# Patient Record
Sex: MALE | Race: BLACK OR AFRICAN AMERICAN | NOT HISPANIC OR LATINO | Employment: OTHER | URBAN - NONMETROPOLITAN AREA
[De-identification: names, ages, dates, MRNs, and addresses within clinical notes are randomized per-mention and may not be internally consistent; named-entity substitution may affect disease eponyms.]

---

## 2024-01-01 ENCOUNTER — HOSPITAL ENCOUNTER (INPATIENT)
Facility: HOSPITAL | Age: 34
LOS: 23 days | DRG: 853 | End: 2025-01-07
Attending: EMERGENCY MEDICINE | Admitting: INTERNAL MEDICINE
Payer: MEDICARE

## 2024-01-01 DIAGNOSIS — J18.9 PNEUMONIA OF RIGHT LOWER LOBE DUE TO INFECTIOUS ORGANISM: ICD-10-CM

## 2024-01-01 DIAGNOSIS — A41.9 SEPSIS: Primary | ICD-10-CM

## 2024-01-01 DIAGNOSIS — S31.000S SACRAL WOUND, SEQUELA: ICD-10-CM

## 2024-01-01 DIAGNOSIS — T83.511A URINARY TRACT INFECTION ASSOCIATED WITH INDWELLING URETHRAL CATHETER, INITIAL ENCOUNTER: ICD-10-CM

## 2024-01-01 DIAGNOSIS — N39.0 URINARY TRACT INFECTION ASSOCIATED WITH INDWELLING URETHRAL CATHETER, INITIAL ENCOUNTER: ICD-10-CM

## 2024-01-01 LAB
ABO + RH BLD: NORMAL
ABO + RH BLD: NORMAL
AC: 12
AC: 18
AC: 22
AC: 22
ALBUMIN SERPL BCP-MCNC: 0.6 G/DL (ref 3.5–5.2)
ALBUMIN SERPL BCP-MCNC: 0.7 G/DL (ref 3.5–5.2)
ALBUMIN SERPL BCP-MCNC: 0.7 G/DL (ref 3.5–5.2)
ALBUMIN SERPL BCP-MCNC: <0.6 G/DL (ref 3.5–5.2)
ALP SERPL-CCNC: 326 U/L (ref 55–135)
ALP SERPL-CCNC: 326 U/L (ref 55–135)
ALP SERPL-CCNC: 328 U/L (ref 55–135)
ALP SERPL-CCNC: 363 U/L (ref 55–135)
ALP SERPL-CCNC: 386 U/L (ref 55–135)
ALP SERPL-CCNC: 395 U/L (ref 55–135)
ALP SERPL-CCNC: 412 U/L (ref 55–135)
ALP SERPL-CCNC: 439 U/L (ref 55–135)
ALP SERPL-CCNC: 461 U/L (ref 55–135)
ALP SERPL-CCNC: 471 U/L (ref 55–135)
ALP SERPL-CCNC: 482 U/L (ref 55–135)
ALP SERPL-CCNC: 503 U/L (ref 55–135)
ALP SERPL-CCNC: 503 U/L (ref 55–135)
ALP SERPL-CCNC: 527 U/L (ref 55–135)
ALP SERPL-CCNC: 558 U/L (ref 55–135)
ALP SERPL-CCNC: 623 U/L (ref 55–135)
ALP SERPL-CCNC: 680 U/L (ref 55–135)
ALT SERPL W/O P-5'-P-CCNC: 10 U/L (ref 10–44)
ALT SERPL W/O P-5'-P-CCNC: 11 U/L (ref 10–44)
ALT SERPL W/O P-5'-P-CCNC: 15 U/L (ref 10–44)
ALT SERPL W/O P-5'-P-CCNC: 16 U/L (ref 10–44)
ALT SERPL W/O P-5'-P-CCNC: 17 U/L (ref 10–44)
ALT SERPL W/O P-5'-P-CCNC: 22 U/L (ref 10–44)
ALT SERPL W/O P-5'-P-CCNC: 22 U/L (ref 10–44)
ALT SERPL W/O P-5'-P-CCNC: 6 U/L (ref 10–44)
ALT SERPL W/O P-5'-P-CCNC: 6 U/L (ref 10–44)
ALT SERPL W/O P-5'-P-CCNC: 8 U/L (ref 10–44)
ALT SERPL W/O P-5'-P-CCNC: 9 U/L (ref 10–44)
ALT SERPL W/O P-5'-P-CCNC: 9 U/L (ref 10–44)
ALT SERPL W/O P-5'-P-CCNC: <6 U/L (ref 10–44)
ALT SERPL W/O P-5'-P-CCNC: <6 U/L (ref 10–44)
ANION GAP SERPL CALC-SCNC: -2 MMOL/L (ref 3–11)
ANION GAP SERPL CALC-SCNC: -2 MMOL/L (ref 3–11)
ANION GAP SERPL CALC-SCNC: 0 MMOL/L (ref 3–11)
ANION GAP SERPL CALC-SCNC: 1 MMOL/L (ref 3–11)
ANION GAP SERPL CALC-SCNC: 2 MMOL/L (ref 3–11)
ANION GAP SERPL CALC-SCNC: 4 MMOL/L (ref 3–11)
ANION GAP SERPL CALC-SCNC: 5 MMOL/L (ref 3–11)
ANION GAP SERPL CALC-SCNC: 9 MMOL/L (ref 3–11)
AST SERPL-CCNC: 105 U/L (ref 10–40)
AST SERPL-CCNC: 18 U/L (ref 10–40)
AST SERPL-CCNC: 18 U/L (ref 10–40)
AST SERPL-CCNC: 20 U/L (ref 10–40)
AST SERPL-CCNC: 23 U/L (ref 10–40)
AST SERPL-CCNC: 24 U/L (ref 10–40)
AST SERPL-CCNC: 25 U/L (ref 10–40)
AST SERPL-CCNC: 25 U/L (ref 10–40)
AST SERPL-CCNC: 27 U/L (ref 10–40)
AST SERPL-CCNC: 33 U/L (ref 10–40)
AST SERPL-CCNC: 34 U/L (ref 10–40)
AST SERPL-CCNC: 38 U/L (ref 10–40)
AST SERPL-CCNC: 41 U/L (ref 10–40)
AST SERPL-CCNC: 41 U/L (ref 10–40)
AST SERPL-CCNC: 44 U/L (ref 10–40)
BACTERIA #/AREA URNS HPF: ABNORMAL /HPF
BACTERIA BLD CULT: NORMAL
BACTERIA BLD CULT: NORMAL
BACTERIA SPEC AEROBE CULT: ABNORMAL
BACTERIA UR CULT: ABNORMAL
BASOPHILS # BLD AUTO: 0.03 K/UL (ref 0–0.2)
BASOPHILS # BLD AUTO: 0.04 K/UL (ref 0–0.2)
BASOPHILS # BLD AUTO: 0.06 K/UL (ref 0–0.2)
BASOPHILS # BLD AUTO: 0.07 K/UL (ref 0–0.2)
BASOPHILS # BLD AUTO: 0.08 K/UL (ref 0–0.2)
BASOPHILS # BLD AUTO: 0.12 K/UL (ref 0–0.2)
BASOPHILS # BLD AUTO: 0.13 K/UL (ref 0–0.2)
BASOPHILS # BLD AUTO: ABNORMAL K/UL (ref 0–0.2)
BASOPHILS # BLD AUTO: ABNORMAL K/UL (ref 0–0.2)
BASOPHILS NFR BLD: 0 % (ref 0–1.9)
BASOPHILS NFR BLD: 0.2 % (ref 0–1.9)
BASOPHILS NFR BLD: 0.2 % (ref 0–1.9)
BASOPHILS NFR BLD: 0.3 % (ref 0–1.9)
BASOPHILS NFR BLD: 0.4 % (ref 0–1.9)
BASOPHILS NFR BLD: 0.6 % (ref 0–1.9)
BASOPHILS NFR BLD: 1 % (ref 0–1.9)
BILIRUB SERPL-MCNC: 0.2 MG/DL (ref 0.1–1)
BILIRUB SERPL-MCNC: 0.3 MG/DL (ref 0.1–1)
BILIRUB SERPL-MCNC: 0.3 MG/DL (ref 0.1–1)
BILIRUB SERPL-MCNC: 0.4 MG/DL (ref 0.1–1)
BILIRUB SERPL-MCNC: 0.5 MG/DL (ref 0.1–1)
BILIRUB SERPL-MCNC: 0.6 MG/DL (ref 0.1–1)
BILIRUB SERPL-MCNC: 0.6 MG/DL (ref 0.1–1)
BILIRUB SERPL-MCNC: 0.9 MG/DL (ref 0.1–1)
BILIRUB UR QL STRIP: NEGATIVE
BLD GP AB SCN CELLS X3 SERPL QL: NORMAL
BLD GP AB SCN CELLS X3 SERPL QL: NORMAL
BLD PROD TYP BPU: NORMAL
BLOOD UNIT EXPIRATION DATE: NORMAL
BLOOD UNIT TYPE CODE: 5100
BLOOD UNIT TYPE: NORMAL
BUN SERPL-MCNC: 10 MG/DL (ref 6–20)
BUN SERPL-MCNC: 10 MG/DL (ref 6–20)
BUN SERPL-MCNC: 12 MG/DL (ref 6–20)
BUN SERPL-MCNC: 13 MG/DL (ref 6–20)
BUN SERPL-MCNC: 13 MG/DL (ref 6–20)
BUN SERPL-MCNC: 14 MG/DL (ref 6–20)
BUN SERPL-MCNC: 16 MG/DL (ref 6–20)
BUN SERPL-MCNC: 18 MG/DL (ref 6–20)
BUN SERPL-MCNC: 19 MG/DL (ref 6–20)
BUN SERPL-MCNC: 27 MG/DL (ref 6–20)
BUN SERPL-MCNC: 40 MG/DL (ref 6–20)
BUN SERPL-MCNC: 50 MG/DL (ref 6–20)
BUN SERPL-MCNC: 52 MG/DL (ref 6–20)
BUN SERPL-MCNC: 57 MG/DL (ref 6–20)
BUN SERPL-MCNC: 66 MG/DL (ref 6–20)
CALCIUM SERPL-MCNC: 10 MG/DL (ref 8.7–10.5)
CALCIUM SERPL-MCNC: 10 MG/DL (ref 8.7–10.5)
CALCIUM SERPL-MCNC: 10.2 MG/DL (ref 8.7–10.5)
CALCIUM SERPL-MCNC: 10.2 MG/DL (ref 8.7–10.5)
CALCIUM SERPL-MCNC: 10.5 MG/DL (ref 8.7–10.5)
CALCIUM SERPL-MCNC: 10.7 MG/DL (ref 8.7–10.5)
CALCIUM SERPL-MCNC: 10.8 MG/DL (ref 8.7–10.5)
CALCIUM SERPL-MCNC: 11 MG/DL (ref 8.7–10.5)
CALCIUM SERPL-MCNC: 9 MG/DL (ref 8.7–10.5)
CALCIUM SERPL-MCNC: 9.1 MG/DL (ref 8.7–10.5)
CALCIUM SERPL-MCNC: 9.1 MG/DL (ref 8.7–10.5)
CALCIUM SERPL-MCNC: 9.2 MG/DL (ref 8.7–10.5)
CALCIUM SERPL-MCNC: 9.4 MG/DL (ref 8.7–10.5)
CALCIUM SERPL-MCNC: 9.6 MG/DL (ref 8.7–10.5)
CALCIUM SERPL-MCNC: 9.7 MG/DL (ref 8.7–10.5)
CALCIUM SERPL-MCNC: 9.8 MG/DL (ref 8.7–10.5)
CALCIUM SERPL-MCNC: 9.8 MG/DL (ref 8.7–10.5)
CAOX CRY URNS QL MICRO: ABNORMAL
CHLORIDE SERPL-SCNC: 100 MMOL/L (ref 95–110)
CHLORIDE SERPL-SCNC: 102 MMOL/L (ref 95–110)
CHLORIDE SERPL-SCNC: 103 MMOL/L (ref 95–110)
CHLORIDE SERPL-SCNC: 104 MMOL/L (ref 95–110)
CHLORIDE SERPL-SCNC: 105 MMOL/L (ref 95–110)
CHLORIDE SERPL-SCNC: 105 MMOL/L (ref 95–110)
CHLORIDE SERPL-SCNC: 106 MMOL/L (ref 95–110)
CHLORIDE SERPL-SCNC: 106 MMOL/L (ref 95–110)
CHLORIDE SERPL-SCNC: 107 MMOL/L (ref 95–110)
CHLORIDE SERPL-SCNC: 108 MMOL/L (ref 95–110)
CHLORIDE SERPL-SCNC: 97 MMOL/L (ref 95–110)
CHLORIDE SERPL-SCNC: 98 MMOL/L (ref 95–110)
CHOLEST SERPL-MCNC: 97 MG/DL (ref 120–199)
CHOLEST/HDLC SERPL: 2.6 {RATIO} (ref 2–5)
CLARITY UR: ABNORMAL
CO2 SERPL-SCNC: 24 MMOL/L (ref 23–29)
CO2 SERPL-SCNC: 25 MMOL/L (ref 23–29)
CO2 SERPL-SCNC: 27 MMOL/L (ref 23–29)
CO2 SERPL-SCNC: 28 MMOL/L (ref 23–29)
CO2 SERPL-SCNC: 28 MMOL/L (ref 23–29)
CO2 SERPL-SCNC: 29 MMOL/L (ref 23–29)
CO2 SERPL-SCNC: 30 MMOL/L (ref 23–29)
CO2 SERPL-SCNC: 31 MMOL/L (ref 23–29)
CO2 SERPL-SCNC: 32 MMOL/L (ref 23–29)
CO2 SERPL-SCNC: 33 MMOL/L (ref 23–29)
CODING SYSTEM: NORMAL
COLOR UR: YELLOW
COMMENTS: ABNORMAL
CREAT SERPL-MCNC: 0.8 MG/DL (ref 0.5–1.4)
CREAT SERPL-MCNC: 0.9 MG/DL (ref 0.5–1.4)
CREAT SERPL-MCNC: 0.9 MG/DL (ref 0.5–1.4)
CREAT SERPL-MCNC: 1 MG/DL (ref 0.5–1.4)
CREAT SERPL-MCNC: 1.1 MG/DL (ref 0.5–1.4)
CREAT SERPL-MCNC: 1.2 MG/DL (ref 0.5–1.4)
CREAT SERPL-MCNC: 1.3 MG/DL (ref 0.5–1.4)
CREAT SERPL-MCNC: 1.4 MG/DL (ref 0.5–1.4)
CREAT SERPL-MCNC: 1.5 MG/DL (ref 0.5–1.4)
CREAT SERPL-MCNC: 1.5 MG/DL (ref 0.5–1.4)
CREAT SERPL-MCNC: 1.6 MG/DL (ref 0.5–1.4)
CROSSMATCH INTERPRETATION: NORMAL
CTP QC/QA: YES
CTP QC/QA: YES
DIFFERENTIAL METHOD BLD: ABNORMAL
DISPENSE STATUS: NORMAL
EOSINOPHIL # BLD AUTO: 0.5 K/UL (ref 0–0.5)
EOSINOPHIL # BLD AUTO: 0.5 K/UL (ref 0–0.5)
EOSINOPHIL # BLD AUTO: 0.6 K/UL (ref 0–0.5)
EOSINOPHIL # BLD AUTO: 0.8 K/UL (ref 0–0.5)
EOSINOPHIL # BLD AUTO: 0.9 K/UL (ref 0–0.5)
EOSINOPHIL # BLD AUTO: 0.9 K/UL (ref 0–0.5)
EOSINOPHIL # BLD AUTO: 1 K/UL (ref 0–0.5)
EOSINOPHIL # BLD AUTO: 1.1 K/UL (ref 0–0.5)
EOSINOPHIL # BLD AUTO: 1.1 K/UL (ref 0–0.5)
EOSINOPHIL # BLD AUTO: 1.3 K/UL (ref 0–0.5)
EOSINOPHIL # BLD AUTO: 1.7 K/UL (ref 0–0.5)
EOSINOPHIL # BLD AUTO: ABNORMAL K/UL (ref 0–0.5)
EOSINOPHIL # BLD AUTO: ABNORMAL K/UL (ref 0–0.5)
EOSINOPHIL NFR BLD: 1.7 % (ref 0–8)
EOSINOPHIL NFR BLD: 2 % (ref 0–8)
EOSINOPHIL NFR BLD: 2.2 % (ref 0–8)
EOSINOPHIL NFR BLD: 3 % (ref 0–8)
EOSINOPHIL NFR BLD: 3.5 % (ref 0–8)
EOSINOPHIL NFR BLD: 4.6 % (ref 0–8)
EOSINOPHIL NFR BLD: 5.3 % (ref 0–8)
EOSINOPHIL NFR BLD: 5.6 % (ref 0–8)
EOSINOPHIL NFR BLD: 6.1 % (ref 0–8)
EOSINOPHIL NFR BLD: 6.2 % (ref 0–8)
EOSINOPHIL NFR BLD: 6.5 % (ref 0–8)
EOSINOPHIL NFR BLD: 7.1 % (ref 0–8)
EOSINOPHIL NFR BLD: 7.1 % (ref 0–8)
EOSINOPHIL NFR BLD: 7.5 % (ref 0–8)
EOSINOPHIL NFR BLD: 8 % (ref 0–8)
EOSINOPHIL NFR BLD: 8.3 % (ref 0–8)
EOSINOPHIL NFR BLD: 9.6 % (ref 0–8)
ERYTHROCYTE [DISTWIDTH] IN BLOOD BY AUTOMATED COUNT: 15.6 % (ref 11.5–14.5)
ERYTHROCYTE [DISTWIDTH] IN BLOOD BY AUTOMATED COUNT: 15.7 % (ref 11.5–14.5)
ERYTHROCYTE [DISTWIDTH] IN BLOOD BY AUTOMATED COUNT: 15.7 % (ref 11.5–14.5)
ERYTHROCYTE [DISTWIDTH] IN BLOOD BY AUTOMATED COUNT: 15.9 % (ref 11.5–14.5)
ERYTHROCYTE [DISTWIDTH] IN BLOOD BY AUTOMATED COUNT: 15.9 % (ref 11.5–14.5)
ERYTHROCYTE [DISTWIDTH] IN BLOOD BY AUTOMATED COUNT: 16 % (ref 11.5–14.5)
ERYTHROCYTE [DISTWIDTH] IN BLOOD BY AUTOMATED COUNT: 16.1 % (ref 11.5–14.5)
ERYTHROCYTE [DISTWIDTH] IN BLOOD BY AUTOMATED COUNT: 16.1 % (ref 11.5–14.5)
ERYTHROCYTE [DISTWIDTH] IN BLOOD BY AUTOMATED COUNT: 16.2 % (ref 11.5–14.5)
ERYTHROCYTE [DISTWIDTH] IN BLOOD BY AUTOMATED COUNT: 16.3 % (ref 11.5–14.5)
ERYTHROCYTE [DISTWIDTH] IN BLOOD BY AUTOMATED COUNT: 16.3 % (ref 11.5–14.5)
ERYTHROCYTE [DISTWIDTH] IN BLOOD BY AUTOMATED COUNT: 16.5 % (ref 11.5–14.5)
EST. GFR  (NO RACE VARIABLE): 58 ML/MIN/1.73 M^2
EST. GFR  (NO RACE VARIABLE): >60 ML/MIN/1.73 M^2
ESTIMATED AVG GLUCOSE: 143 MG/DL (ref 68–131)
FIO2: 100 %
FIO2: 21 %
FIO2: 30 %
FIO2: 40 %
FIO2: 50 %
FIO2: 50 %
FIO2: 60 %
GENTAMICIN PEAK SERPL-MCNC: 4.5 UG/ML (ref 5–30)
GENTAMICIN PEAK SERPL-MCNC: 8.1 UG/ML (ref 5–30)
GENTAMICIN SERPL-MCNC: 0.8 UG/ML
GENTAMICIN SERPL-MCNC: 1.1 UG/ML
GENTAMICIN SERPL-MCNC: 1.5 UG/ML
GENTAMICIN SERPL-MCNC: 1.8 UG/ML
GENTAMICIN SERPL-MCNC: 2.1 UG/ML
GENTAMICIN SERPL-MCNC: 5.1 UG/ML
GENTAMICIN TROUGH SERPL-MCNC: 0.8 UG/ML (ref 0–2)
GENTAMICIN TROUGH SERPL-MCNC: 1.3 UG/ML (ref 0–2)
GENTAMICIN TROUGH SERPL-MCNC: 2.3 UG/ML (ref 0–2)
GENTAMICIN TROUGH SERPL-MCNC: 3 UG/ML (ref 0–2)
GENTAMICIN TROUGH SERPL-MCNC: 4.2 UG/ML (ref 0–2)
GLUCOSE SERPL-MCNC: 156 MG/DL (ref 70–110)
GLUCOSE SERPL-MCNC: 163 MG/DL (ref 70–110)
GLUCOSE SERPL-MCNC: 165 MG/DL (ref 70–110)
GLUCOSE SERPL-MCNC: 187 MG/DL (ref 70–110)
GLUCOSE SERPL-MCNC: 188 MG/DL (ref 70–110)
GLUCOSE SERPL-MCNC: 190 MG/DL (ref 70–110)
GLUCOSE SERPL-MCNC: 199 MG/DL (ref 70–110)
GLUCOSE SERPL-MCNC: 201 MG/DL (ref 70–110)
GLUCOSE SERPL-MCNC: 208 MG/DL (ref 70–110)
GLUCOSE SERPL-MCNC: 229 MG/DL (ref 70–110)
GLUCOSE SERPL-MCNC: 229 MG/DL (ref 70–110)
GLUCOSE SERPL-MCNC: 236 MG/DL (ref 70–110)
GLUCOSE SERPL-MCNC: 239 MG/DL (ref 70–110)
GLUCOSE SERPL-MCNC: 265 MG/DL (ref 70–110)
GLUCOSE SERPL-MCNC: 279 MG/DL (ref 70–110)
GLUCOSE SERPL-MCNC: 297 MG/DL (ref 70–110)
GLUCOSE SERPL-MCNC: 452 MG/DL (ref 70–110)
GLUCOSE SERPL-MCNC: 460 MG/DL (ref 70–110)
GLUCOSE UR QL STRIP: NEGATIVE
GRAM STN SPEC: ABNORMAL
GRAN CASTS #/AREA URNS LPF: 4 /LPF
HBA1C MFR BLD: 6.6 % (ref 4–5.6)
HCT VFR BLD AUTO: 21.2 % (ref 40–54)
HCT VFR BLD AUTO: 21.3 % (ref 40–54)
HCT VFR BLD AUTO: 22 % (ref 40–54)
HCT VFR BLD AUTO: 22.9 % (ref 40–54)
HCT VFR BLD AUTO: 23.4 % (ref 40–54)
HCT VFR BLD AUTO: 24.2 % (ref 40–54)
HCT VFR BLD AUTO: 24.5 % (ref 40–54)
HCT VFR BLD AUTO: 24.7 % (ref 40–54)
HCT VFR BLD AUTO: 24.9 % (ref 40–54)
HCT VFR BLD AUTO: 25.1 % (ref 40–54)
HCT VFR BLD AUTO: 25.2 % (ref 40–54)
HCT VFR BLD AUTO: 25.2 % (ref 40–54)
HCT VFR BLD AUTO: 25.4 % (ref 40–54)
HCT VFR BLD AUTO: 25.7 % (ref 40–54)
HCT VFR BLD AUTO: 25.9 % (ref 40–54)
HCT VFR BLD AUTO: 26.4 % (ref 40–54)
HCT VFR BLD AUTO: 26.8 % (ref 40–54)
HCT VFR BLD AUTO: 30.4 % (ref 40–54)
HDLC SERPL-MCNC: 38 MG/DL (ref 40–75)
HDLC SERPL: 39.2 % (ref 20–50)
HGB BLD-MCNC: 6.3 G/DL (ref 14–18)
HGB BLD-MCNC: 6.5 G/DL (ref 14–18)
HGB BLD-MCNC: 6.5 G/DL (ref 14–18)
HGB BLD-MCNC: 6.7 G/DL (ref 14–18)
HGB BLD-MCNC: 7.3 G/DL (ref 14–18)
HGB BLD-MCNC: 7.6 G/DL (ref 14–18)
HGB BLD-MCNC: 7.6 G/DL (ref 14–18)
HGB BLD-MCNC: 7.8 G/DL (ref 14–18)
HGB BLD-MCNC: 7.9 G/DL (ref 14–18)
HGB BLD-MCNC: 8 G/DL (ref 14–18)
HGB BLD-MCNC: 8.4 G/DL (ref 14–18)
HGB BLD-MCNC: 8.5 G/DL (ref 14–18)
HGB BLD-MCNC: 8.7 G/DL (ref 14–18)
HGB BLD-MCNC: 9.9 G/DL (ref 14–18)
HGB UR QL STRIP: ABNORMAL
HYALINE CASTS #/AREA URNS LPF: 50 /LPF
IMM GRANULOCYTES # BLD AUTO: 0.08 K/UL (ref 0–0.04)
IMM GRANULOCYTES # BLD AUTO: 0.09 K/UL (ref 0–0.04)
IMM GRANULOCYTES # BLD AUTO: 0.12 K/UL (ref 0–0.04)
IMM GRANULOCYTES # BLD AUTO: 0.13 K/UL (ref 0–0.04)
IMM GRANULOCYTES # BLD AUTO: 0.14 K/UL (ref 0–0.04)
IMM GRANULOCYTES # BLD AUTO: 0.17 K/UL (ref 0–0.04)
IMM GRANULOCYTES # BLD AUTO: 0.21 K/UL (ref 0–0.04)
IMM GRANULOCYTES # BLD AUTO: 0.21 K/UL (ref 0–0.04)
IMM GRANULOCYTES # BLD AUTO: 0.23 K/UL (ref 0–0.04)
IMM GRANULOCYTES # BLD AUTO: 0.26 K/UL (ref 0–0.04)
IMM GRANULOCYTES # BLD AUTO: 0.26 K/UL (ref 0–0.04)
IMM GRANULOCYTES # BLD AUTO: 0.28 K/UL (ref 0–0.04)
IMM GRANULOCYTES # BLD AUTO: 0.32 K/UL (ref 0–0.04)
IMM GRANULOCYTES # BLD AUTO: 0.35 K/UL (ref 0–0.04)
IMM GRANULOCYTES # BLD AUTO: 0.71 K/UL (ref 0–0.04)
IMM GRANULOCYTES # BLD AUTO: ABNORMAL K/UL (ref 0–0.04)
IMM GRANULOCYTES # BLD AUTO: ABNORMAL K/UL (ref 0–0.04)
IMM GRANULOCYTES NFR BLD AUTO: 0.5 % (ref 0–0.5)
IMM GRANULOCYTES NFR BLD AUTO: 0.7 % (ref 0–0.5)
IMM GRANULOCYTES NFR BLD AUTO: 0.8 % (ref 0–0.5)
IMM GRANULOCYTES NFR BLD AUTO: 1.1 % (ref 0–0.5)
IMM GRANULOCYTES NFR BLD AUTO: 1.1 % (ref 0–0.5)
IMM GRANULOCYTES NFR BLD AUTO: 1.2 % (ref 0–0.5)
IMM GRANULOCYTES NFR BLD AUTO: 1.2 % (ref 0–0.5)
IMM GRANULOCYTES NFR BLD AUTO: 1.3 % (ref 0–0.5)
IMM GRANULOCYTES NFR BLD AUTO: 1.5 % (ref 0–0.5)
IMM GRANULOCYTES NFR BLD AUTO: 1.6 % (ref 0–0.5)
IMM GRANULOCYTES NFR BLD AUTO: 2.1 % (ref 0–0.5)
IMM GRANULOCYTES NFR BLD AUTO: ABNORMAL % (ref 0–0.5)
IMM GRANULOCYTES NFR BLD AUTO: ABNORMAL % (ref 0–0.5)
KETONES UR QL STRIP: ABNORMAL
LACTATE SERPL-SCNC: 1.6 MMOL/L (ref 0.5–2.2)
LACTATE SERPL-SCNC: 2.3 MMOL/L (ref 0.5–2.2)
LDLC SERPL CALC-MCNC: 7.2 MG/DL (ref 63–159)
LEUKOCYTE ESTERASE UR QL STRIP: ABNORMAL
LYMPHOCYTES # BLD AUTO: 0.6 K/UL (ref 1–4.8)
LYMPHOCYTES # BLD AUTO: 0.9 K/UL (ref 1–4.8)
LYMPHOCYTES # BLD AUTO: 1 K/UL (ref 1–4.8)
LYMPHOCYTES # BLD AUTO: 1 K/UL (ref 1–4.8)
LYMPHOCYTES # BLD AUTO: 1.1 K/UL (ref 1–4.8)
LYMPHOCYTES # BLD AUTO: 1.2 K/UL (ref 1–4.8)
LYMPHOCYTES # BLD AUTO: 1.2 K/UL (ref 1–4.8)
LYMPHOCYTES # BLD AUTO: 1.3 K/UL (ref 1–4.8)
LYMPHOCYTES # BLD AUTO: 1.4 K/UL (ref 1–4.8)
LYMPHOCYTES # BLD AUTO: 1.7 K/UL (ref 1–4.8)
LYMPHOCYTES # BLD AUTO: 2 K/UL (ref 1–4.8)
LYMPHOCYTES # BLD AUTO: ABNORMAL K/UL (ref 1–4.8)
LYMPHOCYTES # BLD AUTO: ABNORMAL K/UL (ref 1–4.8)
LYMPHOCYTES NFR BLD: 10.9 % (ref 18–48)
LYMPHOCYTES NFR BLD: 12.1 % (ref 18–48)
LYMPHOCYTES NFR BLD: 12.6 % (ref 18–48)
LYMPHOCYTES NFR BLD: 3 % (ref 18–48)
LYMPHOCYTES NFR BLD: 3 % (ref 18–48)
LYMPHOCYTES NFR BLD: 3.8 % (ref 18–48)
LYMPHOCYTES NFR BLD: 4.7 % (ref 18–48)
LYMPHOCYTES NFR BLD: 4.9 % (ref 18–48)
LYMPHOCYTES NFR BLD: 5.1 % (ref 18–48)
LYMPHOCYTES NFR BLD: 5.2 % (ref 18–48)
LYMPHOCYTES NFR BLD: 5.3 % (ref 18–48)
LYMPHOCYTES NFR BLD: 6 % (ref 18–48)
LYMPHOCYTES NFR BLD: 6.5 % (ref 18–48)
LYMPHOCYTES NFR BLD: 6.7 % (ref 18–48)
LYMPHOCYTES NFR BLD: 7.1 % (ref 18–48)
LYMPHOCYTES NFR BLD: 8.9 % (ref 18–48)
LYMPHOCYTES NFR BLD: 9.4 % (ref 18–48)
Lab: ABNORMAL
MAGNESIUM SERPL-MCNC: 1.3 MG/DL (ref 1.6–2.6)
MAGNESIUM SERPL-MCNC: 1.4 MG/DL (ref 1.6–2.6)
MAGNESIUM SERPL-MCNC: 1.4 MG/DL (ref 1.6–2.6)
MAGNESIUM SERPL-MCNC: 1.5 MG/DL (ref 1.6–2.6)
MAGNESIUM SERPL-MCNC: 1.6 MG/DL (ref 1.6–2.6)
MAGNESIUM SERPL-MCNC: 1.7 MG/DL (ref 1.6–2.6)
MAGNESIUM SERPL-MCNC: 1.8 MG/DL (ref 1.6–2.6)
MAGNESIUM SERPL-MCNC: 1.9 MG/DL (ref 1.6–2.6)
MCH RBC QN AUTO: 25.9 PG (ref 27–31)
MCH RBC QN AUTO: 26.4 PG (ref 27–31)
MCH RBC QN AUTO: 26.6 PG (ref 27–31)
MCH RBC QN AUTO: 27.1 PG (ref 27–31)
MCH RBC QN AUTO: 27.2 PG (ref 27–31)
MCH RBC QN AUTO: 27.3 PG (ref 27–31)
MCH RBC QN AUTO: 27.3 PG (ref 27–31)
MCH RBC QN AUTO: 27.4 PG (ref 27–31)
MCH RBC QN AUTO: 27.4 PG (ref 27–31)
MCH RBC QN AUTO: 27.5 PG (ref 27–31)
MCH RBC QN AUTO: 27.5 PG (ref 27–31)
MCH RBC QN AUTO: 27.7 PG (ref 27–31)
MCH RBC QN AUTO: 27.7 PG (ref 27–31)
MCH RBC QN AUTO: 27.9 PG (ref 27–31)
MCH RBC QN AUTO: 28 PG (ref 27–31)
MCH RBC QN AUTO: 28 PG (ref 27–31)
MCH RBC QN AUTO: 28.2 PG (ref 27–31)
MCH RBC QN AUTO: 28.9 PG (ref 27–31)
MCHC RBC AUTO-ENTMCNC: 27.5 G/DL (ref 32–36)
MCHC RBC AUTO-ENTMCNC: 29.5 G/DL (ref 32–36)
MCHC RBC AUTO-ENTMCNC: 30.2 G/DL (ref 32–36)
MCHC RBC AUTO-ENTMCNC: 30.3 G/DL (ref 32–36)
MCHC RBC AUTO-ENTMCNC: 30.7 G/DL (ref 32–36)
MCHC RBC AUTO-ENTMCNC: 31.1 G/DL (ref 32–36)
MCHC RBC AUTO-ENTMCNC: 31.1 G/DL (ref 32–36)
MCHC RBC AUTO-ENTMCNC: 31.2 G/DL (ref 32–36)
MCHC RBC AUTO-ENTMCNC: 31.3 G/DL (ref 32–36)
MCHC RBC AUTO-ENTMCNC: 31.5 G/DL (ref 32–36)
MCHC RBC AUTO-ENTMCNC: 31.7 G/DL (ref 32–36)
MCHC RBC AUTO-ENTMCNC: 31.8 G/DL (ref 32–36)
MCHC RBC AUTO-ENTMCNC: 32.2 G/DL (ref 32–36)
MCHC RBC AUTO-ENTMCNC: 32.4 G/DL (ref 32–36)
MCHC RBC AUTO-ENTMCNC: 32.4 G/DL (ref 32–36)
MCHC RBC AUTO-ENTMCNC: 32.5 G/DL (ref 32–36)
MCHC RBC AUTO-ENTMCNC: 32.6 G/DL (ref 32–36)
MCHC RBC AUTO-ENTMCNC: 33.1 G/DL (ref 32–36)
MCV RBC AUTO: 100 FL (ref 82–98)
MCV RBC AUTO: 85 FL (ref 82–98)
MCV RBC AUTO: 86 FL (ref 82–98)
MCV RBC AUTO: 87 FL (ref 82–98)
MCV RBC AUTO: 88 FL (ref 82–98)
MCV RBC AUTO: 91 FL (ref 82–98)
MICROSCOPIC COMMENT: ABNORMAL
MODIFIED ALLEN'S TEST: ABNORMAL
MODIFIED ALLEN'S TEST: NORMAL
MODIFIED ALLEN'S TEST: POSITIVE
MONOCYTES # BLD AUTO: 0.6 K/UL (ref 0.3–1)
MONOCYTES # BLD AUTO: 0.6 K/UL (ref 0.3–1)
MONOCYTES # BLD AUTO: 0.7 K/UL (ref 0.3–1)
MONOCYTES # BLD AUTO: 0.7 K/UL (ref 0.3–1)
MONOCYTES # BLD AUTO: 0.9 K/UL (ref 0.3–1)
MONOCYTES # BLD AUTO: 0.9 K/UL (ref 0.3–1)
MONOCYTES # BLD AUTO: 1 K/UL (ref 0.3–1)
MONOCYTES # BLD AUTO: 1.1 K/UL (ref 0.3–1)
MONOCYTES # BLD AUTO: 1.4 K/UL (ref 0.3–1)
MONOCYTES # BLD AUTO: 1.5 K/UL (ref 0.3–1)
MONOCYTES # BLD AUTO: 1.7 K/UL (ref 0.3–1)
MONOCYTES # BLD AUTO: 2.1 K/UL (ref 0.3–1)
MONOCYTES # BLD AUTO: ABNORMAL K/UL (ref 0.3–1)
MONOCYTES # BLD AUTO: ABNORMAL K/UL (ref 0.3–1)
MONOCYTES NFR BLD: 10.1 % (ref 4–15)
MONOCYTES NFR BLD: 10.5 % (ref 4–15)
MONOCYTES NFR BLD: 3 % (ref 4–15)
MONOCYTES NFR BLD: 3.1 % (ref 4–15)
MONOCYTES NFR BLD: 3.2 % (ref 4–15)
MONOCYTES NFR BLD: 3.9 % (ref 4–15)
MONOCYTES NFR BLD: 4.3 % (ref 4–15)
MONOCYTES NFR BLD: 6.1 % (ref 4–15)
MONOCYTES NFR BLD: 6.4 % (ref 4–15)
MONOCYTES NFR BLD: 6.5 % (ref 4–15)
MONOCYTES NFR BLD: 6.8 % (ref 4–15)
MONOCYTES NFR BLD: 7.4 % (ref 4–15)
MONOCYTES NFR BLD: 7.4 % (ref 4–15)
MONOCYTES NFR BLD: 8.3 % (ref 4–15)
MONOCYTES NFR BLD: 8.7 % (ref 4–15)
NEUTROPHILS # BLD AUTO: 10.7 K/UL (ref 1.8–7.7)
NEUTROPHILS # BLD AUTO: 10.9 K/UL (ref 1.8–7.7)
NEUTROPHILS # BLD AUTO: 11 K/UL (ref 1.8–7.7)
NEUTROPHILS # BLD AUTO: 12.2 K/UL (ref 1.8–7.7)
NEUTROPHILS # BLD AUTO: 13.8 K/UL (ref 1.8–7.7)
NEUTROPHILS # BLD AUTO: 13.9 K/UL (ref 1.8–7.7)
NEUTROPHILS # BLD AUTO: 14.6 K/UL (ref 1.8–7.7)
NEUTROPHILS # BLD AUTO: 15.2 K/UL (ref 1.8–7.7)
NEUTROPHILS # BLD AUTO: 16.4 K/UL (ref 1.8–7.7)
NEUTROPHILS # BLD AUTO: 19.8 K/UL (ref 1.8–7.7)
NEUTROPHILS # BLD AUTO: 26.2 K/UL (ref 1.8–7.7)
NEUTROPHILS # BLD AUTO: 30.6 K/UL (ref 1.8–7.7)
NEUTROPHILS # BLD AUTO: 7.5 K/UL (ref 1.8–7.7)
NEUTROPHILS # BLD AUTO: 7.5 K/UL (ref 1.8–7.7)
NEUTROPHILS # BLD AUTO: 8.5 K/UL (ref 1.8–7.7)
NEUTROPHILS # BLD AUTO: ABNORMAL K/UL (ref 1.8–7.7)
NEUTROPHILS # BLD AUTO: ABNORMAL K/UL (ref 1.8–7.7)
NEUTROPHILS NFR BLD: 35.4 % (ref 38–73)
NEUTROPHILS NFR BLD: 70.1 % (ref 38–73)
NEUTROPHILS NFR BLD: 72.1 % (ref 38–73)
NEUTROPHILS NFR BLD: 74.1 % (ref 38–73)
NEUTROPHILS NFR BLD: 74.6 % (ref 38–73)
NEUTROPHILS NFR BLD: 75.2 % (ref 38–73)
NEUTROPHILS NFR BLD: 78.1 % (ref 38–73)
NEUTROPHILS NFR BLD: 78.4 % (ref 38–73)
NEUTROPHILS NFR BLD: 78.5 % (ref 38–73)
NEUTROPHILS NFR BLD: 78.6 % (ref 38–73)
NEUTROPHILS NFR BLD: 79.3 % (ref 38–73)
NEUTROPHILS NFR BLD: 79.5 % (ref 38–73)
NEUTROPHILS NFR BLD: 84.9 % (ref 38–73)
NEUTROPHILS NFR BLD: 86.9 % (ref 38–73)
NEUTROPHILS NFR BLD: 87.2 % (ref 38–73)
NEUTROPHILS NFR BLD: 88.4 % (ref 38–73)
NEUTROPHILS NFR BLD: 91 % (ref 38–73)
NEUTS BAND NFR BLD MANUAL: 44.4 %
NITRITE UR QL STRIP: POSITIVE
NONHDLC SERPL-MCNC: 59 MG/DL
NOTIFIED BY: ABNORMAL
NRBC BLD-RTO: 0 /100 WBC
NRBC BLD-RTO: 1 /100 WBC
NUM UNITS TRANS PACKED RBC: NORMAL
O2DEVICE: ABNORMAL
O2DEVICE: NORMAL
OHS QRS DURATION: 84 MS
OHS QTC CALCULATION: 470 MS
PCO2 BLDA: 34.6 MMHG (ref 35–45)
PCO2 BLDA: 36 MMHG (ref 35–45)
PCO2 BLDA: 36.1 MMHG (ref 35–45)
PCO2 BLDA: 36.7 MMHG (ref 35–45)
PCO2 BLDA: 38.1 MMHG (ref 35–45)
PCO2 BLDA: 38.3 MMHG (ref 35–45)
PCO2 BLDA: 38.5 MMHG (ref 35–45)
PCO2 BLDA: 39.1 MMHG (ref 35–45)
PCO2 BLDA: 40.8 MMHG (ref 35–45)
PCO2 BLDA: 43.6 MMHG (ref 35–45)
PCO2 BLDA: 46.1 MMHG (ref 35–45)
PCO2 BLDA: 47.4 MMHG (ref 35–45)
PCO2 BLDA: 48.5 MMHG (ref 35–45)
PCO2 BLDA: 48.8 MMHG (ref 35–45)
PCO2 BLDA: 49 MMHG (ref 35–45)
PCO2 BLDA: 49.3 MMHG (ref 35–45)
PCO2 BLDA: 49.9 MMHG (ref 35–45)
PCO2 BLDA: 50.7 MMHG (ref 35–45)
PEAK FLOW: 60
PEEP: 5
PH SMN: 7.36 [PH] (ref 7.34–7.45)
PH SMN: 7.37 [PH] (ref 7.34–7.45)
PH SMN: 7.39 [PH] (ref 7.34–7.45)
PH SMN: 7.41 [PH] (ref 7.34–7.45)
PH SMN: 7.42 [PH] (ref 7.34–7.45)
PH SMN: 7.44 [PH] (ref 7.34–7.45)
PH SMN: 7.44 [PH] (ref 7.34–7.45)
PH SMN: 7.45 [PH] (ref 7.34–7.45)
PH UR STRIP: 6 [PH] (ref 5–8)
PLATELET # BLD AUTO: 141 K/UL (ref 150–450)
PLATELET # BLD AUTO: 154 K/UL (ref 150–450)
PLATELET # BLD AUTO: 157 K/UL (ref 150–450)
PLATELET # BLD AUTO: 170 K/UL (ref 150–450)
PLATELET # BLD AUTO: 171 K/UL (ref 150–450)
PLATELET # BLD AUTO: 173 K/UL (ref 150–450)
PLATELET # BLD AUTO: 174 K/UL (ref 150–450)
PLATELET # BLD AUTO: 232 K/UL (ref 150–450)
PLATELET # BLD AUTO: 247 K/UL (ref 150–450)
PLATELET # BLD AUTO: 250 K/UL (ref 150–450)
PLATELET # BLD AUTO: 253 K/UL (ref 150–450)
PLATELET # BLD AUTO: 255 K/UL (ref 150–450)
PLATELET # BLD AUTO: 265 K/UL (ref 150–450)
PLATELET # BLD AUTO: 331 K/UL (ref 150–450)
PLATELET # BLD AUTO: 343 K/UL (ref 150–450)
PLATELET # BLD AUTO: 450 K/UL (ref 150–450)
PLATELET # BLD AUTO: 527 K/UL (ref 150–450)
PLATELET # BLD AUTO: 563 K/UL (ref 150–450)
PLATELET BLD QL SMEAR: ABNORMAL
PMV BLD AUTO: 10 FL (ref 9.2–12.9)
PMV BLD AUTO: 10 FL (ref 9.2–12.9)
PMV BLD AUTO: 10.3 FL (ref 9.2–12.9)
PMV BLD AUTO: 10.4 FL (ref 9.2–12.9)
PMV BLD AUTO: 10.9 FL (ref 9.2–12.9)
PMV BLD AUTO: 11.3 FL (ref 9.2–12.9)
PMV BLD AUTO: 11.3 FL (ref 9.2–12.9)
PMV BLD AUTO: 11.6 FL (ref 9.2–12.9)
PMV BLD AUTO: 12.2 FL (ref 9.2–12.9)
PMV BLD AUTO: 9.2 FL (ref 9.2–12.9)
PMV BLD AUTO: 9.6 FL (ref 9.2–12.9)
PMV BLD AUTO: 9.7 FL (ref 9.2–12.9)
PMV BLD AUTO: 9.8 FL (ref 9.2–12.9)
PMV BLD AUTO: 9.8 FL (ref 9.2–12.9)
PMV BLD AUTO: 9.9 FL (ref 9.2–12.9)
PO2 BLDA: 114 MMHG (ref 80–100)
PO2 BLDA: 121 MMHG (ref 80–100)
PO2 BLDA: 129 MMHG (ref 80–100)
PO2 BLDA: 156 MMHG (ref 80–100)
PO2 BLDA: 173 MMHG (ref 80–100)
PO2 BLDA: 174 MMHG (ref 80–100)
PO2 BLDA: 178 MMHG (ref 80–100)
PO2 BLDA: 251 MMHG (ref 80–100)
PO2 BLDA: 44.4 MMHG (ref 80–100)
PO2 BLDA: 51.3 MMHG (ref 80–100)
PO2 BLDA: 52.2 MMHG (ref 80–100)
PO2 BLDA: 53.2 MMHG (ref 80–100)
PO2 BLDA: 58.3 MMHG (ref 80–100)
PO2 BLDA: 60.9 MMHG (ref 80–100)
PO2 BLDA: 86.9 MMHG (ref 80–100)
PO2 BLDA: 89.2 MMHG (ref 80–100)
PO2 BLDA: 93.6 MMHG (ref 80–100)
PO2 BLDA: 97.2 MMHG (ref 80–100)
POC BASE DEFICIT: -1 MMOL/L (ref -2–2)
POC BASE DEFICIT: -2.3 MMOL/L (ref -2–2)
POC BASE DEFICIT: -3 MMOL/L (ref -2–2)
POC BASE DEFICIT: 0.7 MMOL/L (ref -2–2)
POC BASE DEFICIT: 0.8 MMOL/L (ref -2–2)
POC BASE DEFICIT: 0.9 MMOL/L (ref -2–2)
POC BASE DEFICIT: 1.3 MMOL/L (ref -2–2)
POC BASE DEFICIT: 1.4 MMOL/L (ref -2–2)
POC BASE DEFICIT: 2.3 MMOL/L (ref -2–2)
POC BASE DEFICIT: 2.3 MMOL/L (ref -2–2)
POC BASE DEFICIT: 2.7 MMOL/L (ref -2–2)
POC BASE DEFICIT: 2.9 MMOL/L (ref -2–2)
POC BASE DEFICIT: 3.6 MMOL/L (ref -2–2)
POC BASE DEFICIT: 4.1 MMOL/L (ref -2–2)
POC BASE DEFICIT: 4.1 MMOL/L (ref -2–2)
POC BASE DEFICIT: 4.3 MMOL/L (ref -2–2)
POC BASE DEFICIT: 7 MMOL/L (ref -2–2)
POC BASE DEFICIT: 7.5 MMOL/L (ref -2–2)
POC HCO3: 22.2 MMOL/L (ref 24–28)
POC HCO3: 22.7 MMOL/L (ref 24–28)
POC HCO3: 23.8 MMOL/L (ref 24–28)
POC HCO3: 25 MMOL/L (ref 24–28)
POC HCO3: 25.1 MMOL/L (ref 24–28)
POC HCO3: 25.1 MMOL/L (ref 24–28)
POC HCO3: 25.4 MMOL/L (ref 24–28)
POC HCO3: 25.5 MMOL/L (ref 24–28)
POC HCO3: 26.1 MMOL/L (ref 24–28)
POC HCO3: 26.3 MMOL/L (ref 24–28)
POC HCO3: 26.3 MMOL/L (ref 24–28)
POC HCO3: 26.4 MMOL/L (ref 24–28)
POC HCO3: 27.3 MMOL/L (ref 24–28)
POC HCO3: 27.3 MMOL/L (ref 24–28)
POC HCO3: 27.7 MMOL/L (ref 24–28)
POC HCO3: 27.9 MMOL/L (ref 24–28)
POC HCO3: 30.1 MMOL/L (ref 24–28)
POC HCO3: 30.5 MMOL/L (ref 24–28)
POC MOLECULAR INFLUENZA A AGN: NEGATIVE
POC MOLECULAR INFLUENZA B AGN: NEGATIVE
POC NOTIFIED NOTE: ABNORMAL
POC PERFORMED BY: ABNORMAL
POC PERFORMED BY: NORMAL
POC SATURATED O2: 100 % (ref 95–100)
POC SATURATED O2: 78.1 % (ref 95–100)
POC SATURATED O2: 85.4 % (ref 95–100)
POC SATURATED O2: 85.5 % (ref 95–100)
POC SATURATED O2: 85.7 % (ref 95–100)
POC SATURATED O2: 87.9 % (ref 95–100)
POC SATURATED O2: 88.1 % (ref 95–100)
POC SATURATED O2: 97.7 % (ref 95–100)
POC SATURATED O2: 97.9 % (ref 95–100)
POC SATURATED O2: 98.1 % (ref 95–100)
POC SATURATED O2: 98.6 % (ref 95–100)
POC SATURATED O2: 99.3 % (ref 95–100)
POC SATURATED O2: 99.5 % (ref 95–100)
POC SATURATED O2: 99.8 % (ref 95–100)
POC SATURATED O2: 99.8 % (ref 95–100)
POC SATURATED O2: 99.9 % (ref 95–100)
POC TEMPERATURE: 37 C
POCT GLUCOSE: 134 MG/DL (ref 70–110)
POCT GLUCOSE: 148 MG/DL (ref 70–110)
POCT GLUCOSE: 152 MG/DL (ref 70–110)
POCT GLUCOSE: 154 MG/DL (ref 70–110)
POCT GLUCOSE: 157 MG/DL (ref 70–110)
POCT GLUCOSE: 158 MG/DL (ref 70–110)
POCT GLUCOSE: 169 MG/DL (ref 70–110)
POCT GLUCOSE: 170 MG/DL (ref 70–110)
POCT GLUCOSE: 170 MG/DL (ref 70–110)
POCT GLUCOSE: 173 MG/DL (ref 70–110)
POCT GLUCOSE: 174 MG/DL (ref 70–110)
POCT GLUCOSE: 175 MG/DL (ref 70–110)
POCT GLUCOSE: 176 MG/DL (ref 70–110)
POCT GLUCOSE: 177 MG/DL (ref 70–110)
POCT GLUCOSE: 178 MG/DL (ref 70–110)
POCT GLUCOSE: 179 MG/DL (ref 70–110)
POCT GLUCOSE: 179 MG/DL (ref 70–110)
POCT GLUCOSE: 181 MG/DL (ref 70–110)
POCT GLUCOSE: 185 MG/DL (ref 70–110)
POCT GLUCOSE: 185 MG/DL (ref 70–110)
POCT GLUCOSE: 186 MG/DL (ref 70–110)
POCT GLUCOSE: 187 MG/DL (ref 70–110)
POCT GLUCOSE: 189 MG/DL (ref 70–110)
POCT GLUCOSE: 189 MG/DL (ref 70–110)
POCT GLUCOSE: 190 MG/DL (ref 70–110)
POCT GLUCOSE: 191 MG/DL (ref 70–110)
POCT GLUCOSE: 194 MG/DL (ref 70–110)
POCT GLUCOSE: 196 MG/DL (ref 70–110)
POCT GLUCOSE: 197 MG/DL (ref 70–110)
POCT GLUCOSE: 198 MG/DL (ref 70–110)
POCT GLUCOSE: 200 MG/DL (ref 70–110)
POCT GLUCOSE: 202 MG/DL (ref 70–110)
POCT GLUCOSE: 206 MG/DL (ref 70–110)
POCT GLUCOSE: 206 MG/DL (ref 70–110)
POCT GLUCOSE: 210 MG/DL (ref 70–110)
POCT GLUCOSE: 212 MG/DL (ref 70–110)
POCT GLUCOSE: 214 MG/DL (ref 70–110)
POCT GLUCOSE: 215 MG/DL (ref 70–110)
POCT GLUCOSE: 216 MG/DL (ref 70–110)
POCT GLUCOSE: 218 MG/DL (ref 70–110)
POCT GLUCOSE: 221 MG/DL (ref 70–110)
POCT GLUCOSE: 222 MG/DL (ref 70–110)
POCT GLUCOSE: 222 MG/DL (ref 70–110)
POCT GLUCOSE: 226 MG/DL (ref 70–110)
POCT GLUCOSE: 226 MG/DL (ref 70–110)
POCT GLUCOSE: 229 MG/DL (ref 70–110)
POCT GLUCOSE: 235 MG/DL (ref 70–110)
POCT GLUCOSE: 238 MG/DL (ref 70–110)
POCT GLUCOSE: 239 MG/DL (ref 70–110)
POCT GLUCOSE: 239 MG/DL (ref 70–110)
POCT GLUCOSE: 242 MG/DL (ref 70–110)
POCT GLUCOSE: 243 MG/DL (ref 70–110)
POCT GLUCOSE: 244 MG/DL (ref 70–110)
POCT GLUCOSE: 244 MG/DL (ref 70–110)
POCT GLUCOSE: 245 MG/DL (ref 70–110)
POCT GLUCOSE: 250 MG/DL (ref 70–110)
POCT GLUCOSE: 251 MG/DL (ref 70–110)
POCT GLUCOSE: 252 MG/DL (ref 70–110)
POCT GLUCOSE: 256 MG/DL (ref 70–110)
POCT GLUCOSE: 257 MG/DL (ref 70–110)
POCT GLUCOSE: 259 MG/DL (ref 70–110)
POCT GLUCOSE: 260 MG/DL (ref 70–110)
POCT GLUCOSE: 261 MG/DL (ref 70–110)
POCT GLUCOSE: 263 MG/DL (ref 70–110)
POCT GLUCOSE: 271 MG/DL (ref 70–110)
POCT GLUCOSE: 276 MG/DL (ref 70–110)
POCT GLUCOSE: 278 MG/DL (ref 70–110)
POCT GLUCOSE: 281 MG/DL (ref 70–110)
POCT GLUCOSE: 285 MG/DL (ref 70–110)
POCT GLUCOSE: 287 MG/DL (ref 70–110)
POCT GLUCOSE: 287 MG/DL (ref 70–110)
POCT GLUCOSE: 289 MG/DL (ref 70–110)
POCT GLUCOSE: 292 MG/DL (ref 70–110)
POCT GLUCOSE: 296 MG/DL (ref 70–110)
POCT GLUCOSE: 303 MG/DL (ref 70–110)
POCT GLUCOSE: 306 MG/DL (ref 70–110)
POCT GLUCOSE: 307 MG/DL (ref 70–110)
POCT GLUCOSE: 313 MG/DL (ref 70–110)
POCT GLUCOSE: 321 MG/DL (ref 70–110)
POCT GLUCOSE: 323 MG/DL (ref 70–110)
POCT GLUCOSE: 324 MG/DL (ref 70–110)
POCT GLUCOSE: 327 MG/DL (ref 70–110)
POCT GLUCOSE: 342 MG/DL (ref 70–110)
POCT GLUCOSE: 342 MG/DL (ref 70–110)
POCT GLUCOSE: 354 MG/DL (ref 70–110)
POCT GLUCOSE: 356 MG/DL (ref 70–110)
POCT GLUCOSE: 363 MG/DL (ref 70–110)
POCT GLUCOSE: 364 MG/DL (ref 70–110)
POCT GLUCOSE: 378 MG/DL (ref 70–110)
POCT GLUCOSE: 384 MG/DL (ref 70–110)
POCT GLUCOSE: 393 MG/DL (ref 70–110)
POCT GLUCOSE: 449 MG/DL (ref 70–110)
POCT GLUCOSE: 452 MG/DL (ref 70–110)
POCT GLUCOSE: 471 MG/DL (ref 70–110)
POTASSIUM SERPL-SCNC: 3.3 MMOL/L (ref 3.5–5.1)
POTASSIUM SERPL-SCNC: 3.3 MMOL/L (ref 3.5–5.1)
POTASSIUM SERPL-SCNC: 3.8 MMOL/L (ref 3.5–5.1)
POTASSIUM SERPL-SCNC: 3.9 MMOL/L (ref 3.5–5.1)
POTASSIUM SERPL-SCNC: 3.9 MMOL/L (ref 3.5–5.1)
POTASSIUM SERPL-SCNC: 4.2 MMOL/L (ref 3.5–5.1)
POTASSIUM SERPL-SCNC: 4.2 MMOL/L (ref 3.5–5.1)
POTASSIUM SERPL-SCNC: 4.3 MMOL/L (ref 3.5–5.1)
POTASSIUM SERPL-SCNC: 4.5 MMOL/L (ref 3.5–5.1)
POTASSIUM SERPL-SCNC: 4.7 MMOL/L (ref 3.5–5.1)
POTASSIUM SERPL-SCNC: 4.7 MMOL/L (ref 3.5–5.1)
POTASSIUM SERPL-SCNC: 4.8 MMOL/L (ref 3.5–5.1)
POTASSIUM SERPL-SCNC: 4.9 MMOL/L (ref 3.5–5.1)
POTASSIUM SERPL-SCNC: 5.1 MMOL/L (ref 3.5–5.1)
POTASSIUM SERPL-SCNC: 5.1 MMOL/L (ref 3.5–5.1)
PROT SERPL-MCNC: 5 G/DL (ref 6–8.4)
PROT SERPL-MCNC: 5.1 G/DL (ref 6–8.4)
PROT SERPL-MCNC: 5.2 G/DL (ref 6–8.4)
PROT SERPL-MCNC: 5.5 G/DL (ref 6–8.4)
PROT SERPL-MCNC: 5.7 G/DL (ref 6–8.4)
PROT SERPL-MCNC: 5.8 G/DL (ref 6–8.4)
PROT SERPL-MCNC: 5.9 G/DL (ref 6–8.4)
PROT SERPL-MCNC: 5.9 G/DL (ref 6–8.4)
PROT SERPL-MCNC: 6 G/DL (ref 6–8.4)
PROT SERPL-MCNC: 6 G/DL (ref 6–8.4)
PROT SERPL-MCNC: 6.1 G/DL (ref 6–8.4)
PROT SERPL-MCNC: 6.2 G/DL (ref 6–8.4)
PROT SERPL-MCNC: 6.7 G/DL (ref 6–8.4)
PROT UR QL STRIP: ABNORMAL
PROVIDER NOTIFIED: ABNORMAL
RBC # BLD AUTO: 2.29 M/UL (ref 4.6–6.2)
RBC # BLD AUTO: 2.44 M/UL (ref 4.6–6.2)
RBC # BLD AUTO: 2.46 M/UL (ref 4.6–6.2)
RBC # BLD AUTO: 2.51 M/UL (ref 4.6–6.2)
RBC # BLD AUTO: 2.67 M/UL (ref 4.6–6.2)
RBC # BLD AUTO: 2.77 M/UL (ref 4.6–6.2)
RBC # BLD AUTO: 2.77 M/UL (ref 4.6–6.2)
RBC # BLD AUTO: 2.85 M/UL (ref 4.6–6.2)
RBC # BLD AUTO: 2.85 M/UL (ref 4.6–6.2)
RBC # BLD AUTO: 2.86 M/UL (ref 4.6–6.2)
RBC # BLD AUTO: 2.86 M/UL (ref 4.6–6.2)
RBC # BLD AUTO: 2.89 M/UL (ref 4.6–6.2)
RBC # BLD AUTO: 2.91 M/UL (ref 4.6–6.2)
RBC # BLD AUTO: 2.94 M/UL (ref 4.6–6.2)
RBC # BLD AUTO: 3 M/UL (ref 4.6–6.2)
RBC # BLD AUTO: 3.05 M/UL (ref 4.6–6.2)
RBC # BLD AUTO: 3.08 M/UL (ref 4.6–6.2)
RBC # BLD AUTO: 3.57 M/UL (ref 4.6–6.2)
RBC #/AREA URNS HPF: >100 /HPF (ref 0–4)
SARS-COV-2 RDRP RESP QL NAA+PROBE: NEGATIVE
SODIUM SERPL-SCNC: 127 MMOL/L (ref 136–145)
SODIUM SERPL-SCNC: 129 MMOL/L (ref 136–145)
SODIUM SERPL-SCNC: 130 MMOL/L (ref 136–145)
SODIUM SERPL-SCNC: 130 MMOL/L (ref 136–145)
SODIUM SERPL-SCNC: 131 MMOL/L (ref 136–145)
SODIUM SERPL-SCNC: 131 MMOL/L (ref 136–145)
SODIUM SERPL-SCNC: 133 MMOL/L (ref 136–145)
SODIUM SERPL-SCNC: 134 MMOL/L (ref 136–145)
SODIUM SERPL-SCNC: 134 MMOL/L (ref 136–145)
SODIUM SERPL-SCNC: 135 MMOL/L (ref 136–145)
SODIUM SERPL-SCNC: 137 MMOL/L (ref 136–145)
SODIUM SERPL-SCNC: 138 MMOL/L (ref 136–145)
SODIUM SERPL-SCNC: 138 MMOL/L (ref 136–145)
SODIUM SERPL-SCNC: 139 MMOL/L (ref 136–145)
SP GR UR STRIP: 1.01 (ref 1–1.03)
SPECIMEN OUTDATE: NORMAL
SPECIMEN OUTDATE: NORMAL
SPECIMEN SOURCE: ABNORMAL
SPECIMEN SOURCE: NORMAL
SQUAMOUS #/AREA URNS HPF: 6 /HPF
TRIGL SERPL-MCNC: 259 MG/DL (ref 30–150)
TRIGL SERPL-MCNC: 259 MG/DL (ref 30–150)
URN SPEC COLLECT METH UR: ABNORMAL
UROBILINOGEN UR STRIP-ACNC: NEGATIVE EU/DL
VANCOMYCIN SERPL-MCNC: 19.2 UG/ML
VANCOMYCIN SERPL-MCNC: 19.3 UG/ML
VANCOMYCIN SERPL-MCNC: 23.5 UG/ML
VANCOMYCIN TROUGH SERPL-MCNC: 31.8 UG/ML (ref 10–22)
VT: 400
WBC # BLD AUTO: 10.37 K/UL (ref 3.9–12.7)
WBC # BLD AUTO: 10.73 K/UL (ref 3.9–12.7)
WBC # BLD AUTO: 11.33 K/UL (ref 3.9–12.7)
WBC # BLD AUTO: 12.66 K/UL (ref 3.9–12.7)
WBC # BLD AUTO: 14.04 K/UL (ref 3.9–12.7)
WBC # BLD AUTO: 14.32 K/UL (ref 3.9–12.7)
WBC # BLD AUTO: 14.55 K/UL (ref 3.9–12.7)
WBC # BLD AUTO: 15.57 K/UL (ref 3.9–12.7)
WBC # BLD AUTO: 17.38 K/UL (ref 3.9–12.7)
WBC # BLD AUTO: 17.69 K/UL (ref 3.9–12.7)
WBC # BLD AUTO: 17.74 K/UL (ref 3.9–12.7)
WBC # BLD AUTO: 19.44 K/UL (ref 3.9–12.7)
WBC # BLD AUTO: 19.77 K/UL (ref 3.9–12.7)
WBC # BLD AUTO: 20.56 K/UL (ref 3.9–12.7)
WBC # BLD AUTO: 22.84 K/UL (ref 3.9–12.7)
WBC # BLD AUTO: 28.42 K/UL (ref 3.9–12.7)
WBC # BLD AUTO: 30 K/UL (ref 3.9–12.7)
WBC # BLD AUTO: 34.55 K/UL (ref 3.9–12.7)
WBC #/AREA URNS HPF: >100 /HPF (ref 0–5)

## 2024-01-01 PROCEDURE — 37000009 HC ANESTHESIA EA ADD 15 MINS: Performed by: SURGERY

## 2024-01-01 PROCEDURE — 20000000 HC ICU ROOM

## 2024-01-01 PROCEDURE — 36415 COLL VENOUS BLD VENIPUNCTURE: CPT | Performed by: INTERNAL MEDICINE

## 2024-01-01 PROCEDURE — 27100171 HC OXYGEN HIGH FLOW UP TO 24 HOURS

## 2024-01-01 PROCEDURE — 63600175 PHARM REV CODE 636 W HCPCS: Performed by: STUDENT IN AN ORGANIZED HEALTH CARE EDUCATION/TRAINING PROGRAM

## 2024-01-01 PROCEDURE — 85025 COMPLETE CBC W/AUTO DIFF WBC: CPT | Performed by: INTERNAL MEDICINE

## 2024-01-01 PROCEDURE — 83735 ASSAY OF MAGNESIUM: CPT | Performed by: INTERNAL MEDICINE

## 2024-01-01 PROCEDURE — 25000003 PHARM REV CODE 250: Performed by: SURGERY

## 2024-01-01 PROCEDURE — 25000003 PHARM REV CODE 250: Performed by: STUDENT IN AN ORGANIZED HEALTH CARE EDUCATION/TRAINING PROGRAM

## 2024-01-01 PROCEDURE — 80053 COMPREHEN METABOLIC PANEL: CPT | Performed by: INTERNAL MEDICINE

## 2024-01-01 PROCEDURE — 87205 SMEAR GRAM STAIN: CPT | Performed by: INTERNAL MEDICINE

## 2024-01-01 PROCEDURE — 82803 BLOOD GASES ANY COMBINATION: CPT

## 2024-01-01 PROCEDURE — P9016 RBC LEUKOCYTES REDUCED: HCPCS | Performed by: INTERNAL MEDICINE

## 2024-01-01 PROCEDURE — 25000003 PHARM REV CODE 250: Performed by: EMERGENCY MEDICINE

## 2024-01-01 PROCEDURE — 99900035 HC TECH TIME PER 15 MIN (STAT)

## 2024-01-01 PROCEDURE — 80170 ASSAY OF GENTAMICIN: CPT | Performed by: INTERNAL MEDICINE

## 2024-01-01 PROCEDURE — 94003 VENT MGMT INPAT SUBQ DAY: CPT

## 2024-01-01 PROCEDURE — 36430 TRANSFUSION BLD/BLD COMPNT: CPT

## 2024-01-01 PROCEDURE — 87502 INFLUENZA DNA AMP PROBE: CPT

## 2024-01-01 PROCEDURE — 87186 SC STD MICRODIL/AGAR DIL: CPT | Performed by: INTERNAL MEDICINE

## 2024-01-01 PROCEDURE — 25000003 PHARM REV CODE 250: Performed by: INTERNAL MEDICINE

## 2024-01-01 PROCEDURE — 36600 WITHDRAWAL OF ARTERIAL BLOOD: CPT

## 2024-01-01 PROCEDURE — 63600175 PHARM REV CODE 636 W HCPCS: Performed by: EMERGENCY MEDICINE

## 2024-01-01 PROCEDURE — 63600175 PHARM REV CODE 636 W HCPCS: Performed by: INTERNAL MEDICINE

## 2024-01-01 PROCEDURE — 27000207 HC ISOLATION

## 2024-01-01 PROCEDURE — 02HV33Z INSERTION OF INFUSION DEVICE INTO SUPERIOR VENA CAVA, PERCUTANEOUS APPROACH: ICD-10-PCS | Performed by: INTERNAL MEDICINE

## 2024-01-01 PROCEDURE — 99900031 HC PATIENT EDUCATION (STAT)

## 2024-01-01 PROCEDURE — 87150 DNA/RNA AMPLIFIED PROBE: CPT | Mod: 59 | Performed by: EMERGENCY MEDICINE

## 2024-01-01 PROCEDURE — 36000707: Performed by: SURGERY

## 2024-01-01 PROCEDURE — 5A09357 ASSISTANCE WITH RESPIRATORY VENTILATION, LESS THAN 24 CONSECUTIVE HOURS, CONTINUOUS POSITIVE AIRWAY PRESSURE: ICD-10-PCS | Performed by: EMERGENCY MEDICINE

## 2024-01-01 PROCEDURE — 94761 N-INVAS EAR/PLS OXIMETRY MLT: CPT

## 2024-01-01 PROCEDURE — 80170 ASSAY OF GENTAMICIN: CPT | Mod: 91 | Performed by: INTERNAL MEDICINE

## 2024-01-01 PROCEDURE — 36569 INSJ PICC 5 YR+ W/O IMAGING: CPT

## 2024-01-01 PROCEDURE — 85027 COMPLETE CBC AUTOMATED: CPT | Performed by: INTERNAL MEDICINE

## 2024-01-01 PROCEDURE — 87086 URINE CULTURE/COLONY COUNT: CPT | Performed by: EMERGENCY MEDICINE

## 2024-01-01 PROCEDURE — P9047 ALBUMIN (HUMAN), 25%, 50ML: HCPCS | Mod: JZ,JG | Performed by: NURSE PRACTITIONER

## 2024-01-01 PROCEDURE — 94640 AIRWAY INHALATION TREATMENT: CPT

## 2024-01-01 PROCEDURE — 25000242 PHARM REV CODE 250 ALT 637 W/ HCPCS: Performed by: STUDENT IN AN ORGANIZED HEALTH CARE EDUCATION/TRAINING PROGRAM

## 2024-01-01 PROCEDURE — 63600175 PHARM REV CODE 636 W HCPCS: Mod: JZ,JG | Performed by: NURSE PRACTITIONER

## 2024-01-01 PROCEDURE — 87635 SARS-COV-2 COVID-19 AMP PRB: CPT | Performed by: EMERGENCY MEDICINE

## 2024-01-01 PROCEDURE — 87070 CULTURE OTHR SPECIMN AEROBIC: CPT | Performed by: EMERGENCY MEDICINE

## 2024-01-01 PROCEDURE — 93005 ELECTROCARDIOGRAM TRACING: CPT

## 2024-01-01 PROCEDURE — 93010 ELECTROCARDIOGRAM REPORT: CPT | Mod: ,,, | Performed by: INTERNAL MEDICINE

## 2024-01-01 PROCEDURE — 99233 SBSQ HOSP IP/OBS HIGH 50: CPT | Mod: ,,, | Performed by: STUDENT IN AN ORGANIZED HEALTH CARE EDUCATION/TRAINING PROGRAM

## 2024-01-01 PROCEDURE — 25000003 PHARM REV CODE 250: Performed by: HOSPITALIST

## 2024-01-01 PROCEDURE — 37000008 HC ANESTHESIA 1ST 15 MINUTES: Performed by: SURGERY

## 2024-01-01 PROCEDURE — 99900026 HC AIRWAY MAINTENANCE (STAT)

## 2024-01-01 PROCEDURE — 25000242 PHARM REV CODE 250 ALT 637 W/ HCPCS: Performed by: INTERNAL MEDICINE

## 2024-01-01 PROCEDURE — 85007 BL SMEAR W/DIFF WBC COUNT: CPT | Performed by: INTERNAL MEDICINE

## 2024-01-01 PROCEDURE — 0JB70ZZ EXCISION OF BACK SUBCUTANEOUS TISSUE AND FASCIA, OPEN APPROACH: ICD-10-PCS | Performed by: SURGERY

## 2024-01-01 PROCEDURE — 83036 HEMOGLOBIN GLYCOSYLATED A1C: CPT | Performed by: INTERNAL MEDICINE

## 2024-01-01 PROCEDURE — 63600175 PHARM REV CODE 636 W HCPCS: Performed by: HOSPITALIST

## 2024-01-01 PROCEDURE — 80202 ASSAY OF VANCOMYCIN: CPT | Performed by: INTERNAL MEDICINE

## 2024-01-01 PROCEDURE — 36000706: Performed by: SURGERY

## 2024-01-01 PROCEDURE — 87205 SMEAR GRAM STAIN: CPT | Performed by: EMERGENCY MEDICINE

## 2024-01-01 PROCEDURE — 87040 BLOOD CULTURE FOR BACTERIA: CPT | Performed by: INTERNAL MEDICINE

## 2024-01-01 PROCEDURE — 36415 COLL VENOUS BLD VENIPUNCTURE: CPT | Performed by: EMERGENCY MEDICINE

## 2024-01-01 PROCEDURE — 27100108

## 2024-01-01 PROCEDURE — 87150 DNA/RNA AMPLIFIED PROBE: CPT | Mod: 59 | Performed by: INTERNAL MEDICINE

## 2024-01-01 PROCEDURE — 94799 UNLISTED PULMONARY SVC/PX: CPT

## 2024-01-01 PROCEDURE — B4185 PARENTERAL SOL 10 GM LIPIDS: HCPCS | Performed by: INTERNAL MEDICINE

## 2024-01-01 PROCEDURE — 87088 URINE BACTERIA CULTURE: CPT | Performed by: EMERGENCY MEDICINE

## 2024-01-01 PROCEDURE — 87186 SC STD MICRODIL/AGAR DIL: CPT | Mod: 59 | Performed by: EMERGENCY MEDICINE

## 2024-01-01 PROCEDURE — 25500020 PHARM REV CODE 255: Performed by: INTERNAL MEDICINE

## 2024-01-01 PROCEDURE — 63600175 PHARM REV CODE 636 W HCPCS: Performed by: SURGERY

## 2024-01-01 PROCEDURE — 27200966 HC CLOSED SUCTION SYSTEM

## 2024-01-01 PROCEDURE — 81000 URINALYSIS NONAUTO W/SCOPE: CPT | Performed by: EMERGENCY MEDICINE

## 2024-01-01 PROCEDURE — 80061 LIPID PANEL: CPT | Performed by: INTERNAL MEDICINE

## 2024-01-01 PROCEDURE — 87070 CULTURE OTHR SPECIMN AEROBIC: CPT | Performed by: INTERNAL MEDICINE

## 2024-01-01 PROCEDURE — 85025 COMPLETE CBC W/AUTO DIFF WBC: CPT | Performed by: EMERGENCY MEDICINE

## 2024-01-01 PROCEDURE — 27201423 OPTIME MED/SURG SUP & DEVICES STERILE SUPPLY: Performed by: SURGERY

## 2024-01-01 PROCEDURE — 82947 ASSAY GLUCOSE BLOOD QUANT: CPT | Performed by: INTERNAL MEDICINE

## 2024-01-01 PROCEDURE — 86920 COMPATIBILITY TEST SPIN: CPT | Performed by: INTERNAL MEDICINE

## 2024-01-01 PROCEDURE — A4216 STERILE WATER/SALINE, 10 ML: HCPCS | Performed by: EMERGENCY MEDICINE

## 2024-01-01 PROCEDURE — 36415 COLL VENOUS BLD VENIPUNCTURE: CPT | Mod: XB | Performed by: INTERNAL MEDICINE

## 2024-01-01 PROCEDURE — 5A1955Z RESPIRATORY VENTILATION, GREATER THAN 96 CONSECUTIVE HOURS: ICD-10-PCS | Performed by: EMERGENCY MEDICINE

## 2024-01-01 PROCEDURE — 94668 MNPJ CHEST WALL SBSQ: CPT

## 2024-01-01 PROCEDURE — 63600175 PHARM REV CODE 636 W HCPCS

## 2024-01-01 PROCEDURE — 80053 COMPREHEN METABOLIC PANEL: CPT | Performed by: EMERGENCY MEDICINE

## 2024-01-01 PROCEDURE — 87040 BLOOD CULTURE FOR BACTERIA: CPT | Mod: 59 | Performed by: EMERGENCY MEDICINE

## 2024-01-01 PROCEDURE — C1751 CATH, INF, PER/CENT/MIDLINE: HCPCS

## 2024-01-01 PROCEDURE — 25000003 PHARM REV CODE 250

## 2024-01-01 PROCEDURE — 94002 VENT MGMT INPAT INIT DAY: CPT

## 2024-01-01 PROCEDURE — 31500 INSERT EMERGENCY AIRWAY: CPT

## 2024-01-01 PROCEDURE — 94761 N-INVAS EAR/PLS OXIMETRY MLT: CPT | Mod: XB

## 2024-01-01 PROCEDURE — 86850 RBC ANTIBODY SCREEN: CPT | Performed by: INTERNAL MEDICINE

## 2024-01-01 PROCEDURE — 30243N1 TRANSFUSION OF NONAUTOLOGOUS RED BLOOD CELLS INTO CENTRAL VEIN, PERCUTANEOUS APPROACH: ICD-10-PCS | Performed by: INTERNAL MEDICINE

## 2024-01-01 PROCEDURE — 87184 SC STD DISK METHOD PER PLATE: CPT | Performed by: EMERGENCY MEDICINE

## 2024-01-01 PROCEDURE — 96368 THER/DIAG CONCURRENT INF: CPT

## 2024-01-01 PROCEDURE — 27000221 HC OXYGEN, UP TO 24 HOURS

## 2024-01-01 PROCEDURE — 0BH17EZ INSERTION OF ENDOTRACHEAL AIRWAY INTO TRACHEA, VIA NATURAL OR ARTIFICIAL OPENING: ICD-10-PCS | Performed by: EMERGENCY MEDICINE

## 2024-01-01 PROCEDURE — 96365 THER/PROPH/DIAG IV INF INIT: CPT | Mod: 59

## 2024-01-01 PROCEDURE — 25000242 PHARM REV CODE 250 ALT 637 W/ HCPCS: Performed by: SURGERY

## 2024-01-01 PROCEDURE — 99285 EMERGENCY DEPT VISIT HI MDM: CPT | Mod: 25

## 2024-01-01 PROCEDURE — 83605 ASSAY OF LACTIC ACID: CPT | Mod: 91 | Performed by: EMERGENCY MEDICINE

## 2024-01-01 PROCEDURE — 94667 MNPJ CHEST WALL 1ST: CPT

## 2024-01-01 RX ORDER — TALC
6 POWDER (GRAM) TOPICAL NIGHTLY PRN
Status: DISCONTINUED | OUTPATIENT
Start: 2024-01-01 | End: 2025-01-08 | Stop reason: HOSPADM

## 2024-01-01 RX ORDER — FAMOTIDINE 20 MG/1
20 TABLET, FILM COATED ORAL 2 TIMES DAILY
Status: DISCONTINUED | OUTPATIENT
Start: 2024-01-01 | End: 2025-01-01

## 2024-01-01 RX ORDER — FAMOTIDINE 10 MG/ML
20 INJECTION INTRAVENOUS 2 TIMES DAILY
Status: DISCONTINUED | OUTPATIENT
Start: 2024-01-01 | End: 2024-01-01

## 2024-01-01 RX ORDER — MUPIROCIN 20 MG/G
OINTMENT TOPICAL 2 TIMES DAILY
Status: COMPLETED | OUTPATIENT
Start: 2024-01-01 | End: 2024-01-01

## 2024-01-01 RX ORDER — TALC
6 POWDER (GRAM) TOPICAL NIGHTLY PRN
Status: DISCONTINUED | OUTPATIENT
Start: 2024-01-01 | End: 2024-01-01

## 2024-01-01 RX ORDER — FLUOXETINE HYDROCHLORIDE 20 MG/1
20 CAPSULE ORAL DAILY
Status: DISCONTINUED | OUTPATIENT
Start: 2024-01-01 | End: 2025-01-08 | Stop reason: HOSPADM

## 2024-01-01 RX ORDER — LANOLIN ALCOHOL/MO/W.PET/CERES
400 CREAM (GRAM) TOPICAL 2 TIMES DAILY
Status: DISCONTINUED | OUTPATIENT
Start: 2024-01-01 | End: 2025-01-08 | Stop reason: HOSPADM

## 2024-01-01 RX ORDER — MAGNESIUM SULFATE HEPTAHYDRATE 40 MG/ML
2 INJECTION, SOLUTION INTRAVENOUS ONCE
Status: COMPLETED | OUTPATIENT
Start: 2024-01-01 | End: 2024-01-01

## 2024-01-01 RX ORDER — ETOMIDATE 2 MG/ML
INJECTION INTRAVENOUS
Status: COMPLETED
Start: 2024-01-01 | End: 2024-01-01

## 2024-01-01 RX ORDER — FUROSEMIDE 10 MG/ML
20 INJECTION INTRAMUSCULAR; INTRAVENOUS DAILY
Status: DISCONTINUED | OUTPATIENT
Start: 2024-01-01 | End: 2025-01-01

## 2024-01-01 RX ORDER — METOCLOPRAMIDE 10 MG/1
10 TABLET ORAL
Status: DISCONTINUED | OUTPATIENT
Start: 2024-01-01 | End: 2024-01-01

## 2024-01-01 RX ORDER — ALBUTEROL SULFATE 2.5 MG/.5ML
2.5 SOLUTION RESPIRATORY (INHALATION)
Status: DISCONTINUED | OUTPATIENT
Start: 2024-01-01 | End: 2025-01-08 | Stop reason: HOSPADM

## 2024-01-01 RX ORDER — FUROSEMIDE 10 MG/ML
20 INJECTION INTRAMUSCULAR; INTRAVENOUS ONCE
Status: COMPLETED | OUTPATIENT
Start: 2024-01-01 | End: 2024-01-01

## 2024-01-01 RX ORDER — ROCURONIUM BROMIDE 10 MG/ML
80 INJECTION, SOLUTION INTRAVENOUS ONCE
Status: COMPLETED | OUTPATIENT
Start: 2024-01-01 | End: 2024-01-01

## 2024-01-01 RX ORDER — METOCLOPRAMIDE 10 MG/1
10 TABLET ORAL EVERY 6 HOURS
Status: DISCONTINUED | OUTPATIENT
Start: 2024-01-01 | End: 2024-01-01

## 2024-01-01 RX ORDER — SODIUM CHLORIDE 0.9 % (FLUSH) 0.9 %
10 SYRINGE (ML) INJECTION EVERY 12 HOURS PRN
Status: DISCONTINUED | OUTPATIENT
Start: 2024-01-01 | End: 2025-01-08 | Stop reason: HOSPADM

## 2024-01-01 RX ORDER — INSULIN ASPART 100 [IU]/ML
0-10 INJECTION, SOLUTION INTRAVENOUS; SUBCUTANEOUS EVERY 4 HOURS PRN
Status: DISCONTINUED | OUTPATIENT
Start: 2024-01-01 | End: 2025-01-08 | Stop reason: HOSPADM

## 2024-01-01 RX ORDER — NOREPINEPHRINE BITARTRATE/D5W 4MG/250ML
0-3 PLASTIC BAG, INJECTION (ML) INTRAVENOUS CONTINUOUS
Status: DISCONTINUED | OUTPATIENT
Start: 2024-01-01 | End: 2025-01-01

## 2024-01-01 RX ORDER — MEROPENEM 1 G/1
1 INJECTION, POWDER, FOR SOLUTION INTRAVENOUS
Status: DISCONTINUED | OUTPATIENT
Start: 2024-01-01 | End: 2024-01-01

## 2024-01-01 RX ORDER — ROCURONIUM BROMIDE 10 MG/ML
INJECTION, SOLUTION INTRAVENOUS
Status: COMPLETED
Start: 2024-01-01 | End: 2024-01-01

## 2024-01-01 RX ORDER — FENTANYL CITRATE 50 UG/ML
INJECTION, SOLUTION INTRAMUSCULAR; INTRAVENOUS
Status: COMPLETED
Start: 2024-01-01 | End: 2024-01-01

## 2024-01-01 RX ORDER — ETOMIDATE 2 MG/ML
20 INJECTION INTRAVENOUS
Status: COMPLETED | OUTPATIENT
Start: 2024-01-01 | End: 2024-01-01

## 2024-01-01 RX ORDER — INSULIN GLARGINE 100 [IU]/ML
12 INJECTION, SOLUTION SUBCUTANEOUS DAILY
Status: DISCONTINUED | OUTPATIENT
Start: 2024-01-01 | End: 2024-01-01

## 2024-01-01 RX ORDER — DEXTROSE MONOHYDRATE 100 MG/ML
INJECTION, SOLUTION INTRAVENOUS CONTINUOUS PRN
Status: DISCONTINUED | OUTPATIENT
Start: 2024-01-01 | End: 2025-01-08 | Stop reason: HOSPADM

## 2024-01-01 RX ORDER — LURASIDONE HYDROCHLORIDE 40 MG/1
40 TABLET, FILM COATED ORAL DAILY
Status: DISCONTINUED | OUTPATIENT
Start: 2024-01-01 | End: 2025-01-08 | Stop reason: HOSPADM

## 2024-01-01 RX ORDER — SODIUM CHLORIDE 0.9 % (FLUSH) 0.9 %
10 SYRINGE (ML) INJECTION
Status: DISCONTINUED | OUTPATIENT
Start: 2024-01-01 | End: 2025-01-08 | Stop reason: HOSPADM

## 2024-01-01 RX ORDER — PROPOFOL 10 MG/ML
0-50 INJECTION, EMULSION INTRAVENOUS DAILY PRN
Status: DISCONTINUED | OUTPATIENT
Start: 2024-01-01 | End: 2025-01-08 | Stop reason: HOSPADM

## 2024-01-01 RX ORDER — MEROPENEM 1 G/1
1 INJECTION, POWDER, FOR SOLUTION INTRAVENOUS
Status: COMPLETED | OUTPATIENT
Start: 2024-01-01 | End: 2024-01-01

## 2024-01-01 RX ORDER — ALBUMIN HUMAN 250 G/1000ML
25 SOLUTION INTRAVENOUS ONCE
Status: COMPLETED | OUTPATIENT
Start: 2024-01-01 | End: 2024-01-01

## 2024-01-01 RX ORDER — HYDROCODONE BITARTRATE AND ACETAMINOPHEN 500; 5 MG/1; MG/1
TABLET ORAL
Status: DISCONTINUED | OUTPATIENT
Start: 2024-01-01 | End: 2024-01-01

## 2024-01-01 RX ORDER — MIDAZOLAM HYDROCHLORIDE 1 MG/ML
2 INJECTION, SOLUTION INTRAMUSCULAR; INTRAVENOUS
Status: DISCONTINUED | OUTPATIENT
Start: 2024-01-01 | End: 2025-01-08 | Stop reason: HOSPADM

## 2024-01-01 RX ORDER — ALBUTEROL SULFATE 2.5 MG/.5ML
2.5 SOLUTION RESPIRATORY (INHALATION)
Status: DISCONTINUED | OUTPATIENT
Start: 2024-01-01 | End: 2024-01-01

## 2024-01-01 RX ORDER — INSULIN ASPART 100 [IU]/ML
0-5 INJECTION, SOLUTION INTRAVENOUS; SUBCUTANEOUS EVERY 4 HOURS PRN
Status: DISCONTINUED | OUTPATIENT
Start: 2024-01-01 | End: 2024-01-01

## 2024-01-01 RX ORDER — INSULIN GLARGINE 100 [IU]/ML
16 INJECTION, SOLUTION SUBCUTANEOUS DAILY
Status: DISCONTINUED | OUTPATIENT
Start: 2024-01-01 | End: 2024-01-01

## 2024-01-01 RX ORDER — NAPROXEN SODIUM 220 MG/1
81 TABLET, FILM COATED ORAL DAILY
Status: DISCONTINUED | OUTPATIENT
Start: 2024-01-01 | End: 2025-01-01

## 2024-01-01 RX ORDER — INSULIN GLARGINE 100 [IU]/ML
20 INJECTION, SOLUTION SUBCUTANEOUS DAILY
Status: DISCONTINUED | OUTPATIENT
Start: 2024-01-01 | End: 2025-01-08 | Stop reason: HOSPADM

## 2024-01-01 RX ORDER — ERYTHROMYCIN 250 MG/1
250 TABLET, COATED ORAL 4 TIMES DAILY
Status: DISCONTINUED | OUTPATIENT
Start: 2024-01-01 | End: 2025-01-08 | Stop reason: HOSPADM

## 2024-01-01 RX ORDER — SODIUM CHLORIDE 9 MG/ML
INJECTION, SOLUTION INTRAVENOUS CONTINUOUS
Status: DISCONTINUED | OUTPATIENT
Start: 2024-01-01 | End: 2025-01-01

## 2024-01-01 RX ORDER — VANCOMYCIN 1.75 GRAM/500 ML IN 0.9 % SODIUM CHLORIDE INTRAVENOUS
25
Status: COMPLETED | OUTPATIENT
Start: 2024-01-01 | End: 2024-01-01

## 2024-01-01 RX ORDER — FUROSEMIDE 10 MG/ML
20 INJECTION INTRAMUSCULAR; INTRAVENOUS EVERY 12 HOURS
Status: DISCONTINUED | OUTPATIENT
Start: 2024-01-01 | End: 2024-01-01

## 2024-01-01 RX ORDER — BISACODYL 10 MG/1
10 SUPPOSITORY RECTAL DAILY
Status: DISCONTINUED | OUTPATIENT
Start: 2024-01-01 | End: 2025-01-01

## 2024-01-01 RX ORDER — GLUCAGON 1 MG
1 KIT INJECTION
Status: DISCONTINUED | OUTPATIENT
Start: 2024-01-01 | End: 2025-01-08 | Stop reason: HOSPADM

## 2024-01-01 RX ORDER — METOCLOPRAMIDE 5 MG/1
5 TABLET ORAL
Status: DISCONTINUED | OUTPATIENT
Start: 2024-01-01 | End: 2024-01-01

## 2024-01-01 RX ORDER — ENOXAPARIN SODIUM 100 MG/ML
40 INJECTION SUBCUTANEOUS EVERY 24 HOURS
Status: DISCONTINUED | OUTPATIENT
Start: 2024-01-01 | End: 2025-01-01

## 2024-01-01 RX ORDER — METOCLOPRAMIDE HYDROCHLORIDE 5 MG/ML
10 INJECTION INTRAMUSCULAR; INTRAVENOUS EVERY 6 HOURS
Status: DISCONTINUED | OUTPATIENT
Start: 2024-01-01 | End: 2025-01-01

## 2024-01-01 RX ADMIN — METOCLOPRAMIDE 10 MG: 5 INJECTION, SOLUTION INTRAMUSCULAR; INTRAVENOUS at 11:12

## 2024-01-01 RX ADMIN — LEVOTHYROXINE SODIUM 150 MCG: 100 TABLET ORAL at 05:12

## 2024-01-01 RX ADMIN — ALBUTEROL SULFATE 2.5 MG: 2.5 SOLUTION RESPIRATORY (INHALATION) at 07:12

## 2024-01-01 RX ADMIN — LEVOTHYROXINE SODIUM 150 MCG: 100 TABLET ORAL at 06:12

## 2024-01-01 RX ADMIN — FLUOXETINE HYDROCHLORIDE 20 MG: 20 CAPSULE ORAL at 08:12

## 2024-01-01 RX ADMIN — PROPOFOL 10 MCG/KG/MIN: 10 INJECTION, EMULSION INTRAVENOUS at 03:12

## 2024-01-01 RX ADMIN — LURASIDONE HYDROCHLORIDE 40 MG: 40 TABLET, FILM COATED ORAL at 09:12

## 2024-01-01 RX ADMIN — POTASSIUM BICARBONATE 25 MEQ: 978 TABLET, EFFERVESCENT ORAL at 08:12

## 2024-01-01 RX ADMIN — ENOXAPARIN SODIUM 40 MG: 40 INJECTION SUBCUTANEOUS at 05:12

## 2024-01-01 RX ADMIN — ALBUTEROL SULFATE 2.5 MG: 2.5 SOLUTION RESPIRATORY (INHALATION) at 11:12

## 2024-01-01 RX ADMIN — ALBUTEROL SULFATE 2.5 MG: 2.5 SOLUTION RESPIRATORY (INHALATION) at 03:12

## 2024-01-01 RX ADMIN — INSULIN ASPART 4 UNITS: 100 INJECTION, SOLUTION INTRAVENOUS; SUBCUTANEOUS at 04:12

## 2024-01-01 RX ADMIN — COLLAGENASE SANTYL: 250 OINTMENT TOPICAL at 04:12

## 2024-01-01 RX ADMIN — Medication 400 MG: at 09:12

## 2024-01-01 RX ADMIN — INSULIN ASPART 2 UNITS: 100 INJECTION, SOLUTION INTRAVENOUS; SUBCUTANEOUS at 08:12

## 2024-01-01 RX ADMIN — INSULIN ASPART 10 UNITS: 100 INJECTION, SOLUTION INTRAVENOUS; SUBCUTANEOUS at 05:12

## 2024-01-01 RX ADMIN — MEROPENEM 1 G: 1 INJECTION INTRAVENOUS at 05:12

## 2024-01-01 RX ADMIN — INSULIN ASPART 3 UNITS: 100 INJECTION, SOLUTION INTRAVENOUS; SUBCUTANEOUS at 08:12

## 2024-01-01 RX ADMIN — PROPOFOL 15 MCG/KG/MIN: 10 INJECTION, EMULSION INTRAVENOUS at 07:12

## 2024-01-01 RX ADMIN — METOCLOPRAMIDE 10 MG: 5 INJECTION, SOLUTION INTRAMUSCULAR; INTRAVENOUS at 05:12

## 2024-01-01 RX ADMIN — INSULIN ASPART 2 UNITS: 100 INJECTION, SOLUTION INTRAVENOUS; SUBCUTANEOUS at 04:12

## 2024-01-01 RX ADMIN — SODIUM CHLORIDE 2040 ML: 9 INJECTION, SOLUTION INTRAVENOUS at 09:12

## 2024-01-01 RX ADMIN — PROPOFOL 20 MCG/KG/MIN: 10 INJECTION, EMULSION INTRAVENOUS at 04:12

## 2024-01-01 RX ADMIN — METOCLOPRAMIDE 5 MG: 5 TABLET ORAL at 04:12

## 2024-01-01 RX ADMIN — MEROPENEM 1 G: 1 INJECTION INTRAVENOUS at 09:12

## 2024-01-01 RX ADMIN — FAMOTIDINE 20 MG: 20 TABLET, FILM COATED ORAL at 09:12

## 2024-01-01 RX ADMIN — FENTANYL CITRATE 25 MCG/HR: 50 INJECTION, SOLUTION INTRAMUSCULAR; INTRAVENOUS at 01:12

## 2024-01-01 RX ADMIN — METOCLOPRAMIDE 10 MG: 5 INJECTION, SOLUTION INTRAMUSCULAR; INTRAVENOUS at 12:12

## 2024-01-01 RX ADMIN — Medication 400 MG: at 08:12

## 2024-01-01 RX ADMIN — INSULIN ASPART 6 UNITS: 100 INJECTION, SOLUTION INTRAVENOUS; SUBCUTANEOUS at 10:12

## 2024-01-01 RX ADMIN — GENTAMICIN SULFATE 112.4 MG: 40 INJECTION, SOLUTION INTRAMUSCULAR; INTRAVENOUS at 07:12

## 2024-01-01 RX ADMIN — FLUOXETINE HYDROCHLORIDE 20 MG: 20 CAPSULE ORAL at 09:12

## 2024-01-01 RX ADMIN — MAGNESIUM SULFATE HEPTAHYDRATE 2 G: 40 INJECTION, SOLUTION INTRAVENOUS at 09:12

## 2024-01-01 RX ADMIN — ALBUTEROL SULFATE 2.5 MG: 2.5 SOLUTION RESPIRATORY (INHALATION) at 02:12

## 2024-01-01 RX ADMIN — FUROSEMIDE 20 MG: 10 INJECTION, SOLUTION INTRAMUSCULAR; INTRAVENOUS at 08:12

## 2024-01-01 RX ADMIN — INSULIN ASPART 8 UNITS: 100 INJECTION, SOLUTION INTRAVENOUS; SUBCUTANEOUS at 03:12

## 2024-01-01 RX ADMIN — METOCLOPRAMIDE 5 MG: 5 TABLET ORAL at 06:12

## 2024-01-01 RX ADMIN — Medication 0.25 MCG/KG/MIN: at 09:12

## 2024-01-01 RX ADMIN — POTASSIUM BICARBONATE 25 MEQ: 978 TABLET, EFFERVESCENT ORAL at 09:12

## 2024-01-01 RX ADMIN — BISACODYL 10 MG: 10 SUPPOSITORY RECTAL at 10:12

## 2024-01-01 RX ADMIN — ASCORBIC ACID, VITAMIN A PALMITATE, CHOLECALCIFEROL, THIAMINE HYDROCHLORIDE, RIBOFLAVIN-5 PHOSPHATE SODIUM, PYRIDOXINE HYDROCHLORIDE, NIACINAMIDE, DEXPANTHENOL, ALPHA-TOCOPHEROL ACETATE, VITAMIN K1, FOLIC ACID, BIOTIN, CYANOCOBALAMIN: 200; 3300; 200; 6; 3.6; 6; 40; 15; 10; 150; 600; 60; 5 INJECTION, SOLUTION INTRAVENOUS at 04:12

## 2024-01-01 RX ADMIN — METOCLOPRAMIDE 10 MG: 10 TABLET ORAL at 12:12

## 2024-01-01 RX ADMIN — FAMOTIDINE 20 MG: 10 INJECTION, SOLUTION INTRAVENOUS at 08:12

## 2024-01-01 RX ADMIN — VANCOMYCIN HYDROCHLORIDE 750 MG: 750 INJECTION, POWDER, LYOPHILIZED, FOR SOLUTION INTRAVENOUS at 12:12

## 2024-01-01 RX ADMIN — ASPIRIN 81 MG CHEWABLE TABLET 81 MG: 81 TABLET CHEWABLE at 08:12

## 2024-01-01 RX ADMIN — MEROPENEM 1 G: 1 INJECTION INTRAVENOUS at 12:12

## 2024-01-01 RX ADMIN — INSULIN GLARGINE 20 UNITS: 100 INJECTION, SOLUTION SUBCUTANEOUS at 09:12

## 2024-01-01 RX ADMIN — Medication 0.1 MCG/KG/MIN: at 09:12

## 2024-01-01 RX ADMIN — ASPIRIN 81 MG CHEWABLE TABLET 81 MG: 81 TABLET CHEWABLE at 09:12

## 2024-01-01 RX ADMIN — LEUCINE, PHENYLALANINE, LYSINE, METHIONINE, ISOLEUCINE, VALINE, HISTIDINE, THREONINE, TRYPTOPHAN, ALANINE, GLYCINE, ARGININE, PROLINE, SERINE, TYROSINE, SODIUM ACETATE, DIBASIC POTASSIUM PHOSPHATE, MAGNESIUM CHLORIDE, SODIUM CHLORIDE, CALCIUM CHLORIDE, DEXTROSE
365; 280; 290; 200; 300; 290; 240; 210; 90; 1035; 515; 575; 340; 250; 20; 340; 261; 51; 59; 33; 20 INJECTION INTRAVENOUS at 03:12

## 2024-01-01 RX ADMIN — MUPIROCIN: 20 OINTMENT TOPICAL at 01:12

## 2024-01-01 RX ADMIN — MUPIROCIN: 20 OINTMENT TOPICAL at 08:12

## 2024-01-01 RX ADMIN — Medication 0.1 MCG/KG/MIN: at 04:12

## 2024-01-01 RX ADMIN — SODIUM HYPOCHLORITE: 1.25 SOLUTION TOPICAL at 02:12

## 2024-01-01 RX ADMIN — VANCOMYCIN HYDROCHLORIDE 1000 MG: 1 INJECTION, POWDER, LYOPHILIZED, FOR SOLUTION INTRAVENOUS at 09:12

## 2024-01-01 RX ADMIN — FAMOTIDINE 20 MG: 20 TABLET, FILM COATED ORAL at 08:12

## 2024-01-01 RX ADMIN — INSULIN GLARGINE 20 UNITS: 100 INJECTION, SOLUTION SUBCUTANEOUS at 10:12

## 2024-01-01 RX ADMIN — LEVOTHYROXINE SODIUM 150 MCG: 100 TABLET ORAL at 04:12

## 2024-01-01 RX ADMIN — COLLAGENASE SANTYL: 250 OINTMENT TOPICAL at 03:12

## 2024-01-01 RX ADMIN — MEROPENEM 1 G: 1 INJECTION INTRAVENOUS at 04:12

## 2024-01-01 RX ADMIN — FAMOTIDINE 20 MG: 20 TABLET, FILM COATED ORAL at 10:12

## 2024-01-01 RX ADMIN — MEROPENEM 1 G: 1 INJECTION INTRAVENOUS at 08:12

## 2024-01-01 RX ADMIN — PROPOFOL 15 MCG/KG/MIN: 10 INJECTION, EMULSION INTRAVENOUS at 04:12

## 2024-01-01 RX ADMIN — SODIUM HYPOCHLORITE: 1.25 SOLUTION TOPICAL at 10:12

## 2024-01-01 RX ADMIN — INSULIN ASPART 6 UNITS: 100 INJECTION, SOLUTION INTRAVENOUS; SUBCUTANEOUS at 11:12

## 2024-01-01 RX ADMIN — INSULIN ASPART 3 UNITS: 100 INJECTION, SOLUTION INTRAVENOUS; SUBCUTANEOUS at 12:12

## 2024-01-01 RX ADMIN — INSULIN ASPART 2 UNITS: 100 INJECTION, SOLUTION INTRAVENOUS; SUBCUTANEOUS at 05:12

## 2024-01-01 RX ADMIN — Medication 0.1 MCG/KG/MIN: at 07:12

## 2024-01-01 RX ADMIN — Medication 10 ML: at 12:12

## 2024-01-01 RX ADMIN — INSULIN ASPART 1 UNITS: 100 INJECTION, SOLUTION INTRAVENOUS; SUBCUTANEOUS at 08:12

## 2024-01-01 RX ADMIN — MEROPENEM 1 G: 1 INJECTION INTRAVENOUS at 01:12

## 2024-01-01 RX ADMIN — Medication 1750 MG: at 09:12

## 2024-01-01 RX ADMIN — PIPERACILLIN AND TAZOBACTAM 4.5 G: 4; .5 INJECTION, POWDER, LYOPHILIZED, FOR SOLUTION INTRAVENOUS; PARENTERAL at 01:12

## 2024-01-01 RX ADMIN — METOCLOPRAMIDE 10 MG: 5 INJECTION, SOLUTION INTRAMUSCULAR; INTRAVENOUS at 01:12

## 2024-01-01 RX ADMIN — COLLAGENASE SANTYL: 250 OINTMENT TOPICAL at 09:12

## 2024-01-01 RX ADMIN — LEVOTHYROXINE SODIUM 150 MCG: 100 TABLET ORAL at 08:12

## 2024-01-01 RX ADMIN — PROPOFOL 15 MCG/KG/MIN: 10 INJECTION, EMULSION INTRAVENOUS at 09:12

## 2024-01-01 RX ADMIN — INSULIN ASPART 10 UNITS: 100 INJECTION, SOLUTION INTRAVENOUS; SUBCUTANEOUS at 08:12

## 2024-01-01 RX ADMIN — INSULIN ASPART 6 UNITS: 100 INJECTION, SOLUTION INTRAVENOUS; SUBCUTANEOUS at 08:12

## 2024-01-01 RX ADMIN — FUROSEMIDE 20 MG: 10 INJECTION, SOLUTION INTRAMUSCULAR; INTRAVENOUS at 09:12

## 2024-01-01 RX ADMIN — ENOXAPARIN SODIUM 40 MG: 40 INJECTION SUBCUTANEOUS at 04:12

## 2024-01-01 RX ADMIN — PROPOFOL 15 MCG/KG/MIN: 10 INJECTION, EMULSION INTRAVENOUS at 06:12

## 2024-01-01 RX ADMIN — ALBUTEROL SULFATE 2.5 MG: 2.5 SOLUTION RESPIRATORY (INHALATION) at 08:12

## 2024-01-01 RX ADMIN — PROPOFOL 20 MCG/KG/MIN: 10 INJECTION, EMULSION INTRAVENOUS at 08:12

## 2024-01-01 RX ADMIN — INSULIN ASPART 8 UNITS: 100 INJECTION, SOLUTION INTRAVENOUS; SUBCUTANEOUS at 05:12

## 2024-01-01 RX ADMIN — SOYBEAN OIL 250 ML: 20 INJECTION, SOLUTION INTRAVENOUS at 10:12

## 2024-01-01 RX ADMIN — PIPERACILLIN AND TAZOBACTAM 4.5 G: 4; .5 INJECTION, POWDER, LYOPHILIZED, FOR SOLUTION INTRAVENOUS; PARENTERAL at 09:12

## 2024-01-01 RX ADMIN — LEUCINE, PHENYLALANINE, LYSINE, METHIONINE, ISOLEUCINE, VALINE, HISTIDINE, THREONINE, TRYPTOPHAN, ALANINE, GLYCINE, ARGININE, PROLINE, SERINE, TYROSINE, SODIUM ACETATE, DIBASIC POTASSIUM PHOSPHATE, MAGNESIUM CHLORIDE, SODIUM CHLORIDE, CALCIUM CHLORIDE, DEXTROSE
365; 280; 290; 200; 300; 290; 240; 210; 90; 1035; 515; 575; 340; 250; 20; 340; 261; 51; 59; 33; 20 INJECTION INTRAVENOUS at 04:12

## 2024-01-01 RX ADMIN — PROPOFOL 10 MCG/KG/MIN: 10 INJECTION, EMULSION INTRAVENOUS at 12:12

## 2024-01-01 RX ADMIN — INSULIN ASPART 2 UNITS: 100 INJECTION, SOLUTION INTRAVENOUS; SUBCUTANEOUS at 12:12

## 2024-01-01 RX ADMIN — PROPOFOL 25 MCG/KG/MIN: 10 INJECTION, EMULSION INTRAVENOUS at 02:12

## 2024-01-01 RX ADMIN — LURASIDONE HYDROCHLORIDE 40 MG: 40 TABLET, FILM COATED ORAL at 08:12

## 2024-01-01 RX ADMIN — INSULIN ASPART 2 UNITS: 100 INJECTION, SOLUTION INTRAVENOUS; SUBCUTANEOUS at 09:12

## 2024-01-01 RX ADMIN — SODIUM CHLORIDE: 9 INJECTION, SOLUTION INTRAVENOUS at 05:12

## 2024-01-01 RX ADMIN — METOCLOPRAMIDE 5 MG: 5 TABLET ORAL at 11:12

## 2024-01-01 RX ADMIN — INSULIN ASPART 8 UNITS: 100 INJECTION, SOLUTION INTRAVENOUS; SUBCUTANEOUS at 08:12

## 2024-01-01 RX ADMIN — SODIUM CHLORIDE: 9 INJECTION, SOLUTION INTRAVENOUS at 09:12

## 2024-01-01 RX ADMIN — INSULIN GLARGINE 12 UNITS: 100 INJECTION, SOLUTION SUBCUTANEOUS at 08:12

## 2024-01-01 RX ADMIN — MUPIROCIN: 20 OINTMENT TOPICAL at 09:12

## 2024-01-01 RX ADMIN — INSULIN ASPART 6 UNITS: 100 INJECTION, SOLUTION INTRAVENOUS; SUBCUTANEOUS at 09:12

## 2024-01-01 RX ADMIN — INSULIN GLARGINE 12 UNITS: 100 INJECTION, SOLUTION SUBCUTANEOUS at 09:12

## 2024-01-01 RX ADMIN — INSULIN ASPART 2 UNITS: 100 INJECTION, SOLUTION INTRAVENOUS; SUBCUTANEOUS at 03:12

## 2024-01-01 RX ADMIN — Medication 0.1 MCG/KG/MIN: at 06:12

## 2024-01-01 RX ADMIN — COLLAGENASE SANTYL: 250 OINTMENT TOPICAL at 10:12

## 2024-01-01 RX ADMIN — FAMOTIDINE 20 MG: 10 INJECTION, SOLUTION INTRAVENOUS at 09:12

## 2024-01-01 RX ADMIN — Medication 0.2 MCG/KG/MIN: at 02:12

## 2024-01-01 RX ADMIN — METOCLOPRAMIDE 5 MG: 5 TABLET ORAL at 10:12

## 2024-01-01 RX ADMIN — Medication 10 ML: at 08:12

## 2024-01-01 RX ADMIN — PROPOFOL 15 MCG/KG/MIN: 10 INJECTION, EMULSION INTRAVENOUS at 11:12

## 2024-01-01 RX ADMIN — SODIUM CHLORIDE: 9 INJECTION, SOLUTION INTRAVENOUS at 12:12

## 2024-01-01 RX ADMIN — PROPOFOL 10 MCG/KG/MIN: 10 INJECTION, EMULSION INTRAVENOUS at 02:12

## 2024-01-01 RX ADMIN — ASCORBIC ACID, VITAMIN A PALMITATE, CHOLECALCIFEROL, THIAMINE HYDROCHLORIDE, RIBOFLAVIN-5 PHOSPHATE SODIUM, PYRIDOXINE HYDROCHLORIDE, NIACINAMIDE, DEXPANTHENOL, ALPHA-TOCOPHEROL ACETATE, VITAMIN K1, FOLIC ACID, BIOTIN, CYANOCOBALAMIN: 200; 3300; 200; 6; 3.6; 6; 40; 15; 10; 150; 600; 60; 5 INJECTION, SOLUTION INTRAVENOUS at 05:12

## 2024-01-01 RX ADMIN — SODIUM HYPOCHLORITE: 1.25 SOLUTION TOPICAL at 09:12

## 2024-01-01 RX ADMIN — MIDAZOLAM 2 MG: 1 INJECTION INTRAMUSCULAR; INTRAVENOUS at 10:12

## 2024-01-01 RX ADMIN — PROPOFOL 15 MCG/KG/MIN: 10 INJECTION, EMULSION INTRAVENOUS at 01:12

## 2024-01-01 RX ADMIN — BISACODYL 10 MG: 10 SUPPOSITORY RECTAL at 05:12

## 2024-01-01 RX ADMIN — SODIUM CHLORIDE 2040 ML: 9 INJECTION, SOLUTION INTRAVENOUS at 10:12

## 2024-01-01 RX ADMIN — INSULIN ASPART 8 UNITS: 100 INJECTION, SOLUTION INTRAVENOUS; SUBCUTANEOUS at 11:12

## 2024-01-01 RX ADMIN — SODIUM CHLORIDE: 9 INJECTION, SOLUTION INTRAVENOUS at 11:12

## 2024-01-01 RX ADMIN — ALBUMIN (HUMAN) 25 G: 12.5 SOLUTION INTRAVENOUS at 09:12

## 2024-01-01 RX ADMIN — ETOMIDATE 20 MG: 2 INJECTION INTRAVENOUS at 09:12

## 2024-01-01 RX ADMIN — Medication 0.1 MCG/KG/MIN: at 10:12

## 2024-01-01 RX ADMIN — INSULIN ASPART 8 UNITS: 100 INJECTION, SOLUTION INTRAVENOUS; SUBCUTANEOUS at 09:12

## 2024-01-01 RX ADMIN — METOCLOPRAMIDE 10 MG: 5 INJECTION, SOLUTION INTRAMUSCULAR; INTRAVENOUS at 06:12

## 2024-01-01 RX ADMIN — INSULIN GLARGINE 16 UNITS: 100 INJECTION, SOLUTION SUBCUTANEOUS at 09:12

## 2024-01-01 RX ADMIN — INSULIN ASPART 3 UNITS: 100 INJECTION, SOLUTION INTRAVENOUS; SUBCUTANEOUS at 04:12

## 2024-01-01 RX ADMIN — INSULIN ASPART 10 UNITS: 100 INJECTION, SOLUTION INTRAVENOUS; SUBCUTANEOUS at 11:12

## 2024-01-01 RX ADMIN — INSULIN ASPART 3 UNITS: 100 INJECTION, SOLUTION INTRAVENOUS; SUBCUTANEOUS at 09:12

## 2024-01-01 RX ADMIN — MEROPENEM 1 G: 1 INJECTION INTRAVENOUS at 06:12

## 2024-01-01 RX ADMIN — PROPOFOL 15 MCG/KG/MIN: 10 INJECTION, EMULSION INTRAVENOUS at 05:12

## 2024-01-01 RX ADMIN — INSULIN ASPART 3 UNITS: 100 INJECTION, SOLUTION INTRAVENOUS; SUBCUTANEOUS at 05:12

## 2024-01-01 RX ADMIN — Medication 400 MG: at 10:12

## 2024-01-01 RX ADMIN — INSULIN ASPART 8 UNITS: 100 INJECTION, SOLUTION INTRAVENOUS; SUBCUTANEOUS at 04:12

## 2024-01-01 RX ADMIN — Medication 0.1 MCG/KG/MIN: at 05:12

## 2024-01-01 RX ADMIN — INSULIN ASPART 6 UNITS: 100 INJECTION, SOLUTION INTRAVENOUS; SUBCUTANEOUS at 07:12

## 2024-01-01 RX ADMIN — Medication 0.1 MCG/KG/MIN: at 01:12

## 2024-01-01 RX ADMIN — Medication 0.15 MCG/KG/MIN: at 12:12

## 2024-01-01 RX ADMIN — SODIUM HYPOCHLORITE: 1.25 SOLUTION TOPICAL at 04:12

## 2024-01-01 RX ADMIN — ERYTHROMYCIN 250 MG: 250 TABLET, COATED ORAL at 05:12

## 2024-01-01 RX ADMIN — Medication 0.1 MCG/KG/MIN: at 11:12

## 2024-01-01 RX ADMIN — INSULIN ASPART 3 UNITS: 100 INJECTION, SOLUTION INTRAVENOUS; SUBCUTANEOUS at 11:12

## 2024-01-01 RX ADMIN — Medication 0.05 MCG/KG/MIN: at 10:12

## 2024-01-01 RX ADMIN — VANCOMYCIN HYDROCHLORIDE 1000 MG: 1 INJECTION, POWDER, LYOPHILIZED, FOR SOLUTION INTRAVENOUS at 10:12

## 2024-01-01 RX ADMIN — GENTAMICIN SULFATE 134.4 MG: 40 INJECTION, SOLUTION INTRAMUSCULAR; INTRAVENOUS at 04:12

## 2024-01-01 RX ADMIN — INSULIN ASPART 10 UNITS: 100 INJECTION, SOLUTION INTRAVENOUS; SUBCUTANEOUS at 12:12

## 2024-01-01 RX ADMIN — FLUOXETINE HYDROCHLORIDE 20 MG: 20 CAPSULE ORAL at 10:12

## 2024-01-01 RX ADMIN — Medication 0.1 MCG/KG/MIN: at 03:12

## 2024-01-01 RX ADMIN — I.V. FAT EMULSION 250 ML: 20 EMULSION INTRAVENOUS at 09:12

## 2024-01-01 RX ADMIN — SODIUM CHLORIDE: 9 INJECTION, SOLUTION INTRAVENOUS at 07:12

## 2024-01-01 RX ADMIN — ERYTHROMYCIN 250 MG: 250 TABLET, COATED ORAL at 09:12

## 2024-01-01 RX ADMIN — ERYTHROMYCIN 250 MG: 250 TABLET, COATED ORAL at 08:12

## 2024-01-01 RX ADMIN — MAGNESIUM SULFATE HEPTAHYDRATE 2 G: 40 INJECTION, SOLUTION INTRAVENOUS at 10:12

## 2024-01-01 RX ADMIN — SODIUM HYPOCHLORITE: 1.25 SOLUTION TOPICAL at 03:12

## 2024-01-01 RX ADMIN — ALBUTEROL SULFATE 2.5 MG: 2.5 SOLUTION RESPIRATORY (INHALATION) at 06:12

## 2024-01-01 RX ADMIN — IOHEXOL 100 ML: 350 INJECTION, SOLUTION INTRAVENOUS at 12:12

## 2024-01-01 RX ADMIN — INSULIN ASPART 2 UNITS: 100 INJECTION, SOLUTION INTRAVENOUS; SUBCUTANEOUS at 11:12

## 2024-01-01 RX ADMIN — Medication 0.05 MCG/KG/MIN: at 06:12

## 2024-01-01 RX ADMIN — METOCLOPRAMIDE 5 MG: 5 TABLET ORAL at 12:12

## 2024-01-01 RX ADMIN — METOCLOPRAMIDE 5 MG: 5 TABLET ORAL at 03:12

## 2024-01-01 RX ADMIN — HUMAN INSULIN 5 UNITS: 100 INJECTION, SOLUTION SUBCUTANEOUS at 04:12

## 2024-01-01 RX ADMIN — INSULIN ASPART 1 UNITS: 100 INJECTION, SOLUTION INTRAVENOUS; SUBCUTANEOUS at 12:12

## 2024-01-01 RX ADMIN — METOCLOPRAMIDE 10 MG: 10 TABLET ORAL at 05:12

## 2024-01-01 RX ADMIN — I.V. FAT EMULSION 250 ML: 20 EMULSION INTRAVENOUS at 04:12

## 2024-01-01 RX ADMIN — SOYBEAN OIL 250 ML: 20 INJECTION, SOLUTION INTRAVENOUS at 09:12

## 2024-01-01 RX ADMIN — Medication 0.1 MCG/KG/MIN: at 08:12

## 2024-01-01 RX ADMIN — METOCLOPRAMIDE 5 MG: 5 TABLET ORAL at 05:12

## 2024-01-01 RX ADMIN — Medication 0.15 MCG/KG/MIN: at 09:12

## 2024-01-01 RX ADMIN — PIPERACILLIN AND TAZOBACTAM 4.5 G: 4; .5 INJECTION, POWDER, LYOPHILIZED, FOR SOLUTION INTRAVENOUS; PARENTERAL at 05:12

## 2024-01-01 RX ADMIN — PROPOFOL 15 MCG/KG/MIN: 10 INJECTION, EMULSION INTRAVENOUS at 03:12

## 2024-01-01 RX ADMIN — INSULIN GLARGINE 16 UNITS: 100 INJECTION, SOLUTION SUBCUTANEOUS at 08:12

## 2024-01-01 RX ADMIN — METOCLOPRAMIDE 10 MG: 10 TABLET ORAL at 06:12

## 2024-01-01 RX ADMIN — BISACODYL 10 MG: 10 SUPPOSITORY RECTAL at 09:12

## 2024-01-01 RX ADMIN — ASPIRIN 81 MG CHEWABLE TABLET 81 MG: 81 TABLET CHEWABLE at 10:12

## 2024-01-01 RX ADMIN — INSULIN ASPART 1 UNITS: 100 INJECTION, SOLUTION INTRAVENOUS; SUBCUTANEOUS at 11:12

## 2024-01-01 RX ADMIN — INSULIN ASPART 4 UNITS: 100 INJECTION, SOLUTION INTRAVENOUS; SUBCUTANEOUS at 12:12

## 2024-01-01 RX ADMIN — Medication 0.1 MCG/KG/MIN: at 12:12

## 2024-01-01 RX ADMIN — ALBUTEROL SULFATE 2.5 MG: 2.5 SOLUTION RESPIRATORY (INHALATION) at 04:12

## 2024-01-01 RX ADMIN — SODIUM CHLORIDE: 9 INJECTION, SOLUTION INTRAVENOUS at 03:12

## 2024-01-01 RX ADMIN — ENOXAPARIN SODIUM 40 MG: 40 INJECTION SUBCUTANEOUS at 06:12

## 2024-01-01 RX ADMIN — SODIUM CHLORIDE: 9 INJECTION, SOLUTION INTRAVENOUS at 04:12

## 2024-01-01 RX ADMIN — ALBUTEROL SULFATE 2.5 MG: 2.5 SOLUTION RESPIRATORY (INHALATION) at 01:12

## 2024-01-01 RX ADMIN — INSULIN ASPART 4 UNITS: 100 INJECTION, SOLUTION INTRAVENOUS; SUBCUTANEOUS at 01:12

## 2024-01-01 RX ADMIN — ERYTHROMYCIN 250 MG: 250 TABLET, COATED ORAL at 01:12

## 2024-01-01 RX ADMIN — ROCURONIUM BROMIDE 80 MG: 10 INJECTION INTRAVENOUS at 09:12

## 2024-01-01 RX ADMIN — PROPOFOL 15 MCG/KG/MIN: 10 INJECTION, EMULSION INTRAVENOUS at 02:12

## 2024-01-01 RX ADMIN — ROCURONIUM BROMIDE 80 MG: 10 INJECTION, SOLUTION INTRAVENOUS at 09:12

## 2024-01-01 RX ADMIN — LURASIDONE HYDROCHLORIDE 40 MG: 40 TABLET, FILM COATED ORAL at 10:12

## 2024-01-01 RX ADMIN — PROPOFOL 20 MCG/KG/MIN: 10 INJECTION, EMULSION INTRAVENOUS at 01:12

## 2024-01-01 RX ADMIN — GENTAMICIN SULFATE 462.8 MG: 40 INJECTION, SOLUTION INTRAMUSCULAR; INTRAVENOUS at 03:12

## 2024-01-01 RX ADMIN — PROPOFOL 25 MCG/KG/MIN: 10 INJECTION, EMULSION INTRAVENOUS at 09:12

## 2024-01-01 RX ADMIN — GENTAMICIN SULFATE 462.8 MG: 40 INJECTION, SOLUTION INTRAMUSCULAR; INTRAVENOUS at 04:12

## 2024-01-01 RX ADMIN — INSULIN ASPART 6 UNITS: 100 INJECTION, SOLUTION INTRAVENOUS; SUBCUTANEOUS at 12:12

## 2024-01-01 RX ADMIN — LEUCINE, PHENYLALANINE, LYSINE, METHIONINE, ISOLEUCINE, VALINE, HISTIDINE, THREONINE, TRYPTOPHAN, ALANINE, GLYCINE, ARGININE, PROLINE, SERINE, TYROSINE, SODIUM ACETATE, DIBASIC POTASSIUM PHOSPHATE, MAGNESIUM CHLORIDE, SODIUM CHLORIDE, CALCIUM CHLORIDE, DEXTROSE
365; 280; 290; 200; 300; 290; 240; 210; 90; 1035; 515; 575; 340; 250; 20; 340; 261; 51; 59; 33; 20 INJECTION INTRAVENOUS at 05:12

## 2024-01-01 RX ADMIN — PROPOFOL 15 MCG/KG/MIN: 10 INJECTION, EMULSION INTRAVENOUS at 10:12

## 2024-01-01 RX ADMIN — FUROSEMIDE 20 MG: 10 INJECTION, SOLUTION INTRAMUSCULAR; INTRAVENOUS at 10:12

## 2024-01-01 RX ADMIN — INSULIN ASPART 6 UNITS: 100 INJECTION, SOLUTION INTRAVENOUS; SUBCUTANEOUS at 03:12

## 2024-01-01 RX ADMIN — PROPOFOL 15 MCG/KG/MIN: 10 INJECTION, EMULSION INTRAVENOUS at 12:12

## 2024-01-01 RX ADMIN — SODIUM CHLORIDE: 9 INJECTION, SOLUTION INTRAVENOUS at 10:12

## 2024-01-01 RX ADMIN — Medication 0.15 MCG/KG/MIN: at 05:12

## 2024-01-01 RX ADMIN — PROPOFOL 25 MCG/KG/MIN: 10 INJECTION, EMULSION INTRAVENOUS at 11:12

## 2024-01-01 RX ADMIN — INSULIN ASPART 4 UNITS: 100 INJECTION, SOLUTION INTRAVENOUS; SUBCUTANEOUS at 11:12

## 2024-01-01 RX ADMIN — Medication 0.25 MCG/KG/MIN: at 05:12

## 2024-01-01 RX ADMIN — PROPOFOL 25 MCG/KG/MIN: 10 INJECTION, EMULSION INTRAVENOUS at 05:12

## 2024-01-01 RX ADMIN — INSULIN ASPART 4 UNITS: 100 INJECTION, SOLUTION INTRAVENOUS; SUBCUTANEOUS at 09:12

## 2024-01-01 RX ADMIN — Medication 10 ML: at 01:12

## 2024-01-01 RX ADMIN — LEUCINE, PHENYLALANINE, LYSINE, METHIONINE, ISOLEUCINE, VALINE, HISTIDINE, THREONINE, TRYPTOPHAN, ALANINE, GLYCINE, ARGININE, PROLINE, SERINE, TYROSINE, SODIUM ACETATE, DIBASIC POTASSIUM PHOSPHATE, MAGNESIUM CHLORIDE, SODIUM CHLORIDE, CALCIUM CHLORIDE, DEXTROSE
1035; 575; 33; 20; 515; 240; 300; 365; 290; 51; 200; 280; 261; 340; 250; 340; 59; 210; 90; 20; 290 INJECTION INTRAVENOUS at 05:12

## 2024-01-01 RX ADMIN — METOCLOPRAMIDE 10 MG: 10 TABLET ORAL at 04:12

## 2024-01-01 RX ADMIN — Medication 0.15 MCG/KG/MIN: at 04:12

## 2024-01-01 RX ADMIN — SODIUM CHLORIDE: 9 INJECTION, SOLUTION INTRAVENOUS at 01:12

## 2024-01-01 RX ADMIN — INSULIN ASPART 10 UNITS: 100 INJECTION, SOLUTION INTRAVENOUS; SUBCUTANEOUS at 04:12

## 2024-01-01 RX ADMIN — SODIUM CHLORIDE: 9 INJECTION, SOLUTION INTRAVENOUS at 06:12

## 2024-01-01 RX ADMIN — PROPOFOL 25 MCG/KG/MIN: 10 INJECTION, EMULSION INTRAVENOUS at 03:12

## 2024-01-03 PROBLEM — J98.2 PNEUMOMEDIASTINUM: Status: ACTIVE | Noted: 2024-01-03
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
## 2024-01-04 PROBLEM — E10.10 DIABETIC KETOACIDOSIS WITHOUT COMA ASSOCIATED WITH TYPE 1 DIABETES MELLITUS: Status: ACTIVE | Noted: 2024-01-04

## 2024-01-04 PROBLEM — E10.9 TYPE 1 DIABETES MELLITUS WITHOUT COMPLICATION: Status: ACTIVE | Noted: 2024-01-04

## 2024-01-04 PROBLEM — E10.9 TYPE 1 DIABETES: Status: ACTIVE | Noted: 2024-01-04

## 2024-01-04 PROBLEM — R11.10 VOMITING: Status: ACTIVE | Noted: 2024-01-04

## 2024-01-04 PROBLEM — N17.9 AKI (ACUTE KIDNEY INJURY): Status: ACTIVE | Noted: 2024-01-04

## 2024-01-04 PROBLEM — E11.10 DKA (DIABETIC KETOACIDOSIS): Status: RESOLVED | Noted: 2024-01-04 | Resolved: 2024-01-04

## 2024-01-04 PROBLEM — E10.9 TYPE 1 DIABETES MELLITUS WITHOUT COMPLICATION: Status: RESOLVED | Noted: 2024-01-04 | Resolved: 2024-01-04

## 2024-01-04 PROBLEM — E11.10 DKA (DIABETIC KETOACIDOSIS): Status: ACTIVE | Noted: 2024-01-04

## 2024-03-09 ENCOUNTER — OUTSIDE PLACE OF SERVICE (OUTPATIENT)
Dept: ADMINISTRATIVE | Facility: OTHER | Age: 34
End: 2024-03-09
Payer: MEDICARE

## 2024-03-09 PROCEDURE — 99222 1ST HOSP IP/OBS MODERATE 55: CPT | Mod: ,,, | Performed by: UROLOGY

## 2024-03-14 ENCOUNTER — HOSPITAL ENCOUNTER (OUTPATIENT)
Dept: RADIOLOGY | Facility: HOSPITAL | Age: 34
Discharge: HOME | End: 2024-03-14
Attending: INTERNAL MEDICINE
Payer: MEDICARE

## 2024-03-14 PROCEDURE — 71045 X-RAY EXAM CHEST 1 VIEW: CPT | Mod: 26,,, | Performed by: RADIOLOGY

## 2024-03-16 ENCOUNTER — HOSPITAL ENCOUNTER (OUTPATIENT)
Dept: RADIOLOGY | Facility: HOSPITAL | Age: 34
Discharge: HOME | End: 2024-03-16
Attending: INTERNAL MEDICINE
Payer: MEDICARE

## 2024-03-16 PROCEDURE — 74018 RADEX ABDOMEN 1 VIEW: CPT | Mod: 26,,, | Performed by: RADIOLOGY

## 2024-04-08 PROBLEM — N17.9 AKI (ACUTE KIDNEY INJURY): Status: RESOLVED | Noted: 2024-01-04 | Resolved: 2024-04-08

## 2024-04-08 PROBLEM — E10.10 DIABETIC KETOACIDOSIS WITHOUT COMA ASSOCIATED WITH TYPE 1 DIABETES MELLITUS: Status: RESOLVED | Noted: 2024-01-04 | Resolved: 2024-04-08

## 2024-11-18 ENCOUNTER — HOSPITAL ENCOUNTER (EMERGENCY)
Facility: HOSPITAL | Age: 34
Discharge: HOME | End: 2024-11-18
Attending: EMERGENCY MEDICINE
Payer: MEDICARE

## 2024-11-18 VITALS
BODY MASS INDEX: 21.19 KG/M2 | HEART RATE: 91 BPM | HEIGHT: 67 IN | SYSTOLIC BLOOD PRESSURE: 106 MMHG | RESPIRATION RATE: 16 BRPM | TEMPERATURE: 98 F | OXYGEN SATURATION: 100 % | DIASTOLIC BLOOD PRESSURE: 67 MMHG | WEIGHT: 135 LBS

## 2024-11-18 DIAGNOSIS — E16.0 HYPOGLYCEMIA DUE TO INSULIN: Primary | ICD-10-CM

## 2024-11-18 DIAGNOSIS — N39.0 URINARY TRACT INFECTION WITHOUT HEMATURIA, SITE UNSPECIFIED: ICD-10-CM

## 2024-11-18 DIAGNOSIS — T38.3X5A HYPOGLYCEMIA DUE TO INSULIN: Primary | ICD-10-CM

## 2024-11-18 LAB
ALBUMIN SERPL BCP-MCNC: 1.5 G/DL (ref 3.5–5.2)
ALP SERPL-CCNC: 277 U/L (ref 55–135)
ALT SERPL W/O P-5'-P-CCNC: 51 U/L (ref 10–44)
ANION GAP SERPL CALC-SCNC: 4 MMOL/L (ref 3–11)
AST SERPL-CCNC: 50 U/L (ref 10–40)
BACTERIA #/AREA URNS HPF: ABNORMAL /HPF
BASOPHILS # BLD AUTO: 0.02 K/UL (ref 0–0.2)
BASOPHILS NFR BLD: 0.2 % (ref 0–1.9)
BILIRUB SERPL-MCNC: 0.2 MG/DL (ref 0.1–1)
BILIRUB UR QL STRIP: NEGATIVE
BUN SERPL-MCNC: 35 MG/DL (ref 6–20)
CALCIUM SERPL-MCNC: 9.9 MG/DL (ref 8.7–10.5)
CHLORIDE SERPL-SCNC: 95 MMOL/L (ref 95–110)
CLARITY UR: ABNORMAL
CO2 SERPL-SCNC: 33 MMOL/L (ref 23–29)
COLOR UR: YELLOW
CREAT SERPL-MCNC: 1 MG/DL (ref 0.5–1.4)
DIFFERENTIAL METHOD BLD: ABNORMAL
EOSINOPHIL # BLD AUTO: 0.4 K/UL (ref 0–0.5)
EOSINOPHIL NFR BLD: 4.1 % (ref 0–8)
ERYTHROCYTE [DISTWIDTH] IN BLOOD BY AUTOMATED COUNT: 15.8 % (ref 11.5–14.5)
EST. GFR  (NO RACE VARIABLE): >60 ML/MIN/1.73 M^2
GLUCOSE SERPL-MCNC: 106 MG/DL (ref 70–110)
GLUCOSE UR QL STRIP: NEGATIVE
HCT VFR BLD AUTO: 26 % (ref 40–54)
HGB BLD-MCNC: 8.1 G/DL (ref 14–18)
HGB UR QL STRIP: ABNORMAL
HYALINE CASTS #/AREA URNS LPF: 10 /LPF
IMM GRANULOCYTES # BLD AUTO: 0.03 K/UL (ref 0–0.04)
IMM GRANULOCYTES NFR BLD AUTO: 0.3 % (ref 0–0.5)
KETONES UR QL STRIP: NEGATIVE
LEUKOCYTE ESTERASE UR QL STRIP: ABNORMAL
LYMPHOCYTES # BLD AUTO: 1.3 K/UL (ref 1–4.8)
LYMPHOCYTES NFR BLD: 13.2 % (ref 18–48)
MCH RBC QN AUTO: 26 PG (ref 27–31)
MCHC RBC AUTO-ENTMCNC: 31.2 G/DL (ref 32–36)
MCV RBC AUTO: 83 FL (ref 82–98)
MICROSCOPIC COMMENT: ABNORMAL
MONOCYTES # BLD AUTO: 0.9 K/UL (ref 0.3–1)
MONOCYTES NFR BLD: 8.7 % (ref 4–15)
NEUTROPHILS # BLD AUTO: 7.4 K/UL (ref 1.8–7.7)
NEUTROPHILS NFR BLD: 73.5 % (ref 38–73)
NITRITE UR QL STRIP: NEGATIVE
NRBC BLD-RTO: 0 /100 WBC
PH UR STRIP: 7 [PH] (ref 5–8)
PLATELET # BLD AUTO: 701 K/UL (ref 150–450)
PMV BLD AUTO: 9.3 FL (ref 9.2–12.9)
POCT GLUCOSE: 113 MG/DL (ref 70–110)
POCT GLUCOSE: 41 MG/DL (ref 70–110)
POCT GLUCOSE: 48 MG/DL (ref 70–110)
POCT GLUCOSE: 56 MG/DL (ref 70–110)
POCT GLUCOSE: 57 MG/DL (ref 70–110)
POTASSIUM SERPL-SCNC: 4.1 MMOL/L (ref 3.5–5.1)
PROT SERPL-MCNC: 8.4 G/DL (ref 6–8.4)
PROT UR QL STRIP: ABNORMAL
RBC # BLD AUTO: 3.12 M/UL (ref 4.6–6.2)
RBC #/AREA URNS HPF: 20 /HPF (ref 0–4)
SODIUM SERPL-SCNC: 132 MMOL/L (ref 136–145)
SP GR UR STRIP: 1.02 (ref 1–1.03)
SQUAMOUS #/AREA URNS HPF: 10 /HPF
URN SPEC COLLECT METH UR: ABNORMAL
UROBILINOGEN UR STRIP-ACNC: 1 EU/DL
WBC # BLD AUTO: 10.08 K/UL (ref 3.9–12.7)
WBC #/AREA URNS HPF: >100 /HPF (ref 0–5)
WBC CLUMPS URNS QL MICRO: ABNORMAL

## 2024-11-18 PROCEDURE — 99284 EMERGENCY DEPT VISIT MOD MDM: CPT

## 2024-11-18 PROCEDURE — 81000 URINALYSIS NONAUTO W/SCOPE: CPT | Performed by: EMERGENCY MEDICINE

## 2024-11-18 PROCEDURE — 96375 TX/PRO/DX INJ NEW DRUG ADDON: CPT

## 2024-11-18 PROCEDURE — 25000003 PHARM REV CODE 250: Performed by: EMERGENCY MEDICINE

## 2024-11-18 PROCEDURE — 96365 THER/PROPH/DIAG IV INF INIT: CPT

## 2024-11-18 PROCEDURE — 87186 SC STD MICRODIL/AGAR DIL: CPT | Performed by: EMERGENCY MEDICINE

## 2024-11-18 PROCEDURE — 96361 HYDRATE IV INFUSION ADD-ON: CPT

## 2024-11-18 PROCEDURE — 80053 COMPREHEN METABOLIC PANEL: CPT | Performed by: EMERGENCY MEDICINE

## 2024-11-18 PROCEDURE — 87086 URINE CULTURE/COLONY COUNT: CPT | Performed by: EMERGENCY MEDICINE

## 2024-11-18 PROCEDURE — 85025 COMPLETE CBC W/AUTO DIFF WBC: CPT | Performed by: EMERGENCY MEDICINE

## 2024-11-18 PROCEDURE — 63600175 PHARM REV CODE 636 W HCPCS: Performed by: EMERGENCY MEDICINE

## 2024-11-18 PROCEDURE — 87088 URINE BACTERIA CULTURE: CPT | Performed by: EMERGENCY MEDICINE

## 2024-11-18 PROCEDURE — 36415 COLL VENOUS BLD VENIPUNCTURE: CPT | Performed by: EMERGENCY MEDICINE

## 2024-11-18 RX ORDER — LEVOTHYROXINE SODIUM 100 UG/1
100 TABLET ORAL EVERY MORNING
COMMUNITY

## 2024-11-18 RX ORDER — DIAZEPAM 5 MG/1
10 TABLET ORAL 2 TIMES DAILY
COMMUNITY

## 2024-11-18 RX ORDER — FLUOXETINE HYDROCHLORIDE 20 MG/1
20 CAPSULE ORAL DAILY
COMMUNITY

## 2024-11-18 RX ORDER — OXYCODONE HYDROCHLORIDE 5 MG/1
10 CAPSULE ORAL EVERY 6 HOURS PRN
COMMUNITY

## 2024-11-18 RX ORDER — CEFDINIR 250 MG/5ML
300 POWDER, FOR SUSPENSION ORAL EVERY 12 HOURS
Qty: 84 ML | Refills: 0 | Status: SHIPPED | OUTPATIENT
Start: 2024-11-18 | End: 2024-11-25

## 2024-11-18 RX ORDER — HYDROXYZINE HYDROCHLORIDE 25 MG/1
25 TABLET, FILM COATED ORAL 3 TIMES DAILY
COMMUNITY

## 2024-11-18 RX ORDER — ACETAMINOPHEN 325 MG/1
325 TABLET ORAL EVERY 6 HOURS PRN
COMMUNITY

## 2024-11-18 RX ORDER — ASCORBIC ACID 500 MG
500 TABLET ORAL DAILY
COMMUNITY

## 2024-11-18 RX ORDER — INSULIN ASPART 100 [IU]/ML
INJECTION, SOLUTION INTRAVENOUS; SUBCUTANEOUS
COMMUNITY

## 2024-11-18 RX ORDER — ENOXAPARIN SODIUM 100 MG/ML
40 INJECTION SUBCUTANEOUS
COMMUNITY

## 2024-11-18 RX ORDER — GABAPENTIN 250 MG/5ML
5 SOLUTION ORAL EVERY 12 HOURS
COMMUNITY

## 2024-11-18 RX ORDER — CEFTRIAXONE 1 G/1
1 INJECTION, POWDER, FOR SOLUTION INTRAMUSCULAR; INTRAVENOUS
Status: COMPLETED | OUTPATIENT
Start: 2024-11-18 | End: 2024-11-18

## 2024-11-18 RX ORDER — FAMOTIDINE 40 MG/1
40 TABLET, FILM COATED ORAL DAILY
COMMUNITY

## 2024-11-18 RX ORDER — DEXTROSE MONOHYDRATE AND SODIUM CHLORIDE 5; .9 G/100ML; G/100ML
1000 INJECTION, SOLUTION INTRAVENOUS
Status: COMPLETED | OUTPATIENT
Start: 2024-11-18 | End: 2024-11-18

## 2024-11-18 RX ADMIN — SODIUM CHLORIDE 1000 ML: 9 INJECTION, SOLUTION INTRAVENOUS at 07:11

## 2024-11-18 RX ADMIN — DEXTROSE AND SODIUM CHLORIDE 1000 ML: 5; 900 INJECTION, SOLUTION INTRAVENOUS at 08:11

## 2024-11-18 RX ADMIN — CEFTRIAXONE SODIUM 1 G: 1 INJECTION, POWDER, FOR SOLUTION INTRAMUSCULAR; INTRAVENOUS at 07:11

## 2024-11-18 NOTE — ED PROVIDER NOTES
Encounter Date: 11/18/2024       History     Chief Complaint   Patient presents with    Hypoglycemia     Pt to the ER via EMS from [facility] w/ complaints of hypoglycemia. Per EMS pt received mutiple doses of insulin in the last 24 hours after cbg was greater than 500, found this morning w/ CBG of 45. EMS gave 250 D5W, repeat cbg 110. Pt non vebral upon arrival, hx on anoxic brain injury.      34 yo male with DM, anoxic brain injury here from NH with hypoglycemia after multiple insulin boluses 2/2 hyperglycemia. History is limited 2/2 chronic condition. No fever reported.       Review of patient's allergies indicates:   Allergen Reactions    Guaifenesin Hives    Codeine Itching     Past Medical History:   Diagnosis Date    Anoxic brain injury     Bipolar disorder, unspecified     Diabetes insipidus     Diabetes mellitus     Dysphagia, unspecified     Chucky gangrene     Gangrene     GERD (gastroesophageal reflux disease)     Hereditary and idiopathic neuropathy     Nontraumatic intracerebral hemorrhage, unspecified     Other muscle spasm     Other psychoactive substance abuse with withdrawal, uncomplicated     Skin transplant status     Thyroid disease      Past Surgical History:   Procedure Laterality Date    COLOSTOMY      INSERTION, PEG TUBE       No family history on file.  Social History     Tobacco Use    Smoking status: Unknown     Review of Systems   Unable to perform ROS: Patient nonverbal       Physical Exam     Initial Vitals [11/18/24 0610]   BP Pulse Resp Temp SpO2   108/78 95 18 98.4 °F (36.9 °C) 96 %      MAP       --         Physical Exam    Nursing note and vitals reviewed.  Constitutional: He is not diaphoretic. No distress.   HENT:   Head: Normocephalic and atraumatic.   Eyes: Conjunctivae are normal. Right eye exhibits no discharge. Left eye exhibits no discharge.   Neck: Neck supple.   Normal range of motion.  Cardiovascular:  Normal rate, regular rhythm and intact distal pulses.            Pulmonary/Chest: Breath sounds normal. No respiratory distress. He has no wheezes. He has no rales.   Abdominal: Abdomen is soft. Bowel sounds are normal. He exhibits no distension. There is no abdominal tenderness. There is no rebound.   Musculoskeletal:         General: No tenderness or edema. Normal range of motion.      Cervical back: Normal range of motion and neck supple.     Neurological:   Responds to some questions with nod of head.   Skin: Skin is warm and dry. Capillary refill takes less than 2 seconds.         ED Course   Procedures  Labs Reviewed   CBC W/ AUTO DIFFERENTIAL - Abnormal       Result Value    WBC 10.08      RBC 3.12 (*)     Hemoglobin 8.1 (*)     Hematocrit 26.0 (*)     MCV 83      MCH 26.0 (*)     MCHC 31.2 (*)     RDW 15.8 (*)     Platelets 701 (*)     MPV 9.3      Immature Granulocytes 0.3      Gran # (ANC) 7.4      Immature Grans (Abs) 0.03      Lymph # 1.3      Mono # 0.9      Eos # 0.4      Baso # 0.02      nRBC 0      Gran % 73.5 (*)     Lymph % 13.2 (*)     Mono % 8.7      Eosinophil % 4.1      Basophil % 0.2      Differential Method Automated     COMPREHENSIVE METABOLIC PANEL - Abnormal    Sodium 132 (*)     Potassium 4.1      Chloride 95      CO2 33 (*)     Glucose 106      BUN 35 (*)     Creatinine 1.0      Calcium 9.9      Total Protein 8.4      Albumin 1.5 (*)     Total Bilirubin 0.2      Alkaline Phosphatase 277 (*)     AST 50 (*)     ALT 51 (*)     eGFR >60.0      Anion Gap 4     URINALYSIS, REFLEX TO URINE CULTURE - Abnormal    Specimen UA Urine, Catheterized      Color, UA Yellow      Appearance, UA Cloudy (*)     pH, UA 7.0      Specific Gravity, UA 1.020      Protein, UA 3+ (*)     Glucose, UA Negative      Ketones, UA Negative      Bilirubin (UA) Negative      Occult Blood UA 2+ (*)     Nitrite, UA Negative      Urobilinogen, UA 1.0      Leukocytes, UA 3+ (*)     Narrative:     Preferred Collection Type->Urine, Catheterized  Specimen Source->Urine   URINALYSIS  MICROSCOPIC - Abnormal    RBC, UA 20 (*)     WBC, UA >100 (*)     WBC Clumps, UA Moderate (*)     Bacteria Many (*)     Squam Epithel, UA 10      Hyaline Casts, UA 10 (*)     Microscopic Comment SEE COMMENT      Narrative:     Preferred Collection Type->Urine, Catheterized  Specimen Source->Urine   POCT GLUCOSE - Abnormal    POCT Glucose 113 (*)    POCT GLUCOSE - Abnormal    POCT Glucose 57 (*)    POCT GLUCOSE - Abnormal    POCT Glucose 41 (*)    POCT GLUCOSE - Abnormal    POCT Glucose 48 (*)    POCT GLUCOSE - Abnormal    POCT Glucose 56 (*)    CULTURE, URINE          Imaging Results    None          Medications   cefTRIAXone injection 1 g (1 g Intravenous Given 11/18/24 0741)   sodium chloride 0.9% bolus 1,000 mL 1,000 mL (0 mLs Intravenous Stopped 11/18/24 0841)   dextrose 5 % and 0.9 % NaCl infusion ( Intravenous Stopped 11/18/24 1128)     Medical Decision Making  Labs with UTI. In ED repeat blood glucose 57. Will contact PCP for disposition.     Contacted Dr Diamond once initial labs resulted with UTI, reassuring chemistry/cbc. Rec to repeat glucose and return to NH.    Contacted Dr Diamond after repeat glucose 48. Recs glucerna, as patient has been NPO while in ED with no nutrition, repeat glucose if stable ok to return to NH on abx for UTI.     Glucose remained stable after glucerna. Vitals stable. Will d/c back to NH where insulin will be adjusted by PCP.     Amount and/or Complexity of Data Reviewed  Independent Historian: EMS  Labs: ordered. Decision-making details documented in ED Course.    Risk  Prescription drug management.                                      Clinical Impression:  Final diagnoses:  [E16.0, T38.3X5A] Hypoglycemia due to insulin (Primary)  [N39.0] Urinary tract infection without hematuria, site unspecified          ED Disposition Condition    Discharge Stable          ED Prescriptions       Medication Sig Dispense Start Date End Date Auth. Provider    cefdinir (OMNICEF) 250 mg/5 mL  suspension 6 mLs (300 mg total) by Per G Tube route every 12 (twelve) hours. for 7 days 84 mL 11/18/2024 11/25/2024 Dashawn Mcdowell MD          Follow-up Information    None          Dashawn Mcdowell MD  11/19/24 0680

## 2024-11-21 ENCOUNTER — LAB VISIT (OUTPATIENT)
Dept: LAB | Facility: HOSPITAL | Age: 34
End: 2024-11-21
Attending: INTERNAL MEDICINE
Payer: MEDICARE

## 2024-11-21 DIAGNOSIS — R10.2 PERINEAL PAIN IN MALE: ICD-10-CM

## 2024-11-21 LAB — BACTERIA UR CULT: ABNORMAL

## 2024-11-21 PROCEDURE — 87070 CULTURE OTHR SPECIMN AEROBIC: CPT | Performed by: INTERNAL MEDICINE

## 2024-11-25 LAB — BACTERIA SPEC AEROBE CULT: NORMAL

## 2024-11-27 ENCOUNTER — LAB VISIT (OUTPATIENT)
Dept: LAB | Facility: HOSPITAL | Age: 34
End: 2024-11-27
Payer: MEDICARE

## 2024-11-27 DIAGNOSIS — L08.9 FACE, NECK, AND SCALP EXCEPT EYE, BLISTER, INFECTED: ICD-10-CM

## 2024-11-27 DIAGNOSIS — N49.3 FOURNIER DISEASE: Primary | ICD-10-CM

## 2024-11-27 DIAGNOSIS — S00.82XA FACE, NECK, AND SCALP EXCEPT EYE, BLISTER, INFECTED: ICD-10-CM

## 2024-11-27 DIAGNOSIS — E03.9 MYXEDEMA HEART DISEASE: ICD-10-CM

## 2024-11-27 DIAGNOSIS — S10.92XA FACE, NECK, AND SCALP EXCEPT EYE, BLISTER, INFECTED: ICD-10-CM

## 2024-11-27 DIAGNOSIS — S00.02XA FACE, NECK, AND SCALP EXCEPT EYE, BLISTER, INFECTED: ICD-10-CM

## 2024-11-27 DIAGNOSIS — I51.9 MYXEDEMA HEART DISEASE: ICD-10-CM

## 2024-11-27 LAB
BASOPHILS # BLD AUTO: 0.02 K/UL (ref 0–0.2)
BASOPHILS NFR BLD: 0.2 % (ref 0–1.9)
DIFFERENTIAL METHOD BLD: ABNORMAL
EOSINOPHIL # BLD AUTO: 0.3 K/UL (ref 0–0.5)
EOSINOPHIL NFR BLD: 2.3 % (ref 0–8)
ERYTHROCYTE [DISTWIDTH] IN BLOOD BY AUTOMATED COUNT: 15.1 % (ref 11.5–14.5)
HCT VFR BLD AUTO: 26.5 % (ref 40–54)
HGB BLD-MCNC: 7.8 G/DL (ref 14–18)
IMM GRANULOCYTES # BLD AUTO: 0.05 K/UL (ref 0–0.04)
IMM GRANULOCYTES NFR BLD AUTO: 0.5 % (ref 0–0.5)
LYMPHOCYTES # BLD AUTO: 1.2 K/UL (ref 1–4.8)
LYMPHOCYTES NFR BLD: 10.8 % (ref 18–48)
MCH RBC QN AUTO: 25.7 PG (ref 27–31)
MCHC RBC AUTO-ENTMCNC: 29.4 G/DL (ref 32–36)
MCV RBC AUTO: 87 FL (ref 82–98)
MONOCYTES # BLD AUTO: 0.9 K/UL (ref 0.3–1)
MONOCYTES NFR BLD: 7.7 % (ref 4–15)
NEUTROPHILS # BLD AUTO: 8.7 K/UL (ref 1.8–7.7)
NEUTROPHILS NFR BLD: 78.5 % (ref 38–73)
NRBC BLD-RTO: 0 /100 WBC
PLATELET # BLD AUTO: 861 K/UL (ref 150–450)
PMV BLD AUTO: 9.4 FL (ref 9.2–12.9)
RBC # BLD AUTO: 3.04 M/UL (ref 4.6–6.2)
T4 FREE SERPL-MCNC: 0.78 NG/DL (ref 0.71–1.51)
TSH SERPL DL<=0.005 MIU/L-ACNC: 22 UIU/ML (ref 0.4–4)
WBC # BLD AUTO: 11.05 K/UL (ref 3.9–12.7)

## 2024-11-27 PROCEDURE — 85025 COMPLETE CBC W/AUTO DIFF WBC: CPT

## 2024-11-27 PROCEDURE — 84439 ASSAY OF FREE THYROXINE: CPT

## 2024-11-27 PROCEDURE — 36415 COLL VENOUS BLD VENIPUNCTURE: CPT

## 2024-11-27 PROCEDURE — 84443 ASSAY THYROID STIM HORMONE: CPT

## 2024-11-29 ENCOUNTER — HOSPITAL ENCOUNTER (INPATIENT)
Facility: HOSPITAL | Age: 34
LOS: 13 days | Discharge: HOME | DRG: 698 | End: 2024-12-12
Attending: EMERGENCY MEDICINE | Admitting: EMERGENCY MEDICINE
Payer: MEDICARE

## 2024-11-29 DIAGNOSIS — S31.000D WOUND OF SACRAL REGION, SUBSEQUENT ENCOUNTER: ICD-10-CM

## 2024-11-29 DIAGNOSIS — N39.0 URINARY TRACT INFECTION ASSOCIATED WITH INDWELLING URETHRAL CATHETER, INITIAL ENCOUNTER: Primary | ICD-10-CM

## 2024-11-29 DIAGNOSIS — T83.511A URINARY TRACT INFECTION ASSOCIATED WITH INDWELLING URETHRAL CATHETER, INITIAL ENCOUNTER: Primary | ICD-10-CM

## 2024-11-29 DIAGNOSIS — R41.82 ALTERED MENTAL STATUS: ICD-10-CM

## 2024-11-29 LAB
ALBUMIN SERPL BCP-MCNC: 1.3 G/DL (ref 3.5–5.2)
ALP SERPL-CCNC: 225 U/L (ref 55–135)
ALT SERPL W/O P-5'-P-CCNC: 39 U/L (ref 10–44)
ANION GAP SERPL CALC-SCNC: 7 MMOL/L (ref 3–11)
AST SERPL-CCNC: 80 U/L (ref 10–40)
BACTERIA #/AREA URNS HPF: ABNORMAL /HPF
BASOPHILS # BLD AUTO: 0.03 K/UL (ref 0–0.2)
BASOPHILS NFR BLD: 0.3 % (ref 0–1.9)
BILIRUB SERPL-MCNC: 0.1 MG/DL (ref 0.1–1)
BILIRUB UR QL STRIP: NEGATIVE
BUN SERPL-MCNC: 42 MG/DL (ref 6–20)
CALCIUM SERPL-MCNC: 9.8 MG/DL (ref 8.7–10.5)
CHLORIDE SERPL-SCNC: 101 MMOL/L (ref 95–110)
CLARITY UR: ABNORMAL
CO2 SERPL-SCNC: 35 MMOL/L (ref 23–29)
COLOR UR: YELLOW
CREAT SERPL-MCNC: 1.2 MG/DL (ref 0.5–1.4)
DIFFERENTIAL METHOD BLD: ABNORMAL
EOSINOPHIL # BLD AUTO: 0.4 K/UL (ref 0–0.5)
EOSINOPHIL NFR BLD: 3.3 % (ref 0–8)
ERYTHROCYTE [DISTWIDTH] IN BLOOD BY AUTOMATED COUNT: 14.6 % (ref 11.5–14.5)
EST. GFR  (NO RACE VARIABLE): >60 ML/MIN/1.73 M^2
GLUCOSE SERPL-MCNC: 100 MG/DL (ref 70–110)
GLUCOSE UR QL STRIP: NEGATIVE
HCT VFR BLD AUTO: 25.8 % (ref 40–54)
HGB BLD-MCNC: 7.5 G/DL (ref 14–18)
HGB UR QL STRIP: ABNORMAL
HYALINE CASTS #/AREA URNS LPF: 1.5 /LPF
IMM GRANULOCYTES # BLD AUTO: 0.09 K/UL (ref 0–0.04)
IMM GRANULOCYTES NFR BLD AUTO: 0.9 % (ref 0–0.5)
KETONES UR QL STRIP: NEGATIVE
LACTATE SERPL-SCNC: 1.3 MMOL/L (ref 0.5–2.2)
LACTATE SERPL-SCNC: 2 MMOL/L (ref 0.5–2.2)
LEUKOCYTE ESTERASE UR QL STRIP: ABNORMAL
LYMPHOCYTES # BLD AUTO: 1.4 K/UL (ref 1–4.8)
LYMPHOCYTES NFR BLD: 13.5 % (ref 18–48)
MCH RBC QN AUTO: 25.4 PG (ref 27–31)
MCHC RBC AUTO-ENTMCNC: 29.1 G/DL (ref 32–36)
MCV RBC AUTO: 88 FL (ref 82–98)
MICROSCOPIC COMMENT: ABNORMAL
MONOCYTES # BLD AUTO: 1 K/UL (ref 0.3–1)
MONOCYTES NFR BLD: 9.1 % (ref 4–15)
NEUTROPHILS # BLD AUTO: 7.6 K/UL (ref 1.8–7.7)
NEUTROPHILS NFR BLD: 72.9 % (ref 38–73)
NITRITE UR QL STRIP: POSITIVE
NRBC BLD-RTO: 0 /100 WBC
PH UR STRIP: 6 [PH] (ref 5–8)
PLATELET # BLD AUTO: 777 K/UL (ref 150–450)
PMV BLD AUTO: 8.9 FL (ref 9.2–12.9)
POCT GLUCOSE: 115 MG/DL (ref 70–110)
POCT GLUCOSE: 94 MG/DL (ref 70–110)
POTASSIUM SERPL-SCNC: 4.5 MMOL/L (ref 3.5–5.1)
PROT SERPL-MCNC: 8.5 G/DL (ref 6–8.4)
PROT UR QL STRIP: ABNORMAL
RBC # BLD AUTO: 2.95 M/UL (ref 4.6–6.2)
RBC #/AREA URNS HPF: 12 /HPF (ref 0–4)
SODIUM SERPL-SCNC: 143 MMOL/L (ref 136–145)
SP GR UR STRIP: 1.02 (ref 1–1.03)
SQUAMOUS #/AREA URNS HPF: 1 /HPF
URN SPEC COLLECT METH UR: ABNORMAL
UROBILINOGEN UR STRIP-ACNC: NEGATIVE EU/DL
WBC # BLD AUTO: 10.45 K/UL (ref 3.9–12.7)
WBC #/AREA URNS HPF: >100 /HPF (ref 0–5)

## 2024-11-29 PROCEDURE — 87086 URINE CULTURE/COLONY COUNT: CPT | Performed by: EMERGENCY MEDICINE

## 2024-11-29 PROCEDURE — 63600175 PHARM REV CODE 636 W HCPCS: Performed by: EMERGENCY MEDICINE

## 2024-11-29 PROCEDURE — 36415 COLL VENOUS BLD VENIPUNCTURE: CPT | Performed by: EMERGENCY MEDICINE

## 2024-11-29 PROCEDURE — 81000 URINALYSIS NONAUTO W/SCOPE: CPT | Performed by: EMERGENCY MEDICINE

## 2024-11-29 PROCEDURE — 63600175 PHARM REV CODE 636 W HCPCS: Performed by: INTERNAL MEDICINE

## 2024-11-29 PROCEDURE — 83605 ASSAY OF LACTIC ACID: CPT | Mod: 91 | Performed by: EMERGENCY MEDICINE

## 2024-11-29 PROCEDURE — 93005 ELECTROCARDIOGRAM TRACING: CPT

## 2024-11-29 PROCEDURE — 93010 ELECTROCARDIOGRAM REPORT: CPT | Mod: ,,, | Performed by: INTERNAL MEDICINE

## 2024-11-29 PROCEDURE — 25000003 PHARM REV CODE 250: Performed by: EMERGENCY MEDICINE

## 2024-11-29 PROCEDURE — 99285 EMERGENCY DEPT VISIT HI MDM: CPT | Mod: 25

## 2024-11-29 PROCEDURE — 80053 COMPREHEN METABOLIC PANEL: CPT | Performed by: EMERGENCY MEDICINE

## 2024-11-29 PROCEDURE — 96360 HYDRATION IV INFUSION INIT: CPT

## 2024-11-29 PROCEDURE — 21400001 HC TELEMETRY ROOM

## 2024-11-29 PROCEDURE — 87040 BLOOD CULTURE FOR BACTERIA: CPT | Performed by: EMERGENCY MEDICINE

## 2024-11-29 PROCEDURE — 87186 SC STD MICRODIL/AGAR DIL: CPT | Performed by: EMERGENCY MEDICINE

## 2024-11-29 PROCEDURE — 87088 URINE BACTERIA CULTURE: CPT | Performed by: EMERGENCY MEDICINE

## 2024-11-29 PROCEDURE — 25000003 PHARM REV CODE 250: Performed by: INTERNAL MEDICINE

## 2024-11-29 PROCEDURE — 96361 HYDRATE IV INFUSION ADD-ON: CPT

## 2024-11-29 PROCEDURE — 85025 COMPLETE CBC W/AUTO DIFF WBC: CPT | Performed by: EMERGENCY MEDICINE

## 2024-11-29 RX ORDER — TALC
6 POWDER (GRAM) TOPICAL NIGHTLY PRN
Status: DISCONTINUED | OUTPATIENT
Start: 2024-11-29 | End: 2024-11-29

## 2024-11-29 RX ORDER — SODIUM CHLORIDE 9 MG/ML
INJECTION, SOLUTION INTRAVENOUS CONTINUOUS
Status: DISCONTINUED | OUTPATIENT
Start: 2024-11-29 | End: 2024-12-03

## 2024-11-29 RX ORDER — CLINDAMYCIN HYDROCHLORIDE 300 MG/1
300 CAPSULE ORAL EVERY 6 HOURS
Status: ON HOLD | COMMUNITY
End: 2024-12-12 | Stop reason: HOSPADM

## 2024-11-29 RX ORDER — POLYETHYLENE GLYCOL 3350 17 G/17G
17 POWDER, FOR SOLUTION ORAL DAILY
Status: DISCONTINUED | OUTPATIENT
Start: 2024-11-30 | End: 2024-12-12 | Stop reason: HOSPADM

## 2024-11-29 RX ORDER — ENOXAPARIN SODIUM 100 MG/ML
40 INJECTION SUBCUTANEOUS EVERY 24 HOURS
Status: DISCONTINUED | OUTPATIENT
Start: 2024-11-29 | End: 2024-12-12 | Stop reason: HOSPADM

## 2024-11-29 RX ORDER — ACETAMINOPHEN 325 MG/1
650 TABLET ORAL EVERY 8 HOURS PRN
Status: DISCONTINUED | OUTPATIENT
Start: 2024-11-29 | End: 2024-12-12 | Stop reason: HOSPADM

## 2024-11-29 RX ORDER — MEROPENEM 500 MG/1
500 INJECTION, POWDER, FOR SOLUTION INTRAVENOUS
Status: DISCONTINUED | OUTPATIENT
Start: 2024-11-29 | End: 2024-11-29

## 2024-11-29 RX ORDER — ONDANSETRON HYDROCHLORIDE 2 MG/ML
4 INJECTION, SOLUTION INTRAVENOUS EVERY 8 HOURS PRN
Status: DISCONTINUED | OUTPATIENT
Start: 2024-11-29 | End: 2024-12-12 | Stop reason: HOSPADM

## 2024-11-29 RX ORDER — MEROPENEM 1 G/1
1 INJECTION, POWDER, FOR SOLUTION INTRAVENOUS
Status: DISCONTINUED | OUTPATIENT
Start: 2024-11-29 | End: 2024-12-08

## 2024-11-29 RX ORDER — TALC
6 POWDER (GRAM) TOPICAL NIGHTLY PRN
Status: DISCONTINUED | OUTPATIENT
Start: 2024-11-29 | End: 2024-12-12 | Stop reason: HOSPADM

## 2024-11-29 RX ORDER — ACETAMINOPHEN 325 MG/1
650 TABLET ORAL EVERY 8 HOURS PRN
Status: DISCONTINUED | OUTPATIENT
Start: 2024-11-29 | End: 2024-11-29

## 2024-11-29 RX ORDER — POLYETHYLENE GLYCOL 3350 17 G/17G
17 POWDER, FOR SOLUTION ORAL DAILY
Status: DISCONTINUED | OUTPATIENT
Start: 2024-11-29 | End: 2024-11-29

## 2024-11-29 RX ORDER — MUPIROCIN 20 MG/G
OINTMENT TOPICAL 2 TIMES DAILY
Status: DISPENSED | OUTPATIENT
Start: 2024-11-29 | End: 2024-12-04

## 2024-11-29 RX ORDER — NAPROXEN SODIUM 220 MG/1
81 TABLET, FILM COATED ORAL DAILY
COMMUNITY

## 2024-11-29 RX ORDER — SODIUM CHLORIDE 0.9 % (FLUSH) 0.9 %
10 SYRINGE (ML) INJECTION
Status: DISCONTINUED | OUTPATIENT
Start: 2024-11-29 | End: 2024-12-12 | Stop reason: HOSPADM

## 2024-11-29 RX ADMIN — MUPIROCIN: 20 OINTMENT TOPICAL at 09:11

## 2024-11-29 RX ADMIN — SODIUM CHLORIDE 2037 ML: 9 INJECTION, SOLUTION INTRAVENOUS at 11:11

## 2024-11-29 RX ADMIN — ENOXAPARIN SODIUM 40 MG: 100 INJECTION SUBCUTANEOUS at 04:11

## 2024-11-29 RX ADMIN — MEROPENEM 1 G: 1 INJECTION INTRAVENOUS at 09:11

## 2024-11-29 RX ADMIN — SODIUM CHLORIDE: 9 INJECTION, SOLUTION INTRAVENOUS at 04:11

## 2024-11-29 RX ADMIN — MEROPENEM 500 MG: 500 INJECTION, POWDER, FOR SOLUTION INTRAVENOUS at 02:11

## 2024-11-29 NOTE — PROGRESS NOTES
Pharmacist Renal Dose Adjustment Note    Carlos Chahal is a 34 y.o. male being treated with the medication Meropenem    Patient Data:    Vital Signs (Most Recent):  Temp: 96.9 °F (36.1 °C) (11/29/24 1502)  Pulse: 99 (11/29/24 1502)  Resp: 18 (11/29/24 1502)  BP: 104/71 (11/29/24 1502)  SpO2: 100 % (11/29/24 1502) Vital Signs (72h Range):  Temp:  [96.9 °F (36.1 °C)-98.3 °F (36.8 °C)]   Pulse:  []   Resp:  [14-19]   BP: ()/(66-71)   SpO2:  [97 %-100 %]      Recent Labs   Lab 11/29/24  1117   CREATININE 1.2     Serum creatinine: 1.2 mg/dL 11/29/24 1117  Estimated creatinine clearance: 81.9 mL/min    Medication:meropenem dose: 500 mg frequency q6h will be changed to medication:meropenem dose:1000 mg frequency:q8h    Pharmacist's Name: Bekah Garay  Pharmacist's Extension: [phone]

## 2024-11-29 NOTE — ED PROVIDER NOTES
Encounter Date: 11/29/2024       History     Chief Complaint   Patient presents with    Loss of Consciousness     Pt here from NH due to increased LOC, unsure what pt normal is per EMS. Pt not responsive, possible UTI. Rojas in place from the 18th     34-year-old male presents to the emergency department from the nursing home due to decreased level of consciousness.  The patient has a history of anoxic brain injury, but he is usually awake, alert, and responsive.  At this time the patient is only responsive to painful stimuli.  GCS 9.         Review of patient's allergies indicates:   Allergen Reactions    Guaifenesin Hives    Codeine Itching     Past Medical History:   Diagnosis Date    Anoxic brain injury     Bipolar disorder, unspecified     Diabetes insipidus     Diabetes mellitus     Dysphagia, unspecified     Chucky gangrene     Gangrene     GERD (gastroesophageal reflux disease)     Hereditary and idiopathic neuropathy     Nontraumatic intracerebral hemorrhage, unspecified     Other muscle spasm     Other psychoactive substance abuse with withdrawal, uncomplicated     Skin transplant status     Thyroid disease      Past Surgical History:   Procedure Laterality Date    COLOSTOMY      INSERTION, PEG TUBE       No family history on file.  Social History     Tobacco Use    Smoking status: Unknown     Review of Systems   Constitutional:         Altered mental status   All other systems reviewed and are negative.      Physical Exam     Initial Vitals [11/29/24 1054]   BP Pulse Resp Temp SpO2   99/66 105 19 98.3 °F (36.8 °C) 97 %      MAP       --         Physical Exam    Nursing note and vitals reviewed.  Constitutional: He appears well-developed. He appears lethargic.   HENT:   Head: Normocephalic and atraumatic.   Eyes: EOM are normal. Pupils are equal, round, and reactive to light.   Neck:   Normal range of motion.  Cardiovascular:  Normal rate, regular rhythm and normal heart sounds.            Pulmonary/Chest: Breath sounds normal.   Abdominal: Abdomen is soft. Bowel sounds are normal. He exhibits no distension. There is no abdominal tenderness. There is no rebound.   Musculoskeletal:         General: Normal range of motion.      Cervical back: Normal range of motion.     Neurological: He appears lethargic. No cranial nerve deficit or sensory deficit. GCS eye subscore is 4. GCS verbal subscore is 5. GCS motor subscore is 6.   Skin: Skin is warm and dry.   Psychiatric: He has a normal mood and affect. His mood appears not anxious.         ED Course   Procedures  Labs Reviewed   CBC W/ AUTO DIFFERENTIAL - Abnormal       Result Value    WBC 10.45      RBC 2.95 (*)     Hemoglobin 7.5 (*)     Hematocrit 25.8 (*)     MCV 88      MCH 25.4 (*)     MCHC 29.1 (*)     RDW 14.6 (*)     Platelets 777 (*)     MPV 8.9 (*)     Immature Granulocytes 0.9 (*)     Gran # (ANC) 7.6      Immature Grans (Abs) 0.09 (*)     Lymph # 1.4      Mono # 1.0      Eos # 0.4      Baso # 0.03      nRBC 0      Gran % 72.9      Lymph % 13.5 (*)     Mono % 9.1      Eosinophil % 3.3      Basophil % 0.3      Differential Method Automated     COMPREHENSIVE METABOLIC PANEL - Abnormal    Sodium 143      Potassium 4.5      Chloride 101      CO2 35 (*)     Glucose 100      BUN 42 (*)     Creatinine 1.2      Calcium 9.8      Total Protein 8.5 (*)     Albumin 1.3 (*)     Total Bilirubin 0.1      Alkaline Phosphatase 225 (*)     AST 80 (*)     ALT 39      eGFR >60.0      Anion Gap 7     URINALYSIS, REFLEX TO URINE CULTURE - Abnormal    Specimen UA Urine, Clean Catch      Color, UA Yellow      Appearance, UA Cloudy (*)     pH, UA 6.0      Specific Gravity, UA 1.020      Protein, UA 1+ (*)     Glucose, UA Negative      Ketones, UA Negative      Bilirubin (UA) Negative      Occult Blood UA 1+ (*)     Nitrite, UA Positive (*)     Urobilinogen, UA Negative      Leukocytes, UA 3+ (*)     Narrative:     Preferred Collection Type->Urine, Clean  Catch  Specimen Source->Urine   URINALYSIS MICROSCOPIC - Abnormal    RBC, UA 12 (*)     WBC, UA >100 (*)     Bacteria Many (*)     Squam Epithel, UA 1      Hyaline Casts, UA 1.5 (*)     Microscopic Comment SEE COMMENT      Narrative:     Preferred Collection Type->Urine, Clean Catch  Specimen Source->Urine   POCT GLUCOSE - Abnormal    POCT Glucose 115 (*)    CULTURE, BLOOD   CULTURE, BLOOD   CULTURE, URINE   LACTIC ACID, PLASMA    Lactate (Lactic Acid) 2.0     LACTIC ACID, PLASMA    Lactate (Lactic Acid) 1.3       EKG Readings: (Independently Interpreted)   Initial Reading: No STEMI. Rhythm: Sinus Tachycardia. Heart Rate: 105. Ectopy: No Ectopy. ST Segments: Normal ST Segments. T Waves: Normal. Clinical Impression: Sinus Tachycardia       Imaging Results              CT Head Without Contrast (Final result)  Result time 11/29/24 11:58:33      Final result by Nakul Cross MD (11/29/24 11:58:33)                   Impression:      Moderate cerebral volume loss when compared to 01/03/2024.  No signs of acute hemorrhage.      Electronically signed by: Nakul Cross MD  Date:    11/29/2024  Time:    11:58               Narrative:    EXAMINATION:  CT HEAD WITHOUT CONTRAST    CLINICAL HISTORY:  Mental status change, unknown cause;    TECHNIQUE:  Iterative reconstruction technique was performed.    CT/cardiac nuclear exam/s in prior 12 months:  4.    COMPARISON:  Head CT 01/03/2024..    FINDINGS:  No hydrocephalus. No mass or mass effect. No signs of acute hemorrhage.  Moderate cerebral volume loss, especially to the prior exam.  Cranial bones unremarkable.                                       X-Ray Chest AP Portable (Final result)  Result time 11/29/24 11:56:38      Final result by Nakul Cross MD (11/29/24 11:56:38)                   Impression:      No acute findings.      Electronically signed by: Nakul Cross MD  Date:    11/29/2024  Time:    11:56               Narrative:    EXAMINATION:  XR CHEST AP  PORTABLE    CLINICAL HISTORY:  Sepsis;    COMPARISON:  Chest x-ray 01/03/2024.    FINDINGS:  Unremarkable AP cardiac silhouette.  No focal airspace disease.  No pleural fluid.                                       Medications   meropenem injection 500 mg (has no administration in time range)   sodium chloride 0.9% bolus 2,037 mL 2,037 mL (0 mLs Intravenous Stopped 11/29/24 1315)     Medical Decision Making             ED Course as of 11/29/24 1431   Fri Nov 29, 2024   1400 The patient has an alteration of mental status due to a urinary tract infection.  IV antibiotics initiated.  Admit. [DH]   1416 Discussed with Dr. Edwards who accepts admission. [DH]      ED Course User Index  [DH] Asad Alegria DO                           Clinical Impression:  Final diagnoses:  [R41.82] Altered mental status  [T83.511A, N39.0] Urinary tract infection associated with indwelling urethral catheter, initial encounter (Primary)          ED Disposition Condition    Admit Stable                Asad Alegria DO  11/29/24 7008

## 2024-11-30 PROBLEM — Z86.69 HISTORY OF ANOXIC BRAIN INJURY: Status: ACTIVE | Noted: 2024-11-30

## 2024-11-30 PROBLEM — N39.0 URINARY TRACT INFECTION ASSOCIATED WITH INDWELLING URETHRAL CATHETER: Status: ACTIVE | Noted: 2024-11-30

## 2024-11-30 PROBLEM — R41.82 ALTERED MENTAL STATUS: Status: ACTIVE | Noted: 2024-11-30

## 2024-11-30 PROBLEM — T83.511A URINARY TRACT INFECTION ASSOCIATED WITH INDWELLING URETHRAL CATHETER: Status: ACTIVE | Noted: 2024-11-30

## 2024-11-30 LAB
ANION GAP SERPL CALC-SCNC: 5 MMOL/L (ref 3–11)
BASOPHILS # BLD AUTO: 0.02 K/UL (ref 0–0.2)
BASOPHILS NFR BLD: 0.2 % (ref 0–1.9)
BUN SERPL-MCNC: 26 MG/DL (ref 6–20)
CALCIUM SERPL-MCNC: 8.7 MG/DL (ref 8.7–10.5)
CHLORIDE SERPL-SCNC: 106 MMOL/L (ref 95–110)
CO2 SERPL-SCNC: 30 MMOL/L (ref 23–29)
CREAT SERPL-MCNC: 0.9 MG/DL (ref 0.5–1.4)
DIFFERENTIAL METHOD BLD: ABNORMAL
EOSINOPHIL # BLD AUTO: 0.3 K/UL (ref 0–0.5)
EOSINOPHIL NFR BLD: 3.1 % (ref 0–8)
ERYTHROCYTE [DISTWIDTH] IN BLOOD BY AUTOMATED COUNT: 14.7 % (ref 11.5–14.5)
EST. GFR  (NO RACE VARIABLE): >60 ML/MIN/1.73 M^2
GLUCOSE SERPL-MCNC: 221 MG/DL (ref 70–110)
HCT VFR BLD AUTO: 21.9 % (ref 40–54)
HGB BLD-MCNC: 6.4 G/DL (ref 14–18)
IMM GRANULOCYTES # BLD AUTO: 0.08 K/UL (ref 0–0.04)
IMM GRANULOCYTES NFR BLD AUTO: 0.8 % (ref 0–0.5)
LYMPHOCYTES # BLD AUTO: 1.2 K/UL (ref 1–4.8)
LYMPHOCYTES NFR BLD: 11.7 % (ref 18–48)
MCH RBC QN AUTO: 25.6 PG (ref 27–31)
MCHC RBC AUTO-ENTMCNC: 29.2 G/DL (ref 32–36)
MCV RBC AUTO: 88 FL (ref 82–98)
MONOCYTES # BLD AUTO: 0.8 K/UL (ref 0.3–1)
MONOCYTES NFR BLD: 8.1 % (ref 4–15)
NEUTROPHILS # BLD AUTO: 7.8 K/UL (ref 1.8–7.7)
NEUTROPHILS NFR BLD: 76.1 % (ref 38–73)
NRBC BLD-RTO: 0 /100 WBC
OHS QRS DURATION: 80 MS
OHS QTC CALCULATION: 457 MS
PLATELET # BLD AUTO: 715 K/UL (ref 150–450)
PMV BLD AUTO: 9.3 FL (ref 9.2–12.9)
POCT GLUCOSE: 160 MG/DL (ref 70–110)
POCT GLUCOSE: 230 MG/DL (ref 70–110)
POCT GLUCOSE: 232 MG/DL (ref 70–110)
POCT GLUCOSE: 57 MG/DL (ref 70–110)
POCT GLUCOSE: 59 MG/DL (ref 70–110)
POTASSIUM SERPL-SCNC: 4.2 MMOL/L (ref 3.5–5.1)
RBC # BLD AUTO: 2.5 M/UL (ref 4.6–6.2)
SODIUM SERPL-SCNC: 141 MMOL/L (ref 136–145)
WBC # BLD AUTO: 10.22 K/UL (ref 3.9–12.7)

## 2024-11-30 PROCEDURE — 85025 COMPLETE CBC W/AUTO DIFF WBC: CPT | Performed by: EMERGENCY MEDICINE

## 2024-11-30 PROCEDURE — 25000003 PHARM REV CODE 250: Performed by: INTERNAL MEDICINE

## 2024-11-30 PROCEDURE — 63600175 PHARM REV CODE 636 W HCPCS: Performed by: INTERNAL MEDICINE

## 2024-11-30 PROCEDURE — 21400001 HC TELEMETRY ROOM

## 2024-11-30 PROCEDURE — 80048 BASIC METABOLIC PNL TOTAL CA: CPT | Performed by: EMERGENCY MEDICINE

## 2024-11-30 PROCEDURE — 25000003 PHARM REV CODE 250: Performed by: EMERGENCY MEDICINE

## 2024-11-30 PROCEDURE — 27000207 HC ISOLATION

## 2024-11-30 PROCEDURE — 36415 COLL VENOUS BLD VENIPUNCTURE: CPT | Performed by: EMERGENCY MEDICINE

## 2024-11-30 RX ORDER — INSULIN GLARGINE 100 [IU]/ML
8 INJECTION, SOLUTION SUBCUTANEOUS NIGHTLY
Status: DISCONTINUED | OUTPATIENT
Start: 2024-11-30 | End: 2024-12-02

## 2024-11-30 RX ORDER — DIAZEPAM 5 MG/1
10 TABLET ORAL 2 TIMES DAILY
Status: DISCONTINUED | OUTPATIENT
Start: 2024-11-30 | End: 2024-12-02

## 2024-11-30 RX ORDER — LURASIDONE HYDROCHLORIDE 40 MG/1
40 TABLET, FILM COATED ORAL DAILY
Status: DISCONTINUED | OUTPATIENT
Start: 2024-12-01 | End: 2024-12-12 | Stop reason: HOSPADM

## 2024-11-30 RX ORDER — GABAPENTIN 100 MG/1
200 CAPSULE ORAL 2 TIMES DAILY
Status: DISCONTINUED | OUTPATIENT
Start: 2024-11-30 | End: 2024-12-08

## 2024-11-30 RX ORDER — LEVOTHYROXINE SODIUM 150 UG/1
150 TABLET ORAL EVERY MORNING
Status: DISCONTINUED | OUTPATIENT
Start: 2024-11-30 | End: 2024-12-05

## 2024-11-30 RX ORDER — LURASIDONE HYDROCHLORIDE 40 MG/1
40 TABLET, FILM COATED ORAL DAILY
Status: DISCONTINUED | OUTPATIENT
Start: 2024-11-30 | End: 2024-11-30

## 2024-11-30 RX ORDER — NAPROXEN SODIUM 220 MG/1
81 TABLET, FILM COATED ORAL DAILY
Status: DISCONTINUED | OUTPATIENT
Start: 2024-11-30 | End: 2024-11-30

## 2024-11-30 RX ORDER — NAPROXEN SODIUM 220 MG/1
81 TABLET, FILM COATED ORAL DAILY
Status: DISCONTINUED | OUTPATIENT
Start: 2024-12-01 | End: 2024-12-12 | Stop reason: HOSPADM

## 2024-11-30 RX ORDER — FLUOXETINE HYDROCHLORIDE 20 MG/1
20 CAPSULE ORAL DAILY
Status: DISCONTINUED | OUTPATIENT
Start: 2024-11-30 | End: 2024-12-12 | Stop reason: HOSPADM

## 2024-11-30 RX ORDER — ATORVASTATIN CALCIUM 40 MG/1
40 TABLET, FILM COATED ORAL DAILY
Status: DISCONTINUED | OUTPATIENT
Start: 2024-11-30 | End: 2024-12-07

## 2024-11-30 RX ORDER — BACLOFEN 5 MG/ML
5 SUSPENSION ORAL 3 TIMES DAILY
Status: DISCONTINUED | OUTPATIENT
Start: 2024-11-30 | End: 2024-11-30

## 2024-11-30 RX ORDER — FAMOTIDINE 20 MG/1
40 TABLET, FILM COATED ORAL DAILY
Status: DISCONTINUED | OUTPATIENT
Start: 2024-11-30 | End: 2024-12-02

## 2024-11-30 RX ORDER — OXYCODONE HYDROCHLORIDE 10 MG/1
10 TABLET ORAL EVERY 6 HOURS PRN
Status: DISCONTINUED | OUTPATIENT
Start: 2024-11-30 | End: 2024-12-12 | Stop reason: HOSPADM

## 2024-11-30 RX ORDER — INSULIN ASPART 100 [IU]/ML
4 INJECTION, SOLUTION INTRAVENOUS; SUBCUTANEOUS
Status: DISCONTINUED | OUTPATIENT
Start: 2024-11-30 | End: 2024-12-04

## 2024-11-30 RX ADMIN — FLUOXETINE HYDROCHLORIDE 20 MG: 20 CAPSULE ORAL at 10:11

## 2024-11-30 RX ADMIN — GABAPENTIN 200 MG: 100 CAPSULE ORAL at 08:11

## 2024-11-30 RX ADMIN — MUPIROCIN: 20 OINTMENT TOPICAL at 09:11

## 2024-11-30 RX ADMIN — INSULIN ASPART 4 UNITS: 100 INJECTION, SOLUTION INTRAVENOUS; SUBCUTANEOUS at 11:11

## 2024-11-30 RX ADMIN — LEVOTHYROXINE SODIUM 150 MCG: 150 TABLET ORAL at 10:11

## 2024-11-30 RX ADMIN — SODIUM CHLORIDE: 9 INJECTION, SOLUTION INTRAVENOUS at 09:11

## 2024-11-30 RX ADMIN — POLYETHYLENE GLYCOL (3350) 17 G: 17 POWDER, FOR SOLUTION ORAL at 09:11

## 2024-11-30 RX ADMIN — ACETAMINOPHEN 650 MG: 325 TABLET ORAL at 04:11

## 2024-11-30 RX ADMIN — OXYCODONE HYDROCHLORIDE 10 MG: 10 TABLET ORAL at 10:11

## 2024-11-30 RX ADMIN — FAMOTIDINE 40 MG: 20 TABLET, FILM COATED ORAL at 10:11

## 2024-11-30 RX ADMIN — LURASIDONE HYDROCHLORIDE 40 MG: 40 TABLET, FILM COATED ORAL at 12:11

## 2024-11-30 RX ADMIN — MEROPENEM 1 G: 1 INJECTION INTRAVENOUS at 02:11

## 2024-11-30 RX ADMIN — SODIUM CHLORIDE: 9 INJECTION, SOLUTION INTRAVENOUS at 12:11

## 2024-11-30 RX ADMIN — MEROPENEM 1 G: 1 INJECTION INTRAVENOUS at 09:11

## 2024-11-30 RX ADMIN — ATORVASTATIN CALCIUM 40 MG: 40 TABLET, FILM COATED ORAL at 10:11

## 2024-11-30 RX ADMIN — DIAZEPAM 10 MG: 5 TABLET ORAL at 10:11

## 2024-11-30 RX ADMIN — MEROPENEM 1 G: 1 INJECTION INTRAVENOUS at 05:11

## 2024-11-30 RX ADMIN — SODIUM CHLORIDE: 9 INJECTION, SOLUTION INTRAVENOUS at 10:11

## 2024-11-30 RX ADMIN — DIAZEPAM 10 MG: 5 TABLET ORAL at 08:11

## 2024-11-30 RX ADMIN — ASPIRIN 81 MG CHEWABLE TABLET 81 MG: 81 TABLET CHEWABLE at 10:11

## 2024-11-30 RX ADMIN — ENOXAPARIN SODIUM 40 MG: 100 INJECTION SUBCUTANEOUS at 05:11

## 2024-11-30 RX ADMIN — GABAPENTIN 200 MG: 100 CAPSULE ORAL at 10:11

## 2024-11-30 NOTE — ASSESSMENT & PLAN NOTE
Altered mental status secondary to UTI, symptoms improved after IV fluids, antibiotics on board follow-up with culture report

## 2024-11-30 NOTE — H&P
[facility] Medicine  History & Physical    Patient Name: Carlos Chahal  MRN: [id number]  Patient Class: IP- Inpatient  Admission Date: 11/29/2024  Attending Physician: Brandon Pruitt DO   Primary Care Provider: Jose Francisco Klein MD         Patient information was obtained from patient, past medical records, and ER records.     Subjective:     Principal Problem:Altered mental status    Chief Complaint:   Chief Complaint   Patient presents with    Loss of Consciousness     Pt here from NH due to increased LOC, unsure what pt normal is per EMS. Pt not responsive, possible UTI. Rojas in place from the 18th        HPI: Patient 34-year-old male history of bipolar disorder, diabetes, history of anoxic brain injury, GERD, neuropathy, brought to the ED due to decreased level of consciousness, patient was evaluated in the ED was noted to have UTI, no leukocytosis, H&H 7.5/25.8, patient was started on IV antibiotic, blood cultures pending, admitted    Past Medical History:   Diagnosis Date    Anoxic brain injury     Bipolar disorder, unspecified     Diabetes insipidus     Diabetes mellitus     Dysphagia, unspecified     Chucky gangrene     Gangrene     GERD (gastroesophageal reflux disease)     Hereditary and idiopathic neuropathy     Nontraumatic intracerebral hemorrhage, unspecified     Other muscle spasm     Other psychoactive substance abuse with withdrawal, uncomplicated     Skin transplant status     Thyroid disease        Past Surgical History:   Procedure Laterality Date    COLOSTOMY      INSERTION, PEG TUBE         Review of patient's allergies indicates:   Allergen Reactions    Guaifenesin Hives    Codeine Itching       No current facility-administered medications on file prior to encounter.     Current Outpatient Medications on File Prior to Encounter   Medication Sig    acetaminophen (TYLENOL) 325 MG tablet 325 mg by Per G Tube route every 6 (six) hours as needed.    ascorbic acid,  vitamin C, (VITAMIN C) 500 MG tablet 500 mg by Per G Tube route once daily.    aspirin 81 MG Chew Take 81 mg by mouth once daily.    BACLOFEN ORAL 5 mg by PEG Tube route 3 (three) times daily.    clindamycin (CLEOCIN) 300 MG capsule Take 300 mg by mouth every 6 (six) hours.    diazePAM (VALIUM) 5 MG tablet 10 mg by Per G Tube route 2 (two) times a day.    enoxaparin (LOVENOX) 40 mg/0.4 mL Syrg Inject 40 mg into the skin.    famotidine (PEPCID) 40 MG tablet 40 mg by Per G Tube route once daily.    FLUoxetine 20 MG capsule 20 mg by Per G Tube route once daily.    gabapentin (NEURONTIN) 250 mg/5 mL solution 5 mLs by Per G Tube route every 12 (twelve) hours.    hydrOXYzine HCL (ATARAX) 25 MG tablet 25 mg by Per G Tube route 3 (three) times daily.    insulin aspart U-100 (NOVOLOG) 100 unit/mL injection Inject into the skin 4 (four) times daily before meals and nightly.    insulin glargine,hum.rec.anlog (LANTUS SUBQ) Inject 8 Units into the skin once daily.    levothyroxine (SYNTHROID) 100 MCG tablet 150 mcg by Per G Tube route every morning.    oxyCODONE (OXY-IR) 5 mg Cap 10 mg by Per G Tube route every 6 (six) hours as needed for Pain.    zinc sulfate (ZINC-220 ORAL) 50 mg by PEG Tube route.    ALPRAZolam (XANAX) 0.5 MG tablet Take 0.5 mg by mouth daily as needed.    levothyroxine (SYNTHROID) 88 MCG tablet Take 88 mcg by mouth every morning.    lurasidone (LATUDA) 40 mg Tab tablet Take 40 mg by mouth once daily.    NOVOLIN R REGULAR U100 INSULIN 100 unit/mL injection Per insulin pump    rosuvastatin (CRESTOR) 10 MG tablet Take 10 mg by mouth once daily.     Family History    None       Tobacco Use    Smoking status: Unknown    Smokeless tobacco: Not on file   Substance and Sexual Activity    Alcohol use: Not on file    Drug use: Not on file    Sexual activity: Not on file     Review of Systems   Unable to perform ROS: Mental status change     Objective:     Vital Signs (Most Recent):  Temp: 97.3 °F (36.3 °C) (11/30/24  "0848)  Pulse: 89 (11/30/24 1323)  Resp: 20 (11/30/24 1018)  BP: 116/77 (11/30/24 0848)  SpO2: 98 % (11/30/24 0848) Vital Signs (24h Range):  Temp:  [96.9 °F (36.1 °C)-97.9 °F (36.6 °C)] 97.3 °F (36.3 °C)  Pulse:  [] 89  Resp:  [16-20] 20  SpO2:  [97 %-100 %] 98 %  BP: (100-116)/(63-77) 116/77     Weight: 67.9 kg (149 lb 11.2 oz)  Body mass index is 23.45 kg/m².     Physical Exam  Constitutional:       General: He is not in acute distress.     Appearance: He is not diaphoretic.   HENT:      Head: Normocephalic and atraumatic.      Nose: No congestion or rhinorrhea.   Eyes:      General:         Right eye: No discharge.         Left eye: No discharge.      Extraocular Movements: Extraocular movements intact.   Cardiovascular:      Rate and Rhythm: Normal rate.   Pulmonary:      Effort: Pulmonary effort is normal. No respiratory distress.      Breath sounds: No wheezing, rhonchi or rales.   Abdominal:      General: Bowel sounds are normal. There is no distension.      Tenderness: There is no abdominal tenderness.   Musculoskeletal:      Cervical back: Neck supple.      Right lower leg: No edema.      Left lower leg: No edema.   Skin:     General: Skin is warm and dry.      Capillary Refill: Capillary refill takes less than 2 seconds.   Neurological:      Mental Status: He is alert. Mental status is at baseline.   Psychiatric:         Mood and Affect: Mood normal.            Significant Labs: All pertinent labs within the past 24 hours have been reviewed.  Blood Culture:   Recent Labs   Lab 11/29/24  1011 11/29/24  1117   LABBLOO No Growth to date No Growth to date     Urine Culture: No results for input(s): "LABURIN" in the last 48 hours.  Urine Studies:   Recent Labs   Lab 11/29/24  1139   COLORU Yellow   APPEARANCEUA Cloudy*   PHUR 6.0   SPECGRAV 1.020   PROTEINUA 1+*   GLUCUA Negative   KETONESU Negative   BILIRUBINUA Negative   OCCULTUA 1+*   NITRITE Positive*   UROBILINOGEN Negative   LEUKOCYTESUR 3+* "   RBCUA 12*   WBCUA >100*   BACTERIA Many*   SQUAMEPITHEL 1   HYALINECASTS 1.5*     Recent Lab Results         11/30/24  0559   11/30/24  0538   11/29/24  1654        Anion Gap   5         Baso #   0.02         Basophil %   0.2         BUN   26         Calcium   8.7         Chloride   106         CO2   30         Creatinine   0.9         Differential Method   Automated         eGFR   >60.0         Eos #   0.3         Eos %   3.1         Glucose   221         Gran # (ANC)   7.8         Gran %   76.1         Hematocrit   21.9         Hemoglobin   6.4         Immature Grans (Abs)   0.08  Comment: Mild elevation in immature granulocytes is non specific and   can be seen in a variety of conditions including stress response,   acute inflammation, trauma and pregnancy. Correlation with other   laboratory and clinical findings is essential.           Immature Granulocytes   0.8         Lymph #   1.2         Lymph %   11.7         MCH   25.6         MCHC   29.2         MCV   88         Mono #   0.8         Mono %   8.1         MPV   9.3         nRBC   0         Platelet Count   715         POCT Glucose 230     94       Potassium   4.2         RBC   2.50         RDW   14.7         Sodium   141         WBC   10.22                 Significant Imaging: I have reviewed all pertinent imaging results/findings within the past 24 hours.  Assessment/Plan:     * Altered mental status  Altered mental status secondary to UTI, symptoms improved after IV fluids, antibiotics on board follow-up with culture report      History of anoxic brain injury  Patient with a history of anoxic brain injury, chronic indwelling Rojas catheter, contributing to increased UTI risks, history of multidrug resistant organisms, ESBL, patient was started on meropenem pending culture follow-up      Urinary tract infection associated with indwelling urethral catheter  Patient was found to have altered mental status at nursing home, limited response, lethargic,  brought to the ED for evaluation no leukocytosis, afebrile, patient was noted to have positive UA, chronic indwelling Rojas catheter, started on IV antibiotic, blood cultures pending urine culture pending, follow-up with result    Type 1 diabetes  Patient's FSGs are uncontrolled due to hyperglycemia on current medication regimen.  Last A1c reviewed-   Lab Results   Component Value Date    HGBA1C 12.4 (H) 01/03/2024     Most recent fingerstick glucose reviewed-   Recent Labs   Lab 11/29/24  1654 11/30/24  0559   POCTGLUCOSE 94 230*     Current correctional scale  Low  Maintain anti-hyperglycemic dose as follows-   Antihyperglycemics (From admission, onward)      Start     Stop Route Frequency Ordered    11/30/24 2100  insulin glargine U-100 (Lantus) injection 8 Units         -- SubQ Nightly 11/30/24 0958    11/30/24 1130  insulin aspart U-100 pen 4 Units         -- SubQ 3 times daily with meals 11/30/24 0958          Hold Oral hypoglycemics while patient is in the hospital.      VTE Risk Mitigation (From admission, onward)           Ordered     enoxaparin injection 40 mg  Daily         11/29/24 1536     Place sequential compression device  Until discontinued         11/29/24 1536                               Pharmacist Renal Dose Adjustment Note    Carlos Chahal is a 34 y.o. male being treated with the medication Meropenem    Patient Data:    Vital Signs (Most Recent):  Temp: 96.9 °F (36.1 °C) (11/29/24 1502)  Pulse: 99 (11/29/24 1502)  Resp: 18 (11/29/24 1502)  BP: 104/71 (11/29/24 1502)  SpO2: 100 % (11/29/24 1502) Vital Signs (72h Range):  Temp:  [96.9 °F (36.1 °C)-98.3 °F (36.8 °C)]   Pulse:  []   Resp:  [14-19]   BP: ()/(66-71)   SpO2:  [97 %-100 %]      Recent Labs   Lab 11/29/24  1117   CREATININE 1.2     Serum creatinine: 1.2 mg/dL 11/29/24 1117  Estimated creatinine clearance: 81.9 mL/min    Medication:meropenem dose: 500 mg frequency q6h will be changed to medication:meropenem dose:1000 mg  frequency:q8h    Pharmacist's Name: Bekah Garay  Pharmacist's Extension: [phone]      Octavio Edwards MD  Department of Hospital Medicine  [facility]

## 2024-11-30 NOTE — ASSESSMENT & PLAN NOTE
Patient was found to have altered mental status at nursing home, limited response, lethargic, brought to the ED for evaluation no leukocytosis, afebrile, patient was noted to have positive UA, chronic indwelling Rojas catheter, started on IV antibiotic, blood cultures pending urine culture pending, follow-up with result

## 2024-11-30 NOTE — SUBJECTIVE & OBJECTIVE
Past Medical History:   Diagnosis Date    Anoxic brain injury     Bipolar disorder, unspecified     Diabetes insipidus     Diabetes mellitus     Dysphagia, unspecified     Chucky gangrene     Gangrene     GERD (gastroesophageal reflux disease)     Hereditary and idiopathic neuropathy     Nontraumatic intracerebral hemorrhage, unspecified     Other muscle spasm     Other psychoactive substance abuse with withdrawal, uncomplicated     Skin transplant status     Thyroid disease        Past Surgical History:   Procedure Laterality Date    COLOSTOMY      INSERTION, PEG TUBE         Review of patient's allergies indicates:   Allergen Reactions    Guaifenesin Hives    Codeine Itching       No current facility-administered medications on file prior to encounter.     Current Outpatient Medications on File Prior to Encounter   Medication Sig    acetaminophen (TYLENOL) 325 MG tablet 325 mg by Per G Tube route every 6 (six) hours as needed.    ascorbic acid, vitamin C, (VITAMIN C) 500 MG tablet 500 mg by Per G Tube route once daily.    aspirin 81 MG Chew Take 81 mg by mouth once daily.    BACLOFEN ORAL 5 mg by PEG Tube route 3 (three) times daily.    clindamycin (CLEOCIN) 300 MG capsule Take 300 mg by mouth every 6 (six) hours.    diazePAM (VALIUM) 5 MG tablet 10 mg by Per G Tube route 2 (two) times a day.    enoxaparin (LOVENOX) 40 mg/0.4 mL Syrg Inject 40 mg into the skin.    famotidine (PEPCID) 40 MG tablet 40 mg by Per G Tube route once daily.    FLUoxetine 20 MG capsule 20 mg by Per G Tube route once daily.    gabapentin (NEURONTIN) 250 mg/5 mL solution 5 mLs by Per G Tube route every 12 (twelve) hours.    hydrOXYzine HCL (ATARAX) 25 MG tablet 25 mg by Per G Tube route 3 (three) times daily.    insulin aspart U-100 (NOVOLOG) 100 unit/mL injection Inject into the skin 4 (four) times daily before meals and nightly.    insulin glargine,hum.rec.anlog (LANTUS SUBQ) Inject 8 Units into the skin once daily.    levothyroxine  (SYNTHROID) 100 MCG tablet 150 mcg by Per G Tube route every morning.    oxyCODONE (OXY-IR) 5 mg Cap 10 mg by Per G Tube route every 6 (six) hours as needed for Pain.    zinc sulfate (ZINC-220 ORAL) 50 mg by PEG Tube route.    ALPRAZolam (XANAX) 0.5 MG tablet Take 0.5 mg by mouth daily as needed.    levothyroxine (SYNTHROID) 88 MCG tablet Take 88 mcg by mouth every morning.    lurasidone (LATUDA) 40 mg Tab tablet Take 40 mg by mouth once daily.    NOVOLIN R REGULAR U100 INSULIN 100 unit/mL injection Per insulin pump    rosuvastatin (CRESTOR) 10 MG tablet Take 10 mg by mouth once daily.     Family History    None       Tobacco Use    Smoking status: Unknown    Smokeless tobacco: Not on file   Substance and Sexual Activity    Alcohol use: Not on file    Drug use: Not on file    Sexual activity: Not on file     Review of Systems   Unable to perform ROS: Mental status change     Objective:     Vital Signs (Most Recent):  Temp: 97.3 °F (36.3 °C) (11/30/24 0848)  Pulse: 89 (11/30/24 1323)  Resp: 20 (11/30/24 1018)  BP: 116/77 (11/30/24 0848)  SpO2: 98 % (11/30/24 0848) Vital Signs (24h Range):  Temp:  [96.9 °F (36.1 °C)-97.9 °F (36.6 °C)] 97.3 °F (36.3 °C)  Pulse:  [] 89  Resp:  [16-20] 20  SpO2:  [97 %-100 %] 98 %  BP: (100-116)/(63-77) 116/77     Weight: 67.9 kg (149 lb 11.2 oz)  Body mass index is 23.45 kg/m².     Physical Exam  Constitutional:       General: He is not in acute distress.     Appearance: He is not diaphoretic.   HENT:      Head: Normocephalic and atraumatic.      Nose: No congestion or rhinorrhea.   Eyes:      General:         Right eye: No discharge.         Left eye: No discharge.      Extraocular Movements: Extraocular movements intact.   Cardiovascular:      Rate and Rhythm: Normal rate.   Pulmonary:      Effort: Pulmonary effort is normal. No respiratory distress.      Breath sounds: No wheezing, rhonchi or rales.   Abdominal:      General: Bowel sounds are normal. There is no distension.      " Tenderness: There is no abdominal tenderness.   Musculoskeletal:      Cervical back: Neck supple.      Right lower leg: No edema.      Left lower leg: No edema.   Skin:     General: Skin is warm and dry.      Capillary Refill: Capillary refill takes less than 2 seconds.   Neurological:      Mental Status: He is alert. Mental status is at baseline.   Psychiatric:         Mood and Affect: Mood normal.            Significant Labs: All pertinent labs within the past 24 hours have been reviewed.  Blood Culture:   Recent Labs   Lab 11/29/24  1011 11/29/24  1117   LABBLOO No Growth to date No Growth to date     Urine Culture: No results for input(s): "LABURIN" in the last 48 hours.  Urine Studies:   Recent Labs   Lab 11/29/24  1139   COLORU Yellow   APPEARANCEUA Cloudy*   PHUR 6.0   SPECGRAV 1.020   PROTEINUA 1+*   GLUCUA Negative   KETONESU Negative   BILIRUBINUA Negative   OCCULTUA 1+*   NITRITE Positive*   UROBILINOGEN Negative   LEUKOCYTESUR 3+*   RBCUA 12*   WBCUA >100*   BACTERIA Many*   SQUAMEPITHEL 1   HYALINECASTS 1.5*     Recent Lab Results         11/30/24  0559   11/30/24  0538   11/29/24  1654        Anion Gap   5         Baso #   0.02         Basophil %   0.2         BUN   26         Calcium   8.7         Chloride   106         CO2   30         Creatinine   0.9         Differential Method   Automated         eGFR   >60.0         Eos #   0.3         Eos %   3.1         Glucose   221         Gran # (ANC)   7.8         Gran %   76.1         Hematocrit   21.9         Hemoglobin   6.4         Immature Grans (Abs)   0.08  Comment: Mild elevation in immature granulocytes is non specific and   can be seen in a variety of conditions including stress response,   acute inflammation, trauma and pregnancy. Correlation with other   laboratory and clinical findings is essential.           Immature Granulocytes   0.8         Lymph #   1.2         Lymph %   11.7         MCH   25.6         MCHC   29.2         MCV   88         " Mono #   0.8         Mono %   8.1         MPV   9.3         nRBC   0         Platelet Count   715         POCT Glucose 230     94       Potassium   4.2         RBC   2.50         RDW   14.7         Sodium   141         WBC   10.22                 Significant Imaging: I have reviewed all pertinent imaging results/findings within the past 24 hours.

## 2024-11-30 NOTE — ASSESSMENT & PLAN NOTE
Patient's FSGs are uncontrolled due to hyperglycemia on current medication regimen.  Last A1c reviewed-   Lab Results   Component Value Date    HGBA1C 12.4 (H) 01/03/2024     Most recent fingerstick glucose reviewed-   Recent Labs   Lab 11/29/24  1654 11/30/24  0559   POCTGLUCOSE 94 230*     Current correctional scale  Low  Maintain anti-hyperglycemic dose as follows-   Antihyperglycemics (From admission, onward)      Start     Stop Route Frequency Ordered    11/30/24 2100  insulin glargine U-100 (Lantus) injection 8 Units         -- SubQ Nightly 11/30/24 0958    11/30/24 1130  insulin aspart U-100 pen 4 Units         -- SubQ 3 times daily with meals 11/30/24 0958          Hold Oral hypoglycemics while patient is in the hospital.

## 2024-11-30 NOTE — PLAN OF CARE
[facility]  Initial Discharge Assessment       Primary Care Provider: Jose Francisco Klein MD    Admission Diagnosis: Altered mental status [R41.82]  Urinary tract infection associated with indwelling urethral catheter, initial encounter [T83.511A, N39.0]    Admission Date: 11/29/2024  Expected Discharge Date:          Payor: BC[employer] M[employer] MEDICARE / Plan: [facility] [employer] ADVANTAGE DUAL SNP / Product Type: Medicare Advantage /     Extended Emergency Contact Information  Primary Emergency Contact: Hope Chahal  Mobile Phone: [phone]  Relation: Father  Preferred language: English  Secondary Emergency Contact: Hipolito Chahal  Mobile Phone: [phone]  Relation: Brother  Preferred language: English    Discharge Plan A: Skilled Nursing Facility  Discharge Plan B: Return to Nursing Home      [facility] [address] [facility]  [address] [facility] [address]  Phone: [phone] Fax: [phone]    [facility] [place], [place] - [address], [facility] [address]  [address], [facility] [address]  [facility] [address]  Phone: [phone] Fax: [phone]      Initial Assessment (most recent)       Adult Discharge Assessment - 11/30/24 1043          Discharge Assessment    Assessment Type Discharge Planning Assessment     Confirmed/corrected address, phone number and insurance Yes     Source of Information family     Communicated JOSE MANUEL with patient/caregiver Date not available/Unable to determine     Reason For Admission infection , UTI, altered mental status, lethargy     People in Home facility resident   unable at this time to confirm if SNF or skilled nursing, Father is not sure.    Facility Arrived From: [facility]     Do you expect to return to your current living situation? Yes     Prior to hospitilization cognitive status: Unable to Assess     Current cognitive status: --   has been lethargic, awake but responses slow.    Walking or Climbing Stairs Difficulty yes     Walking or Climbing Stairs ambulation  difficulty, dependent;transferring difficulty, dependent     Mobility Management dependent     Dressing/Bathing Difficulty yes     Dressing/Bathing bathing difficulty, dependent     Home Layout Able to live on 1st floor     Equipment Currently Used at Home other (see comments)   arrived from nursing home, cannot confirm at this time what equipment patient has    Patient currently being followed by outpatient case management? No     Do you take prescription medications? Yes     Do you have any problems affording any of your prescribed medications? TBD     Is the patient taking medications as prescribed? yes     Who is going to help you get home at discharge? return to [facility]     How do you get to doctors appointments? agency;family or friend will provide     Do you take coumadin? No     Discharge Plan A Skilled Nursing Facility     Discharge Plan B Return to Nursing Home     DME Needed Upon Discharge  other (see comments)   requires further evaluation    Discharge Plan discussed with: --   Hope Chahal, patient's father                Patient slow with responses to conversation, speech difficult to understand, gives permission for  to contact his father.  Reached out per phone to Hope Chahal.  He states patient has been at [facility], not certain if skilled or retirement, but likely patient will be returning there upon discharge.

## 2024-11-30 NOTE — PLAN OF CARE
Plan of care reveiwed with patient and remains ongoing. 18G to left forearm, infusing NS at 125ml/hr as ordered. IV site is clean, dry and intact with no signs of redness, swelling, or discomfort noted. Rojas cath is in place, connected to drainage bag positioned below the bladder level, tubing is free of kinks. Wound dressings in place, covering the affected areas. Dressings are clean, dry and intact. Ostomy appliance is in place and functioning appropriately. Stoma is pink and moist, with no signs of irritation or surrounding skin breakdown. Output os consistent with expected findings. Peg tube is in place and patent. Flushed with 60ml of water. No redness, swelling or leakage noted. Site is clean, and patient tolerated the device without complaints. Bed is in lowest position with call light within reach.       Problem: Skin Injury Risk Increased  Goal: Skin Health and Integrity  Outcome: Progressing     Problem: Infection  Goal: Absence of Infection Signs and Symptoms  Outcome: Progressing     Problem: Adult Inpatient Plan of Care  Goal: Plan of Care Review  Outcome: Progressing  Goal: Patient-Specific Goal (Individualized)  Outcome: Progressing  Goal: Absence of Hospital-Acquired Illness or Injury  Outcome: Progressing  Goal: Optimal Comfort and Wellbeing  Outcome: Progressing  Goal: Readiness for Transition of Care  Outcome: Progressing     Problem: Diabetes Comorbidity  Goal: Blood Glucose Level Within Targeted Range  Outcome: Progressing     Problem: Wound  Goal: Optimal Coping  Outcome: Progressing  Goal: Optimal Functional Ability  Outcome: Progressing  Goal: Absence of Infection Signs and Symptoms  Outcome: Progressing  Goal: Improved Oral Intake  Outcome: Progressing  Goal: Optimal Pain Control and Function  Outcome: Progressing  Goal: Skin Health and Integrity  Outcome: Progressing  Goal: Optimal Wound Healing  Outcome: Progressing     Problem: Fall Injury Risk  Goal: Absence of Fall and Fall-Related  Injury  Outcome: Progressing

## 2024-11-30 NOTE — HPI
Patient 34-year-old male history of bipolar disorder, diabetes, history of anoxic brain injury, GERD, neuropathy, brought to the ED due to decreased level of consciousness, patient was evaluated in the ED was noted to have UTI, no leukocytosis, H&H 7.5/25.8, patient was started on IV antibiotic, blood cultures pending, admitted

## 2024-11-30 NOTE — ASSESSMENT & PLAN NOTE
Patient with a history of anoxic brain injury, chronic indwelling Rojas catheter, contributing to increased UTI risks, history of multidrug resistant organisms, ESBL, patient was started on meropenem pending culture follow-up

## 2024-12-01 PROBLEM — D75.839 THROMBOCYTOSIS: Status: ACTIVE | Noted: 2024-12-01

## 2024-12-01 PROBLEM — D50.9 MICROCYTIC ANEMIA: Status: ACTIVE | Noted: 2024-12-01

## 2024-12-01 LAB
ABO + RH BLD: NORMAL
ALBUMIN SERPL BCP-MCNC: 0.9 G/DL (ref 3.5–5.2)
ALP SERPL-CCNC: 146 U/L (ref 55–135)
ALT SERPL W/O P-5'-P-CCNC: 18 U/L (ref 10–44)
ANION GAP SERPL CALC-SCNC: 7 MMOL/L (ref 3–11)
AST SERPL-CCNC: 30 U/L (ref 10–40)
BACTERIA UR CULT: ABNORMAL
BASOPHILS # BLD AUTO: 0.02 K/UL (ref 0–0.2)
BASOPHILS NFR BLD: 0.2 % (ref 0–1.9)
BILIRUB SERPL-MCNC: 0.3 MG/DL (ref 0.1–1)
BLD GP AB SCN CELLS X3 SERPL QL: NORMAL
BLD PROD TYP BPU: NORMAL
BLOOD UNIT EXPIRATION DATE: NORMAL
BLOOD UNIT TYPE CODE: 9500
BLOOD UNIT TYPE: NORMAL
BUN SERPL-MCNC: 15 MG/DL (ref 6–20)
CALCIUM SERPL-MCNC: 8.7 MG/DL (ref 8.7–10.5)
CHLORIDE SERPL-SCNC: 107 MMOL/L (ref 95–110)
CO2 SERPL-SCNC: 28 MMOL/L (ref 23–29)
CODING SYSTEM: NORMAL
CREAT SERPL-MCNC: 0.9 MG/DL (ref 0.5–1.4)
CROSSMATCH INTERPRETATION: NORMAL
DIFFERENTIAL METHOD BLD: ABNORMAL
DISPENSE STATUS: NORMAL
EOSINOPHIL # BLD AUTO: 0.3 K/UL (ref 0–0.5)
EOSINOPHIL NFR BLD: 2.6 % (ref 0–8)
ERYTHROCYTE [DISTWIDTH] IN BLOOD BY AUTOMATED COUNT: 14.8 % (ref 11.5–14.5)
EST. GFR  (NO RACE VARIABLE): >60 ML/MIN/1.73 M^2
GLUCOSE SERPL-MCNC: 190 MG/DL (ref 70–110)
HCT VFR BLD AUTO: 22.2 % (ref 40–54)
HGB BLD-MCNC: 6.6 G/DL (ref 14–18)
IMM GRANULOCYTES # BLD AUTO: 0.06 K/UL (ref 0–0.04)
IMM GRANULOCYTES NFR BLD AUTO: 0.6 % (ref 0–0.5)
LYMPHOCYTES # BLD AUTO: 1.4 K/UL (ref 1–4.8)
LYMPHOCYTES NFR BLD: 13.7 % (ref 18–48)
MAGNESIUM SERPL-MCNC: 1.6 MG/DL (ref 1.6–2.6)
MCH RBC QN AUTO: 26.1 PG (ref 27–31)
MCHC RBC AUTO-ENTMCNC: 29.7 G/DL (ref 32–36)
MCV RBC AUTO: 88 FL (ref 82–98)
MONOCYTES # BLD AUTO: 0.8 K/UL (ref 0.3–1)
MONOCYTES NFR BLD: 8.1 % (ref 4–15)
NEUTROPHILS # BLD AUTO: 7.6 K/UL (ref 1.8–7.7)
NEUTROPHILS NFR BLD: 74.8 % (ref 38–73)
NRBC BLD-RTO: 0 /100 WBC
NUM UNITS TRANS PACKED RBC: NORMAL
PLATELET # BLD AUTO: 749 K/UL (ref 150–450)
PMV BLD AUTO: 9.6 FL (ref 9.2–12.9)
POCT GLUCOSE: 128 MG/DL (ref 70–110)
POCT GLUCOSE: 137 MG/DL (ref 70–110)
POCT GLUCOSE: 143 MG/DL (ref 70–110)
POCT GLUCOSE: 223 MG/DL (ref 70–110)
POTASSIUM SERPL-SCNC: 4 MMOL/L (ref 3.5–5.1)
PROT SERPL-MCNC: 6.9 G/DL (ref 6–8.4)
RBC # BLD AUTO: 2.53 M/UL (ref 4.6–6.2)
SODIUM SERPL-SCNC: 142 MMOL/L (ref 136–145)
SPECIMEN OUTDATE: NORMAL
WBC # BLD AUTO: 10.17 K/UL (ref 3.9–12.7)

## 2024-12-01 PROCEDURE — 63600175 PHARM REV CODE 636 W HCPCS: Performed by: INTERNAL MEDICINE

## 2024-12-01 PROCEDURE — 83735 ASSAY OF MAGNESIUM: CPT | Performed by: INTERNAL MEDICINE

## 2024-12-01 PROCEDURE — P9016 RBC LEUKOCYTES REDUCED: HCPCS | Performed by: INTERNAL MEDICINE

## 2024-12-01 PROCEDURE — 80053 COMPREHEN METABOLIC PANEL: CPT | Performed by: INTERNAL MEDICINE

## 2024-12-01 PROCEDURE — 85025 COMPLETE CBC W/AUTO DIFF WBC: CPT | Performed by: INTERNAL MEDICINE

## 2024-12-01 PROCEDURE — 86901 BLOOD TYPING SEROLOGIC RH(D): CPT | Performed by: INTERNAL MEDICINE

## 2024-12-01 PROCEDURE — 25000003 PHARM REV CODE 250: Performed by: STUDENT IN AN ORGANIZED HEALTH CARE EDUCATION/TRAINING PROGRAM

## 2024-12-01 PROCEDURE — 25000003 PHARM REV CODE 250: Performed by: INTERNAL MEDICINE

## 2024-12-01 PROCEDURE — 27000207 HC ISOLATION

## 2024-12-01 PROCEDURE — 36415 COLL VENOUS BLD VENIPUNCTURE: CPT | Performed by: INTERNAL MEDICINE

## 2024-12-01 PROCEDURE — 21400001 HC TELEMETRY ROOM

## 2024-12-01 PROCEDURE — 86920 COMPATIBILITY TEST SPIN: CPT | Performed by: INTERNAL MEDICINE

## 2024-12-01 RX ORDER — IBUPROFEN 200 MG
24 TABLET ORAL
Status: CANCELLED | OUTPATIENT
Start: 2024-12-01

## 2024-12-01 RX ORDER — IBUPROFEN 200 MG
16 TABLET ORAL
Status: CANCELLED | OUTPATIENT
Start: 2024-12-01

## 2024-12-01 RX ORDER — GLUCAGON 1 MG
1 KIT INJECTION
Status: CANCELLED | OUTPATIENT
Start: 2024-12-01

## 2024-12-01 RX ADMIN — SODIUM CHLORIDE: 9 INJECTION, SOLUTION INTRAVENOUS at 06:12

## 2024-12-01 RX ADMIN — SODIUM CHLORIDE: 9 INJECTION, SOLUTION INTRAVENOUS at 07:12

## 2024-12-01 RX ADMIN — MEROPENEM 1 G: 1 INJECTION INTRAVENOUS at 06:12

## 2024-12-01 RX ADMIN — INSULIN ASPART 4 UNITS: 100 INJECTION, SOLUTION INTRAVENOUS; SUBCUTANEOUS at 11:12

## 2024-12-01 RX ADMIN — OXYCODONE HYDROCHLORIDE 10 MG: 10 TABLET ORAL at 08:12

## 2024-12-01 RX ADMIN — FAMOTIDINE 40 MG: 20 TABLET, FILM COATED ORAL at 09:12

## 2024-12-01 RX ADMIN — ACETAMINOPHEN 650 MG: 325 TABLET ORAL at 10:12

## 2024-12-01 RX ADMIN — MUPIROCIN: 20 OINTMENT TOPICAL at 09:12

## 2024-12-01 RX ADMIN — OXYCODONE HYDROCHLORIDE 10 MG: 10 TABLET ORAL at 03:12

## 2024-12-01 RX ADMIN — ATORVASTATIN CALCIUM 40 MG: 40 TABLET, FILM COATED ORAL at 09:12

## 2024-12-01 RX ADMIN — MEROPENEM 1 G: 1 INJECTION INTRAVENOUS at 03:12

## 2024-12-01 RX ADMIN — ENOXAPARIN SODIUM 40 MG: 100 INJECTION SUBCUTANEOUS at 05:12

## 2024-12-01 RX ADMIN — LEVOTHYROXINE SODIUM 150 MCG: 150 TABLET ORAL at 06:12

## 2024-12-01 RX ADMIN — DIAZEPAM 10 MG: 5 TABLET ORAL at 08:12

## 2024-12-01 RX ADMIN — ASPIRIN 81 MG CHEWABLE TABLET 81 MG: 81 TABLET CHEWABLE at 09:12

## 2024-12-01 RX ADMIN — GABAPENTIN 200 MG: 100 CAPSULE ORAL at 08:12

## 2024-12-01 RX ADMIN — FLUOXETINE HYDROCHLORIDE 20 MG: 20 CAPSULE ORAL at 09:12

## 2024-12-01 RX ADMIN — DIAZEPAM 10 MG: 5 TABLET ORAL at 09:12

## 2024-12-01 RX ADMIN — POLYETHYLENE GLYCOL (3350) 17 G: 17 POWDER, FOR SOLUTION ORAL at 09:12

## 2024-12-01 RX ADMIN — MUPIROCIN: 20 OINTMENT TOPICAL at 10:12

## 2024-12-01 RX ADMIN — GABAPENTIN 200 MG: 100 CAPSULE ORAL at 09:12

## 2024-12-01 RX ADMIN — MEROPENEM 1 G: 1 INJECTION INTRAVENOUS at 10:12

## 2024-12-01 RX ADMIN — LURASIDONE HYDROCHLORIDE 40 MG: 40 TABLET, FILM COATED ORAL at 09:12

## 2024-12-01 NOTE — SUBJECTIVE & OBJECTIVE
Interval History: Patient seen and examined.    Review of Systems   Unable to perform ROS: Mental status change     Objective:     Vital Signs (Most Recent):  Temp: (!) 100.4 °F (38 °C) (12/01/24 1036)  Pulse: 104 (12/01/24 1136)  Resp: 18 (12/01/24 0854)  BP: 105/66 (12/01/24 0854)  SpO2: 95 % (12/01/24 0854) Vital Signs (24h Range):  Temp:  [97.5 °F (36.4 °C)-100.4 °F (38 °C)] 100.4 °F (38 °C)  Pulse:  [] 104  Resp:  [18] 18  SpO2:  [95 %-98 %] 95 %  BP: ()/(58-75) 105/66     Weight: 67.9 kg (149 lb 11.2 oz)  Body mass index is 23.45 kg/m².    Intake/Output Summary (Last 24 hours) at 12/1/2024 1256  Last data filed at 12/1/2024 0721  Gross per 24 hour   Intake 4063.41 ml   Output 2066 ml   Net 1997.41 ml         Physical Exam  Constitutional:       General: He is not in acute distress.     Appearance: He is not diaphoretic.   HENT:      Head: Normocephalic and atraumatic.      Nose: No congestion or rhinorrhea.   Eyes:      General:         Right eye: No discharge.         Left eye: No discharge.      Extraocular Movements: Extraocular movements intact.   Cardiovascular:      Rate and Rhythm: Normal rate.   Pulmonary:      Effort: Pulmonary effort is normal. No respiratory distress.      Breath sounds: No wheezing, rhonchi or rales.   Abdominal:      General: Bowel sounds are normal. There is no distension.      Tenderness: There is no abdominal tenderness.   Musculoskeletal:      Cervical back: Neck supple.      Right lower leg: No edema.      Left lower leg: No edema.   Skin:     General: Skin is warm and dry.      Capillary Refill: Capillary refill takes less than 2 seconds.   Neurological:      Mental Status: He is alert. Mental status is at baseline.   Psychiatric:         Mood and Affect: Mood normal.             Significant Labs: All pertinent labs within the past 24 hours have been reviewed.  Recent Lab Results  (Last 5 results in the past 24 hours)        12/01/24  1133   12/01/24  0607    11/30/24 2059   11/30/24  1830   11/30/24  1727        Albumin   0.9             ALP   146             ALT   18             Anion Gap   7             AST   30             Baso #   0.02             Basophil %   0.2             BILIRUBIN TOTAL   0.3  Comment: For infants and newborns, interpretation of results should be based  on gestational age, weight and in agreement with clinical  observations.    Premature Infant recommended reference ranges:  Up to 24 hours.............<8.0 mg/dL  Up to 48 hours............<12.0 mg/dL  3-5 days..................<15.0 mg/dL  6-29 days.................<15.0 mg/dL    For patients on Eltrombopag therapy, use of Dimension [place] TBIL is   not   recommended.               BUN   15             Calcium   8.7             Chloride   107             CO2   28             Creatinine   0.9             Differential Method   Automated             eGFR   >60.0             Eos #   0.3             Eos %   2.6             Glucose   190             Gran # (ANC)   7.6             Gran %   74.8             Hematocrit   22.2             Hemoglobin   6.6             Immature Grans (Abs)   0.06  Comment: Mild elevation in immature granulocytes is non specific and   can be seen in a variety of conditions including stress response,   acute inflammation, trauma and pregnancy. Correlation with other   laboratory and clinical findings is essential.               Immature Granulocytes   0.6             Lymph #   1.4             Lymph %   13.7             Magnesium    1.6             MCH   26.1             MCHC   29.7             MCV   88             Mono #   0.8             Mono %   8.1             MPV   9.6             nRBC   0             Platelet Count   749             POCT Glucose 223     160   128   59       Potassium   4.0             PROTEIN TOTAL   6.9             RBC   2.53             RDW   14.8             Sodium   142             WBC   10.17                                    Significant Imaging:  I have reviewed all pertinent imaging results/findings within the past 24 hours.

## 2024-12-01 NOTE — ASSESSMENT & PLAN NOTE
Patient's FSGs are uncontrolled due to hyperglycemia on current medication regimen.  Last A1c reviewed-   Lab Results   Component Value Date    HGBA1C 12.4 (H) 01/03/2024     Most recent fingerstick glucose reviewed-   Recent Labs   Lab 11/30/24  1727 11/30/24  1830 11/30/24 2059 12/01/24  1133   POCTGLUCOSE 59* 128* 160* 223*       Current correctional scale  Low  Maintain anti-hyperglycemic dose as follows-   Antihyperglycemics (From admission, onward)    Start     Stop Route Frequency Ordered    11/30/24 2100  insulin glargine U-100 (Lantus) injection 8 Units         -- SubQ Nightly 11/30/24 0958    11/30/24 1130  insulin aspart U-100 pen 4 Units         -- SubQ 3 times daily with meals 11/30/24 0958        Hold Oral hypoglycemics while patient is in the hospital.

## 2024-12-01 NOTE — ASSESSMENT & PLAN NOTE
Anemia is likely due to Iron deficiency. Most recent hemoglobin and hematocrit are listed below.  Recent Labs     11/29/24  1117 11/30/24  0538 12/01/24  0607   HGB 7.5* 6.4* 6.6*   HCT 25.8* 21.9* 22.2*     Plan  - Monitor serial CBC: Daily  - Transfuse PRBC if patient becomes hemodynamically unstable, symptomatic or H/H drops below 7/21.  - Patient has not received any PRBC transfusions to date, order placed to receive 1 unit today  - Patient's anemia is currently stable

## 2024-12-01 NOTE — PROGRESS NOTES
[facility] Medicine  Progress Note    Patient Name: Carlos Chahal  MRN: [id number]  Patient Class: IP- Inpatient   Admission Date: 11/29/2024  Length of Stay: 2 days  Attending Physician: Brandon Pruitt DO  Primary Care Provider: Jose Francisco Klein MD        Subjective:     Principal Problem:Altered mental status        HPI:  Patient 34-year-old male history of bipolar disorder, diabetes, history of anoxic brain injury, GERD, neuropathy, brought to the ED due to decreased level of consciousness, patient was evaluated in the ED was noted to have UTI, no leukocytosis, H&H 7.5/25.8, patient was started on IV antibiotic, blood cultures pending, admitted    Overview/Hospital Course:  12/1 AA, weekend crosscover, patient anemic, 1 unit packed red blood cell ordered, urine culture Gram-negative rods, sensitivities pending, on IV antibiotic, renal function stable, post H&H once transfusion completed    Interval History: Patient seen and examined.    Review of Systems   Unable to perform ROS: Mental status change     Objective:     Vital Signs (Most Recent):  Temp: (!) 100.4 °F (38 °C) (12/01/24 1036)  Pulse: 104 (12/01/24 1136)  Resp: 18 (12/01/24 0854)  BP: 105/66 (12/01/24 0854)  SpO2: 95 % (12/01/24 0854) Vital Signs (24h Range):  Temp:  [97.5 °F (36.4 °C)-100.4 °F (38 °C)] 100.4 °F (38 °C)  Pulse:  [] 104  Resp:  [18] 18  SpO2:  [95 %-98 %] 95 %  BP: ()/(58-75) 105/66     Weight: 67.9 kg (149 lb 11.2 oz)  Body mass index is 23.45 kg/m².    Intake/Output Summary (Last 24 hours) at 12/1/2024 1256  Last data filed at 12/1/2024 0721  Gross per 24 hour   Intake 4063.41 ml   Output 2066 ml   Net 1997.41 ml         Physical Exam  Constitutional:       General: He is not in acute distress.     Appearance: He is not diaphoretic.   HENT:      Head: Normocephalic and atraumatic.      Nose: No congestion or rhinorrhea.   Eyes:      General:         Right eye: No discharge.         Left eye: No  discharge.      Extraocular Movements: Extraocular movements intact.   Cardiovascular:      Rate and Rhythm: Normal rate.   Pulmonary:      Effort: Pulmonary effort is normal. No respiratory distress.      Breath sounds: No wheezing, rhonchi or rales.   Abdominal:      General: Bowel sounds are normal. There is no distension.      Tenderness: There is no abdominal tenderness.   Musculoskeletal:      Cervical back: Neck supple.      Right lower leg: No edema.      Left lower leg: No edema.   Skin:     General: Skin is warm and dry.      Capillary Refill: Capillary refill takes less than 2 seconds.   Neurological:      Mental Status: He is alert. Mental status is at baseline.   Psychiatric:         Mood and Affect: Mood normal.             Significant Labs: All pertinent labs within the past 24 hours have been reviewed.  Recent Lab Results  (Last 5 results in the past 24 hours)        12/01/24  1133   12/01/24  0607   11/30/24  2059   11/30/24  1830   11/30/24  1727        Albumin   0.9             ALP   146             ALT   18             Anion Gap   7             AST   30             Baso #   0.02             Basophil %   0.2             BILIRUBIN TOTAL   0.3  Comment: For infants and newborns, interpretation of results should be based  on gestational age, weight and in agreement with clinical  observations.    Premature Infant recommended reference ranges:  Up to 24 hours.............<8.0 mg/dL  Up to 48 hours............<12.0 mg/dL  3-5 days..................<15.0 mg/dL  6-29 days.................<15.0 mg/dL    For patients on Eltrombopag therapy, use of Dimension [place] TBIL is   not   recommended.               BUN   15             Calcium   8.7             Chloride   107             CO2   28             Creatinine   0.9             Differential Method   Automated             eGFR   >60.0             Eos #   0.3             Eos %   2.6             Glucose   190             Gran # (ANC)   7.6             Gran %    74.8             Hematocrit   22.2             Hemoglobin   6.6             Immature Grans (Abs)   0.06  Comment: Mild elevation in immature granulocytes is non specific and   can be seen in a variety of conditions including stress response,   acute inflammation, trauma and pregnancy. Correlation with other   laboratory and clinical findings is essential.               Immature Granulocytes   0.6             Lymph #   1.4             Lymph %   13.7             Magnesium    1.6             MCH   26.1             MCHC   29.7             MCV   88             Mono #   0.8             Mono %   8.1             MPV   9.6             nRBC   0             Platelet Count   749             POCT Glucose 223     160   128   59       Potassium   4.0             PROTEIN TOTAL   6.9             RBC   2.53             RDW   14.8             Sodium   142             WBC   10.17                                    Significant Imaging: I have reviewed all pertinent imaging results/findings within the past 24 hours.    Assessment/Plan:      * Altered mental status  Altered mental status secondary to UTI, symptoms improved after IV fluids, antibiotics on board follow-up with culture report      Thrombocytosis  Likely related to iron-deficiency anemia, transfusion ordered, will start on iron supplement      Microcytic anemia  Anemia is likely due to Iron deficiency. Most recent hemoglobin and hematocrit are listed below.  Recent Labs     11/29/24  1117 11/30/24  0538 12/01/24  0607   HGB 7.5* 6.4* 6.6*   HCT 25.8* 21.9* 22.2*     Plan  - Monitor serial CBC: Daily  - Transfuse PRBC if patient becomes hemodynamically unstable, symptomatic or H/H drops below 7/21.  - Patient has not received any PRBC transfusions to date, order placed to receive 1 unit today  - Patient's anemia is currently stable    History of anoxic brain injury  Patient with a history of anoxic brain injury, chronic indwelling Rojas catheter, contributing to increased UTI  risks, history of multidrug resistant organisms, ESBL, patient was started on meropenem pending culture follow-up      Urinary tract infection associated with indwelling urethral catheter  Patient was found to have altered mental status at nursing home, limited response, lethargic, brought to the ED for evaluation no leukocytosis, afebrile, patient was noted to have positive UA, chronic indwelling Rojas catheter, started on IV antibiotic, blood cultures pending urine culture pending, follow-up with result    Type 1 diabetes  Patient's FSGs are uncontrolled due to hyperglycemia on current medication regimen.  Last A1c reviewed-   Lab Results   Component Value Date    HGBA1C 12.4 (H) 01/03/2024     Most recent fingerstick glucose reviewed-   Recent Labs   Lab 11/30/24  1727 11/30/24  1830 11/30/24  2059 12/01/24  1133   POCTGLUCOSE 59* 128* 160* 223*       Current correctional scale  Low  Maintain anti-hyperglycemic dose as follows-   Antihyperglycemics (From admission, onward)      Start     Stop Route Frequency Ordered    11/30/24 2100  insulin glargine U-100 (Lantus) injection 8 Units         -- SubQ Nightly 11/30/24 0958    11/30/24 1130  insulin aspart U-100 pen 4 Units         -- SubQ 3 times daily with meals 11/30/24 0958          Hold Oral hypoglycemics while patient is in the hospital.      VTE Risk Mitigation (From admission, onward)           Ordered     enoxaparin injection 40 mg  Daily         11/29/24 1536     Place sequential compression device  Until discontinued         11/29/24 1536                    Discharge Planning   JOSE MANUEL:      Code Status: Full Code   Is the patient medically ready for discharge?:     Reason for patient still in hospital (select all that apply): Patient trending condition, Laboratory test, Treatment, Imaging, and Pending disposition  Discharge Plan A: Skilled Nursing Facility                  Octavio Edwards MD  Department of Hospital Medicine   [facility]

## 2024-12-01 NOTE — PLAN OF CARE
Plan of care reviewed. Patient remained free of falls and tolerated all medications this shift. IV in place, patent, flushed and continues to infuse 0.9 @ 125 ml/hr. No redness, swelling or drainage noted at site. A/O x1. Oriented to name only. Patient is in bed resting. Visible rise and fall noted. No signs of acute stress. RA .Tele monitor audible with leads intact. incontinent to bowel and bladder. Rojas in place, cleaned and drained. Ostomy wafer and bag changed due to leaking out. Skin cleaned and prepped. Oral care provided. C/O pain in legs this shift, PRN given.  Call bell and remote in reach. Bed locked and in lowest position. All belongings in reach. No further concerns at this time.       Problem: Skin Injury Risk Increased  Goal: Skin Health and Integrity  Outcome: Progressing     Problem: Infection  Goal: Absence of Infection Signs and Symptoms  Outcome: Progressing     Problem: Adult Inpatient Plan of Care  Goal: Plan of Care Review  Outcome: Progressing  Goal: Patient-Specific Goal (Individualized)  Outcome: Progressing  Goal: Absence of Hospital-Acquired Illness or Injury  Outcome: Progressing  Goal: Optimal Comfort and Wellbeing  Outcome: Progressing  Goal: Readiness for Transition of Care  Outcome: Progressing     Problem: Diabetes Comorbidity  Goal: Blood Glucose Level Within Targeted Range  Outcome: Progressing     Problem: Wound  Goal: Optimal Coping  Outcome: Progressing  Goal: Optimal Functional Ability  Outcome: Progressing  Goal: Absence of Infection Signs and Symptoms  Outcome: Progressing  Goal: Improved Oral Intake  Outcome: Progressing  Goal: Optimal Pain Control and Function  Outcome: Progressing  Goal: Skin Health and Integrity  Outcome: Progressing  Goal: Optimal Wound Healing  Outcome: Progressing     Problem: Fall Injury Risk  Goal: Absence of Fall and Fall-Related Injury  Outcome: Progressing

## 2024-12-01 NOTE — HOSPITAL COURSE
12/1 AA, weekend crosscover, patient anemic, 1 unit packed red blood cell ordered, urine culture Gram-negative rods, sensitivities pending, on IV antibiotic, renal function stable, post H&H once transfusion completed  12/2 KD:  Patient lying in bed with decreased LOC, H&H stable.  IV merum continued.  WC consulted to continue wound care orders.  Additional iron labs ordered.  12/3 KD:  Patient awake and alert lying in bed with no distress.  Continue IV antibiotics.  New orders for general surgical consult for wound debridement.  12/4 KD:  Patient awake and alert.  KUB ordered to confirm G-tube placement.  Hold feedings until placement is confirmed.    12/5 KD:  Pt.  Awake and alert.  Glucerna @ 10 cc/hr continuous feedings.  Nutritional consult today. Cont. Wound care per GS recs.   12/6 KD:  Awake and alert.  Continue titrating feedings as tolerated upon awaiting nutritional consult.  Continue wound care per GS rec  12/7 [place] weekend crossover. Nursing home resident with anoxic brain injury, diabetes, PEG dependent with ileostomy undergoing treatment for MDRO urinary tract infection and chronic perineal and sacral wounds. Hg/HCT stable, 7.8/25.6 (s/p 1U PRBC 12/1/24). Reported no urine output and minimal output to ileostomy yesterday. Currently tolerating tube feeds with urinary Rojas catheter draining clear urine. Electrolytes within normal limits, liver enzymes elevated along with alk phos. Patient in no acute distress, non tender abdomen, soft throughout. Will hold statin and monitor LFTs. Continue with IV antibiotics, strict I/O, daily wound cleanse and care.  12/8 [place] weekend crossover. Overnight vital signs normotensive, stable. No reported events overnight. Post tube feed start, residual measure at >200 mL with >600 mL output into ileostomy. Tube feed discontinued. Elevated LFTs AST 1029, , Alk phos 423, Tbili 0.4. Also associated with mild leukocytosis 12k Renal function normal. No evidence of acute  abdomen on physical exam. Rojas catheter draining yellow urine. Will discontinue merrem concerning acute liver failure, give bolus IVF, monitor residual.   12/9 KD: Awake, alert. Feedings d/c. Liver enzymes elevated. , , . H&H 7/28.3. Monitor for need of transfusion. WBC 14.37 elevated on 12/8, today WBC 11.67. CT-ABD today.   12/10 KD: Lying in bed with no complaints. Glucerna 1.5 c. @ 20cc/hr cont. Feeding, tolerating well with no residual. Goal to increase 5cc/hr to reach 45cc/hr. Doxy 100 mg BID with no end date started. WC BID to sacral wound continuing prior wound care orders.   12/11 KD: Pt. Lying in bed no distress noted. Cont. Discharge planning for tomorrow back to [facility], if continues tolerating feedings.   12/12 KD: D/c to NH today. Tolerating feedings, continue to increase feedings slowly to goal of 55cc/hr. Increased reglan to 5mg TID to help improve GI motility. Cont. W.C. consider wound vac to sacrum if needed.

## 2024-12-02 PROBLEM — S31.109A OPEN WOUND OF GROIN: Status: ACTIVE | Noted: 2024-12-02

## 2024-12-02 LAB
ALBUMIN SERPL BCP-MCNC: 0.9 G/DL (ref 3.5–5.2)
ALP SERPL-CCNC: 159 U/L (ref 55–135)
ALT SERPL W/O P-5'-P-CCNC: 17 U/L (ref 10–44)
ANION GAP SERPL CALC-SCNC: 6 MMOL/L (ref 3–11)
AST SERPL-CCNC: 32 U/L (ref 10–40)
BASOPHILS # BLD AUTO: 0.03 K/UL (ref 0–0.2)
BASOPHILS NFR BLD: 0.3 % (ref 0–1.9)
BILIRUB SERPL-MCNC: 0.4 MG/DL (ref 0.1–1)
BUN SERPL-MCNC: 9 MG/DL (ref 6–20)
CALCIUM SERPL-MCNC: 8.7 MG/DL (ref 8.7–10.5)
CHLORIDE SERPL-SCNC: 105 MMOL/L (ref 95–110)
CO2 SERPL-SCNC: 28 MMOL/L (ref 23–29)
CREAT SERPL-MCNC: 0.9 MG/DL (ref 0.5–1.4)
DIFFERENTIAL METHOD BLD: ABNORMAL
EOSINOPHIL # BLD AUTO: 0.3 K/UL (ref 0–0.5)
EOSINOPHIL NFR BLD: 2.2 % (ref 0–8)
ERYTHROCYTE [DISTWIDTH] IN BLOOD BY AUTOMATED COUNT: 14.7 % (ref 11.5–14.5)
EST. GFR  (NO RACE VARIABLE): >60 ML/MIN/1.73 M^2
FERRITIN SERPL-MCNC: 1177 NG/ML (ref 20–300)
FOLATE SERPL-MCNC: 24.3 NG/ML (ref 4–24)
GLUCOSE SERPL-MCNC: 260 MG/DL (ref 70–110)
HCT VFR BLD AUTO: 25.1 % (ref 40–54)
HGB BLD-MCNC: 7.6 G/DL (ref 14–18)
IMM GRANULOCYTES # BLD AUTO: 0.07 K/UL (ref 0–0.04)
IMM GRANULOCYTES NFR BLD AUTO: 0.6 % (ref 0–0.5)
IRON SATN MFR SERPL: 18 % (ref 20–50)
IRON SERPL-MCNC: 16 UG/DL (ref 45–160)
LYMPHOCYTES # BLD AUTO: 1.3 K/UL (ref 1–4.8)
LYMPHOCYTES NFR BLD: 10.7 % (ref 18–48)
MAGNESIUM SERPL-MCNC: 1.5 MG/DL (ref 1.6–2.6)
MCH RBC QN AUTO: 25.9 PG (ref 27–31)
MCHC RBC AUTO-ENTMCNC: 30.3 G/DL (ref 32–36)
MCV RBC AUTO: 86 FL (ref 82–98)
MONOCYTES # BLD AUTO: 0.7 K/UL (ref 0.3–1)
MONOCYTES NFR BLD: 6 % (ref 4–15)
NEUTROPHILS # BLD AUTO: 9.4 K/UL (ref 1.8–7.7)
NEUTROPHILS NFR BLD: 80.2 % (ref 38–73)
NRBC BLD-RTO: 0 /100 WBC
PLATELET # BLD AUTO: 701 K/UL (ref 150–450)
PMV BLD AUTO: 8.8 FL (ref 9.2–12.9)
POTASSIUM SERPL-SCNC: 3.8 MMOL/L (ref 3.5–5.1)
PROT SERPL-MCNC: 6.8 G/DL (ref 6–8.4)
RBC # BLD AUTO: 2.93 M/UL (ref 4.6–6.2)
SODIUM SERPL-SCNC: 139 MMOL/L (ref 136–145)
TOTAL IRON BINDING CAPACITY: 91 UG/DL (ref 250–450)
VIT B12 SERPL-MCNC: 1456 PG/ML (ref 210–950)
WBC # BLD AUTO: 11.7 K/UL (ref 3.9–12.7)

## 2024-12-02 PROCEDURE — 25000003 PHARM REV CODE 250: Performed by: INTERNAL MEDICINE

## 2024-12-02 PROCEDURE — 63600175 PHARM REV CODE 636 W HCPCS: Performed by: INTERNAL MEDICINE

## 2024-12-02 PROCEDURE — 36415 COLL VENOUS BLD VENIPUNCTURE: CPT | Performed by: INTERNAL MEDICINE

## 2024-12-02 PROCEDURE — 27000207 HC ISOLATION

## 2024-12-02 PROCEDURE — 82728 ASSAY OF FERRITIN: CPT | Performed by: INTERNAL MEDICINE

## 2024-12-02 PROCEDURE — 80053 COMPREHEN METABOLIC PANEL: CPT | Performed by: INTERNAL MEDICINE

## 2024-12-02 PROCEDURE — 25000003 PHARM REV CODE 250: Performed by: STUDENT IN AN ORGANIZED HEALTH CARE EDUCATION/TRAINING PROGRAM

## 2024-12-02 PROCEDURE — 83540 ASSAY OF IRON: CPT | Performed by: INTERNAL MEDICINE

## 2024-12-02 PROCEDURE — 83735 ASSAY OF MAGNESIUM: CPT | Performed by: INTERNAL MEDICINE

## 2024-12-02 PROCEDURE — 82746 ASSAY OF FOLIC ACID SERUM: CPT | Performed by: INTERNAL MEDICINE

## 2024-12-02 PROCEDURE — 82607 VITAMIN B-12: CPT | Performed by: INTERNAL MEDICINE

## 2024-12-02 PROCEDURE — 21400001 HC TELEMETRY ROOM

## 2024-12-02 PROCEDURE — 85025 COMPLETE CBC W/AUTO DIFF WBC: CPT | Performed by: INTERNAL MEDICINE

## 2024-12-02 RX ORDER — FAMOTIDINE 20 MG/1
20 TABLET, FILM COATED ORAL 2 TIMES DAILY
Status: DISCONTINUED | OUTPATIENT
Start: 2024-12-03 | End: 2024-12-12 | Stop reason: HOSPADM

## 2024-12-02 RX ORDER — BACLOFEN 5 MG/1
5 TABLET ORAL 2 TIMES DAILY
Status: DISCONTINUED | OUTPATIENT
Start: 2024-12-02 | End: 2024-12-04

## 2024-12-02 RX ORDER — DIAZEPAM 5 MG/1
5 TABLET ORAL 2 TIMES DAILY
Status: DISCONTINUED | OUTPATIENT
Start: 2024-12-02 | End: 2024-12-12 | Stop reason: HOSPADM

## 2024-12-02 RX ORDER — INSULIN GLARGINE 100 [IU]/ML
12 INJECTION, SOLUTION SUBCUTANEOUS NIGHTLY
Status: DISCONTINUED | OUTPATIENT
Start: 2024-12-02 | End: 2024-12-03

## 2024-12-02 RX ADMIN — BACLOFEN 5 MG: 5 TABLET ORAL at 08:12

## 2024-12-02 RX ADMIN — INSULIN ASPART 4 UNITS: 100 INJECTION, SOLUTION INTRAVENOUS; SUBCUTANEOUS at 05:12

## 2024-12-02 RX ADMIN — MEROPENEM 1 G: 1 INJECTION INTRAVENOUS at 02:12

## 2024-12-02 RX ADMIN — SODIUM HYPOCHLORITE: 1.25 SOLUTION TOPICAL at 12:12

## 2024-12-02 RX ADMIN — GABAPENTIN 200 MG: 100 CAPSULE ORAL at 08:12

## 2024-12-02 RX ADMIN — DIAZEPAM 5 MG: 5 TABLET ORAL at 08:12

## 2024-12-02 RX ADMIN — SODIUM CHLORIDE: 9 INJECTION, SOLUTION INTRAVENOUS at 05:12

## 2024-12-02 RX ADMIN — OXYCODONE HYDROCHLORIDE 10 MG: 10 TABLET ORAL at 12:12

## 2024-12-02 RX ADMIN — ENOXAPARIN SODIUM 40 MG: 100 INJECTION SUBCUTANEOUS at 05:12

## 2024-12-02 RX ADMIN — MEROPENEM 1 G: 1 INJECTION INTRAVENOUS at 06:12

## 2024-12-02 RX ADMIN — MUPIROCIN: 20 OINTMENT TOPICAL at 09:12

## 2024-12-02 RX ADMIN — FAMOTIDINE 40 MG: 20 TABLET, FILM COATED ORAL at 08:12

## 2024-12-02 RX ADMIN — DIAZEPAM 5 MG: 5 TABLET ORAL at 09:12

## 2024-12-02 RX ADMIN — MUPIROCIN: 20 OINTMENT TOPICAL at 08:12

## 2024-12-02 RX ADMIN — FLUOXETINE HYDROCHLORIDE 20 MG: 20 CAPSULE ORAL at 08:12

## 2024-12-02 RX ADMIN — MEROPENEM 1 G: 1 INJECTION INTRAVENOUS at 09:12

## 2024-12-02 RX ADMIN — GABAPENTIN 200 MG: 100 CAPSULE ORAL at 09:12

## 2024-12-02 RX ADMIN — ASPIRIN 81 MG CHEWABLE TABLET 81 MG: 81 TABLET CHEWABLE at 08:12

## 2024-12-02 RX ADMIN — POLYETHYLENE GLYCOL (3350) 17 G: 17 POWDER, FOR SOLUTION ORAL at 08:12

## 2024-12-02 RX ADMIN — LEVOTHYROXINE SODIUM 150 MCG: 150 TABLET ORAL at 06:12

## 2024-12-02 RX ADMIN — ATORVASTATIN CALCIUM 40 MG: 40 TABLET, FILM COATED ORAL at 08:12

## 2024-12-02 RX ADMIN — BACLOFEN 5 MG: 5 TABLET ORAL at 09:12

## 2024-12-02 RX ADMIN — OXYCODONE HYDROCHLORIDE 10 MG: 10 TABLET ORAL at 05:12

## 2024-12-02 RX ADMIN — LURASIDONE HYDROCHLORIDE 40 MG: 40 TABLET, FILM COATED ORAL at 09:12

## 2024-12-02 NOTE — CONSULTS
Wound care consult received.  See separate notes for pictures and measurements.  Notified provider of order to perineal/buttocks wounds.  New orders obtained.  Wound care/dressing changes completed with primary nurse.  Dietary has been consulted for nutritional recommendations related to wounds.  Low air loss pump to be connected to mattress.  Continue turning/repositioning every 2 hours.  Bilateral heel medix boots on to offload heels and protect from breakdown.

## 2024-12-02 NOTE — PLAN OF CARE
JAZIEL phoned [facility] and spoke to [facility] to request for wound care orders. JAZIEL provided fax number [phone]. SW pending orders.     ADDENDUM: JAZIEL received fax and provided to Lillian Cadena RN.

## 2024-12-02 NOTE — PROGRESS NOTES
Pharmacist Renal Dose Adjustment Note    Carlos Chahal is a 34 y.o. male being treated with the medication famotidine    Patient Data:    Vital Signs (Most Recent):  Temp: 98.3 °F (36.8 °C) (12/02/24 0733)  Pulse: 98 (12/02/24 1048)  Resp: 18 (12/02/24 0733)  BP: 100/63 (12/02/24 0733)  SpO2: 96 % (12/02/24 0733) Vital Signs (72h Range):  Temp:  [96.9 °F (36.1 °C)-100.4 °F (38 °C)]   Pulse:  []   Resp:  [14-20]   BP: ()/(58-80)   SpO2:  [95 %-100 %]      Recent Labs   Lab 11/30/24  0538 12/01/24  0607 12/02/24  0600   CREATININE 0.9 0.9 0.9     Serum creatinine: 0.9 mg/dL 12/02/24 0600  Estimated creatinine clearance: 109.1 mL/min    Medication: famotidine dose:  40 mg frequency  QD will be changed to medication: famotidine dose:20 mg frequency: BID    Pharmacist's Name: Dong Dubose  Pharmacist's Extension: [phone]

## 2024-12-02 NOTE — CONSULTS
[facility] Surg  Adult Nutrition  Consult Note    SUMMARY     Recommendations  1. Rec'd Continuous EN: Glucerna 1.2 or Diabetasource AC @ 10mL/r increasing by 5-10mL q4-6hrs to goal rate of 75mL/hr with a continuous 25mL FWF to provide 2160kcal (106% EEN), 108g of protein (113% EPN), and 2049mL FW.   2.  Recd Néstor BID via PEG tube to promote wound healing and to provide additional nutrition.       - Do not mix Néstor with formula in a tube-feeding bag      - Pour one packet of Néstor in a clean, small container for mixing.       - Add 4 fl oz (120 mL) water at room temperature.       - Mix well with disposable spoon or tongue blade until all particles are completely hydrated.       - Verify correct tube placement.       - Flush feeding tube with 30 mL water.       -Administer Néstor through feeding tube using a 60-mL or larger syringe.       - Flush with an additional 30 mL water.   3. RD to follow and make erc's accordingly.  Goals:   1. Pt will be started on TF by next RD follow up.   2. Pt will reach TF goal rate within 48hrs of initiation.  Nutrition Goal Status: new  Communication of RD Recs: reviewed with physician, reviewed with RN    Assessment and Plan    Nutrition Problem  Inadequate oral intake    Related to (etiology):   Increased nutrient needs    Signs and Symptoms (as evidenced by):   wounds     Interventions/Recommendations (treatment strategy):  1. Rec'd Continuous EN: Glucerna 1.2 or Diabetasource AC @ 10mL/r increasing by 5-10mL q4-6hrs to goal rate of 75mL/hr with a continuous 25mL FWF to provide 2160kcal (106% EEN), 108g of protein (113% EPN), and 2049mL FW.   2.  Recd Néstor BID via NG tube to promote wound healing and to provide additional nutrition.       - Do not mix Néstor with formula in a tube-feeding bag      - Pour one packet of Néstor in a clean, small container for mixing.       - Add 4 fl oz (120 mL) water at room temperature.       - Mix well with disposable spoon or tongue blade  "until all particles are completely hydrated.       - Verify correct tube placement.       - Flush feeding tube with 30 mL water.       -Administer Nétsor through feeding tube using a 60-mL or larger syringe.       - Flush with an additional 30 mL water.   3. RD to follow and make erc's accordingly.    Nutrition Diagnosis Status:   New    Malnutrition Assessment  NFPE not warranted at this time. RD to continue to monitor for signs and symptoms of malnutrition.    Reason for Assessment    Reason For Assessment: consult (PEG)  Diagnosis: other (see comments) (Altered Mental Status)  General Information Comments: RD consutled for pthaving a PEG tube. Pt is also on a Regular diet. Noted pt's hx of Diabetes. Spoke with RN about pt's PO intake and PEG. RN stated pt is eating minimal via PO and also stated pt has wounds. Stated to RN PO diet should be switched to Consistent CHO diet and order was changed. Will provide rec's for TF and Néstor via PEG. RD to follow and make rec's accordigngly.  Nutrition Discharge Planning: TBD as care progresses    Nutrition Risk Screen    Nutrition Risk Screen: tube feeding or parenteral nutrition    Nutrition/Diet History    Spiritual, Cultural Beliefs, Christian Practices, Values that Affect Care: no  Food Allergies: NKFA    Anthropometrics    Temp: 98.6 °F (37 °C)  Height Method: Stated  Height: 5' 7" (170.2 cm)  Height (inches): 67 in  Weight Method: Bed Scale  Weight: 67.9 kg (149 lb 11.1 oz)  Weight (lb): 149.69 lb  Ideal Body Weight (IBW), Male: 148 lb  % Ideal Body Weight, Male (lb): 101.14 %  BMI (Calculated): 23.4  BMI Grade: 18.5-24.9 - normal    Lab/Procedures/Meds    Pertinent Labs Reviewed: reviewed  Pertinent Medications Reviewed: reviewed    Estimated/Assessed Needs    Weight Used For Calorie Calculations: 67.9 kg (149 lb 11.1 oz)  Energy Calorie Requirements (kcal): 4670-8834 (30-35kcal/kg)  Energy Need Method: Kcal/kg  Protein Requirements: 81-95 (1.2-1.4g/kg)  Weight Used " For Protein Calculations: 67.9 kg (149 lb 11.1 oz)  Fluid Requirements (mL): 0403-4968 (1mL/kcal)  Estimated Fluid Requirement Method: RDA Method  RDA Method (mL): 2037    Nutrition Prescription Ordered    Current Diet Order: Consistent CHO  Oral Nutrition Supplement: None    Evaluation of Received Nutrient/Fluid Intake    % Kcal Needs: 0-25%  % Protein Needs: 0-25%  I/O: - 689.6  Energy Calories Required: not meeting needs  Protein Required: not meeting needs  Tolerance: tolerating  % Intake of Estimated Energy Needs: 0 - 25 %  % Meal Intake: 0 - 25 %    Nutrition Risk    Level of Risk/Frequency of Follow-up: moderate     Monitor and Evaluation    Food and Nutrient Intake: energy intake, food and beverage intake, enteral nutrition intake  Food and Nutrient Adminstration: diet order, enteral and parenteral nutrition administration  Knowledge/Beliefs/Attitudes: food and nutrition knowledge/skill, beliefs and attitudes  Physical Activity and Function: nutrition-related ADLs and IADLs  Anthropometric Measurements: height/length, weight, weight change, body mass index  Biochemical Data, Medical Tests and Procedures: electrolyte and renal panel, glucose/endocrine profile, gastrointestinal profile, inflammatory profile, lipid profile  Nutrition-Focused Physical Findings: overall appearance     Nutrition Follow-Up    RD Follow-up?: Yes

## 2024-12-02 NOTE — ASSESSMENT & PLAN NOTE
Anemia is likely due to Iron deficiency. Most recent hemoglobin and hematocrit are listed below.  Recent Labs     11/30/24  0538 12/01/24  0607 12/02/24  0600   HGB 6.4* 6.6* 7.6*   HCT 21.9* 22.2* 25.1*       Plan  - Monitor serial CBC: Daily  - Transfuse PRBC if patient becomes hemodynamically unstable, symptomatic or H/H drops below 7/21.  - Patient has not received any PRBC transfusions to date, order placed to receive 1 unit today  - Patient's anemia is currently stable  12/2 KD:  Iron panel ordered

## 2024-12-02 NOTE — ASSESSMENT & PLAN NOTE
Patient's FSGs are uncontrolled due to hyperglycemia on current medication regimen.  Last A1c reviewed-   Lab Results   Component Value Date    HGBA1C 12.4 (H) 01/03/2024     Most recent fingerstick glucose reviewed-   Recent Labs   Lab 12/01/24  1133 12/01/24  1643 12/01/24  2228   POCTGLUCOSE 223* 137* 143*       Current correctional scale  Low  Maintain anti-hyperglycemic dose as follows-   Antihyperglycemics (From admission, onward)    Start     Stop Route Frequency Ordered    12/02/24 2100  insulin glargine U-100 (Lantus) injection 12 Units         -- SubQ Nightly 12/02/24 0835    11/30/24 1130  insulin aspart U-100 pen 4 Units         -- SubQ 3 times daily with meals 11/30/24 0958        Hold Oral hypoglycemics while patient is in the hospital.

## 2024-12-02 NOTE — PROGRESS NOTES
12/02/24 1414        Wound 12/02/24 1400 Donor Site Right anterior;medial Thigh   Date First Assessed/Time First Assessed: 12/02/24 1400   Present on Original Admission: Yes  Primary Wound Type: Donor Site  Side: Right  Orientation: anterior;medial  Location: Thigh   Dressing Appearance Clean;Intact;Dry   Drainage Amount None   Appearance Pink;Red   Tissue loss description Partial thickness   Periwound Area Dry;Intact;Warm   Wound Edges Defined   Wound Length (cm) 18.5 cm   Wound Width (cm) 7 cm   Wound Surface Area (cm^2) 129.5 cm^2   Care Cleansed with:;Sterile normal saline   Dressing Applied;Gauze;Other (comment)  (Xerform gauze applied to wound bed)   Dressing Change Due 12/04/24

## 2024-12-02 NOTE — PROGRESS NOTES
12/02/24 1430        Wound 11/18/24 0800 Pressure Injury Left Buttocks   Date First Assessed/Time First Assessed: 11/18/24 0800   Present on Original Admission: Yes  Primary Wound Type: Pressure Injury  Side: Left  Location: (c) Buttocks   Pressure Injury Stage 4   Dressing Appearance Saturated   Drainage Amount Large   Drainage Characteristics/Odor Malodorous;Tan;Serosanguineous;Green   Appearance Pink;Red;Necrotic;Black   Tissue loss description Full thickness   Periwound Area Intact;Warm   Wound Edges Irregular   Wound Length (cm) 17.5 cm   Wound Width (cm) 3.5 cm   Wound Depth (cm) 1.9 cm   Wound Volume (cm^3) 116.375 cm^3   Wound Surface Area (cm^2) 61.25 cm^2   Undermining (depth (cm)/location) 2.5 cm in depth from 10:00 to 3:00   Care Cleansed with:;Antimicrobial agent   Dressing Applied;Gauze, wet to dry;Absorptive Pad  (Dakin's moistened gauze applied undermining and to wound bed)   Periwound Care Absorptive dressing applied   Dressing Change Due 12/03/24     Wound width 13.5 cm

## 2024-12-02 NOTE — ASSESSMENT & PLAN NOTE
Patient was found to have altered mental status at nursing home, limited response, lethargic, brought to the ED for evaluation no leukocytosis, afebrile, patient was noted to have positive UA, chronic indwelling Rojas catheter, started on IV antibiotic, blood cultures pending urine culture pending, follow-up with result  12/2 KD:  Continue IV meropenum 1g Q 8 hrs,

## 2024-12-02 NOTE — SUBJECTIVE & OBJECTIVE
Interval History: Patient seen and examined.    Review of Systems   Unable to perform ROS: Mental status change     Objective:     Vital Signs (Most Recent):  Temp: 98.3 °F (36.8 °C) (12/02/24 0733)  Pulse: 103 (12/02/24 0733)  Resp: 18 (12/02/24 0733)  BP: 100/63 (12/02/24 0733)  SpO2: 96 % (12/02/24 0733) Vital Signs (24h Range):  Temp:  [97.7 °F (36.5 °C)-100.4 °F (38 °C)] 98.3 °F (36.8 °C)  Pulse:  [] 103  Resp:  [16-18] 18  SpO2:  [95 %-99 %] 96 %  BP: (100-119)/(63-80) 100/63     Weight: 67.9 kg (149 lb 11.2 oz)  Body mass index is 23.45 kg/m².    Intake/Output Summary (Last 24 hours) at 12/2/2024 0842  Last data filed at 12/2/2024 0502  Gross per 24 hour   Intake 1691.73 ml   Output 2625 ml   Net -933.27 ml         Physical Exam  Constitutional:       General: He is not in acute distress.     Appearance: He is ill-appearing. He is not diaphoretic.   HENT:      Head: Normocephalic and atraumatic.      Nose: No congestion or rhinorrhea.   Eyes:      General:         Right eye: No discharge.         Left eye: No discharge.      Extraocular Movements: Extraocular movements intact.   Cardiovascular:      Rate and Rhythm: Normal rate.   Pulmonary:      Effort: Pulmonary effort is normal. No respiratory distress.      Breath sounds: No wheezing, rhonchi or rales.   Abdominal:      General: Bowel sounds are normal. There is no distension.      Tenderness: There is no abdominal tenderness.   Musculoskeletal:      Cervical back: Neck supple.      Right lower leg: No edema.      Left lower leg: No edema.   Skin:     General: Skin is warm and dry.      Capillary Refill: Capillary refill takes less than 2 seconds.      Coloration: Skin is pale.      Comments: Multiple wounds   Neurological:      Mental Status: He is alert. He is disoriented.   Psychiatric:         Mood and Affect: Mood normal.             Significant Labs: All pertinent labs within the past 24 hours have been reviewed.  Recent Lab Results  (Last 5  results in the past 24 hours)        12/02/24  0600   12/01/24  2228   12/01/24  1643   12/01/24  1330   12/01/24  1133        Unit Blood Type Code       9500         Unit Expiration       [id number]         Unit Blood Type       O NEG         Albumin 0.9                              ALT 17               Anion Gap 6               AST 32               Baso # 0.03               Basophil % 0.3               BILIRUBIN TOTAL 0.4  Comment: For infants and newborns, interpretation of results should be based  on gestational age, weight and in agreement with clinical  observations.    Premature Infant recommended reference ranges:  Up to 24 hours.............<8.0 mg/dL  Up to 48 hours............<12.0 mg/dL  3-5 days..................<15.0 mg/dL  6-29 days.................<15.0 mg/dL    For patients on Eltrombopag therapy, use of [employer] [place] TBIL is   not   recommended.                 BUN 9               Calcium 8.7               Chloride 105               CO2 28               CODING SYSTEM       [id number]         Creatinine 0.9               Crossmatch Interpretation       Compatible         Differential Method Automated               DISPENSE STATUS       TRANSFUSED         eGFR >60.0               Eos # 0.3               Eos % 2.2               Glucose 260               Gran # (ANC) 9.4               Gran % 80.2               Group & Rh       O POS         Hematocrit 25.1               Hemoglobin 7.6               Immature Grans (Abs) 0.07  Comment: Mild elevation in immature granulocytes is non specific and   can be seen in a variety of conditions including stress response,   acute inflammation, trauma and pregnancy. Correlation with other   laboratory and clinical findings is essential.                 Immature Granulocytes 0.6               INDIRECT CAMILLE       NEG         Lymph # 1.3               Lymph % 10.7               Magnesium  1.5               MCH 25.9               MCHC 30.3               MCV  86               Mono # 0.7               Mono % 6.0               MPV 8.8               nRBC 0               Platelet Count 701               POCT Glucose   143   137     223       Potassium 3.8               Product Code       [id number]         PROTEIN TOTAL 6.8               RBC 2.93               RDW 14.7               Sodium 139               Specimen Outdate       12/04/2024 23:59         UNIT NUMBER       [id number]         WBC 11.70                                      Significant Imaging: I have reviewed all pertinent imaging results/findings within the past 24 hours.

## 2024-12-02 NOTE — ASSESSMENT & PLAN NOTE
Altered mental status secondary to UTI, symptoms improved after IV fluids, antibiotics on board follow-up with culture report  12/2 KD:  Continue IV meropenem amlodipine when g every 8 hours

## 2024-12-02 NOTE — PROGRESS NOTES
[facility]  Progress Note    Patient Name: Carlos Chahal  MRN: [id number]  Patient Class: IP- Inpatient   Admission Date: 11/29/2024  Length of Stay: 3 days  Attending Physician: Kim Diamond MD  Primary Care Provider: Jose Francisco Klein MD        Subjective:     Principal Problem:Altered mental status        HPI:  Patient 34-year-old male history of bipolar disorder, diabetes, history of anoxic brain injury, GERD, neuropathy, brought to the ED due to decreased level of consciousness, patient was evaluated in the ED was noted to have UTI, no leukocytosis, H&H 7.5/25.8, patient was started on IV antibiotic, blood cultures pending, admitted    Overview/Hospital Course:  12/1 AA, weekend crosscover, patient anemic, 1 unit packed red blood cell ordered, urine culture Gram-negative rods, sensitivities pending, on IV antibiotic, renal function stable, post H&H once transfusion completed  12/2 KD:  Patient lying in bed with decreased LOC, H&H stable.  IV merum continued.  WC consulted to continue wound care orders.  Additional iron labs ordered.    Interval History: Patient seen and examined.    Review of Systems   Unable to perform ROS: Mental status change     Objective:     Vital Signs (Most Recent):  Temp: 98.3 °F (36.8 °C) (12/02/24 0733)  Pulse: 103 (12/02/24 0733)  Resp: 18 (12/02/24 0733)  BP: 100/63 (12/02/24 0733)  SpO2: 96 % (12/02/24 0733) Vital Signs (24h Range):  Temp:  [97.7 °F (36.5 °C)-100.4 °F (38 °C)] 98.3 °F (36.8 °C)  Pulse:  [] 103  Resp:  [16-18] 18  SpO2:  [95 %-99 %] 96 %  BP: (100-119)/(63-80) 100/63     Weight: 67.9 kg (149 lb 11.2 oz)  Body mass index is 23.45 kg/m².    Intake/Output Summary (Last 24 hours) at 12/2/2024 0842  Last data filed at 12/2/2024 0502  Gross per 24 hour   Intake 1691.73 ml   Output 2625 ml   Net -933.27 ml         Physical Exam  Constitutional:       General: He is not in acute distress.     Appearance: He is ill-appearing. He is not  diaphoretic.   HENT:      Head: Normocephalic and atraumatic.      Nose: No congestion or rhinorrhea.   Eyes:      General:         Right eye: No discharge.         Left eye: No discharge.      Extraocular Movements: Extraocular movements intact.   Cardiovascular:      Rate and Rhythm: Normal rate.   Pulmonary:      Effort: Pulmonary effort is normal. No respiratory distress.      Breath sounds: No wheezing, rhonchi or rales.   Abdominal:      General: Bowel sounds are normal. There is no distension.      Tenderness: There is no abdominal tenderness.   Musculoskeletal:      Cervical back: Neck supple.      Right lower leg: No edema.      Left lower leg: No edema.   Skin:     General: Skin is warm and dry.      Capillary Refill: Capillary refill takes less than 2 seconds.      Coloration: Skin is pale.      Comments: Multiple wounds   Neurological:      Mental Status: He is alert. He is disoriented.   Psychiatric:         Mood and Affect: Mood normal.             Significant Labs: All pertinent labs within the past 24 hours have been reviewed.  Recent Lab Results  (Last 5 results in the past 24 hours)        12/02/24  0600   12/01/24  2228   12/01/24  1643   12/01/24  1330   12/01/24  1133        Unit Blood Type Code       9500         Unit Expiration       [id number]         Unit Blood Type       O NEG         Albumin 0.9                              ALT 17               Anion Gap 6               AST 32               Baso # 0.03               Basophil % 0.3               BILIRUBIN TOTAL 0.4  Comment: For infants and newborns, interpretation of results should be based  on gestational age, weight and in agreement with clinical  observations.    Premature Infant recommended reference ranges:  Up to 24 hours.............<8.0 mg/dL  Up to 48 hours............<12.0 mg/dL  3-5 days..................<15.0 mg/dL  6-29 days.................<15.0 mg/dL    For patients on Eltrombopag therapy, use of [email or web address] [place]  TBIL is   not   recommended.                 BUN 9               Calcium 8.7               Chloride 105               CO2 28               CODING SYSTEM       [id number]         Creatinine 0.9               Crossmatch Interpretation       Compatible         Differential Method Automated               DISPENSE STATUS       TRANSFUSED         eGFR >60.0               Eos # 0.3               Eos % 2.2               Glucose 260               Gran # (ANC) 9.4               Gran % 80.2               Group & Rh       O POS         Hematocrit 25.1               Hemoglobin 7.6               Immature Grans (Abs) 0.07  Comment: Mild elevation in immature granulocytes is non specific and   can be seen in a variety of conditions including stress response,   acute inflammation, trauma and pregnancy. Correlation with other   laboratory and clinical findings is essential.                 Immature Granulocytes 0.6               INDIRECT CAMILLE       NEG         Lymph # 1.3               Lymph % 10.7               Magnesium  1.5               MCH 25.9               MCHC 30.3               MCV 86               Mono # 0.7               Mono % 6.0               MPV 8.8               nRBC 0               Platelet Count 701               POCT Glucose   143   137     223       Potassium 3.8               Product Code       [id number]         PROTEIN TOTAL 6.8               RBC 2.93               RDW 14.7               Sodium 139               Specimen Outdate       12/04/2024 23:59         UNIT NUMBER       [id number]         WBC 11.70                                      Significant Imaging: I have reviewed all pertinent imaging results/findings within the past 24 hours.    Assessment/Plan:      * Altered mental status  Altered mental status secondary to UTI, symptoms improved after IV fluids, antibiotics on board follow-up with culture report  12/2 KD:  Continue IV meropenem amlodipine when g every 8 hours      Open wound of groin  12/2  KD:  Hx fourier's gangrene.  Wound care consult to continue WC during hospital stay.      Thrombocytosis  Likely related to iron-deficiency anemia, transfusion ordered, will start on iron supplement  12/2 KD:  H&H stable      Microcytic anemia  Anemia is likely due to Iron deficiency. Most recent hemoglobin and hematocrit are listed below.  Recent Labs     11/30/24  0538 12/01/24  0607 12/02/24  0600   HGB 6.4* 6.6* 7.6*   HCT 21.9* 22.2* 25.1*       Plan  - Monitor serial CBC: Daily  - Transfuse PRBC if patient becomes hemodynamically unstable, symptomatic or H/H drops below 7/21.  - Patient has not received any PRBC transfusions to date, order placed to receive 1 unit today  - Patient's anemia is currently stable  12/2 KD:  Iron panel ordered    History of anoxic brain injury  Patient with a history of anoxic brain injury, chronic indwelling Rojas catheter, contributing to increased UTI risks, history of multidrug resistant organisms, ESBL, patient was started on meropenem pending culture follow-up      Urinary tract infection associated with indwelling urethral catheter  Patient was found to have altered mental status at nursing home, limited response, lethargic, brought to the ED for evaluation no leukocytosis, afebrile, patient was noted to have positive UA, chronic indwelling Rojas catheter, started on IV antibiotic, blood cultures pending urine culture pending, follow-up with result  12/2 KD:  Continue IV meropenum 1g Q 8 hrs,     Type 1 diabetes  Patient's FSGs are uncontrolled due to hyperglycemia on current medication regimen.  Last A1c reviewed-   Lab Results   Component Value Date    HGBA1C 12.4 (H) 01/03/2024     Most recent fingerstick glucose reviewed-   Recent Labs   Lab 12/01/24  1133 12/01/24  1643 12/01/24  2228   POCTGLUCOSE 223* 137* 143*       Current correctional scale  Low  Maintain anti-hyperglycemic dose as follows-   Antihyperglycemics (From admission, onward)      Start     Stop Route  Frequency Ordered    12/02/24 2100  insulin glargine U-100 (Lantus) injection 12 Units         -- SubQ Nightly 12/02/24 0835    11/30/24 1130  insulin aspart U-100 pen 4 Units         -- SubQ 3 times daily with meals 11/30/24 0958          Hold Oral hypoglycemics while patient is in the hospital.      VTE Risk Mitigation (From admission, onward)           Ordered     enoxaparin injection 40 mg  Daily         11/29/24 1536     Place sequential compression device  Until discontinued         11/29/24 1536                    Discharge Planning   JOSE MANUEL:      Code Status: Full Code   Is the patient medically ready for discharge?:     Reason for patient still in hospital (select all that apply): Treatment  Discharge Plan A: Skilled Nursing Facility                  STEPHEN GRIGSBY, AMRIK  Department of Hospital Medicine   [facility]

## 2024-12-02 NOTE — PLAN OF CARE
Plan of care reviewed, needs reinforcement due to disorientation. Patient more alert throughout day. Patient receiving one unit of blood @125 mL/hr to 18 G left forearm. Call bell in reach.

## 2024-12-02 NOTE — ASSESSMENT & PLAN NOTE
Likely related to iron-deficiency anemia, transfusion ordered, will start on iron supplement  12/2 KD:  H&H stable

## 2024-12-02 NOTE — PROGRESS NOTES
12/02/24 1303        Wound 11/18/24 0800 Donor Site Right anterior;lateral Thigh   Date First Assessed/Time First Assessed: 11/18/24 0800   Present on Original Admission: Yes  Primary Wound Type: Donor Site  Side: Right  Orientation: anterior;lateral  Location: Thigh   Wound Image    Dressing Appearance Clean;Intact;Dry   Drainage Amount None   Appearance Pink;Red   Tissue loss description Partial thickness   Periwound Area Intact;Warm;Dry   Wound Edges Defined   Wound Length (cm) 21.5 cm   Wound Width (cm) 6.5 cm   Wound Surface Area (cm^2) 139.75 cm^2   Care Cleansed with:;Sterile normal saline   Dressing Applied;Gauze;Other (comment)  (Xeroform gauze applied to wound bed)   Dressing Change Due 12/04/24

## 2024-12-02 NOTE — PROGRESS NOTES
12/02/24 1352        Wound 11/18/24 0800 Other (comment) Groin   Date First Assessed/Time First Assessed: 11/18/24 0800   Present on Original Admission: Yes  Primary Wound Type: (c) Other (comment)  Location: Groin   Wound Image    Dressing Appearance Saturated   Drainage Amount Moderate   Drainage Characteristics/Odor Malodorous;Green;Serosanguineous   Appearance Red;Pink;Moist;Yellow;Slough   Periwound Area Dry;Intact;Warm   Wound Edges Irregular   Wound Length (cm) 21.5 cm   Wound Depth (cm) 6.5 cm   Care Cleansed with:;Antimicrobial agent   Dressing Applied;Gauze, wet to dry;Absorptive Pad   Packing Inserted  1   Periwound Care Absorptive dressing applied   Dressing Change Due 12/03/24     Wound opening over pubis 1.0 cm L X 2.0 cm W X 3.5 cm D.  Yellow slough noted inside wound opening.  Cleansed wound with Dakin's, packed wound opening with Dakin's moistened gauze, applied Dakin's moistened gauze to wound bed, covered with ABD and secured with tape.

## 2024-12-02 NOTE — PLAN OF CARE
Plan of care reviewed. Patient remained free of falls and tolerated all medications this shift. IV in place, patent, flushed and continues to infuse 0.9 @ 125 ml/hr. No redness, swelling or drainage noted at site. A/O x1. Oriented to name only. Patient is in bed resting. Visible rise and fall noted. No signs of acute stress. RA .Tele monitor audible with leads intact. incontinent to bowel and bladder. Rojas in place, cleaned and drained. Ostomy in place with no leakage. Bed bath given. Oral care provided. C/O pain in legs this shift, PRN given.  Call bell and remote in reach. Bed locked and in lowest position. All belongings in reach. No further concerns at this time.           Problem: Skin Injury Risk Increased  Goal: Skin Health and Integrity  Outcome: Progressing     Problem: Infection  Goal: Absence of Infection Signs and Symptoms  Outcome: Progressing     Problem: Adult Inpatient Plan of Care  Goal: Plan of Care Review  Outcome: Progressing  Goal: Patient-Specific Goal (Individualized)  Outcome: Progressing  Goal: Absence of Hospital-Acquired Illness or Injury  Outcome: Progressing  Goal: Optimal Comfort and Wellbeing  Outcome: Progressing  Goal: Readiness for Transition of Care  Outcome: Progressing     Problem: Diabetes Comorbidity  Goal: Blood Glucose Level Within Targeted Range  Outcome: Progressing     Problem: Wound  Goal: Optimal Coping  Outcome: Progressing  Goal: Optimal Functional Ability  Outcome: Progressing  Goal: Absence of Infection Signs and Symptoms  Outcome: Progressing  Goal: Improved Oral Intake  Outcome: Progressing  Goal: Optimal Pain Control and Function  Outcome: Progressing  Goal: Skin Health and Integrity  Outcome: Progressing  Goal: Optimal Wound Healing  Outcome: Progressing     Problem: Fall Injury Risk  Goal: Absence of Fall and Fall-Related Injury  Outcome: Progressing

## 2024-12-03 PROBLEM — S31.000A SACRAL WOUND: Status: ACTIVE | Noted: 2024-12-03

## 2024-12-03 LAB
ALBUMIN SERPL BCP-MCNC: 0.9 G/DL (ref 3.5–5.2)
ALP SERPL-CCNC: 166 U/L (ref 55–135)
ALT SERPL W/O P-5'-P-CCNC: 14 U/L (ref 10–44)
ANION GAP SERPL CALC-SCNC: 10 MMOL/L (ref 3–11)
AST SERPL-CCNC: 30 U/L (ref 10–40)
BASOPHILS # BLD AUTO: 0.02 K/UL (ref 0–0.2)
BASOPHILS NFR BLD: 0.2 % (ref 0–1.9)
BILIRUB SERPL-MCNC: 0.4 MG/DL (ref 0.1–1)
BUN SERPL-MCNC: 12 MG/DL (ref 6–20)
CALCIUM SERPL-MCNC: 9.3 MG/DL (ref 8.7–10.5)
CHLORIDE SERPL-SCNC: 105 MMOL/L (ref 95–110)
CO2 SERPL-SCNC: 24 MMOL/L (ref 23–29)
CREAT SERPL-MCNC: 0.9 MG/DL (ref 0.5–1.4)
DIFFERENTIAL METHOD BLD: ABNORMAL
EOSINOPHIL # BLD AUTO: 0.2 K/UL (ref 0–0.5)
EOSINOPHIL NFR BLD: 2 % (ref 0–8)
ERYTHROCYTE [DISTWIDTH] IN BLOOD BY AUTOMATED COUNT: 15.2 % (ref 11.5–14.5)
EST. GFR  (NO RACE VARIABLE): >60 ML/MIN/1.73 M^2
GLUCOSE SERPL-MCNC: 384 MG/DL (ref 70–110)
HCT VFR BLD AUTO: 27.6 % (ref 40–54)
HGB BLD-MCNC: 8.1 G/DL (ref 14–18)
IMM GRANULOCYTES # BLD AUTO: 0.09 K/UL (ref 0–0.04)
IMM GRANULOCYTES NFR BLD AUTO: 0.8 % (ref 0–0.5)
LYMPHOCYTES # BLD AUTO: 1.1 K/UL (ref 1–4.8)
LYMPHOCYTES NFR BLD: 9.4 % (ref 18–48)
MAGNESIUM SERPL-MCNC: 1.6 MG/DL (ref 1.6–2.6)
MCH RBC QN AUTO: 26.2 PG (ref 27–31)
MCHC RBC AUTO-ENTMCNC: 29.3 G/DL (ref 32–36)
MCV RBC AUTO: 89 FL (ref 82–98)
MONOCYTES # BLD AUTO: 0.6 K/UL (ref 0.3–1)
MONOCYTES NFR BLD: 5.5 % (ref 4–15)
NEUTROPHILS # BLD AUTO: 9.3 K/UL (ref 1.8–7.7)
NEUTROPHILS NFR BLD: 82.1 % (ref 38–73)
NRBC BLD-RTO: 0 /100 WBC
PLATELET # BLD AUTO: 783 K/UL (ref 150–450)
PMV BLD AUTO: 9.3 FL (ref 9.2–12.9)
POCT GLUCOSE: 338 MG/DL (ref 70–110)
POCT GLUCOSE: 481 MG/DL (ref 70–110)
POTASSIUM SERPL-SCNC: 4.2 MMOL/L (ref 3.5–5.1)
PROT SERPL-MCNC: 6.8 G/DL (ref 6–8.4)
RBC # BLD AUTO: 3.09 M/UL (ref 4.6–6.2)
SODIUM SERPL-SCNC: 139 MMOL/L (ref 136–145)
WBC # BLD AUTO: 11.28 K/UL (ref 3.9–12.7)

## 2024-12-03 PROCEDURE — 25000003 PHARM REV CODE 250: Performed by: INTERNAL MEDICINE

## 2024-12-03 PROCEDURE — 63600175 PHARM REV CODE 636 W HCPCS: Performed by: INTERNAL MEDICINE

## 2024-12-03 PROCEDURE — 25000003 PHARM REV CODE 250: Performed by: STUDENT IN AN ORGANIZED HEALTH CARE EDUCATION/TRAINING PROGRAM

## 2024-12-03 PROCEDURE — 36415 COLL VENOUS BLD VENIPUNCTURE: CPT | Performed by: INTERNAL MEDICINE

## 2024-12-03 PROCEDURE — 83735 ASSAY OF MAGNESIUM: CPT | Performed by: INTERNAL MEDICINE

## 2024-12-03 PROCEDURE — 27000207 HC ISOLATION

## 2024-12-03 PROCEDURE — 21400001 HC TELEMETRY ROOM

## 2024-12-03 PROCEDURE — 99223 1ST HOSP IP/OBS HIGH 75: CPT | Mod: ,,, | Performed by: STUDENT IN AN ORGANIZED HEALTH CARE EDUCATION/TRAINING PROGRAM

## 2024-12-03 PROCEDURE — 85025 COMPLETE CBC W/AUTO DIFF WBC: CPT | Performed by: INTERNAL MEDICINE

## 2024-12-03 PROCEDURE — 80053 COMPREHEN METABOLIC PANEL: CPT | Performed by: INTERNAL MEDICINE

## 2024-12-03 RX ORDER — INSULIN ASPART 100 [IU]/ML
0-10 INJECTION, SOLUTION INTRAVENOUS; SUBCUTANEOUS EVERY 4 HOURS PRN
Status: DISCONTINUED | OUTPATIENT
Start: 2024-12-03 | End: 2024-12-03 | Stop reason: CLARIF

## 2024-12-03 RX ORDER — INSULIN GLARGINE 100 [IU]/ML
16 INJECTION, SOLUTION SUBCUTANEOUS NIGHTLY
Status: DISCONTINUED | OUTPATIENT
Start: 2024-12-03 | End: 2024-12-11

## 2024-12-03 RX ORDER — INSULIN ASPART 100 [IU]/ML
0-10 INJECTION, SOLUTION INTRAVENOUS; SUBCUTANEOUS EVERY 6 HOURS PRN
Status: DISCONTINUED | OUTPATIENT
Start: 2024-12-03 | End: 2024-12-12 | Stop reason: HOSPADM

## 2024-12-03 RX ORDER — GLUCAGON 1 MG
1 KIT INJECTION
Status: DISCONTINUED | OUTPATIENT
Start: 2024-12-03 | End: 2024-12-03 | Stop reason: CLARIF

## 2024-12-03 RX ORDER — GLUCAGON 1 MG
1 KIT INJECTION
Status: DISCONTINUED | OUTPATIENT
Start: 2024-12-03 | End: 2024-12-12 | Stop reason: HOSPADM

## 2024-12-03 RX ADMIN — FAMOTIDINE 20 MG: 20 TABLET, FILM COATED ORAL at 08:12

## 2024-12-03 RX ADMIN — INSULIN GLARGINE 16 UNITS: 100 INJECTION, SOLUTION SUBCUTANEOUS at 09:12

## 2024-12-03 RX ADMIN — LEVOTHYROXINE SODIUM 150 MCG: 150 TABLET ORAL at 07:12

## 2024-12-03 RX ADMIN — INSULIN ASPART 4 UNITS: 100 INJECTION, SOLUTION INTRAVENOUS; SUBCUTANEOUS at 12:12

## 2024-12-03 RX ADMIN — INSULIN ASPART 4 UNITS: 100 INJECTION, SOLUTION INTRAVENOUS; SUBCUTANEOUS at 08:12

## 2024-12-03 RX ADMIN — ENOXAPARIN SODIUM 40 MG: 100 INJECTION SUBCUTANEOUS at 06:12

## 2024-12-03 RX ADMIN — MEROPENEM 1 G: 1 INJECTION INTRAVENOUS at 10:12

## 2024-12-03 RX ADMIN — BACLOFEN 5 MG: 5 TABLET ORAL at 09:12

## 2024-12-03 RX ADMIN — FAMOTIDINE 20 MG: 20 TABLET, FILM COATED ORAL at 09:12

## 2024-12-03 RX ADMIN — MEROPENEM 1 G: 1 INJECTION INTRAVENOUS at 06:12

## 2024-12-03 RX ADMIN — MUPIROCIN: 20 OINTMENT TOPICAL at 09:12

## 2024-12-03 RX ADMIN — GABAPENTIN 200 MG: 100 CAPSULE ORAL at 09:12

## 2024-12-03 RX ADMIN — MEROPENEM 1 G: 1 INJECTION INTRAVENOUS at 02:12

## 2024-12-03 RX ADMIN — BACLOFEN 5 MG: 5 TABLET ORAL at 08:12

## 2024-12-03 RX ADMIN — DIAZEPAM 5 MG: 5 TABLET ORAL at 08:12

## 2024-12-03 RX ADMIN — GABAPENTIN 200 MG: 100 CAPSULE ORAL at 08:12

## 2024-12-03 RX ADMIN — POLYETHYLENE GLYCOL (3350) 17 G: 17 POWDER, FOR SOLUTION ORAL at 08:12

## 2024-12-03 RX ADMIN — ASPIRIN 81 MG CHEWABLE TABLET 81 MG: 81 TABLET CHEWABLE at 08:12

## 2024-12-03 RX ADMIN — SODIUM HYPOCHLORITE: 1.25 SOLUTION TOPICAL at 06:12

## 2024-12-03 RX ADMIN — ATORVASTATIN CALCIUM 40 MG: 40 TABLET, FILM COATED ORAL at 08:12

## 2024-12-03 RX ADMIN — LURASIDONE HYDROCHLORIDE 40 MG: 40 TABLET, FILM COATED ORAL at 08:12

## 2024-12-03 RX ADMIN — OXYCODONE HYDROCHLORIDE 10 MG: 10 TABLET ORAL at 08:12

## 2024-12-03 RX ADMIN — FLUOXETINE HYDROCHLORIDE 20 MG: 20 CAPSULE ORAL at 08:12

## 2024-12-03 RX ADMIN — DIAZEPAM 5 MG: 5 TABLET ORAL at 09:12

## 2024-12-03 NOTE — PROGRESS NOTES
[facility] Medicine  Progress Note    Patient Name: Carlos Chahal  MRN: [id number]  Patient Class: IP- Inpatient   Admission Date: 11/29/2024  Length of Stay: 4 days  Attending Physician: Kim Diamond MD  Primary Care Provider: Jose Francisco Klein MD        Subjective     Principal Problem:Altered mental status        HPI:  Patient 34-year-old male history of bipolar disorder, diabetes, history of anoxic brain injury, GERD, neuropathy, brought to the ED due to decreased level of consciousness, patient was evaluated in the ED was noted to have UTI, no leukocytosis, H&H 7.5/25.8, patient was started on IV antibiotic, blood cultures pending, admitted    Overview/Hospital Course:  12/1 AA, weekend crosscover, patient anemic, 1 unit packed red blood cell ordered, urine culture Gram-negative rods, sensitivities pending, on IV antibiotic, renal function stable, post H&H once transfusion completed  12/2 KD:  Patient lying in bed with decreased LOC, H&H stable.  IV merum continued.  WC consulted to continue wound care orders.  Additional iron labs ordered.  12/3 KD:  Patient awake and alert lying in bed with no distress.  Continue IV antibiotics.  New orders for general surgical consult for wound debridement.    Interval History: Patient seen and examined.    Review of Systems   Unable to perform ROS: Mental status change     Objective:     Vital Signs (Most Recent):  Temp: 98.3 °F (36.8 °C) (12/03/24 0732)  Pulse: 103 (12/03/24 0739)  Resp: 19 (12/03/24 0819)  BP: 112/78 (12/03/24 0732)  SpO2: 97 % (12/03/24 0732) Vital Signs (24h Range):  Temp:  [97.6 °F (36.4 °C)-98.6 °F (37 °C)] 98.3 °F (36.8 °C)  Pulse:  [] 103  Resp:  [17-20] 19  SpO2:  [95 %-100 %] 97 %  BP: ()/(50-78) 112/78     Weight: 67.9 kg (149 lb 11.1 oz)  Body mass index is 23.45 kg/m².    Intake/Output Summary (Last 24 hours) at 12/3/2024 0940  Last data filed at 12/3/2024 0546  Gross per 24 hour   Intake 2450 ml   Output  1150 ml   Net 1300 ml         Physical Exam  Constitutional:       General: He is not in acute distress.     Appearance: He is ill-appearing. He is not diaphoretic.   HENT:      Head: Normocephalic and atraumatic.      Nose: No congestion or rhinorrhea.   Eyes:      General:         Right eye: No discharge.         Left eye: No discharge.      Extraocular Movements: Extraocular movements intact.   Cardiovascular:      Rate and Rhythm: Normal rate.   Pulmonary:      Effort: Pulmonary effort is normal. No respiratory distress.      Breath sounds: No wheezing, rhonchi or rales.   Abdominal:      General: Bowel sounds are normal. There is no distension.      Tenderness: There is no abdominal tenderness.   Musculoskeletal:      Cervical back: Neck supple.      Right lower leg: No edema.      Left lower leg: No edema.   Skin:     General: Skin is warm and dry.      Capillary Refill: Capillary refill takes less than 2 seconds.      Coloration: Skin is pale.      Comments: Multiple wounds-see WC notes   Neurological:      Mental Status: He is alert. He is disoriented.   Psychiatric:         Mood and Affect: Mood normal.             Significant Labs: All pertinent labs within the past 24 hours have been reviewed.  Recent Lab Results         12/03/24  0542   12/02/24  2144        Albumin 0.9                  ALT 14         Anion Gap 10         AST 30         Baso # 0.02         Basophil % 0.2         BILIRUBIN TOTAL 0.4  Comment: For infants and newborns, interpretation of results should be based  on gestational age, weight and in agreement with clinical  observations.    Premature Infant recommended reference ranges:  Up to 24 hours.............<8.0 mg/dL  Up to 48 hours............<12.0 mg/dL  3-5 days..................<15.0 mg/dL  6-29 days.................<15.0 mg/dL    For patients on Eltrombopag therapy, use of Dimension [place] TBIL is   not   recommended.           BUN 12         Calcium 9.3         Chloride  105         CO2 24         Creatinine 0.9         Differential Method Automated         eGFR >60.0         Eos # 0.2         Eos % 2.0         Glucose 384         Gran # (ANC) 9.3         Gran % 82.1         Hematocrit 27.6         Hemoglobin 8.1         Immature Grans (Abs) 0.09  Comment: Mild elevation in immature granulocytes is non specific and   can be seen in a variety of conditions including stress response,   acute inflammation, trauma and pregnancy. Correlation with other   laboratory and clinical findings is essential.           Immature Granulocytes 0.8         Lymph # 1.1         Lymph % 9.4         Magnesium  1.6         MCH 26.2         MCHC 29.3         MCV 89         Mono # 0.6         Mono % 5.5         MPV 9.3         nRBC 0         Platelet Count 783         POCT Glucose   338       Potassium 4.2         PROTEIN TOTAL 6.8         RBC 3.09         RDW 15.2         Sodium 139         WBC 11.28                 Significant Imaging: I have reviewed all pertinent imaging results/findings within the past 24 hours.    Assessment and Plan     * Altered mental status  Altered mental status secondary to UTI, symptoms improved after IV fluids, antibiotics on board follow-up with culture report  12/2 KD:  Continue IV meropenem amlodipine when g every 8 hours      Sacral wound    12/3 KD:  Orders placed for General surgery wound debridement consult    Open wound of groin  12/2 KD:  Hx fourier's gangrene.  Wound care consult to continue WC during hospital stay.      Thrombocytosis  Likely related to iron-deficiency anemia, transfusion ordered, will start on iron supplement  12/2 KD:  H&H stable      Microcytic anemia  Anemia is likely due to Iron deficiency. Most recent hemoglobin and hematocrit are listed below.  Recent Labs     12/01/24  0607 12/02/24  0600 12/03/24  0542   HGB 6.6* 7.6* 8.1*   HCT 22.2* 25.1* 27.6*       Plan  - Monitor serial CBC: Daily  - Transfuse PRBC if patient becomes hemodynamically  unstable, symptomatic or H/H drops below 7/21.  - Patient has not received any PRBC transfusions to date, order placed to receive 1 unit today  - Patient's anemia is currently stable  12/2 KD:  Iron panel ordered    History of anoxic brain injury  Patient with a history of anoxic brain injury, chronic indwelling Rojas catheter, contributing to increased UTI risks, history of multidrug resistant organisms, ESBL, patient was started on meropenem pending culture follow-up      Urinary tract infection associated with indwelling urethral catheter  Patient was found to have altered mental status at nursing home, limited response, lethargic, brought to the ED for evaluation no leukocytosis, afebrile, patient was noted to have positive UA, chronic indwelling Rojas catheter, started on IV antibiotic, blood cultures pending urine culture pending, follow-up with result  12/2 KD:  Continue IV meropenum 1g Q 8 hrs,     Type 1 diabetes  Patient's FSGs are uncontrolled due to hyperglycemia on current medication regimen.  Last A1c reviewed-   Lab Results   Component Value Date    HGBA1C 12.4 (H) 01/03/2024     Most recent fingerstick glucose reviewed-   Recent Labs   Lab 12/02/24  2144   POCTGLUCOSE 338*       Current correctional scale  Low  Maintain anti-hyperglycemic dose as follows-   Antihyperglycemics (From admission, onward)      Start     Stop Route Frequency Ordered    12/02/24 2100  insulin glargine U-100 (Lantus) injection 12 Units         -- SubQ Nightly 12/02/24 0835    11/30/24 1130  insulin aspart U-100 pen 4 Units         -- SubQ 3 times daily with meals 11/30/24 0958          Hold Oral hypoglycemics while patient is in the hospital.      VTE Risk Mitigation (From admission, onward)           Ordered     enoxaparin injection 40 mg  Daily         11/29/24 1536     Place sequential compression device  Until discontinued         11/29/24 1536                    Discharge Planning   JOSE MANUEL:      Code Status: Full Code   Is  the patient medically ready for discharge?:  Treatment     Discharge Plan A: Skilled Nursing Facility                        STEPHEN GRIGSBY, AMRIK  Department of Hospital Medicine   [facility]

## 2024-12-03 NOTE — ASSESSMENT & PLAN NOTE
Patient's FSGs are uncontrolled due to hyperglycemia on current medication regimen.  Last A1c reviewed-   Lab Results   Component Value Date    HGBA1C 12.4 (H) 01/03/2024     Most recent fingerstick glucose reviewed-   Recent Labs   Lab 12/02/24 2144   POCTGLUCOSE 338*       Current correctional scale  Low  Maintain anti-hyperglycemic dose as follows-   Antihyperglycemics (From admission, onward)    Start     Stop Route Frequency Ordered    12/02/24 2100  insulin glargine U-100 (Lantus) injection 12 Units         -- SubQ Nightly 12/02/24 0835    11/30/24 1130  insulin aspart U-100 pen 4 Units         -- SubQ 3 times daily with meals 11/30/24 0958        Hold Oral hypoglycemics while patient is in the hospital.

## 2024-12-03 NOTE — SUBJECTIVE & OBJECTIVE
No current facility-administered medications on file prior to encounter.     Current Outpatient Medications on File Prior to Encounter   Medication Sig    acetaminophen (TYLENOL) 325 MG tablet 325 mg by Per G Tube route every 6 (six) hours as needed.    ascorbic acid, vitamin C, (VITAMIN C) 500 MG tablet 500 mg by Per G Tube route once daily.    aspirin 81 MG Chew Take 81 mg by mouth once daily.    BACLOFEN ORAL 5 mg by PEG Tube route 3 (three) times daily.    clindamycin (CLEOCIN) 300 MG capsule Take 300 mg by mouth every 6 (six) hours.    diazePAM (VALIUM) 5 MG tablet 10 mg by Per G Tube route 2 (two) times a day.    enoxaparin (LOVENOX) 40 mg/0.4 mL Syrg Inject 40 mg into the skin.    famotidine (PEPCID) 40 MG tablet 40 mg by Per G Tube route once daily.    FLUoxetine 20 MG capsule 20 mg by Per G Tube route once daily.    gabapentin (NEURONTIN) 250 mg/5 mL solution 5 mLs by Per G Tube route every 12 (twelve) hours.    hydrOXYzine HCL (ATARAX) 25 MG tablet 25 mg by Per G Tube route 3 (three) times daily.    insulin aspart U-100 (NOVOLOG) 100 unit/mL injection Inject into the skin 4 (four) times daily before meals and nightly.    insulin glargine,hum.rec.anlog (LANTUS SUBQ) Inject 8 Units into the skin once daily.    levothyroxine (SYNTHROID) 100 MCG tablet 150 mcg by Per G Tube route every morning.    oxyCODONE (OXY-IR) 5 mg Cap 10 mg by Per G Tube route every 6 (six) hours as needed for Pain.    zinc sulfate (ZINC-220 ORAL) 50 mg by PEG Tube route.    ALPRAZolam (XANAX) 0.5 MG tablet Take 0.5 mg by mouth daily as needed.    levothyroxine (SYNTHROID) 88 MCG tablet Take 88 mcg by mouth every morning.    lurasidone (LATUDA) 40 mg Tab tablet Take 40 mg by mouth once daily.    NOVOLIN R REGULAR U100 INSULIN 100 unit/mL injection Per insulin pump    rosuvastatin (CRESTOR) 10 MG tablet Take 10 mg by mouth once daily.       Review of patient's allergies indicates:   Allergen Reactions    Guaifenesin Hives    Codeine  Itching       Past Medical History:   Diagnosis Date    Anoxic brain injury     Bipolar disorder, unspecified     Diabetes insipidus     Diabetes mellitus     Dysphagia, unspecified     [place] gangrene     Gangrene     GERD (gastroesophageal reflux disease)     Hereditary and idiopathic neuropathy     Nontraumatic intracerebral hemorrhage, unspecified     Other muscle spasm     Other psychoactive substance abuse with withdrawal, uncomplicated     Skin transplant status     Thyroid disease      Past Surgical History:   Procedure Laterality Date    COLOSTOMY      INSERTION, PEG TUBE       Family History    None       Tobacco Use    Smoking status: Unknown    Smokeless tobacco: Not on file   Substance and Sexual Activity    Alcohol use: Not on file    Drug use: Not on file    Sexual activity: Not on file     Review of Systems   Unable to perform ROS: Patient nonverbal     Objective:     Vital Signs (Most Recent):  Temp: 98.3 °F (36.8 °C) (12/03/24 1653)  Pulse: (!) 114 (12/03/24 1653)  Resp: 18 (12/03/24 1653)  BP: 104/68 (12/03/24 1653)  SpO2: 99 % (12/03/24 1653) Vital Signs (24h Range):  Temp:  [97.6 °F (36.4 °C)-98.3 °F (36.8 °C)] 98.3 °F (36.8 °C)  Pulse:  [] 114  Resp:  [18-20] 18  SpO2:  [95 %-100 %] 99 %  BP: ()/(50-78) 104/68     Weight: 67.9 kg (149 lb 11.1 oz)  Body mass index is 23.45 kg/m².     Physical Exam  Vitals reviewed.   Constitutional:       General: He is not in acute distress.     Appearance: He is not toxic-appearing.   Cardiovascular:      Rate and Rhythm: Normal rate and regular rhythm.   Pulmonary:      Effort: Pulmonary effort is normal. No respiratory distress.   Abdominal:      General: There is no distension.      Palpations: Abdomen is soft.      Tenderness: There is no abdominal tenderness. There is no guarding or rebound.   Musculoskeletal:      Right lower leg: No edema.      Left lower leg: No edema.   Skin:     General: Skin is warm and dry.      Capillary Refill:  Capillary refill takes less than 2 seconds.      Comments: Pubic wound with viable granulation tissue.  Small 0.5 cm opening near pubis with mild exudate   L thigh donor site clean and dry   Stage IV sacral wound viable granulation tissue.  No exposed bone. Small amount of devitalized tissue at most cranial aspect.  Small rim of devitalized skin on right lateral rim of wound.  No surrounding skin erythema.  No purulent drainage   Neurological:      Mental Status: Mental status is at baseline.            I have reviewed all pertinent lab results within the past 24 hours.  CBC:   Recent Labs   Lab 12/03/24  0542   WBC 11.28   RBC 3.09*   HGB 8.1*   HCT 27.6*   *   MCV 89   MCH 26.2*   MCHC 29.3*     CMP:   Recent Labs   Lab 12/03/24  0542   *   CALCIUM 9.3   ALBUMIN 0.9*   PROT 6.8      K 4.2   CO2 24      BUN 12   CREATININE 0.9   ALKPHOS 166*   ALT 14   AST 30   BILITOT 0.4       Significant Diagnostics:  I have reviewed all pertinent imaging results/findings within the past 24 hours.

## 2024-12-03 NOTE — PLAN OF CARE
[facility]  Discharge Reassessment    Primary Care Provider: Jose Francisco Klein MD    Expected Discharge Date:     Reassessment (most recent)       Discharge Reassessment - 12/03/24 0953          Discharge Reassessment    Assessment Type Discharge Planning Reassessment     Did the patient's condition or plan change since previous assessment? Yes     Discharge Plan discussed with: Caregiver     Name(s) and Number(s) Yue Maciel (Aunt) [phone]     Communicated JOSE MANUEL with patient/caregiver Date not available/Unable to determine     Discharge Plan A Return to nursing home   Pt is a long-term resident at [facility].    Discharge Plan B Return to Nursing Home     DME Needed Upon Discharge  other (see comments)   TBD    Transition of Care Barriers None     Why the patient remains in the hospital Requires continued medical care        Post-Acute Status    Discharge Delays None known at this time

## 2024-12-03 NOTE — ASSESSMENT & PLAN NOTE
Anemia is likely due to Iron deficiency. Most recent hemoglobin and hematocrit are listed below.  Recent Labs     12/01/24  0607 12/02/24  0600 12/03/24  0542   HGB 6.6* 7.6* 8.1*   HCT 22.2* 25.1* 27.6*       Plan  - Monitor serial CBC: Daily  - Transfuse PRBC if patient becomes hemodynamically unstable, symptomatic or H/H drops below 7/21.  - Patient has not received any PRBC transfusions to date, order placed to receive 1 unit today  - Patient's anemia is currently stable  12/2 KD:  Iron panel ordered

## 2024-12-03 NOTE — SUBJECTIVE & OBJECTIVE
Interval History: Patient seen and examined.    Review of Systems   Unable to perform ROS: Mental status change     Objective:     Vital Signs (Most Recent):  Temp: 98.3 °F (36.8 °C) (12/03/24 0732)  Pulse: 103 (12/03/24 0739)  Resp: 19 (12/03/24 0819)  BP: 112/78 (12/03/24 0732)  SpO2: 97 % (12/03/24 0732) Vital Signs (24h Range):  Temp:  [97.6 °F (36.4 °C)-98.6 °F (37 °C)] 98.3 °F (36.8 °C)  Pulse:  [] 103  Resp:  [17-20] 19  SpO2:  [95 %-100 %] 97 %  BP: ()/(50-78) 112/78     Weight: 67.9 kg (149 lb 11.1 oz)  Body mass index is 23.45 kg/m².    Intake/Output Summary (Last 24 hours) at 12/3/2024 0940  Last data filed at 12/3/2024 0546  Gross per 24 hour   Intake 2450 ml   Output 1150 ml   Net 1300 ml         Physical Exam  Constitutional:       General: He is not in acute distress.     Appearance: He is ill-appearing. He is not diaphoretic.   HENT:      Head: Normocephalic and atraumatic.      Nose: No congestion or rhinorrhea.   Eyes:      General:         Right eye: No discharge.         Left eye: No discharge.      Extraocular Movements: Extraocular movements intact.   Cardiovascular:      Rate and Rhythm: Normal rate.   Pulmonary:      Effort: Pulmonary effort is normal. No respiratory distress.      Breath sounds: No wheezing, rhonchi or rales.   Abdominal:      General: Bowel sounds are normal. There is no distension.      Tenderness: There is no abdominal tenderness.   Musculoskeletal:      Cervical back: Neck supple.      Right lower leg: No edema.      Left lower leg: No edema.   Skin:     General: Skin is warm and dry.      Capillary Refill: Capillary refill takes less than 2 seconds.      Coloration: Skin is pale.      Comments: Multiple wounds-see WC notes   Neurological:      Mental Status: He is alert. He is disoriented.   Psychiatric:         Mood and Affect: Mood normal.             Significant Labs: All pertinent labs within the past 24 hours have been reviewed.  Recent Lab Results          12/03/24  0542   12/02/24  2144        Albumin 0.9                  ALT 14         Anion Gap 10         AST 30         Baso # 0.02         Basophil % 0.2         BILIRUBIN TOTAL 0.4  Comment: For infants and newborns, interpretation of results should be based  on gestational age, weight and in agreement with clinical  observations.    Premature Infant recommended reference ranges:  Up to 24 hours.............<8.0 mg/dL  Up to 48 hours............<12.0 mg/dL  3-5 days..................<15.0 mg/dL  6-29 days.................<15.0 mg/dL    For patients on Eltrombopag therapy, use of Dimension [place] TBIL is   not   recommended.           BUN 12         Calcium 9.3         Chloride 105         CO2 24         Creatinine 0.9         Differential Method Automated         eGFR >60.0         Eos # 0.2         Eos % 2.0         Glucose 384         Gran # (ANC) 9.3         Gran % 82.1         Hematocrit 27.6         Hemoglobin 8.1         Immature Grans (Abs) 0.09  Comment: Mild elevation in immature granulocytes is non specific and   can be seen in a variety of conditions including stress response,   acute inflammation, trauma and pregnancy. Correlation with other   laboratory and clinical findings is essential.           Immature Granulocytes 0.8         Lymph # 1.1         Lymph % 9.4         Magnesium  1.6         MCH 26.2         MCHC 29.3         MCV 89         Mono # 0.6         Mono % 5.5         MPV 9.3         nRBC 0         Platelet Count 783         POCT Glucose   338       Potassium 4.2         PROTEIN TOTAL 6.8         RBC 3.09         RDW 15.2         Sodium 139         WBC 11.28                 Significant Imaging: I have reviewed all pertinent imaging results/findings within the past 24 hours.

## 2024-12-03 NOTE — HPI
35yo male with history of poorly controlled DM I, fornier's gangrene, anoxic brain injury with severe neuro deficit and PEG tube dependent who presents with non healing sacral wound.  Currently treated with Dakins wet to dry.  GS consulted for evaluation.

## 2024-12-03 NOTE — PLAN OF CARE
Problem: Skin Injury Risk Increased  Goal: Skin Health and Integrity  Outcome: Not Progressing     Problem: Infection  Goal: Absence of Infection Signs and Symptoms  Outcome: Not Progressing     Problem: Adult Inpatient Plan of Care  Goal: Plan of Care Review  Outcome: Not Progressing  Goal: Patient-Specific Goal (Individualized)  Outcome: Not Progressing  Goal: Absence of Hospital-Acquired Illness or Injury  Outcome: Not Progressing  Goal: Optimal Comfort and Wellbeing  Outcome: Not Progressing  Goal: Readiness for Transition of Care  Outcome: Not Progressing     Problem: Diabetes Comorbidity  Goal: Blood Glucose Level Within Targeted Range  Outcome: Not Progressing     Problem: Wound  Goal: Optimal Coping  Outcome: Not Progressing  Goal: Optimal Functional Ability  Outcome: Not Progressing  Goal: Absence of Infection Signs and Symptoms  Outcome: Not Progressing  Goal: Improved Oral Intake  Outcome: Not Progressing  Goal: Optimal Pain Control and Function  Outcome: Not Progressing  Goal: Skin Health and Integrity  Outcome: Not Progressing  Goal: Optimal Wound Healing  Outcome: Not Progressing     Problem: Fall Injury Risk  Goal: Absence of Fall and Fall-Related Injury  Outcome: Not Progressing

## 2024-12-04 LAB
ALBUMIN SERPL BCP-MCNC: 0.9 G/DL (ref 3.5–5.2)
ALP SERPL-CCNC: 190 U/L (ref 55–135)
ALT SERPL W/O P-5'-P-CCNC: 12 U/L (ref 10–44)
ANION GAP SERPL CALC-SCNC: 4 MMOL/L (ref 3–11)
AST SERPL-CCNC: 24 U/L (ref 10–40)
BACTERIA BLD CULT: NORMAL
BACTERIA BLD CULT: NORMAL
BASOPHILS # BLD AUTO: 0.02 K/UL (ref 0–0.2)
BASOPHILS NFR BLD: 0.2 % (ref 0–1.9)
BILIRUB SERPL-MCNC: 0.2 MG/DL (ref 0.1–1)
BUN SERPL-MCNC: 14 MG/DL (ref 6–20)
CALCIUM SERPL-MCNC: 9.8 MG/DL (ref 8.7–10.5)
CHLORIDE SERPL-SCNC: 107 MMOL/L (ref 95–110)
CO2 SERPL-SCNC: 32 MMOL/L (ref 23–29)
CREAT SERPL-MCNC: 0.9 MG/DL (ref 0.5–1.4)
DIFFERENTIAL METHOD BLD: ABNORMAL
EOSINOPHIL # BLD AUTO: 0.2 K/UL (ref 0–0.5)
EOSINOPHIL NFR BLD: 1.9 % (ref 0–8)
ERYTHROCYTE [DISTWIDTH] IN BLOOD BY AUTOMATED COUNT: 15.2 % (ref 11.5–14.5)
EST. GFR  (NO RACE VARIABLE): >60 ML/MIN/1.73 M^2
GLUCOSE SERPL-MCNC: 377 MG/DL (ref 70–110)
HCT VFR BLD AUTO: 26.7 % (ref 40–54)
HGB BLD-MCNC: 8 G/DL (ref 14–18)
IMM GRANULOCYTES # BLD AUTO: 0.05 K/UL (ref 0–0.04)
IMM GRANULOCYTES NFR BLD AUTO: 0.5 % (ref 0–0.5)
LYMPHOCYTES # BLD AUTO: 1 K/UL (ref 1–4.8)
LYMPHOCYTES NFR BLD: 9.2 % (ref 18–48)
MAGNESIUM SERPL-MCNC: 1.6 MG/DL (ref 1.6–2.6)
MCH RBC QN AUTO: 26.1 PG (ref 27–31)
MCHC RBC AUTO-ENTMCNC: 30 G/DL (ref 32–36)
MCV RBC AUTO: 87 FL (ref 82–98)
MONOCYTES # BLD AUTO: 0.8 K/UL (ref 0.3–1)
MONOCYTES NFR BLD: 7.4 % (ref 4–15)
NEUTROPHILS # BLD AUTO: 8.6 K/UL (ref 1.8–7.7)
NEUTROPHILS NFR BLD: 80.8 % (ref 38–73)
NRBC BLD-RTO: 0 /100 WBC
PLATELET # BLD AUTO: 777 K/UL (ref 150–450)
PMV BLD AUTO: 8.9 FL (ref 9.2–12.9)
POCT GLUCOSE: 23 MG/DL (ref 70–110)
POCT GLUCOSE: 276 MG/DL (ref 70–110)
POCT GLUCOSE: 359 MG/DL (ref 70–110)
POCT GLUCOSE: 71 MG/DL (ref 70–110)
POCT GLUCOSE: 78 MG/DL (ref 70–110)
POTASSIUM SERPL-SCNC: 3.8 MMOL/L (ref 3.5–5.1)
PROT SERPL-MCNC: 7 G/DL (ref 6–8.4)
RBC # BLD AUTO: 3.06 M/UL (ref 4.6–6.2)
SODIUM SERPL-SCNC: 143 MMOL/L (ref 136–145)
WBC # BLD AUTO: 10.6 K/UL (ref 3.9–12.7)

## 2024-12-04 PROCEDURE — 21400001 HC TELEMETRY ROOM

## 2024-12-04 PROCEDURE — 85025 COMPLETE CBC W/AUTO DIFF WBC: CPT | Performed by: INTERNAL MEDICINE

## 2024-12-04 PROCEDURE — 63600175 PHARM REV CODE 636 W HCPCS: Performed by: INTERNAL MEDICINE

## 2024-12-04 PROCEDURE — 27000207 HC ISOLATION

## 2024-12-04 PROCEDURE — 80053 COMPREHEN METABOLIC PANEL: CPT | Performed by: INTERNAL MEDICINE

## 2024-12-04 PROCEDURE — 25000003 PHARM REV CODE 250: Performed by: INTERNAL MEDICINE

## 2024-12-04 PROCEDURE — 25000003 PHARM REV CODE 250: Performed by: STUDENT IN AN ORGANIZED HEALTH CARE EDUCATION/TRAINING PROGRAM

## 2024-12-04 PROCEDURE — 36415 COLL VENOUS BLD VENIPUNCTURE: CPT | Performed by: INTERNAL MEDICINE

## 2024-12-04 PROCEDURE — 83735 ASSAY OF MAGNESIUM: CPT | Performed by: INTERNAL MEDICINE

## 2024-12-04 RX ORDER — GLUCAGON 1 MG
1 KIT INJECTION
Status: DISCONTINUED | OUTPATIENT
Start: 2024-12-04 | End: 2024-12-04

## 2024-12-04 RX ADMIN — MEROPENEM 1 G: 1 INJECTION INTRAVENOUS at 04:12

## 2024-12-04 RX ADMIN — INSULIN ASPART 10 UNITS: 100 INJECTION, SOLUTION INTRAVENOUS; SUBCUTANEOUS at 05:12

## 2024-12-04 RX ADMIN — MEROPENEM 1 G: 1 INJECTION INTRAVENOUS at 10:12

## 2024-12-04 RX ADMIN — FAMOTIDINE 20 MG: 20 TABLET, FILM COATED ORAL at 08:12

## 2024-12-04 RX ADMIN — LURASIDONE HYDROCHLORIDE 40 MG: 40 TABLET, FILM COATED ORAL at 09:12

## 2024-12-04 RX ADMIN — MUPIROCIN: 20 OINTMENT TOPICAL at 09:12

## 2024-12-04 RX ADMIN — MEROPENEM 1 G: 1 INJECTION INTRAVENOUS at 05:12

## 2024-12-04 RX ADMIN — FLUOXETINE HYDROCHLORIDE 20 MG: 20 CAPSULE ORAL at 09:12

## 2024-12-04 RX ADMIN — GABAPENTIN 200 MG: 100 CAPSULE ORAL at 08:12

## 2024-12-04 RX ADMIN — DIAZEPAM 5 MG: 5 TABLET ORAL at 09:12

## 2024-12-04 RX ADMIN — FAMOTIDINE 20 MG: 20 TABLET, FILM COATED ORAL at 09:12

## 2024-12-04 RX ADMIN — SODIUM HYPOCHLORITE: 1.25 SOLUTION TOPICAL at 09:12

## 2024-12-04 RX ADMIN — OXYCODONE HYDROCHLORIDE 10 MG: 10 TABLET ORAL at 09:12

## 2024-12-04 RX ADMIN — LEVOTHYROXINE SODIUM 150 MCG: 150 TABLET ORAL at 05:12

## 2024-12-04 RX ADMIN — ASPIRIN 81 MG CHEWABLE TABLET 81 MG: 81 TABLET CHEWABLE at 09:12

## 2024-12-04 RX ADMIN — ATORVASTATIN CALCIUM 40 MG: 40 TABLET, FILM COATED ORAL at 09:12

## 2024-12-04 RX ADMIN — DIAZEPAM 5 MG: 5 TABLET ORAL at 08:12

## 2024-12-04 RX ADMIN — GABAPENTIN 200 MG: 100 CAPSULE ORAL at 09:12

## 2024-12-04 RX ADMIN — ENOXAPARIN SODIUM 40 MG: 100 INJECTION SUBCUTANEOUS at 04:12

## 2024-12-04 RX ADMIN — POLYETHYLENE GLYCOL (3350) 17 G: 17 POWDER, FOR SOLUTION ORAL at 09:12

## 2024-12-04 RX ADMIN — OXYCODONE HYDROCHLORIDE 10 MG: 10 TABLET ORAL at 04:12

## 2024-12-04 NOTE — ASSESSMENT & PLAN NOTE
Patient's FSGs are uncontrolled due to hyperglycemia on current medication regimen.  Last A1c reviewed-   Lab Results   Component Value Date    HGBA1C 12.4 (H) 01/03/2024     Most recent fingerstick glucose reviewed-   Recent Labs   Lab 12/03/24 2113 12/04/24  0544   POCTGLUCOSE 481* 359*       Current correctional scale  Low  Maintain anti-hyperglycemic dose as follows-   Antihyperglycemics (From admission, onward)    Start     Stop Route Frequency Ordered    12/03/24 2356  insulin aspart U-100 pen 0-10 Units         -- SubQ Every 6 hours PRN 12/03/24 2300    12/03/24 2100  insulin glargine U-100 (Lantus) pen 16 Units         -- SubQ Nightly 12/03/24 1242        Hold Oral hypoglycemics while patient is in the hospital.

## 2024-12-04 NOTE — PLAN OF CARE
Pt. Sitting up in bed resting, ileostomy present and draining to ostomy bag, peg tube present, feedings on hold for residual of 750 ml, KUB ordered this am, multiple wounds, wound care as ordered, glucose monitoring, indwelling varghese patent and draining to uriometer, plan of care discussed with pt., no evidence of learning, flat affect, able to ask questions, room near unit station, meds crushed per peg, call bell in reach, encouraging to call for assistance, frequent nurse rounds, will continue to monitor.       Problem: Infection  Goal: Absence of Infection Signs and Symptoms  Outcome: Progressing     Problem: Wound  Goal: Optimal Coping  Outcome: Progressing  Goal: Optimal Functional Ability  Outcome: Progressing  Goal: Absence of Infection Signs and Symptoms  Outcome: Progressing  Goal: Improved Oral Intake  Outcome: Progressing  Goal: Optimal Pain Control and Function  Outcome: Progressing  Goal: Skin Health and Integrity  Outcome: Progressing  Goal: Optimal Wound Healing  Outcome: Progressing     Problem: Diabetes Comorbidity  Goal: Blood Glucose Level Within Targeted Range  Outcome: Progressing     Problem: Skin Injury Risk Increased  Goal: Skin Health and Integrity  Outcome: Progressing

## 2024-12-04 NOTE — PLAN OF CARE
Recommendations  1. Rec'd Continuous EN: Glucerna 1.2 or Diabetasource AC @ 10mL/r increasing by 5-10mL q4-6hrs to goal rate of 75mL/hr with a continuous 25mL FWF to provide 2160kcal (106% EEN), 108g of protein (113% EPN), and 2049mL FW.   2.  Recd Néstor BID via PEG tube to promote wound healing and to provide additional nutrition.       - Do not mix Néstor with formula in a tube-feeding bag      - Pour one packet of Néstor in a clean, small container for mixing.       - Add 4 fl oz (120 mL) water at room temperature.       - Mix well with disposable spoon or tongue blade until all particles are completely hydrated.       - Verify correct tube placement.       - Flush feeding tube with 30 mL water.       -Administer Néstor through feeding tube using a 60-mL or larger syringe.       - Flush with an additional 30 mL water.   3. RD to follow and make erc's accordingly.  Goals:   1. Pt will be started on TF by next RD follow up.   2. Pt will reach TF goal rate within 48hrs of initiation.  Nutrition Goal Status: On Hold

## 2024-12-04 NOTE — NURSING
Called and left a message for the jaylen atkins to have Doctor on call call me back about elevated blood sugar and pt. Not tolerating peg tube feedings. Awaiting doctor to call back.

## 2024-12-04 NOTE — ASSESSMENT & PLAN NOTE
Continue dakins wet to dry - pack pubic wound as well  Poorly incorporated skin graft to groin, but underlying granulation tissue viable   No surgical intervention warranted at this time

## 2024-12-04 NOTE — ASSESSMENT & PLAN NOTE
Anemia is likely due to Iron deficiency. Most recent hemoglobin and hematocrit are listed below.  Recent Labs     12/02/24  0600 12/03/24  0542 12/04/24  0529   HGB 7.6* 8.1* 8.0*   HCT 25.1* 27.6* 26.7*       Plan  - Monitor serial CBC: Daily  - Transfuse PRBC if patient becomes hemodynamically unstable, symptomatic or H/H drops below 7/21.  - Patient has not received any PRBC transfusions to date, order placed to receive 1 unit today  - Patient's anemia is currently stable  12/2 KD:  Iron panel ordered

## 2024-12-04 NOTE — PROGRESS NOTES
[facility] Medicine  Progress Note    Patient Name: Carlos Chahal  MRN: [id number]  Patient Class: IP- Inpatient   Admission Date: 11/29/2024  Length of Stay: 5 days  Attending Physician: Kim Diamond MD  Primary Care Provider: Jose Francisco Kelin MD        Subjective     Principal Problem:Altered mental status        HPI:  Patient 34-year-old male history of bipolar disorder, diabetes, history of anoxic brain injury, GERD, neuropathy, brought to the ED due to decreased level of consciousness, patient was evaluated in the ED was noted to have UTI, no leukocytosis, H&H 7.5/25.8, patient was started on IV antibiotic, blood cultures pending, admitted    Overview/Hospital Course:  12/1 AA, weekend crosscover, patient anemic, 1 unit packed red blood cell ordered, urine culture Gram-negative rods, sensitivities pending, on IV antibiotic, renal function stable, post H&H once transfusion completed  12/2 KD:  Patient lying in bed with decreased LOC, H&H stable.  IV merum continued.  WC consulted to continue wound care orders.  Additional iron labs ordered.  12/3 KD:  Patient awake and alert lying in bed with no distress.  Continue IV antibiotics.  New orders for general surgical consult for wound debridement.  12/4 KD:  Patient awake and alert.  KUB ordered to confirm G-tube placement.  Hold feedings until placement is confirmed.      No current facility-administered medications on file prior to encounter.     Current Outpatient Medications on File Prior to Encounter   Medication Sig    acetaminophen (TYLENOL) 325 MG tablet 325 mg by Per G Tube route every 6 (six) hours as needed.    ascorbic acid, vitamin C, (VITAMIN C) 500 MG tablet 500 mg by Per G Tube route once daily.    aspirin 81 MG Chew Take 81 mg by mouth once daily.    BACLOFEN ORAL 5 mg by PEG Tube route 3 (three) times daily.    clindamycin (CLEOCIN) 300 MG capsule Take 300 mg by mouth every 6 (six) hours.    diazePAM (VALIUM) 5 MG tablet 10  mg by Per G Tube route 2 (two) times a day.    enoxaparin (LOVENOX) 40 mg/0.4 mL Syrg Inject 40 mg into the skin.    famotidine (PEPCID) 40 MG tablet 40 mg by Per G Tube route once daily.    FLUoxetine 20 MG capsule 20 mg by Per G Tube route once daily.    gabapentin (NEURONTIN) 250 mg/5 mL solution 5 mLs by Per G Tube route every 12 (twelve) hours.    hydrOXYzine HCL (ATARAX) 25 MG tablet 25 mg by Per G Tube route 3 (three) times daily.    insulin aspart U-100 (NOVOLOG) 100 unit/mL injection Inject into the skin 4 (four) times daily before meals and nightly.    insulin glargine,hum.rec.anlog (LANTUS SUBQ) Inject 8 Units into the skin once daily.    levothyroxine (SYNTHROID) 100 MCG tablet 150 mcg by Per G Tube route every morning.    oxyCODONE (OXY-IR) 5 mg Cap 10 mg by Per G Tube route every 6 (six) hours as needed for Pain.    zinc sulfate (ZINC-220 ORAL) 50 mg by PEG Tube route.    ALPRAZolam (XANAX) 0.5 MG tablet Take 0.5 mg by mouth daily as needed.    levothyroxine (SYNTHROID) 88 MCG tablet Take 88 mcg by mouth every morning.    lurasidone (LATUDA) 40 mg Tab tablet Take 40 mg by mouth once daily.    NOVOLIN R REGULAR U100 INSULIN 100 unit/mL injection Per insulin pump    rosuvastatin (CRESTOR) 10 MG tablet Take 10 mg by mouth once daily.       Review of patient's allergies indicates:   Allergen Reactions    Guaifenesin Hives    Codeine Itching       Past Medical History:   Diagnosis Date    Anoxic brain injury     Bipolar disorder, unspecified     Diabetes insipidus     Diabetes mellitus     Dysphagia, unspecified     Chucky gangrene     Gangrene     GERD (gastroesophageal reflux disease)     Hereditary and idiopathic neuropathy     Nontraumatic intracerebral hemorrhage, unspecified     Other muscle spasm     Other psychoactive substance abuse with withdrawal, uncomplicated     Skin transplant status     Thyroid disease      Past Surgical History:   Procedure Laterality Date    COLOSTOMY      INSERTION, PEG  TUBE       Family History    None       Tobacco Use    Smoking status: Unknown    Smokeless tobacco: Not on file   Substance and Sexual Activity    Alcohol use: Not on file    Drug use: Not on file    Sexual activity: Not on file     Review of Systems   Unable to perform ROS: Patient nonverbal     Objective:     Vital Signs (Most Recent):  Temp: 97.7 °F (36.5 °C) (12/04/24 0816)  Pulse: 99 (12/04/24 0816)  Resp: 18 (12/04/24 0816)  BP: 102/65 (12/04/24 0816)  SpO2: 98 % (12/04/24 0816) Vital Signs (24h Range):  Temp:  [97.7 °F (36.5 °C)-98.9 °F (37.2 °C)] 97.7 °F (36.5 °C)  Pulse:  [] 99  Resp:  [16-20] 18  SpO2:  [95 %-99 %] 98 %  BP: (102-131)/(65-75) 102/65     Weight: 67.9 kg (149 lb 11.1 oz)  Body mass index is 23.45 kg/m².     Physical Exam  Vitals reviewed.   Constitutional:       General: He is not in acute distress.     Appearance: He is not toxic-appearing.   Cardiovascular:      Rate and Rhythm: Normal rate and regular rhythm.   Pulmonary:      Effort: Pulmonary effort is normal. No respiratory distress.   Abdominal:      General: There is no distension.      Palpations: Abdomen is soft.      Tenderness: There is no abdominal tenderness. There is no guarding or rebound.   Musculoskeletal:      Right lower leg: No edema.      Left lower leg: No edema.   Skin:     General: Skin is warm and dry.      Capillary Refill: Capillary refill takes less than 2 seconds.      Comments: Pubic wound with viable granulation tissue.  Small 0.5 cm opening near pubis with mild exudate   L thigh donor site clean and dry   Stage IV sacral wound viable granulation tissue.  No exposed bone. Small amount of devitalized tissue at most cranial aspect.  Small rim of devitalized skin on right lateral rim of wound.  No surrounding skin erythema.  No purulent drainage   Neurological:      Mental Status: Mental status is at baseline.            I have reviewed all pertinent lab results within the past 24 hours.  CBC:   Recent  Labs   Lab 12/04/24  0529   WBC 10.60   RBC 3.06*   HGB 8.0*   HCT 26.7*   *   MCV 87   MCH 26.1*   MCHC 30.0*     CMP:   Recent Labs   Lab 12/04/24  0529   *   CALCIUM 9.8   ALBUMIN 0.9*   PROT 7.0      K 3.8   CO2 32*      BUN 14   CREATININE 0.9   ALKPHOS 190*   ALT 12   AST 24   BILITOT 0.2       Significant Diagnostics:  I have reviewed all pertinent imaging results/findings within the past 24 hours.      Assessment and Plan     * Altered mental status  Altered mental status secondary to UTI, symptoms improved after IV fluids, antibiotics on board follow-up with culture report  12/2 KD:  Continue IV meropenem amlodipine when g every 8 hours      Sacral wound    12/3 KD:  Orders placed for General surgery wound debridement consult    Open wound of groin  12/2 KD:  Hx fourier's gangrene.  Wound care consult to continue WC during hospital stay.      Thrombocytosis  Likely related to iron-deficiency anemia, transfusion ordered, will start on iron supplement  12/2 KD:  H&H stable      Microcytic anemia  Anemia is likely due to Iron deficiency. Most recent hemoglobin and hematocrit are listed below.  Recent Labs     12/02/24  0600 12/03/24  0542 12/04/24  0529   HGB 7.6* 8.1* 8.0*   HCT 25.1* 27.6* 26.7*       Plan  - Monitor serial CBC: Daily  - Transfuse PRBC if patient becomes hemodynamically unstable, symptomatic or H/H drops below 7/21.  - Patient has not received any PRBC transfusions to date, order placed to receive 1 unit today  - Patient's anemia is currently stable  12/2 KD:  Iron panel ordered    History of anoxic brain injury  Patient with a history of anoxic brain injury, chronic indwelling Rojas catheter, contributing to increased UTI risks, history of multidrug resistant organisms, ESBL, patient was started on meropenem pending culture follow-up      Urinary tract infection associated with indwelling urethral catheter  Patient was found to have altered mental status at nursing  home, limited response, lethargic, brought to the ED for evaluation no leukocytosis, afebrile, patient was noted to have positive UA, chronic indwelling Rojas catheter, started on IV antibiotic, blood cultures pending urine culture pending, follow-up with result  12/2 KD:  Continue IV meropenum 1g Q 8 hrs,     Vomiting    12/4 KD:  Hold tube feedings until G-tube placement has been confirmed.  KUB ordered today    Type 1 diabetes  Patient's FSGs are uncontrolled due to hyperglycemia on current medication regimen.  Last A1c reviewed-   Lab Results   Component Value Date    HGBA1C 12.4 (H) 01/03/2024     Most recent fingerstick glucose reviewed-   Recent Labs   Lab 12/03/24  2113 12/04/24  0544   POCTGLUCOSE 481* 359*       Current correctional scale  Low  Maintain anti-hyperglycemic dose as follows-   Antihyperglycemics (From admission, onward)      Start     Stop Route Frequency Ordered    12/03/24 2356  insulin aspart U-100 pen 0-10 Units         -- SubQ Every 6 hours PRN 12/03/24 2300    12/03/24 2100  insulin glargine U-100 (Lantus) pen 16 Units         -- SubQ Nightly 12/03/24 1242          Hold Oral hypoglycemics while patient is in the hospital.      VTE Risk Mitigation (From admission, onward)           Ordered     enoxaparin injection 40 mg  Daily         11/29/24 1536     Place sequential compression device  Until discontinued         11/29/24 1536                    Discharge Planning   JOSE MANUEL:      Code Status: Full Code   Medical Readiness for Discharge Date:   Treatment  Discharge Plan A: Return to nursing home (Pt is a [facility] resident at [facility] [place] [facility] [place]   Discharge Delays: None known at this time                    STEPHEN GRIGSBY NP  Department of Hospital Medicine   [facility]

## 2024-12-04 NOTE — NURSING
Dr. Norris called back and I explained that upon assessment pt. Had a residual of 750 ml of formula that was discarded. Pt. Also has a blood sugar of 481 Pt. Received his lantus of 16 units tonight as ordered.  New order given to hold feedings tonight, order a KUB for in the am, get accuchecks AC & HS with moderate sliding scale coverage starting in the am,  does not want any more insulin tonight, read back order, will continue to monitor.

## 2024-12-04 NOTE — NURSING
Upon turning pt to do wound care to sacrum,. Pt PEG became dislodged due to balloon becoming popped.  Colostomy also fell off Care provided with bed bath. Notified Dr. Liang of tube falling out colostomy replaced. Wound care given

## 2024-12-04 NOTE — SUBJECTIVE & OBJECTIVE
No current facility-administered medications on file prior to encounter.     Current Outpatient Medications on File Prior to Encounter   Medication Sig    acetaminophen (TYLENOL) 325 MG tablet 325 mg by Per G Tube route every 6 (six) hours as needed.    ascorbic acid, vitamin C, (VITAMIN C) 500 MG tablet 500 mg by Per G Tube route once daily.    aspirin 81 MG Chew Take 81 mg by mouth once daily.    BACLOFEN ORAL 5 mg by PEG Tube route 3 (three) times daily.    clindamycin (CLEOCIN) 300 MG capsule Take 300 mg by mouth every 6 (six) hours.    diazePAM (VALIUM) 5 MG tablet 10 mg by Per G Tube route 2 (two) times a day.    enoxaparin (LOVENOX) 40 mg/0.4 mL Syrg Inject 40 mg into the skin.    famotidine (PEPCID) 40 MG tablet 40 mg by Per G Tube route once daily.    FLUoxetine 20 MG capsule 20 mg by Per G Tube route once daily.    gabapentin (NEURONTIN) 250 mg/5 mL solution 5 mLs by Per G Tube route every 12 (twelve) hours.    hydrOXYzine HCL (ATARAX) 25 MG tablet 25 mg by Per G Tube route 3 (three) times daily.    insulin aspart U-100 (NOVOLOG) 100 unit/mL injection Inject into the skin 4 (four) times daily before meals and nightly.    insulin glargine,hum.rec.anlog (LANTUS SUBQ) Inject 8 Units into the skin once daily.    levothyroxine (SYNTHROID) 100 MCG tablet 150 mcg by Per G Tube route every morning.    oxyCODONE (OXY-IR) 5 mg Cap 10 mg by Per G Tube route every 6 (six) hours as needed for Pain.    zinc sulfate (ZINC-220 ORAL) 50 mg by PEG Tube route.    ALPRAZolam (XANAX) 0.5 MG tablet Take 0.5 mg by mouth daily as needed.    levothyroxine (SYNTHROID) 88 MCG tablet Take 88 mcg by mouth every morning.    lurasidone (LATUDA) 40 mg Tab tablet Take 40 mg by mouth once daily.    NOVOLIN R REGULAR U100 INSULIN 100 unit/mL injection Per insulin pump    rosuvastatin (CRESTOR) 10 MG tablet Take 10 mg by mouth once daily.       Review of patient's allergies indicates:   Allergen Reactions    Guaifenesin Hives    Codeine  Itching       Past Medical History:   Diagnosis Date    Anoxic brain injury     Bipolar disorder, unspecified     Diabetes insipidus     Diabetes mellitus     Dysphagia, unspecified     Chucky gangrene     Gangrene     GERD (gastroesophageal reflux disease)     Hereditary and idiopathic neuropathy     Nontraumatic intracerebral hemorrhage, unspecified     Other muscle spasm     Other psychoactive substance abuse with withdrawal, uncomplicated     Skin transplant status     Thyroid disease      Past Surgical History:   Procedure Laterality Date    COLOSTOMY      INSERTION, PEG TUBE       Family History    None       Tobacco Use    Smoking status: Unknown    Smokeless tobacco: Not on file   Substance and Sexual Activity    Alcohol use: Not on file    Drug use: Not on file    Sexual activity: Not on file     Review of Systems   Unable to perform ROS: Patient nonverbal     Objective:     Vital Signs (Most Recent):  Temp: 97.7 °F (36.5 °C) (12/04/24 0816)  Pulse: 99 (12/04/24 0816)  Resp: 18 (12/04/24 0816)  BP: 102/65 (12/04/24 0816)  SpO2: 98 % (12/04/24 0816) Vital Signs (24h Range):  Temp:  [97.7 °F (36.5 °C)-98.9 °F (37.2 °C)] 97.7 °F (36.5 °C)  Pulse:  [] 99  Resp:  [16-20] 18  SpO2:  [95 %-99 %] 98 %  BP: (102-131)/(65-75) 102/65     Weight: 67.9 kg (149 lb 11.1 oz)  Body mass index is 23.45 kg/m².     Physical Exam  Vitals reviewed.   Constitutional:       General: He is not in acute distress.     Appearance: He is not toxic-appearing.   Cardiovascular:      Rate and Rhythm: Normal rate and regular rhythm.   Pulmonary:      Effort: Pulmonary effort is normal. No respiratory distress.   Abdominal:      General: There is no distension.      Palpations: Abdomen is soft.      Tenderness: There is no abdominal tenderness. There is no guarding or rebound.   Musculoskeletal:      Right lower leg: No edema.      Left lower leg: No edema.   Skin:     General: Skin is warm and dry.      Capillary Refill: Capillary  refill takes less than 2 seconds.      Comments: Pubic wound with viable granulation tissue.  Small 0.5 cm opening near pubis with mild exudate   L thigh donor site clean and dry   Stage IV sacral wound viable granulation tissue.  No exposed bone. Small amount of devitalized tissue at most cranial aspect.  Small rim of devitalized skin on right lateral rim of wound.  No surrounding skin erythema.  No purulent drainage   Neurological:      Mental Status: Mental status is at baseline.            I have reviewed all pertinent lab results within the past 24 hours.  CBC:   Recent Labs   Lab 12/04/24  0529   WBC 10.60   RBC 3.06*   HGB 8.0*   HCT 26.7*   *   MCV 87   MCH 26.1*   MCHC 30.0*     CMP:   Recent Labs   Lab 12/04/24  0529   *   CALCIUM 9.8   ALBUMIN 0.9*   PROT 7.0      K 3.8   CO2 32*      BUN 14   CREATININE 0.9   ALKPHOS 190*   ALT 12   AST 24   BILITOT 0.2       Significant Diagnostics:  I have reviewed all pertinent imaging results/findings within the past 24 hours.

## 2024-12-04 NOTE — PROGRESS NOTES
[facility]  Adult Nutrition  Progress Note    SUMMARY       Recommendations  1. Rec'd Continuous EN: Glucerna 1.2 or Diabetasource AC @ 10mL/r increasing by 5-10mL q4-6hrs to goal rate of 75mL/hr with a continuous 25mL FWF to provide 2160kcal (106% EEN), 108g of protein (113% EPN), and 2049mL FW.   2.  Recd Néstor BID via PEG tube to promote wound healing and to provide additional nutrition.       - Do not mix Néstor with formula in a tube-feeding bag      - Pour one packet of Néstor in a clean, small container for mixing.       - Add 4 fl oz (120 mL) water at room temperature.       - Mix well with disposable spoon or tongue blade until all particles are completely hydrated.       - Verify correct tube placement.       - Flush feeding tube with 30 mL water.       -Administer Néstor through feeding tube using a 60-mL or larger syringe.       - Flush with an additional 30 mL water.   3. RD to follow and make erc's accordingly.  Goals:   1. Pt will be started on TF by next RD follow up.   2. Pt will reach TF goal rate within 48hrs of initiation.  Nutrition Goal Status: On Hold  Communication of RD Recs: reviewed with physician, reviewed with RN     Assessment and Plan     Nutrition Problem  Inadequate oral intake     Related to (etiology):   Increased nutrient needs     Signs and Symptoms (as evidenced by):   wounds      Interventions/Recommendations (treatment strategy):  1. Rec'd Continuous EN: Glucerna 1.2 or Diabetasource AC @ 10mL/r increasing by 5-10mL q4-6hrs to goal rate of 75mL/hr with a continuous 25mL FWF to provide 2160kcal (106% EEN), 108g of protein (113% EPN), and 2049mL FW.   2.  Recd Néstor BID via NG tube to promote wound healing and to provide additional nutrition.       - Do not mix Néstor with formula in a tube-feeding bag      - Pour one packet of Néstor in a clean, small container for mixing.       - Add 4 fl oz (120 mL) water at room temperature.       - Mix well with disposable spoon or  "tongue blade until all particles are completely hydrated.       - Verify correct tube placement.       - Flush feeding tube with 30 mL water.       -Administer Néstor through feeding tube using a 60-mL or larger syringe.       - Flush with an additional 30 mL water.   3. RD to follow and make erc's accordingly.     Nutrition Diagnosis Status:   Continues     Malnutrition Assessment  NFPE not warranted at this time. RD to continue to monitor for signs and symptoms of malnutrition.    Reason for Assessment    Reason For Assessment: RD follow-up  Diagnosis: other (see comments) (Altered Mental Status)  General Information Comments: Followed up on pt. Noted BG = 377 this morning. TF on hold unitl KUB is done to confirm G-tube placement. RD to follow and make rec's accordingly.  Nutrition Discharge Planning: TBD as care progresses    Nutrition Risk Screen    Nutrition Risk Screen: tube feeding or parenteral nutrition    Nutrition/Diet History    Spiritual, Cultural Beliefs, Spiritism Practices, Values that Affect Care: no  Food Allergies: NKFA    Anthropometrics    Temp: 97.7 °F (36.5 °C)  Height Method: Stated  Height: 5' 7" (170.2 cm)  Height (inches): 67 in  Weight Method: Bed Scale  Weight: 67.9 kg (149 lb 11.1 oz)  Weight (lb): 149.69 lb  Ideal Body Weight (IBW), Male: 148 lb  % Ideal Body Weight, Male (lb): 101.14 %  BMI (Calculated): 23.4  BMI Grade: 18.5-24.9 - normal    Lab/Procedures/Meds    Pertinent Labs Reviewed: reviewed  Pertinent Labs Comments: Glucose = 377  Pertinent Medications Reviewed: reviewed    Estimated/Assessed Needs    Weight Used For Calorie Calculations: 67.9 kg (149 lb 11.1 oz)  Energy Calorie Requirements (kcal): 7694-9016 (30-35kcal/kg)  Energy Need Method: Kcal/kg  Protein Requirements: 81-95 (1.2-1.4g/kg)  Weight Used For Protein Calculations: 67.9 kg (149 lb 11.1 oz)  Fluid Requirements (mL): 0952-1119 (1mL/kcal)  Estimated Fluid Requirement Method: RDA Method  RDA Method (mL): " 2037    Nutrition Prescription Ordered    Current Diet Order: Consistent CHO  Nutrition Order Comments: Néstor via G-tube  Current Nutrition Support Frequency Ordered: Continuous (On Hold)  Oral Nutrition Supplement: None    Evaluation of Received Nutrient/Fluid Intake    Enteral Calories (kcal): 0  Enteral Protein (gm): 0  Enteral (Free Water) Fluid (mL): 0  % Kcal Needs: 0%  % Protein Needs: 0%  I/O: -550  Energy Calories Required: not meeting needs  Protein Required: not meeting needs  Tolerance: not tolerating  % Intake of Estimated Energy Needs: 0 - 25 %  % Meal Intake: NPO    Nutrition Risk    Level of Risk/Frequency of Follow-up: moderate     Monitor and Evaluation    Food and Nutrient Intake: energy intake, food and beverage intake, enteral nutrition intake  Food and Nutrient Adminstration: diet order, enteral and parenteral nutrition administration  Knowledge/Beliefs/Attitudes: food and nutrition knowledge/skill, beliefs and attitudes  Physical Activity and Function: nutrition-related ADLs and IADLs  Anthropometric Measurements: height/length, weight, weight change, body mass index  Biochemical Data, Medical Tests and Procedures: electrolyte and renal panel, glucose/endocrine profile, gastrointestinal profile, inflammatory profile, lipid profile  Nutrition-Focused Physical Findings: overall appearance     Nutrition Follow-Up    RD Follow-up?: Yes

## 2024-12-04 NOTE — CONSULTS
[facility] Surg  General Surgery  Consult Note    Patient Name: Carlos Chahal  MRN: [id number]  Code Status: Full Code  Admission Date: 11/29/2024  Hospital Length of Stay: 4 days  Attending Physician: Kim Diamond MD  Primary Care Provider: Jose Francisco Klein MD    Patient information was obtained from nursing home, past medical records, and ER records.     Consults  Subjective:     Principal Problem: Altered mental status    History of Present Illness: 35yo male with history of poorly controlled DM I, fornier's gangrene, anoxic brain injury with severe neuro deficit and PEG tube dependent who presents with non healing sacral wound.  Currently treated with Dakins wet to dry.  GS consulted for evaluation.     No current facility-administered medications on file prior to encounter.     Current Outpatient Medications on File Prior to Encounter   Medication Sig    acetaminophen (TYLENOL) 325 MG tablet 325 mg by Per G Tube route every 6 (six) hours as needed.    ascorbic acid, vitamin C, (VITAMIN C) 500 MG tablet 500 mg by Per G Tube route once daily.    aspirin 81 MG Chew Take 81 mg by mouth once daily.    BACLOFEN ORAL 5 mg by PEG Tube route 3 (three) times daily.    clindamycin (CLEOCIN) 300 MG capsule Take 300 mg by mouth every 6 (six) hours.    diazePAM (VALIUM) 5 MG tablet 10 mg by Per G Tube route 2 (two) times a day.    enoxaparin (LOVENOX) 40 mg/0.4 mL Syrg Inject 40 mg into the skin.    famotidine (PEPCID) 40 MG tablet 40 mg by Per G Tube route once daily.    FLUoxetine 20 MG capsule 20 mg by Per G Tube route once daily.    gabapentin (NEURONTIN) 250 mg/5 mL solution 5 mLs by Per G Tube route every 12 (twelve) hours.    hydrOXYzine HCL (ATARAX) 25 MG tablet 25 mg by Per G Tube route 3 (three) times daily.    insulin aspart U-100 (NOVOLOG) 100 unit/mL injection Inject into the skin 4 (four) times daily before meals and nightly.    insulin glargine,hum.rec.anlog (LANTUS SUBQ) Inject 8 Units into the skin  once daily.    levothyroxine (SYNTHROID) 100 MCG tablet 150 mcg by Per G Tube route every morning.    oxyCODONE (OXY-IR) 5 mg Cap 10 mg by Per G Tube route every 6 (six) hours as needed for Pain.    zinc sulfate (ZINC-220 ORAL) 50 mg by PEG Tube route.    ALPRAZolam (XANAX) 0.5 MG tablet Take 0.5 mg by mouth daily as needed.    levothyroxine (SYNTHROID) 88 MCG tablet Take 88 mcg by mouth every morning.    lurasidone (LATUDA) 40 mg Tab tablet Take 40 mg by mouth once daily.    NOVOLIN R REGULAR U100 INSULIN 100 unit/mL injection Per insulin pump    rosuvastatin (CRESTOR) 10 MG tablet Take 10 mg by mouth once daily.       Review of patient's allergies indicates:   Allergen Reactions    Guaifenesin Hives    Codeine Itching       Past Medical History:   Diagnosis Date    Anoxic brain injury     Bipolar disorder, unspecified     Diabetes insipidus     Diabetes mellitus     Dysphagia, unspecified     Chucky gangrene     Gangrene     GERD (gastroesophageal reflux disease)     Hereditary and idiopathic neuropathy     Nontraumatic intracerebral hemorrhage, unspecified     Other muscle spasm     Other psychoactive substance abuse with withdrawal, uncomplicated     Skin transplant status     Thyroid disease      Past Surgical History:   Procedure Laterality Date    COLOSTOMY      INSERTION, PEG TUBE       Family History    None       Tobacco Use    Smoking status: Unknown    Smokeless tobacco: Not on file   Substance and Sexual Activity    Alcohol use: Not on file    Drug use: Not on file    Sexual activity: Not on file     Review of Systems   Unable to perform ROS: Patient nonverbal     Objective:     Vital Signs (Most Recent):  Temp: 98.3 °F (36.8 °C) (12/03/24 1653)  Pulse: (!) 114 (12/03/24 1653)  Resp: 18 (12/03/24 1653)  BP: 104/68 (12/03/24 1653)  SpO2: 99 % (12/03/24 1653) Vital Signs (24h Range):  Temp:  [97.6 °F (36.4 °C)-98.3 °F (36.8 °C)] 98.3 °F (36.8 °C)  Pulse:  [] 114  Resp:  [18-20] 18  SpO2:  [95  %-100 %] 99 %  BP: ()/(50-78) 104/68     Weight: 67.9 kg (149 lb 11.1 oz)  Body mass index is 23.45 kg/m².     Physical Exam  Vitals reviewed.   Constitutional:       General: He is not in acute distress.     Appearance: He is not toxic-appearing.   Cardiovascular:      Rate and Rhythm: Normal rate and regular rhythm.   Pulmonary:      Effort: Pulmonary effort is normal. No respiratory distress.   Abdominal:      General: There is no distension.      Palpations: Abdomen is soft.      Tenderness: There is no abdominal tenderness. There is no guarding or rebound.   Musculoskeletal:      Right lower leg: No edema.      Left lower leg: No edema.   Skin:     General: Skin is warm and dry.      Capillary Refill: Capillary refill takes less than 2 seconds.      Comments: Pubic wound with viable granulation tissue.  Small 0.5 cm opening near pubis with mild exudate   L thigh donor site clean and dry   Stage IV sacral wound viable granulation tissue.  No exposed bone. Small amount of devitalized tissue at most cranial aspect.  Small rim of devitalized skin on right lateral rim of wound.  No surrounding skin erythema.  No purulent drainage   Neurological:      Mental Status: Mental status is at baseline.            I have reviewed all pertinent lab results within the past 24 hours.  CBC:   Recent Labs   Lab 12/03/24  0542   WBC 11.28   RBC 3.09*   HGB 8.1*   HCT 27.6*   *   MCV 89   MCH 26.2*   MCHC 29.3*     CMP:   Recent Labs   Lab 12/03/24  0542   *   CALCIUM 9.3   ALBUMIN 0.9*   PROT 6.8      K 4.2   CO2 24      BUN 12   CREATININE 0.9   ALKPHOS 166*   ALT 14   AST 30   BILITOT 0.4       Significant Diagnostics:  I have reviewed all pertinent imaging results/findings within the past 24 hours.    Assessment/Plan:     Sacral wound  Stage IV with small amount of devitalized tissue   Continue Dakins wet to dry daily   Will attempt non-excisional bedside debridement tomorrow to aid in wound  healing   Q2 turn  Continue tube feeds for nutritional support - Albumin 0.9     Open wound of groin  Continue dakins wet to dry - pack pubic wound as well  Poorly incorporated skin graft to groin, but underlying granulation tissue viable   No surgical intervention warranted at this time    History of anoxic brain injury  PEG tube dependent  Balloon ruptured this afternoon - 20 Fr gastrotomy tube replaced at bedside today   Rest of care per primary     Type 1 diabetes  Glucose 331   Continue management per primary       VTE Risk Mitigation (From admission, onward)           Ordered     enoxaparin injection 40 mg  Daily         11/29/24 1536     Place sequential compression device  Until discontinued         11/29/24 1536                    Thank you for your consult. I will follow-up with patient. Please contact us if you have any additional questions.    Ramonita Liang MD  General Surgery  [facility]

## 2024-12-04 NOTE — ASSESSMENT & PLAN NOTE
Stage IV with small amount of devitalized tissue   Continue Dakins wet to dry daily   Will attempt non-excisional bedside debridement tomorrow to aid in wound healing   Q2 turn  Continue tube feeds for nutritional support - Albumin 0.9

## 2024-12-04 NOTE — ASSESSMENT & PLAN NOTE
PEG tube dependent  Balloon ruptured this afternoon - 20 Fr gastrotomy tube replaced at bedside today   Rest of care per primary

## 2024-12-05 LAB
ALBUMIN SERPL BCP-MCNC: 0.9 G/DL (ref 3.5–5.2)
ALP SERPL-CCNC: 170 U/L (ref 55–135)
ALT SERPL W/O P-5'-P-CCNC: 11 U/L (ref 10–44)
ANION GAP SERPL CALC-SCNC: 8 MMOL/L (ref 3–11)
AST SERPL-CCNC: 24 U/L (ref 10–40)
BASOPHILS # BLD AUTO: 0.03 K/UL (ref 0–0.2)
BASOPHILS NFR BLD: 0.2 % (ref 0–1.9)
BILIRUB SERPL-MCNC: 0.2 MG/DL (ref 0.1–1)
BUN SERPL-MCNC: 11 MG/DL (ref 6–20)
CALCIUM SERPL-MCNC: 9.9 MG/DL (ref 8.7–10.5)
CHLORIDE SERPL-SCNC: 107 MMOL/L (ref 95–110)
CO2 SERPL-SCNC: 33 MMOL/L (ref 23–29)
CREAT SERPL-MCNC: 0.7 MG/DL (ref 0.5–1.4)
DIFFERENTIAL METHOD BLD: ABNORMAL
EOSINOPHIL # BLD AUTO: 0.2 K/UL (ref 0–0.5)
EOSINOPHIL NFR BLD: 0.8 % (ref 0–8)
ERYTHROCYTE [DISTWIDTH] IN BLOOD BY AUTOMATED COUNT: 15.3 % (ref 11.5–14.5)
EST. GFR  (NO RACE VARIABLE): >60 ML/MIN/1.73 M^2
GLUCOSE SERPL-MCNC: 235 MG/DL (ref 70–110)
GLUCOSE SERPL-MCNC: NORMAL MG/DL (ref 70–110)
HCT VFR BLD AUTO: 26.2 % (ref 40–54)
HGB BLD-MCNC: 8 G/DL (ref 14–18)
IMM GRANULOCYTES # BLD AUTO: 0.11 K/UL (ref 0–0.04)
IMM GRANULOCYTES NFR BLD AUTO: 0.6 % (ref 0–0.5)
LYMPHOCYTES # BLD AUTO: 0.8 K/UL (ref 1–4.8)
LYMPHOCYTES NFR BLD: 4.2 % (ref 18–48)
MAGNESIUM SERPL-MCNC: 1.4 MG/DL (ref 1.6–2.6)
MCH RBC QN AUTO: 26.7 PG (ref 27–31)
MCHC RBC AUTO-ENTMCNC: 30.5 G/DL (ref 32–36)
MCV RBC AUTO: 87 FL (ref 82–98)
MONOCYTES # BLD AUTO: 0.8 K/UL (ref 0.3–1)
MONOCYTES NFR BLD: 4.2 % (ref 4–15)
NEUTROPHILS # BLD AUTO: 16.8 K/UL (ref 1.8–7.7)
NEUTROPHILS NFR BLD: 90 % (ref 38–73)
NRBC BLD-RTO: 0 /100 WBC
PLATELET # BLD AUTO: 640 K/UL (ref 150–450)
PMV BLD AUTO: 9 FL (ref 9.2–12.9)
POCT GLUCOSE: 365 MG/DL (ref 70–110)
POCT GLUCOSE: 381 MG/DL (ref 70–110)
POTASSIUM SERPL-SCNC: 3.7 MMOL/L (ref 3.5–5.1)
PROT SERPL-MCNC: 6.7 G/DL (ref 6–8.4)
RBC # BLD AUTO: 3 M/UL (ref 4.6–6.2)
SODIUM SERPL-SCNC: 148 MMOL/L (ref 136–145)
WBC # BLD AUTO: 18.66 K/UL (ref 3.9–12.7)

## 2024-12-05 PROCEDURE — 25000003 PHARM REV CODE 250: Performed by: INTERNAL MEDICINE

## 2024-12-05 PROCEDURE — 80053 COMPREHEN METABOLIC PANEL: CPT | Performed by: INTERNAL MEDICINE

## 2024-12-05 PROCEDURE — 63600175 PHARM REV CODE 636 W HCPCS: Performed by: INTERNAL MEDICINE

## 2024-12-05 PROCEDURE — 25000003 PHARM REV CODE 250: Performed by: STUDENT IN AN ORGANIZED HEALTH CARE EDUCATION/TRAINING PROGRAM

## 2024-12-05 PROCEDURE — 85025 COMPLETE CBC W/AUTO DIFF WBC: CPT | Performed by: INTERNAL MEDICINE

## 2024-12-05 PROCEDURE — 27000207 HC ISOLATION

## 2024-12-05 PROCEDURE — 83735 ASSAY OF MAGNESIUM: CPT | Performed by: INTERNAL MEDICINE

## 2024-12-05 PROCEDURE — 36415 COLL VENOUS BLD VENIPUNCTURE: CPT | Performed by: INTERNAL MEDICINE

## 2024-12-05 PROCEDURE — 21400001 HC TELEMETRY ROOM

## 2024-12-05 RX ORDER — LEVOTHYROXINE SODIUM 150 UG/1
150 TABLET ORAL
Status: DISCONTINUED | OUTPATIENT
Start: 2024-12-06 | End: 2024-12-12 | Stop reason: HOSPADM

## 2024-12-05 RX ADMIN — GABAPENTIN 200 MG: 100 CAPSULE ORAL at 08:12

## 2024-12-05 RX ADMIN — ASPIRIN 81 MG CHEWABLE TABLET 81 MG: 81 TABLET CHEWABLE at 09:12

## 2024-12-05 RX ADMIN — DIAZEPAM 5 MG: 5 TABLET ORAL at 09:12

## 2024-12-05 RX ADMIN — MEROPENEM 1 G: 1 INJECTION INTRAVENOUS at 06:12

## 2024-12-05 RX ADMIN — FAMOTIDINE 20 MG: 20 TABLET, FILM COATED ORAL at 09:12

## 2024-12-05 RX ADMIN — GABAPENTIN 200 MG: 100 CAPSULE ORAL at 09:12

## 2024-12-05 RX ADMIN — MEROPENEM 1 G: 1 INJECTION INTRAVENOUS at 09:12

## 2024-12-05 RX ADMIN — ENOXAPARIN SODIUM 40 MG: 100 INJECTION SUBCUTANEOUS at 04:12

## 2024-12-05 RX ADMIN — LURASIDONE HYDROCHLORIDE 40 MG: 40 TABLET, FILM COATED ORAL at 09:12

## 2024-12-05 RX ADMIN — DIAZEPAM 5 MG: 5 TABLET ORAL at 08:12

## 2024-12-05 RX ADMIN — ATORVASTATIN CALCIUM 40 MG: 40 TABLET, FILM COATED ORAL at 09:12

## 2024-12-05 RX ADMIN — SODIUM HYPOCHLORITE: 1.25 SOLUTION TOPICAL at 09:12

## 2024-12-05 RX ADMIN — OXYCODONE HYDROCHLORIDE 10 MG: 10 TABLET ORAL at 04:12

## 2024-12-05 RX ADMIN — MEROPENEM 1 G: 1 INJECTION INTRAVENOUS at 04:12

## 2024-12-05 RX ADMIN — FLUOXETINE HYDROCHLORIDE 20 MG: 20 CAPSULE ORAL at 09:12

## 2024-12-05 RX ADMIN — OXYCODONE HYDROCHLORIDE 10 MG: 10 TABLET ORAL at 11:12

## 2024-12-05 RX ADMIN — POLYETHYLENE GLYCOL (3350) 17 G: 17 POWDER, FOR SOLUTION ORAL at 09:12

## 2024-12-05 RX ADMIN — FAMOTIDINE 20 MG: 20 TABLET, FILM COATED ORAL at 08:12

## 2024-12-05 RX ADMIN — INSULIN GLARGINE 16 UNITS: 100 INJECTION, SOLUTION SUBCUTANEOUS at 09:12

## 2024-12-05 RX ADMIN — INSULIN ASPART 10 UNITS: 100 INJECTION, SOLUTION INTRAVENOUS; SUBCUTANEOUS at 09:12

## 2024-12-05 NOTE — PROGRESS NOTES
[facility] Medicine  Progress Note    Patient Name: Carlos Chahal  MRN: [id number]  Patient Class: IP- Inpatient   Admission Date: 11/29/2024  Length of Stay: 6 days  Attending Physician: Kim Diamond MD  Primary Care Provider: Jose Francisco Klein MD        Subjective     Principal Problem:Altered mental status        HPI:  Patient 34-year-old male history of bipolar disorder, diabetes, history of anoxic brain injury, GERD, neuropathy, brought to the ED due to decreased level of consciousness, patient was evaluated in the ED was noted to have UTI, no leukocytosis, H&H 7.5/25.8, patient was started on IV antibiotic, blood cultures pending, admitted    Overview/Hospital Course:  12/1 AA, weekend crosscover, patient anemic, 1 unit packed red blood cell ordered, urine culture Gram-negative rods, sensitivities pending, on IV antibiotic, renal function stable, post H&H once transfusion completed  12/2 KD:  Patient lying in bed with decreased LOC, H&H stable.  IV merum continued.  WC consulted to continue wound care orders.  Additional iron labs ordered.  12/3 KD:  Patient awake and alert lying in bed with no distress.  Continue IV antibiotics.  New orders for general surgical consult for wound debridement.  12/4 KD:  Patient awake and alert.  KUB ordered to confirm G-tube placement.  Hold feedings until placement is confirmed.    12/5 KD:  Pt.  Awake and alert.  Glucerna @ 10 cc/hr continuous feedings.  Nutritional consult today. Cont. Wound care per  recs.     No current facility-administered medications on file prior to encounter.     Current Outpatient Medications on File Prior to Encounter   Medication Sig    acetaminophen (TYLENOL) 325 MG tablet 325 mg by Per G Tube route every 6 (six) hours as needed.    ascorbic acid, vitamin C, (VITAMIN C) 500 MG tablet 500 mg by Per G Tube route once daily.    aspirin 81 MG Chew Take 81 mg by mouth once daily.    BACLOFEN ORAL 5 mg by PEG Tube route 3  (three) times daily.    clindamycin (CLEOCIN) 300 MG capsule Take 300 mg by mouth every 6 (six) hours.    diazePAM (VALIUM) 5 MG tablet 10 mg by Per G Tube route 2 (two) times a day.    enoxaparin (LOVENOX) 40 mg/0.4 mL Syrg Inject 40 mg into the skin.    famotidine (PEPCID) 40 MG tablet 40 mg by Per G Tube route once daily.    FLUoxetine 20 MG capsule 20 mg by Per G Tube route once daily.    gabapentin (NEURONTIN) 250 mg/5 mL solution 5 mLs by Per G Tube route every 12 (twelve) hours.    hydrOXYzine HCL (ATARAX) 25 MG tablet 25 mg by Per G Tube route 3 (three) times daily.    insulin aspart U-100 (NOVOLOG) 100 unit/mL injection Inject into the skin 4 (four) times daily before meals and nightly.    insulin glargine,hum.rec.anlog (LANTUS SUBQ) Inject 8 Units into the skin once daily.    levothyroxine (SYNTHROID) 100 MCG tablet 150 mcg by Per G Tube route every morning.    oxyCODONE (OXY-IR) 5 mg Cap 10 mg by Per G Tube route every 6 (six) hours as needed for Pain.    zinc sulfate (ZINC-220 ORAL) 50 mg by PEG Tube route.    ALPRAZolam (XANAX) 0.5 MG tablet Take 0.5 mg by mouth daily as needed.    levothyroxine (SYNTHROID) 88 MCG tablet Take 88 mcg by mouth every morning.    lurasidone (LATUDA) 40 mg Tab tablet Take 40 mg by mouth once daily.    NOVOLIN R REGULAR U100 INSULIN 100 unit/mL injection Per insulin pump    rosuvastatin (CRESTOR) 10 MG tablet Take 10 mg by mouth once daily.       Review of patient's allergies indicates:   Allergen Reactions    Guaifenesin Hives    Codeine Itching       Past Medical History:   Diagnosis Date    Anoxic brain injury     Bipolar disorder, unspecified     Diabetes insipidus     Diabetes mellitus     Dysphagia, unspecified     Chucky gangrene     Gangrene     GERD (gastroesophageal reflux disease)     Hereditary and idiopathic neuropathy     Nontraumatic intracerebral hemorrhage, unspecified     Other muscle spasm     Other psychoactive substance abuse with withdrawal,  uncomplicated     Skin transplant status     Thyroid disease      Past Surgical History:   Procedure Laterality Date    COLOSTOMY      INSERTION, PEG TUBE       Family History    None       Tobacco Use    Smoking status: Unknown    Smokeless tobacco: Not on file   Substance and Sexual Activity    Alcohol use: Not on file    Drug use: Not on file    Sexual activity: Not on file     Review of Systems   Unable to perform ROS: Patient nonverbal     Objective:     Vital Signs (Most Recent):  Temp: 97.7 °F (36.5 °C) (12/04/24 0816)  Pulse: 95 (12/05/24 0450)  Resp: 18 (12/05/24 0450)  BP: 103/65 (12/05/24 0450)  SpO2: 97 % (12/05/24 0450) Vital Signs (24h Range):  Pulse:  [84-95] 95  Resp:  [16-20] 18  SpO2:  [96 %-99 %] 97 %  BP: (103-110)/(65-68) 103/65     Weight: 67.9 kg (149 lb 11.1 oz)  Body mass index is 23.45 kg/m².     Physical Exam  Vitals reviewed.   Constitutional:       General: He is not in acute distress.     Appearance: He is not toxic-appearing.   Cardiovascular:      Rate and Rhythm: Normal rate and regular rhythm.   Pulmonary:      Effort: Pulmonary effort is normal. No respiratory distress.   Abdominal:      General: There is no distension.      Palpations: Abdomen is soft.      Tenderness: There is no abdominal tenderness. There is no guarding or rebound.   Musculoskeletal:      Right lower leg: No edema.      Left lower leg: No edema.   Skin:     General: Skin is warm and dry.      Capillary Refill: Capillary refill takes less than 2 seconds.      Comments: Pubic wound with viable granulation tissue.  Small 0.5 cm opening near pubis with mild exudate   L thigh donor site clean and dry   Stage IV sacral wound viable granulation tissue.  No exposed bone. Small amount of devitalized tissue at most cranial aspect.  Small rim of devitalized skin on right lateral rim of wound.  No surrounding skin erythema.  No purulent drainage   Neurological:      Mental Status: Mental status is at baseline.            I  have reviewed all pertinent lab results within the past 24 hours.  CBC:   Recent Labs   Lab 12/05/24  0536   WBC 18.66*   RBC 3.00*   HGB 8.0*   HCT 26.2*   *   MCV 87   MCH 26.7*   MCHC 30.5*     CMP:   Recent Labs   Lab 12/05/24  0536   *   CALCIUM 9.9   ALBUMIN 0.9*   PROT 6.7   *   K 3.7   CO2 33*      BUN 11   CREATININE 0.7   ALKPHOS 170*   ALT 11   AST 24   BILITOT 0.2       Significant Diagnostics:  I have reviewed all pertinent imaging results/findings within the past 24 hours.      Assessment and Plan     * Altered mental status  Altered mental status secondary to UTI, symptoms improved after IV fluids, antibiotics on board follow-up with culture report  12/2 KD:  Continue IV meropenem amlodipine when g every 8 hours      Sacral wound    12/3 KD:  Orders placed for General surgery wound debridement consult  12/5 KD: Cont. Daily WC per GS recs.    Open wound of groin  12/2 KD:  Hx fourier's gangrene.  Wound care consult to continue WC during hospital stay.  12/5 KD: Cont. Daily Wound care per GS recs.      Thrombocytosis  Likely related to iron-deficiency anemia, transfusion ordered, will start on iron supplement  12/2 KD:  H&H stable      Microcytic anemia  Anemia is likely due to Iron deficiency. Most recent hemoglobin and hematocrit are listed below.  Recent Labs     12/03/24  0542 12/04/24  0529 12/05/24  0536   HGB 8.1* 8.0* 8.0*   HCT 27.6* 26.7* 26.2*       Plan  - Monitor serial CBC: Daily  - Transfuse PRBC if patient becomes hemodynamically unstable, symptomatic or H/H drops below 7/21.  - Patient has not received any PRBC transfusions to date, order placed to receive 1 unit today  - Patient's anemia is currently stable  12/2 KD:  Iron panel ordered    History of anoxic brain injury  Patient with a history of anoxic brain injury, chronic indwelling Rojas catheter, contributing to increased UTI risks, history of multidrug resistant organisms, ESBL, patient was started on  meropenem pending culture follow-up      Urinary tract infection associated with indwelling urethral catheter  Patient was found to have altered mental status at nursing home, limited response, lethargic, brought to the ED for evaluation no leukocytosis, afebrile, patient was noted to have positive UA, chronic indwelling Rojas catheter, started on IV antibiotic, blood cultures pending urine culture pending, follow-up with result  12/2 KD:  Continue IV meropenum 1g Q 8 hrs,     Vomiting    12/4 KD:  Hold tube feedings until G-tube placement has been confirmed.  KUB ordered today  12/5 KD: Glucernia 10cc/hr cont. Feedings. Nutrition consult today.    Type 1 diabetes  Patient's FSGs are uncontrolled due to hyperglycemia on current medication regimen.  Last A1c reviewed-   Lab Results   Component Value Date    HGBA1C 12.4 (H) 01/03/2024     Most recent fingerstick glucose reviewed-   Recent Labs   Lab 12/04/24  1800 12/04/24  1808 12/04/24  2046   POCTGLUCOSE 23* 71 78       Current correctional scale  Low  Maintain anti-hyperglycemic dose as follows-   Antihyperglycemics (From admission, onward)      Start     Stop Route Frequency Ordered    12/03/24 2356  insulin aspart U-100 pen 0-10 Units         -- SubQ Every 6 hours PRN 12/03/24 2300    12/03/24 2100  insulin glargine U-100 (Lantus) pen 16 Units         -- SubQ Nightly 12/03/24 1242          Hold Oral hypoglycemics while patient is in the hospital.      VTE Risk Mitigation (From admission, onward)           Ordered     enoxaparin injection 40 mg  Daily         11/29/24 1536     Place sequential compression device  Until discontinued         11/29/24 1536                    Discharge Planning   JOSE MANUEL:      Code Status: Full Code   Medical Readiness for Discharge Date:  Treatment   Discharge Plan A: Return to nursing home (Pt is a long-term resident at [facility]   Discharge Delays: None known at this time                    STEPHEN GRIGSBY NP  Department  of [facility] Medicine   [facility]

## 2024-12-05 NOTE — ASSESSMENT & PLAN NOTE
12/2 KD:  Hx fourier's gangrene.  Wound care consult to continue WC during hospital stay.  12/5 KD: Cont. Daily Wound care per GS recs.

## 2024-12-05 NOTE — ASSESSMENT & PLAN NOTE
12/4 KD:  Hold tube feedings until G-tube placement has been confirmed.  KUB ordered today  12/5 KD: Glucernia 10cc/hr cont. Feedings. Nutrition consult today.

## 2024-12-05 NOTE — ASSESSMENT & PLAN NOTE
Likely related to iron-deficiency anemia, transfusion ordered, will start on iron supplement  12/2 KD:  H&H stable     yesterday

## 2024-12-05 NOTE — SUBJECTIVE & OBJECTIVE
No current facility-administered medications on file prior to encounter.     Current Outpatient Medications on File Prior to Encounter   Medication Sig    acetaminophen (TYLENOL) 325 MG tablet 325 mg by Per G Tube route every 6 (six) hours as needed.    ascorbic acid, vitamin C, (VITAMIN C) 500 MG tablet 500 mg by Per G Tube route once daily.    aspirin 81 MG Chew Take 81 mg by mouth once daily.    BACLOFEN ORAL 5 mg by PEG Tube route 3 (three) times daily.    clindamycin (CLEOCIN) 300 MG capsule Take 300 mg by mouth every 6 (six) hours.    diazePAM (VALIUM) 5 MG tablet 10 mg by Per G Tube route 2 (two) times a day.    enoxaparin (LOVENOX) 40 mg/0.4 mL Syrg Inject 40 mg into the skin.    famotidine (PEPCID) 40 MG tablet 40 mg by Per G Tube route once daily.    FLUoxetine 20 MG capsule 20 mg by Per G Tube route once daily.    gabapentin (NEURONTIN) 250 mg/5 mL solution 5 mLs by Per G Tube route every 12 (twelve) hours.    hydrOXYzine HCL (ATARAX) 25 MG tablet 25 mg by Per G Tube route 3 (three) times daily.    insulin aspart U-100 (NOVOLOG) 100 unit/mL injection Inject into the skin 4 (four) times daily before meals and nightly.    insulin glargine,hum.rec.anlog (LANTUS SUBQ) Inject 8 Units into the skin once daily.    levothyroxine (SYNTHROID) 100 MCG tablet 150 mcg by Per G Tube route every morning.    oxyCODONE (OXY-IR) 5 mg Cap 10 mg by Per G Tube route every 6 (six) hours as needed for Pain.    zinc sulfate (ZINC-220 ORAL) 50 mg by PEG Tube route.    ALPRAZolam (XANAX) 0.5 MG tablet Take 0.5 mg by mouth daily as needed.    levothyroxine (SYNTHROID) 88 MCG tablet Take 88 mcg by mouth every morning.    lurasidone (LATUDA) 40 mg Tab tablet Take 40 mg by mouth once daily.    NOVOLIN R REGULAR U100 INSULIN 100 unit/mL injection Per insulin pump    rosuvastatin (CRESTOR) 10 MG tablet Take 10 mg by mouth once daily.       Review of patient's allergies indicates:   Allergen Reactions    Guaifenesin Hives    Codeine  Itching       Past Medical History:   Diagnosis Date    Anoxic brain injury     Bipolar disorder, unspecified     Diabetes insipidus     Diabetes mellitus     Dysphagia, unspecified     Chucky gangrene     Gangrene     GERD (gastroesophageal reflux disease)     Hereditary and idiopathic neuropathy     Nontraumatic intracerebral hemorrhage, unspecified     Other muscle spasm     Other psychoactive substance abuse with withdrawal, uncomplicated     Skin transplant status     Thyroid disease      Past Surgical History:   Procedure Laterality Date    COLOSTOMY      INSERTION, PEG TUBE       Family History    None       Tobacco Use    Smoking status: Unknown    Smokeless tobacco: Not on file   Substance and Sexual Activity    Alcohol use: Not on file    Drug use: Not on file    Sexual activity: Not on file     Review of Systems   Unable to perform ROS: Patient nonverbal     Objective:     Vital Signs (Most Recent):  Temp: 97.7 °F (36.5 °C) (12/04/24 0816)  Pulse: 95 (12/05/24 0450)  Resp: 18 (12/05/24 0450)  BP: 103/65 (12/05/24 0450)  SpO2: 97 % (12/05/24 0450) Vital Signs (24h Range):  Pulse:  [84-95] 95  Resp:  [16-20] 18  SpO2:  [96 %-99 %] 97 %  BP: (103-110)/(65-68) 103/65     Weight: 67.9 kg (149 lb 11.1 oz)  Body mass index is 23.45 kg/m².     Physical Exam  Vitals reviewed.   Constitutional:       General: He is not in acute distress.     Appearance: He is not toxic-appearing.   Cardiovascular:      Rate and Rhythm: Normal rate and regular rhythm.   Pulmonary:      Effort: Pulmonary effort is normal. No respiratory distress.   Abdominal:      General: There is no distension.      Palpations: Abdomen is soft.      Tenderness: There is no abdominal tenderness. There is no guarding or rebound.   Musculoskeletal:      Right lower leg: No edema.      Left lower leg: No edema.   Skin:     General: Skin is warm and dry.      Capillary Refill: Capillary refill takes less than 2 seconds.      Comments: Pubic wound with  viable granulation tissue.  Small 0.5 cm opening near pubis with mild exudate   L thigh donor site clean and dry   Stage IV sacral wound viable granulation tissue.  No exposed bone. Small amount of devitalized tissue at most cranial aspect.  Small rim of devitalized skin on right lateral rim of wound.  No surrounding skin erythema.  No purulent drainage   Neurological:      Mental Status: Mental status is at baseline.            I have reviewed all pertinent lab results within the past 24 hours.  CBC:   Recent Labs   Lab 12/05/24  0536   WBC 18.66*   RBC 3.00*   HGB 8.0*   HCT 26.2*   *   MCV 87   MCH 26.7*   MCHC 30.5*     CMP:   Recent Labs   Lab 12/05/24  0536   *   CALCIUM 9.9   ALBUMIN 0.9*   PROT 6.7   *   K 3.7   CO2 33*      BUN 11   CREATININE 0.7   ALKPHOS 170*   ALT 11   AST 24   BILITOT 0.2       Significant Diagnostics:  I have reviewed all pertinent imaging results/findings within the past 24 hours.

## 2024-12-05 NOTE — NURSING
Pt crying and yelling due to pain stating he wants to go home, pt allowed me to reposition his legs and head but refused further care this morning. Pain medication offered to relieve patients pain however he refused as well as colostomy care and rechecking his tempeture. Emotional relief offered to patient. Call light and personal items within reach of patient.

## 2024-12-05 NOTE — ASSESSMENT & PLAN NOTE
12/3 KD:  Orders placed for General surgery wound debridement consult  12/5 KD: Cont. Daily WC per GS recs.

## 2024-12-05 NOTE — ASSESSMENT & PLAN NOTE
Anemia is likely due to Iron deficiency. Most recent hemoglobin and hematocrit are listed below.  Recent Labs     12/03/24  0542 12/04/24  0529 12/05/24  0536   HGB 8.1* 8.0* 8.0*   HCT 27.6* 26.7* 26.2*       Plan  - Monitor serial CBC: Daily  - Transfuse PRBC if patient becomes hemodynamically unstable, symptomatic or H/H drops below 7/21.  - Patient has not received any PRBC transfusions to date, order placed to receive 1 unit today  - Patient's anemia is currently stable  12/2 KD:  Iron panel ordered

## 2024-12-05 NOTE — PLAN OF CARE
Plan of care reviewed and ongoing with patient. 22 gauge IV to R FA saline locked/flushes well, room air tolerating well. PEG intact with Glucerna 1.2 Liborio continuous feedings by pump with a rate of 10 mL/hr, tolerated medications crushed through PEG. Scheduled residual checks 100 mL or less each time. Ostomy to LLQ passing flatus and loose-liquid stool. 16 Fr varghese catheter in place, draining properly into drainage device free of kinks/loops. Wounds to groin area noted. Call light and personal items within reach, free checks and turning completed during the night.       Problem: Skin Injury Risk Increased  Goal: Skin Health and Integrity  Outcome: Not Progressing     Problem: Infection  Goal: Absence of Infection Signs and Symptoms  Outcome: Not Progressing     Problem: Adult Inpatient Plan of Care  Goal: Plan of Care Review  Outcome: Not Progressing  Goal: Patient-Specific Goal (Individualized)  Outcome: Not Progressing  Goal: Absence of Hospital-Acquired Illness or Injury  Outcome: Not Progressing  Goal: Optimal Comfort and Wellbeing  Outcome: Not Progressing  Goal: Readiness for Transition of Care  Outcome: Not Progressing     Problem: Diabetes Comorbidity  Goal: Blood Glucose Level Within Targeted Range  Outcome: Not Progressing     Problem: Wound  Goal: Optimal Coping  Outcome: Not Progressing  Goal: Optimal Functional Ability  Outcome: Not Progressing  Goal: Absence of Infection Signs and Symptoms  Outcome: Not Progressing  Goal: Improved Oral Intake  Outcome: Not Progressing  Goal: Optimal Pain Control and Function  Outcome: Not Progressing  Goal: Skin Health and Integrity  Outcome: Not Progressing  Goal: Optimal Wound Healing  Outcome: Not Progressing     Problem: Fall Injury Risk  Goal: Absence of Fall and Fall-Related Injury  Outcome: Not Progressing

## 2024-12-05 NOTE — ASSESSMENT & PLAN NOTE
Patient's FSGs are uncontrolled due to hyperglycemia on current medication regimen.  Last A1c reviewed-   Lab Results   Component Value Date    HGBA1C 12.4 (H) 01/03/2024     Most recent fingerstick glucose reviewed-   Recent Labs   Lab 12/04/24  1800 12/04/24  1808 12/04/24  2046   POCTGLUCOSE 23* 71 78       Current correctional scale  Low  Maintain anti-hyperglycemic dose as follows-   Antihyperglycemics (From admission, onward)    Start     Stop Route Frequency Ordered    12/03/24 2356  insulin aspart U-100 pen 0-10 Units         -- SubQ Every 6 hours PRN 12/03/24 2300    12/03/24 2100  insulin glargine U-100 (Lantus) pen 16 Units         -- SubQ Nightly 12/03/24 1242        Hold Oral hypoglycemics while patient is in the hospital.

## 2024-12-06 LAB
ALBUMIN SERPL BCP-MCNC: 0.9 G/DL (ref 3.5–5.2)
ALP SERPL-CCNC: 209 U/L (ref 55–135)
ALT SERPL W/O P-5'-P-CCNC: 17 U/L (ref 10–44)
ANION GAP SERPL CALC-SCNC: 1 MMOL/L (ref 3–11)
AST SERPL-CCNC: 37 U/L (ref 10–40)
BASOPHILS # BLD AUTO: 0.02 K/UL (ref 0–0.2)
BASOPHILS NFR BLD: 0.2 % (ref 0–1.9)
BILIRUB SERPL-MCNC: 0.2 MG/DL (ref 0.1–1)
BUN SERPL-MCNC: 21 MG/DL (ref 6–20)
CALCIUM SERPL-MCNC: 9.9 MG/DL (ref 8.7–10.5)
CHLORIDE SERPL-SCNC: 102 MMOL/L (ref 95–110)
CO2 SERPL-SCNC: 35 MMOL/L (ref 23–29)
CREAT SERPL-MCNC: 1.1 MG/DL (ref 0.5–1.4)
DIFFERENTIAL METHOD BLD: ABNORMAL
EOSINOPHIL # BLD AUTO: 0.2 K/UL (ref 0–0.5)
EOSINOPHIL NFR BLD: 1.6 % (ref 0–8)
ERYTHROCYTE [DISTWIDTH] IN BLOOD BY AUTOMATED COUNT: 15.4 % (ref 11.5–14.5)
EST. GFR  (NO RACE VARIABLE): >60 ML/MIN/1.73 M^2
GLUCOSE SERPL-MCNC: 311 MG/DL (ref 70–110)
GLUCOSE SERPL-MCNC: 414 MG/DL (ref 70–110)
HCT VFR BLD AUTO: 25.3 % (ref 40–54)
HGB BLD-MCNC: 7.7 G/DL (ref 14–18)
IMM GRANULOCYTES # BLD AUTO: 0.03 K/UL (ref 0–0.04)
IMM GRANULOCYTES NFR BLD AUTO: 0.3 % (ref 0–0.5)
LYMPHOCYTES # BLD AUTO: 1.2 K/UL (ref 1–4.8)
LYMPHOCYTES NFR BLD: 11.2 % (ref 18–48)
MAGNESIUM SERPL-MCNC: 1.5 MG/DL (ref 1.6–2.6)
MCH RBC QN AUTO: 26.1 PG (ref 27–31)
MCHC RBC AUTO-ENTMCNC: 30.4 G/DL (ref 32–36)
MCV RBC AUTO: 86 FL (ref 82–98)
MONOCYTES # BLD AUTO: 0.5 K/UL (ref 0.3–1)
MONOCYTES NFR BLD: 4.2 % (ref 4–15)
NEUTROPHILS # BLD AUTO: 8.9 K/UL (ref 1.8–7.7)
NEUTROPHILS NFR BLD: 82.5 % (ref 38–73)
NRBC BLD-RTO: 0 /100 WBC
PLATELET # BLD AUTO: 679 K/UL (ref 150–450)
PMV BLD AUTO: 9.4 FL (ref 9.2–12.9)
POCT GLUCOSE: 208 MG/DL (ref 70–110)
POCT GLUCOSE: 222 MG/DL (ref 70–110)
POCT GLUCOSE: 300 MG/DL (ref 70–110)
POCT GLUCOSE: 414 MG/DL (ref 70–110)
POCT GLUCOSE: >500 MG/DL (ref 70–110)
POTASSIUM SERPL-SCNC: 3.7 MMOL/L (ref 3.5–5.1)
PROT SERPL-MCNC: 7 G/DL (ref 6–8.4)
RBC # BLD AUTO: 2.95 M/UL (ref 4.6–6.2)
SODIUM SERPL-SCNC: 138 MMOL/L (ref 136–145)
WBC # BLD AUTO: 10.73 K/UL (ref 3.9–12.7)

## 2024-12-06 PROCEDURE — 85025 COMPLETE CBC W/AUTO DIFF WBC: CPT | Performed by: INTERNAL MEDICINE

## 2024-12-06 PROCEDURE — 63600175 PHARM REV CODE 636 W HCPCS: Performed by: INTERNAL MEDICINE

## 2024-12-06 PROCEDURE — 27000207 HC ISOLATION

## 2024-12-06 PROCEDURE — 80053 COMPREHEN METABOLIC PANEL: CPT | Performed by: INTERNAL MEDICINE

## 2024-12-06 PROCEDURE — 36415 COLL VENOUS BLD VENIPUNCTURE: CPT | Performed by: INTERNAL MEDICINE

## 2024-12-06 PROCEDURE — 21400001 HC TELEMETRY ROOM

## 2024-12-06 PROCEDURE — 25000003 PHARM REV CODE 250: Performed by: STUDENT IN AN ORGANIZED HEALTH CARE EDUCATION/TRAINING PROGRAM

## 2024-12-06 PROCEDURE — 25000003 PHARM REV CODE 250: Performed by: INTERNAL MEDICINE

## 2024-12-06 PROCEDURE — 83735 ASSAY OF MAGNESIUM: CPT | Performed by: INTERNAL MEDICINE

## 2024-12-06 RX ADMIN — FAMOTIDINE 20 MG: 20 TABLET, FILM COATED ORAL at 09:12

## 2024-12-06 RX ADMIN — INSULIN ASPART 2 UNITS: 100 INJECTION, SOLUTION INTRAVENOUS; SUBCUTANEOUS at 05:12

## 2024-12-06 RX ADMIN — MEROPENEM 1 G: 1 INJECTION INTRAVENOUS at 10:12

## 2024-12-06 RX ADMIN — ATORVASTATIN CALCIUM 40 MG: 40 TABLET, FILM COATED ORAL at 08:12

## 2024-12-06 RX ADMIN — OXYCODONE HYDROCHLORIDE 10 MG: 10 TABLET ORAL at 12:12

## 2024-12-06 RX ADMIN — INSULIN GLARGINE 16 UNITS: 100 INJECTION, SOLUTION SUBCUTANEOUS at 09:12

## 2024-12-06 RX ADMIN — SODIUM HYPOCHLORITE: 1.25 SOLUTION TOPICAL at 08:12

## 2024-12-06 RX ADMIN — FLUOXETINE HYDROCHLORIDE 20 MG: 20 CAPSULE ORAL at 08:12

## 2024-12-06 RX ADMIN — DIAZEPAM 5 MG: 5 TABLET ORAL at 08:12

## 2024-12-06 RX ADMIN — MEROPENEM 1 G: 1 INJECTION INTRAVENOUS at 02:12

## 2024-12-06 RX ADMIN — ENOXAPARIN SODIUM 40 MG: 100 INJECTION SUBCUTANEOUS at 05:12

## 2024-12-06 RX ADMIN — FAMOTIDINE 20 MG: 20 TABLET, FILM COATED ORAL at 08:12

## 2024-12-06 RX ADMIN — MEROPENEM 1 G: 1 INJECTION INTRAVENOUS at 06:12

## 2024-12-06 RX ADMIN — GABAPENTIN 200 MG: 100 CAPSULE ORAL at 09:12

## 2024-12-06 RX ADMIN — ASPIRIN 81 MG CHEWABLE TABLET 81 MG: 81 TABLET CHEWABLE at 08:12

## 2024-12-06 RX ADMIN — DIAZEPAM 5 MG: 5 TABLET ORAL at 09:12

## 2024-12-06 RX ADMIN — LEVOTHYROXINE SODIUM 150 MCG: 150 TABLET ORAL at 06:12

## 2024-12-06 RX ADMIN — GABAPENTIN 200 MG: 100 CAPSULE ORAL at 08:12

## 2024-12-06 RX ADMIN — LURASIDONE HYDROCHLORIDE 40 MG: 40 TABLET, FILM COATED ORAL at 08:12

## 2024-12-06 NOTE — ASSESSMENT & PLAN NOTE
Anemia is likely due to Iron deficiency. Most recent hemoglobin and hematocrit are listed below.  Recent Labs     12/04/24  0529 12/05/24  0536 12/06/24  0518   HGB 8.0* 8.0* 7.7*   HCT 26.7* 26.2* 25.3*       Plan  - Monitor serial CBC: Daily  - Transfuse PRBC if patient becomes hemodynamically unstable, symptomatic or H/H drops below 7/21.  - Patient has not received any PRBC transfusions to date, order placed to receive 1 unit today  - Patient's anemia is currently stable  12/2 KD:  Iron panel ordered  12/6 KD: H&H stable

## 2024-12-06 NOTE — PLAN OF CARE
Plan of care reviewed and ongoing with patient. 22 gauge IV to R FA saline locked/flushes well, room air tolerating well. PEG intact with Glucerna 1.2 Liborio continuous feedings by pump with a rate of 25 mL/hr, tolerated medications crushed through PEG. No residual noted during ordered checks during the night. Ostomy to LLQ passing flatus and loose-liquid stool. 16 Fr varghese catheter in place, draining properly into drainage device free of kinks/loops. Wounds to groin area noted. Call light and personal items within reach, frequent checks and turning completed during the night.           Problem: Skin Injury Risk Increased  Goal: Skin Health and Integrity  Outcome: Not Progressing     Problem: Infection  Goal: Absence of Infection Signs and Symptoms  Outcome: Not Progressing     Problem: Adult Inpatient Plan of Care  Goal: Plan of Care Review  Outcome: Not Progressing  Goal: Patient-Specific Goal (Individualized)  Outcome: Not Progressing  Goal: Absence of Hospital-Acquired Illness or Injury  Outcome: Not Progressing  Goal: Optimal Comfort and Wellbeing  Outcome: Not Progressing  Goal: Readiness for Transition of Care  Outcome: Not Progressing     Problem: Diabetes Comorbidity  Goal: Blood Glucose Level Within Targeted Range  Outcome: Not Progressing     Problem: Wound  Goal: Optimal Coping  Outcome: Not Progressing  Goal: Optimal Functional Ability  Outcome: Not Progressing  Goal: Absence of Infection Signs and Symptoms  Outcome: Not Progressing  Goal: Improved Oral Intake  Outcome: Not Progressing  Goal: Optimal Pain Control and Function  Outcome: Not Progressing  Goal: Skin Health and Integrity  Outcome: Not Progressing  Goal: Optimal Wound Healing  Outcome: Not Progressing     Problem: Fall Injury Risk  Goal: Absence of Fall and Fall-Related Injury  Outcome: Not Progressing

## 2024-12-06 NOTE — ASSESSMENT & PLAN NOTE
12/4 KD:  Hold tube feedings until G-tube placement has been confirmed.  KUB ordered today  12/5 KD: Glucernia 10cc/hr cont. Feedings. Nutrition consult today.  12/6 KD: Continue titrating feedings as tolerated upon awaiting nutritional consult.  Continue wound care per GS rec

## 2024-12-06 NOTE — SUBJECTIVE & OBJECTIVE
No current facility-administered medications on file prior to encounter.     Current Outpatient Medications on File Prior to Encounter   Medication Sig    acetaminophen (TYLENOL) 325 MG tablet 325 mg by Per G Tube route every 6 (six) hours as needed.    ascorbic acid, vitamin C, (VITAMIN C) 500 MG tablet 500 mg by Per G Tube route once daily.    aspirin 81 MG Chew Take 81 mg by mouth once daily.    BACLOFEN ORAL 5 mg by PEG Tube route 3 (three) times daily.    clindamycin (CLEOCIN) 300 MG capsule Take 300 mg by mouth every 6 (six) hours.    diazePAM (VALIUM) 5 MG tablet 10 mg by Per G Tube route 2 (two) times a day.    enoxaparin (LOVENOX) 40 mg/0.4 mL Syrg Inject 40 mg into the skin.    famotidine (PEPCID) 40 MG tablet 40 mg by Per G Tube route once daily.    FLUoxetine 20 MG capsule 20 mg by Per G Tube route once daily.    gabapentin (NEURONTIN) 250 mg/5 mL solution 5 mLs by Per G Tube route every 12 (twelve) hours.    hydrOXYzine HCL (ATARAX) 25 MG tablet 25 mg by Per G Tube route 3 (three) times daily.    insulin aspart U-100 (NOVOLOG) 100 unit/mL injection Inject into the skin 4 (four) times daily before meals and nightly.    insulin glargine,hum.rec.anlog (LANTUS SUBQ) Inject 8 Units into the skin once daily.    levothyroxine (SYNTHROID) 100 MCG tablet 150 mcg by Per G Tube route every morning.    oxyCODONE (OXY-IR) 5 mg Cap 10 mg by Per G Tube route every 6 (six) hours as needed for Pain.    zinc sulfate (ZINC-220 ORAL) 50 mg by PEG Tube route.    ALPRAZolam (XANAX) 0.5 MG tablet Take 0.5 mg by mouth daily as needed.    levothyroxine (SYNTHROID) 88 MCG tablet Take 88 mcg by mouth every morning.    lurasidone (LATUDA) 40 mg Tab tablet Take 40 mg by mouth once daily.    NOVOLIN R REGULAR U100 INSULIN 100 unit/mL injection Per insulin pump    rosuvastatin (CRESTOR) 10 MG tablet Take 10 mg by mouth once daily.       Review of patient's allergies indicates:   Allergen Reactions    Guaifenesin Hives    Codeine  Itching       Past Medical History:   Diagnosis Date    Anoxic brain injury     Bipolar disorder, unspecified     Diabetes insipidus     Diabetes mellitus     Dysphagia, unspecified     Chucky gangrene     Gangrene     GERD (gastroesophageal reflux disease)     Hereditary and idiopathic neuropathy     Nontraumatic intracerebral hemorrhage, unspecified     Other muscle spasm     Other psychoactive substance abuse with withdrawal, uncomplicated     Skin transplant status     Thyroid disease      Past Surgical History:   Procedure Laterality Date    COLOSTOMY      INSERTION, PEG TUBE       Family History    None       Tobacco Use    Smoking status: Unknown    Smokeless tobacco: Not on file   Substance and Sexual Activity    Alcohol use: Not on file    Drug use: Not on file    Sexual activity: Not on file     Review of Systems   Unable to perform ROS: Patient nonverbal     Objective:     Vital Signs (Most Recent):  Temp: 98.6 °F (37 °C) (12/06/24 0728)  Pulse: 110 (12/06/24 0728)  Resp: 18 (12/06/24 0728)  BP: (!) 101/44 (12/06/24 0728)  SpO2: 96 % (12/06/24 0728) Vital Signs (24h Range):  Temp:  [98 °F (36.7 °C)-99.9 °F (37.7 °C)] 98.6 °F (37 °C)  Pulse:  [] 110  Resp:  [16-20] 18  SpO2:  [94 %-99 %] 96 %  BP: ()/(44-68) 101/44     Weight: 67.9 kg (149 lb 11.1 oz)  Body mass index is 23.45 kg/m².     Physical Exam  Vitals reviewed.   Constitutional:       General: He is not in acute distress.     Appearance: He is not toxic-appearing.   Cardiovascular:      Rate and Rhythm: Normal rate and regular rhythm.   Pulmonary:      Effort: Pulmonary effort is normal. No respiratory distress.   Abdominal:      General: There is no distension.      Palpations: Abdomen is soft.      Tenderness: There is no abdominal tenderness. There is no guarding or rebound.   Musculoskeletal:      Right lower leg: No edema.      Left lower leg: No edema.   Skin:     General: Skin is warm and dry.      Capillary Refill: Capillary  refill takes less than 2 seconds.      Comments: Pubic wound with viable granulation tissue.  Small 0.5 cm opening near pubis with mild exudate   L thigh donor site clean and dry   Stage IV sacral wound viable granulation tissue.  No exposed bone. Small amount of devitalized tissue at most cranial aspect.  Small rim of devitalized skin on right lateral rim of wound.  No surrounding skin erythema.  No purulent drainage   Neurological:      Mental Status: Mental status is at baseline.            I have reviewed all pertinent lab results within the past 24 hours.  CBC:   Recent Labs   Lab 12/06/24  0518   WBC 10.73   RBC 2.95*   HGB 7.7*   HCT 25.3*   *   MCV 86   MCH 26.1*   MCHC 30.4*     CMP:   Recent Labs   Lab 12/06/24  0517   *   CALCIUM 9.9   ALBUMIN 0.9*   PROT 7.0      K 3.7   CO2 35*      BUN 21*   CREATININE 1.1   ALKPHOS 209*   ALT 17   AST 37   BILITOT 0.2       Significant Diagnostics:  I have reviewed all pertinent imaging results/findings within the past 24 hours.

## 2024-12-06 NOTE — ASSESSMENT & PLAN NOTE
Patient's FSGs are uncontrolled due to hyperglycemia on current medication regimen.  Last A1c reviewed-   Lab Results   Component Value Date    HGBA1C 12.4 (H) 01/03/2024     Most recent fingerstick glucose reviewed-   Recent Labs   Lab 12/05/24  1558 12/05/24  2049 12/06/24  0012 12/06/24  0634   POCTGLUCOSE 381* >500* 414* 300*       Current correctional scale  Low  Maintain anti-hyperglycemic dose as follows-   Antihyperglycemics (From admission, onward)    Start     Stop Route Frequency Ordered    12/03/24 2356  insulin aspart U-100 pen 0-10 Units         -- SubQ Every 6 hours PRN 12/03/24 2300    12/03/24 2100  insulin glargine U-100 (Lantus) pen 16 Units         -- SubQ Nightly 12/03/24 1242        Hold Oral hypoglycemics while patient is in the hospital.

## 2024-12-06 NOTE — PLAN OF CARE
Recommendations  1. Rec'd Continuous EN: Glucerna 1.2 or Diabetasource AC @ 10mL/r increasing by 5-10mL q4-6hrs to goal rate of 75mL/hr with a continuous 25mL FWF to provide 2160kcal (106% EEN), 108g of protein (113% EPN), and 2049mL FW.   2.  Recd Néstor BID via PEG tube to promote wound healing and to provide additional nutrition.       - Do not mix Néstor with formula in a tube-feeding bag      - Pour one packet of Néstor in a clean, small container for mixing.       - Add 4 fl oz (120 mL) water at room temperature.       - Mix well with disposable spoon or tongue blade until all particles are completely hydrated.       - Verify correct tube placement.       - Flush feeding tube with 30 mL water.       -Administer Néstor through feeding tube using a 60-mL or larger syringe.       - Flush with an additional 30 mL water.   3. RD to follow and make erc's accordingly.  Goals:   1. Pt will be started on TF by next RD follow up.   2. Pt will reach TF goal rate within 48hrs of initiation.  Nutrition Goal Status: goal not met

## 2024-12-06 NOTE — PROGRESS NOTES
[facility] Medicine  Progress Note    Patient Name: Carlos Chahal  MRN: [id number]  Patient Class: IP- Inpatient   Admission Date: 11/29/2024  Length of Stay: 7 days  Attending Physician: Kim Diamond MD  Primary Care Provider: Jose Francisco Klein MD        Subjective     Principal Problem:Altered mental status        HPI:  Patient 34-year-old male history of bipolar disorder, diabetes, history of anoxic brain injury, GERD, neuropathy, brought to the ED due to decreased level of consciousness, patient was evaluated in the ED was noted to have UTI, no leukocytosis, H&H 7.5/25.8, patient was started on IV antibiotic, blood cultures pending, admitted    Overview/Hospital Course:  12/1 AA, weekend crosscover, patient anemic, 1 unit packed red blood cell ordered, urine culture Gram-negative rods, sensitivities pending, on IV antibiotic, renal function stable, post H&H once transfusion completed  12/2 KD:  Patient lying in bed with decreased LOC, H&H stable.  IV merum continued.  WC consulted to continue wound care orders.  Additional iron labs ordered.  12/3 KD:  Patient awake and alert lying in bed with no distress.  Continue IV antibiotics.  New orders for general surgical consult for wound debridement.  12/4 KD:  Patient awake and alert.  KUB ordered to confirm G-tube placement.  Hold feedings until placement is confirmed.    12/5 KD:  Pt.  Awake and alert.  Glucerna @ 10 cc/hr continuous feedings.  Nutritional consult today. Cont. Wound care per GS recs.   12/6 KD:  Awake and alert.  Continue titrating feedings as tolerated upon awaiting nutritional consult.  Continue wound care per GS rec    No current facility-administered medications on file prior to encounter.     Current Outpatient Medications on File Prior to Encounter   Medication Sig    acetaminophen (TYLENOL) 325 MG tablet 325 mg by Per G Tube route every 6 (six) hours as needed.    ascorbic acid, vitamin C, (VITAMIN C) 500 MG tablet  500 mg by Per G Tube route once daily.    aspirin 81 MG Chew Take 81 mg by mouth once daily.    BACLOFEN ORAL 5 mg by PEG Tube route 3 (three) times daily.    clindamycin (CLEOCIN) 300 MG capsule Take 300 mg by mouth every 6 (six) hours.    diazePAM (VALIUM) 5 MG tablet 10 mg by Per G Tube route 2 (two) times a day.    enoxaparin (LOVENOX) 40 mg/0.4 mL Syrg Inject 40 mg into the skin.    famotidine (PEPCID) 40 MG tablet 40 mg by Per G Tube route once daily.    FLUoxetine 20 MG capsule 20 mg by Per G Tube route once daily.    gabapentin (NEURONTIN) 250 mg/5 mL solution 5 mLs by Per G Tube route every 12 (twelve) hours.    hydrOXYzine HCL (ATARAX) 25 MG tablet 25 mg by Per G Tube route 3 (three) times daily.    insulin aspart U-100 (NOVOLOG) 100 unit/mL injection Inject into the skin 4 (four) times daily before meals and nightly.    insulin glargine,hum.rec.anlog (LANTUS SUBQ) Inject 8 Units into the skin once daily.    levothyroxine (SYNTHROID) 100 MCG tablet 150 mcg by Per G Tube route every morning.    oxyCODONE (OXY-IR) 5 mg Cap 10 mg by Per G Tube route every 6 (six) hours as needed for Pain.    zinc sulfate (ZINC-220 ORAL) 50 mg by PEG Tube route.    ALPRAZolam (XANAX) 0.5 MG tablet Take 0.5 mg by mouth daily as needed.    levothyroxine (SYNTHROID) 88 MCG tablet Take 88 mcg by mouth every morning.    lurasidone (LATUDA) 40 mg Tab tablet Take 40 mg by mouth once daily.    NOVOLIN R REGULAR U100 INSULIN 100 unit/mL injection Per insulin pump    rosuvastatin (CRESTOR) 10 MG tablet Take 10 mg by mouth once daily.       Review of patient's allergies indicates:   Allergen Reactions    Guaifenesin Hives    Codeine Itching       Past Medical History:   Diagnosis Date    Anoxic brain injury     Bipolar disorder, unspecified     Diabetes insipidus     Diabetes mellitus     Dysphagia, unspecified     Chucky gangrene     Gangrene     GERD (gastroesophageal reflux disease)     Hereditary and idiopathic neuropathy      Nontraumatic intracerebral hemorrhage, unspecified     Other muscle spasm     Other psychoactive substance abuse with withdrawal, uncomplicated     Skin transplant status     Thyroid disease      Past Surgical History:   Procedure Laterality Date    COLOSTOMY      INSERTION, PEG TUBE       Family History    None       Tobacco Use    Smoking status: Unknown    Smokeless tobacco: Not on file   Substance and Sexual Activity    Alcohol use: Not on file    Drug use: Not on file    Sexual activity: Not on file     Review of Systems   Unable to perform ROS: Patient nonverbal     Objective:     Vital Signs (Most Recent):  Temp: 98.6 °F (37 °C) (12/06/24 0728)  Pulse: 110 (12/06/24 0728)  Resp: 18 (12/06/24 0728)  BP: (!) 101/44 (12/06/24 0728)  SpO2: 96 % (12/06/24 0728) Vital Signs (24h Range):  Temp:  [98 °F (36.7 °C)-99.9 °F (37.7 °C)] 98.6 °F (37 °C)  Pulse:  [] 110  Resp:  [16-20] 18  SpO2:  [94 %-99 %] 96 %  BP: ()/(44-68) 101/44     Weight: 67.9 kg (149 lb 11.1 oz)  Body mass index is 23.45 kg/m².     Physical Exam  Vitals reviewed.   Constitutional:       General: He is not in acute distress.     Appearance: He is not toxic-appearing.   Cardiovascular:      Rate and Rhythm: Normal rate and regular rhythm.   Pulmonary:      Effort: Pulmonary effort is normal. No respiratory distress.   Abdominal:      General: There is no distension.      Palpations: Abdomen is soft.      Tenderness: There is no abdominal tenderness. There is no guarding or rebound.   Musculoskeletal:      Right lower leg: No edema.      Left lower leg: No edema.   Skin:     General: Skin is warm and dry.      Capillary Refill: Capillary refill takes less than 2 seconds.      Comments: Pubic wound with viable granulation tissue.  Small 0.5 cm opening near pubis with mild exudate   L thigh donor site clean and dry   Stage IV sacral wound viable granulation tissue.  No exposed bone. Small amount of devitalized tissue at most cranial aspect.   Small rim of devitalized skin on right lateral rim of wound.  No surrounding skin erythema.  No purulent drainage   Neurological:      Mental Status: Mental status is at baseline.            I have reviewed all pertinent lab results within the past 24 hours.  CBC:   Recent Labs   Lab 12/06/24  0518   WBC 10.73   RBC 2.95*   HGB 7.7*   HCT 25.3*   *   MCV 86   MCH 26.1*   MCHC 30.4*     CMP:   Recent Labs   Lab 12/06/24  0517   *   CALCIUM 9.9   ALBUMIN 0.9*   PROT 7.0      K 3.7   CO2 35*      BUN 21*   CREATININE 1.1   ALKPHOS 209*   ALT 17   AST 37   BILITOT 0.2       Significant Diagnostics:  I have reviewed all pertinent imaging results/findings within the past 24 hours.      Assessment and Plan     * Altered mental status  Altered mental status secondary to UTI, symptoms improved after IV fluids, antibiotics on board follow-up with culture report  12/2 KD:  Continue IV meropenem amlodipine when g every 8 hours      Sacral wound    12/3 KD:  Orders placed for General surgery wound debridement consult  12/5 KD: Cont. Daily WC per GS recs.    Open wound of groin  12/2 KD:  Hx fourier's gangrene.  Wound care consult to continue WC during hospital stay.  12/5 KD: Cont. Daily Wound care per GS recs.      Thrombocytosis  Likely related to iron-deficiency anemia, transfusion ordered, will start on iron supplement  12/2 KD:  H&H stable      Microcytic anemia  Anemia is likely due to Iron deficiency. Most recent hemoglobin and hematocrit are listed below.  Recent Labs     12/04/24  0529 12/05/24  0536 12/06/24  0518   HGB 8.0* 8.0* 7.7*   HCT 26.7* 26.2* 25.3*       Plan  - Monitor serial CBC: Daily  - Transfuse PRBC if patient becomes hemodynamically unstable, symptomatic or H/H drops below 7/21.  - Patient has not received any PRBC transfusions to date, order placed to receive 1 unit today  - Patient's anemia is currently stable  12/2 KD:  Iron panel ordered  12/6 KD: H&H stable    History of  anoxic brain injury  Patient with a history of anoxic brain injury, chronic indwelling Rojas catheter, contributing to increased UTI risks, history of multidrug resistant organisms, ESBL, patient was started on meropenem pending culture follow-up      Urinary tract infection associated with indwelling urethral catheter  Patient was found to have altered mental status at nursing home, limited response, lethargic, brought to the ED for evaluation no leukocytosis, afebrile, patient was noted to have positive UA, chronic indwelling Rojas catheter, started on IV antibiotic, blood cultures pending urine culture pending, follow-up with result  12/2 KD:  Continue IV meropenum 1g Q 8 hrs,     Vomiting    12/4 KD:  Hold tube feedings until G-tube placement has been confirmed.  KUB ordered today  12/5 KD: Glucernia 10cc/hr cont. Feedings. Nutrition consult today.  12/6 KD: Continue titrating feedings as tolerated upon awaiting nutritional consult.  Continue wound care per GS rec    Type 1 diabetes  Patient's FSGs are uncontrolled due to hyperglycemia on current medication regimen.  Last A1c reviewed-   Lab Results   Component Value Date    HGBA1C 12.4 (H) 01/03/2024     Most recent fingerstick glucose reviewed-   Recent Labs   Lab 12/05/24  1558 12/05/24  2049 12/06/24  0012 12/06/24  0634   POCTGLUCOSE 381* >500* 414* 300*       Current correctional scale  Low  Maintain anti-hyperglycemic dose as follows-   Antihyperglycemics (From admission, onward)      Start     Stop Route Frequency Ordered    12/03/24 2356  insulin aspart U-100 pen 0-10 Units         -- SubQ Every 6 hours PRN 12/03/24 2300    12/03/24 2100  insulin glargine U-100 (Lantus) pen 16 Units         -- SubQ Nightly 12/03/24 1242          Hold Oral hypoglycemics while patient is in the hospital.      VTE Risk Mitigation (From admission, onward)           Ordered     enoxaparin injection 40 mg  Daily         11/29/24 1536     Place sequential compression device   Until discontinued         11/29/24 1536                    Discharge Planning   JOSE MANUEL:      Code Status: Full Code   Medical Readiness for Discharge Date:   Treatment  Discharge Plan A: Return to nursing home (Pt is a [facility] resident at [facility] [place].)   Discharge Delays: None known at this time                    STEPHEN GRIGSBY, NP  Department of Hospital Medicine   [facility]

## 2024-12-06 NOTE — NURSING
Pt blood glucose resulted to over 500 per glucometer. NP Calin notified, ordered to give scheduled 16 unit of long acting insulin with 10 unit short acting insulin. Medication administered per orders. Blood glucose scheduled to be rechecked @ 0000. Call light and personal items.

## 2024-12-06 NOTE — PROGRESS NOTES
[facility]  Adult Nutrition  Progress Note    SUMMARY       Recommendations  1. Rec'd Continuous EN: Glucerna 1.2 or Diabetasource AC @ 10mL/r increasing by 5-10mL q4-6hrs to goal rate of 75mL/hr with a continuous 25mL FWF to provide 2160kcal (106% EEN), 108g of protein (113% EPN), and 2049mL FW.   2.  Recd Néstor BID via PEG tube to promote wound healing and to provide additional nutrition.       - Do not mix Néstor with formula in a tube-feeding bag      - Pour one packet of Néstor in a clean, small container for mixing.       - Add 4 fl oz (120 mL) water at room temperature.       - Mix well with disposable spoon or tongue blade until all particles are completely hydrated.       - Verify correct tube placement.       - Flush feeding tube with 30 mL water.       -Administer Néstor through feeding tube using a 60-mL or larger syringe.       - Flush with an additional 30 mL water.   3. RD to follow and make erc's accordingly.  Goals:   1. Pt will be started on TF by next RD follow up.   2. Pt will reach TF goal rate within 48hrs of initiation.  Nutrition Goal Status: goal not met  Communication of RD Recs: reviewed with physician    Assessment and Plan     Nutrition Problem  Inadequate oral intake     Related to (etiology):   Increased nutrient needs     Signs and Symptoms (as evidenced by):   wounds      Interventions/Recommendations (treatment strategy):  1. Rec'd Continuous EN: Glucerna 1.2 or Diabetasource AC @ 10mL/r increasing by 5-10mL q4-6hrs to goal rate of 75mL/hr with a continuous 25mL FWF to provide 2160kcal (106% EEN), 108g of protein (113% EPN), and 2049mL FW.   2.  Recd Néstor BID via NG tube to promote wound healing and to provide additional nutrition.       - Do not mix Néstor with formula in a tube-feeding bag      - Pour one packet of Néstor in a clean, small container for mixing.       - Add 4 fl oz (120 mL) water at room temperature.       - Mix well with disposable spoon or tongue blade  "until all particles are completely hydrated.       - Verify correct tube placement.       - Flush feeding tube with 30 mL water.       -Administer Néstor through feeding tube using a 60-mL or larger syringe.       - Flush with an additional 30 mL water.   3. RD to follow and make erc's accordingly.     Nutrition Diagnosis Status:   Continues     Malnutrition Assessment  NFPE not warranted at this time. RD to continue to monitor for signs and symptoms of malnutrition.    Nutrition Related Social Determinants of Health:  Unable to assess    Reason for Assessment    Reason For Assessment: RD follow-up  Diagnosis: other (see comments) (Altered Mental Status)  General Information Comments: Followed up on pt this morning. Pt is not greatly tolerating TF. TF not progressing towards goal, staying @ 10mL/hr. Spoke with MD about switching to a more concentrated formula to potentially lower the rate. Awaiting shipment of Glucerna 1.5 and will make rec's when formula arrives. Noted BG >500. Will monitor labs. Continue to progress towards current TF goal as tolerated/medically appropriate. RD to follow and make rec's accordingly.  Nutrition Discharge Planning: TBD as care progresses    Nutrition Risk Screen    Nutrition Risk Screen: tube feeding or parenteral nutrition    Nutrition/Diet History    Spiritual, Cultural Beliefs, Restorationist Practices, Values that Affect Care: no  Food Allergies: NKFA    Anthropometrics    Temp: 98.6 °F (37 °C)  Height Method: Stated  Height: 5' 7" (170.2 cm)  Height (inches): 67 in  Weight Method: Bed Scale  Weight: 67.9 kg (149 lb 11.1 oz)  Weight (lb): 149.69 lb  Ideal Body Weight (IBW), Male: 148 lb  % Ideal Body Weight, Male (lb): 101.14 %  BMI (Calculated): 23.4  BMI Grade: 18.5-24.9 - normal    Lab/Procedures/Meds    Pertinent Labs Reviewed: reviewed  Pertinent Labs Comments: Glucose = >500  Pertinent Medications Reviewed: reviewed    Estimated/Assessed Needs    Weight Used For Calorie " Calculations: 67.9 kg (149 lb 11.1 oz)  Energy Calorie Requirements (kcal): 9174-5323 (30-35kcal/kg)  Energy Need Method: Kcal/kg  Protein Requirements: 81-95 (1.2-1.4g/kg)  Weight Used For Protein Calculations: 67.9 kg (149 lb 11.1 oz)  Fluid Requirements (mL): 8194-0550 (1mL/kcal)  Estimated Fluid Requirement Method: RDA Method  RDA Method (mL): 2037    Nutrition Prescription Ordered    Current Diet Order: Consistent CHO  Nutrition Order Comments: Néstor via G-tube  Current Nutrition Support Formula Ordered: Glucerna 1.2  Current Nutrition Support Rate Ordered: 10 (ml)  Current Nutrition Support Frequency Ordered: Continuous  Oral Nutrition Supplement: None    Evaluation of Received Nutrient/Fluid Intake    Enteral Calories (kcal): 288  Enteral Protein (gm): 14  Enteral (Free Water) Fluid (mL): 193  % Kcal Needs: 14%  % Protein Needs: 17%  I/O: -550  Energy Calories Required: not meeting needs  Protein Required: not meeting needs  Tolerance: not tolerating  % Intake of Estimated Energy Needs: 0 - 25 %  % Meal Intake: 0 - 25 %    Nutrition Risk    Level of Risk/Frequency of Follow-up: moderate     Monitor and Evaluation    Food and Nutrient Intake: energy intake, food and beverage intake, enteral nutrition intake  Food and Nutrient Adminstration: diet order, enteral and parenteral nutrition administration  Knowledge/Beliefs/Attitudes: food and nutrition knowledge/skill, beliefs and attitudes  Physical Activity and Function: nutrition-related ADLs and IADLs  Anthropometric Measurements: height/length, weight, weight change, body mass index  Biochemical Data, Medical Tests and Procedures: electrolyte and renal panel, glucose/endocrine profile, gastrointestinal profile, inflammatory profile, lipid profile  Nutrition-Focused Physical Findings: overall appearance     Nutrition Follow-Up    RD Follow-up?: Yes

## 2024-12-06 NOTE — PLAN OF CARE
Problem: Skin Injury Risk Increased  Goal: Skin Health and Integrity  Outcome: Progressing     Problem: Infection  Goal: Absence of Infection Signs and Symptoms  Outcome: Progressing     Problem: Adult Inpatient Plan of Care  Goal: Plan of Care Review  Outcome: Progressing  Goal: Patient-Specific Goal (Individualized)  Outcome: Progressing  Goal: Absence of Hospital-Acquired Illness or Injury  Outcome: Progressing  Goal: Optimal Comfort and Wellbeing  Outcome: Progressing  Goal: Readiness for Transition of Care  Outcome: Progressing     Problem: Diabetes Comorbidity  Goal: Blood Glucose Level Within Targeted Range  Outcome: Progressing     Problem: Wound  Goal: Optimal Coping  Outcome: Progressing  Goal: Optimal Functional Ability  Outcome: Progressing  Goal: Absence of Infection Signs and Symptoms  Outcome: Progressing  Goal: Improved Oral Intake  Outcome: Progressing  Goal: Optimal Pain Control and Function  Outcome: Progressing  Goal: Skin Health and Integrity  Outcome: Progressing  Goal: Optimal Wound Healing  Outcome: Progressing     Problem: Fall Injury Risk  Goal: Absence of Fall and Fall-Related Injury  Outcome: Progressing

## 2024-12-07 LAB
ALBUMIN SERPL BCP-MCNC: 0.9 G/DL (ref 3.5–5.2)
ALP SERPL-CCNC: 269 U/L (ref 55–135)
ALT SERPL W/O P-5'-P-CCNC: 61 U/L (ref 10–44)
ANION GAP SERPL CALC-SCNC: 5 MMOL/L (ref 3–11)
AST SERPL-CCNC: 273 U/L (ref 10–40)
BASOPHILS # BLD AUTO: 0.01 K/UL (ref 0–0.2)
BASOPHILS NFR BLD: 0.1 % (ref 0–1.9)
BILIRUB SERPL-MCNC: 0.2 MG/DL (ref 0.1–1)
BUN SERPL-MCNC: 20 MG/DL (ref 6–20)
CALCIUM SERPL-MCNC: 9.5 MG/DL (ref 8.7–10.5)
CHLORIDE SERPL-SCNC: 103 MMOL/L (ref 95–110)
CO2 SERPL-SCNC: 34 MMOL/L (ref 23–29)
CREAT SERPL-MCNC: 0.9 MG/DL (ref 0.5–1.4)
DIFFERENTIAL METHOD BLD: ABNORMAL
EOSINOPHIL # BLD AUTO: 0.1 K/UL (ref 0–0.5)
EOSINOPHIL NFR BLD: 0.8 % (ref 0–8)
ERYTHROCYTE [DISTWIDTH] IN BLOOD BY AUTOMATED COUNT: 15.1 % (ref 11.5–14.5)
EST. GFR  (NO RACE VARIABLE): >60 ML/MIN/1.73 M^2
GLUCOSE SERPL-MCNC: 172 MG/DL (ref 70–110)
HCT VFR BLD AUTO: 25.6 % (ref 40–54)
HGB BLD-MCNC: 7.8 G/DL (ref 14–18)
IMM GRANULOCYTES # BLD AUTO: 0.04 K/UL (ref 0–0.04)
IMM GRANULOCYTES NFR BLD AUTO: 0.3 % (ref 0–0.5)
LYMPHOCYTES # BLD AUTO: 1.4 K/UL (ref 1–4.8)
LYMPHOCYTES NFR BLD: 12.1 % (ref 18–48)
MAGNESIUM SERPL-MCNC: 1.6 MG/DL (ref 1.6–2.6)
MCH RBC QN AUTO: 26 PG (ref 27–31)
MCHC RBC AUTO-ENTMCNC: 30.5 G/DL (ref 32–36)
MCV RBC AUTO: 85 FL (ref 82–98)
MONOCYTES # BLD AUTO: 0.8 K/UL (ref 0.3–1)
MONOCYTES NFR BLD: 6.5 % (ref 4–15)
NEUTROPHILS # BLD AUTO: 9.4 K/UL (ref 1.8–7.7)
NEUTROPHILS NFR BLD: 80.2 % (ref 38–73)
NRBC BLD-RTO: 0 /100 WBC
PLATELET # BLD AUTO: 579 K/UL (ref 150–450)
PMV BLD AUTO: 9.1 FL (ref 9.2–12.9)
POCT GLUCOSE: 116 MG/DL (ref 70–110)
POCT GLUCOSE: 163 MG/DL (ref 70–110)
POCT GLUCOSE: 313 MG/DL (ref 70–110)
POCT GLUCOSE: 89 MG/DL (ref 70–110)
POTASSIUM SERPL-SCNC: 4 MMOL/L (ref 3.5–5.1)
PROT SERPL-MCNC: 7 G/DL (ref 6–8.4)
RBC # BLD AUTO: 3 M/UL (ref 4.6–6.2)
SODIUM SERPL-SCNC: 142 MMOL/L (ref 136–145)
WBC # BLD AUTO: 11.77 K/UL (ref 3.9–12.7)

## 2024-12-07 PROCEDURE — 25000003 PHARM REV CODE 250: Performed by: INTERNAL MEDICINE

## 2024-12-07 PROCEDURE — 63600175 PHARM REV CODE 636 W HCPCS: Performed by: INTERNAL MEDICINE

## 2024-12-07 PROCEDURE — 99233 SBSQ HOSP IP/OBS HIGH 50: CPT | Mod: ,,, | Performed by: STUDENT IN AN ORGANIZED HEALTH CARE EDUCATION/TRAINING PROGRAM

## 2024-12-07 PROCEDURE — 63600175 PHARM REV CODE 636 W HCPCS: Performed by: STUDENT IN AN ORGANIZED HEALTH CARE EDUCATION/TRAINING PROGRAM

## 2024-12-07 PROCEDURE — 25000003 PHARM REV CODE 250: Performed by: STUDENT IN AN ORGANIZED HEALTH CARE EDUCATION/TRAINING PROGRAM

## 2024-12-07 PROCEDURE — 83735 ASSAY OF MAGNESIUM: CPT | Performed by: INTERNAL MEDICINE

## 2024-12-07 PROCEDURE — 27000207 HC ISOLATION

## 2024-12-07 PROCEDURE — 85025 COMPLETE CBC W/AUTO DIFF WBC: CPT | Performed by: INTERNAL MEDICINE

## 2024-12-07 PROCEDURE — 36415 COLL VENOUS BLD VENIPUNCTURE: CPT | Performed by: INTERNAL MEDICINE

## 2024-12-07 PROCEDURE — 21400001 HC TELEMETRY ROOM

## 2024-12-07 PROCEDURE — 80053 COMPREHEN METABOLIC PANEL: CPT | Performed by: INTERNAL MEDICINE

## 2024-12-07 RX ORDER — MAGNESIUM SULFATE HEPTAHYDRATE 40 MG/ML
2 INJECTION, SOLUTION INTRAVENOUS ONCE
Status: COMPLETED | OUTPATIENT
Start: 2024-12-07 | End: 2024-12-07

## 2024-12-07 RX ADMIN — FLUOXETINE HYDROCHLORIDE 20 MG: 20 CAPSULE ORAL at 09:12

## 2024-12-07 RX ADMIN — ENOXAPARIN SODIUM 40 MG: 100 INJECTION SUBCUTANEOUS at 05:12

## 2024-12-07 RX ADMIN — GABAPENTIN 200 MG: 100 CAPSULE ORAL at 09:12

## 2024-12-07 RX ADMIN — SODIUM HYPOCHLORITE: 1.25 SOLUTION TOPICAL at 09:12

## 2024-12-07 RX ADMIN — LEVOTHYROXINE SODIUM 150 MCG: 150 TABLET ORAL at 05:12

## 2024-12-07 RX ADMIN — OXYCODONE HYDROCHLORIDE 10 MG: 10 TABLET ORAL at 05:12

## 2024-12-07 RX ADMIN — FAMOTIDINE 20 MG: 20 TABLET, FILM COATED ORAL at 09:12

## 2024-12-07 RX ADMIN — ASPIRIN 81 MG CHEWABLE TABLET 81 MG: 81 TABLET CHEWABLE at 09:12

## 2024-12-07 RX ADMIN — ATORVASTATIN CALCIUM 40 MG: 40 TABLET, FILM COATED ORAL at 09:12

## 2024-12-07 RX ADMIN — INSULIN ASPART 4 UNITS: 100 INJECTION, SOLUTION INTRAVENOUS; SUBCUTANEOUS at 12:12

## 2024-12-07 RX ADMIN — POLYETHYLENE GLYCOL (3350) 17 G: 17 POWDER, FOR SOLUTION ORAL at 09:12

## 2024-12-07 RX ADMIN — MAGNESIUM SULFATE HEPTAHYDRATE 2 G: 40 INJECTION, SOLUTION INTRAVENOUS at 03:12

## 2024-12-07 RX ADMIN — LURASIDONE HYDROCHLORIDE 40 MG: 40 TABLET, FILM COATED ORAL at 09:12

## 2024-12-07 RX ADMIN — MEROPENEM 1 G: 1 INJECTION INTRAVENOUS at 07:12

## 2024-12-07 RX ADMIN — DIAZEPAM 5 MG: 5 TABLET ORAL at 09:12

## 2024-12-07 RX ADMIN — MEROPENEM 1 G: 1 INJECTION INTRAVENOUS at 03:12

## 2024-12-07 RX ADMIN — MEROPENEM 1 G: 1 INJECTION INTRAVENOUS at 10:12

## 2024-12-07 NOTE — ASSESSMENT & PLAN NOTE
Altered mental status secondary to UTI, symptoms improved after IV fluids, antibiotics on board follow-up with culture report  12/2 KD:  Continue IV meropenem amlodipine when g every 8 hours  12/7/ no leukocytosis, no elevated temperature, continue D9 merrem for MDRO uti

## 2024-12-07 NOTE — ASSESSMENT & PLAN NOTE
Patient was found to have altered mental status at nursing home, limited response, lethargic, brought to the ED for evaluation no leukocytosis, afebrile, patient was noted to have positive UA, chronic indwelling Rojas catheter, started on IV antibiotic, blood cultures pending urine culture pending, follow-up with result  12/2 KD:  Continue IV meropenum 1g Q 8 hrs,   12/7 continue D9 abx therapy, monitor I/Os

## 2024-12-07 NOTE — ASSESSMENT & PLAN NOTE
Anemia is likely due to Iron deficiency. Most recent hemoglobin and hematocrit are listed below.  Recent Labs     12/05/24  0536 12/06/24  0518 12/07/24  0603   HGB 8.0* 7.7* 7.8*   HCT 26.2* 25.3* 25.6*       Plan  - Monitor serial CBC: Daily  - Transfuse PRBC if patient becomes hemodynamically unstable, symptomatic or H/H drops below 7/21.  - Patient has not received any PRBC transfusions to date, order placed to receive 1 unit today  - Patient's anemia is currently stable  12/2 KD:  Iron panel ordered  12/6 KD: H&H stable

## 2024-12-07 NOTE — ASSESSMENT & PLAN NOTE
Patient's FSGs are uncontrolled due to hyperglycemia on current medication regimen.  Last A1c reviewed-   Lab Results   Component Value Date    HGBA1C 12.4 (H) 01/03/2024     Most recent fingerstick glucose reviewed-   Recent Labs   Lab 12/06/24  1739 12/07/24  0053 12/07/24  0740   POCTGLUCOSE 222* 313* 163*       Current correctional scale  Low  Maintain anti-hyperglycemic dose as follows-   Antihyperglycemics (From admission, onward)      Start     Stop Route Frequency Ordered    12/03/24 2356  insulin aspart U-100 pen 0-10 Units         -- SubQ Every 6 hours PRN 12/03/24 2300    12/03/24 2100  insulin glargine U-100 (Lantus) pen 16 Units         -- SubQ Nightly 12/03/24 1242          Hold Oral hypoglycemics while patient is in the hospital.

## 2024-12-07 NOTE — PLAN OF CARE
Plan of care on going and reviewed with patient. Vital signs stable, afebrile. Antibiotics administered as ordered. Patient rested throughout the night with visible rise and fall of the chest. Continuous feeding, Glucerna 1.2, via peg tube going at 40ml/hr with 60ml flush every 4 hours. Patient tolerating feeding well, 0 residual noted. Bed is in lowest position, with call light within reach.      Problem: Skin Injury Risk Increased  Goal: Skin Health and Integrity  Outcome: Progressing     Problem: Infection  Goal: Absence of Infection Signs and Symptoms  Outcome: Progressing     Problem: Adult Inpatient Plan of Care  Goal: Plan of Care Review  Outcome: Progressing  Goal: Patient-Specific Goal (Individualized)  Outcome: Progressing  Goal: Absence of Hospital-Acquired Illness or Injury  Outcome: Progressing  Goal: Optimal Comfort and Wellbeing  Outcome: Progressing  Goal: Readiness for Transition of Care  Outcome: Progressing     Problem: Diabetes Comorbidity  Goal: Blood Glucose Level Within Targeted Range  Outcome: Progressing     Problem: Wound  Goal: Optimal Coping  Outcome: Progressing  Goal: Optimal Functional Ability  Outcome: Progressing  Goal: Absence of Infection Signs and Symptoms  Outcome: Progressing  Goal: Improved Oral Intake  Outcome: Progressing  Goal: Optimal Pain Control and Function  Outcome: Progressing  Goal: Skin Health and Integrity  Outcome: Progressing  Goal: Optimal Wound Healing  Outcome: Progressing     Problem: Fall Injury Risk  Goal: Absence of Fall and Fall-Related Injury  Outcome: Progressing

## 2024-12-07 NOTE — SUBJECTIVE & OBJECTIVE
Interval History: Patient seen and examined.     Review of Systems   Unable to perform ROS: Patient nonverbal     Objective:     Vital Signs (Most Recent):  Temp: 98.6 °F (37 °C) (12/07/24 1214)  Pulse: 90 (12/07/24 1214)  Resp: 17 (12/07/24 1214)  BP: 109/60 (12/07/24 1214)  SpO2: 98 % (12/07/24 1214) Vital Signs (24h Range):  Temp:  [98.2 °F (36.8 °C)-98.6 °F (37 °C)] 98.6 °F (37 °C)  Pulse:  [] 90  Resp:  [14-18] 17  SpO2:  [95 %-98 %] 98 %  BP: (109-119)/(60-74) 109/60     Weight: 67.9 kg (149 lb 11.1 oz)  Body mass index is 23.45 kg/m².    Intake/Output Summary (Last 24 hours) at 12/7/2024 1436  Last data filed at 12/7/2024 0552  Gross per 24 hour   Intake --   Output 350 ml   Net -350 ml         Physical Exam  Vitals and nursing note reviewed.   Constitutional:       General: He is not in acute distress.     Appearance: He is not toxic-appearing.   Cardiovascular:      Rate and Rhythm: Normal rate and regular rhythm.   Pulmonary:      Effort: Pulmonary effort is normal. No respiratory distress.      Breath sounds: No wheezing.   Abdominal:      General: There is no distension.      Palpations: Abdomen is soft.      Tenderness: There is no abdominal tenderness. There is no guarding or rebound.   Musculoskeletal:      Right lower leg: No edema.      Left lower leg: No edema.   Skin:     General: Skin is warm and dry.      Capillary Refill: Capillary refill takes less than 2 seconds.   Neurological:      Mental Status: Mental status is at baseline.             Significant Labs: All pertinent labs within the past 24 hours have been reviewed.    Recent Labs   Lab 12/07/24  0603   *      K 4.0      CO2 34*   BUN 20   CREATININE 0.9   CALCIUM 9.5   MG 1.6     CBC:   Recent Labs   Lab 12/06/24  0518 12/07/24  0603   WBC 10.73 11.77   HGB 7.7* 7.8*   HCT 25.3* 25.6*   * 579*     CMP:   Recent Labs   Lab 12/06/24  0517 12/07/24  0603    142   K 3.7 4.0    103   CO2 35* 34*    * 172*   BUN 21* 20   CREATININE 1.1 0.9   CALCIUM 9.9 9.5   PROT 7.0 7.0   ALBUMIN 0.9* 0.9*   BILITOT 0.2 0.2   ALKPHOS 209* 269*   AST 37 273*   ALT 17 61*   ANIONGAP 1* 5     Recent Labs   Lab 12/06/24  0517 12/07/24  0603   MG 1.5* 1.6     POCT Glucose:   Recent Labs   Lab 12/06/24  1739 12/07/24  0053 12/07/24  0740   POCTGLUCOSE 222* 313* 163*     Lab Results   Component Value Date    TSH 22.000 (H) 11/27/2024    FREET4 0.78 11/27/2024       X-Ray Abdomen AP 1 View  Narrative: EXAMINATION:  XR ABDOMEN AP 1 VIEW    CLINICAL HISTORY:  gtube placement;    COMPARISON:  None    FINDINGS:  A gastrostomy tube is noted.  There is contrast material within the stomach.  No extraluminal contrast identified.  No dilated bowel.  Impression: As above.    Electronically signed by: Rober Ramachandran MD  Date:    12/04/2024  Time:    09:27  X-Ray Abdomen AP 1 View  Narrative: EXAMINATION:  XR ABDOMEN AP 1 VIEW    CLINICAL HISTORY:  Residuals 750ml of formula;    COMPARISON:  03/16/2024    FINDINGS:  No dilated bowel identified.  A catheter projects over the pelvis.  No acute osseous finding.  Impression: Nonobstructive bowel gas pattern.    Electronically signed by: Rober Ramachandran MD  Date:    12/04/2024  Time:    07:38     Significant Imaging: I have reviewed all pertinent imaging results/findings within the past 24 hours.

## 2024-12-07 NOTE — PROGRESS NOTES
[facility] Medicine  Progress Note    Patient Name: Carlos Chahal  MRN: [id number]  Patient Class: IP- Inpatient   Admission Date: 11/29/2024  Length of Stay: 8 days  Attending Physician: Kim Diamond MD  Primary Care Provider: Jose Francisco Klein MD        Subjective     Principal Problem:Altered mental status        HPI:  Patient 34-year-old male history of bipolar disorder, diabetes, history of anoxic brain injury, GERD, neuropathy, brought to the ED due to decreased level of consciousness, patient was evaluated in the ED was noted to have UTI, no leukocytosis, H&H 7.5/25.8, patient was started on IV antibiotic, blood cultures pending, admitted    Overview/Hospital Course:  12/1 AA, weekend crosscover, patient anemic, 1 unit packed red blood cell ordered, urine culture Gram-negative rods, sensitivities pending, on IV antibiotic, renal function stable, post H&H once transfusion completed  12/2 KD:  Patient lying in bed with decreased LOC, H&H stable.  IV merum continued.  WC consulted to continue wound care orders.  Additional iron labs ordered.  12/3 KD:  Patient awake and alert lying in bed with no distress.  Continue IV antibiotics.  New orders for general surgical consult for wound debridement.  12/4 KD:  Patient awake and alert.  KUB ordered to confirm G-tube placement.  Hold feedings until placement is confirmed.    12/5 KD:  Pt.  Awake and alert.  Glucerna @ 10 cc/hr continuous feedings.  Nutritional consult today. Cont. Wound care per GS recs.   12/6 KD:  Awake and alert.  Continue titrating feedings as tolerated upon awaiting nutritional consult.  Continue wound care per GS rec  12/7 [place] weekend crossover. Nursing home resident with anoxic brain injury, diabetes, PEG dependent with ileostomy undergoing treatment for MDRO urinary tract infection and chronic perineal and sacral wounds. Hg/HCT stable, 7.8/25.6 (s/p 1U PRBC 12/1/24). Reported no urine output and minimal output to  ileostomy yesterday. Currently tolerating tube feeds with urinary Rojas catheter draining clear urine. Electrolytes within normal limits, liver enzymes elevated along with alk phos. Patient in no acute distress, non tender abdomen, soft throughout. Will hold statin and monitor LFTs. Continue with IV antibiotics, strict I/O, daily wound cleanse and care.    Interval History: Patient seen and examined.     Review of Systems   Unable to perform ROS: Patient nonverbal     Objective:     Vital Signs (Most Recent):  Temp: 98.6 °F (37 °C) (12/07/24 1214)  Pulse: 90 (12/07/24 1214)  Resp: 17 (12/07/24 1214)  BP: 109/60 (12/07/24 1214)  SpO2: 98 % (12/07/24 1214) Vital Signs (24h Range):  Temp:  [98.2 °F (36.8 °C)-98.6 °F (37 °C)] 98.6 °F (37 °C)  Pulse:  [] 90  Resp:  [14-18] 17  SpO2:  [95 %-98 %] 98 %  BP: (109-119)/(60-74) 109/60     Weight: 67.9 kg (149 lb 11.1 oz)  Body mass index is 23.45 kg/m².    Intake/Output Summary (Last 24 hours) at 12/7/2024 1436  Last data filed at 12/7/2024 0552  Gross per 24 hour   Intake --   Output 350 ml   Net -350 ml         Physical Exam  Vitals and nursing note reviewed.   Constitutional:       General: He is not in acute distress.     Appearance: He is not toxic-appearing.   Cardiovascular:      Rate and Rhythm: Normal rate and regular rhythm.   Pulmonary:      Effort: Pulmonary effort is normal. No respiratory distress.      Breath sounds: No wheezing.   Abdominal:      General: There is no distension.      Palpations: Abdomen is soft.      Tenderness: There is no abdominal tenderness. There is no guarding or rebound.   Musculoskeletal:      Right lower leg: No edema.      Left lower leg: No edema.   Skin:     General: Skin is warm and dry.      Capillary Refill: Capillary refill takes less than 2 seconds.   Neurological:      Mental Status: Mental status is at baseline.             Significant Labs: All pertinent labs within the past 24 hours have been reviewed.    Recent Labs    Lab 12/07/24  0603   *      K 4.0      CO2 34*   BUN 20   CREATININE 0.9   CALCIUM 9.5   MG 1.6     CBC:   Recent Labs   Lab 12/06/24  0518 12/07/24  0603   WBC 10.73 11.77   HGB 7.7* 7.8*   HCT 25.3* 25.6*   * 579*     CMP:   Recent Labs   Lab 12/06/24  0517 12/07/24  0603    142   K 3.7 4.0    103   CO2 35* 34*   * 172*   BUN 21* 20   CREATININE 1.1 0.9   CALCIUM 9.9 9.5   PROT 7.0 7.0   ALBUMIN 0.9* 0.9*   BILITOT 0.2 0.2   ALKPHOS 209* 269*   AST 37 273*   ALT 17 61*   ANIONGAP 1* 5     Recent Labs   Lab 12/06/24  0517 12/07/24  0603   MG 1.5* 1.6     POCT Glucose:   Recent Labs   Lab 12/06/24  1739 12/07/24  0053 12/07/24  0740   POCTGLUCOSE 222* 313* 163*     Lab Results   Component Value Date    TSH 22.000 (H) 11/27/2024    FREET4 0.78 11/27/2024       X-Ray Abdomen AP 1 View  Narrative: EXAMINATION:  XR ABDOMEN AP 1 VIEW    CLINICAL HISTORY:  gtube placement;    COMPARISON:  None    FINDINGS:  A gastrostomy tube is noted.  There is contrast material within the stomach.  No extraluminal contrast identified.  No dilated bowel.  Impression: As above.    Electronically signed by: Rober Ramachandran MD  Date:    12/04/2024  Time:    09:27  X-Ray Abdomen AP 1 View  Narrative: EXAMINATION:  XR ABDOMEN AP 1 VIEW    CLINICAL HISTORY:  Residuals 750ml of formula;    COMPARISON:  03/16/2024    FINDINGS:  No dilated bowel identified.  A catheter projects over the pelvis.  No acute osseous finding.  Impression: Nonobstructive bowel gas pattern.    Electronically signed by: Rober Ramachandran MD  Date:    12/04/2024  Time:    07:38     Significant Imaging: I have reviewed all pertinent imaging results/findings within the past 24 hours.    Assessment and Plan     * Altered mental status  Altered mental status secondary to UTI, symptoms improved after IV fluids, antibiotics on board follow-up with culture report  12/2 KD:  Continue IV meropenem amlodipine when g every 8 hours  12/7/ no  leukocytosis, no elevated temperature, continue D9 merrem for MDRO uti      Sacral wound    12/3 KD:  Orders placed for General surgery wound debridement consult  12/5 KD: Cont. Daily WC per GS recs.    Open wound of groin  12/2 KD:  Hx fourier's gangrene.  Wound care consult to continue WC during hospital stay.  12/5 KD: Cont. Daily Wound care per GS recs.        Thrombocytosis  Likely related to iron-deficiency anemia, transfusion ordered, will start on iron supplement  12/2 KD:  H&H stable      Microcytic anemia  Anemia is likely due to Iron deficiency. Most recent hemoglobin and hematocrit are listed below.  Recent Labs     12/05/24  0536 12/06/24  0518 12/07/24  0603   HGB 8.0* 7.7* 7.8*   HCT 26.2* 25.3* 25.6*       Plan  - Monitor serial CBC: Daily  - Transfuse PRBC if patient becomes hemodynamically unstable, symptomatic or H/H drops below 7/21.  - Patient has not received any PRBC transfusions to date, order placed to receive 1 unit today  - Patient's anemia is currently stable  12/2 KD:  Iron panel ordered  12/6 KD: H&H stable    History of anoxic brain injury  Patient with a history of anoxic brain injury, chronic indwelling Rojas catheter, contributing to increased UTI risks, history of multidrug resistant organisms, ESBL, patient was started on meropenem pending culture follow-up      Urinary tract infection associated with indwelling urethral catheter  Patient was found to have altered mental status at nursing home, limited response, lethargic, brought to the ED for evaluation no leukocytosis, afebrile, patient was noted to have positive UA, chronic indwelling Rojas catheter, started on IV antibiotic, blood cultures pending urine culture pending, follow-up with result  12/2 KD:  Continue IV meropenum 1g Q 8 hrs,   12/7 continue D9 abx therapy, monitor I/Os     Vomiting    12/4 KD:  Hold tube feedings until G-tube placement has been confirmed.  KUB ordered today  12/5 KD: Glucernia 10cc/hr cont.  Feedings. Nutrition consult today.  12/6 KD: Continue titrating feedings as tolerated upon awaiting nutritional consult.  Continue wound care per GS rec    Type 1 diabetes  Patient's FSGs are uncontrolled due to hyperglycemia on current medication regimen.  Last A1c reviewed-   Lab Results   Component Value Date    HGBA1C 12.4 (H) 01/03/2024     Most recent fingerstick glucose reviewed-   Recent Labs   Lab 12/06/24  1739 12/07/24  0053 12/07/24  0740   POCTGLUCOSE 222* 313* 163*       Current correctional scale  Low  Maintain anti-hyperglycemic dose as follows-   Antihyperglycemics (From admission, onward)      Start     Stop Route Frequency Ordered    12/03/24 2356  insulin aspart U-100 pen 0-10 Units         -- SubQ Every 6 hours PRN 12/03/24 2300    12/03/24 2100  insulin glargine U-100 (Lantus) pen 16 Units         -- SubQ Nightly 12/03/24 1242          Hold Oral hypoglycemics while patient is in the hospital.      VTE Risk Mitigation (From admission, onward)           Ordered     enoxaparin injection 40 mg  Daily         11/29/24 1536     Place sequential compression device  Until discontinued         11/29/24 1536                    Discharge Planning   JOSE MANUEL:      Code Status: Full Code   Medical Readiness for Discharge Date:   Discharge Plan A: Return to nursing home (Pt is a [facility] resident [facility]   Discharge Delays: None known at this time                    Brandon Pruitt DO  Department of Hospital Medicine   [facility]

## 2024-12-08 LAB
ALBUMIN SERPL BCP-MCNC: 1 G/DL (ref 3.5–5.2)
ALBUMIN SERPL BCP-MCNC: 1 G/DL (ref 3.5–5.2)
ALP SERPL-CCNC: 400 U/L (ref 55–135)
ALP SERPL-CCNC: 423 U/L (ref 55–135)
ALT SERPL W/O P-5'-P-CCNC: 171 U/L (ref 10–44)
ALT SERPL W/O P-5'-P-CCNC: 214 U/L (ref 10–44)
ANION GAP SERPL CALC-SCNC: 5 MMOL/L (ref 3–11)
AST SERPL-CCNC: 1029 U/L (ref 10–40)
AST SERPL-CCNC: 668 U/L (ref 10–40)
BASOPHILS # BLD AUTO: 0.01 K/UL (ref 0–0.2)
BASOPHILS NFR BLD: 0.1 % (ref 0–1.9)
BILIRUB DIRECT SERPL-MCNC: 0.3 MG/DL (ref 0.1–0.3)
BILIRUB SERPL-MCNC: 0.4 MG/DL (ref 0.1–1)
BILIRUB SERPL-MCNC: 0.5 MG/DL (ref 0.1–1)
BUN SERPL-MCNC: 16 MG/DL (ref 6–20)
CALCIUM SERPL-MCNC: 9.6 MG/DL (ref 8.7–10.5)
CHLORIDE SERPL-SCNC: 100 MMOL/L (ref 95–110)
CO2 SERPL-SCNC: 34 MMOL/L (ref 23–29)
CREAT SERPL-MCNC: 1 MG/DL (ref 0.5–1.4)
DIFFERENTIAL METHOD BLD: ABNORMAL
EOSINOPHIL # BLD AUTO: 0.2 K/UL (ref 0–0.5)
EOSINOPHIL NFR BLD: 1.6 % (ref 0–8)
ERYTHROCYTE [DISTWIDTH] IN BLOOD BY AUTOMATED COUNT: 15.1 % (ref 11.5–14.5)
EST. GFR  (NO RACE VARIABLE): >60 ML/MIN/1.73 M^2
GLUCOSE SERPL-MCNC: 117 MG/DL (ref 70–110)
HAV IGM SERPL QL IA: NORMAL
HBV CORE IGM SERPL QL IA: NORMAL
HBV SURFACE AG SERPL QL IA: NORMAL
HCT VFR BLD AUTO: 24.3 % (ref 40–54)
HCV AB SERPL QL IA: NORMAL
HGB BLD-MCNC: 7.4 G/DL (ref 14–18)
IMM GRANULOCYTES # BLD AUTO: 0.06 K/UL (ref 0–0.04)
IMM GRANULOCYTES NFR BLD AUTO: 0.4 % (ref 0–0.5)
LIPASE SERPL-CCNC: 12 U/L (ref 13–75)
LYMPHOCYTES # BLD AUTO: 1.5 K/UL (ref 1–4.8)
LYMPHOCYTES NFR BLD: 10.2 % (ref 18–48)
MAGNESIUM SERPL-MCNC: 1.9 MG/DL (ref 1.6–2.6)
MCH RBC QN AUTO: 26.2 PG (ref 27–31)
MCHC RBC AUTO-ENTMCNC: 30.5 G/DL (ref 32–36)
MCV RBC AUTO: 86 FL (ref 82–98)
MONOCYTES # BLD AUTO: 1 K/UL (ref 0.3–1)
MONOCYTES NFR BLD: 7.1 % (ref 4–15)
NEUTROPHILS # BLD AUTO: 11.6 K/UL (ref 1.8–7.7)
NEUTROPHILS NFR BLD: 80.6 % (ref 38–73)
NRBC BLD-RTO: 0 /100 WBC
PLATELET # BLD AUTO: 510 K/UL (ref 150–450)
PMV BLD AUTO: 9.3 FL (ref 9.2–12.9)
POCT GLUCOSE: 130 MG/DL (ref 70–110)
POCT GLUCOSE: 86 MG/DL (ref 70–110)
POTASSIUM SERPL-SCNC: 3.9 MMOL/L (ref 3.5–5.1)
PROT SERPL-MCNC: 6.7 G/DL (ref 6–8.4)
PROT SERPL-MCNC: 6.8 G/DL (ref 6–8.4)
RBC # BLD AUTO: 2.82 M/UL (ref 4.6–6.2)
SODIUM SERPL-SCNC: 139 MMOL/L (ref 136–145)
WBC # BLD AUTO: 14.37 K/UL (ref 3.9–12.7)

## 2024-12-08 PROCEDURE — 25000003 PHARM REV CODE 250: Performed by: INTERNAL MEDICINE

## 2024-12-08 PROCEDURE — 36415 COLL VENOUS BLD VENIPUNCTURE: CPT | Mod: XB | Performed by: STUDENT IN AN ORGANIZED HEALTH CARE EDUCATION/TRAINING PROGRAM

## 2024-12-08 PROCEDURE — 99233 SBSQ HOSP IP/OBS HIGH 50: CPT | Mod: ,,, | Performed by: STUDENT IN AN ORGANIZED HEALTH CARE EDUCATION/TRAINING PROGRAM

## 2024-12-08 PROCEDURE — 83690 ASSAY OF LIPASE: CPT | Performed by: STUDENT IN AN ORGANIZED HEALTH CARE EDUCATION/TRAINING PROGRAM

## 2024-12-08 PROCEDURE — 63600175 PHARM REV CODE 636 W HCPCS: Performed by: INTERNAL MEDICINE

## 2024-12-08 PROCEDURE — 80076 HEPATIC FUNCTION PANEL: CPT | Performed by: STUDENT IN AN ORGANIZED HEALTH CARE EDUCATION/TRAINING PROGRAM

## 2024-12-08 PROCEDURE — 85025 COMPLETE CBC W/AUTO DIFF WBC: CPT | Performed by: INTERNAL MEDICINE

## 2024-12-08 PROCEDURE — 25000003 PHARM REV CODE 250: Performed by: STUDENT IN AN ORGANIZED HEALTH CARE EDUCATION/TRAINING PROGRAM

## 2024-12-08 PROCEDURE — 27000207 HC ISOLATION

## 2024-12-08 PROCEDURE — 83735 ASSAY OF MAGNESIUM: CPT | Performed by: INTERNAL MEDICINE

## 2024-12-08 PROCEDURE — 36415 COLL VENOUS BLD VENIPUNCTURE: CPT | Performed by: INTERNAL MEDICINE

## 2024-12-08 PROCEDURE — 21400001 HC TELEMETRY ROOM

## 2024-12-08 PROCEDURE — 80074 ACUTE HEPATITIS PANEL: CPT | Performed by: STUDENT IN AN ORGANIZED HEALTH CARE EDUCATION/TRAINING PROGRAM

## 2024-12-08 PROCEDURE — 80053 COMPREHEN METABOLIC PANEL: CPT | Performed by: INTERNAL MEDICINE

## 2024-12-08 RX ORDER — SODIUM CHLORIDE 9 MG/ML
INJECTION, SOLUTION INTRAVENOUS CONTINUOUS
Status: ACTIVE | OUTPATIENT
Start: 2024-12-08 | End: 2024-12-08

## 2024-12-08 RX ORDER — GABAPENTIN 100 MG/1
100 CAPSULE ORAL 2 TIMES DAILY
Status: DISCONTINUED | OUTPATIENT
Start: 2024-12-09 | End: 2024-12-12 | Stop reason: HOSPADM

## 2024-12-08 RX ORDER — SODIUM CHLORIDE 9 MG/ML
INJECTION, SOLUTION INTRAVENOUS CONTINUOUS
Status: DISCONTINUED | OUTPATIENT
Start: 2024-12-08 | End: 2024-12-12

## 2024-12-08 RX ADMIN — MEROPENEM 1 G: 1 INJECTION INTRAVENOUS at 06:12

## 2024-12-08 RX ADMIN — LURASIDONE HYDROCHLORIDE 40 MG: 40 TABLET, FILM COATED ORAL at 08:12

## 2024-12-08 RX ADMIN — FAMOTIDINE 20 MG: 20 TABLET, FILM COATED ORAL at 08:12

## 2024-12-08 RX ADMIN — PANCRELIPASE LIPASE, PANCRELIPASE PROTEASE, PANCRELIPASE AMYLASE 2 CAPSULE: 10000; 32000; 42000 CAPSULE, DELAYED RELEASE ORAL at 04:12

## 2024-12-08 RX ADMIN — FLUOXETINE HYDROCHLORIDE 20 MG: 20 CAPSULE ORAL at 08:12

## 2024-12-08 RX ADMIN — SODIUM HYPOCHLORITE: 1.25 SOLUTION TOPICAL at 08:12

## 2024-12-08 RX ADMIN — ENOXAPARIN SODIUM 40 MG: 100 INJECTION SUBCUTANEOUS at 04:12

## 2024-12-08 RX ADMIN — DIAZEPAM 5 MG: 5 TABLET ORAL at 08:12

## 2024-12-08 RX ADMIN — SODIUM CHLORIDE: 9 INJECTION, SOLUTION INTRAVENOUS at 01:12

## 2024-12-08 RX ADMIN — OXYCODONE HYDROCHLORIDE 10 MG: 10 TABLET ORAL at 03:12

## 2024-12-08 RX ADMIN — GABAPENTIN 200 MG: 100 CAPSULE ORAL at 08:12

## 2024-12-08 RX ADMIN — LEVOTHYROXINE SODIUM 150 MCG: 150 TABLET ORAL at 06:12

## 2024-12-08 RX ADMIN — ASPIRIN 81 MG CHEWABLE TABLET 81 MG: 81 TABLET CHEWABLE at 08:12

## 2024-12-08 NOTE — PLAN OF CARE
Problem: Skin Injury Risk Increased  Goal: Skin Health and Integrity  Outcome: Progressing     Problem: Infection  Goal: Absence of Infection Signs and Symptoms  Outcome: Progressing     Problem: Adult Inpatient Plan of Care  Goal: Plan of Care Review  Outcome: Progressing  Goal: Patient-Specific Goal (Individualized)  Outcome: Progressing  Goal: Absence of Hospital-Acquired Illness or Injury  Outcome: Progressing  Goal: Optimal Comfort and Wellbeing  Outcome: Progressing  Goal: Readiness for Transition of Care  Outcome: Progressing     Problem: Diabetes Comorbidity  Goal: Blood Glucose Level Within Targeted Range  Outcome: Progressing     Problem: Wound  Goal: Optimal Coping  Outcome: Progressing  Goal: Optimal Functional Ability  Outcome: Progressing  Goal: Absence of Infection Signs and Symptoms  Outcome: Progressing  Goal: Improved Oral Intake  Outcome: Progressing  Goal: Optimal Pain Control and Function  Outcome: Progressing  Goal: Skin Health and Integrity  Outcome: Progressing  Goal: Optimal Wound Healing  Outcome: Progressing     Problem: Fall Injury Risk  Goal: Absence of Fall and Fall-Related Injury  Outcome: Progressing   Plan of care reviewed with patient. Pt is free of falls and injury. NADN. VSS. Afebrile. Pt tolerating meds well. Feedings (Glucerna 1.2) infusing at 40mL/hr via pump. Pain controlled with PRN meds. Questions and concerns addressed.

## 2024-12-08 NOTE — SUBJECTIVE & OBJECTIVE
Interval History: patient seen and examined.     Review of Systems   Unable to perform ROS: Patient nonverbal     Objective:     Vital Signs (Most Recent):  Temp: 98.4 °F (36.9 °C) (12/08/24 0835)  Pulse: 97 (12/08/24 0835)  Resp: 20 (12/08/24 0835)  BP: 105/64 (12/08/24 0835)  SpO2: 97 % (12/08/24 0835) Vital Signs (24h Range):  Temp:  [97.9 °F (36.6 °C)-99 °F (37.2 °C)] 98.4 °F (36.9 °C)  Pulse:  [95-98] 97  Resp:  [18-20] 20  SpO2:  [93 %-97 %] 97 %  BP: (103-112)/(55-64) 105/64     Weight: 67.9 kg (149 lb 11.1 oz)  Body mass index is 23.45 kg/m².    Intake/Output Summary (Last 24 hours) at 12/8/2024 1251  Last data filed at 12/8/2024 0337  Gross per 24 hour   Intake 776.31 ml   Output 1650 ml   Net -873.69 ml         Physical Exam  Vitals and nursing note reviewed.   Constitutional:       General: He is not in acute distress.     Appearance: He is not toxic-appearing.   Cardiovascular:      Rate and Rhythm: Normal rate and regular rhythm.   Pulmonary:      Effort: Pulmonary effort is normal. No respiratory distress.      Breath sounds: No wheezing.   Abdominal:      General: Abdomen is flat. There is no distension.      Palpations: Abdomen is soft.      Tenderness: There is abdominal tenderness (mild grimace at epigastric region). There is no guarding.   Musculoskeletal:      Right lower leg: No edema.      Left lower leg: No edema.   Skin:     General: Skin is warm and dry.      Capillary Refill: Capillary refill takes less than 2 seconds.   Neurological:      Mental Status: Mental status is at baseline.             Significant Labs: All pertinent labs within the past 24 hours have been reviewed.    Recent Labs   Lab 12/04/24  0529 12/05/24  0536 12/06/24  0517 12/07/24  0603 12/08/24  0545   BILITOT 0.2 0.2 0.2 0.2 0.4     Recent Labs   Lab 12/08/24  0545   *      K 3.9      CO2 34*   BUN 16   CREATININE 1.0   CALCIUM 9.6   MG 1.9     CBC:   Recent Labs   Lab 12/07/24  0603 12/08/24  0545    WBC 11.77 14.37*   HGB 7.8* 7.4*   HCT 25.6* 24.3*   * 510*     CMP:   Recent Labs   Lab 12/07/24  0603 12/08/24  0545    139   K 4.0 3.9    100   CO2 34* 34*   * 117*   BUN 20 16   CREATININE 0.9 1.0   CALCIUM 9.5 9.6   PROT 7.0 6.8   ALBUMIN 0.9* 1.0*   BILITOT 0.2 0.4   ALKPHOS 269* 423*   * 1,029*   ALT 61* 214*   ANIONGAP 5 5     Recent Labs   Lab 12/07/24  0603 12/08/24  0545   MG 1.6 1.9     POCT Glucose:   Recent Labs   Lab 12/07/24  2117 12/07/24  2332 12/08/24  1235   POCTGLUCOSE 89 116* 86     X-Ray Abdomen AP 1 View  Narrative: EXAMINATION:  XR ABDOMEN AP 1 VIEW    CLINICAL HISTORY:  gtube placement;    COMPARISON:  None    FINDINGS:  A gastrostomy tube is noted.  There is contrast material within the stomach.  No extraluminal contrast identified.  No dilated bowel.  Impression: As above.    Electronically signed by: Rober Ramachandran MD  Date:    12/04/2024  Time:    09:27    X-Ray Abdomen AP 1 View  Narrative: EXAMINATION:  XR ABDOMEN AP 1 VIEW    CLINICAL HISTORY:  Residuals 750ml of formula;    COMPARISON:  03/16/2024    FINDINGS:  No dilated bowel identified.  A catheter projects over the pelvis.  No acute osseous finding.  Impression: Nonobstructive bowel gas pattern.    Electronically signed by: Rober Ramachandran MD  Date:    12/04/2024  Time:    07:38     Significant Imaging: I have reviewed all pertinent imaging results/findings within the past 24 hours.

## 2024-12-08 NOTE — ASSESSMENT & PLAN NOTE
Patient's FSGs are uncontrolled due to hyperglycemia on current medication regimen.  Last A1c reviewed-   Lab Results   Component Value Date    HGBA1C 12.4 (H) 01/03/2024     Most recent fingerstick glucose reviewed-   Recent Labs   Lab 12/07/24  2117 12/07/24  2332 12/08/24  1235   POCTGLUCOSE 89 116* 86       Current correctional scale  Low  Maintain anti-hyperglycemic dose as follows-   Antihyperglycemics (From admission, onward)      Start     Stop Route Frequency Ordered    12/03/24 2356  insulin aspart U-100 pen 0-10 Units         -- SubQ Every 6 hours PRN 12/03/24 2300    12/03/24 2100  insulin glargine U-100 (Lantus) pen 16 Units         -- SubQ Nightly 12/03/24 1242          Hold Oral hypoglycemics while patient is in the hospital.

## 2024-12-08 NOTE — ASSESSMENT & PLAN NOTE
Altered mental status secondary to UTI, symptoms improved after IV fluids, antibiotics on board follow-up with culture report  12/2 KD:  Continue IV meropenem amlodipine when g every 8 hours  12/7/ no leukocytosis, no elevated temperature, continue D9 merrem for MDRO uti  12/8 leukocytosis 12k, vitals signs stable, elevated LFT >1000, f/u hepatic study after IVF

## 2024-12-08 NOTE — ASSESSMENT & PLAN NOTE
Anemia is likely due to Iron deficiency. Most recent hemoglobin and hematocrit are listed below.  Recent Labs     12/06/24  0518 12/07/24  0603 12/08/24  0545   HGB 7.7* 7.8* 7.4*   HCT 25.3* 25.6* 24.3*       Plan  - Monitor serial CBC: Daily  - Transfuse PRBC if patient becomes hemodynamically unstable, symptomatic or H/H drops below 7/21.  - Patient has not received any PRBC transfusions to date, order placed to receive 1 unit today  - Patient's anemia is currently stable  12/2 KD:  Iron panel ordered  12/6 KD: H&H stable

## 2024-12-08 NOTE — PLAN OF CARE
Plan of care reviewed with patient. Patient still on PEG tube feedings of 40/hr with Glucerna. Patient's ostomy was emptied 3 times on DOC and his varghese was emptied once. Patient had no residual on DOC. PIV flushed and saline locked. Patient belongings and call bell within reach. Bed locked and in the lowest position.

## 2024-12-08 NOTE — ASSESSMENT & PLAN NOTE
12/3 KD:  Orders placed for General surgery wound debridement consult  12/5 KD: Cont. Daily WC per GS recs.  12/8 wound dressing change, f/u GS recommendations

## 2024-12-08 NOTE — NURSING
Notified Yvonne Pruitt and Garth that pt is not tolerating any feedings. Unable to administer pancrealase due to inability to crush,. New order to hold feeding. Garth to reassess tube in am.  Continue ivf

## 2024-12-09 LAB
ALBUMIN SERPL BCP-MCNC: 0.7 G/DL (ref 3.5–5.2)
ALP SERPL-CCNC: 362 U/L (ref 55–135)
ALT SERPL W/O P-5'-P-CCNC: 136 U/L (ref 10–44)
ANION GAP SERPL CALC-SCNC: 5 MMOL/L (ref 3–11)
AST SERPL-CCNC: 399 U/L (ref 10–40)
BASOPHILS # BLD AUTO: 0.01 K/UL (ref 0–0.2)
BASOPHILS NFR BLD: 0.1 % (ref 0–1.9)
BILIRUB SERPL-MCNC: 0.5 MG/DL (ref 0.1–1)
BUN SERPL-MCNC: 12 MG/DL (ref 6–20)
CALCIUM SERPL-MCNC: 9.1 MG/DL (ref 8.7–10.5)
CHLORIDE SERPL-SCNC: 105 MMOL/L (ref 95–110)
CO2 SERPL-SCNC: 33 MMOL/L (ref 23–29)
CREAT SERPL-MCNC: 0.8 MG/DL (ref 0.5–1.4)
DIFFERENTIAL METHOD BLD: ABNORMAL
EOSINOPHIL # BLD AUTO: 0.4 K/UL (ref 0–0.5)
EOSINOPHIL NFR BLD: 3.1 % (ref 0–8)
ERYTHROCYTE [DISTWIDTH] IN BLOOD BY AUTOMATED COUNT: 15.1 % (ref 11.5–14.5)
EST. GFR  (NO RACE VARIABLE): >60 ML/MIN/1.73 M^2
GLUCOSE SERPL-MCNC: 106 MG/DL (ref 70–110)
HCT VFR BLD AUTO: 23.3 % (ref 40–54)
HGB BLD-MCNC: 7 G/DL (ref 14–18)
IMM GRANULOCYTES # BLD AUTO: 0.05 K/UL (ref 0–0.04)
IMM GRANULOCYTES NFR BLD AUTO: 0.4 % (ref 0–0.5)
LYMPHOCYTES # BLD AUTO: 1.2 K/UL (ref 1–4.8)
LYMPHOCYTES NFR BLD: 9.9 % (ref 18–48)
MAGNESIUM SERPL-MCNC: 1.6 MG/DL (ref 1.6–2.6)
MCH RBC QN AUTO: 26.2 PG (ref 27–31)
MCHC RBC AUTO-ENTMCNC: 30 G/DL (ref 32–36)
MCV RBC AUTO: 87 FL (ref 82–98)
MONOCYTES # BLD AUTO: 1.1 K/UL (ref 0.3–1)
MONOCYTES NFR BLD: 9.2 % (ref 4–15)
NEUTROPHILS # BLD AUTO: 9 K/UL (ref 1.8–7.7)
NEUTROPHILS NFR BLD: 77.3 % (ref 38–73)
NRBC BLD-RTO: 0 /100 WBC
PLATELET # BLD AUTO: 522 K/UL (ref 150–450)
PMV BLD AUTO: 9.8 FL (ref 9.2–12.9)
POCT GLUCOSE: 152 MG/DL (ref 70–110)
POCT GLUCOSE: 208 MG/DL (ref 70–110)
POCT GLUCOSE: 248 MG/DL (ref 70–110)
POTASSIUM SERPL-SCNC: 3.9 MMOL/L (ref 3.5–5.1)
PROT SERPL-MCNC: 6.3 G/DL (ref 6–8.4)
RBC # BLD AUTO: 2.67 M/UL (ref 4.6–6.2)
SODIUM SERPL-SCNC: 143 MMOL/L (ref 136–145)
WBC # BLD AUTO: 11.67 K/UL (ref 3.9–12.7)

## 2024-12-09 PROCEDURE — 21400001 HC TELEMETRY ROOM

## 2024-12-09 PROCEDURE — 25500020 PHARM REV CODE 255: Performed by: INTERNAL MEDICINE

## 2024-12-09 PROCEDURE — 25000003 PHARM REV CODE 250: Performed by: INTERNAL MEDICINE

## 2024-12-09 PROCEDURE — 85025 COMPLETE CBC W/AUTO DIFF WBC: CPT | Performed by: INTERNAL MEDICINE

## 2024-12-09 PROCEDURE — 83735 ASSAY OF MAGNESIUM: CPT | Performed by: INTERNAL MEDICINE

## 2024-12-09 PROCEDURE — 27000207 HC ISOLATION

## 2024-12-09 PROCEDURE — 80053 COMPREHEN METABOLIC PANEL: CPT | Performed by: INTERNAL MEDICINE

## 2024-12-09 PROCEDURE — 63600175 PHARM REV CODE 636 W HCPCS: Performed by: INTERNAL MEDICINE

## 2024-12-09 PROCEDURE — 36415 COLL VENOUS BLD VENIPUNCTURE: CPT | Performed by: INTERNAL MEDICINE

## 2024-12-09 PROCEDURE — 25000003 PHARM REV CODE 250: Performed by: STUDENT IN AN ORGANIZED HEALTH CARE EDUCATION/TRAINING PROGRAM

## 2024-12-09 RX ORDER — HYDROMORPHONE HYDROCHLORIDE 1 MG/ML
1 INJECTION, SOLUTION INTRAMUSCULAR; INTRAVENOUS; SUBCUTANEOUS EVERY 4 HOURS PRN
Status: DISCONTINUED | OUTPATIENT
Start: 2024-12-09 | End: 2024-12-12 | Stop reason: HOSPADM

## 2024-12-09 RX ORDER — METOCLOPRAMIDE 5 MG/1
5 TABLET ORAL 2 TIMES DAILY
Status: DISCONTINUED | OUTPATIENT
Start: 2024-12-09 | End: 2024-12-10

## 2024-12-09 RX ADMIN — PANCRELIPASE LIPASE, PANCRELIPASE PROTEASE, PANCRELIPASE AMYLASE 2 CAPSULE: 10000; 32000; 42000 CAPSULE, DELAYED RELEASE ORAL at 06:12

## 2024-12-09 RX ADMIN — GABAPENTIN 100 MG: 100 CAPSULE ORAL at 09:12

## 2024-12-09 RX ADMIN — ENOXAPARIN SODIUM 40 MG: 100 INJECTION SUBCUTANEOUS at 06:12

## 2024-12-09 RX ADMIN — INSULIN GLARGINE 16 UNITS: 100 INJECTION, SOLUTION SUBCUTANEOUS at 09:12

## 2024-12-09 RX ADMIN — METOCLOPRAMIDE 5 MG: 5 TABLET ORAL at 09:12

## 2024-12-09 RX ADMIN — METOCLOPRAMIDE 5 MG: 5 TABLET ORAL at 02:12

## 2024-12-09 RX ADMIN — FAMOTIDINE 20 MG: 20 TABLET, FILM COATED ORAL at 09:12

## 2024-12-09 RX ADMIN — IOHEXOL 100 ML: 350 INJECTION, SOLUTION INTRAVENOUS at 10:12

## 2024-12-09 RX ADMIN — PANCRELIPASE LIPASE, PANCRELIPASE PROTEASE, PANCRELIPASE AMYLASE 2 CAPSULE: 10000; 32000; 42000 CAPSULE, DELAYED RELEASE ORAL at 08:12

## 2024-12-09 RX ADMIN — OXYCODONE HYDROCHLORIDE 10 MG: 10 TABLET ORAL at 02:12

## 2024-12-09 RX ADMIN — SODIUM HYPOCHLORITE: 1.25 SOLUTION TOPICAL at 09:12

## 2024-12-09 RX ADMIN — DIAZEPAM 5 MG: 5 TABLET ORAL at 08:12

## 2024-12-09 RX ADMIN — DIAZEPAM 5 MG: 5 TABLET ORAL at 09:12

## 2024-12-09 RX ADMIN — OXYCODONE HYDROCHLORIDE 10 MG: 10 TABLET ORAL at 08:12

## 2024-12-09 NOTE — ASSESSMENT & PLAN NOTE
12/4 KD:  Hold tube feedings until G-tube placement has been confirmed.  KUB ordered today  12/5 KD: Glucernia 10cc/hr cont. Feedings. Nutrition consult today.  12/6 KD: Continue titrating feedings as tolerated upon awaiting nutritional consult.  Continue wound care per GS rec  12/8 residual over >200 mL, tube feeding discontinued, no emesis  12/9 KD: cont. D/c feedings d/t residual. CT-ABD today.   ----- Message from [place] sent at 3/14/2018  5:58 PM CDT -----  Patient has an appointment for lab on 03/16/2018; There are no lab orders in Epic, please enter orders prior to patient's appointment.   If patient is not due for lab work please respond to this message or notify the Lab Department.  Thank you.

## 2024-12-09 NOTE — ASSESSMENT & PLAN NOTE
Patient was found to have altered mental status at nursing home, limited response, lethargic, brought to the ED for evaluation no leukocytosis, afebrile, patient was noted to have positive UA, chronic indwelling Rojas catheter, started on IV antibiotic, blood cultures pending urine culture pending, follow-up with result  12/2 KD:  Continue IV meropenum 1g Q 8 hrs,   12/7 continue D9 abx therapy, monitor I/Os   12/8 discontinue merrem concern with acute liver failure, IVF NS bolus x1L

## 2024-12-09 NOTE — ASSESSMENT & PLAN NOTE
Anemia is likely due to Iron deficiency. Most recent hemoglobin and hematocrit are listed below.  Recent Labs     12/07/24  0603 12/08/24  0545 12/09/24  0528   HGB 7.8* 7.4* 7.0*   HCT 25.6* 24.3* 23.3*       Plan  - Monitor serial CBC: Daily  - Transfuse PRBC if patient becomes hemodynamically unstable, symptomatic or H/H drops below 7/21.  - Patient has not received any PRBC transfusions to date, order placed to receive 1 unit today  - Patient's anemia is currently stable  12/2 KD:  Iron panel ordered  12/6 KD: H&H stable  12/9 KD: H&H 7/28.3. Monitor for need of transfusion.

## 2024-12-09 NOTE — PLAN OF CARE
Problem: Skin Injury Risk Increased  Goal: Skin Health and Integrity  Outcome: Progressing     Problem: Infection  Goal: Absence of Infection Signs and Symptoms  Outcome: Progressing     Problem: Adult Inpatient Plan of Care  Goal: Plan of Care Review  Outcome: Progressing  Goal: Patient-Specific Goal (Individualized)  Outcome: Progressing  Goal: Absence of Hospital-Acquired Illness or Injury  Outcome: Progressing  Goal: Optimal Comfort and Wellbeing  Outcome: Progressing  Goal: Readiness for Transition of Care  Outcome: Progressing     Problem: Diabetes Comorbidity  Goal: Blood Glucose Level Within Targeted Range  Outcome: Progressing     Problem: Wound  Goal: Optimal Coping  Outcome: Progressing  Goal: Optimal Functional Ability  Outcome: Progressing  Goal: Absence of Infection Signs and Symptoms  Outcome: Progressing  Goal: Improved Oral Intake  Outcome: Progressing  Goal: Optimal Pain Control and Function  Outcome: Progressing  Goal: Skin Health and Integrity  Outcome: Progressing  Goal: Optimal Wound Healing  Outcome: Progressing     Problem: Fall Injury Risk  Goal: Absence of Fall and Fall-Related Injury  Outcome: Progressing   Plan of care reviewed with patient. Pt is free of falls and injury. NADN. VSS. Afebrile.  IVF infusing. PEG tube feedings are being held per Dr. Pruitt's orders due to excessive residuals.  Questions and concerns addressed.

## 2024-12-09 NOTE — PROGRESS NOTES
[facility]  Progress Note    Patient Name: Carlos Chahal  MRN: [id number]  Patient Class: IP- Inpatient   Admission Date: 11/29/2024  Length of Stay: 10 days  Attending Physician: Kim Diamond MD  Primary Care Provider: Jose Francisco Klein MD        Subjective     Principal Problem:Altered mental status        HPI:  Patient 34-year-old male history of bipolar disorder, diabetes, history of anoxic brain injury, GERD, neuropathy, brought to the ED due to decreased level of consciousness, patient was evaluated in the ED was noted to have UTI, no leukocytosis, H&H 7.5/25.8, patient was started on IV antibiotic, blood cultures pending, admitted    Overview/Hospital Course:  12/1 AA, weekend crosscover, patient anemic, 1 unit packed red blood cell ordered, urine culture Gram-negative rods, sensitivities pending, on IV antibiotic, renal function stable, post H&H once transfusion completed  12/2 KD:  Patient lying in bed with decreased LOC, H&H stable.  IV merum continued.  WC consulted to continue wound care orders.  Additional iron labs ordered.  12/3 KD:  Patient awake and alert lying in bed with no distress.  Continue IV antibiotics.  New orders for general surgical consult for wound debridement.  12/4 KD:  Patient awake and alert.  KUB ordered to confirm G-tube placement.  Hold feedings until placement is confirmed.    12/5 KD:  Pt.  Awake and alert.  Glucerna @ 10 cc/hr continuous feedings.  Nutritional consult today. Cont. Wound care per GS recs.   12/6 KD:  Awake and alert.  Continue titrating feedings as tolerated upon awaiting nutritional consult.  Continue wound care per GS rec  12/7 [place] weekend crossover. Nursing home resident with anoxic brain injury, diabetes, PEG dependent with ileostomy undergoing treatment for MDRO urinary tract infection and chronic perineal and sacral wounds. Hg/HCT stable, 7.8/25.6 (s/p 1U PRBC 12/1/24). Reported no urine output and minimal output to  ileostomy yesterday. Currently tolerating tube feeds with urinary Rojas catheter draining clear urine. Electrolytes within normal limits, liver enzymes elevated along with alk phos. Patient in no acute distress, non tender abdomen, soft throughout. Will hold statin and monitor LFTs. Continue with IV antibiotics, strict I/O, daily wound cleanse and care.  12/8 [place] weekend crossover. Overnight vital signs normotensive, stable. No reported events overnight. Post tube feed start, residual measure at >200 mL with >600 mL output into ileostomy. Tube feed discontinued. Elevated LFTs AST 1029, , Alk phos 423, Tbili 0.4. Also associated with mild leukocytosis 12k Renal function normal. No evidence of acute abdomen on physical exam. Rojas catheter draining yellow urine. Will discontinue merrem concerning acute liver failure, give bolus IVF, monitor residual.   12/9 KD: Awake, alert. Feedings d/c. Liver enzymes elevated. , , . H&H 7/28.3. Monitor for need of transfusion. WBC 14.37 elevated on 12/8, today WBC 11.67. CT-ABD today.     Interval History: patient seen and examined.     Review of Systems   Unable to perform ROS: Other     Objective:     Vital Signs (Most Recent):  Temp: 98.2 °F (36.8 °C) (12/09/24 0824)  Pulse: 93 (12/09/24 0824)  Resp: 18 (12/09/24 0824)  BP: (!) 103/45 (12/09/24 0824)  SpO2: 99 % (12/09/24 0824) Vital Signs (24h Range):  Temp:  [97.2 °F (36.2 °C)-98.2 °F (36.8 °C)] 98.2 °F (36.8 °C)  Pulse:  [92-96] 93  Resp:  [15-18] 18  SpO2:  [95 %-99 %] 99 %  BP: (103-125)/(45-72) 103/45     Weight: 67.9 kg (149 lb 11.1 oz)  Body mass index is 23.45 kg/m².    Intake/Output Summary (Last 24 hours) at 12/9/2024 0925  Last data filed at 12/9/2024 0600  Gross per 24 hour   Intake 240 ml   Output 925 ml   Net -685 ml         Physical Exam  Vitals and nursing note reviewed.   Constitutional:       General: He is not in acute distress.     Appearance: He is not ill-appearing or  toxic-appearing.   Cardiovascular:      Rate and Rhythm: Normal rate and regular rhythm.   Pulmonary:      Effort: Pulmonary effort is normal. No respiratory distress.      Breath sounds: No wheezing.   Abdominal:      General: Abdomen is flat. There is no distension.      Palpations: Abdomen is soft.      Tenderness: There is abdominal tenderness (mild grimace at epigastric region). There is no guarding.   Musculoskeletal:      Right lower leg: No edema.      Left lower leg: No edema.   Skin:     General: Skin is warm and dry.      Capillary Refill: Capillary refill takes less than 2 seconds.   Neurological:      Mental Status: Mental status is at baseline.   Psychiatric:      Comments: tearful             Significant Labs: All pertinent labs within the past 24 hours have been reviewed.    Recent Labs   Lab 12/06/24  0517 12/07/24  0603 12/08/24  0545 12/08/24  1301 12/09/24  0528   BILIDIR  --   --   --  0.3  --    BILITOT 0.2 0.2 0.4 0.5 0.5     Recent Labs   Lab 12/09/24  0528         K 3.9      CO2 33*   BUN 12   CREATININE 0.8   CALCIUM 9.1   MG 1.6     CBC:   Recent Labs   Lab 12/08/24  0545 12/09/24  0528   WBC 14.37* 11.67   HGB 7.4* 7.0*   HCT 24.3* 23.3*   * 522*     CMP:   Recent Labs   Lab 12/08/24  0545 12/08/24  1301 12/09/24  0528     --  143   K 3.9  --  3.9     --  105   CO2 34*  --  33*   *  --  106   BUN 16  --  12   CREATININE 1.0  --  0.8   CALCIUM 9.6  --  9.1   PROT 6.8 6.7 6.3   ALBUMIN 1.0* 1.0* 0.7*   BILITOT 0.4 0.5 0.5   ALKPHOS 423* 400* 362*   AST 1,029* 668* 399*   * 171* 136*   ANIONGAP 5  --  5     Recent Labs   Lab 12/08/24  0545 12/09/24  0528   MG 1.9 1.6     POCT Glucose:   Recent Labs   Lab 12/07/24  2332 12/08/24  1235 12/08/24  1916   POCTGLUCOSE 116* 86 130*     X-Ray Abdomen AP 1 View  Narrative: EXAMINATION:  XR ABDOMEN AP 1 VIEW    CLINICAL HISTORY:  gtube placement;    COMPARISON:  None    FINDINGS:  A gastrostomy tube  is noted.  There is contrast material within the stomach.  No extraluminal contrast identified.  No dilated bowel.  Impression: As above.    Electronically signed by: Rober Ramachandran MD  Date:    12/04/2024  Time:    09:27    X-Ray Abdomen AP 1 View  Narrative: EXAMINATION:  XR ABDOMEN AP 1 VIEW    CLINICAL HISTORY:  Residuals 750ml of formula;    COMPARISON:  03/16/2024    FINDINGS:  No dilated bowel identified.  A catheter projects over the pelvis.  No acute osseous finding.  Impression: Nonobstructive bowel gas pattern.    Electronically signed by: Rober Ramachandran MD  Date:    12/04/2024  Time:    07:38     Significant Imaging: I have reviewed all pertinent imaging results/findings within the past 24 hours.    Assessment and Plan     * Altered mental status  Altered mental status secondary to UTI, symptoms improved after IV fluids, antibiotics on board follow-up with culture report  12/2 KD:  Continue IV meropenem amlodipine when g every 8 hours  12/7/ no leukocytosis, no elevated temperature, continue D9 merrem for MDRO uti  12/8 leukocytosis 12k, vitals signs stable, elevated LFT >1000, f/u hepatic study after IVF  12/9 KD: Liver enzymes elevated. , , . H&H 7/28.3. Monitor for need of transfusion. WBC 14.37 elevated on 12/8, today WBC 11.67. CT-ABD today      Sacral wound    12/3 KD:  Orders placed for General surgery wound debridement consult  12/5 KD: Cont. Daily WC per GS recs.  12/8 wound dressing change, f/u GS recommendations    Open wound of groin  12/2 KD:  Hx fourier's gangrene.  Wound care consult to continue WC during hospital stay.  12/5 KD: Cont. Daily Wound care per GS recs.        Thrombocytosis  Likely related to iron-deficiency anemia, transfusion ordered, will start on iron supplement  12/2 KD:  H&H stable  12/9 KD: WBC 14.37 elevated on 12/8, today WBC 11.67. CT-ABD today       Microcytic anemia  Anemia is likely due to Iron deficiency. Most recent hemoglobin and hematocrit are  listed below.  Recent Labs     12/07/24  0603 12/08/24  0545 12/09/24  0528   HGB 7.8* 7.4* 7.0*   HCT 25.6* 24.3* 23.3*       Plan  - Monitor serial CBC: Daily  - Transfuse PRBC if patient becomes hemodynamically unstable, symptomatic or H/H drops below 7/21.  - Patient has not received any PRBC transfusions to date, order placed to receive 1 unit today  - Patient's anemia is currently stable  12/2 KD:  Iron panel ordered  12/6 KD: H&H stable  12/9 KD: H&H 7/28.3. Monitor for need of transfusion.     History of anoxic brain injury  Patient with a history of anoxic brain injury, chronic indwelling Rojas catheter, contributing to increased UTI risks, history of multidrug resistant organisms, ESBL, patient was started on meropenem pending culture follow-up      Urinary tract infection associated with indwelling urethral catheter  Patient was found to have altered mental status at nursing home, limited response, lethargic, brought to the ED for evaluation no leukocytosis, afebrile, patient was noted to have positive UA, chronic indwelling Rojas catheter, started on IV antibiotic, blood cultures pending urine culture pending, follow-up with result  12/2 KD:  Continue IV meropenum 1g Q 8 hrs,   12/7 continue D9 abx therapy, monitor I/Os   12/8 discontinue merrem concern with acute liver failure, IVF NS bolus x1L    Vomiting    12/4 KD:  Hold tube feedings until G-tube placement has been confirmed.  KUB ordered today  12/5 KD: Glucernia 10cc/hr cont. Feedings. Nutrition consult today.  12/6 KD: Continue titrating feedings as tolerated upon awaiting nutritional consult.  Continue wound care per GS rec  12/8 residual over >200 mL, tube feeding discontinued, no emesis  12/9 KD: cont. D/c feedings d/t residual. CT-ABD today.    Type 1 diabetes  Patient's FSGs are uncontrolled due to hyperglycemia on current medication regimen.  Last A1c reviewed-   Lab Results   Component Value Date    HGBA1C 12.4 (H) 01/03/2024     Most  recent fingerstick glucose reviewed-   Recent Labs   Lab 12/08/24  1235 12/08/24  1916   POCTGLUCOSE 86 130*       Current correctional scale  Low  Maintain anti-hyperglycemic dose as follows-   Antihyperglycemics (From admission, onward)      Start     Stop Route Frequency Ordered    12/03/24 2356  insulin aspart U-100 pen 0-10 Units         -- SubQ Every 6 hours PRN 12/03/24 2300    12/03/24 2100  insulin glargine U-100 (Lantus) pen 16 Units         -- SubQ Nightly 12/03/24 1242          Hold Oral hypoglycemics while patient is in the hospital.      VTE Risk Mitigation (From admission, onward)           Ordered     enoxaparin injection 40 mg  Daily         11/29/24 1536     Place sequential compression device  Until discontinued         11/29/24 1536                    Discharge Planning   JOSE MANUEL:      Code Status: Full Code   Medical Readiness for Discharge Date: Treatment.  Discharge Plan A: Return to nursing home (Pt is a senior care resident at [facility].)   Discharge Delays: None known at this time                    STEPHEN GRIGSBY NP  Department of Hospital Medicine   [facility]

## 2024-12-09 NOTE — PROGRESS NOTES
[facility] Medicine  Progress Note    Patient Name: Carlos Chahal  MRN: [id number]  Patient Class: IP- Inpatient   Admission Date: 11/29/2024  Length of Stay: 9 days  Attending Physician: Kim Diamond MD  Primary Care Provider: Jose Francisco Klein MD        Subjective     Principal Problem:Altered mental status        HPI:  Patient 34-year-old male history of bipolar disorder, diabetes, history of anoxic brain injury, GERD, neuropathy, brought to the ED due to decreased level of consciousness, patient was evaluated in the ED was noted to have UTI, no leukocytosis, H&H 7.5/25.8, patient was started on IV antibiotic, blood cultures pending, admitted    Overview/Hospital Course:  12/1 AA, weekend crosscover, patient anemic, 1 unit packed red blood cell ordered, urine culture Gram-negative rods, sensitivities pending, on IV antibiotic, renal function stable, post H&H once transfusion completed  12/2 KD:  Patient lying in bed with decreased LOC, H&H stable.  IV merum continued.  WC consulted to continue wound care orders.  Additional iron labs ordered.  12/3 KD:  Patient awake and alert lying in bed with no distress.  Continue IV antibiotics.  New orders for general surgical consult for wound debridement.  12/4 KD:  Patient awake and alert.  KUB ordered to confirm G-tube placement.  Hold feedings until placement is confirmed.    12/5 KD:  Pt.  Awake and alert.  Glucerna @ 10 cc/hr continuous feedings.  Nutritional consult today. Cont. Wound care per GS recs.   12/6 KD:  Awake and alert.  Continue titrating feedings as tolerated upon awaiting nutritional consult.  Continue wound care per GS rec  12/7 [place] weekend crossover. Nursing home resident with anoxic brain injury, diabetes, PEG dependent with ileostomy undergoing treatment for MDRO urinary tract infection and chronic perineal and sacral wounds. Hg/HCT stable, 7.8/25.6 (s/p 1U PRBC 12/1/24). Reported no urine output and minimal output to  ileostomy yesterday. Currently tolerating tube feeds with urinary Rojas catheter draining clear urine. Electrolytes within normal limits, liver enzymes elevated along with alk phos. Patient in no acute distress, non tender abdomen, soft throughout. Will hold statin and monitor LFTs. Continue with IV antibiotics, strict I/O, daily wound cleanse and care.  12/8 [place] weekend crossover. Overnight vital signs normotensive, stable. No reported events overnight. Post tube feed start, residual measure at >200 mL with >600 mL output into ileostomy. Tube feed discontinued. Elevated LFTs AST 1029, , Alk phos 423, Tbili 0.4. Also associated with mild leukocytosis 12k Renal function normal. No evidence of acute abdomen on physical exam. Rojas catheter draining yellow urine. Will discontinue merrem concerning acute liver failure, give bolus IVF, monitor residual.     Interval History: patient seen and examined.     Review of Systems   Unable to perform ROS: Patient nonverbal     Objective:     Vital Signs (Most Recent):  Temp: 98.4 °F (36.9 °C) (12/08/24 0835)  Pulse: 97 (12/08/24 0835)  Resp: 20 (12/08/24 0835)  BP: 105/64 (12/08/24 0835)  SpO2: 97 % (12/08/24 0835) Vital Signs (24h Range):  Temp:  [97.9 °F (36.6 °C)-99 °F (37.2 °C)] 98.4 °F (36.9 °C)  Pulse:  [95-98] 97  Resp:  [18-20] 20  SpO2:  [93 %-97 %] 97 %  BP: (103-112)/(55-64) 105/64     Weight: 67.9 kg (149 lb 11.1 oz)  Body mass index is 23.45 kg/m².    Intake/Output Summary (Last 24 hours) at 12/8/2024 1251  Last data filed at 12/8/2024 0337  Gross per 24 hour   Intake 776.31 ml   Output 1650 ml   Net -873.69 ml         Physical Exam  Vitals and nursing note reviewed.   Constitutional:       General: He is not in acute distress.     Appearance: He is not toxic-appearing.   Cardiovascular:      Rate and Rhythm: Normal rate and regular rhythm.   Pulmonary:      Effort: Pulmonary effort is normal. No respiratory distress.      Breath sounds: No wheezing.    Abdominal:      General: Abdomen is flat. There is no distension.      Palpations: Abdomen is soft.      Tenderness: There is abdominal tenderness (mild grimace at epigastric region). There is no guarding.   Musculoskeletal:      Right lower leg: No edema.      Left lower leg: No edema.   Skin:     General: Skin is warm and dry.      Capillary Refill: Capillary refill takes less than 2 seconds.   Neurological:      Mental Status: Mental status is at baseline.             Significant Labs: All pertinent labs within the past 24 hours have been reviewed.    Recent Labs   Lab 12/04/24  0529 12/05/24  0536 12/06/24  0517 12/07/24  0603 12/08/24  0545   BILITOT 0.2 0.2 0.2 0.2 0.4     Recent Labs   Lab 12/08/24  0545   *      K 3.9      CO2 34*   BUN 16   CREATININE 1.0   CALCIUM 9.6   MG 1.9     CBC:   Recent Labs   Lab 12/07/24  0603 12/08/24  0545   WBC 11.77 14.37*   HGB 7.8* 7.4*   HCT 25.6* 24.3*   * 510*     CMP:   Recent Labs   Lab 12/07/24  0603 12/08/24  0545    139   K 4.0 3.9    100   CO2 34* 34*   * 117*   BUN 20 16   CREATININE 0.9 1.0   CALCIUM 9.5 9.6   PROT 7.0 6.8   ALBUMIN 0.9* 1.0*   BILITOT 0.2 0.4   ALKPHOS 269* 423*   * 1,029*   ALT 61* 214*   ANIONGAP 5 5     Recent Labs   Lab 12/07/24  0603 12/08/24  0545   MG 1.6 1.9     POCT Glucose:   Recent Labs   Lab 12/07/24  2117 12/07/24  2332 12/08/24  1235   POCTGLUCOSE 89 116* 86     X-Ray Abdomen AP 1 View  Narrative: EXAMINATION:  XR ABDOMEN AP 1 VIEW    CLINICAL HISTORY:  gtube placement;    COMPARISON:  None    FINDINGS:  A gastrostomy tube is noted.  There is contrast material within the stomach.  No extraluminal contrast identified.  No dilated bowel.  Impression: As above.    Electronically signed by: Rober Ramachandran MD  Date:    12/04/2024  Time:    09:27    X-Ray Abdomen AP 1 View  Narrative: EXAMINATION:  XR ABDOMEN AP 1 VIEW    CLINICAL HISTORY:  Residuals 750ml of  formula;    COMPARISON:  03/16/2024    FINDINGS:  No dilated bowel identified.  A catheter projects over the pelvis.  No acute osseous finding.  Impression: Nonobstructive bowel gas pattern.    Electronically signed by: Rober Ramachandran MD  Date:    12/04/2024  Time:    07:38     Significant Imaging: I have reviewed all pertinent imaging results/findings within the past 24 hours.    Assessment and Plan     * Altered mental status  Altered mental status secondary to UTI, symptoms improved after IV fluids, antibiotics on board follow-up with culture report  12/2 KD:  Continue IV meropenem amlodipine when g every 8 hours  12/7/ no leukocytosis, no elevated temperature, continue D9 merrem for MDRO uti  12/8 leukocytosis 12k, vitals signs stable, elevated LFT >1000, f/u hepatic study after IVF      Sacral wound    12/3 KD:  Orders placed for General surgery wound debridement consult  12/5 KD: Cont. Daily WC per GS recs.  12/8 wound dressing change, f/u GS recommendations    Open wound of groin  12/2 KD:  Hx fourier's gangrene.  Wound care consult to continue WC during hospital stay.  12/5 KD: Cont. Daily Wound care per GS recs.        Thrombocytosis  Likely related to iron-deficiency anemia, transfusion ordered, will start on iron supplement  12/2 KD:  H&H stable      Microcytic anemia  Anemia is likely due to Iron deficiency. Most recent hemoglobin and hematocrit are listed below.  Recent Labs     12/06/24  0518 12/07/24  0603 12/08/24  0545   HGB 7.7* 7.8* 7.4*   HCT 25.3* 25.6* 24.3*       Plan  - Monitor serial CBC: Daily  - Transfuse PRBC if patient becomes hemodynamically unstable, symptomatic or H/H drops below 7/21.  - Patient has not received any PRBC transfusions to date, order placed to receive 1 unit today  - Patient's anemia is currently stable  12/2 KD:  Iron panel ordered  12/6 KD: H&H stable    History of anoxic brain injury  Patient with a history of anoxic brain injury, chronic indwelling Rojas catheter,  contributing to increased UTI risks, history of multidrug resistant organisms, ESBL, patient was started on meropenem pending culture follow-up      Urinary tract infection associated with indwelling urethral catheter  Patient was found to have altered mental status at nursing home, limited response, lethargic, brought to the ED for evaluation no leukocytosis, afebrile, patient was noted to have positive UA, chronic indwelling Rojas catheter, started on IV antibiotic, blood cultures pending urine culture pending, follow-up with result  12/2 KD:  Continue IV meropenum 1g Q 8 hrs,   12/7 continue D9 abx therapy, monitor I/Os   12/8 discontinue merrem concern with acute liver failure, IVF NS bolus x1L    Vomiting    12/4 KD:  Hold tube feedings until G-tube placement has been confirmed.  KUB ordered today  12/5 KD: Glucernia 10cc/hr cont. Feedings. Nutrition consult today.  12/6 KD: Continue titrating feedings as tolerated upon awaiting nutritional consult.  Continue wound care per GS rec  12/8 residual over >200 mL, tube feeding discontinued, no emesis    Type 1 diabetes  Patient's FSGs are uncontrolled due to hyperglycemia on current medication regimen.  Last A1c reviewed-   Lab Results   Component Value Date    HGBA1C 12.4 (H) 01/03/2024     Most recent fingerstick glucose reviewed-   Recent Labs   Lab 12/07/24  2117 12/07/24  2332 12/08/24  1235   POCTGLUCOSE 89 116* 86       Current correctional scale  Low  Maintain anti-hyperglycemic dose as follows-   Antihyperglycemics (From admission, onward)      Start     Stop Route Frequency Ordered    12/03/24 2356  insulin aspart U-100 pen 0-10 Units         -- SubQ Every 6 hours PRN 12/03/24 2300    12/03/24 2100  insulin glargine U-100 (Lantus) pen 16 Units         -- SubQ Nightly 12/03/24 1242          Hold Oral hypoglycemics while patient is in the hospital.      VTE Risk Mitigation (From admission, onward)           Ordered     enoxaparin injection 40 mg  Daily          11/29/24 1536     Place sequential compression device  Until discontinued         11/29/24 1536                    Discharge Planning   JOSE MANUEL:      Code Status: Full Code   Medical Readiness for Discharge Date:   Discharge Plan A: Return to nursing home (Pt is a long term resident at [facility]   Discharge Delays: None known at this time                    Brandon Pruitt DO  Department of Hospital Medicine   [facility] Surg

## 2024-12-09 NOTE — ASSESSMENT & PLAN NOTE
Altered mental status secondary to UTI, symptoms improved after IV fluids, antibiotics on board follow-up with culture report  12/2 KD:  Continue IV meropenem amlodipine when g every 8 hours  12/7/ no leukocytosis, no elevated temperature, continue D9 merrem for MDRO uti  12/8 leukocytosis 12k, vitals signs stable, elevated LFT >1000, f/u hepatic study after IVF  12/9 KD: Liver enzymes elevated. , , . H&H 7/28.3. Monitor for need of transfusion. WBC 14.37 elevated on 12/8, today WBC 11.67. CT-ABD today

## 2024-12-09 NOTE — PROGRESS NOTES
[facility] Surg  Adult Nutrition  Progress Note    SUMMARY       Recommendations  1. Rec'd Continuous EN: Glucerna 1.2 or Diabetasource AC @ 10mL/r increasing by 5-10mL q4-6hrs to goal rate of 75mL/hr with a continuous 25mL FWF to provide 2160kcal (106% EEN), 108g of protein (113% EPN), and 2049mL FW.    2. Rec'd Continuous EN: Glucerna 1.5 @ 10mL/r increasing by 5-10mL q4-6hrs to goal rate of 55mL/hr with a continuous 40mL FWF to provide 1980kcal (97% EEN), 109g of protein (114% EPN), and 1962mL FW.     3.  Recd Néstor BID via PEG tube to promote wound healing and to provide additional nutrition.         - Do not mix Néstor with formula in a tube-feeding bag        - Pour one packet of Néstor in a clean, small container for mixing.         - Add 4 fl oz (120 mL) water at room temperature.         - Mix well with disposable spoon or tongue blade until all particles are completely hydrated.         - Verify correct tube placement.         - Flush feeding tube with 30 mL water.         -Administer Néstor through feeding tube using a 60-mL or larger syringe.         - Flush with an additional 30 mL water.     4. RD to follow and make erc's accordingly.  Goals:   1. Pt will be started on TF by next RD follow up.   2. Pt will reach TF goal rate within 48hrs of initiation.  Nutrition Goal Status: goal not met  Communication of RD Recs: reviewed with physician    Assessment and Plan     Nutrition Problem  Inadequate oral intake     Related to (etiology):   Increased nutrient needs     Signs and Symptoms (as evidenced by):   wounds      Interventions/Recommendations (treatment strategy):  1. Rec'd Continuous EN: Glucerna 1.2 or Diabetasource AC @ 10mL/r increasing by 5-10mL q4-6hrs to goal rate of 75mL/hr with a continuous 25mL FWF to provide 2160kcal (106% EEN), 108g of protein (113% EPN), and 2049mL FW.   2.  Recd Néstor BID via NG tube to promote wound healing and to provide additional nutrition.       - Do not mix  "Néstor with formula in a tube-feeding bag      - Pour one packet of Néstor in a clean, small container for mixing.       - Add 4 fl oz (120 mL) water at room temperature.       - Mix well with disposable spoon or tongue blade until all particles are completely hydrated.       - Verify correct tube placement.       - Flush feeding tube with 30 mL water.       -Administer Néstor through feeding tube using a 60-mL or larger syringe.       - Flush with an additional 30 mL water.   3. RD to follow and make erc's accordingly.     Nutrition Diagnosis Status:   Continues     Malnutrition Assessment  NFPE not warranted at this time. RD to continue to monitor for signs and symptoms of malnutrition.     Nutrition Related Social Determinants of Health:  Unable to assess    Reason for Assessment    Reason For Assessment: RD follow-up  Diagnosis: other (see comments) (Altered Mental Status)  General Information Comments: Followed up on pt this morning. Pt is not tolerating TF. RD to follow and make rec's accordingly. Recieved Glucerna 1.5 shipment today. Noticed liver labs. Will make rec's and communicate with MD. RD to follow and make rec's accordingly.  Nutrition Discharge Planning: TBD as care progresses    Nutrition Risk Screen    Nutrition Risk Screen: tube feeding or parenteral nutrition    Nutrition/Diet History    Spiritual, Cultural Beliefs, Nondenominational Practices, Values that Affect Care: no  Food Allergies: NKFA    Anthropometrics    Temp: 98 °F (36.7 °C)  Height Method: Stated  Height: 5' 7" (170.2 cm)  Height (inches): 67 in  Weight Method: Bed Scale  Weight: 67.9 kg (149 lb 11.1 oz)  Weight (lb): 149.69 lb  Ideal Body Weight (IBW), Male: 148 lb  % Ideal Body Weight, Male (lb): 101.14 %  BMI (Calculated): 23.4  BMI Grade: 18.5-24.9 - normal    Lab/Procedures/Meds    Pertinent Labs Reviewed: reviewed  Pertinent Labs Comments: Glucose = >500  Pertinent Medications Reviewed: reviewed    Estimated/Assessed Needs    Weight Used " For Calorie Calculations: 67.9 kg (149 lb 11.1 oz)  Energy Calorie Requirements (kcal): 7483-4850 (30-35kcal/kg)  Energy Need Method: Kcal/kg  Protein Requirements: 81-95 (1.2-1.4g/kg)  Weight Used For Protein Calculations: 67.9 kg (149 lb 11.1 oz)  Fluid Requirements (mL): 5389-6702 (1mL/kcal)  Estimated Fluid Requirement Method: RDA Method  RDA Method (mL): 2037    Nutrition Prescription Ordered    Current Diet Order: Consistent CHO  Nutrition Order Comments: Néstor via G-tube  Current Nutrition Support Formula Ordered: Glucerna 1.2  Current Nutrition Support Rate Ordered: 0 (ml)  Current Nutrition Support Frequency Ordered: Discontinued  Oral Nutrition Supplement: None    Evaluation of Received Nutrient/Fluid Intake    Enteral Calories (kcal): 0  Enteral Protein (gm): 0  Enteral (Free Water) Fluid (mL): 0  Free Water Flush Fluid (mL): 0  % Kcal Needs: 0%  % Protein Needs: 0%  I/O: +240  Energy Calories Required: not meeting needs  Protein Required: not meeting needs  Tolerance: not tolerating  % Intake of Estimated Energy Needs: 0 - 25 %  % Meal Intake: 0 - 25 %    Nutrition Risk    Level of Risk/Frequency of Follow-up: moderate     Monitor and Evaluation    Food and Nutrient Intake: energy intake, food and beverage intake, enteral nutrition intake  Food and Nutrient Adminstration: diet order, enteral and parenteral nutrition administration  Knowledge/Beliefs/Attitudes: food and nutrition knowledge/skill, beliefs and attitudes  Physical Activity and Function: nutrition-related ADLs and IADLs  Anthropometric Measurements: height/length, weight, weight change, body mass index  Biochemical Data, Medical Tests and Procedures: electrolyte and renal panel, glucose/endocrine profile, gastrointestinal profile, inflammatory profile, lipid profile  Nutrition-Focused Physical Findings: overall appearance     Nutrition Follow-Up    RD Follow-up?: Yes

## 2024-12-09 NOTE — ASSESSMENT & PLAN NOTE
Patient's FSGs are uncontrolled due to hyperglycemia on current medication regimen.  Last A1c reviewed-   Lab Results   Component Value Date    HGBA1C 12.4 (H) 01/03/2024     Most recent fingerstick glucose reviewed-   Recent Labs   Lab 12/08/24  1235 12/08/24  1916   POCTGLUCOSE 86 130*       Current correctional scale  Low  Maintain anti-hyperglycemic dose as follows-   Antihyperglycemics (From admission, onward)    Start     Stop Route Frequency Ordered    12/03/24 2356  insulin aspart U-100 pen 0-10 Units         -- SubQ Every 6 hours PRN 12/03/24 2300    12/03/24 2100  insulin glargine U-100 (Lantus) pen 16 Units         -- SubQ Nightly 12/03/24 1242        Hold Oral hypoglycemics while patient is in the hospital.

## 2024-12-09 NOTE — SUBJECTIVE & OBJECTIVE
Interval History: patient seen and examined.     Review of Systems   Unable to perform ROS: Other     Objective:     Vital Signs (Most Recent):  Temp: 98.2 °F (36.8 °C) (12/09/24 0824)  Pulse: 93 (12/09/24 0824)  Resp: 18 (12/09/24 0824)  BP: (!) 103/45 (12/09/24 0824)  SpO2: 99 % (12/09/24 0824) Vital Signs (24h Range):  Temp:  [97.2 °F (36.2 °C)-98.2 °F (36.8 °C)] 98.2 °F (36.8 °C)  Pulse:  [92-96] 93  Resp:  [15-18] 18  SpO2:  [95 %-99 %] 99 %  BP: (103-125)/(45-72) 103/45     Weight: 67.9 kg (149 lb 11.1 oz)  Body mass index is 23.45 kg/m².    Intake/Output Summary (Last 24 hours) at 12/9/2024 0925  Last data filed at 12/9/2024 0600  Gross per 24 hour   Intake 240 ml   Output 925 ml   Net -685 ml         Physical Exam  Vitals and nursing note reviewed.   Constitutional:       General: He is not in acute distress.     Appearance: He is not ill-appearing or toxic-appearing.   Cardiovascular:      Rate and Rhythm: Normal rate and regular rhythm.   Pulmonary:      Effort: Pulmonary effort is normal. No respiratory distress.      Breath sounds: No wheezing.   Abdominal:      General: Abdomen is flat. There is no distension.      Palpations: Abdomen is soft.      Tenderness: There is abdominal tenderness (mild grimace at epigastric region). There is no guarding.   Musculoskeletal:      Right lower leg: No edema.      Left lower leg: No edema.   Skin:     General: Skin is warm and dry.      Capillary Refill: Capillary refill takes less than 2 seconds.   Neurological:      Mental Status: Mental status is at baseline.   Psychiatric:      Comments: tearful             Significant Labs: All pertinent labs within the past 24 hours have been reviewed.    Recent Labs   Lab 12/06/24  0517 12/07/24  0603 12/08/24  0545 12/08/24  1301 12/09/24  0528   BILIDIR  --   --   --  0.3  --    BILITOT 0.2 0.2 0.4 0.5 0.5     Recent Labs   Lab 12/09/24  0528         K 3.9      CO2 33*   BUN 12   CREATININE 0.8   CALCIUM  9.1   MG 1.6     CBC:   Recent Labs   Lab 12/08/24  0545 12/09/24  0528   WBC 14.37* 11.67   HGB 7.4* 7.0*   HCT 24.3* 23.3*   * 522*     CMP:   Recent Labs   Lab 12/08/24  0545 12/08/24  1301 12/09/24  0528     --  143   K 3.9  --  3.9     --  105   CO2 34*  --  33*   *  --  106   BUN 16  --  12   CREATININE 1.0  --  0.8   CALCIUM 9.6  --  9.1   PROT 6.8 6.7 6.3   ALBUMIN 1.0* 1.0* 0.7*   BILITOT 0.4 0.5 0.5   ALKPHOS 423* 400* 362*   AST 1,029* 668* 399*   * 171* 136*   ANIONGAP 5  --  5     Recent Labs   Lab 12/08/24  0545 12/09/24  0528   MG 1.9 1.6     POCT Glucose:   Recent Labs   Lab 12/07/24  2332 12/08/24  1235 12/08/24  1916   POCTGLUCOSE 116* 86 130*     X-Ray Abdomen AP 1 View  Narrative: EXAMINATION:  XR ABDOMEN AP 1 VIEW    CLINICAL HISTORY:  gtube placement;    COMPARISON:  None    FINDINGS:  A gastrostomy tube is noted.  There is contrast material within the stomach.  No extraluminal contrast identified.  No dilated bowel.  Impression: As above.    Electronically signed by: Rober Ramachandran MD  Date:    12/04/2024  Time:    09:27    X-Ray Abdomen AP 1 View  Narrative: EXAMINATION:  XR ABDOMEN AP 1 VIEW    CLINICAL HISTORY:  Residuals 750ml of formula;    COMPARISON:  03/16/2024    FINDINGS:  No dilated bowel identified.  A catheter projects over the pelvis.  No acute osseous finding.  Impression: Nonobstructive bowel gas pattern.    Electronically signed by: Rober Ramachandran MD  Date:    12/04/2024  Time:    07:38     Significant Imaging: I have reviewed all pertinent imaging results/findings within the past 24 hours.

## 2024-12-09 NOTE — PLAN OF CARE
Recommendations  1. Rec'd Continuous EN: Glucerna 1.2 or Diabetasource AC @ 10mL/r increasing by 5-10mL q4-6hrs to goal rate of 75mL/hr with a continuous 25mL FWF to provide 2160kcal (106% EEN), 108g of protein (113% EPN), and 2049mL FW.    2. Rec'd Continuous EN: Glucerna 1.5 @ 10mL/r increasing by 5-10mL q4-6hrs to goal rate of 55mL/hr with a continuous 40mL FWF to provide 1980kcal (97% EEN), 109g of protein (114% EPN), and 1962mL FW.     3.  Recd Néstor BID via PEG tube to promote wound healing and to provide additional nutrition.         - Do not mix Néstor with formula in a tube-feeding bag        - Pour one packet of Néstor in a clean, small container for mixing.         - Add 4 fl oz (120 mL) water at room temperature.         - Mix well with disposable spoon or tongue blade until all particles are completely hydrated.         - Verify correct tube placement.         - Flush feeding tube with 30 mL water.         -Administer Néstor through feeding tube using a 60-mL or larger syringe.         - Flush with an additional 30 mL water.     4. RD to follow and make erc's accordingly.  Goals:   1. Pt will be started on TF by next RD follow up.   2. Pt will reach TF goal rate within 48hrs of initiation.  Nutrition Goal Status: goal not met

## 2024-12-09 NOTE — PLAN OF CARE
12/09/24 1415   Medicare Message   Important Message from Medicare regarding Discharge Appeal Rights Given to patient/caregiver;Explained to patient/caregiver;Signed/date by patient/caregiver   Date IMM was signed 12/09/24   Time IMM was signed [id number]

## 2024-12-09 NOTE — ASSESSMENT & PLAN NOTE
Likely related to iron-deficiency anemia, transfusion ordered, will start on iron supplement  12/2 KD:  H&H stable  12/9 KD: WBC 14.37 elevated on 12/8, today WBC 11.67. CT-ABD today

## 2024-12-09 NOTE — ASSESSMENT & PLAN NOTE
12/4 KD:  Hold tube feedings until G-tube placement has been confirmed.  KUB ordered today  12/5 KD: Glucernia 10cc/hr cont. Feedings. Nutrition consult today.  12/6 KD: Continue titrating feedings as tolerated upon awaiting nutritional consult.  Continue wound care per GS rec  12/8 residual over >200 mL, tube feeding discontinued, no emesis

## 2024-12-10 LAB
ALBUMIN SERPL BCP-MCNC: 0.8 G/DL (ref 3.5–5.2)
ALP SERPL-CCNC: 450 U/L (ref 55–135)
ALT SERPL W/O P-5'-P-CCNC: 96 U/L (ref 10–44)
ANION GAP SERPL CALC-SCNC: 5 MMOL/L (ref 3–11)
AST SERPL-CCNC: 180 U/L (ref 10–40)
BASOPHILS # BLD AUTO: 0.02 K/UL (ref 0–0.2)
BASOPHILS NFR BLD: 0.2 % (ref 0–1.9)
BILIRUB SERPL-MCNC: 0.4 MG/DL (ref 0.1–1)
BUN SERPL-MCNC: 14 MG/DL (ref 6–20)
CALCIUM SERPL-MCNC: 10.7 MG/DL (ref 8.7–10.5)
CHLORIDE SERPL-SCNC: 104 MMOL/L (ref 95–110)
CO2 SERPL-SCNC: 33 MMOL/L (ref 23–29)
CREAT SERPL-MCNC: 0.8 MG/DL (ref 0.5–1.4)
DIFFERENTIAL METHOD BLD: ABNORMAL
EOSINOPHIL # BLD AUTO: 0.5 K/UL (ref 0–0.5)
EOSINOPHIL NFR BLD: 4 % (ref 0–8)
ERYTHROCYTE [DISTWIDTH] IN BLOOD BY AUTOMATED COUNT: 15 % (ref 11.5–14.5)
EST. GFR  (NO RACE VARIABLE): >60 ML/MIN/1.73 M^2
GLUCOSE SERPL-MCNC: 214 MG/DL (ref 70–110)
HCT VFR BLD AUTO: 23.6 % (ref 40–54)
HGB BLD-MCNC: 7.2 G/DL (ref 14–18)
IMM GRANULOCYTES # BLD AUTO: 0.06 K/UL (ref 0–0.04)
IMM GRANULOCYTES NFR BLD AUTO: 0.5 % (ref 0–0.5)
LYMPHOCYTES # BLD AUTO: 1.2 K/UL (ref 1–4.8)
LYMPHOCYTES NFR BLD: 10.4 % (ref 18–48)
MAGNESIUM SERPL-MCNC: 1.7 MG/DL (ref 1.6–2.6)
MCH RBC QN AUTO: 26.2 PG (ref 27–31)
MCHC RBC AUTO-ENTMCNC: 30.5 G/DL (ref 32–36)
MCV RBC AUTO: 86 FL (ref 82–98)
MONOCYTES # BLD AUTO: 1 K/UL (ref 0.3–1)
MONOCYTES NFR BLD: 9.1 % (ref 4–15)
NEUTROPHILS # BLD AUTO: 8.6 K/UL (ref 1.8–7.7)
NEUTROPHILS NFR BLD: 75.8 % (ref 38–73)
NRBC BLD-RTO: 0 /100 WBC
PLATELET # BLD AUTO: 541 K/UL (ref 150–450)
PMV BLD AUTO: 9.7 FL (ref 9.2–12.9)
POCT GLUCOSE: 256 MG/DL (ref 70–110)
POCT GLUCOSE: 265 MG/DL (ref 70–110)
POTASSIUM SERPL-SCNC: 4.1 MMOL/L (ref 3.5–5.1)
PROT SERPL-MCNC: 7 G/DL (ref 6–8.4)
RBC # BLD AUTO: 2.75 M/UL (ref 4.6–6.2)
SODIUM SERPL-SCNC: 142 MMOL/L (ref 136–145)
WBC # BLD AUTO: 11.29 K/UL (ref 3.9–12.7)

## 2024-12-10 PROCEDURE — 63600175 PHARM REV CODE 636 W HCPCS: Performed by: INTERNAL MEDICINE

## 2024-12-10 PROCEDURE — 25000003 PHARM REV CODE 250: Performed by: INTERNAL MEDICINE

## 2024-12-10 PROCEDURE — 25000003 PHARM REV CODE 250: Performed by: STUDENT IN AN ORGANIZED HEALTH CARE EDUCATION/TRAINING PROGRAM

## 2024-12-10 PROCEDURE — 21400001 HC TELEMETRY ROOM

## 2024-12-10 PROCEDURE — 80053 COMPREHEN METABOLIC PANEL: CPT | Performed by: INTERNAL MEDICINE

## 2024-12-10 PROCEDURE — 36415 COLL VENOUS BLD VENIPUNCTURE: CPT | Performed by: INTERNAL MEDICINE

## 2024-12-10 PROCEDURE — 27000207 HC ISOLATION

## 2024-12-10 PROCEDURE — 83735 ASSAY OF MAGNESIUM: CPT | Performed by: INTERNAL MEDICINE

## 2024-12-10 PROCEDURE — 85025 COMPLETE CBC W/AUTO DIFF WBC: CPT | Performed by: INTERNAL MEDICINE

## 2024-12-10 RX ORDER — METOCLOPRAMIDE 5 MG/1
5 TABLET ORAL 2 TIMES DAILY
Status: DISCONTINUED | OUTPATIENT
Start: 2024-12-10 | End: 2024-12-12

## 2024-12-10 RX ORDER — DOXYCYCLINE HYCLATE 100 MG
100 TABLET ORAL EVERY 12 HOURS
Status: DISCONTINUED | OUTPATIENT
Start: 2024-12-10 | End: 2024-12-12 | Stop reason: HOSPADM

## 2024-12-10 RX ORDER — DOXYCYCLINE HYCLATE 100 MG
100 TABLET ORAL EVERY 12 HOURS
Status: DISCONTINUED | OUTPATIENT
Start: 2024-12-10 | End: 2024-12-10

## 2024-12-10 RX ADMIN — ASPIRIN 81 MG CHEWABLE TABLET 81 MG: 81 TABLET CHEWABLE at 08:12

## 2024-12-10 RX ADMIN — METOCLOPRAMIDE 5 MG: 5 TABLET ORAL at 09:12

## 2024-12-10 RX ADMIN — HYDROMORPHONE HYDROCHLORIDE 1 MG: 1 INJECTION, SOLUTION INTRAMUSCULAR; INTRAVENOUS; SUBCUTANEOUS at 02:12

## 2024-12-10 RX ADMIN — METOCLOPRAMIDE 5 MG: 5 TABLET ORAL at 08:12

## 2024-12-10 RX ADMIN — SODIUM HYPOCHLORITE: 1.25 SOLUTION TOPICAL at 09:12

## 2024-12-10 RX ADMIN — OXYCODONE HYDROCHLORIDE 10 MG: 10 TABLET ORAL at 02:12

## 2024-12-10 RX ADMIN — GABAPENTIN 100 MG: 100 CAPSULE ORAL at 08:12

## 2024-12-10 RX ADMIN — FAMOTIDINE 20 MG: 20 TABLET, FILM COATED ORAL at 08:12

## 2024-12-10 RX ADMIN — LEVOTHYROXINE SODIUM 150 MCG: 150 TABLET ORAL at 06:12

## 2024-12-10 RX ADMIN — DIAZEPAM 5 MG: 5 TABLET ORAL at 08:12

## 2024-12-10 RX ADMIN — DOXYCYCLINE HYCLATE 100 MG: 100 TABLET, COATED ORAL at 08:12

## 2024-12-10 RX ADMIN — ENOXAPARIN SODIUM 40 MG: 100 INJECTION SUBCUTANEOUS at 05:12

## 2024-12-10 RX ADMIN — OXYCODONE HYDROCHLORIDE 10 MG: 10 TABLET ORAL at 08:12

## 2024-12-10 RX ADMIN — PANCRELIPASE LIPASE, PANCRELIPASE PROTEASE, PANCRELIPASE AMYLASE 2 CAPSULE: 10000; 32000; 42000 CAPSULE, DELAYED RELEASE ORAL at 08:12

## 2024-12-10 RX ADMIN — LURASIDONE HYDROCHLORIDE 40 MG: 40 TABLET, FILM COATED ORAL at 08:12

## 2024-12-10 RX ADMIN — FLUOXETINE HYDROCHLORIDE 20 MG: 20 CAPSULE ORAL at 08:12

## 2024-12-10 RX ADMIN — INSULIN GLARGINE 16 UNITS: 100 INJECTION, SOLUTION SUBCUTANEOUS at 08:12

## 2024-12-10 RX ADMIN — PANCRELIPASE LIPASE, PANCRELIPASE PROTEASE, PANCRELIPASE AMYLASE 2 CAPSULE: 10000; 32000; 42000 CAPSULE, DELAYED RELEASE ORAL at 01:12

## 2024-12-10 NOTE — ASSESSMENT & PLAN NOTE
12/3 KD:  Orders placed for General surgery wound debridement consult  12/5 KD: Cont. Daily WC per GS recs.  12/8 wound dressing change, f/u GS recommendations  12/10 KD: Doxy 100 mg BID with no end date started. WC BID to sacral wound continuing prior wound care orders.

## 2024-12-10 NOTE — SUBJECTIVE & OBJECTIVE
Interval History: patient seen and examined.     Review of Systems   Unable to perform ROS: Other     Objective:     Vital Signs (Most Recent):  Temp: 97.8 °F (36.6 °C) (12/09/24 1927)  Pulse: 100 (12/10/24 0737)  Resp: 16 (12/10/24 0345)  BP: 126/60 (12/10/24 0737)  SpO2: 98 % (12/10/24 0737) Vital Signs (24h Range):  Temp:  [97.8 °F (36.6 °C)-98 °F (36.7 °C)] 97.8 °F (36.6 °C)  Pulse:  [] 100  Resp:  [16-20] 16  SpO2:  [97 %-100 %] 98 %  BP: ()/(53-69) 126/60     Weight: 67.9 kg (149 lb 11.1 oz)  Body mass index is 23.45 kg/m².    Intake/Output Summary (Last 24 hours) at 12/10/2024 0831  Last data filed at 12/10/2024 0631  Gross per 24 hour   Intake 545 ml   Output 2450 ml   Net -1905 ml         Physical Exam  Vitals and nursing note reviewed.   Constitutional:       General: He is not in acute distress.     Appearance: He is not ill-appearing or toxic-appearing.   Cardiovascular:      Rate and Rhythm: Normal rate and regular rhythm.   Pulmonary:      Effort: Pulmonary effort is normal. No respiratory distress.      Breath sounds: No wheezing.   Abdominal:      General: Abdomen is flat. There is no distension.      Palpations: Abdomen is soft.      Tenderness: There is abdominal tenderness (mild grimace at epigastric region). There is no guarding.   Musculoskeletal:      Right lower leg: No edema.      Left lower leg: No edema.   Skin:     General: Skin is warm and dry.      Capillary Refill: Capillary refill takes less than 2 seconds.   Neurological:      Mental Status: Mental status is at baseline.   Psychiatric:      Comments: tearful             Significant Labs: All pertinent labs within the past 24 hours have been reviewed.    Recent Labs   Lab 12/07/24  0603 12/08/24  0545 12/08/24  1301 12/09/24  0528 12/10/24  0516   BILIDIR  --   --  0.3  --   --    BILITOT 0.2 0.4 0.5 0.5 0.4     Recent Labs   Lab 12/10/24  0516   *      K 4.1      CO2 33*   BUN 14   CREATININE 0.8    CALCIUM 10.7*   MG 1.7     CBC:   Recent Labs   Lab 12/09/24  0528 12/10/24  0516   WBC 11.67 11.29   HGB 7.0* 7.2*   HCT 23.3* 23.6*   * 541*     CMP:   Recent Labs   Lab 12/08/24  1301 12/09/24  0528 12/10/24  0516   NA  --  143 142   K  --  3.9 4.1   CL  --  105 104   CO2  --  33* 33*   GLU  --  106 214*   BUN  --  12 14   CREATININE  --  0.8 0.8   CALCIUM  --  9.1 10.7*   PROT 6.7 6.3 7.0   ALBUMIN 1.0* 0.7* 0.8*   BILITOT 0.5 0.5 0.4   ALKPHOS 400* 362* 450*   * 399* 180*   * 136* 96*   ANIONGAP  --  5 5     Recent Labs   Lab 12/09/24  0528 12/10/24  0516   MG 1.6 1.7     POCT Glucose:   Recent Labs   Lab 12/09/24  1205 12/09/24  1704 12/09/24  2115   POCTGLUCOSE 152* 208* 248*     X-Ray Abdomen AP 1 View  Narrative: EXAMINATION:  XR ABDOMEN AP 1 VIEW    CLINICAL HISTORY:  gtube placement;    COMPARISON:  None    FINDINGS:  A gastrostomy tube is noted.  There is contrast material within the stomach.  No extraluminal contrast identified.  No dilated bowel.  Impression: As above.    Electronically signed by: Rober Ramachandran MD  Date:    12/04/2024  Time:    09:27    X-Ray Abdomen AP 1 View  Narrative: EXAMINATION:  XR ABDOMEN AP 1 VIEW    CLINICAL HISTORY:  Residuals 750ml of formula;    COMPARISON:  03/16/2024    FINDINGS:  No dilated bowel identified.  A catheter projects over the pelvis.  No acute osseous finding.  Impression: Nonobstructive bowel gas pattern.    Electronically signed by: Rober Ramachandran MD  Date:    12/04/2024  Time:    07:38     Significant Imaging: I have reviewed all pertinent imaging results/findings within the past 24 hours.

## 2024-12-10 NOTE — PROGRESS NOTES
[facility]  Progress Note    Patient Name: Carlos Chahal  MRN: [id number]  Patient Class: IP- Inpatient   Admission Date: 11/29/2024  Length of Stay: 11 days  Attending Physician: Kim Diamond MD  Primary Care Provider: Jose Francisco Klein MD        Subjective     Principal Problem:Altered mental status        HPI:  Patient 34-year-old male history of bipolar disorder, diabetes, history of anoxic brain injury, GERD, neuropathy, brought to the ED due to decreased level of consciousness, patient was evaluated in the ED was noted to have UTI, no leukocytosis, H&H 7.5/25.8, patient was started on IV antibiotic, blood cultures pending, admitted    Overview/Hospital Course:  12/1 AA, weekend crosscover, patient anemic, 1 unit packed red blood cell ordered, urine culture Gram-negative rods, sensitivities pending, on IV antibiotic, renal function stable, post H&H once transfusion completed  12/2 KD:  Patient lying in bed with decreased LOC, H&H stable.  IV merum continued.  WC consulted to continue wound care orders.  Additional iron labs ordered.  12/3 KD:  Patient awake and alert lying in bed with no distress.  Continue IV antibiotics.  New orders for general surgical consult for wound debridement.  12/4 KD:  Patient awake and alert.  KUB ordered to confirm G-tube placement.  Hold feedings until placement is confirmed.    12/5 KD:  Pt.  Awake and alert.  Glucerna @ 10 cc/hr continuous feedings.  Nutritional consult today. Cont. Wound care per GS recs.   12/6 KD:  Awake and alert.  Continue titrating feedings as tolerated upon awaiting nutritional consult.  Continue wound care per GS rec  12/7 [place] weekend crossover. Nursing home resident with anoxic brain injury, diabetes, PEG dependent with ileostomy undergoing treatment for MDRO urinary tract infection and chronic perineal and sacral wounds. Hg/HCT stable, 7.8/25.6 (s/p 1U PRBC 12/1/24). Reported no urine output and minimal output to  ileostomy yesterday. Currently tolerating tube feeds with urinary Rojas catheter draining clear urine. Electrolytes within normal limits, liver enzymes elevated along with alk phos. Patient in no acute distress, non tender abdomen, soft throughout. Will hold statin and monitor LFTs. Continue with IV antibiotics, strict I/O, daily wound cleanse and care.  12/8 [place] weekend crossover. Overnight vital signs normotensive, stable. No reported events overnight. Post tube feed start, residual measure at >200 mL with >600 mL output into ileostomy. Tube feed discontinued. Elevated LFTs AST 1029, , Alk phos 423, Tbili 0.4. Also associated with mild leukocytosis 12k Renal function normal. No evidence of acute abdomen on physical exam. Rojas catheter draining yellow urine. Will discontinue merrem concerning acute liver failure, give bolus IVF, monitor residual.   12/9 KD: Awake, alert. Feedings d/c. Liver enzymes elevated. , , . H&H 7/28.3. Monitor for need of transfusion. WBC 14.37 elevated on 12/8, today WBC 11.67. CT-ABD today.   12/10 KD: Lying in bed with no complaints. Glucerna 1.5 c. @ 20cc/hr cont. Feeding, tolerating well with no residual. Goal to increase 5cc/hr to reach 45cc/hr. Doxy 100 mg BID with no end date started. WC BID to sacral wound continuing prior wound care orders.     Interval History: patient seen and examined.     Review of Systems   Unable to perform ROS: Other     Objective:     Vital Signs (Most Recent):  Temp: 97.8 °F (36.6 °C) (12/09/24 1927)  Pulse: 100 (12/10/24 0737)  Resp: 16 (12/10/24 0345)  BP: 126/60 (12/10/24 0737)  SpO2: 98 % (12/10/24 0737) Vital Signs (24h Range):  Temp:  [97.8 °F (36.6 °C)-98 °F (36.7 °C)] 97.8 °F (36.6 °C)  Pulse:  [] 100  Resp:  [16-20] 16  SpO2:  [97 %-100 %] 98 %  BP: ()/(53-69) 126/60     Weight: 67.9 kg (149 lb 11.1 oz)  Body mass index is 23.45 kg/m².    Intake/Output Summary (Last 24 hours) at 12/10/2024 0831  Last data  filed at 12/10/2024 0631  Gross per 24 hour   Intake 545 ml   Output 2450 ml   Net -1905 ml         Physical Exam  Vitals and nursing note reviewed.   Constitutional:       General: He is not in acute distress.     Appearance: He is not ill-appearing or toxic-appearing.   Cardiovascular:      Rate and Rhythm: Normal rate and regular rhythm.   Pulmonary:      Effort: Pulmonary effort is normal. No respiratory distress.      Breath sounds: No wheezing.   Abdominal:      General: Abdomen is flat. There is no distension.      Palpations: Abdomen is soft.      Tenderness: There is abdominal tenderness (mild grimace at epigastric region). There is no guarding.   Musculoskeletal:      Right lower leg: No edema.      Left lower leg: No edema.   Skin:     General: Skin is warm and dry.      Capillary Refill: Capillary refill takes less than 2 seconds.   Neurological:      Mental Status: Mental status is at baseline.   Psychiatric:      Comments: tearful             Significant Labs: All pertinent labs within the past 24 hours have been reviewed.    Recent Labs   Lab 12/07/24  0603 12/08/24  0545 12/08/24  1301 12/09/24  0528 12/10/24  0516   BILIDIR  --   --  0.3  --   --    BILITOT 0.2 0.4 0.5 0.5 0.4     Recent Labs   Lab 12/10/24  0516   *      K 4.1      CO2 33*   BUN 14   CREATININE 0.8   CALCIUM 10.7*   MG 1.7     CBC:   Recent Labs   Lab 12/09/24  0528 12/10/24  0516   WBC 11.67 11.29   HGB 7.0* 7.2*   HCT 23.3* 23.6*   * 541*     CMP:   Recent Labs   Lab 12/08/24  1301 12/09/24  0528 12/10/24  0516   NA  --  143 142   K  --  3.9 4.1   CL  --  105 104   CO2  --  33* 33*   GLU  --  106 214*   BUN  --  12 14   CREATININE  --  0.8 0.8   CALCIUM  --  9.1 10.7*   PROT 6.7 6.3 7.0   ALBUMIN 1.0* 0.7* 0.8*   BILITOT 0.5 0.5 0.4   ALKPHOS 400* 362* 450*   * 399* 180*   * 136* 96*   ANIONGAP  --  5 5     Recent Labs   Lab 12/09/24  0528 12/10/24  0516   MG 1.6 1.7     POCT Glucose:    Recent Labs   Lab 12/09/24  1205 12/09/24  1704 12/09/24  2115   POCTGLUCOSE 152* 208* 248*     X-Ray Abdomen AP 1 View  Narrative: EXAMINATION:  XR ABDOMEN AP 1 VIEW    CLINICAL HISTORY:  gtube placement;    COMPARISON:  None    FINDINGS:  A gastrostomy tube is noted.  There is contrast material within the stomach.  No extraluminal contrast identified.  No dilated bowel.  Impression: As above.    Electronically signed by: Rober Ramachandran MD  Date:    12/04/2024  Time:    09:27    X-Ray Abdomen AP 1 View  Narrative: EXAMINATION:  XR ABDOMEN AP 1 VIEW    CLINICAL HISTORY:  Residuals 750ml of formula;    COMPARISON:  03/16/2024    FINDINGS:  No dilated bowel identified.  A catheter projects over the pelvis.  No acute osseous finding.  Impression: Nonobstructive bowel gas pattern.    Electronically signed by: Rober Ramachandran MD  Date:    12/04/2024  Time:    07:38     Significant Imaging: I have reviewed all pertinent imaging results/findings within the past 24 hours.    Assessment and Plan     * Altered mental status  Altered mental status secondary to UTI, symptoms improved after IV fluids, antibiotics on board follow-up with culture report  12/2 KD:  Continue IV meropenem amlodipine when g every 8 hours  12/7/ no leukocytosis, no elevated temperature, continue D9 merrem for MDRO uti  12/8 leukocytosis 12k, vitals signs stable, elevated LFT >1000, f/u hepatic study after IVF  12/9 KD: Liver enzymes elevated. , , . H&H 7/28.3. Monitor for need of transfusion. WBC 14.37 elevated on 12/8, today WBC 11.67. CT-ABD today      Sacral wound    12/3 KD:  Orders placed for General surgery wound debridement consult  12/5 KD: Cont. Daily WC per GS recs.  12/8 wound dressing change, f/u GS recommendations  12/10 KD: Doxy 100 mg BID with no end date started. WC BID to sacral wound continuing prior wound care orders.     Open wound of groin  12/2 KD:  Hx fourier's gangrene.  Wound care consult to continue WC during  hospital stay.  12/5 KD: Cont. Daily Wound care per GS recs.  12/10 KD: Doxy 100 mg BID with no end date started. WC BID to sacral wound continuing prior wound care orders.         Thrombocytosis  Likely related to iron-deficiency anemia, transfusion ordered, will start on iron supplement  12/2 KD:  H&H stable  12/9 KD: WBC 14.37 elevated on 12/8, today WBC 11.67. CT-ABD today   12/10 KD: Doxy 100 mg BID with no end date started. WC BID to sacral wound continuing prior wound care orders.       Microcytic anemia  Anemia is likely due to Iron deficiency. Most recent hemoglobin and hematocrit are listed below.  Recent Labs     12/08/24  0545 12/09/24  0528 12/10/24  0516   HGB 7.4* 7.0* 7.2*   HCT 24.3* 23.3* 23.6*       Plan  - Monitor serial CBC: Daily  - Transfuse PRBC if patient becomes hemodynamically unstable, symptomatic or H/H drops below 7/21.  - Patient has not received any PRBC transfusions to date, order placed to receive 1 unit today  - Patient's anemia is currently stable  12/2 KD:  Iron panel ordered  12/6 KD: H&H stable  12/9 KD: H&H 7/28.3. Monitor for need of transfusion.   12/10 KD: H&H 7.2/23.6    History of anoxic brain injury  Patient with a history of anoxic brain injury, chronic indwelling Rojas catheter, contributing to increased UTI risks, history of multidrug resistant organisms, ESBL, patient was started on meropenem pending culture follow-up      Urinary tract infection associated with indwelling urethral catheter  Patient was found to have altered mental status at nursing home, limited response, lethargic, brought to the ED for evaluation no leukocytosis, afebrile, patient was noted to have positive UA, chronic indwelling Rojas catheter, started on IV antibiotic, blood cultures pending urine culture pending, follow-up with result  12/2 KD:  Continue IV meropenum 1g Q 8 hrs,   12/7 continue D9 abx therapy, monitor I/Os   12/8 discontinue merrem concern with acute liver failure, IVF NS bolus  x1L  12/10 KD: Doxy 100 mg BID with no end date started. WC BID to sacral wound continuing prior wound care orders.     Vomiting    12/4 KD:  Hold tube feedings until G-tube placement has been confirmed.  KUB ordered today  12/5 KD: Glucernia 10cc/hr cont. Feedings. Nutrition consult today.  12/6 KD: Continue titrating feedings as tolerated upon awaiting nutritional consult.  Continue wound care per GS rec  12/8 residual over >200 mL, tube feeding discontinued, no emesis  12/9 KD: cont. D/c feedings d/t residual. CT-ABD today.  12/10 KD: Glucerna 1.5 c. @ 20cc/hr cont. Feeding, tolerating well with no residual. Goal to increase 5cc/hr to reach 45cc/hr.    Type 1 diabetes  Patient's FSGs are uncontrolled due to hyperglycemia on current medication regimen.  Last A1c reviewed-   Lab Results   Component Value Date    HGBA1C 12.4 (H) 01/03/2024     Most recent fingerstick glucose reviewed-   Recent Labs   Lab 12/09/24  1205 12/09/24  1704 12/09/24  2115   POCTGLUCOSE 152* 208* 248*       Current correctional scale  Low  Maintain anti-hyperglycemic dose as follows-   Antihyperglycemics (From admission, onward)      Start     Stop Route Frequency Ordered    12/03/24 2356  insulin aspart U-100 pen 0-10 Units         -- SubQ Every 6 hours PRN 12/03/24 2300    12/03/24 2100  insulin glargine U-100 (Lantus) pen 16 Units         -- SubQ Nightly 12/03/24 1242          Hold Oral hypoglycemics while patient is in the hospital.      VTE Risk Mitigation (From admission, onward)           Ordered     enoxaparin injection 40 mg  Daily         11/29/24 1536     Place sequential compression device  Until discontinued         11/29/24 1536                    Discharge Planning   JOSE MANUEL:      Code Status: Full Code   Medical Readiness for Discharge Date: Treatment  Discharge Plan A: Return to nursing home (Pt is a care home resident at [facility].)   Discharge Delays: None known at this time                    STEPHEN GRIGSBY,  NP  Department of Hospital Medicine   [facility]

## 2024-12-10 NOTE — ASSESSMENT & PLAN NOTE
Patient's FSGs are uncontrolled due to hyperglycemia on current medication regimen.  Last A1c reviewed-   Lab Results   Component Value Date    HGBA1C 12.4 (H) 01/03/2024     Most recent fingerstick glucose reviewed-   Recent Labs   Lab 12/09/24  1205 12/09/24  1704 12/09/24  2115   POCTGLUCOSE 152* 208* 248*       Current correctional scale  Low  Maintain anti-hyperglycemic dose as follows-   Antihyperglycemics (From admission, onward)    Start     Stop Route Frequency Ordered    12/03/24 2356  insulin aspart U-100 pen 0-10 Units         -- SubQ Every 6 hours PRN 12/03/24 2300    12/03/24 2100  insulin glargine U-100 (Lantus) pen 16 Units         -- SubQ Nightly 12/03/24 1242        Hold Oral hypoglycemics while patient is in the hospital.

## 2024-12-10 NOTE — PLAN OF CARE
Plan of care reviewed and ongoing with patient. 22 gauge IV to R FA saline locked/flushes well, room air tolerating well. PEG intact with Glucerna 1.2 [place] continuous feedings by pump with a rate of 20 mL/hr, tolerated medications crushed through PEG. No residual noted during ordered checks during the night. Ostomy to LLQ passing flatus and loose-liquid stool, appliance changed during the night. Stoma and site care completed. 16 Fr varghese catheter in place, draining properly into drainage device free of kinks/loops. Wounds to groin area noted. Call light and personal items within reach, frequent checks and turning completed during the night.         Problem: Adult Inpatient Plan of Care  Goal: Plan of Care Review  Outcome: Progressing  Goal: Absence of Hospital-Acquired Illness or Injury  Outcome: Progressing     Problem: Skin Injury Risk Increased  Goal: Skin Health and Integrity  Outcome: Not Progressing     Problem: Infection  Goal: Absence of Infection Signs and Symptoms  Outcome: Not Progressing     Problem: Adult Inpatient Plan of Care  Goal: Patient-Specific Goal (Individualized)  Outcome: Not Progressing  Goal: Optimal Comfort and Wellbeing  Outcome: Not Progressing  Goal: Readiness for Transition of Care  Outcome: Not Progressing     Problem: Diabetes Comorbidity  Goal: Blood Glucose Level Within Targeted Range  Outcome: Not Progressing     Problem: Wound  Goal: Optimal Coping  Outcome: Not Progressing  Goal: Optimal Functional Ability  Outcome: Not Progressing  Goal: Absence of Infection Signs and Symptoms  Outcome: Not Progressing  Goal: Improved Oral Intake  Outcome: Not Progressing  Goal: Optimal Pain Control and Function  Outcome: Not Progressing  Goal: Skin Health and Integrity  Outcome: Not Progressing  Goal: Optimal Wound Healing  Outcome: Not Progressing     Problem: Fall Injury Risk  Goal: Absence of Fall and Fall-Related Injury  Outcome: Not Progressing

## 2024-12-10 NOTE — ASSESSMENT & PLAN NOTE
12/4 KD:  Hold tube feedings until G-tube placement has been confirmed.  KUB ordered today  12/5 KD: Glucernia 10cc/hr cont. Feedings. Nutrition consult today.  12/6 KD: Continue titrating feedings as tolerated upon awaiting nutritional consult.  Continue wound care per GS rec  12/8 residual over >200 mL, tube feeding discontinued, no emesis  12/9 KD: cont. D/c feedings d/t residual. CT-ABD today.  12/10 KD: Glucerna 1.5 c. @ 20cc/hr cont. Feeding, tolerating well with no residual. Goal to increase 5cc/hr to reach 45cc/hr.

## 2024-12-10 NOTE — PLAN OF CARE
[facility]  Discharge Reassessment    Primary Care Provider: Jose Francisco Klein MD    Expected Discharge Date:     Reassessment (most recent)       Discharge Reassessment - 12/10/24 1001          Discharge Reassessment    Assessment Type Discharge Planning Reassessment     Did the patient's condition or plan change since previous assessment? Yes     Communicated JOSE MANUEL with patient/caregiver Date not available/Unable to determine     Discharge Plan A Return to nursing home     Discharge Plan B Return to Nursing Home     DME Needed Upon Discharge  other (see comments)   TBD    Transition of Care Barriers None     Why the patient remains in the hospital Requires continued medical care        Post-Acute Status    Discharge Delays None known at this time                   Patient will return to [facility] [place] a [facility] resident once medically stable.

## 2024-12-10 NOTE — ASSESSMENT & PLAN NOTE
Patient was found to have altered mental status at nursing home, limited response, lethargic, brought to the ED for evaluation no leukocytosis, afebrile, patient was noted to have positive UA, chronic indwelling Rojas catheter, started on IV antibiotic, blood cultures pending urine culture pending, follow-up with result  12/2 KD:  Continue IV meropenum 1g Q 8 hrs,   12/7 continue D9 abx therapy, monitor I/Os   12/8 discontinue merrem concern with acute liver failure, IVF NS bolus x1L  12/10 KD: Doxy 100 mg BID with no end date started. WC BID to sacral wound continuing prior wound care orders.

## 2024-12-10 NOTE — ASSESSMENT & PLAN NOTE
Likely related to iron-deficiency anemia, transfusion ordered, will start on iron supplement  12/2 KD:  H&H stable  12/9 KD: WBC 14.37 elevated on 12/8, today WBC 11.67. CT-ABD today   12/10 KD: Doxy 100 mg BID with no end date started. WC BID to sacral wound continuing prior wound care orders.

## 2024-12-10 NOTE — ASSESSMENT & PLAN NOTE
Anemia is likely due to Iron deficiency. Most recent hemoglobin and hematocrit are listed below.  Recent Labs     12/08/24  0545 12/09/24  0528 12/10/24  0516   HGB 7.4* 7.0* 7.2*   HCT 24.3* 23.3* 23.6*       Plan  - Monitor serial CBC: Daily  - Transfuse PRBC if patient becomes hemodynamically unstable, symptomatic or H/H drops below 7/21.  - Patient has not received any PRBC transfusions to date, order placed to receive 1 unit today  - Patient's anemia is currently stable  12/2 KD:  Iron panel ordered  12/6 KD: H&H stable  12/9 KD: H&H 7/28.3. Monitor for need of transfusion.   12/10 KD: H&H 7.2/23.6

## 2024-12-11 LAB
ALBUMIN SERPL BCP-MCNC: 0.9 G/DL (ref 3.5–5.2)
ALP SERPL-CCNC: 427 U/L (ref 55–135)
ALT SERPL W/O P-5'-P-CCNC: 63 U/L (ref 10–44)
ANION GAP SERPL CALC-SCNC: 4 MMOL/L (ref 3–11)
AST SERPL-CCNC: 73 U/L (ref 10–40)
BASOPHILS # BLD AUTO: 0.01 K/UL (ref 0–0.2)
BASOPHILS NFR BLD: 0.1 % (ref 0–1.9)
BILIRUB SERPL-MCNC: 0.1 MG/DL (ref 0.1–1)
BUN SERPL-MCNC: 21 MG/DL (ref 6–20)
CALCIUM SERPL-MCNC: 10.3 MG/DL (ref 8.7–10.5)
CHLORIDE SERPL-SCNC: 104 MMOL/L (ref 95–110)
CO2 SERPL-SCNC: 32 MMOL/L (ref 23–29)
CREAT SERPL-MCNC: 1.1 MG/DL (ref 0.5–1.4)
DIFFERENTIAL METHOD BLD: ABNORMAL
EOSINOPHIL # BLD AUTO: 0.3 K/UL (ref 0–0.5)
EOSINOPHIL NFR BLD: 3.6 % (ref 0–8)
ERYTHROCYTE [DISTWIDTH] IN BLOOD BY AUTOMATED COUNT: 15.2 % (ref 11.5–14.5)
EST. GFR  (NO RACE VARIABLE): >60 ML/MIN/1.73 M^2
GLUCOSE SERPL-MCNC: 128 MG/DL (ref 70–110)
GLUCOSE SERPL-MCNC: 171 MG/DL (ref 70–110)
HCT VFR BLD AUTO: 23.2 % (ref 40–54)
HGB BLD-MCNC: 6.9 G/DL (ref 14–18)
IMM GRANULOCYTES # BLD AUTO: 0.04 K/UL (ref 0–0.04)
IMM GRANULOCYTES NFR BLD AUTO: 0.4 % (ref 0–0.5)
LYMPHOCYTES # BLD AUTO: 1.2 K/UL (ref 1–4.8)
LYMPHOCYTES NFR BLD: 13.7 % (ref 18–48)
MAGNESIUM SERPL-MCNC: 1.6 MG/DL (ref 1.6–2.6)
MCH RBC QN AUTO: 25.8 PG (ref 27–31)
MCHC RBC AUTO-ENTMCNC: 29.7 G/DL (ref 32–36)
MCV RBC AUTO: 87 FL (ref 82–98)
MONOCYTES # BLD AUTO: 1 K/UL (ref 0.3–1)
MONOCYTES NFR BLD: 11.7 % (ref 4–15)
NEUTROPHILS # BLD AUTO: 6.3 K/UL (ref 1.8–7.7)
NEUTROPHILS NFR BLD: 70.5 % (ref 38–73)
NRBC BLD-RTO: 0 /100 WBC
PLATELET # BLD AUTO: 460 K/UL (ref 150–450)
PMV BLD AUTO: 9.8 FL (ref 9.2–12.9)
POCT GLUCOSE: 133 MG/DL (ref 70–110)
POCT GLUCOSE: 236 MG/DL (ref 70–110)
POCT GLUCOSE: 282 MG/DL (ref 70–110)
POCT GLUCOSE: 31 MG/DL (ref 70–110)
POCT GLUCOSE: 32 MG/DL (ref 70–110)
POTASSIUM SERPL-SCNC: 4 MMOL/L (ref 3.5–5.1)
PROT SERPL-MCNC: 6.8 G/DL (ref 6–8.4)
RBC # BLD AUTO: 2.67 M/UL (ref 4.6–6.2)
SODIUM SERPL-SCNC: 140 MMOL/L (ref 136–145)
WBC # BLD AUTO: 8.92 K/UL (ref 3.9–12.7)

## 2024-12-11 PROCEDURE — 83735 ASSAY OF MAGNESIUM: CPT | Performed by: INTERNAL MEDICINE

## 2024-12-11 PROCEDURE — 99233 SBSQ HOSP IP/OBS HIGH 50: CPT | Mod: ,,, | Performed by: STUDENT IN AN ORGANIZED HEALTH CARE EDUCATION/TRAINING PROGRAM

## 2024-12-11 PROCEDURE — 85025 COMPLETE CBC W/AUTO DIFF WBC: CPT | Performed by: INTERNAL MEDICINE

## 2024-12-11 PROCEDURE — 36415 COLL VENOUS BLD VENIPUNCTURE: CPT | Performed by: INTERNAL MEDICINE

## 2024-12-11 PROCEDURE — 21400001 HC TELEMETRY ROOM

## 2024-12-11 PROCEDURE — 27000207 HC ISOLATION

## 2024-12-11 PROCEDURE — 80053 COMPREHEN METABOLIC PANEL: CPT | Performed by: INTERNAL MEDICINE

## 2024-12-11 PROCEDURE — 82947 ASSAY GLUCOSE BLOOD QUANT: CPT | Performed by: INTERNAL MEDICINE

## 2024-12-11 PROCEDURE — 25000003 PHARM REV CODE 250: Performed by: INTERNAL MEDICINE

## 2024-12-11 PROCEDURE — 63600175 PHARM REV CODE 636 W HCPCS: Performed by: INTERNAL MEDICINE

## 2024-12-11 PROCEDURE — 25000003 PHARM REV CODE 250: Performed by: STUDENT IN AN ORGANIZED HEALTH CARE EDUCATION/TRAINING PROGRAM

## 2024-12-11 RX ORDER — SILVER NITRATE 38.21; 12.74 MG/1; MG/1
1 STICK TOPICAL ONCE
Status: COMPLETED | OUTPATIENT
Start: 2024-12-11 | End: 2024-12-11

## 2024-12-11 RX ADMIN — METOCLOPRAMIDE 5 MG: 5 TABLET ORAL at 11:12

## 2024-12-11 RX ADMIN — LEVOTHYROXINE SODIUM 150 MCG: 150 TABLET ORAL at 06:12

## 2024-12-11 RX ADMIN — SILVER NITRATE APPLICATORS 1 APPLICATOR: 25; 75 STICK TOPICAL at 04:12

## 2024-12-11 RX ADMIN — HYDROMORPHONE HYDROCHLORIDE 1 MG: 1 INJECTION, SOLUTION INTRAMUSCULAR; INTRAVENOUS; SUBCUTANEOUS at 09:12

## 2024-12-11 RX ADMIN — METOCLOPRAMIDE 5 MG: 5 TABLET ORAL at 09:12

## 2024-12-11 RX ADMIN — ENOXAPARIN SODIUM 40 MG: 100 INJECTION SUBCUTANEOUS at 05:12

## 2024-12-11 RX ADMIN — GABAPENTIN 100 MG: 100 CAPSULE ORAL at 09:12

## 2024-12-11 RX ADMIN — LURASIDONE HYDROCHLORIDE 40 MG: 40 TABLET, FILM COATED ORAL at 09:12

## 2024-12-11 RX ADMIN — DOXYCYCLINE HYCLATE 100 MG: 100 TABLET, COATED ORAL at 09:12

## 2024-12-11 RX ADMIN — FLUOXETINE HYDROCHLORIDE 20 MG: 20 CAPSULE ORAL at 09:12

## 2024-12-11 RX ADMIN — DEXTROSE MONOHYDRATE 250 ML: 100 INJECTION, SOLUTION INTRAVENOUS at 10:12

## 2024-12-11 RX ADMIN — FAMOTIDINE 20 MG: 20 TABLET, FILM COATED ORAL at 09:12

## 2024-12-11 RX ADMIN — FAMOTIDINE 20 MG: 20 TABLET, FILM COATED ORAL at 11:12

## 2024-12-11 RX ADMIN — GABAPENTIN 100 MG: 100 CAPSULE ORAL at 11:12

## 2024-12-11 RX ADMIN — SODIUM HYPOCHLORITE: 1.25 SOLUTION TOPICAL at 09:12

## 2024-12-11 RX ADMIN — ASPIRIN 81 MG CHEWABLE TABLET 81 MG: 81 TABLET CHEWABLE at 09:12

## 2024-12-11 RX ADMIN — PANCRELIPASE LIPASE, PANCRELIPASE PROTEASE, PANCRELIPASE AMYLASE 2 CAPSULE: 10000; 32000; 42000 CAPSULE, DELAYED RELEASE ORAL at 05:12

## 2024-12-11 RX ADMIN — DOXYCYCLINE HYCLATE 100 MG: 100 TABLET, COATED ORAL at 11:12

## 2024-12-11 RX ADMIN — POLYETHYLENE GLYCOL (3350) 17 G: 17 POWDER, FOR SOLUTION ORAL at 09:12

## 2024-12-11 RX ADMIN — DIAZEPAM 5 MG: 5 TABLET ORAL at 09:12

## 2024-12-11 NOTE — PLAN OF CARE
12/11/24 1407   Medicare Message   Important Message from Medicare regarding Discharge Appeal Rights Given to patient/caregiver;Explained to patient/caregiver;Signed/date by patient/caregiver   Date IMM was signed 12/11/24   Time IMM was signed [id number]

## 2024-12-11 NOTE — SUBJECTIVE & OBJECTIVE
Interval History: patient seen and examined.     Review of Systems   Unable to perform ROS: Other     Objective:     Vital Signs (Most Recent):  Temp: 98.6 °F (37 °C) (12/11/24 0715)  Pulse: 88 (12/11/24 0715)  Resp: 18 (12/11/24 0715)  BP: 117/73 (12/11/24 0715)  SpO2: 100 % (12/11/24 0715) Vital Signs (24h Range):  Temp:  [97 °F (36.1 °C)-98.9 °F (37.2 °C)] 98.6 °F (37 °C)  Pulse:  [] 88  Resp:  [18-24] 18  SpO2:  [96 %-100 %] 100 %  BP: (106-123)/(59-73) 117/73     Weight: 67.9 kg (149 lb 11.1 oz)  Body mass index is 23.45 kg/m².    Intake/Output Summary (Last 24 hours) at 12/11/2024 0843  Last data filed at 12/10/2024 2008  Gross per 24 hour   Intake --   Output 350 ml   Net -350 ml         Physical Exam  Vitals and nursing note reviewed.   Constitutional:       General: He is not in acute distress.     Appearance: He is not ill-appearing or toxic-appearing.   Cardiovascular:      Rate and Rhythm: Normal rate and regular rhythm.   Pulmonary:      Effort: Pulmonary effort is normal. No respiratory distress.      Breath sounds: No wheezing.   Abdominal:      General: Abdomen is flat. There is no distension.      Palpations: Abdomen is soft.      Tenderness: There is abdominal tenderness (mild grimace at epigastric region). There is no guarding.   Musculoskeletal:      Right lower leg: No edema.      Left lower leg: No edema.   Skin:     General: Skin is warm and dry.      Capillary Refill: Capillary refill takes less than 2 seconds.   Neurological:      Mental Status: Mental status is at baseline.   Psychiatric:      Comments: tearful             Significant Labs: All pertinent labs within the past 24 hours have been reviewed.    Recent Labs   Lab 12/08/24  0545 12/08/24  1301 12/09/24  0528 12/10/24  0516 12/11/24  0530   BILIDIR  --  0.3  --   --   --    BILITOT 0.4 0.5 0.5 0.4 0.1     Recent Labs   Lab 12/11/24  0530   *      K 4.0      CO2 32*   BUN 21*   CREATININE 1.1   CALCIUM 10.3    MG 1.6     CBC:   Recent Labs   Lab 12/10/24  0516 12/11/24  0530   WBC 11.29 8.92   HGB 7.2* 6.9*   HCT 23.6* 23.2*   * 460*     CMP:   Recent Labs   Lab 12/10/24  0516 12/11/24  0530    140   K 4.1 4.0    104   CO2 33* 32*   * 171*   BUN 14 21*   CREATININE 0.8 1.1   CALCIUM 10.7* 10.3   PROT 7.0 6.8   ALBUMIN 0.8* 0.9*   BILITOT 0.4 0.1   ALKPHOS 450* 427*   * 73*   ALT 96* 63*   ANIONGAP 5 4     Recent Labs   Lab 12/10/24  0516 12/11/24  0530   MG 1.7 1.6     POCT Glucose:   Recent Labs   Lab 12/10/24  0118 12/10/24  1455 12/10/24  2035   POCTGLUCOSE 256* 265* 282*     X-Ray Abdomen AP 1 View  Narrative: EXAMINATION:  XR ABDOMEN AP 1 VIEW    CLINICAL HISTORY:  gtube placement;    COMPARISON:  None    FINDINGS:  A gastrostomy tube is noted.  There is contrast material within the stomach.  No extraluminal contrast identified.  No dilated bowel.  Impression: As above.    Electronically signed by: Rober Ramachandran MD  Date:    12/04/2024  Time:    09:27    X-Ray Abdomen AP 1 View  Narrative: EXAMINATION:  XR ABDOMEN AP 1 VIEW    CLINICAL HISTORY:  Residuals 750ml of formula;    COMPARISON:  03/16/2024    FINDINGS:  No dilated bowel identified.  A catheter projects over the pelvis.  No acute osseous finding.  Impression: Nonobstructive bowel gas pattern.    Electronically signed by: Rober Ramachandran MD  Date:    12/04/2024  Time:    07:38     Significant Imaging: I have reviewed all pertinent imaging results/findings within the past 24 hours.

## 2024-12-11 NOTE — PROGRESS NOTES
[facility] Medicine  Progress Note    Patient Name: Carlos Chahal  MRN: [id number]  Patient Class: IP- Inpatient   Admission Date: 11/29/2024  Length of Stay: 12 days  Attending Physician: Kim Diamond MD  Primary Care Provider: Jose Francisco Klein MD        Subjective     Principal Problem:Altered mental status        HPI:  Patient 34-year-old male history of bipolar disorder, diabetes, history of anoxic brain injury, GERD, neuropathy, brought to the ED due to decreased level of consciousness, patient was evaluated in the ED was noted to have UTI, no leukocytosis, H&H 7.5/25.8, patient was started on IV antibiotic, blood cultures pending, admitted    Overview/Hospital Course:  12/1 AA, weekend crosscover, patient anemic, 1 unit packed red blood cell ordered, urine culture Gram-negative rods, sensitivities pending, on IV antibiotic, renal function stable, post H&H once transfusion completed  12/2 KD:  Patient lying in bed with decreased LOC, H&H stable.  IV merum continued.  WC consulted to continue wound care orders.  Additional iron labs ordered.  12/3 KD:  Patient awake and alert lying in bed with no distress.  Continue IV antibiotics.  New orders for general surgical consult for wound debridement.  12/4 KD:  Patient awake and alert.  KUB ordered to confirm G-tube placement.  Hold feedings until placement is confirmed.    12/5 KD:  Pt.  Awake and alert.  Glucerna @ 10 cc/hr continuous feedings.  Nutritional consult today. Cont. Wound care per GS recs.   12/6 KD:  Awake and alert.  Continue titrating feedings as tolerated upon awaiting nutritional consult.  Continue wound care per GS rec  12/7 [place] weekend crossover. Nursing home resident with anoxic brain injury, diabetes, PEG dependent with ileostomy undergoing treatment for MDRO urinary tract infection and chronic perineal and sacral wounds. Hg/HCT stable, 7.8/25.6 (s/p 1U PRBC 12/1/24). Reported no urine output and minimal output to  ileostomy yesterday. Currently tolerating tube feeds with urinary Rojas catheter draining clear urine. Electrolytes within normal limits, liver enzymes elevated along with alk phos. Patient in no acute distress, non tender abdomen, soft throughout. Will hold statin and monitor LFTs. Continue with IV antibiotics, strict I/O, daily wound cleanse and care.  12/8 [place] weekend crossover. Overnight vital signs normotensive, stable. No reported events overnight. Post tube feed start, residual measure at >200 mL with >600 mL output into ileostomy. Tube feed discontinued. Elevated LFTs AST 1029, , Alk phos 423, Tbili 0.4. Also associated with mild leukocytosis 12k Renal function normal. No evidence of acute abdomen on physical exam. Rojas catheter draining yellow urine. Will discontinue merrem concerning acute liver failure, give bolus IVF, monitor residual.   12/9 KD: Awake, alert. Feedings d/c. Liver enzymes elevated. , , . H&H 7/28.3. Monitor for need of transfusion. WBC 14.37 elevated on 12/8, today WBC 11.67. CT-ABD today.   12/10 KD: Lying in bed with no complaints. Glucerna 1.5 c. @ 20cc/hr cont. Feeding, tolerating well with no residual. Goal to increase 5cc/hr to reach 45cc/hr. Doxy 100 mg BID with no end date started. WC BID to sacral wound continuing prior wound care orders.   12/11 KD: Pt. Lying in bed no distress noted. Cont. Discharge planning for tomorrow back to [facility], if continues tolerating feedings.     Interval History: patient seen and examined.     Review of Systems   Unable to perform ROS: Other     Objective:     Vital Signs (Most Recent):  Temp: 98.6 °F (37 °C) (12/11/24 0715)  Pulse: 88 (12/11/24 0715)  Resp: 18 (12/11/24 0715)  BP: 117/73 (12/11/24 0715)  SpO2: 100 % (12/11/24 0715) Vital Signs (24h Range):  Temp:  [97 °F (36.1 °C)-98.9 °F (37.2 °C)] 98.6 °F (37 °C)  Pulse:  [] 88  Resp:  [18-24] 18  SpO2:  [96 %-100 %] 100 %  BP: (106-123)/(59-73) 117/73      Weight: 67.9 kg (149 lb 11.1 oz)  Body mass index is 23.45 kg/m².    Intake/Output Summary (Last 24 hours) at 12/11/2024 0843  Last data filed at 12/10/2024 2008  Gross per 24 hour   Intake --   Output 350 ml   Net -350 ml         Physical Exam  Vitals and nursing note reviewed.   Constitutional:       General: He is not in acute distress.     Appearance: He is not ill-appearing or toxic-appearing.   Cardiovascular:      Rate and Rhythm: Normal rate and regular rhythm.   Pulmonary:      Effort: Pulmonary effort is normal. No respiratory distress.      Breath sounds: No wheezing.   Abdominal:      General: Abdomen is flat. There is no distension.      Palpations: Abdomen is soft.      Tenderness: There is abdominal tenderness (mild grimace at epigastric region). There is no guarding.   Musculoskeletal:      Right lower leg: No edema.      Left lower leg: No edema.   Skin:     General: Skin is warm and dry.      Capillary Refill: Capillary refill takes less than 2 seconds.   Neurological:      Mental Status: Mental status is at baseline.   Psychiatric:      Comments: tearful             Significant Labs: All pertinent labs within the past 24 hours have been reviewed.    Recent Labs   Lab 12/08/24  0545 12/08/24  1301 12/09/24  0528 12/10/24  0516 12/11/24  0530   BILIDIR  --  0.3  --   --   --    BILITOT 0.4 0.5 0.5 0.4 0.1     Recent Labs   Lab 12/11/24  0530   *      K 4.0      CO2 32*   BUN 21*   CREATININE 1.1   CALCIUM 10.3   MG 1.6     CBC:   Recent Labs   Lab 12/10/24  0516 12/11/24  0530   WBC 11.29 8.92   HGB 7.2* 6.9*   HCT 23.6* 23.2*   * 460*     CMP:   Recent Labs   Lab 12/10/24  0516 12/11/24  0530    140   K 4.1 4.0    104   CO2 33* 32*   * 171*   BUN 14 21*   CREATININE 0.8 1.1   CALCIUM 10.7* 10.3   PROT 7.0 6.8   ALBUMIN 0.8* 0.9*   BILITOT 0.4 0.1   ALKPHOS 450* 427*   * 73*   ALT 96* 63*   ANIONGAP 5 4     Recent Labs   Lab 12/10/24  0516  12/11/24  0530   MG 1.7 1.6     POCT Glucose:   Recent Labs   Lab 12/10/24  0118 12/10/24  1455 12/10/24  2035   POCTGLUCOSE 256* 265* 282*     X-Ray Abdomen AP 1 View  Narrative: EXAMINATION:  XR ABDOMEN AP 1 VIEW    CLINICAL HISTORY:  gtube placement;    COMPARISON:  None    FINDINGS:  A gastrostomy tube is noted.  There is contrast material within the stomach.  No extraluminal contrast identified.  No dilated bowel.  Impression: As above.    Electronically signed by: Rober Ramachandran MD  Date:    12/04/2024  Time:    09:27    X-Ray Abdomen AP 1 View  Narrative: EXAMINATION:  XR ABDOMEN AP 1 VIEW    CLINICAL HISTORY:  Residuals 750ml of formula;    COMPARISON:  03/16/2024    FINDINGS:  No dilated bowel identified.  A catheter projects over the pelvis.  No acute osseous finding.  Impression: Nonobstructive bowel gas pattern.    Electronically signed by: Rober Ramachandran MD  Date:    12/04/2024  Time:    07:38     Significant Imaging: I have reviewed all pertinent imaging results/findings within the past 24 hours.    Assessment and Plan     * Altered mental status  Altered mental status secondary to UTI, symptoms improved after IV fluids, antibiotics on board follow-up with culture report  12/2 KD:  Continue IV meropenem amlodipine when g every 8 hours  12/7/ no leukocytosis, no elevated temperature, continue D9 merrem for MDRO uti  12/8 leukocytosis 12k, vitals signs stable, elevated LFT >1000, f/u hepatic study after IVF  12/9 KD: Liver enzymes elevated. , , . H&H 7/28.3. Monitor for need of transfusion. WBC 14.37 elevated on 12/8, today WBC 11.67. CT-ABD today      Sacral wound    12/3 KD:  Orders placed for General surgery wound debridement consult  12/5 KD: Cont. Daily WC per GS recs.  12/8 wound dressing change, f/u GS recommendations  12/10 KD: Doxy 100 mg BID with no end date started. WC BID to sacral wound continuing prior wound care orders.     Open wound of groin  12/2 KD:  Hx fourier's gangrene.   Wound care consult to continue WC during hospital stay.  12/5 KD: Cont. Daily Wound care per GS recs.  12/10 KD: Doxy 100 mg BID with no end date started. WC BID to sacral wound continuing prior wound care orders.         Thrombocytosis  Likely related to iron-deficiency anemia, transfusion ordered, will start on iron supplement  12/2 KD:  H&H stable  12/9 KD: WBC 14.37 elevated on 12/8, today WBC 11.67. CT-ABD today   12/10 KD: Doxy 100 mg BID with no end date started. WC BID to sacral wound continuing prior wound care orders.       Microcytic anemia  Anemia is likely due to Iron deficiency. Most recent hemoglobin and hematocrit are listed below.  Recent Labs     12/09/24  0528 12/10/24  0516 12/11/24  0530   HGB 7.0* 7.2* 6.9*   HCT 23.3* 23.6* 23.2*       Plan  - Monitor serial CBC: Daily  - Transfuse PRBC if patient becomes hemodynamically unstable, symptomatic or H/H drops below 7/21.  - Patient has not received any PRBC transfusions to date, order placed to receive 1 unit today  - Patient's anemia is currently stable  12/2 KD:  Iron panel ordered  12/6 KD: H&H stable  12/9 KD: H&H 7/28.3. Monitor for need of transfusion.   12/10 KD: H&H 7.2/23.6    History of anoxic brain injury  Patient with a history of anoxic brain injury, chronic indwelling Rojas catheter, contributing to increased UTI risks, history of multidrug resistant organisms, ESBL, patient was started on meropenem pending culture follow-up      Urinary tract infection associated with indwelling urethral catheter  Patient was found to have altered mental status at nursing home, limited response, lethargic, brought to the ED for evaluation no leukocytosis, afebrile, patient was noted to have positive UA, chronic indwelling Rojas catheter, started on IV antibiotic, blood cultures pending urine culture pending, follow-up with result  12/2 KD:  Continue IV meropenum 1g Q 8 hrs,   12/7 continue D9 abx therapy, monitor I/Os   12/8 discontinue merrem  concern with acute liver failure, IVF NS bolus x1L  12/10 KD: Doxy 100 mg BID with no end date started. WC BID to sacral wound continuing prior wound care orders.     Vomiting    12/4 KD:  Hold tube feedings until G-tube placement has been confirmed.  KUB ordered today  12/5 KD: Glucernia 10cc/hr cont. Feedings. Nutrition consult today.  12/6 KD: Continue titrating feedings as tolerated upon awaiting nutritional consult.  Continue wound care per GS rec  12/8 residual over >200 mL, tube feeding discontinued, no emesis  12/9 KD: cont. D/c feedings d/t residual. CT-ABD today.  12/10 KD: Glucerna 1.5 c. @ 20cc/hr cont. Feeding, tolerating well with no residual. Goal to increase 5cc/hr to reach 45cc/hr.    Type 1 diabetes  Patient's FSGs are uncontrolled due to hyperglycemia on current medication regimen.  Last A1c reviewed-   Lab Results   Component Value Date    HGBA1C 12.4 (H) 01/03/2024     Most recent fingerstick glucose reviewed-   Recent Labs   Lab 12/10/24  1455 12/10/24  2035   POCTGLUCOSE 265* 282*       Current correctional scale  Low  Maintain anti-hyperglycemic dose as follows-   Antihyperglycemics (From admission, onward)      Start     Stop Route Frequency Ordered    12/03/24 2356  insulin aspart U-100 pen 0-10 Units         -- SubQ Every 6 hours PRN 12/03/24 2300    12/03/24 2100  insulin glargine U-100 (Lantus) pen 16 Units         -- SubQ Nightly 12/03/24 1242          Hold Oral hypoglycemics while patient is in the hospital.      VTE Risk Mitigation (From admission, onward)           Ordered     enoxaparin injection 40 mg  Daily         11/29/24 1536     Place sequential compression device  Until discontinued         11/29/24 1536                    Discharge Planning   JOSE MANUEL:      Code Status: Full Code   Medical Readiness for Discharge Date: Treatment  Discharge Plan A: Return to nursing home   Discharge Delays: None known at this time                    STEPHEN GRIGSBY NP  Department of Hospital  Medicine   [facility]

## 2024-12-11 NOTE — ASSESSMENT & PLAN NOTE
Patient's FSGs are uncontrolled due to hyperglycemia on current medication regimen.  Last A1c reviewed-   Lab Results   Component Value Date    HGBA1C 12.4 (H) 01/03/2024     Most recent fingerstick glucose reviewed-   Recent Labs   Lab 12/10/24  1455 12/10/24  2035   POCTGLUCOSE 265* 282*       Current correctional scale  Low  Maintain anti-hyperglycemic dose as follows-   Antihyperglycemics (From admission, onward)    Start     Stop Route Frequency Ordered    12/03/24 2356  insulin aspart U-100 pen 0-10 Units         -- SubQ Every 6 hours PRN 12/03/24 2300    12/03/24 2100  insulin glargine U-100 (Lantus) pen 16 Units         -- SubQ Nightly 12/03/24 1242        Hold Oral hypoglycemics while patient is in the hospital.

## 2024-12-11 NOTE — PLAN OF CARE
Recommendations  1. Rec'd Continuous EN: Glucerna 1.2 or Diabetasource AC @ 10mL/r increasing by 5-10mL q4-6hrs to goal rate of 75mL/hr with a continuous 25mL FWF to provide 2160kcal (106% EEN), 108g of protein (113% EPN), and 2049mL FW.    2. Rec'd Continuous EN: Glucerna 1.5 @ 10mL/r increasing by 5-10mL q4-6hrs to goal rate of 55mL/hr with a continuous 40mL FWF to provide 1980kcal (97% EEN), 109g of protein (114% EPN), and 1962mL FW.     3.  Recd Néstor BID via PEG tube to promote wound healing and to provide additional nutrition.         - Do not mix Néstor with formula in a tube-feeding bag       - Pour one packet of Néstor in a clean, small container for mixing.         - Add 4 fl oz (120 mL) water at room temperature.         - Mix well with disposable spoon or tongue blade until all particles are completely hydrated.         - Verify correct tube placement.         - Flush feeding tube with 30 mL water.         -Administer Néstor through feeding tube using a 60-mL or larger syringe.         - Flush with an additional 30 mL water.     4. RD to follow and make erc's accordingly.  Goals:   1. Pt will be started on TF by next RD follow up.   2. Pt will reach TF goal rate within 48hrs of initiation.  Nutrition Goal Status: progressing towards goal

## 2024-12-11 NOTE — PLAN OF CARE
Plan of care reviewed and ongoing with patient. 22 gauge IV to R FA saline locked/flushes well, room air tolerating well. PEG intact with Glucerna 1.2 Lib[place] continuous feedings by pump with a rate of 35 mL/hr, tolerated medications crushed through PEG. No residual noted during ordered checks during the night. Ostomy to LLQ passing flatus and loose-liquid stool. 16 Fr varghese catheter in place, draining properly into drainage device free of kinks/loops. Wounds to groin area noted. Call light and personal items within reach, frequent checks and turning completed during the night.       Problem: Skin Injury Risk Increased  Goal: Skin Health and Integrity  Outcome: Not Progressing     Problem: Infection  Goal: Absence of Infection Signs and Symptoms  Outcome: Not Progressing     Problem: Adult Inpatient Plan of Care  Goal: Plan of Care Review  Outcome: Not Progressing  Goal: Patient-Specific Goal (Individualized)  Outcome: Not Progressing  Goal: Absence of Hospital-Acquired Illness or Injury  Outcome: Not Progressing  Goal: Optimal Comfort and Wellbeing  Outcome: Not Progressing  Goal: Readiness for Transition of Care  Outcome: Not Progressing     Problem: Diabetes Comorbidity  Goal: Blood Glucose Level Within Targeted Range  Outcome: Not Progressing     Problem: Wound  Goal: Optimal Coping  Outcome: Not Progressing  Goal: Optimal Functional Ability  Outcome: Not Progressing  Goal: Absence of Infection Signs and Symptoms  Outcome: Not Progressing  Goal: Improved Oral Intake  Outcome: Not Progressing  Goal: Optimal Pain Control and Function  Outcome: Not Progressing  Goal: Skin Health and Integrity  Outcome: Not Progressing  Goal: Optimal Wound Healing  Outcome: Not Progressing     Problem: Fall Injury Risk  Goal: Absence of Fall and Fall-Related Injury  Outcome: Not Progressing

## 2024-12-11 NOTE — SUBJECTIVE & OBJECTIVE
Interval History: Pt s/e.  Tolerating tube feeds after removing 10cc out of gtube balloon.      Medications:  Continuous Infusions:   0.9% NaCl   Intravenous Continuous         Scheduled Meds:   aspirin  81 mg Per G Tube Daily    diazePAM  5 mg Per G Tube BID    doxycycline  100 mg Per G Tube Q12H    enoxparin  40 mg Subcutaneous Daily    famotidine  20 mg Per G Tube BID    FLUoxetine  20 mg Per G Tube Daily    gabapentin  100 mg Per G Tube BID    insulin glargine U-100 (Lantus)  16 Units Subcutaneous QHS    levothyroxine  150 mcg Per G Tube Before breakfast    lipase-protease-amylase  2 capsule Oral TID WM    lurasidone  40 mg Per G Tube Daily    metoclopramide HCl  5 mg Per G Tube BID    polyethylene glycol  17 g Per G Tube Daily    silver nitrate applicators  1 applicator Topical (Top) Once    sodium hypochlorite 0.125%   Topical (Top) Daily     PRN Meds:  Current Facility-Administered Medications:     acetaminophen, 650 mg, Per G Tube, Q8H PRN    dextrose 10%, 12.5 g, Intravenous, PRN    dextrose 10%, 25 g, Intravenous, PRN    glucagon (human recombinant), 1 mg, Intramuscular, PRN    HYDROmorphone, 1 mg, Intravenous, Q4H PRN    insulin aspart U-100, 0-10 Units, Subcutaneous, Q6H PRN    melatonin, 6 mg, Per G Tube, Nightly PRN    ondansetron, 4 mg, Intravenous, Q8H PRN    oxyCODONE, 10 mg, Per G Tube, Q6H PRN    sodium chloride 0.9%, 10 mL, Intravenous, PRN     Review of patient's allergies indicates:   Allergen Reactions    Guaifenesin Hives    Codeine Itching     Objective:     Vital Signs (Most Recent):  Temp: 98.3 °F (36.8 °C) (12/11/24 1210)  Pulse: 85 (12/11/24 1210)  Resp: 18 (12/11/24 1210)  BP: 105/74 (12/11/24 1210)  SpO2: 95 % (12/11/24 1210) Vital Signs (24h Range):  Temp:  [97 °F (36.1 °C)-98.9 °F (37.2 °C)] 98.3 °F (36.8 °C)  Pulse:  [] 85  Resp:  [18-24] 18  SpO2:  [95 %-100 %] 95 %  BP: (105-123)/(59-74) 105/74     Weight: 67.9 kg (149 lb 11.1 oz)  Body mass index is 23.45  kg/m².    Intake/Output - Last 3 Shifts         12/09 0700  12/10 0659 12/10 0700 12/11 0659 12/11 0700 12/12 0659    P.O. 240  100    NG/      Total Intake(mL/kg) 545 (8)  100 (1.5)    Urine (mL/kg/hr) 800 (0.5) 350 (0.2)     Stool 1650  350    Total Output 2450 350 350    Net -1905 -350 -250                    Physical Exam  Vitals reviewed.   Constitutional:       General: He is not in acute distress.     Appearance: He is not ill-appearing or toxic-appearing.      Comments: thin   Cardiovascular:      Rate and Rhythm: Normal rate and regular rhythm.   Pulmonary:      Effort: Pulmonary effort is normal. No respiratory distress.   Abdominal:      General: There is no distension.      Palpations: Abdomen is soft.      Tenderness: There is no abdominal tenderness. There is no guarding or rebound.      Comments: Colostomy pink, patent and viable with stool in bag    Musculoskeletal:      Right lower leg: No edema.      Left lower leg: No edema.   Skin:     General: Skin is warm and dry.      Capillary Refill: Capillary refill takes less than 2 seconds.      Comments: 28 x 13 Stage IV sacral wound with 1.2 cm tunneling at superior portion.  Thick, loose fibrinous exudate within tunnel with thin layer of granulation tissue over sacrum.      4cm rim of devitalized skin on right lateral edge with clear demarcation from healthy appearing skin     Pelvic and penile wound clean with viable granulation tissue.  Portions of poorly incorporated skin graft scattered throughout.     Neurological:      Mental Status: Mental status is at baseline.          Significant Labs:  I have reviewed all pertinent lab results within the past 24 hours.  CBC:   Recent Labs   Lab 12/11/24  0530   WBC 8.92   RBC 2.67*   HGB 6.9*   HCT 23.2*   *   MCV 87   MCH 25.8*   MCHC 29.7*     CMP:   Recent Labs   Lab 12/11/24  0530   *   CALCIUM 10.3   ALBUMIN 0.9*   PROT 6.8      K 4.0   CO2 32*      BUN 21*   CREATININE  1.1   ALKPHOS 427*   ALT 63*   AST 73*   BILITOT 0.1       Significant Diagnostics:  I have reviewed all pertinent imaging results/findings within the past 24 hours.

## 2024-12-11 NOTE — ASSESSMENT & PLAN NOTE
12/2 KD:  Hx fourier's gangrene.  Wound care consult to continue WC during hospital stay.  12/5 KD: Cont. Daily Wound care per GS recs.  12/10 KD: Doxy 100 mg BID with no end date started. WC BID to sacral wound continuing prior wound care orders.

## 2024-12-11 NOTE — PROGRESS NOTES
12/11/24 1111        Wound 11/18/24 0800 Other (comment) Groin   Date First Assessed/Time First Assessed: 11/18/24 0800   Present on Original Admission: Yes  Primary Wound Type: (c) Other (comment)  Location: Groin   Dressing Appearance Moist drainage   Drainage Amount Small   Drainage Characteristics/Odor Malodorous;Serosanguineous;Green   Appearance Pink;Red;Slough;Yellow;Moist   Periwound Area Intact;Dry;Warm   Wound Edges Irregular   Wound Length (cm) 28 cm   Wound Width (cm) 13 cm   Wound Depth (cm) 1.2 cm   Wound Volume (cm^3) 436.8 cm^3   Wound Surface Area (cm^2) 364 cm^2   Care Cleansed with:;Antimicrobial agent   Dressing Changed;Gauze, wet to moist;Absorptive Pad  (Dakin's moistened gauze applied to wound bed)   Packing Inserted  1   Periwound Care Absorptive dressing applied   Dressing Change Due 12/12/24

## 2024-12-11 NOTE — ASSESSMENT & PLAN NOTE
Anemia is likely due to Iron deficiency. Most recent hemoglobin and hematocrit are listed below.  Recent Labs     12/09/24  0528 12/10/24  0516 12/11/24  0530   HGB 7.0* 7.2* 6.9*   HCT 23.3* 23.6* 23.2*       Plan  - Monitor serial CBC: Daily  - Transfuse PRBC if patient becomes hemodynamically unstable, symptomatic or H/H drops below 7/21.  - Patient has not received any PRBC transfusions to date, order placed to receive 1 unit today  - Patient's anemia is currently stable  12/2 KD:  Iron panel ordered  12/6 KD: H&H stable  12/9 KD: H&H 7/28.3. Monitor for need of transfusion.   12/10 KD: H&H 7.2/23.6

## 2024-12-11 NOTE — PROGRESS NOTES
12/11/24 1104        Wound 11/18/24 0800 Pressure Injury Left Buttocks   Date First Assessed/Time First Assessed: 11/18/24 0800   Present on Original Admission: Yes  Primary Wound Type: Pressure Injury  Side: Left  Location: (c) Buttocks   Wound Image    Pressure Injury Stage 4   Dressing Appearance Moist drainage   Drainage Amount Large   Drainage Characteristics/Odor Green;Malodorous;Tan;Serosanguineous   Appearance Pink;Red;Black;Moist;Slough;Eschar   Tissue loss description Full thickness   Periwound Area Intact;Warm   Wound Edges Irregular   Wound Length (cm) 20 cm   Wound Width (cm) 15 cm   Wound Depth (cm) 2 cm   Wound Volume (cm^3) 600 cm^3   Wound Surface Area (cm^2) 300 cm^2   Undermining (depth (cm)/location) 3.1 cm in depth from 10:00 to 3:00   Care Cleansed with:;Antimicrobial agent   Dressing Gauze, wet to dry;Changed;Absorptive Pad  (Dakin's moistened gauze to undermining and wound bed)   Periwound Care Absorptive dressing applied;Moisture barrier applied   Dressing Change Due 12/11/24  (PM)

## 2024-12-11 NOTE — ASSESSMENT & PLAN NOTE
Non-excisional debridement of exudate and devitalized tissue at bedside   Continue dakins wet to dry   Periwound zinc oxide   May consider wound vac placement upon return to SNF   Q2 turn  Continue tube feeds for nutritional support - Albumin 0.9

## 2024-12-11 NOTE — PROGRESS NOTES
[facility]  General Surgery  Progress Note    Subjective:     History of Present Illness:  35yo male with history of poorly controlled DM I, fornier's gangrene, anoxic brain injury with severe neuro deficit and PEG tube dependent who presents with non healing sacral wound.  Currently treated with Dakins wet to dry.  GS consulted for evaluation.     Post-Op Info:  * No surgery found *         Interval History: Pt s/e.  Tolerating tube feeds after removing 10cc out of gtube balloon.      Medications:  Continuous Infusions:   0.9% NaCl   Intravenous Continuous         Scheduled Meds:   aspirin  81 mg Per G Tube Daily    diazePAM  5 mg Per G Tube BID    doxycycline  100 mg Per G Tube Q12H    enoxparin  40 mg Subcutaneous Daily    famotidine  20 mg Per G Tube BID    FLUoxetine  20 mg Per G Tube Daily    gabapentin  100 mg Per G Tube BID    insulin glargine U-100 (Lantus)  16 Units Subcutaneous QHS    levothyroxine  150 mcg Per G Tube Before breakfast    lipase-protease-amylase  2 capsule Oral TID WM    lurasidone  40 mg Per G Tube Daily    metoclopramide HCl  5 mg Per G Tube BID    polyethylene glycol  17 g Per G Tube Daily    silver nitrate applicators  1 applicator Topical (Top) Once    sodium hypochlorite 0.125%   Topical (Top) Daily     PRN Meds:  Current Facility-Administered Medications:     acetaminophen, 650 mg, Per G Tube, Q8H PRN    dextrose 10%, 12.5 g, Intravenous, PRN    dextrose 10%, 25 g, Intravenous, PRN    glucagon (human recombinant), 1 mg, Intramuscular, PRN    HYDROmorphone, 1 mg, Intravenous, Q4H PRN    insulin aspart U-100, 0-10 Units, Subcutaneous, Q6H PRN    melatonin, 6 mg, Per G Tube, Nightly PRN    ondansetron, 4 mg, Intravenous, Q8H PRN    oxyCODONE, 10 mg, Per G Tube, Q6H PRN    sodium chloride 0.9%, 10 mL, Intravenous, PRN     Review of patient's allergies indicates:   Allergen Reactions    Guaifenesin Hives    Codeine Itching     Objective:     Vital Signs (Most Recent):  Temp:  98.3 °F (36.8 °C) (12/11/24 1210)  Pulse: 85 (12/11/24 1210)  Resp: 18 (12/11/24 1210)  BP: 105/74 (12/11/24 1210)  SpO2: 95 % (12/11/24 1210) Vital Signs (24h Range):  Temp:  [97 °F (36.1 °C)-98.9 °F (37.2 °C)] 98.3 °F (36.8 °C)  Pulse:  [] 85  Resp:  [18-24] 18  SpO2:  [95 %-100 %] 95 %  BP: (105-123)/(59-74) 105/74     Weight: 67.9 kg (149 lb 11.1 oz)  Body mass index is 23.45 kg/m².    Intake/Output - Last 3 Shifts         12/09 0700  12/10 0659 12/10 0700  12/11 0659 12/11 0700  12/12 0659    P.O. 240  100    NG/      Total Intake(mL/kg) 545 (8)  100 (1.5)    Urine (mL/kg/hr) 800 (0.5) 350 (0.2)     Stool 1650  350    Total Output 2450 350 350    Net -1905 -350 -250                    Physical Exam  Vitals reviewed.   Constitutional:       General: He is not in acute distress.     Appearance: He is not ill-appearing or toxic-appearing.      Comments: thin   Cardiovascular:      Rate and Rhythm: Normal rate and regular rhythm.   Pulmonary:      Effort: Pulmonary effort is normal. No respiratory distress.   Abdominal:      General: There is no distension.      Palpations: Abdomen is soft.      Tenderness: There is no abdominal tenderness. There is no guarding or rebound.      Comments: Colostomy pink, patent and viable with stool in bag    Musculoskeletal:      Right lower leg: No edema.      Left lower leg: No edema.   Skin:     General: Skin is warm and dry.      Capillary Refill: Capillary refill takes less than 2 seconds.      Comments: 28 x 13 Stage IV sacral wound with 1.2 cm tunneling at superior portion.  Thick, loose fibrinous exudate within tunnel with thin layer of granulation tissue over sacrum.      4cm rim of devitalized skin on right lateral edge with clear demarcation from healthy appearing skin     Pelvic and penile wound clean with viable granulation tissue.  Portions of poorly incorporated skin graft scattered throughout.     Neurological:      Mental Status: Mental status is at  baseline.          Significant Labs:  I have reviewed all pertinent lab results within the past 24 hours.  CBC:   Recent Labs   Lab 12/11/24  0530   WBC 8.92   RBC 2.67*   HGB 6.9*   HCT 23.2*   *   MCV 87   MCH 25.8*   MCHC 29.7*     CMP:   Recent Labs   Lab 12/11/24  0530   *   CALCIUM 10.3   ALBUMIN 0.9*   PROT 6.8      K 4.0   CO2 32*      BUN 21*   CREATININE 1.1   ALKPHOS 427*   ALT 63*   AST 73*   BILITOT 0.1       Significant Diagnostics:  I have reviewed all pertinent imaging results/findings within the past 24 hours.  Assessment/Plan:     Sacral wound  Non-excisional debridement of exudate and devitalized tissue at bedside   Continue dakins wet to dry   Periwound zinc oxide   May consider wound vac placement upon return to SNF   Q2 turn  Continue tube feeds for nutritional support - Albumin 0.9     Open wound of groin  Continue dakins wet to dry - pack pubic wound as well  Poorly incorporated skin graft to groin, but underlying granulation tissue viable   No surgical intervention warranted at this time    History of anoxic brain injury  PEG tube dependent  Balloon ruptured this afternoon - 20 Fr gastrotomy tube replaced at bedside today   Rest of care per primary     Type 1 diabetes  Glucose 331   Continue management per primary         Ramonita Liang MD  General Surgery  [facility]

## 2024-12-11 NOTE — PROGRESS NOTES
[facility]  Adult Nutrition  Progress Note    SUMMARY       Recommendations  1. Rec'd Continuous EN: Glucerna 1.2 or Diabetasource AC @ 10mL/r increasing by 5-10mL q4-6hrs to goal rate of 75mL/hr with a continuous 25mL FWF to provide 2160kcal (106% EEN), 108g of protein (113% EPN), and 2049mL FW.    2. Rec'd Continuous EN: Glucerna 1.5 @ 10mL/r increasing by 5-10mL q4-6hrs to goal rate of 55mL/hr with a continuous 40mL FWF to provide 1980kcal (97% EEN), 109g of protein (114% EPN), and 1962mL FW.     3.  Recd Néstor BID via PEG tube to promote wound healing and to provide additional nutrition.         - Do not mix Néstor with formula in a tube-feeding bag       - Pour one packet of Néstor in a clean, small container for mixing.         - Add 4 fl oz (120 mL) water at room temperature.         - Mix well with disposable spoon or tongue blade until all particles are completely hydrated.         - Verify correct tube placement.         - Flush feeding tube with 30 mL water.         -Administer Néstor through feeding tube using a 60-mL or larger syringe.         - Flush with an additional 30 mL water.     4. RD to follow and make erc's accordingly.  Goals:   1. Pt will be started on TF by next RD follow up.   2. Pt will reach TF goal rate within 48hrs of initiation.  Nutrition Goal Status: progressing towards goal  Communication of RD Recs: reviewed with RN    Assessment and Plan     Nutrition Problem  Inadequate oral intake     Related to (etiology):   Increased nutrient needs     Signs and Symptoms (as evidenced by):   wounds      Interventions/Recommendations (treatment strategy):  1. Rec'd Continuous EN: Glucerna 1.2 or Diabetasource AC @ 10mL/r increasing by 5-10mL q4-6hrs to goal rate of 75mL/hr with a continuous 25mL FWF to provide 2160kcal (106% EEN), 108g of protein (113% EPN), and 2049mL FW.   2.  Recd Néstor BID via NG tube to promote wound healing and to provide additional nutrition.       - Do not mix  "Néstor with formula in a tube-feeding bag      - Pour one packet of Néstor in a clean, small container for mixing.       - Add 4 fl oz (120 mL) water at room temperature.       - Mix well with disposable spoon or tongue blade until all particles are completely hydrated.       - Verify correct tube placement.       - Flush feeding tube with 30 mL water.       -Administer Néstor through feeding tube using a 60-mL or larger syringe.       - Flush with an additional 30 mL water.   3. RD to follow and make erc's accordingly.     Nutrition Diagnosis Status:   Continues     Malnutrition Assessment  NFPE not warranted at this time. RD to continue to monitor for signs and symptoms of malnutrition.     Nutrition Related Social Determinants of Health:  Unable to assess    Reason for Assessment    Reason For Assessment: RD follow-up  Diagnosis: other (see comments) (Altered Mental Status)  General Information Comments: Followed up on pt this afternoon. Pt is tolerating new TF of Glucerna 1.5. Continue progressing towards goal rate as tolerated. RD to follow and make rec's accordingly.  Nutrition Discharge Planning: TBD as care progresses    Nutrition Risk Screen    Nutrition Risk Screen: tube feeding or parenteral nutrition    Nutrition/Diet History    Spiritual, Cultural Beliefs, Oriental orthodox Practices, Values that Affect Care: no  Food Allergies: NKFA    Anthropometrics    Temp: 98.3 °F (36.8 °C)  Height Method: Stated  Height: 5' 7" (170.2 cm)  Height (inches): 67 in  Weight Method: Bed Scale  Weight: 67.9 kg (149 lb 11.1 oz)  Weight (lb): 149.69 lb  Ideal Body Weight (IBW), Male: 148 lb  % Ideal Body Weight, Male (lb): 101.14 %  BMI (Calculated): 23.4  BMI Grade: 18.5-24.9 - normal    Lab/Procedures/Meds    Pertinent Labs Reviewed: reviewed  Pertinent Medications Reviewed: reviewed    Estimated/Assessed Needs    Weight Used For Calorie Calculations: 67.9 kg (149 lb 11.1 oz)  Energy Calorie Requirements (kcal): 3930-9092 " (30-35kcal/kg)  Energy Need Method: Kcal/kg  Protein Requirements: 81-95 (1.2-1.4g/kg)  Weight Used For Protein Calculations: 67.9 kg (149 lb 11.1 oz)  Fluid Requirements (mL): 7177-4488 (1mL/kcal)  Estimated Fluid Requirement Method: RDA Method  RDA Method (mL): 2037    Nutrition Prescription Ordered    Current Diet Order: Consistent CHO  Nutrition Order Comments: Néstor via G-tube  Current Nutrition Support Formula Ordered: Glucerna 1.5  Current Nutrition Support Rate Ordered: 35 (ml)  Current Nutrition Support Frequency Ordered: Continuous  Oral Nutrition Supplement: None    Evaluation of Received Nutrient/Fluid Intake    Enteral Calories (kcal): 1260  Enteral Protein (gm): 69  Enteral (Free Water) Fluid (mL): 638  Free Water Flush Fluid (mL): 0  % Kcal Needs: 62%  % Protein Needs: 85%  I/O: -250  Energy Calories Required: not meeting needs  Protein Required: meeting needs  Tolerance: tolerating  % Intake of Estimated Energy Needs: 50 - 75 %  % Meal Intake: 0 - 25 %    Nutrition Risk    Level of Risk/Frequency of Follow-up: moderate     Monitor and Evaluation    Food and Nutrient Intake: energy intake, food and beverage intake, enteral nutrition intake  Food and Nutrient Adminstration: enteral and parenteral nutrition administration, diet order  Knowledge/Beliefs/Attitudes: food and nutrition knowledge/skill, beliefs and attitudes  Physical Activity and Function: nutrition-related ADLs and IADLs  Anthropometric Measurements: height/length, weight, weight change, body mass index  Biochemical Data, Medical Tests and Procedures: gastrointestinal profile, electrolyte and renal panel, glucose/endocrine profile, inflammatory profile, lipid profile  Nutrition-Focused Physical Findings: overall appearance     Nutrition Follow-Up    RD Follow-up?: Yes

## 2024-12-12 VITALS
BODY MASS INDEX: 23.49 KG/M2 | SYSTOLIC BLOOD PRESSURE: 106 MMHG | WEIGHT: 149.69 LBS | HEIGHT: 67 IN | RESPIRATION RATE: 18 BRPM | DIASTOLIC BLOOD PRESSURE: 66 MMHG | OXYGEN SATURATION: 99 % | TEMPERATURE: 99 F | HEART RATE: 68 BPM

## 2024-12-12 PROBLEM — T83.511A URINARY TRACT INFECTION ASSOCIATED WITH INDWELLING URETHRAL CATHETER: Status: RESOLVED | Noted: 2024-11-30 | Resolved: 2024-12-12

## 2024-12-12 PROBLEM — D75.839 THROMBOCYTOSIS: Status: RESOLVED | Noted: 2024-12-01 | Resolved: 2024-12-12

## 2024-12-12 PROBLEM — N39.0 URINARY TRACT INFECTION ASSOCIATED WITH INDWELLING URETHRAL CATHETER: Status: RESOLVED | Noted: 2024-11-30 | Resolved: 2024-12-12

## 2024-12-12 LAB
ALBUMIN SERPL BCP-MCNC: 1 G/DL (ref 3.5–5.2)
ALP SERPL-CCNC: 482 U/L (ref 55–135)
ALT SERPL W/O P-5'-P-CCNC: 49 U/L (ref 10–44)
ANION GAP SERPL CALC-SCNC: 3 MMOL/L (ref 3–11)
AST SERPL-CCNC: 60 U/L (ref 10–40)
BASOPHILS # BLD AUTO: 0.02 K/UL (ref 0–0.2)
BASOPHILS NFR BLD: 0.1 % (ref 0–1.9)
BILIRUB SERPL-MCNC: 0.2 MG/DL (ref 0.1–1)
BUN SERPL-MCNC: 20 MG/DL (ref 6–20)
CALCIUM SERPL-MCNC: 10.4 MG/DL (ref 8.7–10.5)
CHLORIDE SERPL-SCNC: 104 MMOL/L (ref 95–110)
CO2 SERPL-SCNC: 34 MMOL/L (ref 23–29)
CREAT SERPL-MCNC: 0.9 MG/DL (ref 0.5–1.4)
DIFFERENTIAL METHOD BLD: ABNORMAL
EOSINOPHIL # BLD AUTO: 0.4 K/UL (ref 0–0.5)
EOSINOPHIL NFR BLD: 2.6 % (ref 0–8)
ERYTHROCYTE [DISTWIDTH] IN BLOOD BY AUTOMATED COUNT: 15.2 % (ref 11.5–14.5)
EST. GFR  (NO RACE VARIABLE): >60 ML/MIN/1.73 M^2
GLUCOSE SERPL-MCNC: 68 MG/DL (ref 70–110)
HCT VFR BLD AUTO: 23.7 % (ref 40–54)
HGB BLD-MCNC: 7 G/DL (ref 14–18)
IMM GRANULOCYTES # BLD AUTO: 0.08 K/UL (ref 0–0.04)
IMM GRANULOCYTES NFR BLD AUTO: 0.5 % (ref 0–0.5)
LYMPHOCYTES # BLD AUTO: 0.9 K/UL (ref 1–4.8)
LYMPHOCYTES NFR BLD: 6.1 % (ref 18–48)
MAGNESIUM SERPL-MCNC: 1.4 MG/DL (ref 1.6–2.6)
MCH RBC QN AUTO: 25.5 PG (ref 27–31)
MCHC RBC AUTO-ENTMCNC: 29.5 G/DL (ref 32–36)
MCV RBC AUTO: 86 FL (ref 82–98)
MONOCYTES # BLD AUTO: 1.2 K/UL (ref 0.3–1)
MONOCYTES NFR BLD: 7.5 % (ref 4–15)
NEUTROPHILS # BLD AUTO: 12.8 K/UL (ref 1.8–7.7)
NEUTROPHILS NFR BLD: 83.2 % (ref 38–73)
NRBC BLD-RTO: 0 /100 WBC
PLATELET # BLD AUTO: 458 K/UL (ref 150–450)
PMV BLD AUTO: 9.7 FL (ref 9.2–12.9)
POCT GLUCOSE: 101 MG/DL (ref 70–110)
POCT GLUCOSE: 129 MG/DL (ref 70–110)
POCT GLUCOSE: 70 MG/DL (ref 70–110)
POCT GLUCOSE: 77 MG/DL (ref 70–110)
POTASSIUM SERPL-SCNC: 4.1 MMOL/L (ref 3.5–5.1)
PROT SERPL-MCNC: 7 G/DL (ref 6–8.4)
RBC # BLD AUTO: 2.75 M/UL (ref 4.6–6.2)
SODIUM SERPL-SCNC: 141 MMOL/L (ref 136–145)
WBC # BLD AUTO: 15.37 K/UL (ref 3.9–12.7)

## 2024-12-12 PROCEDURE — 63600175 PHARM REV CODE 636 W HCPCS

## 2024-12-12 PROCEDURE — 25000003 PHARM REV CODE 250: Performed by: INTERNAL MEDICINE

## 2024-12-12 PROCEDURE — 83735 ASSAY OF MAGNESIUM: CPT | Performed by: INTERNAL MEDICINE

## 2024-12-12 PROCEDURE — 80053 COMPREHEN METABOLIC PANEL: CPT | Performed by: INTERNAL MEDICINE

## 2024-12-12 PROCEDURE — 85025 COMPLETE CBC W/AUTO DIFF WBC: CPT | Performed by: INTERNAL MEDICINE

## 2024-12-12 PROCEDURE — 36415 COLL VENOUS BLD VENIPUNCTURE: CPT | Performed by: INTERNAL MEDICINE

## 2024-12-12 PROCEDURE — 25000003 PHARM REV CODE 250: Performed by: STUDENT IN AN ORGANIZED HEALTH CARE EDUCATION/TRAINING PROGRAM

## 2024-12-12 RX ORDER — DIAZEPAM 5 MG/1
10 TABLET ORAL 2 TIMES DAILY
Qty: 60 TABLET | Refills: 0 | Status: SHIPPED | OUTPATIENT
Start: 2024-12-12

## 2024-12-12 RX ORDER — METOCLOPRAMIDE 5 MG/1
5 TABLET ORAL
Status: DISCONTINUED | OUTPATIENT
Start: 2024-12-12 | End: 2024-12-12 | Stop reason: HOSPADM

## 2024-12-12 RX ORDER — POLYETHYLENE GLYCOL 3350 17 G/17G
17 POWDER, FOR SOLUTION ORAL DAILY
COMMUNITY
Start: 2024-12-12

## 2024-12-12 RX ORDER — GABAPENTIN 250 MG/5ML
125 SOLUTION ORAL EVERY 12 HOURS
Qty: 150 ML | Refills: 0 | Status: SHIPPED | OUTPATIENT
Start: 2024-12-12 | End: 2025-01-11

## 2024-12-12 RX ORDER — INSULIN ASPART 100 [IU]/ML
INJECTION, SOLUTION INTRAVENOUS; SUBCUTANEOUS
Qty: 3 EACH | Refills: 0 | Status: SHIPPED | OUTPATIENT
Start: 2024-12-12

## 2024-12-12 RX ORDER — PANCRELIPASE LIPASE, PANCRELIPASE PROTEASE, PANCRELIPASE AMYLASE 20000; 63000; 84000 [USP'U]/1; [USP'U]/1; [USP'U]/1
1 CAPSULE, DELAYED RELEASE ORAL 3 TIMES DAILY
Qty: 90 CAPSULE | Refills: 5 | Status: SHIPPED | OUTPATIENT
Start: 2024-12-12

## 2024-12-12 RX ORDER — OXYCODONE HYDROCHLORIDE 10 MG/1
10 TABLET ORAL EVERY 6 HOURS PRN
Qty: 60 TABLET | Refills: 0 | Status: SHIPPED | OUTPATIENT
Start: 2024-12-12

## 2024-12-12 RX ORDER — GLUCAGON HCL 1 MG
1 VIAL (EA) INJECTION ONCE AS NEEDED
Qty: 1 EACH | Refills: 5 | Status: SHIPPED | OUTPATIENT
Start: 2024-12-12 | End: 2024-12-12

## 2024-12-12 RX ORDER — METOCLOPRAMIDE 5 MG/1
5 TABLET ORAL
Qty: 90 TABLET | Refills: 3 | Status: SHIPPED | OUTPATIENT
Start: 2024-12-12

## 2024-12-12 RX ORDER — LEVOTHYROXINE SODIUM 150 UG/1
150 TABLET ORAL
Qty: 30 TABLET | Refills: 11 | Status: SHIPPED | OUTPATIENT
Start: 2024-12-13 | End: 2025-12-13

## 2024-12-12 RX ORDER — MAGNESIUM SULFATE HEPTAHYDRATE 40 MG/ML
2 INJECTION, SOLUTION INTRAVENOUS ONCE
Status: COMPLETED | OUTPATIENT
Start: 2024-12-12 | End: 2024-12-12

## 2024-12-12 RX ORDER — TALC
6 POWDER (GRAM) TOPICAL NIGHTLY PRN
COMMUNITY
Start: 2024-12-12

## 2024-12-12 RX ORDER — OXYCODONE HYDROCHLORIDE 5 MG/1
10 CAPSULE ORAL EVERY 6 HOURS PRN
Qty: 20 CAPSULE | Refills: 0 | Status: SHIPPED | OUTPATIENT
Start: 2024-12-12 | End: 2024-12-12 | Stop reason: HOSPADM

## 2024-12-12 RX ORDER — DOXYCYCLINE HYCLATE 100 MG
100 TABLET ORAL EVERY 12 HOURS
Qty: 60 TABLET | Refills: 11 | Status: SHIPPED | OUTPATIENT
Start: 2024-12-12

## 2024-12-12 RX ADMIN — DIAZEPAM 5 MG: 5 TABLET ORAL at 08:12

## 2024-12-12 RX ADMIN — DOXYCYCLINE HYCLATE 100 MG: 100 TABLET, COATED ORAL at 08:12

## 2024-12-12 RX ADMIN — LEVOTHYROXINE SODIUM 150 MCG: 150 TABLET ORAL at 05:12

## 2024-12-12 RX ADMIN — PANCRELIPASE LIPASE, PANCRELIPASE PROTEASE, PANCRELIPASE AMYLASE 2 CAPSULE: 10000; 32000; 42000 CAPSULE, DELAYED RELEASE ORAL at 08:12

## 2024-12-12 RX ADMIN — DEXTROSE MONOHYDRATE 125 ML: 100 INJECTION, SOLUTION INTRAVENOUS at 06:12

## 2024-12-12 RX ADMIN — MAGNESIUM SULFATE HEPTAHYDRATE 2 G: 40 INJECTION, SOLUTION INTRAVENOUS at 09:12

## 2024-12-12 RX ADMIN — POLYETHYLENE GLYCOL (3350) 17 G: 17 POWDER, FOR SOLUTION ORAL at 08:12

## 2024-12-12 RX ADMIN — SODIUM HYPOCHLORITE: 1.25 SOLUTION TOPICAL at 08:12

## 2024-12-12 RX ADMIN — GABAPENTIN 100 MG: 100 CAPSULE ORAL at 08:12

## 2024-12-12 RX ADMIN — ASPIRIN 81 MG CHEWABLE TABLET 81 MG: 81 TABLET CHEWABLE at 08:12

## 2024-12-12 RX ADMIN — FAMOTIDINE 20 MG: 20 TABLET, FILM COATED ORAL at 08:12

## 2024-12-12 RX ADMIN — FLUOXETINE HYDROCHLORIDE 20 MG: 20 CAPSULE ORAL at 08:12

## 2024-12-12 RX ADMIN — LURASIDONE HYDROCHLORIDE 40 MG: 40 TABLET, FILM COATED ORAL at 08:12

## 2024-12-12 RX ADMIN — OXYCODONE HYDROCHLORIDE 10 MG: 10 TABLET ORAL at 05:12

## 2024-12-12 NOTE — PLAN OF CARE
[facility] - Med Surg  Discharge Final Note    Primary Care Provider: Jose Francisco Klein MD    Expected Discharge Date: 12/12/2024    Final Discharge Note (most recent)       Final Note - 12/12/24 1019          Final Note    Assessment Type Final Discharge Note     Anticipated Discharge Disposition [facility]     Hospital Resources/Appts/Education Provided Appointments scheduled and added to AVS        Post-Acute Status    Discharge Delays None known at this time                     Important Message from Medicare  Important Message from Medicare regarding Discharge Appeal Rights: Given to patient/caregiver, Explained to patient/caregiver, Signed/date by patient/caregiver     Date IMM was signed: 12/11/24  Time IMM was signed: 1255     Follow-up providers       Kim Diamond MD   Specialty: Internal Medicine   Relationship: Consulting Physician    [address] [place] [id number]   Phone: [phone]       Next Steps: Follow up    Instructions: Will follow up at NH in 1 week              After-discharge care                Destination       [facility]   Service: Nursing Home    [address] [facility] [address]   Phone: [phone]                              phoned RaheemKatiYuecurtis Alcazar (Aunt) (Other) [phone] and informed of pt's dc plans.

## 2024-12-12 NOTE — ASSESSMENT & PLAN NOTE
Anemia is likely due to Iron deficiency. Most recent hemoglobin and hematocrit are listed below.  Recent Labs     12/10/24  0516 12/11/24  0530 12/12/24  0530   HGB 7.2* 6.9* 7.0*   HCT 23.6* 23.2* 23.7*       Plan  - Monitor serial CBC: Daily  - Transfuse PRBC if patient becomes hemodynamically unstable, symptomatic or H/H drops below 7/21.  - Patient has not received any PRBC transfusions to date, order placed to receive 1 unit today  - Patient's anemia is currently stable  12/2 KD:  Iron panel ordered  12/6 KD: H&H stable  12/9 KD: H&H 7/28.3. Monitor for need of transfusion.   12/10 KD: H&H 7.2/23.6

## 2024-12-12 NOTE — NURSING
Spoke to Dr. Norris about pt. Blood sugar less than 32. New order given to discontinue the long acting insulin and to follow protocol for blood sugars tonight until get sugars up per protocol and saline lock after that. Read back orders. Will continue to monitor.

## 2024-12-12 NOTE — DISCHARGE SUMMARY
[facility] Medicine  Discharge Summary      Patient Name: Carlos Chahal  MRN: [id number]  [facility]: [id number]  Patient Class: IP- Inpatient  Admission Date: 11/29/2024  Hospital Length of Stay: 13 days  Discharge Date and Time:  12/12/2024 8:33 AM  Attending Physician: Kim Diamond MD   Discharging Provider: STEPHEN GRIGSBY NP  Primary Care Provider: Jose Francisco Klein MD    Primary Care Team: Networked reference to record PCT     HPI:   Patient 34-year-old male history of bipolar disorder, diabetes, history of anoxic brain injury, GERD, neuropathy, brought to the ED due to decreased level of consciousness, patient was evaluated in the ED was noted to have UTI, no leukocytosis, H&H 7.5/25.8, patient was started on IV antibiotic, blood cultures pending, admitted    * No surgery found *      Hospital Course:   12/1 AA, weekend crosscover, patient anemic, 1 unit packed red blood cell ordered, urine culture Gram-negative rods, sensitivities pending, on IV antibiotic, renal function stable, post H&H once transfusion completed  12/2 KD:  Patient lying in bed with decreased LOC, H&H stable.  IV merum continued.  WC consulted to continue wound care orders.  Additional iron labs ordered.  12/3 KD:  Patient awake and alert lying in bed with no distress.  Continue IV antibiotics.  New orders for general surgical consult for wound debridement.  12/4 KD:  Patient awake and alert.  KUB ordered to confirm G-tube placement.  Hold feedings until placement is confirmed.    12/5 KD:  Pt.  Awake and alert.  Glucerna @ 10 cc/hr continuous feedings.  Nutritional consult today. Cont. Wound care per GS recs.   12/6 KD:  Awake and alert.  Continue titrating feedings as tolerated upon awaiting nutritional consult.  Continue wound care per GS rec  12/7 [place] weekend crossover. Nursing home resident with anoxic brain injury, diabetes, PEG dependent with ileostomy undergoing treatment for MDRO urinary tract infection and  chronic perineal and sacral wounds. Hg/HCT stable, 7.8/25.6 (s/p 1U PRBC 12/1/24). Reported no urine output and minimal output to ileostomy yesterday. Currently tolerating tube feeds with urinary Rojas catheter draining clear urine. Electrolytes within normal limits, liver enzymes elevated along with alk phos. Patient in no acute distress, non tender abdomen, soft throughout. Will hold statin and monitor LFTs. Continue with IV antibiotics, strict I/O, daily wound cleanse and care.  12/8 [place] weekend crossover. Overnight vital signs normotensive, stable. No reported events overnight. Post tube feed start, residual measure at >200 mL with >600 mL output into ileostomy. Tube feed discontinued. Elevated LFTs AST 1029, , Alk phos 423, Tbili 0.4. Also associated with mild leukocytosis 12k Renal function normal. No evidence of acute abdomen on physical exam. Rojas catheter draining yellow urine. Will discontinue merrem concerning acute liver failure, give bolus IVF, monitor residual.   12/9 KD: Awake, alert. Feedings d/c. Liver enzymes elevated. , , . H&H 7/28.3. Monitor for need of transfusion. WBC 14.37 elevated on 12/8, today WBC 11.67. CT-ABD today.   12/10 KD: Lying in bed with no complaints. Glucerna 1.5 c. @ 20cc/hr cont. Feeding, tolerating well with no residual. Goal to increase 5cc/hr to reach 45cc/hr. Doxy 100 mg BID with no end date started. WC BID to sacral wound continuing prior wound care orders.   12/11 KD: Pt. Lying in bed no distress noted. Cont. Discharge planning for tomorrow back to [facility], if continues tolerating feedings.   12/12 KD: D/c to NH today. Tolerating feedings, continue to increase feedings slowly to goal of 55cc/hr. Increased reglan to 5mg TID to help improve GI motility. Cont. W.C. consider wound vac to sacrum if needed.     Goals of Care Treatment Preferences:  Code Status: Full Code       LaPOST: Yes           SDOH Screening:  The patient declined to be  screened for utility difficulties, food insecurity, transport difficulties, housing insecurity, and interpersonal safety, so no concerns could be identified this admission.     Consults:   Consults (From admission, onward)          Status Ordering Provider     Inpatient consult to General Surgery  Once        Provider:  Ramonita Liang MD    Acknowledged ATIF VELARDE     Inpatient consult to Registered Dietitian/Nutritionist  Once        Provider:  (Not yet assigned)    Completed HEMANT KULKARNI            * Altered mental status  Altered mental status secondary to UTI, symptoms improved after IV fluids, antibiotics on board follow-up with culture report  12/2 KD:  Continue IV meropenem amlodipine when g every 8 hours  12/7/ no leukocytosis, no elevated temperature, continue D9 merrem for MDRO uti  12/8 leukocytosis 12k, vitals signs stable, elevated LFT >1000, f/u hepatic study after IVF  12/9 KD: Liver enzymes elevated. , , . H&H 7/28.3. Monitor for need of transfusion. WBC 14.37 elevated on 12/8, today WBC 11.67. CT-ABD today      Sacral wound    12/3 KD:  Orders placed for General surgery wound debridement consult  12/5 KD: Cont. Daily WC per GS recs.  12/8 wound dressing change, f/u GS recommendations  12/10 KD: Start Doxy 100 mg BID with no end date d/t possible osteo. Increase WC BID to sacral wound continuing prior wound care orders.     Open wound of groin  12/2 KD:  Hx fourier's gangrene.  Wound care consult to continue WC during hospital stay.  12/5 KD: Cont. Daily Wound care per GS recs.  12/10 KD: Doxy 100 mg BID with no end date started. WC BID to sacral wound continuing prior wound care orders.         Microcytic anemia  Anemia is likely due to Iron deficiency. Most recent hemoglobin and hematocrit are listed below.  Recent Labs     12/10/24  0516 12/11/24  0530 12/12/24  0530   HGB 7.2* 6.9* 7.0*   HCT 23.6* 23.2* 23.7*       Plan  - Monitor serial CBC: Daily  -  Transfuse PRBC if patient becomes hemodynamically unstable, symptomatic or H/H drops below 7/21.  - Patient has not received any PRBC transfusions to date, order placed to receive 1 unit today  - Patient's anemia is currently stable  12/2 KD:  Iron panel ordered  12/6 KD: H&H stable  12/9 KD: H&H 7/28.3. Monitor for need of transfusion.   12/10 KD: H&H 7.2/23.6    History of anoxic brain injury  Patient with a history of anoxic brain injury, chronic indwelling Rojas catheter, contributing to increased UTI risks, history of multidrug resistant organisms, ESBL, patient was started on meropenem pending culture follow-up      Vomiting    12/4 KD:  Hold tube feedings until G-tube placement has been confirmed.  KUB ordered today  12/5 KD: Glucernia 10cc/hr cont. Feedings. Nutrition consult today.  12/6 KD: Continue titrating feedings as tolerated upon awaiting nutritional consult.  Continue wound care per GS rec  12/8 residual over >200 mL, tube feeding discontinued, no emesis  12/9 KD: cont. D/c feedings d/t residual. CT-ABD today.  12/10 KD: Glucerna 1.5 c. @ 20cc/hr cont. Feeding, tolerating well with no residual. Goal to increase 5cc/hr to reach 45cc/hr.  12/12 KD: resolved    Type 1 diabetes  Patient's FSGs are uncontrolled due to hyperglycemia on current medication regimen.  Last A1c reviewed-   Lab Results   Component Value Date    HGBA1C 12.4 (H) 01/03/2024     Most recent fingerstick glucose reviewed-   Recent Labs   Lab 12/12/24  0106 12/12/24  0628 12/12/24  0647 12/12/24  0746   POCTGLUCOSE 77 70 129* 101       Current correctional scale  Low  Maintain anti-hyperglycemic dose as follows-   Antihyperglycemics (From admission, onward)      Start     Stop Route Frequency Ordered    12/03/24 2356  insulin aspart U-100 pen 0-10 Units         -- SubQ Every 6 hours PRN 12/03/24 2300          Hold Oral hypoglycemics while patient is in the hospital.    12/12 KD: Pt. Has had several episodes of hypoglycemia, d/c long  "acting insulin. Will only use sliding scale insulin at this time until CBG become more regulated.       Final Active Diagnoses:    Diagnosis Date Noted POA    PRINCIPAL PROBLEM:  Altered mental status [R41.82] 11/30/2024 Yes    Sacral wound [S31.000A] 12/03/2024 Yes    Open wound of groin [S31.109A] 12/02/2024 Yes    Microcytic anemia [D50.9] 12/01/2024 Yes    History of anoxic brain injury [Z86.69] 11/30/2024 Not Applicable    Type 1 diabetes [E10.9] 01/04/2024 Yes    Vomiting [R11.10] 01/04/2024 Yes      Problems Resolved During this Admission:    Diagnosis Date Noted Date Resolved POA    Thrombocytosis [D75.839] 12/01/2024 12/12/2024 Yes    Urinary tract infection associated with indwelling urethral catheter [T83.511A, N39.0] 11/30/2024 12/12/2024 Yes       Discharged Condition: stable    Disposition: Skilled Nursing Facility    Follow Up:   Follow-up Information       Kim Diamond MD Follow up.    Specialty: Internal Medicine  Why: Will follow up at NH in 1 week  Contact information:  [address] [place] [id number] [phone]                           Patient Instructions:   No discharge procedures on file.    Significant Diagnostic Studies: Labs: CMP   Recent Labs   Lab 12/11/24  0530 12/11/24  2221 12/12/24  0530     --  141   K 4.0  --  4.1     --  104   CO2 32*  --  34*   * 128* 68*   BUN 21*  --  20   CREATININE 1.1  --  0.9   CALCIUM 10.3  --  10.4   PROT 6.8  --  7.0   ALBUMIN 0.9*  --  1.0*   BILITOT 0.1  --  0.2   ALKPHOS 427*  --  482*   AST 73*  --  60*   ALT 63*  --  49*   ANIONGAP 4  --  3   , CBC   Recent Labs   Lab 12/11/24  0530 12/12/24  0530   WBC 8.92 15.37*   HGB 6.9* 7.0*   HCT 23.2* 23.7*   * 458*   , A1C: No results for input(s): "HGBA1C" in the last 4320 hours., and All labs within the past 24 hours have been reviewed    Pending Diagnostic Studies:       None           Medications:  Reconciled Home Medications:      Medication List    "     START taking these medications      doxycycline 100 MG tablet  Commonly known as: VIBRA-TABS  1 tablet (100 mg total) by Per G Tube route every 12 (twelve) hours.     glucagon HCL 1 mg Solr  Commonly known as: GLUCAGON (HCL) EMERGENCY KIT  Inject 1 mg as directed once as needed (Blood glucose < 60mg/dl).     insulin aspart U-100 100 unit/mL (3 mL) Inpn pen  Commonly known as: NovoLOG Flexpen U-100 Insulin  Use per sliding scale insulin  Replaces: insulin aspart U-100 100 unit/mL injection     lipase-protease-amylase 10,000-32,000 -42,000 unit Cpdr  Commonly known as: PANLIPASE  Take 2 capsules by mouth 3 (three) times daily with meals.     melatonin 3 mg tablet  Commonly known as: MELATIN  2 tablets (6 mg total) by Per G Tube route nightly as needed for Insomnia.     metoclopramide HCl 5 MG tablet  Commonly known as: REGLAN  1 tablet (5 mg total) by Per G Tube route 3 (three) times daily before meals.     polyethylene glycol 17 gram Pwpk  Commonly known as: GLYCOLAX  17 g by Per G Tube route once daily.     sodium hypochlorite 0.125% external solution  Commonly known as: DAKIN'S SOLUTION  Apply topically once daily. Apply once daily to groin area and BID to sacral            CHANGE how you take these medications      gabapentin 250 mg/5 mL solution  Commonly known as: NEURONTIN  2.5 mLs (125 mg total) by Per G Tube route every 12 (twelve) hours.  What changed: how much to take     levothyroxine 150 MCG tablet  Commonly known as: SYNTHROID  1 tablet (150 mcg total) by Per G Tube route before breakfast.  Start taking on: December 13, 2024  What changed:   medication strength  when to take this  Another medication with the same name was removed. Continue taking this medication, and follow the directions you see here.            CONTINUE taking these medications      acetaminophen 325 MG tablet  Commonly known as: TYLENOL  325 mg by Per G Tube route every 6 (six) hours as needed.     ascorbic acid (vitamin C) 500 MG  tablet  Commonly known as: VITAMIN C  500 mg by Per G Tube route once daily.     aspirin 81 MG Chew  Take 81 mg by mouth once daily.     diazePAM 5 MG tablet  Commonly known as: VALIUM  2 tablets (10 mg total) by Per G Tube route 2 (two) times a day.     famotidine 40 MG tablet  Commonly known as: PEPCID  40 mg by Per G Tube route once daily.     FLUoxetine 20 MG capsule  20 mg by Per G Tube route once daily.     LOVENOX 40 mg/0.4 mL Syrg  Generic drug: enoxaparin  Inject 40 mg into the skin.     lurasidone 40 mg Tab tablet  Commonly known as: LATUDA  Take 40 mg by mouth once daily.     oxyCODONE 5 mg Cap  Commonly known as: OXY-IR  2 capsules (10 mg total) by Per G Tube route every 6 (six) hours as needed for Pain.     rosuvastatin 10 MG tablet  Commonly known as: CRESTOR  Take 10 mg by mouth once daily.     ZINC-220 ORAL  50 mg by PEG Tube route.            STOP taking these medications      ALPRAZolam 0.5 MG tablet  Commonly known as: XANAX     BACLOFEN ORAL     clindamycin 300 MG capsule  Commonly known as: CLEOCIN     hydrOXYzine HCL 25 MG tablet  Commonly known as: ATARAX     insulin aspart U-100 100 unit/mL injection  Commonly known as: NovoLOG  Replaced by: insulin aspart U-100 100 unit/mL (3 mL) Inpn pen     LANTUS SUBQ     NovoLIN R Regular U100 Insulin 100 unit/mL injection  Generic drug: insulin regular              Indwelling Lines/Drains at time of discharge:   Lines/Drains/Airways       Drain  Duration                  Gastrostomy/Enterostomy midline feeding -- days         Ileostomy LLQ -- days         Urethral Catheter 11/29/24 1143 Double-lumen 16 Fr. 12 days                    Time spent on the discharge of patient: 35 minutes         STEPHEN GRIGSBY NP  Department of Hospital Medicine  [facility]

## 2024-12-12 NOTE — NURSING
15 minute blood sugar check 133. Pt. Now saline locked. Will continue to monitor blood sugars as ordered

## 2024-12-12 NOTE — NURSING
Called and spoke to answering service about pt,. Blood sugar <32. Dr. Norris is on call and the answering service is unable to reach him, they left a message for him to call back, will continue to monitor. Unitl then protocol followed for initiated D10 bolus of 250 ml IV and stat lab glucose obtained. Will continue to monitor.

## 2024-12-12 NOTE — ASSESSMENT & PLAN NOTE
12/4 KD:  Hold tube feedings until G-tube placement has been confirmed.  KUB ordered today  12/5 KD: Glucernia 10cc/hr cont. Feedings. Nutrition consult today.  12/6 KD: Continue titrating feedings as tolerated upon awaiting nutritional consult.  Continue wound care per GS rec  12/8 residual over >200 mL, tube feeding discontinued, no emesis  12/9 KD: cont. D/c feedings d/t residual. CT-ABD today.  12/10 KD: Glucerna 1.5 c. @ 20cc/hr cont. Feeding, tolerating well with no residual. Goal to increase 5cc/hr to reach 45cc/hr.  12/12 KD: resolved

## 2024-12-12 NOTE — PLAN OF CARE
Wound care as ordered, continuous Peg tube feedings of Glucerna at 40 ml/hr, water flushes 60 ml every 4 hours as ordered, residuals checked, glucose monitoring, low glucose <32 at start of shift, dextrose 10% IV per protocol, insulin discontinued and held this shift, glucose checks as ordered, indwelling varghese patent, draining to closed system uriometer to assist with wound healing, multiple wounds present to perineum, lower abdomen, buttocks, illeostomy draining to ostomy appliance. Light brown, soft, loose, stool, disoriented to place, time, situation, turn q 2 hours, pt. Is holding on to feedings, received report from prior nurse who explained that pt. Had 650 ml of residual that was discarded, 175 ml of residual this am returned to pt, plan of care discussed with pt., no evidence of learning, room near unit station, lip moisturizer applied, dry peeling, cracked lips,  turn q 2 hours, room near unit station, contact isolation in place for ESBL, MDRO, encouraging to call for needs/assistance, bilateral heel protector boots in use, pillows in use, frequent nurse rounds, will continue to monitor.       Problem: Infection  Goal: Absence of Infection Signs and Symptoms  Outcome: Progressing     Problem: Skin Injury Risk Increased  Goal: Skin Health and Integrity  Outcome: Progressing     Problem: Wound  Goal: Optimal Coping  Outcome: Progressing  Goal: Optimal Functional Ability  Outcome: Progressing  Goal: Absence of Infection Signs and Symptoms  Outcome: Progressing  Goal: Improved Oral Intake  Outcome: Progressing  Goal: Optimal Pain Control and Function  Outcome: Progressing  Goal: Skin Health and Integrity  Outcome: Progressing  Goal: Optimal Wound Healing  Outcome: Progressing     Problem: Diabetes Comorbidity  Goal: Blood Glucose Level Within Targeted Range  Outcome: Progressing

## 2024-12-12 NOTE — DISCHARGE INSTRUCTIONS
Sacral wound  Non-excisional debridement of exudate and devitalized tissue at bedside on 12/11/24 Dr. Liang  Continue dakins wet to dry BID to sacral  Periwound zinc oxide   May consider wound vac placement upon return to SNF   Q2 turn  Continue tube feeds for nutritional support - Albumin 0.9      Open wound of groin  Continue dakins wet to dry - pack pubic wound daily  Poorly incorporated skin graft to groin, but underlying granulation tissue viable   No surgical intervention warranted at this time     Rec'd Continuous EN: Glucerna 1.5 @ 40mL/r increasing by 5 mL q6hrs to goal rate of 55mL/hr with a continuous 40mL FWF to provide 1980kcal (97% EEN), 109g of protein (114% EPN), and 1962mL FW.     Néstor BID via PEG tube to promote wound healing and to provide additional nutrition.         - Do not mix Néstor with formula in a tube-feeding bag       - Pour one packet of Néstor in a clean, small container for mixing.         - Add 4 fl oz (120 mL) water at room temperature.         - Mix well with disposable spoon or tongue blade until all particles are completely hydrated.         - Verify correct tube placement.         - Flush feeding tube with 30 mL water.         -Administer Néstor through feeding tube using a 60-mL or larger syringe.         - Flush with an additional 30 mL water.        Diabetes: Monitor CBG Q AC and Q HS, use sliding scale below.  **MODERATE CORRECTION DOSE**  Blood Glucose      Short acting insulin   mg/dL                                               151-200                2 units               201-250                4 units               251-300                6 units               301-350                8 units               >350                      10 units             Administer subcutaneously if needed at times designated by monitoring  schedule.    As pt. Continues to tolerate feedings, may restart long acting insulin as needed.      Cont. Rojas and ostomy care per NF protocols.    4

## 2024-12-12 NOTE — ASSESSMENT & PLAN NOTE
12/3 KD:  Orders placed for General surgery wound debridement consult  12/5 KD: Cont. Daily WC per GS recs.  12/8 wound dressing change, f/u GS recommendations  12/10 KD: Start Doxy 100 mg BID with no end date d/t possible osteo. Increase WC BID to sacral wound continuing prior wound care orders.

## 2024-12-12 NOTE — ASSESSMENT & PLAN NOTE
Patient's FSGs are uncontrolled due to hyperglycemia on current medication regimen.  Last A1c reviewed-   Lab Results   Component Value Date    HGBA1C 12.4 (H) 01/03/2024     Most recent fingerstick glucose reviewed-   Recent Labs   Lab 12/12/24  0106 12/12/24  0628 12/12/24  0647 12/12/24  0746   POCTGLUCOSE 77 70 129* 101       Current correctional scale  Low  Maintain anti-hyperglycemic dose as follows-   Antihyperglycemics (From admission, onward)      Start     Stop Route Frequency Ordered    12/03/24 2356  insulin aspart U-100 pen 0-10 Units         -- SubQ Every 6 hours PRN 12/03/24 2300          Hold Oral hypoglycemics while patient is in the hospital.    12/12 KD: Pt. Has had several episodes of hypoglycemia, d/c long acting insulin. Will only use sliding scale insulin at this time until CBG become more regulated.

## 2024-12-15 NOTE — PROGRESS NOTES
"Pharmacokinetic Initial Assessment: IV Vancomycin    Assessment/Plan:    Initiate intravenous vancomycin with loading dose of 1750 mg once followed by a maintenance dose of vancomycin 1000 mg IV every 12 hours  Desired empiric serum trough concentration is 10 to 20 mcg/mL  Draw vancomycin trough level 60 min prior to fourth dose on 12/16/2024 at approximately 2045  Pharmacy will continue to follow and monitor vancomycin.      Please contact pharmacy at extension [phone] with any questions regarding this assessment.     Thank you for the consult,   Dong Dubose       Patient brief summary:  Carlos Chahal is a 34 y.o. male initiated on antimicrobial therapy with IV Vancomycin for treatment of suspected lower respiratory infection    Drug Allergies:   Review of patient's allergies indicates:   Allergen Reactions    Guaifenesin Hives    Codeine Itching       Actual Body Weight:   68 kg    BMI: 23.49 kg/m2    Renal Function:   Estimated Creatinine Clearance: 81.9 mL/min (based on SCr of 1.2 mg/dL).,     Dialysis Method (if applicable):  N/A    CBC (last 72 hours):  Recent Labs   Lab Result Units 12/15/24  0923   WBC K/uL 15.57*   Hemoglobin g/dL 6.5*   Hematocrit % 22.0*   Platelets K/uL 527*   Gran % % 78.5*   Lymph % % 10.9*   Mono % % 6.8   Eosinophil % % 3.0   Basophil % % 0.3   Differential Method  Automated       Metabolic Panel (last 72 hours):  Recent Labs   Lab Result Units 12/15/24  0922 12/15/24  0941   Sodium mmol/L 139  --    Potassium mmol/L 4.7  --    Chloride mmol/L 103  --    CO2 mmol/L 31*  --    Glucose mg/dL 156*  --    Glucose, UA   --  Negative   BUN mg/dL 19  --    Creatinine mg/dL 1.2  --    Albumin g/dL 0.7*  --    Total Bilirubin mg/dL 0.4  --    Alkaline Phosphatase U/L 503*  --    AST U/L 41*  --    ALT U/L 22  --        Drug levels (last 3 results):  No results for input(s): "VANCOMYCINRA", "VANCORANDOM", "VANCOMYCINPE", "VANCOPEAK", "VANCOMYCINTR", "VANCOTROUGH" in the last 72 " hours.    Microbiologic Results:  Microbiology Results (last 7 days)       Procedure Component Value Units Date/Time    Urine culture [[id number]] Collected: 12/15/24 0941    Order Status: No result Specimen: Urine Updated: 12/15/24 1007    Culture, Respiratory with Gram Stain [[id number]] Collected: 12/15/24 0935    Order Status: Sent Specimen: Respiratory from Endotracheal Aspirate Updated: 12/15/24 0942    Blood culture x two cultures. Draw prior to antibiotics. [[id number]] Collected: 12/15/24 0942    Order Status: Sent Specimen: Blood     Blood culture x two cultures. Draw prior to antibiotics. [[id number]] Collected: 12/15/24 0923    Order Status: Sent Specimen: Blood

## 2024-12-15 NOTE — PROGRESS NOTES
Pharmacist Renal Dose Adjustment Note    Carlos Chahal is a 34 y.o. male being treated with the medication Zosyn    Patient Data:    Vital Signs (Most Recent):  Temp: 97.1 °F (36.2 °C) (12/15/24 0938)  Pulse: 85 (12/15/24 1125)  Resp: (!) 22 (12/15/24 1125)  BP: 100/65 (12/15/24 1106)  SpO2: 99 % (12/15/24 1125) Vital Signs (72h Range):  Temp:  [97.1 °F (36.2 °C)]   Pulse:  []   Resp:  [0-60]   BP: ()/(44-82)   SpO2:  [84 %-99 %]      Recent Labs   Lab 12/11/24  0530 12/12/24  0530 12/15/24  0922   CREATININE 1.1 0.9 1.2     Serum creatinine: 1.2 mg/dL 12/15/24 0922  Estimated creatinine clearance: 81.9 mL/min    Medication: Zosyn dose:  3.375 g frequency  Q8H will be changed to medication: Zosyn dose: 4.5 g frequency: Q8H     Pharmacist's Name: Dong Dubose  Pharmacist's Extension: [phone]

## 2024-12-15 NOTE — ED NOTES
Report to [facility],spoke with ALDO Arcos, updated her on pt's condition and she will contact family members.

## 2024-12-15 NOTE — CARE UPDATE
Dr newell called concerning h and h results - pt typed and cross matched - will continue to monitor

## 2024-12-15 NOTE — ED PROVIDER NOTES
NAME:  Carlos Chahal  [facility]:     [id number]  MRN:    [id number]  ADMIT DATE: 12/15/2024        eMERGENCY dEPARTMENT eNCOUnter    CHIEF COMPLAINT    Chief Complaint   Patient presents with    Altered Mental Status     Pt to ED via EMS from [facility] - hx anoxic brain injury - staff stated that pt found this morning with decreased SPO2 / hypotensive and decreased mental status from baseline.     Respiratory Distress       HPI      Carlos Chahal is a 34 y.o. male who presents to the ED from nursing home for altered mental status and difficulty breathing.  Patient is found to be hypoxic.  He has a history of anoxic brain injury.          ALLERGIES    Review of patient's allergies indicates:   Allergen Reactions    Guaifenesin Hives    Codeine Itching       PAST MEDICAL HISTORY  Past Medical History:   Diagnosis Date    Anoxic brain injury     Bipolar disorder, unspecified     Diabetes insipidus     Diabetes mellitus     Dysphagia, unspecified     Chucky gangrene     Gangrene     GERD (gastroesophageal reflux disease)     Hereditary and idiopathic neuropathy     Nontraumatic intracerebral hemorrhage, unspecified     Other muscle spasm     Other psychoactive substance abuse with withdrawal, uncomplicated     Skin transplant status     Thyroid disease        SURGICAL HISTORY    Past Surgical History:   Procedure Laterality Date    COLOSTOMY      INSERTION, PEG TUBE         SOCIAL HISTORY    Social History     Socioeconomic History    Marital status: Single   Tobacco Use    Smoking status: Unknown     Social Drivers of Health     Financial Resource Strain: Patient Declined (12/2/2024)    Overall Financial Resource Strain (CARDIA)     Difficulty of Paying Living Expenses: Patient declined   Food Insecurity: Patient Declined (12/2/2024)    Hunger Vital Sign     Worried About Running Out of Food in the Last Year: Patient declined     Ran Out of Food in the Last Year: Patient declined   Transportation Needs: Patient  "Declined (12/2/2024)    TRANSPORTATION NEEDS     Transportation : Patient declined   Stress: Patient Declined (12/2/2024)    [employer] - Occupational Stress Questionnaire     Feeling of Stress : Patient declined   Housing Stability: Patient Declined (12/2/2024)    Housing Stability Vital Sign     Unable to Pay for Housing in the Last Year: Patient declined     Homeless in the Last Year: Patient declined       FAMILY HISTORY    No family history on file.    REVIEW OF SYSTEMS   ROS  Unable to obtain        PHYSICAL EXAM    Reviewed Triage Note  VITAL SIGNS:   ED Triage Vitals   Encounter Vitals Group      BP 12/15/24 0904 (!) 86/53      Systolic BP Percentile --       Diastolic BP Percentile --       Pulse 12/15/24 0904 106      Resp 12/15/24 0904 (!) 60      Temp 12/15/24 0938 97.1 °F (36.2 °C)      Temp src --       SpO2 12/15/24 0904 (!) 86 %      Weight 12/15/24 0904 150 lb      Height 12/15/24 0904 5' 7"      Head Circumference --       Peak Flow --       Pain Score --       Pain Loc --       Pain Education --       Exclude from Growth Chart --        Patient Vitals for the past 24 hrs:   BP Temp Pulse Resp SpO2 Height Weight   12/15/24 1106 100/65 -- 84 18 98 % -- --   12/15/24 1104 102/68 -- 86 (!) 7 97 % -- --   12/15/24 1059 102/66 -- 87 (!) 7 (!) 92 % -- --   12/15/24 1054 120/81 -- 91 (!) 9 (!) 93 % -- --   12/15/24 1049 135/82 -- 85 10 (!) 94 % -- --   12/15/24 1044 (!) 94/58 -- 89 16 (!) 94 % -- --   12/15/24 1039 (!) 94/59 -- 88 (!) 6 (!) 92 % -- --   12/15/24 1034 100/61 -- 86 (!) 8 (!) 92 % -- --   12/15/24 1030 100/61 -- 85 -- (!) 93 % -- --   12/15/24 1025 101/67 -- 85 -- (!) 92 % -- --   12/15/24 1020 121/71 -- 88 18 (!) 93 % -- --   12/15/24 1017 105/64 -- 84 -- 96 % -- --   12/15/24 1014 (!) 85/51 -- 79 (!) 9 97 % -- --   12/15/24 1007 -- -- -- 16 -- -- --   12/15/24 1005 (!) 73/46 -- 79 -- -- -- --   12/15/24 1004 (!) 79/49 -- 84 (!) 0 99 % -- --   12/15/24 0959 (!) " "74/44 -- 78 (!) 9 97 % -- --   12/15/24 0954 (!) 77/47 -- 81 18 97 % -- --   12/15/24 0950 (!) 75/47 -- 82 18 97 % -- --   12/15/24 0949 (!) 75/45 -- 84 18 98 % -- --   12/15/24 0948 (!) 73/44 -- 85 18 98 % -- --   12/15/24 0947 (!) 86/53 -- 84 (!) 40 (!) 84 % -- --   12/15/24 0938 -- 97.1 °F (36.2 °C) -- -- -- -- --   12/15/24 0922 -- -- 104 (!) 36 97 % -- --   12/15/24 0917 -- -- 105 -- -- -- --   12/15/24 0914 -- -- 104 -- (!) 86 % -- --   12/15/24 0904 (!) 86/53 -- 106 (!) 60 (!) 86 % 5' 7" (1.702 m) 68 kg (150 lb)           Physical Exam    Constitutional:  Chronically ill-appearing  HENT:  Normocephalic, atraumatic.  Eyes:  EOMI. Conjunctiva normal without discharge.   Neck: Normal range of motion.No stridor. No meningismus.   Respiratory:  Shallow inspirations, tachypneic, diffuse rhonchi   Cardiovascular:  Normal heart rate. Normal rhythm. No pitting lower extremity edema.   GI:  Abdomen soft, non-distended, non-tender. Normal bowel sounds. No guarding, rigidity or rebound.    :  Chronic appearing suprapubic wound extending around perineum  Musculoskeletal:  No gross deformity  Neurologic:  No focal deficits noted        LABS  Pertinent labs reviewed. (See chart for details)   Labs Reviewed   CBC W/ AUTO DIFFERENTIAL - Abnormal       Result Value    WBC 15.57 (*)     RBC 2.51 (*)     Hemoglobin 6.5 (*)     Hematocrit 22.0 (*)     MCV 88      MCH 25.9 (*)     MCHC 29.5 (*)     RDW 16.1 (*)     Platelets 527 (*)     MPV 9.7      Immature Granulocytes 0.5      Gran # (ANC) 12.2 (*)     Immature Grans (Abs) 0.08 (*)     Lymph # 1.7      Mono # 1.1 (*)     Eos # 0.5      Baso # 0.04      nRBC 0      Gran % 78.5 (*)     Lymph % 10.9 (*)     Mono % 6.8      Eosinophil % 3.0      Basophil % 0.3      Differential Method Automated     COMPREHENSIVE METABOLIC PANEL - Abnormal    Sodium 139      Potassium 4.7      Chloride 103      CO2 31 (*)     Glucose 156 (*)     BUN 19      Creatinine 1.2      Calcium 10.0      " Total Protein 6.7      Albumin 0.7 (*)     Total Bilirubin 0.4      Alkaline Phosphatase 503 (*)     AST 41 (*)     ALT 22      eGFR >60.0      Anion Gap 5     URINALYSIS, REFLEX TO URINE CULTURE - Abnormal    Specimen UA Urine, Catheterized      Color, UA Yellow      Appearance, UA Cloudy (*)     pH, UA 6.0      Specific Gravity, UA 1.015      Protein, UA 2+ (*)     Glucose, UA Negative      Ketones, UA 1+ (*)     Bilirubin (UA) Negative      Occult Blood UA 3+ (*)     Nitrite, UA Positive (*)     Urobilinogen, UA Negative      Leukocytes, UA 3+ (*)     Narrative:     Preferred Collection Type->Urine, Clean Catch  Specimen Source->Urine   URINALYSIS MICROSCOPIC - Abnormal    RBC, UA >100 (*)     WBC, UA >100 (*)     Bacteria Many (*)     Squam Epithel, UA 6      Hyaline Casts, UA 50 (*)     Granular Casts, UA 4 (*)     Ca Oxalate Kerline, UA Occasional      Microscopic Comment SEE COMMENT      Narrative:     Preferred Collection Type->Urine, Clean Catch  Specimen Source->Urine   CULTURE, BLOOD   CULTURE, BLOOD   CULTURE, RESPIRATORY   CULTURE, URINE   LACTIC ACID, PLASMA    Lactate (Lactic Acid) 1.6     LACTIC ACID, PLASMA   SARS-COV-2 RDRP GENE   POCT INFLUENZA A/B MOLECULAR         RADIOLOGY    Imaging Results              X-Ray Chest AP Portable (Final result)  Result time 12/15/24 10:35:59      Final result by Nakul Cross MD (12/15/24 10:35:59)                   Impression:      Right lower lung airspace disease and moderate right pleural fluid collection.      Electronically signed by: Nakul Cross MD  Date:    12/15/2024  Time:    10:35               Narrative:    EXAMINATION:  XR CHEST AP PORTABLE    CLINICAL HISTORY:  Sepsis;    COMPARISON:  Chest x-ray 11/29/2024.    FINDINGS:  Unremarkable AP cardiac silhouette.  Right lower lung airspace disease and moderate right pleural fluid collection.  Left lung clear.  Endotracheal tube present.                                      PROCEDURES     Intubation    Date/Time: 12/15/2024 11:05 AM  Location procedure was performed: [facility] EMERGENCY DEPARTMENT    Performed by: Doug Ibrahim MD  Authorized by: Doug Ibrahim MD  Indications: respiratory failure  Intubation method: video-assisted  Patient status: paralyzed (RSI)  Preoxygenation: BVM and nonrebreather mask  Sedatives: etomidate  Paralytic: rocuronium  Laryngoscope size: Mac 3  Tube size: 7.5 mm  Tube type: cuffed  Number of attempts: 1  Cords visualized: yes  Post-procedure assessment: CO2 detector  Breath sounds: rales/crackles and equal and absent over the epigastrium  Cuff inflated: yes  ETT to lip: 23 cm  Tube secured with: ETT kaufman  Chest x-ray interpreted by me.  Chest x-ray findings: endotracheal tube in appropriate position  Patient tolerance: Patient tolerated the procedure well with no immediate complications      Critical Care    Date/Time: 12/15/2024 11:06 AM    Performed by: Doug Ibrahim MD  Authorized by: Doug Ibrahim MD  Direct patient critical care time: 30 minutes  Ordering / reviewing critical care time: 5 minutes  Documentation critical care time: 5 minutes  Consulting other physicians critical care time: 5 minutes  Total critical care time (exclusive of procedural time) : 45 minutes            EKG     Interpreted by ERP:          ED COURSE & MEDICAL DECISION MAKING    Pertinent & Imaging studies reviewed. (See chart for details and specific orders.)        Medications   piperacillin-tazobactam (ZOSYN) 4.5 g in D5W 100 mL IVPB (MB+) (has no administration in time range)   vancomycin 1750 mg in 0.9% sodium chloride 500 mL IVPB ( Intravenous Verify Only 12/15/24 1108)   NORepinephrine 4 mg in dextrose 5% 250 mL infusion (premix) (0.1 mcg/kg/min × 68 kg Intravenous Verify Only 12/15/24 1108)   sodium chloride 0.9% bolus 2,040 mL 2,040 mL (0 mLs Intravenous Stopped 12/15/24 1045)   piperacillin-tazobactam (ZOSYN) 4.5 g in D5W 100 mL IVPB (MB+) (0 g Intravenous Stopped  12/15/24 1002)   etomidate injection 20 mg (20 mg Intravenous Given 12/15/24 0919)   rocuronium injection 80 mg (80 mg Intravenous Given 12/15/24 0920)        Patient was intubated shortly after arrival.  He is covered with broad-spectrum antibiotics and started on pressors.  Patient with pneumonia and urosepsis.  He will be admitted to the ICU for further management         DISPOSITION  Patient admitted in critical condition at No discharge date for patient encounter.        FINAL IMPRESSION    1. Sepsis    2. Pneumonia of right lower lobe due to infectious organism    3. Urinary tract infection associated with indwelling urethral catheter, initial encounter            Critical care time spent with this patient (not including separately billable items) was  45 minutes.     DISCLAIMER: This note was prepared with Dragon NaturallySpeaking voice recognition transcription software. Garbled syntax, mangled pronouns, and other bizarre constructions may be attributed to that software system.      Doug Ibrahim MD  12/15/2024  11:02 AM           Doug Ibrahim MD  12/15/24 9532

## 2024-12-15 NOTE — PROCEDURES
"Carlos Chahal is a 34 y.o. male patient.    Temp: 96.6 °F (35.9 °C) (12/15/24 1130)  Pulse: 84 (12/15/24 1430)  Resp: 18 (12/15/24 1430)  BP: (!) 88/57 (12/15/24 1430)  SpO2: 100 % (12/15/24 1430)  Weight: 68.5 kg (151 lb) (12/15/24 1230)  Height: 5' 7" (170.2 cm) (12/15/24 1230)    PICC  Date/Time: 12/15/2024 2:51 PM  Performed by: Caridad Morales, NADIRA  Consent Done: Yes  Time out: Immediately prior to procedure a time out was called to verify the correct patient, procedure, equipment, support staff and site/side marked as required  Indications: med administration and vascular access  Anesthesia: local infiltration  Local anesthetic: lidocaine 1% without epinephrine  Anesthetic Total (mL): 3  Preparation: skin prepped with ChloraPrep  Skin prep agent dried: skin prep agent completely dried prior to procedure  Sterile barriers: all five maximum sterile barriers used - cap, mask, sterile gown, sterile gloves, and large sterile sheet  Hand hygiene: hand hygiene performed prior to central venous catheter insertion  Location details: left brachial  Catheter type: triple lumen  Catheter size: 5 Fr  Catheter Length: 42cm    Ultrasound guidance: yes  Vessel Caliber: medium and patent, compressibility normal  Needle advanced into vessel with real time Ultrasound guidance.  Guidewire confirmed in vessel.  Sterile sheath used.  Number of attempts: 1  Post-procedure: blood return through all ports, sterile dressing applied and chlorhexidine patch    Assessment: placement verified by x-ray  Complications: none          Caridad Morales RN  12/15/2024    "

## 2024-12-15 NOTE — PLAN OF CARE
Received from er to room 207 on vent - transferred to bed - opens eyes to painful stimuli - no on any sedation - diprivan ordered placed for prn - pt calm and no distress noted - number 7.5 oett secured in place at chapin 23 cm - vent settings ac rate 18 tidal volume 400 fio2 100 percent and peep of 5 - sat on vent 100 percent - no resp distress noted - diminished breath sounds - sr rate of 91 - pulses present - scd's in place - abdomen with g tube clamped - dressing care performed - crusty areas around tube with small pink drainage - ileostomy in place with light brown yellow evelyn - varghese catheter with yellow urine - secured in place - saline lock to right arm and left wrist - picc now in place - awaiting xray report before using - levophed at 0.15 at present - temp topical 96.7 uable to obtain axillary or oral - bed warmer in place - pt has multiple wounds - dressing changed in er prior to arrival to icu - no signs of pain or distress - will continue to monitor

## 2024-12-16 PROBLEM — S31.000S SACRAL WOUND, SEQUELA: Status: ACTIVE | Noted: 2024-12-03

## 2024-12-16 PROBLEM — J18.9 PNEUMONIA OF RIGHT LOWER LOBE DUE TO INFECTIOUS ORGANISM: Status: ACTIVE | Noted: 2024-12-16

## 2024-12-16 PROBLEM — A41.9 SEPSIS: Status: ACTIVE | Noted: 2024-12-16

## 2024-12-16 PROBLEM — E83.42 HYPOMAGNESEMIA: Status: ACTIVE | Noted: 2024-12-16

## 2024-12-16 NOTE — EICU
Intervention Initiated From:  [facility] / EICU    Sandy intervened regarding:  Rounding (Video assessment)  Comments: Video rounds done.  Resting quietly in bed,  on vent support, norepinephrine IV drip and IVF's in progress (see MAR for rates), no acute distress noted.

## 2024-12-16 NOTE — SUBJECTIVE & OBJECTIVE
Past Medical History:   Diagnosis Date    Anoxic brain injury     Bipolar disorder, unspecified     Diabetes insipidus     Diabetes mellitus     Dysphagia, unspecified     Chucky gangrene     Gangrene     GERD (gastroesophageal reflux disease)     Hereditary and idiopathic neuropathy     Nontraumatic intracerebral hemorrhage, unspecified     Other muscle spasm     Other psychoactive substance abuse with withdrawal, uncomplicated     Skin transplant status     Thyroid disease        Past Surgical History:   Procedure Laterality Date    COLOSTOMY      INSERTION, PEG TUBE         Review of patient's allergies indicates:   Allergen Reactions    Guaifenesin Hives    Codeine Itching       No current facility-administered medications on file prior to encounter.     Current Outpatient Medications on File Prior to Encounter   Medication Sig    acetaminophen (TYLENOL) 325 MG tablet 325 mg by Per G Tube route every 6 (six) hours as needed.    ascorbic acid, vitamin C, (VITAMIN C) 500 MG tablet 500 mg by Per G Tube route once daily.    aspirin 81 MG Chew Take 81 mg by mouth once daily.    diazePAM (VALIUM) 5 MG tablet 2 tablets (10 mg total) by Per G Tube route 2 (two) times a day.    doxycycline (VIBRA-TABS) 100 MG tablet 1 tablet (100 mg total) by Per G Tube route every 12 (twelve) hours.    enoxaparin (LOVENOX) 40 mg/0.4 mL Syrg Inject 40 mg into the skin.    famotidine (PEPCID) 40 MG tablet 40 mg by Per G Tube route once daily.    FLUoxetine 20 MG capsule 20 mg by Per G Tube route once daily.    gabapentin (NEURONTIN) 250 mg/5 mL solution 2.5 mLs (125 mg total) by Per G Tube route every 12 (twelve) hours.    insulin aspart U-100 (NOVOLOG FLEXPEN U-100 INSULIN) 100 unit/mL (3 mL) InPn pen Use per sliding scale insulin    levothyroxine (SYNTHROID) 150 MCG tablet 1 tablet (150 mcg total) by Per G Tube route before breakfast.    lipase-protease-amylase (ZENPEP) 20,000-63,000- 84,000 unit capsule Take 1 capsule by mouth 3  (three) times daily.    lurasidone (LATUDA) 40 mg Tab tablet Take 40 mg by mouth once daily.    melatonin (MELATIN) 3 mg tablet 2 tablets (6 mg total) by Per G Tube route nightly as needed for Insomnia.    metoclopramide HCl (REGLAN) 5 MG tablet 1 tablet (5 mg total) by Per G Tube route 3 (three) times daily before meals.    oxyCODONE (ROXICODONE) 10 mg Tab immediate release tablet 1 tablet (10 mg total) by Per G Tube route every 6 (six) hours as needed for Pain.    polyethylene glycol (GLYCOLAX) 17 gram PwPk 17 g by Per G Tube route once daily.    rosuvastatin (CRESTOR) 10 MG tablet Take 10 mg by mouth once daily.    sodium hypochlorite 0.125% (DAKIN'S SOLUTION) external solution Apply topically once daily. Apply once daily to groin area and BID to sacral     Family History    None       Tobacco Use    Smoking status: Unknown    Smokeless tobacco: Not on file   Substance and Sexual Activity    Alcohol use: Not on file    Drug use: Not on file    Sexual activity: Not on file     Review of Systems   Unable to perform ROS: Acuity of condition     Objective:     Vital Signs (Most Recent):  Temp: 99.1 °F (37.3 °C) (12/16/24 0730)  Pulse: 110 (12/16/24 0800)  Resp: (!) 24 (12/16/24 0800)  BP: (!) 98/55 (12/16/24 0800)  SpO2: 100 % (12/16/24 0800) Vital Signs (24h Range):  Temp:  [96.6 °F (35.9 °C)-99.1 °F (37.3 °C)] 99.1 °F (37.3 °C)  Pulse:  [] 110  Resp:  [0-60] 24  SpO2:  [84 %-100 %] 100 %  BP: ()/(44-82) 98/55     Weight: 68.5 kg (151 lb)  Body mass index is 23.65 kg/m².     Physical Exam  Constitutional:       Appearance: He is ill-appearing.      Comments: Thin male; intubated   HENT:      Mouth/Throat:      Mouth: Mucous membranes are dry.   Eyes:      Pupils: Pupils are equal, round, and reactive to light.   Cardiovascular:      Rate and Rhythm: Regular rhythm. Tachycardia present.   Pulmonary:      Effort: Pulmonary effort is normal.      Breath sounds: Rhonchi present.   Abdominal:      General:  There is no distension.      Palpations: Abdomen is soft.      Tenderness: There is no abdominal tenderness.      Comments: Jtube noted; ileostomy noted   Genitourinary:     Comments: Rojas with yellow urine and occ tiny blood clots  Skin:     General: Skin is warm.      Comments: Wounds to sacrum; groin area              CRANIAL NERVES     CN III, IV, VI   Pupils are equal, round, and reactive to light.       Significant Labs: All pertinent labs within the past 24 hours have been reviewed.  Recent Lab Results  (Last 5 results in the past 24 hours)        12/16/24  0548   12/16/24  0356   12/15/24  1306   12/15/24  1205   12/15/24  1204        Base Deficit 0.7               Notified Note               Performed By: Hardeep               Comments               POC Molecular Influenza A Ag         Negative       POC Molecular Influenza B Ag         Negative       Unit Blood Type Code     5100  [P]                5100  [P]           Unit Expiration     [id number]  [P]                [id number]  [P]           Unit Blood Type     O POS  [P]                O POS  [P]           Provider Notified:               Specimen source Arterial               Notified Time               Notified By               AC 18               Albumin   0.6             ALP   412             ALT   17             Anion Gap   5             AST   38             Baso #   0.06             Basophil %   0.2             BILIRUBIN TOTAL   0.6  Comment: For infants and newborns, interpretation of results should be based  on gestational age, weight and in agreement with clinical  observations.    Premature Infant recommended reference ranges:  Up to 24 hours.............<8.0 mg/dL  Up to 48 hours............<12.0 mg/dL  3-5 days..................<15.0 mg/dL  6-29 days.................<15.0 mg/dL    For patients on Eltrombopag therapy, use of Dimension [place] TBIL is   not   recommended.               BUN   16             Calcium   9.7              Chloride   104             CO2   29             CODING SYSTEM     [id number]  [P]                [id number]  [P]           Creatinine   1.5             Crossmatch Interpretation     Compatible  [P]                Compatible  [P]           Differential Method   Automated             DISPENSE STATUS     CROSSMATCHED  [P]                CROSSMATCHED  [P]           eGFR   >60.0             Eos #   0.5             Eos %   1.7             FiO2 100.0               Glucose   229             Gran # (ANC)   26.2             Gran %   87.2             Group & Rh     O POS           Hematocrit   21.2             Hemoglobin   6.5             Immature Grans (Abs)   0.35  Comment: Mild elevation in immature granulocytes is non specific and   can be seen in a variety of conditions including stress response,   acute inflammation, trauma and pregnancy. Correlation with other   laboratory and clinical findings is essential.               Immature Granulocytes   1.2             INDIRECT CAMILLE     NEG           Lactic Acid Level     2.3           Lymph #   2.0             Lymph %   6.5             Magnesium    1.3             MCH   26.4             MCHC   30.7             MCV   86             MODIFIED CELSO'S TEST N/A               Mono #   1.0             Mono %   3.2             MPV   9.7             nRBC   1             O2DEVICE Ventilator               PEAK FLOW 60.0               PEEP 5.0               Platelet Count   563             POC HCO3 25.0               POC PCO2 39.1               POC PH 7.418               POC PO2 251  Comment: Value above reference range               POC SATURATED O2 100.0  Comment: Value above reference range               POC Temp 37.0               Potassium   4.5             Product Code     [id number]  [P]                [id number]  [P]           PROTEIN TOTAL   6.2              Acceptable       Yes   Yes       RBC   2.46             RDW   16.3             SARS-CoV-2 RNA, Amplification, Qual        Negative         Sodium   138             Specimen Outdate     12/18/2024 23:59           UNIT NUMBER     [id number]  [P]                [id number]  [P]           Vt 400               WBC   30.00                                     [P] - Preliminary Result               Significant Imaging: I have reviewed all pertinent imaging results/findings within the past 24 hours.  I have reviewed and interpreted all pertinent imaging results/findings within the past 24 hours.

## 2024-12-16 NOTE — ASSESSMENT & PLAN NOTE
"Patient's FSGs are uncontrolled due to hyperglycemia on current medication regimen.  Last A1c reviewed-   Lab Results   Component Value Date    HGBA1C 12.4 (H) 01/03/2024     Most recent fingerstick glucose reviewed- No results for input(s): "POCTGLUCOSE" in the last 24 hours.  Current correctional scale  Low  Maintain anti-hyperglycemic dose as follows-   Antihyperglycemics (From admission, onward)      Start     Stop Route Frequency Ordered    12/16/24 0952  insulin aspart U-100 pen 0-5 Units         -- SubQ Every 4 hours PRN 12/16/24 0853          Hold Oral hypoglycemics while patient is in the hospital.  "

## 2024-12-16 NOTE — ASSESSMENT & PLAN NOTE
Anemia is likely due to chronic infections Most recent hemoglobin and hematocrit are listed below.  Recent Labs     12/15/24  0923 12/16/24  0356   HGB 6.5* 6.5*   HCT 22.0* 21.2*     Plan  - Monitor serial CBC: Daily  - Transfuse PRBC if patient becomes hemodynamically unstable, symptomatic or H/H drops below 7/21.  - Patient has not received any PRBC transfusions to date  - Patient's anemia is currently worsening. Will adjust treatment as follows: 2uprbcs today

## 2024-12-16 NOTE — CONSULTS
[facility] - Intensive Care  Adult Nutrition  Consult Note    SUMMARY     Recommendations  1. Rec'd Continuous EN: Glucerna 1.5 @ 40mL/r increasing by 5 mL q6hrs to goal rate of 55mL/hr with a continuous 40mL FWF to provide 1980kcal (94% EEN), 109g of protein (100% EPN), and 1962mL FW.   2. Néstor BID via PEG tube to promote wound healing and to provide additional nutrition.           - Do not mix Néstor with formula in a tube-feeding bag         - Pour one packet of Néstor in a clean, small container for mixing.           - Add 4 fl oz (120 mL) water at room temperature.           - Mix well with disposable spoon or tongue blade until all particles are completely hydrated.           - Verify correct tube placement.           - Flush feeding tube with 30 mL water.           -Administer Néstor through feeding tube using a 60-mL or larger syringe.           - Flush with an additional 30 mL water.   3. RD to follow and make rec's accordingly.  Goals:   1. Pt will be started on EN by next RD follow up.   2. Pt will reach TF goal rate within 48hrs of initiation.  Nutrition Goal Status: new  Communication of RD Recs: reviewed with RN    Assessment and Plan    Nutrition Problem  Inadequate oral intake    Related to (etiology):   NPO/Intubation Status    Signs and Symptoms (as evidenced by):   0% PO intake     Interventions/Recommendations (treatment strategy):  1. Rec'd Continuous EN: Glucerna 1.5 @ 40mL/r increasing by 5 mL q6hrs to goal rate of 55mL/hr with a continuous 40mL FWF to provide 1980kcal (94% EEN), 109g of protein (100% EPN), and 1962mL FW.   2. Néstor BID via PEG tube to promote wound healing and to provide additional nutrition.           - Do not mix Néstor with formula in a tube-feeding bag         - Pour one packet of Néstor in a clean, small container for mixing.           - Add 4 fl oz (120 mL) water at room temperature.           - Mix well with disposable spoon or tongue blade until all particles are  "completely hydrated.           - Verify correct tube placement.           - Flush feeding tube with 30 mL water.           -Administer Néstor through feeding tube using a 60-mL or larger syringe.           - Flush with an additional 30 mL water.   3. RD to follow and make rec's accordingly.    Nutrition Diagnosis Status:   New    Malnutrition Assessment  NFPE not warranted at this time. RD to continue to monitor for signs and symptoms of malnutrition.    Nutrition Related Social Determinants of Health:  None identified at this time.    Reason for Assessment    Reason For Assessment: consult (Pt on ventilator)  Diagnosis: infection/sepsis  General Information Comments: RD triggered for pt intubation status and havign a PEG tube. Will provide TF rec's. RD to follow and make rec's accordingly.  Nutrition Discharge Planning: TBD as care progresses    Nutrition Risk Screen    Nutrition Risk Screen: large or nonhealing wound, burn or pressure injury    Nutrition/Diet History    Patient Reported Diet/Restrictions/Preferences: no oral intake  Spiritual, Cultural Beliefs, Jewish Practices, Values that Affect Care: no  Food Allergies: NKFA  Factors Affecting Nutritional Intake: NPO  Nutrition Support Formula Prior to Admit: Glucerna 1.5    Anthropometrics    Temp: 97 °F (36.1 °C)  Height: 5' 7" (170.2 cm)  Height (inches): 67 in  Weight Method: Bed Scale  Weight: 68.5 kg (151 lb 0.2 oz)  Weight (lb): 151.02 lb  Ideal Body Weight (IBW), Male: 148 lb  % Ideal Body Weight, Male (lb): 102.04 %  BMI (Calculated): 23.6  BMI Grade: 18.5-24.9 - normal    Lab/Procedures/Meds    Pertinent Labs Reviewed: reviewed  Pertinent Medications Reviewed: reviewed    Estimated/Assessed Needs    Weight Used For Calorie Calculations: 68.5 kg (151 lb 0.2 oz)  Energy Calorie Requirements (kcal): 2096  Energy Need Method: [facility]  Protein Requirements: 109-137 (1.6-2.0g/kg)  Weight Used For Protein Calculations: 68.5 kg (151 lb 0.2 oz)  Fluid " Requirements (mL): 2096 (1mL/kcal)  Estimated Fluid Requirement Method: RDA Method  RDA Method (mL): 2096    Nutrition Prescription Ordered    Current Diet Order: NPO  Oral Nutrition Supplement: None    Evaluation of Received Nutrient/Fluid Intake    Enteral Calories (kcal): 0  Enteral Protein (gm): 0  Enteral (Free Water) Fluid (mL): 0  % Kcal Needs: 0%  % Protein Needs: 0%  I/O: + 1,605.2  Energy Calories Required: not meeting needs  Protein Required: not meeting needs  Tolerance: other (see comments) (TF not initiated at time of assessment)  % Intake of Estimated Energy Needs: 0 - 25 %  % Meal Intake: NPO    Nutrition Risk    Level of Risk/Frequency of Follow-up: moderate     Monitor and Evaluation    Food and Nutrient Intake: enteral nutrition intake  Food and Nutrient Adminstration: enteral and parenteral nutrition administration  Knowledge/Beliefs/Attitudes: food and nutrition knowledge/skill, beliefs and attitudes  Physical Activity and Function: nutrition-related ADLs and IADLs  Anthropometric Measurements: height/length, weight, weight change, body mass index  Biochemical Data, Medical Tests and Procedures: electrolyte and renal panel, gastrointestinal profile, glucose/endocrine profile, lipid profile, inflammatory profile  Nutrition-Focused Physical Findings: overall appearance     Nutrition Follow-Up    RD Follow-up?: Yes

## 2024-12-16 NOTE — PLAN OF CARE
Recommendations  1. Rec'd Continuous EN: Glucerna 1.5 @ 40mL/r increasing by 5 mL q6hrs to goal rate of 55mL/hr with a continuous 40mL FWF to provide 1980kcal (94% EEN), 109g of protein (100% EPN), and 1962mL FW.   2. Néstor BID via PEG tube to promote wound healing and to provide additional nutrition.           - Do not mix Néstor with formula in a tube-feeding bag         - Pour one packet of Néstor in a clean, small container for mixing.           - Add 4 fl oz (120 mL) water at room temperature.           - Mix well with disposable spoon or tongue blade until all particles are completely hydrated.           - Verify correct tube placement.           - Flush feeding tube with 30 mL water.           -Administer Néstor through feeding tube using a 60-mL or larger syringe.           - Flush with an additional 30 mL water.   3. RD to follow and make rec's accordingly.  Goals:   1. Pt will be started on EN by next RD follow up.   2. Pt will reach TF goal rate within 48hrs of initiation.  Nutrition Goal Status: new

## 2024-12-16 NOTE — NURSING
Pt remains on the vent. FIO2 decreased to 70% after ABG results. Levo infusing. Titrated as needed. IVF infusing. Sinus tachy. Afebrile this shift. Dry cracking skin to lips, moisture treatment applied. Small amount of light brown stool from ileostomy (100ml). 400 ml of cloudy yellow urine with sediment. Rojas care. Slight edema to lower ext. SCDs maintained. ABT therapy continued without s/s of SE or AR. PEG tube maintained. Continue to observe.

## 2024-12-16 NOTE — PLAN OF CARE
Problem: Skin Injury Risk Increased  Goal: Skin Health and Integrity  Outcome: Progressing     Problem: Mechanical Ventilation Invasive  Goal: Effective Communication  Outcome: Progressing  Goal: Optimal Device Function  Outcome: Progressing  Goal: Mechanical Ventilation Liberation  Outcome: Progressing  Goal: Optimal Nutrition Delivery  Outcome: Progressing  Goal: Absence of Device-Related Skin and Tissue Injury  Outcome: Progressing  Goal: Absence of Ventilator-Induced Lung Injury  Outcome: Progressing     Problem: Artificial Airway  Goal: Effective Communication  Outcome: Progressing  Goal: Optimal Device Function  Outcome: Progressing  Goal: Absence of Device-Related Skin or Tissue Injury  Outcome: Progressing     Problem: Adult Inpatient Plan of Care  Goal: Plan of Care Review  Outcome: Progressing  Goal: Patient-Specific Goal (Individualized)  Outcome: Progressing  Goal: Absence of Hospital-Acquired Illness or Injury  Outcome: Progressing  Goal: Optimal Comfort and Wellbeing  Outcome: Progressing  Goal: Readiness for Transition of Care  Outcome: Progressing     Problem: Diabetes Comorbidity  Goal: Blood Glucose Level Within Targeted Range  Outcome: Progressing     Problem: Wound  Goal: Optimal Coping  Outcome: Progressing  Goal: Optimal Functional Ability  Outcome: Progressing  Goal: Absence of Infection Signs and Symptoms  Outcome: Progressing  Goal: Improved Oral Intake  Outcome: Progressing  Goal: Optimal Pain Control and Function  Outcome: Progressing  Goal: Skin Health and Integrity  Outcome: Progressing  Goal: Optimal Wound Healing  Outcome: Progressing     Problem: Infection  Goal: Absence of Infection Signs and Symptoms  Outcome: Progressing     Problem: Pneumonia  Goal: Fluid Balance  Outcome: Progressing  Goal: Resolution of Infection Signs and Symptoms  Outcome: Progressing  Goal: Effective Oxygenation and Ventilation  Outcome: Progressing

## 2024-12-16 NOTE — HPI
Carlos Chahal is a 34 y.o. male who presents to the ED from nursing home for altered mental status and difficulty breathing.  Patient is found to be hypoxic.  He has a history of anoxic brain injury     This am, pt is on ventilator and levophed at 0.25mcg and ivfs at 75ml/hr.      Spoke with father this am on condition of pt

## 2024-12-16 NOTE — PLAN OF CARE
[facility] - Intensive Care  Initial Discharge Assessment       Primary Care Provider: Jose Francisco Klein MD    Admission Diagnosis: Sepsis [A41.9]  Pneumonia of right lower lobe due to infectious organism [J18.9]  Urinary tract infection associated with indwelling urethral catheter, initial encounter [T83.511A, N39.0]    Admission Date: 12/15/2024  Expected Discharge Date:     Transition of Care Barriers: None    Payor: BCBS MGD MEDICARE / Plan: [facility] Clinical Data DUAL SNP / Product Type: Medicare Advantage /     Extended Emergency Contact Information  Primary Emergency Contact: Yue Maciel (Aunt)  Mobile Phone: [phone]  Relation: Other  Secondary Emergency Contact: Hope Chahal  Mobile Phone: [phone]  Relation: Father  Preferred language: English    Discharge Plan A: Return to nursing home  Discharge Plan B: Return to Nursing Home      [facility] [address]  [address] [facility] [address]  Phone: [phone] Fax: [phone]    [facility] [place], [place] - [address]  [address]  [facility] [address]  Phone: [phone] Fax: [phone]      Initial Assessment (most recent)       Adult Discharge Assessment - 12/16/24 1100          Discharge Assessment    Assessment Type Discharge Planning Assessment     Confirmed/corrected address, phone number and insurance Yes     Confirmed Demographics Correct on Facesheet     Source of Information family   Yue Maciel (Aunt) [phone]    When was your last doctors appointment? 06/20/24     Communicated JOSE MANUEL with patient/caregiver Date not available/Unable to determine     Reason For Admission Sepsis     People in Home facility resident     Facility Arrived From: [facility]     Do you expect to return to your current living situation? Yes     Do you have help at home or someone to help you manage your care at home? Yes     Prior to hospitilization cognitive status: Unable  to Assess     Current cognitive status: Unable to Assess     Walking or Climbing Stairs Difficulty yes     Walking or Climbing Stairs ambulation difficulty, dependent;stair climbing difficulty, dependent;transferring difficulty, dependent     Dressing/Bathing Difficulty yes     Dressing/Bathing bathing difficulty, dependent;dressing difficulty, dependent     Home Accessibility wheelchair accessible     Home Layout Able to live on 1st floor     Equipment Currently Used at Home other (see comments)     Readmission within 30 days? Yes     Patient currently being followed by outpatient case management? No     Do you currently have service(s) that help you manage your care at home? No     Do you take prescription medications? Yes     Do you have prescription coverage? Yes     Coverage BC[employer] [place] MEDIC[place] - [place] [employer] [place]     Do you have any problems affording any of your prescribed medications? No     Is the patient taking medications as prescribed? yes     Who is going to help you get home at discharge? Facility     How do you get to doctors appointments? agency     Are you on dialysis? No     Do you take coumadin? No     Discharge Plan A Return to nursing home     Discharge Plan B Return to Nursing Home     DME Needed Upon Discharge  other (see comments)   TBD    Discharge Plan discussed with: Caregiver     Name(s) and Number(s) Yue Maciel (Aunt) [phone]     Transition of Care Barriers None                   Discharge assessment completed with patient.  Patient does not have any  needs at this time.  Discharge planning checklist given to patient/family with instructions to contact  for any needs.  SW will follow as needed.

## 2024-12-16 NOTE — RESPIRATORY THERAPY
12/16/24 0805   PRE-TX-O2   Oxygen Concentration (%) (S)  60  (FIO2 decreased per Dr. Diamond)

## 2024-12-16 NOTE — PLAN OF CARE
Patient remains intubated and on levophed. Levophed able to be weaned down per titration. 2 units of blood given today, tolerated well. Wound care done per orders. Tube feedings started this morning, but held this evening due to residuals over 100mls. 2 gram mag given. Antibiotics continued. 300mls of stool per ileostomy, 650mls of urine per varghese. Propofol had to be started this afternoon, patient woke up and was moving arms up towards ETT so sedation started for pt safety and airway protection.

## 2024-12-16 NOTE — H&P
[facility] Medicine  History & Physical    Patient Name: Carlos Chahal  MRN: [id number]  Patient Class: IP- Inpatient  Admission Date: 12/15/2024  Attending Physician: Kim Diamond MD   Primary Care Provider: oJse Francisco Klein MD         Patient information was obtained from past medical records and ER records.     Subjective:     Principal Problem:Sepsis    Chief Complaint:   Chief Complaint   Patient presents with    Altered Mental Status     Pt to ED via EMS from [facility] - hx anoxic brain injury - staff stated that pt found this morning with decreased SPO2 / hypotensive and decreased mental status from baseline.     Respiratory Distress        HPI: Carlos Chahal is a 34 y.o. male who presents to the ED from nursing home for altered mental status and difficulty breathing.  Patient is found to be hypoxic.  He has a history of anoxic brain injury     This am, pt is on ventilator and levophed at 0.25mcg and ivfs at 75ml/hr.      Spoke with father this am on condition of pt    Past Medical History:   Diagnosis Date    Anoxic brain injury     Bipolar disorder, unspecified     Diabetes insipidus     Diabetes mellitus     Dysphagia, unspecified     Chucky gangrene     Gangrene     GERD (gastroesophageal reflux disease)     Hereditary and idiopathic neuropathy     Nontraumatic intracerebral hemorrhage, unspecified     Other muscle spasm     Other psychoactive substance abuse with withdrawal, uncomplicated     Skin transplant status     Thyroid disease        Past Surgical History:   Procedure Laterality Date    COLOSTOMY      INSERTION, PEG TUBE         Review of patient's allergies indicates:   Allergen Reactions    Guaifenesin Hives    Codeine Itching       No current facility-administered medications on file prior to encounter.     Current Outpatient Medications on File Prior to Encounter   Medication Sig    acetaminophen (TYLENOL) 325 MG tablet 325 mg by Per G Tube route  every 6 (six) hours as needed.    ascorbic acid, vitamin C, (VITAMIN C) 500 MG tablet 500 mg by Per G Tube route once daily.    aspirin 81 MG Chew Take 81 mg by mouth once daily.    diazePAM (VALIUM) 5 MG tablet 2 tablets (10 mg total) by Per G Tube route 2 (two) times a day.    doxycycline (VIBRA-TABS) 100 MG tablet 1 tablet (100 mg total) by Per G Tube route every 12 (twelve) hours.    enoxaparin (LOVENOX) 40 mg/0.4 mL Syrg Inject 40 mg into the skin.    famotidine (PEPCID) 40 MG tablet 40 mg by Per G Tube route once daily.    FLUoxetine 20 MG capsule 20 mg by Per G Tube route once daily.    gabapentin (NEURONTIN) 250 mg/5 mL solution 2.5 mLs (125 mg total) by Per G Tube route every 12 (twelve) hours.    insulin aspart U-100 (NOVOLOG FLEXPEN U-100 INSULIN) 100 unit/mL (3 mL) InPn pen Use per sliding scale insulin    levothyroxine (SYNTHROID) 150 MCG tablet 1 tablet (150 mcg total) by Per G Tube route before breakfast.    lipase-protease-amylase (ZENPEP) 20,000-63,000- 84,000 unit capsule Take 1 capsule by mouth 3 (three) times daily.    lurasidone (LATUDA) 40 mg Tab tablet Take 40 mg by mouth once daily.    melatonin (MELATIN) 3 mg tablet 2 tablets (6 mg total) by Per G Tube route nightly as needed for Insomnia.    metoclopramide HCl (REGLAN) 5 MG tablet 1 tablet (5 mg total) by Per G Tube route 3 (three) times daily before meals.    oxyCODONE (ROXICODONE) 10 mg Tab immediate release tablet 1 tablet (10 mg total) by Per G Tube route every 6 (six) hours as needed for Pain.    polyethylene glycol (GLYCOLAX) 17 gram PwPk 17 g by Per G Tube route once daily.    rosuvastatin (CRESTOR) 10 MG tablet Take 10 mg by mouth once daily.    sodium hypochlorite 0.125% (DAKIN'S SOLUTION) external solution Apply topically once daily. Apply once daily to groin area and BID to sacral     Family History    None       Tobacco Use    Smoking status: Unknown    Smokeless tobacco: Not on file   Substance and Sexual Activity    Alcohol  use: Not on file    Drug use: Not on file    Sexual activity: Not on file     Review of Systems   Unable to perform ROS: Acuity of condition     Objective:     Vital Signs (Most Recent):  Temp: 99.1 °F (37.3 °C) (12/16/24 0730)  Pulse: 110 (12/16/24 0800)  Resp: (!) 24 (12/16/24 0800)  BP: (!) 98/55 (12/16/24 0800)  SpO2: 100 % (12/16/24 0800) Vital Signs (24h Range):  Temp:  [96.6 °F (35.9 °C)-99.1 °F (37.3 °C)] 99.1 °F (37.3 °C)  Pulse:  [] 110  Resp:  [0-60] 24  SpO2:  [84 %-100 %] 100 %  BP: ()/(44-82) 98/55     Weight: 68.5 kg (151 lb)  Body mass index is 23.65 kg/m².     Physical Exam  Constitutional:       Appearance: He is ill-appearing.      Comments: Thin male; intubated   HENT:      Mouth/Throat:      Mouth: Mucous membranes are dry.   Eyes:      Pupils: Pupils are equal, round, and reactive to light.   Cardiovascular:      Rate and Rhythm: Regular rhythm. Tachycardia present.   Pulmonary:      Effort: Pulmonary effort is normal.      Breath sounds: Rhonchi present.   Abdominal:      General: There is no distension.      Palpations: Abdomen is soft.      Tenderness: There is no abdominal tenderness.      Comments: Jtube noted; ileostomy noted   Genitourinary:     Comments: Rojas with yellow urine and occ tiny blood clots  Skin:     General: Skin is warm.      Comments: Wounds to sacrum; groin area              CRANIAL NERVES     CN III, IV, VI   Pupils are equal, round, and reactive to light.       Significant Labs: All pertinent labs within the past 24 hours have been reviewed.  Recent Lab Results  (Last 5 results in the past 24 hours)        12/16/24  0548   12/16/24  0356   12/15/24  1306   12/15/24  1205   12/15/24  1204        Base Deficit 0.7               Notified Note               Performed By: Hardeep               Comments               POC Molecular Influenza A Ag         Negative       POC Molecular Influenza B Ag         Negative       Unit Blood Type Code     5100  [P]                 5100  [P]           Unit Expiration     [id number]  [P]                [id number]  [P]           Unit Blood Type     O POS  [P]                O POS  [P]           Provider Notified:               Specimen source Arterial               Notified Time               Notified By               AC 18               Albumin   0.6             ALP   412             ALT   17             Anion Gap   5             AST   38             Baso #   0.06             Basophil %   0.2             BILIRUBIN TOTAL   0.6  Comment: For infants and newborns, interpretation of results should be based  on gestational age, weight and in agreement with clinical  observations.    Premature Infant recommended reference ranges:  Up to 24 hours.............<8.0 mg/dL  Up to 48 hours............<12.0 mg/dL  3-5 days..................<15.0 mg/dL  6-29 days.................<15.0 mg/dL    For patients on Eltrombopag therapy, use of Dimension [place] TBIL is   not   recommended.               BUN   16             Calcium   9.7             Chloride   104             CO2   29             CODING SYSTEM     VETT193  [P]                FCOA002  [P]           Creatinine   1.5             Crossmatch Interpretation     Compatible  [P]                Compatible  [P]           Differential Method   Automated             DISPENSE STATUS     CROSSMATCHED  [P]                CROSSMATCHED  [P]           eGFR   >60.0             Eos #   0.5             Eos %   1.7             FiO2 100.0               Glucose   229             Gran # (ANC)   26.2             Gran %   87.2             Group & Rh     O POS           Hematocrit   21.2             Hemoglobin   6.5             Immature Grans (Abs)   0.35  Comment: Mild elevation in immature granulocytes is non specific and   can be seen in a variety of conditions including stress response,   acute inflammation, trauma and pregnancy. Correlation with other   laboratory and clinical findings is essential.                Immature Granulocytes   1.2             INDIRECT CAMILLE     NEG           Lactic Acid Level     2.3           Lymph #   2.0             Lymph %   6.5             Magnesium    1.3             MCH   26.4             MCHC   30.7             MCV   86             MODIFIED CELSO'S TEST N/A               Mono #   1.0             Mono %   3.2             MPV   9.7             nRBC   1             O2DEVICE Ventilator               PEAK FLOW 60.0               PEEP 5.0               Platelet Count   563             POC HCO3 25.0               POC PCO2 39.1               POC PH 7.418               POC PO2 251  Comment: Value above reference range               POC SATURATED O2 100.0  Comment: Value above reference range               POC Temp 37.0               Potassium   4.5             Product Code     H4555K66  [P]                O6767S41  [P]           PROTEIN TOTAL   6.2              Acceptable       Yes   Yes       RBC   2.46             RDW   16.3             SARS-CoV-2 RNA, Amplification, Qual       Negative         Sodium   138             Specimen Outdate     12/18/2024 23:59           UNIT NUMBER     [id number]  [P]                [id number]  [P]           Vt 400               WBC   30.00                                     [P] - Preliminary Result               Significant Imaging: I have reviewed all pertinent imaging results/findings within the past 24 hours.  I have reviewed and interpreted all pertinent imaging results/findings within the past 24 hours.  Assessment/Plan:     * Sepsis  This patient does have evidence of infective focus  My overall impression is septic shock due to MAP < 65 and SBP < 90.  Source: Respiratory and Urinary Tract  Antibiotics given-   Antibiotics (72h ago, onward)      Start     Stop Route Frequency Ordered    12/15/24 7639  vancomycin (VANCOCIN) 1,000 mg in 0.9% NaCl 250 mL IVPB (admixture device)         -- IV Every 12 hours (non-standard times) 12/15/24 1215  "   12/15/24 1745  piperacillin-tazobactam (ZOSYN) 4.5 g in D5W 100 mL IVPB (MB+)         -- IV Every 8 hours (non-standard times) 12/15/24 1206    12/15/24 1230  mupirocin 2 % ointment         12/20/24 0859 Nasl 2 times daily 12/15/24 1118    12/15/24 1209  vancomycin - pharmacy to dose  (vancomycin IVPB (PEDS and ADULTS))        Placed in "And" Linked Group    -- IV pharmacy to manage frequency 12/15/24 1201          Latest lactate reviewed-  Recent Labs   Lab 12/15/24  1306   LACTATE 2.3*     Organ dysfunction indicated by Acute respiratory failure and Encephalopathy    Fluid challenge Ideal Body Weight- The patient's ideal body weight is Ideal body weight: 66.1 kg (145 lb 11.6 oz) which will be used to calculate fluid bolus of 30 ml/kg for treatment of septic shock.      Post- resuscitation assessment Yes Perfusion exam was performed within 6 hours of septic shock presentation after bolus shows Inadequate tissue perfusion assessed by non-invasive monitoring       Will Start Pressors- Levophed for MAP of 65  Source control achieved by: iv zosym. Vanc, ivfs    Hypomagnesemia  Patient has Abnormal Magnesium: hypomagnesemia. Will continue to monitor electrolytes closely. Will replace the affected electrolytes and repeat labs to be done after interventions completed. The patient's magnesium results have been reviewed and are listed below.  Recent Labs   Lab 12/16/24  0356   MG 1.3*        Pneumonia of right lower lobe due to infectious organism  Patient has a diagnosis of pneumonia. The cause of the pneumonia is suspected to be bacterial in etiology but organism is not known. The pneumonia is stable. The patient has the following signs/symptoms of pneumonia: persistent hypoxia  and sputum production. The patient does have a current oxygen requirement and the patient does not have a home oxygen requirement. I have reviewed the pertinent imaging. The following cultures have been collected: Blood cultures and Sputum " "culture The culture results are listed below.     Current antimicrobial regimen consists of the antibiotics listed below. Will monitor patient closely and continue current treatment plan unchanged.    Antibiotics (From admission, onward)      Start     Stop Route Frequency Ordered    12/15/24 2145  vancomycin (VANCOCIN) 1,000 mg in 0.9% NaCl 250 mL IVPB (admixture device)         -- IV Every 12 hours (non-standard times) 12/15/24 1215    12/15/24 1745  piperacillin-tazobactam (ZOSYN) 4.5 g in D5W 100 mL IVPB (MB+)         -- IV Every 8 hours (non-standard times) 12/15/24 1206    12/15/24 1230  mupirocin 2 % ointment         12/20/24 0859 Nasl 2 times daily 12/15/24 1118    12/15/24 1209  vancomycin - pharmacy to dose  (vancomycin IVPB (PEDS and ADULTS))        Placed in "And" Linked Group    -- IV pharmacy to manage frequency 12/15/24 1201            Microbiology Results (last 7 days)       Procedure Component Value Units Date/Time    Culture, Respiratory with Gram Stain [[id number]]  (Abnormal) Collected: 12/15/24 0935    Order Status: Completed Specimen: Respiratory from Endotracheal Aspirate Updated: 12/16/24 0734     Respiratory Culture GRAM NEGATIVE FLORA  Many  Identification and susceptibility pending        PRESUMPTIVE PROTEUS SPECIES  Few  Identification and susceptibility pending       Gram Stain (Respiratory) Few WBC's     Gram Stain (Respiratory) Many Gram positive rods     Gram Stain (Respiratory) Few Gram negative cocci    Blood culture x two cultures. Draw prior to antibiotics. [[id number]] Collected: 12/15/24 0923    Order Status: Completed Specimen: Blood from Peripheral, Forearm, Right Updated: 12/15/24 2115     Blood Culture, Routine No Growth to date    Narrative:      Aerobic and anaerobic    Blood culture x two cultures. Draw prior to antibiotics. 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  Recent Labs     12/15/24  0923 12/16/24  0356   HGB 6.5* 6.5*   HCT 22.0* 21.2*     Plan  - Monitor serial CBC: Daily  - Transfuse PRBC if patient becomes hemodynamically unstable, symptomatic or H/H drops below 7/21.  - Patient has not received any PRBC transfusions to date  - Patient's anemia is currently worsening. Will adjust treatment as follows: 2uprbcs today      History of anoxic brain injury        Urinary tract infection associated with indwelling urethral catheter  Await new urine culture =- currently on zosyn/vanc      Type 1 diabetes  Patient's FSGs are uncontrolled due to hyperglycemia on current medication regimen.  Last A1c reviewed-   Lab Results   Component Value Date    HGBA1C 12.4 (H) 01/03/2024     Most recent fingerstick glucose reviewed- No results for input(s): "POCTGLUCOSE" in the last 24 hours.  Current correctional scale  Low  Maintain anti-hyperglycemic dose as follows-   Antihyperglycemics (From admission, onward)      Start     Stop Route Frequency Ordered    12/16/24 0952  insulin aspart U-100 pen 0-5 Units         -- SubQ Every 4 hours PRN 12/16/24 0853          Hold Oral hypoglycemics while patient is in the hospital.      VTE Risk Mitigation (From admission, onward)           Ordered     enoxaparin injection 40 mg  Daily         12/16/24 0815     IP VTE LOW RISK PATIENT  Once         12/15/24 " 1201     Place sequential compression device  Until discontinued         12/15/24 1201                               Pharmacokinetic Initial Assessment: IV Vancomycin    Assessment/Plan:    Initiate intravenous vancomycin with loading dose of 1750 mg once followed by a maintenance dose of vancomycin 1000 mg IV every 12 hours  Desired empiric serum trough concentration is 10 to 20 mcg/mL  Draw vancomycin trough level 60 min prior to fourth dose on 12/16/2024 at approximately 2045  Pharmacy will continue to follow and monitor vancomycin.      Please contact pharmacy at extension [phone] with any questions regarding this assessment.     Thank you for the consult,   Dong Dubose       Patient brief summary:  Carlos Chahal is a 34 y.o. male initiated on antimicrobial therapy with IV Vancomycin for treatment of suspected lower respiratory infection    Drug Allergies:   Review of patient's allergies indicates:   Allergen Reactions    Guaifenesin Hives    Codeine Itching       Actual Body Weight:   68 kg    BMI: 23.49 kg/m2    Renal Function:   Estimated Creatinine Clearance: 81.9 mL/min (based on SCr of 1.2 mg/dL).,     Dialysis Method (if applicable):  N/A    CBC (last 72 hours):  Recent Labs   Lab Result Units 12/15/24  0923   WBC K/uL 15.57*   Hemoglobin g/dL 6.5*   Hematocrit % 22.0*   Platelets K/uL 527*   Gran % % 78.5*   Lymph % % 10.9*   Mono % % 6.8   Eosinophil % % 3.0   Basophil % % 0.3   Differential Method  Automated       Metabolic Panel (last 72 hours):  Recent Labs   Lab Result Units 12/15/24  0922 12/15/24  0941   Sodium mmol/L 139  --    Potassium mmol/L 4.7  --    Chloride mmol/L 103  --    CO2 mmol/L 31*  --    Glucose mg/dL 156*  --    Glucose, UA   --  Negative   BUN mg/dL 19  --    Creatinine mg/dL 1.2  --    Albumin g/dL 0.7*  --    Total Bilirubin mg/dL 0.4  --    Alkaline Phosphatase U/L 503*  --    AST U/L 41*  --    ALT U/L 22  --        Drug levels (last 3 results):  No results for input(s):  ""VANCOMYCINRA", "VANCORANDOM", "VANCOMYCINPE", "VANCOPEAK", "VANCOMYCINTR", "VANCOTROUGH" in the last 72 hours.    Microbiologic Results:  Microbiology Results (last 7 days)       Procedure Component Value Units Date/Time    Urine culture [[id number]] Collected: 12/15/24 0941    Order Status: No result Specimen: Urine Updated: 12/15/24 1007    Culture, Respiratory with Gram Stain [[id number]] Collected: 12/15/24 0935    Order Status: Sent Specimen: Respiratory from Endotracheal Aspirate Updated: 12/15/24 0942    Blood culture x two cultures. Draw prior to antibiotics. [[id number]] Collected: 12/15/24 0942    Order Status: Sent Specimen: Blood     Blood culture x two cultures. Draw prior to antibiotics. [[id number]] Collected: 12/15/24 0923    Order Status: Sent Specimen: Blood                  Kim Diamond MD  Department of Hospital Medicine  [place] - [facility] [place]          "

## 2024-12-16 NOTE — ASSESSMENT & PLAN NOTE
"This patient does have evidence of infective focus  My overall impression is septic shock due to MAP < 65 and SBP < 90.  Source: Respiratory and Urinary Tract  Antibiotics given-   Antibiotics (72h ago, onward)      Start     Stop Route Frequency Ordered    12/15/24 2145  vancomycin (VANCOCIN) 1,000 mg in 0.9% NaCl 250 mL IVPB (admixture device)         -- IV Every 12 hours (non-standard times) 12/15/24 1215    12/15/24 1745  piperacillin-tazobactam (ZOSYN) 4.5 g in D5W 100 mL IVPB (MB+)         -- IV Every 8 hours (non-standard times) 12/15/24 1206    12/15/24 1230  mupirocin 2 % ointment         12/20/24 0859 Nasl 2 times daily 12/15/24 1118    12/15/24 1209  vancomycin - pharmacy to dose  (vancomycin IVPB (PEDS and ADULTS))        Placed in "And" Linked Group    -- IV pharmacy to manage frequency 12/15/24 1201          Latest lactate reviewed-  Recent Labs   Lab 12/15/24  1306   LACTATE 2.3*     Organ dysfunction indicated by Acute respiratory failure and Encephalopathy    Fluid challenge Ideal Body Weight- The patient's ideal body weight is Ideal body weight: 66.1 kg (145 lb 11.6 oz) which will be used to calculate fluid bolus of 30 ml/kg for treatment of septic shock.      Post- resuscitation assessment Yes Perfusion exam was performed within 6 hours of septic shock presentation after bolus shows Inadequate tissue perfusion assessed by non-invasive monitoring       Will Start Pressors- Levophed for MAP of 65  Source control achieved by: iv zosym. Vanc, ivfs  "

## 2024-12-16 NOTE — ASSESSMENT & PLAN NOTE
"Patient has a diagnosis of pneumonia. The cause of the pneumonia is suspected to be bacterial in etiology but organism is not known. The pneumonia is stable. The patient has the following signs/symptoms of pneumonia: persistent hypoxia  and sputum production. The patient does have a current oxygen requirement and the patient does not have a home oxygen requirement. I have reviewed the pertinent imaging. The following cultures have been collected: Blood cultures and Sputum culture The culture results are listed below.     Current antimicrobial regimen consists of the antibiotics listed below. Will monitor patient closely and continue current treatment plan unchanged.    Antibiotics (From admission, onward)      Start     Stop Route Frequency Ordered    12/15/24 2145  vancomycin (VANCOCIN) 1,000 mg in 0.9% NaCl 250 mL IVPB (admixture device)         -- IV Every 12 hours (non-standard times) 12/15/24 1215    12/15/24 1745  piperacillin-tazobactam (ZOSYN) 4.5 g in D5W 100 mL IVPB (MB+)         -- IV Every 8 hours (non-standard times) 12/15/24 1206    12/15/24 1230  mupirocin 2 % ointment         12/20/24 0859 Nasl 2 times daily 12/15/24 1118    12/15/24 1209  vancomycin - pharmacy to dose  (vancomycin IVPB (PEDS and ADULTS))        Placed in "And" Linked Group    -- IV pharmacy to manage frequency 12/15/24 1201            Microbiology Results (last 7 days)       Procedure Component Value Units Date/Time    Culture, Respiratory with Gram Stain [[id number]]  (Abnormal) Collected: 12/15/24 0935    Order Status: Completed Specimen: Respiratory from Endotracheal Aspirate Updated: 12/16/24 0734     Respiratory Culture GRAM NEGATIVE FLORA  Many  Identification and susceptibility pending        PRESUMPTIVE PROTEUS SPECIES  Few  Identification and susceptibility pending       Gram Stain (Respiratory) Few WBC's     Gram Stain (Respiratory) Many Gram positive rods     Gram Stain (Respiratory) Few Gram negative cocci    Blood culture " x two cultures. Draw prior to antibiotics. [[id number]] Collected: 12/15/24 0923    Order Status: Completed Specimen: Blood from Peripheral, Forearm, Right Updated: 12/15/24 2115     Blood Culture, Routine No Growth to date    Narrative:      Aerobic and anaerobic    Blood culture x two cultures. Draw prior to antibiotics. [[id number]] Collected: 12/15/24 0942    Order Status: Completed Specimen: Blood from Peripheral, Antecubital, Left Updated: 12/15/24 2115     Blood Culture, Routine No Growth to date    Narrative:      Aerobic and anaerobic    Culture, Respiratory with Gram Stain [[id number]]     Order Status: Canceled Specimen: Respiratory     Urine culture [[id number]] Collected: 12/15/24 0941    Order Status: No result Specimen: Urine Updated: 12/15/24 1007

## 2024-12-17 NOTE — SUBJECTIVE & OBJECTIVE
Past Medical History:   Diagnosis Date    Anoxic brain injury     Bipolar disorder, unspecified     Diabetes insipidus     Diabetes mellitus     Dysphagia, unspecified     Chucky gangrene     Gangrene     GERD (gastroesophageal reflux disease)     Hereditary and idiopathic neuropathy     Nontraumatic intracerebral hemorrhage, unspecified     Other muscle spasm     Other psychoactive substance abuse with withdrawal, uncomplicated     Skin transplant status     Thyroid disease        Past Surgical History:   Procedure Laterality Date    COLOSTOMY      INSERTION, PEG TUBE         Review of patient's allergies indicates:   Allergen Reactions    Guaifenesin Hives    Codeine Itching       No current facility-administered medications on file prior to encounter.     Current Outpatient Medications on File Prior to Encounter   Medication Sig    acetaminophen (TYLENOL) 325 MG tablet 325 mg by Per G Tube route every 6 (six) hours as needed.    ascorbic acid, vitamin C, (VITAMIN C) 500 MG tablet 500 mg by Per G Tube route once daily.    aspirin 81 MG Chew Take 81 mg by mouth once daily.    diazePAM (VALIUM) 5 MG tablet 2 tablets (10 mg total) by Per G Tube route 2 (two) times a day.    doxycycline (VIBRA-TABS) 100 MG tablet 1 tablet (100 mg total) by Per G Tube route every 12 (twelve) hours.    enoxaparin (LOVENOX) 40 mg/0.4 mL Syrg Inject 40 mg into the skin.    famotidine (PEPCID) 40 MG tablet 40 mg by Per G Tube route once daily.    FLUoxetine 20 MG capsule 20 mg by Per G Tube route once daily.    gabapentin (NEURONTIN) 250 mg/5 mL solution 2.5 mLs (125 mg total) by Per G Tube route every 12 (twelve) hours.    insulin aspart U-100 (NOVOLOG FLEXPEN U-100 INSULIN) 100 unit/mL (3 mL) InPn pen Use per sliding scale insulin    levothyroxine (SYNTHROID) 150 MCG tablet 1 tablet (150 mcg total) by Per G Tube route before breakfast.    lipase-protease-amylase (ZENPEP) 20,000-63,000- 84,000 unit capsule Take 1 capsule by mouth 3  (three) times daily.    lurasidone (LATUDA) 40 mg Tab tablet Take 40 mg by mouth once daily.    melatonin (MELATIN) 3 mg tablet 2 tablets (6 mg total) by Per G Tube route nightly as needed for Insomnia.    metoclopramide HCl (REGLAN) 5 MG tablet 1 tablet (5 mg total) by Per G Tube route 3 (three) times daily before meals.    oxyCODONE (ROXICODONE) 10 mg Tab immediate release tablet 1 tablet (10 mg total) by Per G Tube route every 6 (six) hours as needed for Pain.    polyethylene glycol (GLYCOLAX) 17 gram PwPk 17 g by Per G Tube route once daily.    rosuvastatin (CRESTOR) 10 MG tablet Take 10 mg by mouth once daily.    sodium hypochlorite 0.125% (DAKIN'S SOLUTION) external solution Apply topically once daily. Apply once daily to groin area and BID to sacral     Family History    None       Tobacco Use    Smoking status: Unknown    Smokeless tobacco: Not on file   Substance and Sexual Activity    Alcohol use: Not on file    Drug use: Not on file    Sexual activity: Not on file     Review of Systems   Unable to perform ROS: Acuity of condition     Objective:     Vital Signs (Most Recent):  Temp: 96.4 °F (35.8 °C) (12/17/24 0702)  Pulse: 68 (12/17/24 0800)  Resp: 16 (12/17/24 0800)  BP: (!) 92/58 (12/17/24 0800)  SpO2: 100 % (12/17/24 0800) Vital Signs (24h Range):  Temp:  [96.4 °F (35.8 °C)-97.5 °F (36.4 °C)] 96.4 °F (35.8 °C)  Pulse:  [] 68  Resp:  [0-28] 16  SpO2:  [100 %] 100 %  BP: ()/(51-75) 92/58     Weight: 68.5 kg (151 lb 0.2 oz)  Body mass index is 23.65 kg/m².     Physical Exam  Constitutional:       Appearance: He is ill-appearing.      Comments: Thin male; intubated   HENT:      Mouth/Throat:      Mouth: Mucous membranes are moist.   Eyes:      Pupils: Pupils are equal, round, and reactive to light.   Cardiovascular:      Rate and Rhythm: Normal rate and regular rhythm.   Pulmonary:      Effort: Pulmonary effort is normal.      Breath sounds: Rhonchi present.   Abdominal:      General: There is  no distension.      Palpations: Abdomen is soft.      Tenderness: There is no abdominal tenderness.      Comments: Jtube noted; ileostomy noted   Genitourinary:     Comments: Rojas with yellow urine  Skin:     General: Skin is warm.      Comments: Wounds to sacrum; groin area              CRANIAL NERVES     CN III, IV, VI   Pupils are equal, round, and reactive to light.       Significant Labs: All pertinent labs within the past 24 hours have been reviewed.  Recent Lab Results  (Last 5 results in the past 24 hours)        12/17/24  0818   12/17/24  0506   12/17/24  0406   12/17/24  0404   12/16/24  2345        Base Deficit   -1.0             Performed By:   Hardeep             Specimen source   Arterial             AC   18             Albumin       0.6         ALP       395         ALT       16         Anion Gap       9         AST       33         Baso #       0.13         Basophil %       0.4         BILIRUBIN TOTAL       0.6  Comment: For infants and newborns, interpretation of results should be based  on gestational age, weight and in agreement with clinical  observations.    Premature Infant recommended reference ranges:  Up to 24 hours.............<8.0 mg/dL  Up to 48 hours............<12.0 mg/dL  3-5 days..................<15.0 mg/dL  6-29 days.................<15.0 mg/dL    For patients on Eltrombopag therapy, use of Dimension [place] TBIL is   not   recommended.           BUN       14         Calcium       9.6         Chloride       104         CO2       24         Creatinine       1.4         Differential Method       Automated         eGFR       >60.0         Eos #       0.8         Eos %       2.2         FiO2   40.0             Glucose       229         Gran # (ANC)       30.6         Gran %       88.4         Hematocrit       26.4         Hemoglobin       8.5         Immature Grans (Abs)       0.71  Comment: Mild elevation in immature granulocytes is non specific and   can be seen in a variety of  conditions including stress response,   acute inflammation, trauma and pregnancy. Correlation with other   laboratory and clinical findings is essential.           Immature Granulocytes       2.1         Lymph #       1.3         Lymph %       3.8         Magnesium        1.6         MCH       27.9         MCHC       32.2         MCV       87         MODIFIED CELSO'S TEST   N/A             Mono #       1.1         Mono %       3.1         MPV       9.6         nRBC       0         O2DEVICE   Ventilator             PEAK FLOW   60.0             PEEP   5.0             Platelet Count       450         POC HCO3   23.8  Comment: Value below reference range             POC PCO2   36.1             POC PH   7.422             POC PO2   97.2             POC SATURATED O2   98.6             POC Temp   37.0             POCT Glucose 222     250     196       Potassium       4.2         PROTEIN TOTAL       6.1         RBC       3.05         RDW       15.6         Sodium       137         Vancomycin-Trough               Vt   400             WBC       34.55                                Significant Imaging: I have reviewed all pertinent imaging results/findings within the past 24 hours.  I have reviewed and interpreted all pertinent imaging results/findings within the past 24 hours.

## 2024-12-17 NOTE — ASSESSMENT & PLAN NOTE
Patient has Abnormal Magnesium: hypomagnesemia. Will continue to monitor electrolytes closely. Will replace the affected electrolytes and repeat labs to be done after interventions completed. The patient's magnesium results have been reviewed and are listed below.  Recent Labs   Lab 12/17/24  0404   MG 1.6      12/17 mag imrpoving - repalce mag today

## 2024-12-17 NOTE — PLAN OF CARE
Problem: Skin Injury Risk Increased  Goal: Skin Health and Integrity  Outcome: Not Progressing     Problem: Mechanical Ventilation Invasive  Goal: Effective Communication  Outcome: Not Progressing  Goal: Optimal Device Function  Outcome: Progressing  Goal: Mechanical Ventilation Liberation  Outcome: Progressing  Goal: Optimal Nutrition Delivery  Outcome: Not Progressing  Goal: Absence of Device-Related Skin and Tissue Injury  Outcome: Progressing  Goal: Absence of Ventilator-Induced Lung Injury  Outcome: Progressing     Problem: Artificial Airway  Goal: Effective Communication  Outcome: Not Progressing  Goal: Optimal Device Function  Outcome: Progressing  Goal: Absence of Device-Related Skin or Tissue Injury  Outcome: Not Progressing     Problem: Adult Inpatient Plan of Care  Goal: Plan of Care Review  Outcome: Progressing  Goal: Patient-Specific Goal (Individualized)  Outcome: Not Progressing  Goal: Absence of Hospital-Acquired Illness or Injury  Outcome: Not Progressing  Goal: Optimal Comfort and Wellbeing  Outcome: Progressing  Goal: Readiness for Transition of Care  Outcome: Progressing     Problem: Diabetes Comorbidity  Goal: Blood Glucose Level Within Targeted Range  Outcome: Not Progressing     Problem: Wound  Goal: Optimal Coping  Outcome: Not Progressing  Goal: Optimal Functional Ability  Outcome: Not Progressing  Goal: Absence of Infection Signs and Symptoms  Outcome: Not Progressing  Goal: Improved Oral Intake  Outcome: Not Progressing  Goal: Optimal Pain Control and Function  Outcome: Not Progressing  Goal: Skin Health and Integrity  Outcome: Not Progressing  Goal: Optimal Wound Healing  Outcome: Not Progressing     Problem: Infection  Goal: Absence of Infection Signs and Symptoms  Outcome: Not Progressing     Problem: Pneumonia  Goal: Fluid Balance  Outcome: Progressing  Goal: Resolution of Infection Signs and Symptoms  Outcome: Not Progressing  Goal: Effective Oxygenation and Ventilation  Outcome:  Progressing     Problem: Sepsis/Septic Shock  Goal: Optimal Coping  Outcome: Progressing  Goal: Absence of Bleeding  Outcome: Progressing  Goal: Blood Glucose Level Within Targeted Range  Outcome: Not Progressing  Goal: Absence of Infection Signs and Symptoms  Outcome: Not Progressing  Goal: Optimal Nutrition Intake  Outcome: Not Progressing

## 2024-12-17 NOTE — ASSESSMENT & PLAN NOTE
"Patient has a diagnosis of pneumonia. The cause of the pneumonia is suspected to be bacterial in etiology but organism is not known. The pneumonia is stable. The patient has the following signs/symptoms of pneumonia: persistent hypoxia  and sputum production. The patient does have a current oxygen requirement and the patient does not have a home oxygen requirement. I have reviewed the pertinent imaging. The following cultures have been collected: Blood cultures and Sputum culture The culture results are listed below.     Current antimicrobial regimen consists of the antibiotics listed below. Will monitor patient closely and continue current treatment plan unchanged.    Antibiotics (From admission, onward)      Start     Stop Route Frequency Ordered    12/17/24 0900  meropenem injection 1 g         -- IV Every 8 hours (non-standard times) 12/17/24 0757    12/15/24 1230  mupirocin 2 % ointment         12/20/24 0859 Nasl 2 times daily 12/15/24 1118    12/15/24 1209  vancomycin - pharmacy to dose  (vancomycin IVPB (PEDS and ADULTS))        Placed in "And" Linked Group    -- IV pharmacy to manage frequency 12/15/24 1201            Microbiology Results (last 7 days)       Procedure Component Value Units Date/Time    Urine culture [[id number]]  (Abnormal) Collected: 12/15/24 0941    Order Status: Completed Specimen: Urine Updated: 12/16/24 1726     Urine Culture, Routine GRAM NEGATIVE FLORA  >100,000 cfu/ml  Identification and susceptibility pending      Narrative:      Preferred Collection Type->Urine, Clean Catch  Specimen Source->Urine    Blood culture x two cultures. Draw prior to antibiotics. [[id number]] Collected: 12/15/24 0923    Order Status: Completed Specimen: Blood from Peripheral, Forearm, Right Updated: 12/16/24 1612     Blood Culture, Routine No Growth to date      No Growth to date    Narrative:      Aerobic and anaerobic    Blood culture x two cultures. Draw prior to antibiotics. [[id number]] Collected: " 12/15/24 0942    Order Status: Completed Specimen: Blood from Peripheral, Antecubital, Left Updated: 12/16/24 1612     Blood Culture, Routine No Growth to date      No Growth to date    Narrative:      Aerobic and anaerobic    Culture, Respiratory with Gram Stain [[id number]]  (Abnormal) Collected: 12/15/24 0935    Order Status: Completed Specimen: Respiratory from Endotracheal Aspirate Updated: 12/16/24 0734     Respiratory Culture GRAM NEGATIVE FLORA  Many  Identification and susceptibility pending        PRESUMPTIVE PROTEUS SPECIES  Few  Identification and susceptibility pending       Gram Stain (Respiratory) Few WBC's     Gram Stain (Respiratory) Many Gram positive rods     Gram Stain (Respiratory) Few Gram negative cocci    Culture, Respiratory with Gram Stain [[id number]]     Order Status: Canceled Specimen: Respiratory         12/17 dc zosyn with recent esbl kleb in urine - will change to merrem - cont vanc until final cultures obtained - change vent to simv; add nebs

## 2024-12-17 NOTE — ASSESSMENT & PLAN NOTE
Patient's FSGs are uncontrolled due to hyperglycemia on current medication regimen.  Last A1c reviewed-   Lab Results   Component Value Date    HGBA1C 6.6 (H) 12/16/2024     Most recent fingerstick glucose reviewed-   Recent Labs   Lab 12/16/24  1945 12/16/24  2345 12/17/24  0406 12/17/24  0818   POCTGLUCOSE 189* 196* 250* 222*     Current correctional scale  Low  Maintain anti-hyperglycemic dose as follows-   Antihyperglycemics (From admission, onward)      Start     Stop Route Frequency Ordered    12/16/24 0952  insulin aspart U-100 pen 0-5 Units         -- SubQ Every 4 hours PRN 12/16/24 0853          Hold Oral hypoglycemics while patient is in the hospital.  12/17 A1c improved to 6.6%

## 2024-12-17 NOTE — PROGRESS NOTES
Pharmacokinetic Assessment Follow Up: IV Vancomycin    Vancomycin serum concentration assessment(s):    The random level was drawn correctly and can be used to guide therapy at this time. The measurement is above the desired definitive target range of 10 to 20 mcg/mL.    Vancomycin Regimen Plan:    Re-dose when the random level is less than 20 mcg/mL, next level to be drawn at 2100 on 12/17/2024    Drug levels (last 3 results):  Recent Labs   Lab Result Units 12/16/24  2046 12/17/24  0857   Vancomycin, Random ug/mL  --  23.5   Vancomycin-Trough ug/mL 31.8*  --        Pharmacy will continue to follow and monitor vancomycin.    Please contact pharmacy at extension [phone] for questions regarding this assessment.    Thank you for the consult,   Dong Dubose       Patient brief summary:  Carlos Chahal is a 34 y.o. male initiated on antimicrobial therapy with IV Vancomycin for treatment of lower respiratory infection    The patient's current regimen is pulse dosing due to renal function     Drug Allergies:   Review of patient's allergies indicates:   Allergen Reactions    Guaifenesin Hives    Codeine Itching       Actual Body Weight:   68.5 kg    BMI: 23.65 kg/m2    Renal Function:   Estimated Creatinine Clearance: 70.2 mL/min (based on SCr of 1.4 mg/dL).,     Dialysis Method (if applicable):  N/A    CBC (last 72 hours):  Recent Labs   Lab Result Units 12/15/24  0923 12/16/24  0356 12/16/24  0933 12/17/24  0404   WBC K/uL 15.57* 30.00*  --  34.55*   Hemoglobin g/dL 6.5* 6.5*  --  8.5*   Hemoglobin A1C %  --   --  6.6*  --    Hematocrit % 22.0* 21.2*  --  26.4*   Platelets K/uL 527* 563*  --  450   Gran % % 78.5* 87.2*  --  88.4*   Lymph % % 10.9* 6.5*  --  3.8*   Mono % % 6.8 3.2*  --  3.1*   Eosinophil % % 3.0 1.7  --  2.2   Basophil % % 0.3 0.2  --  0.4   Differential Method  Automated Automated  --  Automated       Metabolic Panel (last 72 hours):  Recent Labs   Lab Result Units 12/15/24  0922 12/15/24  0941  12/16/24  0356 12/17/24  0404   Sodium mmol/L 139  --  138 137   Potassium mmol/L 4.7  --  4.5 4.2   Chloride mmol/L 103  --  104 104   CO2 mmol/L 31*  --  29 24   Glucose mg/dL 156*  --  229* 229*   Glucose, UA   --  Negative  --   --    BUN mg/dL 19  --  16 14   Creatinine mg/dL 1.2  --  1.5* 1.4   Albumin g/dL 0.7*  --  0.6* 0.6*   Total Bilirubin mg/dL 0.4  --  0.6 0.6   Alkaline Phosphatase U/L 503*  --  412* 395*   AST U/L 41*  --  38 33   ALT U/L 22  --  17 16   Magnesium mg/dL  --   --  1.3* 1.6       Vancomycin Administrations:  vancomycin given in the last 96 hours                     vancomycin (VANCOCIN) 1,000 mg in 0.9% NaCl 250 mL IVPB (admixture device) (mg) 1,000 mg New Bag 12/16/24 0901     1,000 mg New Bag 12/15/24 2118    vancomycin 1750 mg in 0.9% sodium chloride 500 mL IVPB ()  Restarted 12/15/24 0941     1,750 mg New Bag  0938                    Microbiologic Results:  Microbiology Results (last 7 days)       Procedure Component Value Units Date/Time    Urine culture [[id number]]  (Abnormal) Collected: 12/15/24 0941    Order Status: Completed Specimen: Urine Updated: 12/16/24 1726     Urine Culture, Routine GRAM NEGATIVE FLORA  >100,000 cfu/ml  Identification and susceptibility pending      Narrative:      Preferred Collection Type->Urine, Clean Catch  Specimen Source->Urine    Blood culture x two cultures. Draw prior to antibiotics. [[id number]] Collected: 12/15/24 0923    Order Status: Completed Specimen: Blood from Peripheral, Forearm, Right Updated: 12/16/24 1612     Blood Culture, Routine No Growth to date      No Growth to date    Narrative:      Aerobic and anaerobic    Blood culture x two cultures. Draw prior to antibiotics. [[id number]] Collected: 12/15/24 0942    Order Status: Completed Specimen: Blood from Peripheral, Antecubital, Left Updated: 12/16/24 1612     Blood Culture, Routine No Growth to date      No Growth to date    Narrative:      Aerobic and anaerobic    Culture,  Respiratory with Gram Stain [[id number]]  (Abnormal) Collected: 12/15/24 0935    Order Status: Completed Specimen: Respiratory from Endotracheal Aspirate Updated: 12/16/24 0734     Respiratory Culture GRAM NEGATIVE FLORA  Many  Identification and susceptibility pending        PRESUMPTIVE PROTEUS SPECIES  Few  Identification and susceptibility pending       Gram Stain (Respiratory) Few WBC's     Gram Stain (Respiratory) Many Gram positive rods     Gram Stain (Respiratory) Few Gram negative cocci    Culture, Respiratory with Gram Stain [[id number]]     Order Status: Canceled Specimen: Respiratory

## 2024-12-17 NOTE — NURSING
Patient is on the ventilator, sedated, not breathing above the ventilator settings. His H&H have improved after receiving 2 units yesterday, he is afebrile, but WBC's have increased. Very little suctioned from the ETT. Urine is yellow with sediment.wounds were cleaned and redressed as ordered, tolerated well.

## 2024-12-17 NOTE — PLAN OF CARE
Patient remains intubated, sedated, and on levophed. Levophed able to be weaned slightly. Pt tolerating SIMV on vent. Pt continues with high residuals throughout the day so feedings continue to be withheld- Dr Diamond aware. Merrem antibiotic started today. 2 gram mag replacement given. Wound care done to groin and sacral areas per orders. CHG bath completed. Q2 hr turns maintained.

## 2024-12-17 NOTE — ASSESSMENT & PLAN NOTE
Anemia is likely due to chronic infections Most recent hemoglobin and hematocrit are listed below.  Recent Labs     12/15/24  0923 12/16/24  0356 12/17/24  0404   HGB 6.5* 6.5* 8.5*   HCT 22.0* 21.2* 26.4*       Plan  - Monitor serial CBC: Daily  - Transfuse PRBC if patient becomes hemodynamically unstable, symptomatic or H/H drops below 7/21.  - Patient has not received any PRBC transfusions to date  - Patient's anemia is currently worsening. Will adjust treatment as follows: 2uprbcs today  12/17 h/h Improved

## 2024-12-17 NOTE — PROGRESS NOTES
Pharmacokinetic Assessment Follow Up: IV Vancomycin    Vancomycin serum concentration assessment(s):    The trough level was drawn correctly and can be used to guide therapy at this time. The measurement is above the desired definitive target range of 10 to 20 mcg/mL.    Vancomycin Regimen Plan:    Discontinue the scheduled vancomycin regimen and re-dose when the random level is less than 20 mcg/mL, next level to be drawn at 0900 on 12/17.    Drug levels (last 3 results):  Recent Labs   Lab Result Units 12/16/24  2046   Vancomycin-Trough ug/mL 31.8*       Pharmacy will continue to follow and monitor vancomycin.    Please contact pharmacy for questions regarding this assessment.    Thank you for the consult,   Nanci Jackson       Patient brief summary:  Carlos Chahal is a 34 y.o. male initiated on antimicrobial therapy with IV Vancomycin for treatment of lower respiratory infection    Drug Allergies:   Review of patient's allergies indicates:   Allergen Reactions    Guaifenesin Hives    Codeine Itching       Actual Body Weight:   68.5kg    Renal Function:   Estimated Creatinine Clearance: 65.5 mL/min (A) (based on SCr of 1.5 mg/dL (H)).,     Dialysis Method (if applicable):  N/A    CBC (last 72 hours):  Recent Labs   Lab Result Units 12/15/24  0923 12/16/24  0356 12/16/24  0933   WBC K/uL 15.57* 30.00*  --    Hemoglobin g/dL 6.5* 6.5*  --    Hemoglobin A1C %  --   --  6.6*   Hematocrit % 22.0* 21.2*  --    Platelets K/uL 527* 563*  --    Gran % % 78.5* 87.2*  --    Lymph % % 10.9* 6.5*  --    Mono % % 6.8 3.2*  --    Eosinophil % % 3.0 1.7  --    Basophil % % 0.3 0.2  --    Differential Method  Automated Automated  --        Metabolic Panel (last 72 hours):  Recent Labs   Lab Result Units 12/15/24  0922 12/15/24  0941 12/16/24  0356   Sodium mmol/L 139  --  138   Potassium mmol/L 4.7  --  4.5   Chloride mmol/L 103  --  104   CO2 mmol/L 31*  --  29   Glucose mg/dL 156*  --  229*   Glucose, UA   --  Negative  --    BUN  mg/dL 19  --  16   Creatinine mg/dL 1.2  --  1.5*   Albumin g/dL 0.7*  --  0.6*   Total Bilirubin mg/dL 0.4  --  0.6   Alkaline Phosphatase U/L 503*  --  412*   AST U/L 41*  --  38   ALT U/L 22  --  17   Magnesium mg/dL  --   --  1.3*       Vancomycin Administrations:  vancomycin given in the last 96 hours                     vancomycin (VANCOCIN) 1,000 mg in 0.9% NaCl 250 mL IVPB (admixture device) (mg) 1,000 mg New Bag 12/16/24 0901     1,000 mg New Bag 12/15/24 2118    vancomycin 1750 mg in 0.9% sodium chloride 500 mL IVPB ()  Restarted 12/15/24 0941     1,750 mg New Bag  0938                    Microbiologic Results:  Microbiology Results (last 7 days)       Procedure Component Value Units Date/Time    Urine culture [[id number]]  (Abnormal) Collected: 12/15/24 0941    Order Status: Completed Specimen: Urine Updated: 12/16/24 1726     Urine Culture, Routine GRAM NEGATIVE FLORA  >100,000 cfu/ml  Identification and susceptibility pending      Narrative:      Preferred Collection Type->Urine, Clean Catch  Specimen Source->Urine    Blood culture x two cultures. Draw prior to antibiotics. [[id number]] Collected: 12/15/24 0923    Order Status: Completed Specimen: Blood from Peripheral, Forearm, Right Updated: 12/16/24 1612     Blood Culture, Routine No Growth to date      No Growth to date    Narrative:      Aerobic and anaerobic    Blood culture x two cultures. Draw prior to antibiotics. [[id number]] Collected: 12/15/24 0942    Order Status: Completed Specimen: Blood from Peripheral, Antecubital, Left Updated: 12/16/24 1612     Blood Culture, Routine No Growth to date      No Growth to date    Narrative:      Aerobic and anaerobic    Culture, Respiratory with Gram Stain [[id number]]  (Abnormal) Collected: 12/15/24 0935    Order Status: Completed Specimen: Respiratory from Endotracheal Aspirate Updated: 12/16/24 0734     Respiratory Culture GRAM NEGATIVE FLORA  Many  Identification and susceptibility pending         PRESUMPTIVE PROTEUS SPECIES  Few  Identification and susceptibility pending       Gram Stain (Respiratory) Few WBC's     Gram Stain (Respiratory) Many Gram positive rods     Gram Stain (Respiratory) Few Gram negative cocci    Culture, Respiratory with Gram Stain [[id number]]     Order Status: Canceled Specimen: Respiratory

## 2024-12-17 NOTE — PROGRESS NOTES
[facility] Medicine  Progress Note    Patient Name: Carlos Chahal  MRN: [id number]  Patient Class: IP- Inpatient   Admission Date: 12/15/2024  Length of Stay: 2 days  Attending Physician: Kim Diamond MD  Primary Care Provider: Jose Francisco Klein MD        Subjective     Principal Problem:Sepsis        HPI:  Carlos Chahal is a 34 y.o. male who presents to the ED from nursing home for altered mental status and difficulty breathing.  Patient is found to be hypoxic.  He has a history of anoxic brain injury     This am, pt is on ventilator and levophed at 0.25mcg and ivfs at 75ml/hr.      Spoke with father this am on condition of pt    Overview/Hospital Course:  12/17 ND became more awake yesterday with wound changes - low dose propofol added for pt - able to wean levophed to 0.15mcg.  did tolerate tf last night - check kub this am    Past Medical History:   Diagnosis Date    Anoxic brain injury     Bipolar disorder, unspecified     Diabetes insipidus     Diabetes mellitus     Dysphagia, unspecified     Chucky gangrene     Gangrene     GERD (gastroesophageal reflux disease)     Hereditary and idiopathic neuropathy     Nontraumatic intracerebral hemorrhage, unspecified     Other muscle spasm     Other psychoactive substance abuse with withdrawal, uncomplicated     Skin transplant status     Thyroid disease        Past Surgical History:   Procedure Laterality Date    COLOSTOMY      INSERTION, PEG TUBE         Review of patient's allergies indicates:   Allergen Reactions    Guaifenesin Hives    Codeine Itching       No current facility-administered medications on file prior to encounter.     Current Outpatient Medications on File Prior to Encounter   Medication Sig    acetaminophen (TYLENOL) 325 MG tablet 325 mg by Per G Tube route every 6 (six) hours as needed.    ascorbic acid, vitamin C, (VITAMIN C) 500 MG tablet 500 mg by Per G Tube route once daily.    aspirin 81 MG Chew Take 81 mg by  mouth once daily.    diazePAM (VALIUM) 5 MG tablet 2 tablets (10 mg total) by Per G Tube route 2 (two) times a day.    doxycycline (VIBRA-TABS) 100 MG tablet 1 tablet (100 mg total) by Per G Tube route every 12 (twelve) hours.    enoxaparin (LOVENOX) 40 mg/0.4 mL Syrg Inject 40 mg into the skin.    famotidine (PEPCID) 40 MG tablet 40 mg by Per G Tube route once daily.    FLUoxetine 20 MG capsule 20 mg by Per G Tube route once daily.    gabapentin (NEURONTIN) 250 mg/5 mL solution 2.5 mLs (125 mg total) by Per G Tube route every 12 (twelve) hours.    insulin aspart U-100 (NOVOLOG FLEXPEN U-100 INSULIN) 100 unit/mL (3 mL) InPn pen Use per sliding scale insulin    levothyroxine (SYNTHROID) 150 MCG tablet 1 tablet (150 mcg total) by Per G Tube route before breakfast.    lipase-protease-amylase (ZENPEP) 20,000-63,000- 84,000 unit capsule Take 1 capsule by mouth 3 (three) times daily.    lurasidone (LATUDA) 40 mg Tab tablet Take 40 mg by mouth once daily.    melatonin (MELATIN) 3 mg tablet 2 tablets (6 mg total) by Per G Tube route nightly as needed for Insomnia.    metoclopramide HCl (REGLAN) 5 MG tablet 1 tablet (5 mg total) by Per G Tube route 3 (three) times daily before meals.    oxyCODONE (ROXICODONE) 10 mg Tab immediate release tablet 1 tablet (10 mg total) by Per G Tube route every 6 (six) hours as needed for Pain.    polyethylene glycol (GLYCOLAX) 17 gram PwPk 17 g by Per G Tube route once daily.    rosuvastatin (CRESTOR) 10 MG tablet Take 10 mg by mouth once daily.    sodium hypochlorite 0.125% (DAKIN'S SOLUTION) external solution Apply topically once daily. Apply once daily to groin area and BID to sacral     Family History    None       Tobacco Use    Smoking status: Unknown    Smokeless tobacco: Not on file   Substance and Sexual Activity    Alcohol use: Not on file    Drug use: Not on file    Sexual activity: Not on file     Review of Systems   Unable to perform ROS: Acuity of condition     Objective:      Vital Signs (Most Recent):  Temp: 96.4 °F (35.8 °C) (12/17/24 0702)  Pulse: 68 (12/17/24 0800)  Resp: 16 (12/17/24 0800)  BP: (!) 92/58 (12/17/24 0800)  SpO2: 100 % (12/17/24 0800) Vital Signs (24h Range):  Temp:  [96.4 °F (35.8 °C)-97.5 °F (36.4 °C)] 96.4 °F (35.8 °C)  Pulse:  [] 68  Resp:  [0-28] 16  SpO2:  [100 %] 100 %  BP: ()/(51-75) 92/58     Weight: 68.5 kg (151 lb 0.2 oz)  Body mass index is 23.65 kg/m².     Physical Exam  Constitutional:       Appearance: He is ill-appearing.      Comments: Thin male; intubated   HENT:      Mouth/Throat:      Mouth: Mucous membranes are moist.   Eyes:      Pupils: Pupils are equal, round, and reactive to light.   Cardiovascular:      Rate and Rhythm: Normal rate and regular rhythm.   Pulmonary:      Effort: Pulmonary effort is normal.      Breath sounds: Rhonchi present.   Abdominal:      General: There is no distension.      Palpations: Abdomen is soft.      Tenderness: There is no abdominal tenderness.      Comments: Jtube noted; ileostomy noted   Genitourinary:     Comments: Rojas with yellow urine  Skin:     General: Skin is warm.      Comments: Wounds to sacrum; groin area              CRANIAL NERVES     CN III, IV, VI   Pupils are equal, round, and reactive to light.       Significant Labs: All pertinent labs within the past 24 hours have been reviewed.  Recent Lab Results  (Last 5 results in the past 24 hours)        12/17/24  0818   12/17/24  0506   12/17/24  0406   12/17/24  0404   12/16/24  2345        Base Deficit   -1.0             Performed By:   [facility]             Specimen source   Arterial             AC   18             Albumin       0.6         ALP       395         ALT       16         Anion Gap       9         AST       33         Baso #       0.13         Basophil %       0.4         BILIRUBIN TOTAL       0.6  Comment: For infants and newborns, interpretation of results should be based  on gestational age, weight and in agreement with  clinical  observations.    Premature Infant recommended reference ranges:  Up to 24 hours.............<8.0 mg/dL  Up to 48 hours............<12.0 mg/dL  3-5 days..................<15.0 mg/dL  6-29 days.................<15.0 mg/dL    For patients on Eltrombopag therapy, use of Dimension [place] TBIL is   not   recommended.           BUN       14         Calcium       9.6         Chloride       104         CO2       24         Creatinine       1.4         Differential Method       Automated         eGFR       >60.0         Eos #       0.8         Eos %       2.2         FiO2   40.0             Glucose       229         Gran # (ANC)       30.6         Gran %       88.4         Hematocrit       26.4         Hemoglobin       8.5         Immature Grans (Abs)       0.71  Comment: Mild elevation in immature granulocytes is non specific and   can be seen in a variety of conditions including stress response,   acute inflammation, trauma and pregnancy. Correlation with other   laboratory and clinical findings is essential.           Immature Granulocytes       2.1         Lymph #       1.3         Lymph %       3.8         Magnesium        1.6         MCH       27.9         MCHC       32.2         MCV       87         MODIFIED CELSO'S TEST   N/A             Mono #       1.1         Mono %       3.1         MPV       9.6         nRBC       0         O2DEVICE   Ventilator             PEAK FLOW   60.0             PEEP   5.0             Platelet Count       450         POC HCO3   23.8  Comment: Value below reference range             POC PCO2   36.1             POC PH   7.422             POC PO2   97.2             POC SATURATED O2   98.6             POC Temp   37.0             POCT Glucose 222     250     196       Potassium       4.2         PROTEIN TOTAL       6.1         RBC       3.05         RDW       15.6         Sodium       137         Vancomycin-Trough               Vt   400             WBC       34.55                      "           Significant Imaging: I have reviewed all pertinent imaging results/findings within the past 24 hours.  I have reviewed and interpreted all pertinent imaging results/findings within the past 24 hours.    Assessment and Plan     * Sepsis  This patient does have evidence of infective focus  My overall impression is septic shock due to MAP < 65 and SBP < 90.  Source: Respiratory and Urinary Tract  Antibiotics given-   Antibiotics (72h ago, onward)      Start     Stop Route Frequency Ordered    12/17/24 0900  meropenem injection 1 g         -- IV Every 8 hours (non-standard times) 12/17/24 0757    12/15/24 1230  mupirocin 2 % ointment         12/20/24 0859 Nasl 2 times daily 12/15/24 1118    12/15/24 1209  vancomycin - pharmacy to dose  (vancomycin IVPB (PEDS and ADULTS))        Placed in "And" Linked Group    -- IV pharmacy to manage frequency 12/15/24 1201          Latest lactate reviewed-  Recent Labs   Lab 12/15/24  1306   LACTATE 2.3*       Organ dysfunction indicated by Acute respiratory failure and Encephalopathy    Fluid challenge Ideal Body Weight- The patient's ideal body weight is Ideal body weight: 66.1 kg (145 lb 11.6 oz) which will be used to calculate fluid bolus of 30 ml/kg for treatment of septic shock.      Post- resuscitation assessment Yes Perfusion exam was performed within 6 hours of septic shock presentation after bolus shows Inadequate tissue perfusion assessed by non-invasive monitoring       Will Start Pressors- Levophed for MAP of 65  Source control achieved by: iv zosym. Vanc, ivfs  12/17 wean levophed as tolerates- abxs changed to merrem, cotn vanc - await culture final reports    Hypomagnesemia  Patient has Abnormal Magnesium: hypomagnesemia. Will continue to monitor electrolytes closely. Will replace the affected electrolytes and repeat labs to be done after interventions completed. The patient's magnesium results have been reviewed and are listed below.  Recent Labs   Lab " "12/17/24  0404   MG 1.6      12/17 mag imrpoving - repalce mag today    Pneumonia of right lower lobe due to infectious organism  Patient has a diagnosis of pneumonia. The cause of the pneumonia is suspected to be bacterial in etiology but organism is not known. The pneumonia is stable. The patient has the following signs/symptoms of pneumonia: persistent hypoxia  and sputum production. The patient does have a current oxygen requirement and the patient does not have a home oxygen requirement. I have reviewed the pertinent imaging. The following cultures have been collected: Blood cultures and Sputum culture The culture results are listed below.     Current antimicrobial regimen consists of the antibiotics listed below. Will monitor patient closely and continue current treatment plan unchanged.    Antibiotics (From admission, onward)      Start     Stop Route Frequency Ordered    12/17/24 0900  meropenem injection 1 g         -- IV Every 8 hours (non-standard times) 12/17/24 0757    12/15/24 1230  mupirocin 2 % ointment         12/20/24 0859 Nasl 2 times daily 12/15/24 1118    12/15/24 1209  vancomycin - pharmacy to dose  (vancomycin IVPB (PEDS and ADULTS))        Placed in "And" Linked Group    -- IV pharmacy to manage frequency 12/15/24 1201            Microbiology Results (last 7 days)       Procedure Component Value Units Date/Time    Urine culture [[id number]]  (Abnormal) Collected: 12/15/24 0941    Order Status: Completed Specimen: Urine Updated: 12/16/24 1726     Urine Culture, Routine GRAM NEGATIVE FLORA  >100,000 cfu/ml  Identification and susceptibility pending      Narrative:      Preferred Collection Type->Urine, Clean Catch  Specimen Source->Urine    Blood culture x two cultures. Draw prior to antibiotics. [[id number]] Collected: 12/15/24 0923    Order Status: Completed Specimen: Blood from Peripheral, Forearm, Right Updated: 12/16/24 1612     Blood Culture, Routine No Growth to date      No Growth to date "    Narrative:      Aerobic and anaerobic    Blood culture x two cultures. Draw prior to antibiotics. [[id number]] Collected: 12/15/24 0942    Order Status: Completed Specimen: Blood from Peripheral, Antecubital, Left Updated: 12/16/24 1612     Blood Culture, Routine No Growth to date      No Growth to date    Narrative:      Aerobic and anaerobic    Culture, Respiratory with Gram Stain [[id number]]  (Abnormal) Collected: 12/15/24 0935    Order Status: Completed Specimen: Respiratory from Endotracheal Aspirate Updated: 12/16/24 0734     Respiratory Culture GRAM NEGATIVE FLORA  Many  Identification and susceptibility pending        PRESUMPTIVE PROTEUS SPECIES  Few  Identification and susceptibility pending       Gram Stain (Respiratory) Few WBC's     Gram Stain (Respiratory) Many Gram positive rods     Gram Stain (Respiratory) Few Gram negative cocci    Culture, Respiratory with Gram Stain [[id number]]     Order Status: Canceled Specimen: Respiratory         12/17 dc zosyn with recent esbl kleb in urine - will change to merrem - cont vanc until final cultures obtained - change vent to simv; add nebs    Sacral wound, sequela  Local wound care with dakins bid  Iv abxs      Open wound of groin  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  Recent Labs     12/15/24  0923 12/16/24  0356 12/17/24  0404   HGB 6.5* 6.5* 8.5*   HCT 22.0* 21.2* 26.4*       Plan  - Monitor serial CBC: Daily  - Transfuse PRBC if patient becomes hemodynamically unstable, symptomatic or H/H drops below 7/21.  - Patient has not received any PRBC transfusions to date  - Patient's anemia is currently worsening. Will adjust treatment as follows: 2uprbcs today  12/17 h/h Improved    History of anoxic brain injury        Urinary tract infection associated with indwelling urethral catheter  Await new urine  culture =- currently on zosyn/vanc  12/17 change to merrem/vanc    Type 1 diabetes  Patient's FSGs are uncontrolled due to hyperglycemia on current medication regimen.  Last A1c reviewed-   Lab Results   Component Value Date    HGBA1C 6.6 (H) 12/16/2024     Most recent fingerstick glucose reviewed-   Recent Labs   Lab 12/16/24  1945 12/16/24  2345 12/17/24  0406 12/17/24  0818   POCTGLUCOSE 189* 196* 250* 222*     Current correctional scale  Low  Maintain anti-hyperglycemic dose as follows-   Antihyperglycemics (From admission, onward)      Start     Stop Route Frequency Ordered    12/16/24 0952  insulin aspart U-100 pen 0-5 Units         -- SubQ Every 4 hours PRN 12/16/24 0853          Hold Oral hypoglycemics while patient is in the hospital.  12/17 A1c improved to 6.6%      VTE Risk Mitigation (From admission, onward)           Ordered     enoxaparin injection 40 mg  Daily         12/16/24 0815     IP VTE LOW RISK PATIENT  Once         12/15/24 1201     Place sequential compression device  Until discontinued         12/15/24 1201                    Discharge Planning   JOSE MANUEL:      Code Status: Full Code   Medical Readiness for Discharge Date: treatment  Discharge Plan A: Return to nursing home                        Kim Diamond MD  Department of Hospital Medicine   [facility] - Intensive Care

## 2024-12-17 NOTE — EICU
Intervention Initiated From:  [facility] / EICU    Sandy intervened regarding:  Rounding (Video assessment)    Nurse Notified:  No    Doctor Notified:  No  Comments: On vent sedated with Propofol. VSS with Levophed infusing. No needs at this time.

## 2024-12-17 NOTE — HOSPITAL COURSE
12/17 ND became more awake yesterday with wound changes - low dose propofol added for pt - able to wean levophed to 0.15mcg.  did tolerate tf last night - check kub this am  12/18 ND pt had to be changed back to ac control last nigth after becoming tachypneic and had low tv.  Tolerating ac mode.  Levophed down to 0.1 mcg  12/19 ND pt toleratign vent - will wean RR today - cont to slowly wean levophed as tolerates - wbc slowly imrpoving - tolerating low dose tfs - will cont to increase tfs as tolerates  12/20 ND tolerating vent - cont to work on feeds and nutritional support  12/21 [place] weekend coverage, in no acute distress, stable vital signs, AC/18/400/5/40%, SpO2 100%. On proprofol for sedation, patient tracks voice and moving head and neck with lid opening. Mild bump in WBC, afebrile, may be associated with the reported residual >200 and tube feeds held. Abdomen, soft and no distension on exam. Will resume as tolerated and monitor.  Blood cx x2, final report no growth. Continue merrem (D5), gentamicin (D2) with mixed MDRO frida from sputum studies and UTI. Replete Mg, continue abx. Continue daily wound care.   12/22 [place] weekend coverage, overnight rate change and tolerating AC12/400/5/40 SpO2 100%, proprofol 15 mcg/kg/min, levophed 0.1mcg/kg/min. Patient without gag reflex on suction. Tube feeds held overnight and resumed, remains on trickle feeds. Ileostomy output 100.   No associated abdominal distension, patient in no distress. Vent settings weaned. Leukocytosis resolved. Continue IV abx for MDRO coverage.   12/23 ND Pt still havign trouble tolerating tfs -change reglan iv; pt is more awake this am - will change to simv for a few hours today   12/24 ND elevated bs with tpn- will add long acting insulin as 12u and restart tpn - ssi adjusted to moderate scale.  12/25 FM:  Holiday coverage, chart reviewed and case discussed with team.  Patient's Levophed is being weaned slowly and he is not tolerating his tube  feeds.  Abdominal exam is soft patient has output via ostomy we will check KUB.  Patient is followed by surgery and has a polymicrobial respiratory and urinary tract infection.  Patient has multiple wounds and has a history of anoxic brain injury and is a long-term nursing home resident.  12/26 FM:  Patient is off of vasopressors this morning, patient's KUB noted and will rechallenge tube feedings today.  Patient is tolerating TPN labs noted and his hemoglobin has continued to trend downward Ms. With no visible blood loss.  Patient's other labs noted his white blood cell count is rising despite appropriate antibiotics based on respiratory and urinary cultures.  Patient's prognosis is poor.  12/27 ND pt  still not tolerating tfs - surgery following -  cont tpn; h/h improved after transfusion.  Updated aunt on pts condition  12/28/24:  Patient seen this morning.  Not tolerating tube feedings at this time, remains on vent support.  Poor prognosis at this time.  Patient with poor urine output despite diuretics.  Trial of albumin infusion followed by lasix today.    12/29/24:  Good response to albumin/lasix combo yesterday with good urine output.  Renal function essentially unchanged.  Sputum culture with gram negative rods, susceptibility pending.  Continue abx for now. Leukocytosis improving, H/H stable.   12/30 ND pt will be switched to simv today to start havign him do more work on breathing.  Awaiting sputum cx results - cxr appears improved and wbc improving.  Still not tolerating tfs  12/31 WC: Patient having ongoing need for ventilator support.  Sputum culture still pending.  Abdominal x-ray reveals no evidence of bowel obstruction.  Tube feeds as tolerated.  1/1/25 WC:  remains vent dependent.  TF as tolerated.  1/2/25 ND PT had to placed back on levophed overnight due to lower bp and had some tachypnea -= abg showed lower pO2 - fio2 increased  tolerating feeds via dibhoff tube.   Also had new temp overnight -  leia repeat bc x 2  1/3/25 ND decreased uop yesterday.   Had abd us and reviewed.  Pt off of levophed.   No further temp spike  1/4/25 ND pt had abxs adjusted yesterday for better coverage for actinobacter in lung - current ly on unasyn and merrem per pharm id.  Pt had large blood clot from sacral area last nigth.  And has some decreased bp - back on low dose levophed.    1/5/25 ND pt still not toleratign tube feeds overnight.  Sp 2 uprbcs yesterday with improvement in h/h.  Havign more edema and more blistering of skin with the edema.   Updated aunt on pts condition and that he has poor prognosis - will discuss with his dad  about poss dnr status  1/6/25 ND pt still not tolerating tube feeds - pt has poor denitition which has been causign bleedign from oral cavity.  1/7/25 ND PT has had more organ dysfunction - resp acidosis, worsening renal and liver fxn.  Family updated this am and notified imminent death due to organ failure.  Levophed has been titrated    Family had visited on 1/7/25,  later that evening pt started to become bradycardic then ultimately went aystole - pt was pronounced dead on 1/7/25 at 2105 - cause of death - sepsis, actinobacter pneumonia, respiratory failure, renal failure, diabetes type1

## 2024-12-17 NOTE — CARE UPDATE
12/17/24 0800   PRE-TX-O2   Oxygen Concentration (%) 40   SpO2 100 %   Pulse 68   Resp 16   BP (!) 92/58   Vent Select   Charged w/in last 24h YES   Preset Conventional Ventilator Settings   Ventilation Type VC   Vent Mode (S)  SIMV   Set Rate (S)  16 BPM   Vt Set 400 mL   PEEP/CPAP 5 cmH20   Pressure Support (S)  10 cmH20   Peak Flow 60 L/min   Plateau Set/Insp. Hold (sec) 0   Insp Rise Time  70 %   I-Trigger Type  V-TRIG   Trigger Sensitivity Flow/I-Trigger 3 L/min   Patient Ventilator Parameters   Resp Rate Total 16 br/min   Peak Airway Pressure 24 cmH20   Mean Airway Pressure 8.9 cmH20   Plateau Pressure 19 cmH20   Exhaled Vt 417 mL   Total Ve 6.68 L/m   Spont Ve 0 L   I:E Ratio Measured 1:4.20   Auto PEEP 0 cmH20   Inspired Tidal Volume (VTI) 0 mL   Conventional Ventilator Alarms   Ve High Alarm 24 L/min   Ve Low Alarm 4 L/min   Resp Rate High Alarm 35 br/min   Press High Alarm 50 cmH2O   Apnea Rate 20   Apnea Volume (mL) 400 mL   Apnea Oxygen Concentration  100   Apnea Flow Rate (L/min) 60   T Apnea 10 sec(s)   Ready to Wean/Extubation Screen   FIO2<=50 (chart decimal) 0.4   MV<16L (chart vol.) 6.68   PEEP <=8 (chart #) 5   Ready to Wean Parameters   F/VT Ratio<105 (RSBI) (!) 38.37   Negative Inspiratory Force   Negative Inspiratory Force (cm H2O) 0   Vital Capacity   Vital Capacity (mL) 0

## 2024-12-17 NOTE — ASSESSMENT & PLAN NOTE
"This patient does have evidence of infective focus  My overall impression is septic shock due to MAP < 65 and SBP < 90.  Source: Respiratory and Urinary Tract  Antibiotics given-   Antibiotics (72h ago, onward)      Start     Stop Route Frequency Ordered    12/17/24 0900  meropenem injection 1 g         -- IV Every 8 hours (non-standard times) 12/17/24 0757    12/15/24 1230  mupirocin 2 % ointment         12/20/24 0859 Nasl 2 times daily 12/15/24 1118    12/15/24 1209  vancomycin - pharmacy to dose  (vancomycin IVPB (PEDS and ADULTS))        Placed in "And" Linked Group    -- IV pharmacy to manage frequency 12/15/24 1201          Latest lactate reviewed-  Recent Labs   Lab 12/15/24  1306   LACTATE 2.3*       Organ dysfunction indicated by Acute respiratory failure and Encephalopathy    Fluid challenge Ideal Body Weight- The patient's ideal body weight is Ideal body weight: 66.1 kg (145 lb 11.6 oz) which will be used to calculate fluid bolus of 30 ml/kg for treatment of septic shock.      Post- resuscitation assessment Yes Perfusion exam was performed within 6 hours of septic shock presentation after bolus shows Inadequate tissue perfusion assessed by non-invasive monitoring       Will Start Pressors- Levophed for MAP of 65  Source control achieved by: iv zosym. Vanc, ivfs  12/17 wean levophed as tolerates- abxs changed to merrem, cotn vanc - await culture final reports  "

## 2024-12-18 PROBLEM — E43 SEVERE MALNUTRITION: Status: ACTIVE | Noted: 2024-12-18

## 2024-12-18 NOTE — PLAN OF CARE
Problem: Skin Injury Risk Increased  Goal: Skin Health and Integrity  Outcome: Progressing     Problem: Mechanical Ventilation Invasive  Goal: Effective Communication  Outcome: Progressing  Goal: Optimal Device Function  Outcome: Progressing  Goal: Mechanical Ventilation Liberation  Outcome: Progressing  Goal: Absence of Device-Related Skin and Tissue Injury  Outcome: Progressing  Goal: Absence of Ventilator-Induced Lung Injury  Outcome: Progressing     Problem: Artificial Airway  Goal: Effective Communication  Outcome: Progressing  Goal: Optimal Device Function  Outcome: Progressing  Goal: Absence of Device-Related Skin or Tissue Injury  Outcome: Progressing     Problem: Adult Inpatient Plan of Care  Goal: Plan of Care Review  Outcome: Progressing  Goal: Patient-Specific Goal (Individualized)  Outcome: Progressing  Goal: Absence of Hospital-Acquired Illness or Injury  Outcome: Progressing  Goal: Optimal Comfort and Wellbeing  Outcome: Progressing  Goal: Readiness for Transition of Care  Outcome: Progressing     Problem: Diabetes Comorbidity  Goal: Blood Glucose Level Within Targeted Range  Outcome: Progressing     Problem: Wound  Goal: Optimal Coping  Outcome: Progressing  Goal: Optimal Functional Ability  Outcome: Progressing  Goal: Absence of Infection Signs and Symptoms  Outcome: Progressing  Goal: Optimal Pain Control and Function  Outcome: Progressing  Goal: Skin Health and Integrity  Outcome: Progressing  Goal: Optimal Wound Healing  Outcome: Progressing     Problem: Infection  Goal: Absence of Infection Signs and Symptoms  Outcome: Progressing     Problem: Pneumonia  Goal: Effective Oxygenation and Ventilation  Outcome: Progressing     Problem: Sepsis/Septic Shock  Goal: Optimal Coping  Outcome: Progressing  Goal: Absence of Bleeding  Outcome: Progressing     Problem: Fall Injury Risk  Goal: Absence of Fall and Fall-Related Injury  Outcome: Progressing

## 2024-12-18 NOTE — ASSESSMENT & PLAN NOTE
"Patient has a diagnosis of pneumonia. The cause of the pneumonia is suspected to be bacterial in etiology but organism is not known. The pneumonia is stable. The patient has the following signs/symptoms of pneumonia: persistent hypoxia  and sputum production. The patient does have a current oxygen requirement and the patient does not have a home oxygen requirement. I have reviewed the pertinent imaging. The following cultures have been collected: Blood cultures and Sputum culture The culture results are listed below.     Current antimicrobial regimen consists of the antibiotics listed below. Will monitor patient closely and continue current treatment plan unchanged.    Antibiotics (From admission, onward)      Start     Stop Route Frequency Ordered    12/17/24 0900  meropenem injection 1 g         -- IV Every 8 hours (non-standard times) 12/17/24 0757    12/15/24 1230  mupirocin 2 % ointment         12/20/24 0859 Nasl 2 times daily 12/15/24 1118    12/15/24 1209  vancomycin - pharmacy to dose  (vancomycin IVPB (PEDS and ADULTS))        Placed in "And" Linked Group    -- IV pharmacy to manage frequency 12/15/24 1201            Microbiology Results (last 7 days)       Procedure Component Value Units Date/Time    Urine culture [[id number]]  (Abnormal)  (Susceptibility) Collected: 12/15/24 0941    Order Status: Completed Specimen: Urine Updated: 12/17/24 2343     Urine Culture, Routine KLEBSIELLA PNEUMONIAE ESBL  >100,000 cfu/ml      Narrative:      Preferred Collection Type->Urine, Clean Catch  Specimen Source->Urine    Blood culture x two cultures. Draw prior to antibiotics. [[id number]] Collected: 12/15/24 0923    Order Status: Completed Specimen: Blood from Peripheral, Forearm, Right Updated: 12/17/24 1612     Blood Culture, Routine No Growth to date      No Growth to date      No Growth to date    Narrative:      Aerobic and anaerobic    Blood culture x two cultures. Draw prior to antibiotics. [[id number]] " Collected: 12/15/24 0942    Order Status: Completed Specimen: Blood from Peripheral, Antecubital, Left Updated: 12/17/24 1612     Blood Culture, Routine No Growth to date      No Growth to date      No Growth to date    Narrative:      Aerobic and anaerobic    Culture, Respiratory with Gram Stain [[id number]]  (Abnormal) Collected: 12/15/24 0935    Order Status: Completed Specimen: Respiratory from Endotracheal Aspirate Updated: 12/17/24 1424     Respiratory Culture GRAM NEGATIVE FLORA  Many  Identification and susceptibility pending        PRESUMPTIVE PROTEUS SPECIES  Few  Identification and susceptibility pending       Gram Stain (Respiratory) Few WBC's     Gram Stain (Respiratory) Many Gram positive rods     Gram Stain (Respiratory) Few Gram negative cocci    Culture, Respiratory with Gram Stain [[id number]]     Order Status: Canceled Specimen: Respiratory         12/17 dc zosyn with recent esbl kleb in urine - will change to merrem - cont vanc until final cultures obtained - change vent to simv; add nebs  12/18 cotn merrem and vanc - await final sputum cutlure - cont vent on ac control; nebs - wbc imrpoved slightly; lasix 20mg iv for edema today

## 2024-12-18 NOTE — ASSESSMENT & PLAN NOTE
Nutrition consulted. Most recent weight and BMI monitored-     Measurements:  Wt Readings from Last 1 Encounters:   12/16/24 68.5 kg (151 lb 0.2 oz)   Body mass index is 23.65 kg/m².    Patient has been screened and assessed by RD.    Malnutrition Type:  Context:    Level:      Malnutrition Characteristic Summary:       Interventions/Recommendations (treatment strategy):  1. Rec'd Continuous EN: Glucerna 1.5 @ 40mL/r increasing by 5 mL q6hrs to goal rate of 55mL/hr with a continuous 40mL FWF to provide 1980kcal (94% EEN), 109g of protein (100% EPN), and 1962mL FW. 2. Néstor BID via PEG tube to promote wound healing and to provide additional nutrition.         - Do not mix Néstor with formula in a tube-feeding bag       - Pour one packet of Néstor in a clean, small container for mixing.         - Add 4 fl oz (120 mL) water at room temperature.         - Mix well with disposable spoon or tongue blade until all particles are completely hydrated.         - Verify correct tube placement.         - Flush feeding tube with 30 mL water.         -Administer Néstor through feeding tube using a 60-mL or larger syringe.         - Flush with an additional 30 mL water. 3. RD to follow and make rec's accordingly.    12/18 restart tfs today at 1ml/hr to see if can tolerate feeds

## 2024-12-18 NOTE — PROGRESS NOTES
Pharmacokinetic Assessment Follow Up: IV Vancomycin    Vancomycin serum concentration assessment(s):    The random level was drawn correctly and can be used to guide therapy at this time. The measurement is within the desired definitive target range of 10 to 20 mcg/mL.    Vancomycin Regimen Plan:    Re-dose when the random level is less than 20 mcg/mL, next level to be drawn at 2300 on 12/18. One time dose of 1g given    Drug levels (last 3 results):  Recent Labs   Lab Result Units 12/16/24 2046 12/17/24  0857 12/17/24  2105   Vancomycin, Random ug/mL  --  23.5 19.3   Vancomycin-Trough ug/mL 31.8*  --   --        Pharmacy will continue to follow and monitor vancomycin.    Please contact pharmacy for questions regarding this assessment.    Thank you for the consult,   Nanci Jackson       Patient brief summary:  Carlos Chahal is a 34 y.o. male initiated on antimicrobial therapy with IV Vancomycin for treatment of lower respiratory infection      Drug Allergies:   Review of patient's allergies indicates:   Allergen Reactions    Guaifenesin Hives    Codeine Itching       Actual Body Weight:   68.5kg    Renal Function:   Estimated Creatinine Clearance: 70.2 mL/min (based on SCr of 1.4 mg/dL).,     Dialysis Method (if applicable):  N/A    CBC (last 72 hours):  Recent Labs   Lab Result Units 12/15/24  0923 12/16/24  0356 12/16/24  0933 12/17/24  0404   WBC K/uL 15.57* 30.00*  --  34.55*   Hemoglobin g/dL 6.5* 6.5*  --  8.5*   Hemoglobin A1C %  --   --  6.6*  --    Hematocrit % 22.0* 21.2*  --  26.4*   Platelets K/uL 527* 563*  --  450   Gran % % 78.5* 87.2*  --  88.4*   Lymph % % 10.9* 6.5*  --  3.8*   Mono % % 6.8 3.2*  --  3.1*   Eosinophil % % 3.0 1.7  --  2.2   Basophil % % 0.3 0.2  --  0.4   Differential Method  Automated Automated  --  Automated       Metabolic Panel (last 72 hours):  Recent Labs   Lab Result Units 12/15/24  0922 12/15/24  0941 12/16/24  0356 12/17/24  0404   Sodium mmol/L 139  --  138 137    Potassium mmol/L 4.7  --  4.5 4.2   Chloride mmol/L 103  --  104 104   CO2 mmol/L 31*  --  29 24   Glucose mg/dL 156*  --  229* 229*   Glucose, UA   --  Negative  --   --    BUN mg/dL 19  --  16 14   Creatinine mg/dL 1.2  --  1.5* 1.4   Albumin g/dL 0.7*  --  0.6* 0.6*   Total Bilirubin mg/dL 0.4  --  0.6 0.6   Alkaline Phosphatase U/L 503*  --  412* 395*   AST U/L 41*  --  38 33   ALT U/L 22  --  17 16   Magnesium mg/dL  --   --  1.3* 1.6       Vancomycin Administrations:  vancomycin given in the last 96 hours                     vancomycin (VANCOCIN) 1,000 mg in 0.9% NaCl 250 mL IVPB (admixture device) (mg) 1,000 mg New Bag 12/16/24 0901     1,000 mg New Bag 12/15/24 2118    vancomycin 1750 mg in 0.9% sodium chloride 500 mL IVPB ()  Restarted 12/15/24 0941     1,750 mg New Bag  0938                    Microbiologic Results:  Microbiology Results (last 7 days)       Procedure Component Value Units Date/Time    Blood culture x two cultures. Draw prior to antibiotics. [[id number]] Collected: 12/15/24 0923    Order Status: Completed Specimen: Blood from Peripheral, Forearm, Right Updated: 12/17/24 1612     Blood Culture, Routine No Growth to date      No Growth to date      No Growth to date    Narrative:      Aerobic and anaerobic    Blood culture x two cultures. Draw prior to antibiotics. [[id number]] Collected: 12/15/24 0942    Order Status: Completed Specimen: Blood from Peripheral, Antecubital, Left Updated: 12/17/24 1612     Blood Culture, Routine No Growth to date      No Growth to date      No Growth to date    Narrative:      Aerobic and anaerobic    Culture, Respiratory with Gram Stain [[id number]]  (Abnormal) Collected: 12/15/24 0935    Order Status: Completed Specimen: Respiratory from Endotracheal Aspirate Updated: 12/17/24 1424     Respiratory Culture GRAM NEGATIVE FLORA  Many  Identification and susceptibility pending        PRESUMPTIVE PROTEUS SPECIES  Few  Identification and susceptibility pending        Gram Stain (Respiratory) Few WBC's     Gram Stain (Respiratory) Many Gram positive rods     Gram Stain (Respiratory) Few Gram negative cocci    Urine culture [[id number]]  (Abnormal) Collected: 12/15/24 0941    Order Status: Completed Specimen: Urine Updated: 12/16/24 1726     Urine Culture, Routine GRAM NEGATIVE FLORA  >100,000 cfu/ml  Identification and susceptibility pending      Narrative:      Preferred Collection Type->Urine, Clean Catch  Specimen Source->Urine    Culture, Respiratory with Gram Stain [[id number]]     Order Status: Canceled Specimen: Respiratory

## 2024-12-18 NOTE — NURSING
Pt tachypneic, vent alarming low tidal volume and high resp rate. Versed given, pt remains tachypneic. Not tolerating setting at this time.

## 2024-12-18 NOTE — PROGRESS NOTES
[facility] Medicine  Progress Note    Patient Name: Carlos Chahal  MRN: [id number]  Patient Class: IP- Inpatient   Admission Date: 12/15/2024  Length of Stay: 3 days  Attending Physician: Kim Diamond MD  Primary Care Provider: Jose Francisco Klein MD        Subjective     Principal Problem:Sepsis        HPI:  Carlos Chahal is a 34 y.o. male who presents to the ED from nursing home for altered mental status and difficulty breathing.  Patient is found to be hypoxic.  He has a history of anoxic brain injury     This am, pt is on ventilator and levophed at 0.25mcg and ivfs at 75ml/hr.      Spoke with father this am on condition of pt    Overview/Hospital Course:  12/17 ND became more awake yesterday with wound changes - low dose propofol added for pt - able to wean levophed to 0.15mcg.  did tolerate tf last night - check kub this am  12/18 ND pt had to be changed back to ac control last nigth after becoming tachypneic and had low tv.  Tolerating ac mode.  Levophed down to 0.1 mcg    Past Medical History:   Diagnosis Date    Anoxic brain injury     Bipolar disorder, unspecified     Diabetes insipidus     Diabetes mellitus     Dysphagia, unspecified     Chucky gangrene     Gangrene     GERD (gastroesophageal reflux disease)     Hereditary and idiopathic neuropathy     Nontraumatic intracerebral hemorrhage, unspecified     Other muscle spasm     Other psychoactive substance abuse with withdrawal, uncomplicated     Skin transplant status     Thyroid disease        Past Surgical History:   Procedure Laterality Date    COLOSTOMY      INSERTION, PEG TUBE         Review of patient's allergies indicates:   Allergen Reactions    Guaifenesin Hives    Codeine Itching       No current facility-administered medications on file prior to encounter.     Current Outpatient Medications on File Prior to Encounter   Medication Sig    acetaminophen (TYLENOL) 325 MG tablet 325 mg by Per G Tube route every 6 (six)  hours as needed.    ascorbic acid, vitamin C, (VITAMIN C) 500 MG tablet 500 mg by Per G Tube route once daily.    aspirin 81 MG Chew Take 81 mg by mouth once daily.    diazePAM (VALIUM) 5 MG tablet 2 tablets (10 mg total) by Per G Tube route 2 (two) times a day.    doxycycline (VIBRA-TABS) 100 MG tablet 1 tablet (100 mg total) by Per G Tube route every 12 (twelve) hours.    enoxaparin (LOVENOX) 40 mg/0.4 mL Syrg Inject 40 mg into the skin.    famotidine (PEPCID) 40 MG tablet 40 mg by Per G Tube route once daily.    FLUoxetine 20 MG capsule 20 mg by Per G Tube route once daily.    gabapentin (NEURONTIN) 250 mg/5 mL solution 2.5 mLs (125 mg total) by Per G Tube route every 12 (twelve) hours.    insulin aspart U-100 (NOVOLOG FLEXPEN U-100 INSULIN) 100 unit/mL (3 mL) InPn pen Use per sliding scale insulin    levothyroxine (SYNTHROID) 150 MCG tablet 1 tablet (150 mcg total) by Per G Tube route before breakfast.    lipase-protease-amylase (ZENPEP) 20,000-63,000- 84,000 unit capsule Take 1 capsule by mouth 3 (three) times daily.    lurasidone (LATUDA) 40 mg Tab tablet Take 40 mg by mouth once daily.    melatonin (MELATIN) 3 mg tablet 2 tablets (6 mg total) by Per G Tube route nightly as needed for Insomnia.    metoclopramide HCl (REGLAN) 5 MG tablet 1 tablet (5 mg total) by Per G Tube route 3 (three) times daily before meals.    oxyCODONE (ROXICODONE) 10 mg Tab immediate release tablet 1 tablet (10 mg total) by Per G Tube route every 6 (six) hours as needed for Pain.    polyethylene glycol (GLYCOLAX) 17 gram PwPk 17 g by Per G Tube route once daily.    rosuvastatin (CRESTOR) 10 MG tablet Take 10 mg by mouth once daily.    sodium hypochlorite 0.125% (DAKIN'S SOLUTION) external solution Apply topically once daily. Apply once daily to groin area and BID to sacral     Family History    None       Tobacco Use    Smoking status: Unknown    Smokeless tobacco: Not on file   Substance and Sexual Activity    Alcohol use: Not on file     Drug use: Not on file    Sexual activity: Not on file     Review of Systems   Unable to perform ROS: Acuity of condition     Objective:     Vital Signs (Most Recent):  Temp: 99.2 °F (37.3 °C) (12/18/24 0702)  Pulse: 102 (12/18/24 0815)  Resp: (!) 23 (12/18/24 0815)  BP: 93/60 (12/18/24 0815)  SpO2: 98 % (12/18/24 0815) Vital Signs (24h Range):  Temp:  [96.2 °F (35.7 °C)-99.2 °F (37.3 °C)] 99.2 °F (37.3 °C)  Pulse:  [] 102  Resp:  [13-31] 23  SpO2:  [97 %-100 %] 98 %  BP: ()/(50-72) 93/60     Weight: 68.5 kg (151 lb 0.2 oz)  Body mass index is 23.65 kg/m².     Physical Exam  Constitutional:       Appearance: He is ill-appearing.      Comments: Thin male; intubated   HENT:      Mouth/Throat:      Mouth: Mucous membranes are moist.   Eyes:      Pupils: Pupils are equal, round, and reactive to light.   Cardiovascular:      Rate and Rhythm: Normal rate and regular rhythm.   Pulmonary:      Effort: Pulmonary effort is normal.      Breath sounds: Rhonchi present.   Abdominal:      General: There is no distension.      Palpations: Abdomen is soft.      Tenderness: There is no abdominal tenderness.      Comments: Jtube noted; ileostomy noted   Genitourinary:     Comments: Rojas with yellow urine  Skin:     General: Skin is warm.      Comments: Wounds to sacrum; groin area              CRANIAL NERVES     CN III, IV, VI   Pupils are equal, round, and reactive to light.       Significant Labs: All pertinent labs within the past 24 hours have been reviewed.  Recent Lab Results  (Last 5 results in the past 24 hours)        12/18/24  0519   12/18/24  0418   12/18/24  0418   12/18/24  0132   12/18/24  0009        Base Deficit -2.3  Comment: Value below reference range       -3.0  Comment: Value below reference range         Performed By: Hardeep Meier         Specimen source Arterial       Arterial         AC 22       22         Albumin     <0.6           ALP     439           ALT     10           Anion Gap      5           AST     24           Baso #     Test Not Performed  Comment: CORRECTED RESULT; previously reported as 0.08 on 12/18/2024 at 04:35.  [C]           Basophil %     1.0  Comment: CORRECTED RESULT; previously reported as 0.3 on 12/18/2024 at 04:35.  [C]           BILIRUBIN TOTAL     0.4  Comment: For infants and newborns, interpretation of results should be based  on gestational age, weight and in agreement with clinical  observations.    Premature Infant recommended reference ranges:  Up to 24 hours.............<8.0 mg/dL  Up to 48 hours............<12.0 mg/dL  3-5 days..................<15.0 mg/dL  6-29 days.................<15.0 mg/dL    For patients on Eltrombopag therapy, use of Dimension [place] TBIL is   not   recommended.             BUN     13           Calcium     9.1           Chloride     104           CO2     25           Creatinine     1.3           Differential Method     Manual  Comment: CORRECTED RESULT; previously reported as Automated on 12/18/2024 at   04:35.    [C]           eGFR     >60.0           Eos #     Test Not Performed  Comment: CORRECTED RESULT; previously reported as 0.8 on 12/18/2024 at 04:35.  [C]           Eos %     2.0  Comment: CORRECTED RESULT; previously reported as 3.0 on 12/18/2024 at 04:35.  [C]           FiO2 30.0       40.0         Glucose     199           Gran # (ANC)     Test Not Performed  Comment: CORRECTED RESULT; previously reported as 25.6 on 12/18/2024 at 04:35.  [C]           Gran %     91.0  Comment: CORRECTED RESULT; previously reported as 89.9 on 12/18/2024 at 04:35.  [C]           Hematocrit     24.9           Hemoglobin     7.9           Immature Grans (Abs)     Test Not Performed  Comment: Mild elevation in immature granulocytes is non specific and   can be seen in a variety of conditions including stress response,   acute inflammation, trauma and pregnancy. Correlation with other   laboratory and clinical findings is essential.  CORRECTED RESULT;  previously reported as 0.21 on 12/18/2024 at 04:35.    [C]           Immature Granulocytes     Test Not Performed  Comment: CORRECTED RESULT; previously reported as 0.7 on 12/18/2024 at 04:35.  [C]           Lymph #     Test Not Performed  Comment: CORRECTED RESULT; previously reported as 0.9 on 12/18/2024 at 04:35.  [C]           Lymph %     3.0  Comment: CORRECTED RESULT; previously reported as 3.3 on 12/18/2024 at 04:35.  [C]           Magnesium      1.7           MCH     27.7           MCHC     31.7           MCV     87           MODIFIED CELSO'S TEST N/A       N/A         Mono #     Test Not Performed  Comment: CORRECTED RESULT; previously reported as 0.8 on 12/18/2024 at 04:35.  [C]           Mono %     3.0  Comment: CORRECTED RESULT; previously reported as 2.8 on 12/18/2024 at 04:35.  [C]           MPV     9.2           nRBC     0           O2DEVICE Ventilator       Ventilator         PEAK FLOW 60.0       60.0         PEEP 5.0       5.0         Platelet Estimate     Appears normal           Platelet Count     343           POC HCO3 22.7  Comment: Value below reference range       22.2  Comment: Value below reference range         POC PCO2 34.6  Comment: Value below reference range       36.0         POC PH 7.416       7.393         POC PO2 86.9       121  Comment: Value above reference range         POC SATURATED O2 97.9       99.5         POC Temp 37.0       37.0         POCT Glucose   215       226       Potassium     3.9           PROTEIN TOTAL     5.8           RBC     2.85           RDW     16.0           Sodium     134           Vt 400       400         WBC     28.42                                   [C] - Corrected Result               Significant Imaging: I have reviewed all pertinent imaging results/findings within the past 24 hours.  I have reviewed and interpreted all pertinent imaging results/findings within the past 24 hours.    Assessment and Plan     * Sepsis  This patient does have evidence of  "infective focus  My overall impression is septic shock due to MAP < 65 and SBP < 90.  Source: Respiratory and Urinary Tract  Antibiotics given-   Antibiotics (72h ago, onward)      Start     Stop Route Frequency Ordered    12/17/24 0900  meropenem injection 1 g         -- IV Every 8 hours (non-standard times) 12/17/24 0757    12/15/24 1230  mupirocin 2 % ointment         12/20/24 0859 Nasl 2 times daily 12/15/24 1118    12/15/24 1209  vancomycin - pharmacy to dose  (vancomycin IVPB (PEDS and ADULTS))        Placed in "And" Linked Group    -- IV pharmacy to manage frequency 12/15/24 1201          Latest lactate reviewed-  Recent Labs   Lab 12/15/24  1306   LACTATE 2.3*       Organ dysfunction indicated by Acute respiratory failure and Encephalopathy    Fluid challenge Ideal Body Weight- The patient's ideal body weight is Ideal body weight: 66.1 kg (145 lb 11.6 oz) which will be used to calculate fluid bolus of 30 ml/kg for treatment of septic shock.      Post- resuscitation assessment Yes Perfusion exam was performed within 6 hours of septic shock presentation after bolus shows Inadequate tissue perfusion assessed by non-invasive monitoring       Will Start Pressors- Levophed for MAP of 65  Source control achieved by: iv zosym. Vanc, ivfs  12/17 wean levophed as tolerates- abxs changed to merrem, cotn vanc - await culture final reports  12/18 wbc slightly imrpoved - cont iv abxs- await final sputum culture - on merrem fro esbl kleb in urine    Severe malnutrition  Nutrition consulted. Most recent weight and BMI monitored-     Measurements:  Wt Readings from Last 1 Encounters:   12/16/24 68.5 kg (151 lb 0.2 oz)   Body mass index is 23.65 kg/m².    Patient has been screened and assessed by RD.    Malnutrition Type:  Context:    Level:      Malnutrition Characteristic Summary:       Interventions/Recommendations (treatment strategy):  1. Rec'd Continuous EN: Glucerna 1.5 @ 40mL/r increasing by 5 mL q6hrs to goal rate of " 55mL/hr with a continuous 40mL FWF to provide 1980kcal (94% EEN), 109g of protein (100% EPN), and 1962mL FW. 2. Néstor BID via PEG tube to promote wound healing and to provide additional nutrition.         - Do not mix Néstor with formula in a tube-feeding bag       - Pour one packet of Néstor in a clean, small container for mixing.         - Add 4 fl oz (120 mL) water at room temperature.         - Mix well with disposable spoon or tongue blade until all particles are completely hydrated.         - Verify correct tube placement.         - Flush feeding tube with 30 mL water.         -Administer Néstor through feeding tube using a 60-mL or larger syringe.         - Flush with an additional 30 mL water. 3. RD to follow and make rec's accordingly.    12/18 restart tfs today at 1ml/hr to see if can tolerate feeds    Hypomagnesemia  Patient has Abnormal Magnesium: hypomagnesemia. Will continue to monitor electrolytes closely. Will replace the affected electrolytes and repeat labs to be done after interventions completed. The patient's magnesium results have been reviewed and are listed below.  Recent Labs   Lab 12/18/24  0418   MG 1.7      12/17 mag imrpoving - repalce mag today    Pneumonia of right lower lobe due to infectious organism  Patient has a diagnosis of pneumonia. The cause of the pneumonia is suspected to be bacterial in etiology but organism is not known. The pneumonia is stable. The patient has the following signs/symptoms of pneumonia: persistent hypoxia  and sputum production. The patient does have a current oxygen requirement and the patient does not have a home oxygen requirement. I have reviewed the pertinent imaging. The following cultures have been collected: Blood cultures and Sputum culture The culture results are listed below.     Current antimicrobial regimen consists of the antibiotics listed below. Will monitor patient closely and continue current treatment plan unchanged.    Antibiotics (From  "admission, onward)      Start     Stop Route Frequency Ordered    12/17/24 0900  meropenem injection 1 g         -- IV Every 8 hours (non-standard times) 12/17/24 0757    12/15/24 1230  mupirocin 2 % ointment         12/20/24 0859 Nasl 2 times daily 12/15/24 1118    12/15/24 1209  vancomycin - pharmacy to dose  (vancomycin IVPB (PEDS and ADULTS))        Placed in "And" Linked Group    -- IV pharmacy to manage frequency 12/15/24 1201            Microbiology Results (last 7 days)       Procedure Component Value Units Date/Time    Urine culture [[id number]]  (Abnormal)  (Susceptibility) Collected: 12/15/24 0941    Order Status: Completed Specimen: Urine Updated: 12/17/24 2343     Urine Culture, Routine KLEBSIELLA PNEUMONIAE ESBL  >100,000 cfu/ml      Narrative:      Preferred Collection Type->Urine, Clean Catch  Specimen Source->Urine    Blood culture x two cultures. Draw prior to antibiotics. [[id number]] Collected: 12/15/24 0923    Order Status: Completed Specimen: Blood from Peripheral, Forearm, Right Updated: 12/17/24 1612     Blood Culture, Routine No Growth to date      No Growth to date      No Growth to date    Narrative:      Aerobic and anaerobic    Blood culture x two cultures. Draw prior to antibiotics. [[id number]] Collected: 12/15/24 0942    Order Status: Completed Specimen: Blood from Peripheral, Antecubital, Left Updated: 12/17/24 1612     Blood Culture, Routine No Growth to date      No Growth to date      No Growth to date    Narrative:      Aerobic and anaerobic    Culture, Respiratory with Gram Stain [[id number]]  (Abnormal) Collected: 12/15/24 0935    Order Status: Completed Specimen: Respiratory from Endotracheal Aspirate Updated: 12/17/24 1424     Respiratory Culture GRAM NEGATIVE FLORA  Many  Identification and susceptibility pending        PRESUMPTIVE PROTEUS SPECIES  Few  Identification and susceptibility pending       Gram Stain (Respiratory) Few WBC's     Gram Stain (Respiratory) Many Gram " positive rods     Gram Stain (Respiratory) Few Gram negative cocci    Culture, Respiratory with Gram Stain [[id number]]     Order Status: Canceled Specimen: Respiratory         12/17 dc zosyn with recent esbl kleb in urine - will change to merrem - cont vanc until final cultures obtained - change vent to simv; add nebs  12/18 cotn merrem and vanc - await final sputum cutlure - cont vent on ac control; nebs - wbc imrpoved slightly; lasix 20mg iv for edema today    Sacral wound, sequela  Local wound care with dakins bid  Iv abxs      Open wound of groin  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  Recent Labs     12/16/24  0356 12/17/24  0404 12/18/24  0418   HGB 6.5* 8.5* 7.9*   HCT 21.2* 26.4* 24.9*       Plan  - Monitor serial CBC: Daily  - Transfuse PRBC if patient becomes hemodynamically unstable, symptomatic or H/H drops below 7/21.  - Patient has not received any PRBC transfusions to date  - Patient's anemia is currently worsening. Will adjust treatment as follows: 2uprbcs today  12/17 h/h Improved    History of anoxic brain injury        Urinary tract infection associated with indwelling urethral catheter  Await new urine culture =- currently on zosyn/vanc  12/17 change to merrem/vanc  12/18 has esbl klebsiella - cont merrem    Type 1 diabetes  Patient's FSGs are uncontrolled due to hyperglycemia on current medication regimen.  Last A1c reviewed-   Lab Results   Component Value Date    HGBA1C 6.6 (H) 12/16/2024     Most recent fingerstick glucose reviewed-   Recent Labs   Lab 12/17/24 2000 12/18/24  0009 12/18/24  0418 12/18/24  0848   POCTGLUCOSE 212* 226* 215* 179*       Current correctional scale  Low  Maintain anti-hyperglycemic dose as follows-   Antihyperglycemics (From admission, onward)      Start     Stop Route Frequency Ordered    12/16/24 2393   insulin aspart U-100 pen 0-5 Units         -- SubQ Every 4 hours PRN 12/16/24 0853          Hold Oral hypoglycemics while patient is in the hospital.  12/17 A1c improved to 6.6%      VTE Risk Mitigation (From admission, onward)           Ordered     enoxaparin injection 40 mg  Daily         12/16/24 0815     IP VTE LOW RISK PATIENT  Once         12/15/24 1201     Place sequential compression device  Until discontinued         12/15/24 1201                    Discharge Planning   JOSE MANUEL:      Code Status: Full Code   Medical Readiness for Discharge Date:  treatment  Discharge Plan A: Return to nursing home                        Kim Diamond MD  Department of Hospital Medicine   [place] [facility]

## 2024-12-18 NOTE — PLAN OF CARE
Pt remains intubated, sedated, and on levophed. No major changes in patient status today. Feeds restarted this morning. 1x dose of lasix given, urine output 1100mls. Wound care done and continued per orders.

## 2024-12-18 NOTE — SUBJECTIVE & OBJECTIVE
Past Medical History:   Diagnosis Date    Anoxic brain injury     Bipolar disorder, unspecified     Diabetes insipidus     Diabetes mellitus     Dysphagia, unspecified     Chucky gangrene     Gangrene     GERD (gastroesophageal reflux disease)     Hereditary and idiopathic neuropathy     Nontraumatic intracerebral hemorrhage, unspecified     Other muscle spasm     Other psychoactive substance abuse with withdrawal, uncomplicated     Skin transplant status     Thyroid disease        Past Surgical History:   Procedure Laterality Date    COLOSTOMY      INSERTION, PEG TUBE         Review of patient's allergies indicates:   Allergen Reactions    Guaifenesin Hives    Codeine Itching       No current facility-administered medications on file prior to encounter.     Current Outpatient Medications on File Prior to Encounter   Medication Sig    acetaminophen (TYLENOL) 325 MG tablet 325 mg by Per G Tube route every 6 (six) hours as needed.    ascorbic acid, vitamin C, (VITAMIN C) 500 MG tablet 500 mg by Per G Tube route once daily.    aspirin 81 MG Chew Take 81 mg by mouth once daily.    diazePAM (VALIUM) 5 MG tablet 2 tablets (10 mg total) by Per G Tube route 2 (two) times a day.    doxycycline (VIBRA-TABS) 100 MG tablet 1 tablet (100 mg total) by Per G Tube route every 12 (twelve) hours.    enoxaparin (LOVENOX) 40 mg/0.4 mL Syrg Inject 40 mg into the skin.    famotidine (PEPCID) 40 MG tablet 40 mg by Per G Tube route once daily.    FLUoxetine 20 MG capsule 20 mg by Per G Tube route once daily.    gabapentin (NEURONTIN) 250 mg/5 mL solution 2.5 mLs (125 mg total) by Per G Tube route every 12 (twelve) hours.    insulin aspart U-100 (NOVOLOG FLEXPEN U-100 INSULIN) 100 unit/mL (3 mL) InPn pen Use per sliding scale insulin    levothyroxine (SYNTHROID) 150 MCG tablet 1 tablet (150 mcg total) by Per G Tube route before breakfast.    lipase-protease-amylase (ZENPEP) 20,000-63,000- 84,000 unit capsule Take 1 capsule by mouth 3  (three) times daily.    lurasidone (LATUDA) 40 mg Tab tablet Take 40 mg by mouth once daily.    melatonin (MELATIN) 3 mg tablet 2 tablets (6 mg total) by Per G Tube route nightly as needed for Insomnia.    metoclopramide HCl (REGLAN) 5 MG tablet 1 tablet (5 mg total) by Per G Tube route 3 (three) times daily before meals.    oxyCODONE (ROXICODONE) 10 mg Tab immediate release tablet 1 tablet (10 mg total) by Per G Tube route every 6 (six) hours as needed for Pain.    polyethylene glycol (GLYCOLAX) 17 gram PwPk 17 g by Per G Tube route once daily.    rosuvastatin (CRESTOR) 10 MG tablet Take 10 mg by mouth once daily.    sodium hypochlorite 0.125% (DAKIN'S SOLUTION) external solution Apply topically once daily. Apply once daily to groin area and BID to sacral     Family History    None       Tobacco Use    Smoking status: Unknown    Smokeless tobacco: Not on file   Substance and Sexual Activity    Alcohol use: Not on file    Drug use: Not on file    Sexual activity: Not on file     Review of Systems   Unable to perform ROS: Acuity of condition     Objective:     Vital Signs (Most Recent):  Temp: 99.2 °F (37.3 °C) (12/18/24 0702)  Pulse: 102 (12/18/24 0815)  Resp: (!) 23 (12/18/24 0815)  BP: 93/60 (12/18/24 0815)  SpO2: 98 % (12/18/24 0815) Vital Signs (24h Range):  Temp:  [96.2 °F (35.7 °C)-99.2 °F (37.3 °C)] 99.2 °F (37.3 °C)  Pulse:  [] 102  Resp:  [13-31] 23  SpO2:  [97 %-100 %] 98 %  BP: ()/(50-72) 93/60     Weight: 68.5 kg (151 lb 0.2 oz)  Body mass index is 23.65 kg/m².     Physical Exam  Constitutional:       Appearance: He is ill-appearing.      Comments: Thin male; intubated   HENT:      Mouth/Throat:      Mouth: Mucous membranes are moist.   Eyes:      Pupils: Pupils are equal, round, and reactive to light.   Cardiovascular:      Rate and Rhythm: Normal rate and regular rhythm.   Pulmonary:      Effort: Pulmonary effort is normal.      Breath sounds: Rhonchi present.   Abdominal:      General:  There is no distension.      Palpations: Abdomen is soft.      Tenderness: There is no abdominal tenderness.      Comments: Jtube noted; ileostomy noted   Genitourinary:     Comments: Rojas with yellow urine  Skin:     General: Skin is warm.      Comments: Wounds to sacrum; groin area              CRANIAL NERVES     CN III, IV, VI   Pupils are equal, round, and reactive to light.       Significant Labs: All pertinent labs within the past 24 hours have been reviewed.  Recent Lab Results  (Last 5 results in the past 24 hours)        12/18/24  0519   12/18/24  0418   12/18/24  0418   12/18/24  0132   12/18/24  0009        Base Deficit -2.3  Comment: Value below reference range       -3.0  Comment: Value below reference range         Performed By: Hardeep Meier         Specimen source Arterial       Arterial         AC 22       22         Albumin     <0.6           ALP     439           ALT     10           Anion Gap     5           AST     24           Baso #     Test Not Performed  Comment: CORRECTED RESULT; previously reported as 0.08 on 12/18/2024 at 04:35.  [C]           Basophil %     1.0  Comment: CORRECTED RESULT; previously reported as 0.3 on 12/18/2024 at 04:35.  [C]           BILIRUBIN TOTAL     0.4  Comment: For infants and newborns, interpretation of results should be based  on gestational age, weight and in agreement with clinical  observations.    Premature Infant recommended reference ranges:  Up to 24 hours.............<8.0 mg/dL  Up to 48 hours............<12.0 mg/dL  3-5 days..................<15.0 mg/dL  6-29 days.................<15.0 mg/dL    For patients on Eltrombopag therapy, use of Dimension Norris TBIL is   not   recommended.             BUN     13           Calcium     9.1           Chloride     104           CO2     25           Creatinine     1.3           Differential Method     Manual  Comment: CORRECTED RESULT; previously reported as Automated on 12/18/2024 at   04:35.    [C]            eGFR     >60.0           Eos #     Test Not Performed  Comment: CORRECTED RESULT; previously reported as 0.8 on 12/18/2024 at 04:35.  [C]           Eos %     2.0  Comment: CORRECTED RESULT; previously reported as 3.0 on 12/18/2024 at 04:35.  [C]           FiO2 30.0       40.0         Glucose     199           Gran # (ANC)     Test Not Performed  Comment: CORRECTED RESULT; previously reported as 25.6 on 12/18/2024 at 04:35.  [C]           Gran %     91.0  Comment: CORRECTED RESULT; previously reported as 89.9 on 12/18/2024 at 04:35.  [C]           Hematocrit     24.9           Hemoglobin     7.9           Immature Grans (Abs)     Test Not Performed  Comment: Mild elevation in immature granulocytes is non specific and   can be seen in a variety of conditions including stress response,   acute inflammation, trauma and pregnancy. Correlation with other   laboratory and clinical findings is essential.  CORRECTED RESULT; previously reported as 0.21 on 12/18/2024 at 04:35.    [C]           Immature Granulocytes     Test Not Performed  Comment: CORRECTED RESULT; previously reported as 0.7 on 12/18/2024 at 04:35.  [C]           Lymph #     Test Not Performed  Comment: CORRECTED RESULT; previously reported as 0.9 on 12/18/2024 at 04:35.  [C]           Lymph %     3.0  Comment: CORRECTED RESULT; previously reported as 3.3 on 12/18/2024 at 04:35.  [C]           Magnesium      1.7           MCH     27.7           MCHC     31.7           MCV     87           MODIFIED CELSO'S TEST N/A       N/A         Mono #     Test Not Performed  Comment: CORRECTED RESULT; previously reported as 0.8 on 12/18/2024 at 04:35.  [C]           Mono %     3.0  Comment: CORRECTED RESULT; previously reported as 2.8 on 12/18/2024 at 04:35.  [C]           MPV     9.2           nRBC     0           O2DEVICE Ventilator       Ventilator         PEAK FLOW 60.0       60.0         PEEP 5.0       5.0         Platelet Estimate     Appears normal            Platelet Count     343           POC HCO3 22.7  Comment: Value below reference range       22.2  Comment: Value below reference range         POC PCO2 34.6  Comment: Value below reference range       36.0         POC PH 7.416       7.393         POC PO2 86.9       121  Comment: Value above reference range         POC SATURATED O2 97.9       99.5         POC Temp 37.0       37.0         POCT Glucose   215       226       Potassium     3.9           PROTEIN TOTAL     5.8           RBC     2.85           RDW     16.0           Sodium     134           Vt 400       400         WBC     28.42                                   [C] - Corrected Result               Significant Imaging: I have reviewed all pertinent imaging results/findings within the past 24 hours.  I have reviewed and interpreted all pertinent imaging results/findings within the past 24 hours.

## 2024-12-18 NOTE — ASSESSMENT & PLAN NOTE
Await new urine culture =- currently on zosyn/vanc  12/17 change to merrem/vanc  12/18 has esbl klebsiella - cont merrem

## 2024-12-18 NOTE — ASSESSMENT & PLAN NOTE
Patient has Abnormal Magnesium: hypomagnesemia. Will continue to monitor electrolytes closely. Will replace the affected electrolytes and repeat labs to be done after interventions completed. The patient's magnesium results have been reviewed and are listed below.  Recent Labs   Lab 12/18/24  0418   MG 1.7      12/17 mag imrpoving - repalce mag today

## 2024-12-18 NOTE — ASSESSMENT & PLAN NOTE
Anemia is likely due to chronic infections Most recent hemoglobin and hematocrit are listed below.  Recent Labs     12/16/24  0356 12/17/24  0404 12/18/24  0418   HGB 6.5* 8.5* 7.9*   HCT 21.2* 26.4* 24.9*       Plan  - Monitor serial CBC: Daily  - Transfuse PRBC if patient becomes hemodynamically unstable, symptomatic or H/H drops below 7/21.  - Patient has not received any PRBC transfusions to date  - Patient's anemia is currently worsening. Will adjust treatment as follows: 2uprbcs today  12/17 h/h Improved

## 2024-12-18 NOTE — ASSESSMENT & PLAN NOTE
"This patient does have evidence of infective focus  My overall impression is septic shock due to MAP < 65 and SBP < 90.  Source: Respiratory and Urinary Tract  Antibiotics given-   Antibiotics (72h ago, onward)      Start     Stop Route Frequency Ordered    12/17/24 0900  meropenem injection 1 g         -- IV Every 8 hours (non-standard times) 12/17/24 0757    12/15/24 1230  mupirocin 2 % ointment         12/20/24 0859 Nasl 2 times daily 12/15/24 1118    12/15/24 1209  vancomycin - pharmacy to dose  (vancomycin IVPB (PEDS and ADULTS))        Placed in "And" Linked Group    -- IV pharmacy to manage frequency 12/15/24 1201          Latest lactate reviewed-  Recent Labs   Lab 12/15/24  1306   LACTATE 2.3*       Organ dysfunction indicated by Acute respiratory failure and Encephalopathy    Fluid challenge Ideal Body Weight- The patient's ideal body weight is Ideal body weight: 66.1 kg (145 lb 11.6 oz) which will be used to calculate fluid bolus of 30 ml/kg for treatment of septic shock.      Post- resuscitation assessment Yes Perfusion exam was performed within 6 hours of septic shock presentation after bolus shows Inadequate tissue perfusion assessed by non-invasive monitoring       Will Start Pressors- Levophed for MAP of 65  Source control achieved by: iv zosym. Vanc, ivfs  12/17 wean levophed as tolerates- abxs changed to merrem, cotn vanc - await culture final reports  12/18 wbc slightly imrpoved - cont iv abxs- await final sputum culture - on merrem fro esbl kleb in urine  "

## 2024-12-18 NOTE — ASSESSMENT & PLAN NOTE
Patient's FSGs are uncontrolled due to hyperglycemia on current medication regimen.  Last A1c reviewed-   Lab Results   Component Value Date    HGBA1C 6.6 (H) 12/16/2024     Most recent fingerstick glucose reviewed-   Recent Labs   Lab 12/17/24 2000 12/18/24  0009 12/18/24  0418 12/18/24  0848   POCTGLUCOSE 212* 226* 215* 179*       Current correctional scale  Low  Maintain anti-hyperglycemic dose as follows-   Antihyperglycemics (From admission, onward)    Start     Stop Route Frequency Ordered    12/16/24 0952  insulin aspart U-100 pen 0-5 Units         -- SubQ Every 4 hours PRN 12/16/24 0853        Hold Oral hypoglycemics while patient is in the hospital.  12/17 A1c improved to 6.6%

## 2024-12-18 NOTE — EICU
Intervention Initiated From:  COR / EICU    Sandy intervened regarding:  Rounding (Video assessment)    Nurse Notified:  No    Doctor Notified:  No    Comments: Video rounding completed. Orally intubated, 16/400/40%/5. Levo infusing as per MAR. VSS. RN at bedside.

## 2024-12-18 NOTE — PROGRESS NOTES
Agitated, on propofol drip   RR in 40s-50s  - add Fentanyl drip & IV Versed PRN       12:33 AM   Will change vent from SIMV to ACVC 22/400/40/5+   - get CXR now   - ABG after 1 hour

## 2024-12-19 PROBLEM — E87.6 HYPOKALEMIA: Status: ACTIVE | Noted: 2024-12-19

## 2024-12-19 NOTE — PROGRESS NOTES
Pharmacokinetic Assessment Follow Up: IV Vancomycin    Vancomycin serum concentration assessment(s):    The random level was drawn correctly and can be used to guide therapy at this time. The measurement is within the desired definitive target range of 10 to 20 mcg/mL.    Vancomycin Regimen Plan:  Administer 750mg dose    Re-dose when the random level is less than 20 mcg/mL, next level to be drawn at 00:00 on 12/20    Drug levels (last 3 results):  Recent Labs   Lab Result Units 12/16/24  2046 12/17/24  0857 12/17/24  2105 12/18/24  2310   Vancomycin, Random ug/mL  --  23.5 19.3 19.2   Vancomycin-Trough ug/mL 31.8*  --   --   --        Pharmacy will continue to follow and monitor vancomycin.    Please contact pharmacy for questions regarding this assessment.    Thank you for the consult,   Tima Morris       Patient brief summary:  Carlos Chahal is a 34 y.o. male initiated on antimicrobial therapy with IV Vancomycin for treatment of lower respiratory infection    The patient's current regimen is pulse dosing    Drug Allergies:   Review of patient's allergies indicates:   Allergen Reactions    Guaifenesin Hives    Codeine Itching       Actual Body Weight:   68.5kg    Renal Function:   Estimated Creatinine Clearance: 75.6 mL/min (based on SCr of 1.3 mg/dL).,     Dialysis Method (if applicable):  N/A    CBC (last 72 hours):  Recent Labs   Lab Result Units 12/16/24  0356 12/16/24  0933 12/17/24  0404 12/18/24  0418   WBC K/uL 30.00*  --  34.55* 28.42*   Hemoglobin g/dL 6.5*  --  8.5* 7.9*   Hemoglobin A1C %  --  6.6*  --   --    Hematocrit % 21.2*  --  26.4* 24.9*   Platelets K/uL 563*  --  450 343   Gran % % 87.2*  --  88.4* 91.0*   Lymph % % 6.5*  --  3.8* 3.0*   Mono % % 3.2*  --  3.1* 3.0*   Eosinophil % % 1.7  --  2.2 2.0   Basophil % % 0.2  --  0.4 1.0   Differential Method  Automated  --  Automated Manual       Metabolic Panel (last 72 hours):  Recent Labs   Lab Result Units 12/16/24  0356 12/17/24  0405  12/18/24  0418   Sodium mmol/L 138 137 134*   Potassium mmol/L 4.5 4.2 3.9   Chloride mmol/L 104 104 104   CO2 mmol/L 29 24 25   Glucose mg/dL 229* 229* 199*   BUN mg/dL 16 14 13   Creatinine mg/dL 1.5* 1.4 1.3   Albumin g/dL 0.6* 0.6* <0.6*   Total Bilirubin mg/dL 0.6 0.6 0.4   Alkaline Phosphatase U/L 412* 395* 439*   AST U/L 38 33 24   ALT U/L 17 16 10   Magnesium mg/dL 1.3* 1.6 1.7       Vancomycin Administrations:  vancomycin given in the last 96 hours                     vancomycin 750 mg in 0.9% NaCl 250 mL IVPB (admixture device) (mg) 750 mg New Bag 12/19/24 0028    vancomycin (VANCOCIN) 1,000 mg in 0.9% NaCl 250 mL IVPB (admixture device) ()  Restarted 12/17/24 2259     1,000 mg New Bag  2256    vancomycin (VANCOCIN) 1,000 mg in 0.9% NaCl 250 mL IVPB (admixture device) (mg) 1,000 mg New Bag 12/16/24 0901     1,000 mg New Bag 12/15/24 2118    vancomycin 1750 mg in 0.9% sodium chloride 500 mL IVPB ()  Restarted 12/15/24 0941     1,750 mg New Bag  0938                    Microbiologic Results:  Microbiology Results (last 7 days)       Procedure Component Value Units Date/Time    Blood culture x two cultures. Draw prior to antibiotics. [[id number]] Collected: 12/15/24 0942    Order Status: Completed Specimen: Blood from Peripheral, Antecubital, Left Updated: 12/18/24 1612     Blood Culture, Routine No Growth to date      No Growth to date      No Growth to date      No Growth to date    Narrative:      Aerobic and anaerobic    Blood culture x two cultures. Draw prior to antibiotics. [[id number]] Collected: 12/15/24 0923    Order Status: Completed Specimen: Blood from Peripheral, Forearm, Right Updated: 12/18/24 1612     Blood Culture, Routine No Growth to date      No Growth to date      No Growth to date      No Growth to date    Narrative:      Aerobic and anaerobic    Culture, Respiratory with Gram Stain [[id number]]  (Abnormal)  (Susceptibility) Collected: 12/15/24 0935    Order Status: Completed Specimen:  Respiratory from Endotracheal Aspirate Updated: 12/18/24 1209     Respiratory Culture GRAM NEGATIVE FLORA  Many  Identification and susceptibility pending        PROTEUS MIRABILIS  Few       Gram Stain (Respiratory) Few WBC's     Gram Stain (Respiratory) Many Gram positive rods     Gram Stain (Respiratory) Few Gram negative cocci    Urine culture [[id number]]  (Abnormal)  (Susceptibility) Collected: 12/15/24 0941    Order Status: Completed Specimen: Urine Updated: 12/17/24 2343     Urine Culture, Routine KLEBSIELLA PNEUMONIAE ESBL  >100,000 cfu/ml      Narrative:      Preferred Collection Type->Urine, Clean Catch  Specimen Source->Urine    Culture, Respiratory with Gram Stain [[id number]]     Order Status: Canceled Specimen: Respiratory            Normal

## 2024-12-19 NOTE — PLAN OF CARE
Pt remains intubated, sedated, and on levophed. Levophed able to be weaned down slightly to 0.05mcg/kg/min. Pt's tube feeding residuals were: 215mls @ 0915, 180mls @ 1115, 215mls @ 1352, 135mls @ 1700. Feeds held this AM due to 215mls of residuals.Tube feeds restarted at 1710 @ 15ml/hr. Wound care done per orders. Antibiotic continued. Mag and Potassium replacement given. 1x dose of lasix given, urine output =2150. Liquid stool from ileostomy.

## 2024-12-19 NOTE — PROGRESS NOTES
[facility] - Intensive Care  Adult Nutrition  Progress Note    SUMMARY       Recommendations  1. Rec'd Continuous EN: Glucerna 1.5 @ 40mL/r increasing by 5 mL q6hrs to goal rate of 55mL/hr with a continuous 40mL FWF to provide 1980kcal (94% EEN), 109g of protein (100% EPN), and 1962mL FW.   2. Néstor BID via PEG tube to promote wound healing and to provide additional nutrition.           - Do not mix Néstor with formula in a tube-feeding bag         - Pour one packet of Néstor in a clean, small container for mixing.           - Add 4 fl oz (120 mL) water at room temperature.           - Mix well with disposable spoon or tongue blade until all particles are completely hydrated.           - Verify correct tube placement.           - Flush feeding tube with 30 mL water.           - Administer Néstor through feeding tube using a 60-mL or larger syringe.           - Flush with an additional 30 mL water.   3. RD to follow and make rec's accordingly.  Goals:   1. Pt will be started on EN by next RD follow up.   2. Pt will reach TF goal rate within 48hrs of initiation.  Nutrition Goal Status: goal met, progressing towards goal  Communication of RD Recs: other (Documented in POC)    Assessment and Plan     Nutrition Problem  Inadequate oral intake     Related to (etiology):   NPO/Intubation Status     Signs and Symptoms (as evidenced by):   0% PO intake      Interventions/Recommendations (treatment strategy):  1. Rec'd Continuous EN: Glucerna 1.5 @ 40mL/r increasing by 5 mL q6hrs to goal rate of 55mL/hr with a continuous 40mL FWF to provide 1980kcal (94% EEN), 109g of protein (100% EPN), and 1962mL FW.   2. Néstor BID via PEG tube to promote wound healing and to provide additional nutrition.           - Do not mix Néstor with formula in a tube-feeding bag         - Pour one packet of Néstor in a clean, small container for mixing.           - Add 4 fl oz (120 mL) water at room temperature.           - Mix well with disposable spoon or  "tongue blade until all particles are completely hydrated.           - Verify correct tube placement.           - Flush feeding tube with 30 mL water.           -Administer Néstor through feeding tube using a 60-mL or larger syringe.           - Flush with an additional 30 mL water.   3. RD to follow and make rec's accordingly.     Nutrition Diagnosis Status:   Continues     Malnutrition Assessment  NFPE not warranted at this time. RD to continue to monitor for signs and symptoms of malnutrition.     Nutrition Related Social Determinants of Health:  None identified at this time.    Reason for Assessment    Reason For Assessment: RD follow-up  Diagnosis: infection/sepsis  General Information Comments: Followed up on pt. Pt TF @ 30mL/hr. Continue to progress to goal rate as tolerated.  Nutrition Discharge Planning: TBD as care progresses    Nutrition Risk Screen    Nutrition Risk Screen: tube feeding or parenteral nutrition    Nutrition/Diet History    Patient Reported Diet/Restrictions/Preferences: no oral intake  Spiritual, Cultural Beliefs, Religion Practices, Values that Affect Care: no  Food Allergies: NKFA  Factors Affecting Nutritional Intake: NPO  Nutrition Support Formula Prior to Admit: Glucerna 1.5    Anthropometrics    Temp: 98.9 °F (37.2 °C)  Height: 5' 7" (170.2 cm)  Height (inches): 67 in  Weight Method: Bed Scale  Weight: 68.5 kg (151 lb 0.2 oz)  Weight (lb): 151.02 lb  Ideal Body Weight (IBW), Male: 148 lb  % Ideal Body Weight, Male (lb): 102.04 %  BMI (Calculated): 23.6  BMI Grade: 18.5-24.9 - normal    Lab/Procedures/Meds    Pertinent Labs Reviewed: reviewed  Pertinent Medications Reviewed: reviewed    Estimated/Assessed Needs    Weight Used For Calorie Calculations: 68.5 kg (151 lb 0.2 oz)  Energy Calorie Requirements (kcal): 2096  Energy Need Method: [facility]  Protein Requirements: 109-137 (1.6-2.0g/kg)  Weight Used For Protein Calculations: 68.5 kg (151 lb 0.2 oz)  Fluid Requirements (mL): 2096 " (1mL/kcal)  Estimated Fluid Requirement Method: RDA Method  RDA Method (mL): 2096    Nutrition Prescription Ordered    Current Diet Order: NPO  Current Nutrition Support Formula Ordered: Glucerna 1.5  Current Nutrition Support Rate Ordered: 30 (ml)  Current Nutrition Support Frequency Ordered: Continuous  Oral Nutrition Supplement: None    Evaluation of Received Nutrient/Fluid Intake    Enteral Calories (kcal): 1080  Enteral Protein (gm): 59  Enteral (Free Water) Fluid (mL): 546  % Kcal Needs: 51%  % Protein Needs: 54%  I/O: + 233.9  Energy Calories Required: not meeting needs  Protein Required: not meeting needs  Tolerance: tolerating  % Intake of Estimated Energy Needs: 25 - 50 %  % Meal Intake: NPO    Nutrition Risk    Level of Risk/Frequency of Follow-up: moderate     Monitor and Evaluation    Food and Nutrient Intake: enteral nutrition intake  Food and Nutrient Adminstration: enteral and parenteral nutrition administration  Knowledge/Beliefs/Attitudes: food and nutrition knowledge/skill, beliefs and attitudes  Physical Activity and Function: nutrition-related ADLs and IADLs  Anthropometric Measurements: height/length, weight, weight change, body mass index  Biochemical Data, Medical Tests and Procedures: electrolyte and renal panel, gastrointestinal profile, glucose/endocrine profile, lipid profile, inflammatory profile  Nutrition-Focused Physical Findings: overall appearance     Nutrition Follow-Up    RD Follow-up?: Yes

## 2024-12-19 NOTE — NURSING
Pt remains on the vent. Settings unchanged. Levo infusing, attempted to decrease, MAP drop to 61. Resumed at 0.1 mcg/kg/min. Diprivan infusing. Improvement with tolerating feeds. Residual decreased. Glucose monitoring. Coverage as needed. Dark, cream colored thick drainage from the penis meatus. Rojas care. 300 ml of loose and mucoid stool from ileostomy. Wound care as ordered. Scant amount blood during treatment. Edema to the upper and lower ext. Boot to the feet. Repositioned. Continue to observe.

## 2024-12-19 NOTE — PROGRESS NOTES
[facility] Medicine  Progress Note    Patient Name: Carlos Chahal  MRN: [id number]  Patient Class: IP- Inpatient   Admission Date: 12/15/2024  Length of Stay: 4 days  Attending Physician: Kim Diamond MD  Primary Care Provider: Jose Francisco Klein MD        Subjective     Principal Problem:Sepsis        HPI:  Carlos Chahal is a 34 y.o. male who presents to the ED from nursing home for altered mental status and difficulty breathing.  Patient is found to be hypoxic.  He has a history of anoxic brain injury     This am, pt is on ventilator and levophed at 0.25mcg and ivfs at 75ml/hr.      Spoke with father this am on condition of pt    Overview/Hospital Course:  12/17 ND became more awake yesterday with wound changes - low dose propofol added for pt - able to wean levophed to 0.15mcg.  did tolerate tf last night - check kub this am  12/18 ND pt had to be changed back to ac control last nigth after becoming tachypneic and had low tv.  Tolerating ac mode.  Levophed down to 0.1 mcg  12/19 ND pt toleratign vent - will wean RR today - cont to slowly wean levophed as tolerates - wbc slowly imrpoving - tolerating low dose tfs - will cont to increase tfs as tolerates    Past Medical History:   Diagnosis Date    Anoxic brain injury     Bipolar disorder, unspecified     Diabetes insipidus     Diabetes mellitus     Dysphagia, unspecified     Chucky gangrene     Gangrene     GERD (gastroesophageal reflux disease)     Hereditary and idiopathic neuropathy     Nontraumatic intracerebral hemorrhage, unspecified     Other muscle spasm     Other psychoactive substance abuse with withdrawal, uncomplicated     Skin transplant status     Thyroid disease        Past Surgical History:   Procedure Laterality Date    COLOSTOMY      INSERTION, PEG TUBE         Review of patient's allergies indicates:   Allergen Reactions    Guaifenesin Hives    Codeine Itching       No current facility-administered medications on  file prior to encounter.     Current Outpatient Medications on File Prior to Encounter   Medication Sig    acetaminophen (TYLENOL) 325 MG tablet 325 mg by Per G Tube route every 6 (six) hours as needed.    ascorbic acid, vitamin C, (VITAMIN C) 500 MG tablet 500 mg by Per G Tube route once daily.    aspirin 81 MG Chew Take 81 mg by mouth once daily.    diazePAM (VALIUM) 5 MG tablet 2 tablets (10 mg total) by Per G Tube route 2 (two) times a day.    doxycycline (VIBRA-TABS) 100 MG tablet 1 tablet (100 mg total) by Per G Tube route every 12 (twelve) hours.    enoxaparin (LOVENOX) 40 mg/0.4 mL Syrg Inject 40 mg into the skin.    famotidine (PEPCID) 40 MG tablet 40 mg by Per G Tube route once daily.    FLUoxetine 20 MG capsule 20 mg by Per G Tube route once daily.    gabapentin (NEURONTIN) 250 mg/5 mL solution 2.5 mLs (125 mg total) by Per G Tube route every 12 (twelve) hours.    insulin aspart U-100 (NOVOLOG FLEXPEN U-100 INSULIN) 100 unit/mL (3 mL) InPn pen Use per sliding scale insulin    levothyroxine (SYNTHROID) 150 MCG tablet 1 tablet (150 mcg total) by Per G Tube route before breakfast.    lipase-protease-amylase (ZENPEP) 20,000-63,000- 84,000 unit capsule Take 1 capsule by mouth 3 (three) times daily.    lurasidone (LATUDA) 40 mg Tab tablet Take 40 mg by mouth once daily.    melatonin (MELATIN) 3 mg tablet 2 tablets (6 mg total) by Per G Tube route nightly as needed for Insomnia.    metoclopramide HCl (REGLAN) 5 MG tablet 1 tablet (5 mg total) by Per G Tube route 3 (three) times daily before meals.    oxyCODONE (ROXICODONE) 10 mg Tab immediate release tablet 1 tablet (10 mg total) by Per G Tube route every 6 (six) hours as needed for Pain.    polyethylene glycol (GLYCOLAX) 17 gram PwPk 17 g by Per G Tube route once daily.    rosuvastatin (CRESTOR) 10 MG tablet Take 10 mg by mouth once daily.    sodium hypochlorite 0.125% (DAKIN'S SOLUTION) external solution Apply topically once daily. Apply once daily to groin  area and BID to sacral     Family History    None       Tobacco Use    Smoking status: Unknown    Smokeless tobacco: Not on file   Substance and Sexual Activity    Alcohol use: Not on file    Drug use: Not on file    Sexual activity: Not on file     Review of Systems   Unable to perform ROS: Acuity of condition     Objective:     Vital Signs (Most Recent):  Temp: 98.9 °F (37.2 °C) (12/19/24 1101)  Pulse: 70 (12/19/24 1215)  Resp: 19 (12/19/24 1215)  BP: 90/64 (12/19/24 1215)  SpO2: 100 % (12/19/24 1215) Vital Signs (24h Range):  Temp:  [97.1 °F (36.2 °C)-99.1 °F (37.3 °C)] 98.9 °F (37.2 °C)  Pulse:  [] 70  Resp:  [18-22] 19  SpO2:  [100 %] 100 %  BP: ()/(56-84) 90/64     Weight: 68.5 kg (151 lb 0.2 oz)  Body mass index is 23.65 kg/m².     Physical Exam  Constitutional:       Appearance: He is ill-appearing.      Comments: Thin male; intubated   HENT:      Mouth/Throat:      Mouth: Mucous membranes are moist.   Eyes:      Pupils: Pupils are equal, round, and reactive to light.   Cardiovascular:      Rate and Rhythm: Normal rate and regular rhythm.   Pulmonary:      Effort: Pulmonary effort is normal.      Breath sounds: Rhonchi present.   Abdominal:      General: There is no distension.      Palpations: Abdomen is soft.      Tenderness: There is no abdominal tenderness.      Comments: Jtube noted; ileostomy noted   Genitourinary:     Comments: Rojas with yellow urine  Skin:     General: Skin is warm.      Comments: Wounds to sacrum; groin area              CRANIAL NERVES     CN III, IV, VI   Pupils are equal, round, and reactive to light.       Significant Labs: All pertinent labs within the past 24 hours have been reviewed.  Recent Lab Results  (Last 5 results in the past 24 hours)        12/19/24  1106   12/19/24  0844   12/19/24  0546   12/19/24  0338   12/19/24  0336        Base Deficit     0.9           Performed By:     Geradro           Provider Notified:     NADIRA MANZANO           Specimen source      Arterial           Notified Time     12/19/2024 05:48           Notified By     Carrillo           Albumin       <0.6         ALP       503         ALT       8         Anion Gap       4         AST       25         Baso #       0.07         Basophil %       0.3         BILIRUBIN TOTAL       0.4  Comment: For infants and newborns, interpretation of results should be based  on gestational age, weight and in agreement with clinical  observations.    Premature Infant recommended reference ranges:  Up to 24 hours.............<8.0 mg/dL  Up to 48 hours............<12.0 mg/dL  3-5 days..................<15.0 mg/dL  6-29 days.................<15.0 mg/dL    For patients on Eltrombopag therapy, use of Dimension [place] TBIL is   not   recommended.           BUN       12         Calcium       9.1         Chloride       106         CO2       27         Creatinine       1.2         Differential Method       Automated         eGFR       >60.0         Eos #       0.8         Eos %       3.5         FiO2     30.0           Glucose       208         Gran # (ANC)       19.8         Gran %       86.9         Hematocrit       24.5         Hemoglobin       7.8         Immature Grans (Abs)       0.17  Comment: Mild elevation in immature granulocytes is non specific and   can be seen in a variety of conditions including stress response,   acute inflammation, trauma and pregnancy. Correlation with other   laboratory and clinical findings is essential.           Immature Granulocytes       0.7         Lymph #       1.1         Lymph %       4.7         Magnesium        1.5         MCH       27.4         MCHC       31.8         MCV       86         MODIFIED CELSO'S TEST     POSITIVE           Mono #       0.9         Mono %       3.9         MPV       9.7         nRBC       0         O2DEVICE     Ventilator           PEAK FLOW     60.0           PEEP     5.0           Platelet Count       331         POC HCO3     25.1           POC PCO2      "36.7           POC PH     7.443           POC PO2     60.9  Comment: Value below reference range           POC SATURATED O2     85.7  Comment: Carrillo notified RN NATACHA at 12/19/2024 05:48   read back  Value below critical limit             POC Temp     37.0           POCT Glucose 206   244       194       Potassium       3.3         PROTEIN TOTAL       6.0         RBC       2.85         RDW       15.9         Sodium       137         Vt     400           WBC       22.84                                Significant Imaging: I have reviewed all pertinent imaging results/findings within the past 24 hours.  I have reviewed and interpreted all pertinent imaging results/findings within the past 24 hours.    Assessment and Plan     * Sepsis  This patient does have evidence of infective focus  My overall impression is septic shock due to MAP < 65 and SBP < 90.  Source: Respiratory and Urinary Tract  Antibiotics given-   Antibiotics (72h ago, onward)      Start     Stop Route Frequency Ordered    12/17/24 0900  meropenem injection 1 g         -- IV Every 8 hours (non-standard times) 12/17/24 0757    12/15/24 1230  mupirocin 2 % ointment         12/20/24 0859 Nasl 2 times daily 12/15/24 1118          Latest lactate reviewed-  No results for input(s): "LACTATE", "POCLAC" in the last 72 hours.    Organ dysfunction indicated by Acute respiratory failure and Encephalopathy    Fluid challenge Ideal Body Weight- The patient's ideal body weight is Ideal body weight: 66.1 kg (145 lb 11.6 oz) which will be used to calculate fluid bolus of 30 ml/kg for treatment of septic shock.      Post- resuscitation assessment Yes Perfusion exam was performed within 6 hours of septic shock presentation after bolus shows Inadequate tissue perfusion assessed by non-invasive monitoring       Will Start Pressors- Levophed for MAP of 65  Source control achieved by: iv zosym. Vanc, ivfs  12/17 wean levophed as tolerates- abxs changed to merrem, cotn vanc - " await culture final reports  12/18 wbc slightly imrpoved - cont iv abxs- await final sputum culture - on merrem fro esbl kleb in urine  12/19 cont iv merrem for esbl kleb in urine and proteus in sputum - dc vanc    Hypokalemia  Patient's most recent potassium results are listed below.   Recent Labs     12/17/24  0404 12/18/24  0418 12/19/24  0338   K 4.2 3.9 3.3*     Plan  - Replete potassium per protocol  - Monitor potassium Daily  - Patient's hypokalemia is worsening. Will replete with kcl      Severe malnutrition  Nutrition consulted. Most recent weight and BMI monitored-     Measurements:  Wt Readings from Last 1 Encounters:   12/16/24 68.5 kg (151 lb 0.2 oz)   Body mass index is 23.65 kg/m².    Patient has been screened and assessed by RD.    Malnutrition Type:  Context:    Level:      Malnutrition Characteristic Summary:       Interventions/Recommendations (treatment strategy):  1. Rec'd Continuous EN: Glucerna 1.5 @ 40mL/r increasing by 5 mL q6hrs to goal rate of 55mL/hr with a continuous 40mL FWF to provide 1980kcal (94% EEN), 109g of protein (100% EPN), and 1962mL FW. 2. Néstor BID via PEG tube to promote wound healing and to provide additional nutrition.         - Do not mix Néstor with formula in a tube-feeding bag       - Pour one packet of Néstor in a clean, small container for mixing.         - Add 4 fl oz (120 mL) water at room temperature.         - Mix well with disposable spoon or tongue blade until all particles are completely hydrated.         - Verify correct tube placement.         - Flush feeding tube with 30 mL water.         -Administer Néstor through feeding tube using a 60-mL or larger syringe.         - Flush with an additional 30 mL water. 3. RD to follow and make rec's accordingly.    12/18 restart tfs today at 1ml/hr to see if can tolerate feeds  12/19 increase tfs as tolerates    Hypomagnesemia  Patient has Abnormal Magnesium: hypomagnesemia. Will continue to monitor electrolytes closely.  Will replace the affected electrolytes and repeat labs to be done after interventions completed. The patient's magnesium results have been reviewed and are listed below.  Recent Labs   Lab 12/19/24  0338   MG 1.5*      12/17 mag imrpoving - repalce mag today  12/19 replace mag today    Pneumonia of right lower lobe due to infectious organism  Patient has a diagnosis of pneumonia. The cause of the pneumonia is suspected to be bacterial in etiology but organism is not known. The pneumonia is stable. The patient has the following signs/symptoms of pneumonia: persistent hypoxia  and sputum production. The patient does have a current oxygen requirement and the patient does not have a home oxygen requirement. I have reviewed the pertinent imaging. The following cultures have been collected: Blood cultures and Sputum culture The culture results are listed below.     Current antimicrobial regimen consists of the antibiotics listed below. Will monitor patient closely and continue current treatment plan unchanged.    Antibiotics (From admission, onward)      Start     Stop Route Frequency Ordered    12/17/24 0900  meropenem injection 1 g         -- IV Every 8 hours (non-standard times) 12/17/24 0757    12/15/24 1230  mupirocin 2 % ointment         12/20/24 0859 Nasl 2 times daily 12/15/24 1118            Microbiology Results (last 7 days)       Procedure Component Value Units Date/Time    Culture, Respiratory with Gram Stain [[id number]]  (Abnormal)  (Susceptibility) Collected: 12/15/24 0935    Order Status: Completed Specimen: Respiratory from Endotracheal Aspirate Updated: 12/19/24 1158     Respiratory Culture GRAM NEGATIVE FLORA  Many  Identification and susceptibility pending        PROTEUS MIRABILIS  Few        KLEBSIELLA PNEUMONIAE   Few  Susceptibility pending       Gram Stain (Respiratory) Few WBC's     Gram Stain (Respiratory) Many Gram positive rods     Gram Stain (Respiratory) Few Gram negative cocci    Blood  culture x two cultures. Draw prior to antibiotics. [[id number]] Collected: 12/15/24 0942    Order Status: Completed Specimen: Blood from Peripheral, Antecubital, Left Updated: 12/18/24 1612     Blood Culture, Routine No Growth to date      No Growth to date      No Growth to date      No Growth to date    Narrative:      Aerobic and anaerobic    Blood culture x two cultures. Draw prior to antibiotics. [[id number]] Collected: 12/15/24 0923    Order Status: Completed Specimen: Blood from Peripheral, Forearm, Right Updated: 12/18/24 1612     Blood Culture, Routine No Growth to date      No Growth to date      No Growth to date      No Growth to date    Narrative:      Aerobic and anaerobic    Urine culture [[id number]]  (Abnormal)  (Susceptibility) Collected: 12/15/24 0941    Order Status: Completed Specimen: Urine Updated: 12/17/24 2343     Urine Culture, Routine KLEBSIELLA PNEUMONIAE ESBL  >100,000 cfu/ml      Narrative:      Preferred Collection Type->Urine, Clean Catch  Specimen Source->Urine    Culture, Respiratory with Gram Stain [[id number]]     Order Status: Canceled Specimen: Respiratory         12/17 dc zosyn with recent esbl kleb in urine - will change to merrem - cont vanc until final cultures obtained - change vent to simv; add nebs  12/18 cotn merrem and vanc - await final sputum cutlure - cont vent on ac control; nebs - wbc imrpoved slightly; lasix 20mg iv for edema today  12/19  dc vanc - proteus grwoign from sputum - cont merrem and nebs; lasix today - wean rr on vent    Sacral wound, sequela  Local wound care with dakins bid  Iv abxs      Open wound of groin  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  Recent Labs     12/17/24  0404 12/18/24  0418 12/19/24  0338   HGB 8.5* 7.9* 7.8*   HCT 26.4* 24.9* 24.5*       Plan  - Monitor serial CBC: Daily  -  Transfuse PRBC if patient becomes hemodynamically unstable, symptomatic or H/H drops below 7/21.  - Patient has not received any PRBC transfusions to date  - Patient's anemia is currently worsening. Will adjust treatment as follows: 2uprbcs today  12/17 h/h Improved    History of anoxic brain injury        Urinary tract infection associated with indwelling urethral catheter  Await new urine culture =- currently on zosyn/vanc  12/17 change to merrem/vanc  12/18 has esbl klebsiella - cont merrem    Type 1 diabetes  Patient's FSGs are uncontrolled due to hyperglycemia on current medication regimen.  Last A1c reviewed-   Lab Results   Component Value Date    HGBA1C 6.6 (H) 12/16/2024     Most recent fingerstick glucose reviewed-   Recent Labs   Lab 12/18/24  2333 12/19/24  0336 12/19/24  0844 12/19/24  1106   POCTGLUCOSE 238* 194* 244* 206*       Current correctional scale  Low  Maintain anti-hyperglycemic dose as follows-   Antihyperglycemics (From admission, onward)      Start     Stop Route Frequency Ordered    12/16/24 0952  insulin aspart U-100 pen 0-5 Units         -- SubQ Every 4 hours PRN 12/16/24 0853          Hold Oral hypoglycemics while patient is in the hospital.  12/17 A1c improved to 6.6%      VTE Risk Mitigation (From admission, onward)           Ordered     enoxaparin injection 40 mg  Daily         12/16/24 0815     IP VTE LOW RISK PATIENT  Once         12/15/24 1201     Place sequential compression device  Until discontinued         12/15/24 1201                    Discharge Planning   JOSE MANUEL:      Code Status: Full Code   Medical Readiness for Discharge Date:   Discharge Plan A: Return to nursing home                        Kim Diamond MD  Department of Hospital Medicine   [facility]

## 2024-12-19 NOTE — SUBJECTIVE & OBJECTIVE
Past Medical History:   Diagnosis Date    Anoxic brain injury     Bipolar disorder, unspecified     Diabetes insipidus     Diabetes mellitus     Dysphagia, unspecified     Chucky gangrene     Gangrene     GERD (gastroesophageal reflux disease)     Hereditary and idiopathic neuropathy     Nontraumatic intracerebral hemorrhage, unspecified     Other muscle spasm     Other psychoactive substance abuse with withdrawal, uncomplicated     Skin transplant status     Thyroid disease        Past Surgical History:   Procedure Laterality Date    COLOSTOMY      INSERTION, PEG TUBE         Review of patient's allergies indicates:   Allergen Reactions    Guaifenesin Hives    Codeine Itching       No current facility-administered medications on file prior to encounter.     Current Outpatient Medications on File Prior to Encounter   Medication Sig    acetaminophen (TYLENOL) 325 MG tablet 325 mg by Per G Tube route every 6 (six) hours as needed.    ascorbic acid, vitamin C, (VITAMIN C) 500 MG tablet 500 mg by Per G Tube route once daily.    aspirin 81 MG Chew Take 81 mg by mouth once daily.    diazePAM (VALIUM) 5 MG tablet 2 tablets (10 mg total) by Per G Tube route 2 (two) times a day.    doxycycline (VIBRA-TABS) 100 MG tablet 1 tablet (100 mg total) by Per G Tube route every 12 (twelve) hours.    enoxaparin (LOVENOX) 40 mg/0.4 mL Syrg Inject 40 mg into the skin.    famotidine (PEPCID) 40 MG tablet 40 mg by Per G Tube route once daily.    FLUoxetine 20 MG capsule 20 mg by Per G Tube route once daily.    gabapentin (NEURONTIN) 250 mg/5 mL solution 2.5 mLs (125 mg total) by Per G Tube route every 12 (twelve) hours.    insulin aspart U-100 (NOVOLOG FLEXPEN U-100 INSULIN) 100 unit/mL (3 mL) InPn pen Use per sliding scale insulin    levothyroxine (SYNTHROID) 150 MCG tablet 1 tablet (150 mcg total) by Per G Tube route before breakfast.    lipase-protease-amylase (ZENPEP) 20,000-63,000- 84,000 unit capsule Take 1 capsule by mouth 3  (three) times daily.    lurasidone (LATUDA) 40 mg Tab tablet Take 40 mg by mouth once daily.    melatonin (MELATIN) 3 mg tablet 2 tablets (6 mg total) by Per G Tube route nightly as needed for Insomnia.    metoclopramide HCl (REGLAN) 5 MG tablet 1 tablet (5 mg total) by Per G Tube route 3 (three) times daily before meals.    oxyCODONE (ROXICODONE) 10 mg Tab immediate release tablet 1 tablet (10 mg total) by Per G Tube route every 6 (six) hours as needed for Pain.    polyethylene glycol (GLYCOLAX) 17 gram PwPk 17 g by Per G Tube route once daily.    rosuvastatin (CRESTOR) 10 MG tablet Take 10 mg by mouth once daily.    sodium hypochlorite 0.125% (DAKIN'S SOLUTION) external solution Apply topically once daily. Apply once daily to groin area and BID to sacral     Family History    None       Tobacco Use    Smoking status: Unknown    Smokeless tobacco: Not on file   Substance and Sexual Activity    Alcohol use: Not on file    Drug use: Not on file    Sexual activity: Not on file     Review of Systems   Unable to perform ROS: Acuity of condition     Objective:     Vital Signs (Most Recent):  Temp: 98.9 °F (37.2 °C) (12/19/24 1101)  Pulse: 70 (12/19/24 1215)  Resp: 19 (12/19/24 1215)  BP: 90/64 (12/19/24 1215)  SpO2: 100 % (12/19/24 1215) Vital Signs (24h Range):  Temp:  [97.1 °F (36.2 °C)-99.1 °F (37.3 °C)] 98.9 °F (37.2 °C)  Pulse:  [] 70  Resp:  [18-22] 19  SpO2:  [100 %] 100 %  BP: ()/(56-84) 90/64     Weight: 68.5 kg (151 lb 0.2 oz)  Body mass index is 23.65 kg/m².     Physical Exam  Constitutional:       Appearance: He is ill-appearing.      Comments: Thin male; intubated   HENT:      Mouth/Throat:      Mouth: Mucous membranes are moist.   Eyes:      Pupils: Pupils are equal, round, and reactive to light.   Cardiovascular:      Rate and Rhythm: Normal rate and regular rhythm.   Pulmonary:      Effort: Pulmonary effort is normal.      Breath sounds: Rhonchi present.   Abdominal:      General: There is no  distension.      Palpations: Abdomen is soft.      Tenderness: There is no abdominal tenderness.      Comments: Jtube noted; ileostomy noted   Genitourinary:     Comments: Rojas with yellow urine  Skin:     General: Skin is warm.      Comments: Wounds to sacrum; groin area              CRANIAL NERVES     CN III, IV, VI   Pupils are equal, round, and reactive to light.       Significant Labs: All pertinent labs within the past 24 hours have been reviewed.  Recent Lab Results  (Last 5 results in the past 24 hours)        12/19/24  1106   12/19/24  0844   12/19/24  0546   12/19/24  0338   12/19/24  0336        Base Deficit     0.9           Performed By:     Gerardo           Provider Notified:     NADIRA MANZANO           Specimen source     Arterial           Notified Time     12/19/2024 05:48           Notified By     Gerardo           Albumin       <0.6         ALP       503         ALT       8         Anion Gap       4         AST       25         Baso #       0.07         Basophil %       0.3         BILIRUBIN TOTAL       0.4  Comment: For infants and newborns, interpretation of results should be based  on gestational age, weight and in agreement with clinical  observations.    Premature Infant recommended reference ranges:  Up to 24 hours.............<8.0 mg/dL  Up to 48 hours............<12.0 mg/dL  3-5 days..................<15.0 mg/dL  6-29 days.................<15.0 mg/dL    For patients on Eltrombopag therapy, use of Dimension [place] TBIL is   not   recommended.           BUN       12         Calcium       9.1         Chloride       106         CO2       27         Creatinine       1.2         Differential Method       Automated         eGFR       >60.0         Eos #       0.8         Eos %       3.5         FiO2     30.0           Glucose       208         Gran # (ANC)       19.8         Gran %       86.9         Hematocrit       24.5         Hemoglobin       7.8         Immature Grans (Abs)       0.17  Comment: Mild  elevation in immature granulocytes is non specific and   can be seen in a variety of conditions including stress response,   acute inflammation, trauma and pregnancy. Correlation with other   laboratory and clinical findings is essential.           Immature Granulocytes       0.7         Lymph #       1.1         Lymph %       4.7         Magnesium        1.5         MCH       27.4         MCHC       31.8         MCV       86         MODIFIED CELSO'S TEST     POSITIVE           Mono #       0.9         Mono %       3.9         MPV       9.7         nRBC       0         O2DEVICE     Ventilator           PEAK FLOW     60.0           PEEP     5.0           Platelet Count       331         POC HCO3     25.1           POC PCO2     36.7           POC PH     7.443           POC PO2     60.9  Comment: Value below reference range           POC SATURATED O2     85.7  Comment: Carrillo notified NADIRA MANZANO at 12/19/2024 05:48   read back  Value below critical limit             POC Temp     37.0           POCT Glucose 206   244       194       Potassium       3.3         PROTEIN TOTAL       6.0         RBC       2.85         RDW       15.9         Sodium       137         Vt     400           WBC       22.84                                Significant Imaging: I have reviewed all pertinent imaging results/findings within the past 24 hours.  I have reviewed and interpreted all pertinent imaging results/findings within the past 24 hours.

## 2024-12-19 NOTE — EICU
Intervention Initiated From:  [facility] / EICU    Sandy intervened regarding:  Rounding (Video assessment)    Nurse Notified:  No    Doctor Notified:  No    Comments: Video rounding completed. Orally intubated, 22/400/30%/5. Levo infusing as per MAR. VSS.

## 2024-12-19 NOTE — ASSESSMENT & PLAN NOTE
Patient's most recent potassium results are listed below.   Recent Labs     12/17/24  0404 12/18/24  0418 12/19/24  0338   K 4.2 3.9 3.3*     Plan  - Replete potassium per protocol  - Monitor potassium Daily  - Patient's hypokalemia is worsening. Will replete with kcl

## 2024-12-19 NOTE — ASSESSMENT & PLAN NOTE
Patient's FSGs are uncontrolled due to hyperglycemia on current medication regimen.  Last A1c reviewed-   Lab Results   Component Value Date    HGBA1C 6.6 (H) 12/16/2024     Most recent fingerstick glucose reviewed-   Recent Labs   Lab 12/18/24  2333 12/19/24  0336 12/19/24  0844 12/19/24  1106   POCTGLUCOSE 238* 194* 244* 206*       Current correctional scale  Low  Maintain anti-hyperglycemic dose as follows-   Antihyperglycemics (From admission, onward)    Start     Stop Route Frequency Ordered    12/16/24 0952  insulin aspart U-100 pen 0-5 Units         -- SubQ Every 4 hours PRN 12/16/24 0853        Hold Oral hypoglycemics while patient is in the hospital.  12/17 A1c improved to 6.6%

## 2024-12-19 NOTE — ASSESSMENT & PLAN NOTE
Patient has a diagnosis of pneumonia. The cause of the pneumonia is suspected to be bacterial in etiology but organism is not known. The pneumonia is stable. The patient has the following signs/symptoms of pneumonia: persistent hypoxia  and sputum production. The patient does have a current oxygen requirement and the patient does not have a home oxygen requirement. I have reviewed the pertinent imaging. The following cultures have been collected: Blood cultures and Sputum culture The culture results are listed below.     Current antimicrobial regimen consists of the antibiotics listed below. Will monitor patient closely and continue current treatment plan unchanged.    Antibiotics (From admission, onward)      Start     Stop Route Frequency Ordered    12/17/24 0900  meropenem injection 1 g         -- IV Every 8 hours (non-standard times) 12/17/24 0757    12/15/24 1230  mupirocin 2 % ointment         12/20/24 0859 Nasl 2 times daily 12/15/24 1118            Microbiology Results (last 7 days)       Procedure Component Value Units Date/Time    Culture, Respiratory with Gram Stain [[id number]]  (Abnormal)  (Susceptibility) Collected: 12/15/24 0935    Order Status: Completed Specimen: Respiratory from Endotracheal Aspirate Updated: 12/19/24 1158     Respiratory Culture GRAM NEGATIVE FLORA  Many  Identification and susceptibility pending        PROTEUS MIRABILIS  Few        KLEBSIELLA PNEUMONIAE   Few  Susceptibility pending       Gram Stain (Respiratory) Few WBC's     Gram Stain (Respiratory) Many Gram positive rods     Gram Stain (Respiratory) Few Gram negative cocci    Blood culture x two cultures. Draw prior to antibiotics. [[id number]] Collected: 12/15/24 0942    Order Status: Completed Specimen: Blood from Peripheral, Antecubital, Left Updated: 12/18/24 1612     Blood Culture, Routine No Growth to date      No Growth to date      No Growth to date      No Growth to date    Narrative:      Aerobic and anaerobic     Blood culture x two cultures. Draw prior to antibiotics. [[id number]] Collected: 12/15/24 0923    Order Status: Completed Specimen: Blood from Peripheral, Forearm, Right Updated: 12/18/24 1612     Blood Culture, Routine No Growth to date      No Growth to date      No Growth to date      No Growth to date    Narrative:      Aerobic and anaerobic    Urine culture [[id number]]  (Abnormal)  (Susceptibility) Collected: 12/15/24 0941    Order Status: Completed Specimen: Urine Updated: 12/17/24 2343     Urine Culture, Routine KLEBSIELLA PNEUMONIAE ESBL  >100,000 cfu/ml      Narrative:      Preferred Collection Type->Urine, Clean Catch  Specimen Source->Urine    Culture, Respiratory with Gram Stain [[id number]]     Order Status: Canceled Specimen: Respiratory         12/17 dc zosyn with recent esbl kleb in urine - will change to merrem - cont vanc until final cultures obtained - change vent to simv; add nebs  12/18 cotn merrem and vanc - await final sputum cutlure - cont vent on ac control; nebs - wbc imrpoved slightly; lasix 20mg iv for edema today  12/19  dc vanc - proteus grwoign from sputum - cont merrem and nebs; lasix today - wean rr on vent

## 2024-12-19 NOTE — PROGRESS NOTES
VANCOMYCIN DOSING BY PHARMACY DISCONTINUATION NOTE    Carlos Chahal is a 34 y.o. male who had been consulted for vancomycin dosing.    The pharmacy consult for vancomycin dosing has been discontinued.     Vancomycin Dosing by Pharmacy Consult will sign-off. Please reconsult if necessary. Thank you for allowing us to participate in this patient's care.     Thank you for the consult,   Loree Dubose, Pharm.D.  Inpatient Pharmacist  [phone]

## 2024-12-19 NOTE — ASSESSMENT & PLAN NOTE
Anemia is likely due to chronic infections Most recent hemoglobin and hematocrit are listed below.  Recent Labs     12/17/24  0404 12/18/24  0418 12/19/24  0338   HGB 8.5* 7.9* 7.8*   HCT 26.4* 24.9* 24.5*       Plan  - Monitor serial CBC: Daily  - Transfuse PRBC if patient becomes hemodynamically unstable, symptomatic or H/H drops below 7/21.  - Patient has not received any PRBC transfusions to date  - Patient's anemia is currently worsening. Will adjust treatment as follows: 2uprbcs today  12/17 h/h Improved

## 2024-12-19 NOTE — ASSESSMENT & PLAN NOTE
Patient has Abnormal Magnesium: hypomagnesemia. Will continue to monitor electrolytes closely. Will replace the affected electrolytes and repeat labs to be done after interventions completed. The patient's magnesium results have been reviewed and are listed below.  Recent Labs   Lab 12/19/24  0338   MG 1.5*      12/17 mag imrpoving - repalce mag today  12/19 replace mag today

## 2024-12-19 NOTE — ASSESSMENT & PLAN NOTE
Nutrition consulted. Most recent weight and BMI monitored-     Measurements:  Wt Readings from Last 1 Encounters:   12/16/24 68.5 kg (151 lb 0.2 oz)   Body mass index is 23.65 kg/m².    Patient has been screened and assessed by RD.    Malnutrition Type:  Context:    Level:      Malnutrition Characteristic Summary:       Interventions/Recommendations (treatment strategy):  1. Rec'd Continuous EN: Glucerna 1.5 @ 40mL/r increasing by 5 mL q6hrs to goal rate of 55mL/hr with a continuous 40mL FWF to provide 1980kcal (94% EEN), 109g of protein (100% EPN), and 1962mL FW. 2. Néstor BID via PEG tube to promote wound healing and to provide additional nutrition.         - Do not mix Néstor with formula in a tube-feeding bag       - Pour one packet of Néstor in a clean, small container for mixing.         - Add 4 fl oz (120 mL) water at room temperature.         - Mix well with disposable spoon or tongue blade until all particles are completely hydrated.         - Verify correct tube placement.         - Flush feeding tube with 30 mL water.         -Administer Néstor through feeding tube using a 60-mL or larger syringe.         - Flush with an additional 30 mL water. 3. RD to follow and make rec's accordingly.    12/18 restart tfs today at 1ml/hr to see if can tolerate feeds  12/19 increase tfs as tolerates

## 2024-12-19 NOTE — ASSESSMENT & PLAN NOTE
"This patient does have evidence of infective focus  My overall impression is septic shock due to MAP < 65 and SBP < 90.  Source: Respiratory and Urinary Tract  Antibiotics given-   Antibiotics (72h ago, onward)      Start     Stop Route Frequency Ordered    12/17/24 0900  meropenem injection 1 g         -- IV Every 8 hours (non-standard times) 12/17/24 0757    12/15/24 1230  mupirocin 2 % ointment         12/20/24 0859 Nasl 2 times daily 12/15/24 1118          Latest lactate reviewed-  No results for input(s): "LACTATE", "POCLAC" in the last 72 hours.    Organ dysfunction indicated by Acute respiratory failure and Encephalopathy    Fluid challenge Ideal Body Weight- The patient's ideal body weight is Ideal body weight: 66.1 kg (145 lb 11.6 oz) which will be used to calculate fluid bolus of 30 ml/kg for treatment of septic shock.      Post- resuscitation assessment Yes Perfusion exam was performed within 6 hours of septic shock presentation after bolus shows Inadequate tissue perfusion assessed by non-invasive monitoring       Will Start Pressors- Levophed for MAP of 65  Source control achieved by: iv zosym. Vanc, ivfs  12/17 wean levophed as tolerates- abxs changed to merrem, cotn vanc - await culture final reports  12/18 wbc slightly imrpoved - cont iv abxs- await final sputum culture - on merrem fro esbl kleb in urine  12/19 cont iv merrem for esbl kleb in urine and proteus in sputum - dc vanc  "

## 2024-12-19 NOTE — PLAN OF CARE
Problem: Skin Injury Risk Increased  Goal: Skin Health and Integrity  Outcome: Progressing     Problem: Mechanical Ventilation Invasive  Goal: Effective Communication  Outcome: Progressing  Goal: Optimal Device Function  Outcome: Progressing  Goal: Mechanical Ventilation Liberation  Outcome: Progressing  Goal: Optimal Nutrition Delivery  Outcome: Progressing  Goal: Absence of Device-Related Skin and Tissue Injury  Outcome: Progressing  Goal: Absence of Ventilator-Induced Lung Injury  Outcome: Progressing     Problem: Artificial Airway  Goal: Effective Communication  Outcome: Progressing  Goal: Optimal Device Function  Outcome: Progressing  Goal: Absence of Device-Related Skin or Tissue Injury  Outcome: Progressing     Problem: Adult Inpatient Plan of Care  Goal: Plan of Care Review  Outcome: Progressing  Goal: Patient-Specific Goal (Individualized)  Outcome: Progressing  Goal: Absence of Hospital-Acquired Illness or Injury  Outcome: Progressing  Goal: Optimal Comfort and Wellbeing  Outcome: Progressing  Goal: Readiness for Transition of Care  Outcome: Progressing     Problem: Diabetes Comorbidity  Goal: Blood Glucose Level Within Targeted Range  Outcome: Progressing     Problem: Wound  Goal: Optimal Coping  Outcome: Progressing  Goal: Optimal Functional Ability  Outcome: Progressing  Goal: Absence of Infection Signs and Symptoms  Outcome: Progressing  Goal: Improved Oral Intake  Outcome: Progressing  Goal: Optimal Pain Control and Function  Outcome: Progressing  Goal: Skin Health and Integrity  Outcome: Progressing  Goal: Optimal Wound Healing  Outcome: Progressing     Problem: Infection  Goal: Absence of Infection Signs and Symptoms  Outcome: Progressing     Problem: Pneumonia  Goal: Fluid Balance  Outcome: Progressing  Goal: Resolution of Infection Signs and Symptoms  Outcome: Progressing  Goal: Effective Oxygenation and Ventilation  Outcome: Progressing     Problem: Sepsis/Septic Shock  Goal: Optimal  Coping  Outcome: Progressing  Goal: Absence of Bleeding  Outcome: Progressing  Goal: Blood Glucose Level Within Targeted Range  Outcome: Progressing  Goal: Absence of Infection Signs and Symptoms  Outcome: Progressing  Goal: Optimal Nutrition Intake  Outcome: Progressing     Problem: Fall Injury Risk  Goal: Absence of Fall and Fall-Related Injury  Outcome: Progressing

## 2024-12-20 NOTE — PROGRESS NOTES
[facility] Medicine  Progress Note    Patient Name: Carlos Chahal  MRN: [id number]  Patient Class: IP- Inpatient   Admission Date: 12/15/2024  Length of Stay: 5 days  Attending Physician: Kim Diamond MD  Primary Care Provider: Jose Francisco Klein MD        Subjective     Principal Problem:Sepsis        HPI:  Carlos Chahal is a 34 y.o. male who presents to the ED from nursing home for altered mental status and difficulty breathing.  Patient is found to be hypoxic.  He has a history of anoxic brain injury     This am, pt is on ventilator and levophed at 0.25mcg and ivfs at 75ml/hr.      Spoke with father this am on condition of pt    Overview/Hospital Course:  12/17 ND became more awake yesterday with wound changes - low dose propofol added for pt - able to wean levophed to 0.15mcg.  did tolerate tf last night - check kub this am  12/18 ND pt had to be changed back to ac control last nigth after becoming tachypneic and had low tv.  Tolerating ac mode.  Levophed down to 0.1 mcg  12/19 ND pt toleratign vent - will wean RR today - cont to slowly wean levophed as tolerates - wbc slowly imrpoving - tolerating low dose tfs - will cont to increase tfs as tolerates  12/20 ND tolerating vent - cont to work on feeds and nutritional support    Past Medical History:   Diagnosis Date    Anoxic brain injury     Bipolar disorder, unspecified     Diabetes insipidus     Diabetes mellitus     Dysphagia, unspecified     Chucky gangrene     Gangrene     GERD (gastroesophageal reflux disease)     Hereditary and idiopathic neuropathy     Nontraumatic intracerebral hemorrhage, unspecified     Other muscle spasm     Other psychoactive substance abuse with withdrawal, uncomplicated     Skin transplant status     Thyroid disease        Past Surgical History:   Procedure Laterality Date    COLOSTOMY      INSERTION, PEG TUBE         Review of patient's allergies indicates:   Allergen Reactions    Guaifenesin Hives     Codeine Itching       No current facility-administered medications on file prior to encounter.     Current Outpatient Medications on File Prior to Encounter   Medication Sig    acetaminophen (TYLENOL) 325 MG tablet 325 mg by Per G Tube route every 6 (six) hours as needed.    ascorbic acid, vitamin C, (VITAMIN C) 500 MG tablet 500 mg by Per G Tube route once daily.    aspirin 81 MG Chew Take 81 mg by mouth once daily.    diazePAM (VALIUM) 5 MG tablet 2 tablets (10 mg total) by Per G Tube route 2 (two) times a day.    doxycycline (VIBRA-TABS) 100 MG tablet 1 tablet (100 mg total) by Per G Tube route every 12 (twelve) hours.    enoxaparin (LOVENOX) 40 mg/0.4 mL Syrg Inject 40 mg into the skin.    famotidine (PEPCID) 40 MG tablet 40 mg by Per G Tube route once daily.    FLUoxetine 20 MG capsule 20 mg by Per G Tube route once daily.    gabapentin (NEURONTIN) 250 mg/5 mL solution 2.5 mLs (125 mg total) by Per G Tube route every 12 (twelve) hours.    insulin aspart U-100 (NOVOLOG FLEXPEN U-100 INSULIN) 100 unit/mL (3 mL) InPn pen Use per sliding scale insulin    levothyroxine (SYNTHROID) 150 MCG tablet 1 tablet (150 mcg total) by Per G Tube route before breakfast.    lipase-protease-amylase (ZENPEP) 20,000-63,000- 84,000 unit capsule Take 1 capsule by mouth 3 (three) times daily.    lurasidone (LATUDA) 40 mg Tab tablet Take 40 mg by mouth once daily.    melatonin (MELATIN) 3 mg tablet 2 tablets (6 mg total) by Per G Tube route nightly as needed for Insomnia.    metoclopramide HCl (REGLAN) 5 MG tablet 1 tablet (5 mg total) by Per G Tube route 3 (three) times daily before meals.    oxyCODONE (ROXICODONE) 10 mg Tab immediate release tablet 1 tablet (10 mg total) by Per G Tube route every 6 (six) hours as needed for Pain.    polyethylene glycol (GLYCOLAX) 17 gram PwPk 17 g by Per G Tube route once daily.    rosuvastatin (CRESTOR) 10 MG tablet Take 10 mg by mouth once daily.    sodium hypochlorite 0.125% (DAKIN'S SOLUTION)  external solution Apply topically once daily. Apply once daily to groin area and BID to sacral     Family History    None       Tobacco Use    Smoking status: Unknown    Smokeless tobacco: Not on file   Substance and Sexual Activity    Alcohol use: Not on file    Drug use: Not on file    Sexual activity: Not on file     Review of Systems   Unable to perform ROS: Acuity of condition     Objective:     Vital Signs (Most Recent):  Temp: (!) 93.8 °F (34.3 °C) (12/20/24 0702)  Pulse: 75 (12/20/24 0739)  Resp: 18 (12/20/24 0739)  BP: (!) 89/59 (12/20/24 0630)  SpO2: 100 % (12/20/24 0739) Vital Signs (24h Range):  Temp:  [93.8 °F (34.3 °C)-99.2 °F (37.3 °C)] 93.8 °F (34.3 °C)  Pulse:  [59-78] 75  Resp:  [18-20] 18  SpO2:  [100 %] 100 %  BP: ()/(51-74) 89/59     Weight: 68.5 kg (151 lb 0.2 oz)  Body mass index is 23.65 kg/m².     Physical Exam  Constitutional:       Appearance: He is ill-appearing.      Comments: Thin male; intubated   HENT:      Mouth/Throat:      Mouth: Mucous membranes are moist.   Eyes:      Pupils: Pupils are equal, round, and reactive to light.   Cardiovascular:      Rate and Rhythm: Normal rate and regular rhythm.   Pulmonary:      Effort: Pulmonary effort is normal.      Breath sounds: Rhonchi present.   Abdominal:      General: There is no distension.      Palpations: Abdomen is soft.      Tenderness: There is no abdominal tenderness.      Comments: Jtube noted; ileostomy noted   Genitourinary:     Comments: Rojas with yellow urine  Skin:     General: Skin is warm.      Comments: Wounds to sacrum; groin area              CRANIAL NERVES     CN III, IV, VI   Pupils are equal, round, and reactive to light.       Significant Labs: All pertinent labs within the past 24 hours have been reviewed.  Recent Lab Results  (Last 5 results in the past 24 hours)        12/20/24  0524   12/20/24  0340   12/19/24  1602   12/19/24  1106   12/19/24  0844        Base Deficit 1.4               Notified Note  Called and read back by JOSE RT               Performed By: Toni               Provider Notified: JOSE RN               Specimen source Arterial               Notified Time 12/20/2024 05:26               Notified By Toni ZAIDI 18               Albumin   <0.6             ALP   527             ALT   9             Anion Gap   2             AST   33             Baso #   0.03             Basophil %   0.2             BILIRUBIN TOTAL   0.4  Comment: For infants and newborns, interpretation of results should be based  on gestational age, weight and in agreement with clinical  observations.    Premature Infant recommended reference ranges:  Up to 24 hours.............<8.0 mg/dL  Up to 48 hours............<12.0 mg/dL  3-5 days..................<15.0 mg/dL  6-29 days.................<15.0 mg/dL    For patients on Eltrombopag therapy, use of Dimension [place] TBIL is   not   recommended.               BUN   12             Calcium   9.0             Chloride   105             CO2   28             Creatinine   1.0             Differential Method   Automated             eGFR   >60.0             Eos #   0.6             Eos %   4.6             FiO2 40.0               Glucose   188             Gran # (ANC)   10.7             Gran %   84.9             Hematocrit   25.2             Hemoglobin   7.9             Immature Grans (Abs)   0.14  Comment: Mild elevation in immature granulocytes is non specific and   can be seen in a variety of conditions including stress response,   acute inflammation, trauma and pregnancy. Correlation with other   laboratory and clinical findings is essential.               Immature Granulocytes   1.1             Lymph #   0.6             Lymph %   4.9             Magnesium    1.6             MCH   27.1             MCHC   31.3             MCV   87             MODIFIED CELSO'S TEST NA               Mono #   0.6             Mono %   4.3             MPV   9.8             nRBC   0              "O2DEVICE Ventilator               PEAK FLOW 60.0               PEEP 5.0               Platelet Count   250             POC HCO3 25.5               POC PCO2 38.1               POC PH 7.436               POC PO2 58.3  Comment: Toni notified JOSE RN at 12/20/2024 05:26  Called and read back by JOSE RT read back  Value below critical limit                 POC SATURATED O2 88.1  Comment: Toni notified JOSE RN at 12/20/2024 05:26  Called and read back by JOSE RT read back  Value below critical limit                 POC Temp 37.0               POCT Glucose     175   206   244       Potassium   3.3             PROTEIN TOTAL   5.8             RBC   2.91             RDW   16.0             Sodium   135             Vt 400               WBC   12.66                                    Significant Imaging: I have reviewed all pertinent imaging results/findings within the past 24 hours.  I have reviewed and interpreted all pertinent imaging results/findings within the past 24 hours.    Assessment and Plan     * Sepsis  This patient does have evidence of infective focus  My overall impression is septic shock due to MAP < 65 and SBP < 90.  Source: Respiratory and Urinary Tract  Antibiotics given-   Antibiotics (72h ago, onward)      Start     Stop Route Frequency Ordered    12/17/24 0900  meropenem injection 1 g         -- IV Every 8 hours (non-standard times) 12/17/24 0757          Latest lactate reviewed-  No results for input(s): "LACTATE", "POCLAC" in the last 72 hours.    Organ dysfunction indicated by Acute respiratory failure and Encephalopathy    Fluid challenge Ideal Body Weight- The patient's ideal body weight is Ideal body weight: 66.1 kg (145 lb 11.6 oz) which will be used to calculate fluid bolus of 30 ml/kg for treatment of septic shock.      Post- resuscitation assessment Yes Perfusion exam was performed within 6 hours of septic shock presentation after bolus shows Inadequate tissue perfusion assessed by " non-invasive monitoring       Will Start Pressors- Levophed for MAP of 65  Source control achieved by: iv zosym. Vanc, ivfs  12/17 wean levophed as tolerates- abxs changed to merrem, cotn vanc - await culture final reports  12/18 wbc slightly imrpoved - cont iv abxs- await final sputum culture - on merrem fro esbl kleb in urine  12/19 cont iv merrem for esbl kleb in urine and proteus in sputum - dc vanc  12/20 wbc improvign - cotn iv merrem    Hypokalemia  Patient's most recent potassium results are listed below.   Recent Labs     12/18/24  0418 12/19/24  0338 12/20/24  0340   K 3.9 3.3* 3.3*       Plan  - Replete potassium per protocol  - Monitor potassium Daily  - Patient's hypokalemia is worsening. Will replete with kcl  12/20 increase pot bicarb to bid      Severe malnutrition  Nutrition consulted. Most recent weight and BMI monitored-     Measurements:  Wt Readings from Last 1 Encounters:   12/16/24 68.5 kg (151 lb 0.2 oz)   Body mass index is 23.65 kg/m².    Patient has been screened and assessed by RD.    Malnutrition Type:  Context:    Level:      Malnutrition Characteristic Summary:       Interventions/Recommendations (treatment strategy):  1. Rec'd Continuous EN: Glucerna 1.5 @ 40mL/r increasing by 5 mL q6hrs to goal rate of 55mL/hr with a continuous 40mL FWF to provide 1980kcal (94% EEN), 109g of protein (100% EPN), and 1962mL FW. 2. Néstor BID via PEG tube to promote wound healing and to provide additional nutrition.         - Do not mix Néstor with formula in a tube-feeding bag       - Pour one packet of Néstor in a clean, small container for mixing.         - Add 4 fl oz (120 mL) water at room temperature.         - Mix well with disposable spoon or tongue blade until all particles are completely hydrated.         - Verify correct tube placement.         - Flush feeding tube with 30 mL water.         -Administer Néstor through feeding tube using a 60-mL or larger syringe.         - Flush with an additional  30 mL water. 3. RD to follow and make rec's accordingly.    12/18 restart tfs today at 1ml/hr to see if can tolerate feeds  12/19 increase tfs as tolerates    Hypomagnesemia  Patient has Abnormal Magnesium: hypomagnesemia. Will continue to monitor electrolytes closely. Will replace the affected electrolytes and repeat labs to be done after interventions completed. The patient's magnesium results have been reviewed and are listed below.  Recent Labs   Lab 12/20/24  0340   MG 1.6      12/17 mag imrpoving - repalce mag today  12/19 replace mag today  12/20 mag oxide bid    Pneumonia of right lower lobe due to infectious organism  Patient has a diagnosis of pneumonia. The cause of the pneumonia is suspected to be bacterial in etiology but organism is not known. The pneumonia is stable. The patient has the following signs/symptoms of pneumonia: persistent hypoxia  and sputum production. The patient does have a current oxygen requirement and the patient does not have a home oxygen requirement. I have reviewed the pertinent imaging. The following cultures have been collected: Blood cultures and Sputum culture The culture results are listed below.     Current antimicrobial regimen consists of the antibiotics listed below. Will monitor patient closely and continue current treatment plan unchanged.    Antibiotics (From admission, onward)      Start     Stop Route Frequency Ordered    12/17/24 0900  meropenem injection 1 g         -- IV Every 8 hours (non-standard times) 12/17/24 0757            Microbiology Results (last 7 days)       Procedure Component Value Units Date/Time    Blood culture x two cultures. Draw prior to antibiotics. [[id number]] Collected: 12/15/24 0942    Order Status: Completed Specimen: Blood from Peripheral, Antecubital, Left Updated: 12/19/24 1612     Blood Culture, Routine No Growth to date      No Growth to date      No Growth to date      No Growth to date      No Growth to date    Narrative:       Aerobic and anaerobic    Blood culture x two cultures. Draw prior to antibiotics. [[id number]] Collected: 12/15/24 0923    Order Status: Completed Specimen: Blood from Peripheral, Forearm, Right Updated: 12/19/24 1612     Blood Culture, Routine No Growth to date      No Growth to date      No Growth to date      No Growth to date      No Growth to date    Narrative:      Aerobic and anaerobic    Culture, Respiratory with Gram Stain [[id number]]  (Abnormal)  (Susceptibility) Collected: 12/15/24 0935    Order Status: Completed Specimen: Respiratory from Endotracheal Aspirate Updated: 12/19/24 1158     Respiratory Culture GRAM NEGATIVE FLORA  Many  Identification and susceptibility pending        PROTEUS MIRABILIS  Few        KLEBSIELLA PNEUMONIAE   Few  Susceptibility pending       Gram Stain (Respiratory) Few WBC's     Gram Stain (Respiratory) Many Gram positive rods     Gram Stain (Respiratory) Few Gram negative cocci    Urine culture [[id number]]  (Abnormal)  (Susceptibility) Collected: 12/15/24 0941    Order Status: Completed Specimen: Urine Updated: 12/17/24 2343     Urine Culture, Routine KLEBSIELLA PNEUMONIAE ESBL  >100,000 cfu/ml      Narrative:      Preferred Collection Type->Urine, Clean Catch  Specimen Source->Urine    Culture, Respiratory with Gram Stain [[id number]]     Order Status: Canceled Specimen: Respiratory         12/17 dc zosyn with recent esbl kleb in urine - will change to merrem - cont vanc until final cultures obtained - change vent to simv; add nebs  12/18 cotn merrem and vanc - await final sputum cutlure - cont vent on ac control; nebs - wbc imrpoved slightly; lasix 20mg iv for edema today  12/19  dc vanc - proteus grwoign from sputum - cont merrem and nebs; lasix today - wean rr on vent  12/20 wnc improved - cont merrem - nebs and vent - another dose of lasix today for edema - fio2 increased    Sacral wound, sequela  Local wound care with dakins bid  Iv abxs      Open wound of groin  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  Recent Labs     12/18/24  0418 12/19/24  0338 12/20/24  0340   HGB 7.9* 7.8* 7.9*   HCT 24.9* 24.5* 25.2*       Plan  - Monitor serial CBC: Daily  - Transfuse PRBC if patient becomes hemodynamically unstable, symptomatic or H/H drops below 7/21.  - Patient has not received any PRBC transfusions to date  - Patient's anemia is currently worsening. Will adjust treatment as follows: 2uprbcs today  12/17 h/h Improved  12/20 h/h holding    History of anoxic brain injury        Urinary tract infection associated with indwelling urethral catheter  Await new urine culture =- currently on zosyn/vanc  12/17 change to merrem/vanc  12/18 has esbl klebsiella - cont merrem    Type 1 diabetes  Patient's FSGs are uncontrolled due to hyperglycemia on current medication regimen.  Last A1c reviewed-   Lab Results   Component Value Date    HGBA1C 6.6 (H) 12/16/2024     Most recent fingerstick glucose reviewed-   Recent Labs   Lab 12/19/24  0844 12/19/24  1106 12/19/24  1602   POCTGLUCOSE 244* 206* 175*       Current correctional scale  Low  Maintain anti-hyperglycemic dose as follows-   Antihyperglycemics (From admission, onward)      Start     Stop Route Frequency Ordered    12/16/24 0952  insulin aspart U-100 pen 0-5 Units         -- SubQ Every 4 hours PRN 12/16/24 0853          Hold Oral hypoglycemics while patient is in the hospital.  12/17 A1c improved to 6.6%      VTE Risk Mitigation (From admission, onward)           Ordered     enoxaparin injection 40 mg  Daily         12/16/24 0815     IP VTE LOW RISK PATIENT  Once         12/15/24 1201     Place sequential compression device  Until discontinued         12/15/24 1201                    Discharge Planning   JOSE MANUEL:      Code Status: Full Code   Medical Readiness for Discharge Date:    Discharge Plan A: Return to nursing home                        Kim Diamond MD  Department of Hospital Medicine   [facility] - Intensive Care

## 2024-12-20 NOTE — PLAN OF CARE
Problem: Skin Injury Risk Increased  Goal: Skin Health and Integrity  Outcome: Progressing     Problem: Mechanical Ventilation Invasive  Goal: Effective Communication  Outcome: Progressing  Goal: Optimal Device Function  Outcome: Progressing  Goal: Mechanical Ventilation Liberation  Outcome: Progressing  Goal: Optimal Nutrition Delivery  Outcome: Progressing  Goal: Absence of Device-Related Skin and Tissue Injury  Outcome: Progressing  Goal: Absence of Ventilator-Induced Lung Injury  Outcome: Progressing     Problem: Artificial Airway  Goal: Effective Communication  Outcome: Progressing  Goal: Optimal Device Function  Outcome: Progressing  Goal: Absence of Device-Related Skin or Tissue Injury  Outcome: Progressing     Problem: Adult Inpatient Plan of Care  Goal: Plan of Care Review  Outcome: Progressing  Goal: Patient-Specific Goal (Individualized)  Outcome: Progressing  Goal: Absence of Hospital-Acquired Illness or Injury  Outcome: Progressing  Goal: Optimal Comfort and Wellbeing  Outcome: Progressing  Goal: Readiness for Transition of Care  Outcome: Progressing     Problem: Diabetes Comorbidity  Goal: Blood Glucose Level Within Targeted Range  Outcome: Progressing     Problem: Wound  Goal: Absence of Infection Signs and Symptoms  Outcome: Not Progressing  Goal: Improved Oral Intake  Outcome: Not Progressing  Goal: Optimal Pain Control and Function  Outcome: Progressing  Goal: Skin Health and Integrity  Outcome: Not Progressing  Goal: Optimal Wound Healing  Outcome: Not Progressing     Problem: Infection  Goal: Absence of Infection Signs and Symptoms  Outcome: Progressing     Problem: Pneumonia  Goal: Fluid Balance  Outcome: Progressing  Goal: Resolution of Infection Signs and Symptoms  Outcome: Progressing  Goal: Effective Oxygenation and Ventilation  Outcome: Progressing     Problem: Sepsis/Septic Shock  Goal: Absence of Bleeding  Outcome: Progressing  Goal: Blood Glucose Level Within Targeted Range  Outcome:  Progressing  Goal: Absence of Infection Signs and Symptoms  Outcome: Progressing  Goal: Optimal Nutrition Intake  Outcome: Not Progressing     Problem: Fall Injury Risk  Goal: Absence of Fall and Fall-Related Injury  Outcome: Progressing   Patient's B/P remains low, stable. Tolerating tube feeding slightly better.  Urine output is a little low. Groin wound and right thigh graft appear slightly improved, the sacral decubitus has more discoloration noted.

## 2024-12-20 NOTE — SUBJECTIVE & OBJECTIVE
Past Medical History:   Diagnosis Date    Anoxic brain injury     Bipolar disorder, unspecified     Diabetes insipidus     Diabetes mellitus     Dysphagia, unspecified     Chucky gangrene     Gangrene     GERD (gastroesophageal reflux disease)     Hereditary and idiopathic neuropathy     Nontraumatic intracerebral hemorrhage, unspecified     Other muscle spasm     Other psychoactive substance abuse with withdrawal, uncomplicated     Skin transplant status     Thyroid disease        Past Surgical History:   Procedure Laterality Date    COLOSTOMY      INSERTION, PEG TUBE         Review of patient's allergies indicates:   Allergen Reactions    Guaifenesin Hives    Codeine Itching       No current facility-administered medications on file prior to encounter.     Current Outpatient Medications on File Prior to Encounter   Medication Sig    acetaminophen (TYLENOL) 325 MG tablet 325 mg by Per G Tube route every 6 (six) hours as needed.    ascorbic acid, vitamin C, (VITAMIN C) 500 MG tablet 500 mg by Per G Tube route once daily.    aspirin 81 MG Chew Take 81 mg by mouth once daily.    diazePAM (VALIUM) 5 MG tablet 2 tablets (10 mg total) by Per G Tube route 2 (two) times a day.    doxycycline (VIBRA-TABS) 100 MG tablet 1 tablet (100 mg total) by Per G Tube route every 12 (twelve) hours.    enoxaparin (LOVENOX) 40 mg/0.4 mL Syrg Inject 40 mg into the skin.    famotidine (PEPCID) 40 MG tablet 40 mg by Per G Tube route once daily.    FLUoxetine 20 MG capsule 20 mg by Per G Tube route once daily.    gabapentin (NEURONTIN) 250 mg/5 mL solution 2.5 mLs (125 mg total) by Per G Tube route every 12 (twelve) hours.    insulin aspart U-100 (NOVOLOG FLEXPEN U-100 INSULIN) 100 unit/mL (3 mL) InPn pen Use per sliding scale insulin    levothyroxine (SYNTHROID) 150 MCG tablet 1 tablet (150 mcg total) by Per G Tube route before breakfast.    lipase-protease-amylase (ZENPEP) 20,000-63,000- 84,000 unit capsule Take 1 capsule by mouth 3  (three) times daily.    lurasidone (LATUDA) 40 mg Tab tablet Take 40 mg by mouth once daily.    melatonin (MELATIN) 3 mg tablet 2 tablets (6 mg total) by Per G Tube route nightly as needed for Insomnia.    metoclopramide HCl (REGLAN) 5 MG tablet 1 tablet (5 mg total) by Per G Tube route 3 (three) times daily before meals.    oxyCODONE (ROXICODONE) 10 mg Tab immediate release tablet 1 tablet (10 mg total) by Per G Tube route every 6 (six) hours as needed for Pain.    polyethylene glycol (GLYCOLAX) 17 gram PwPk 17 g by Per G Tube route once daily.    rosuvastatin (CRESTOR) 10 MG tablet Take 10 mg by mouth once daily.    sodium hypochlorite 0.125% (DAKIN'S SOLUTION) external solution Apply topically once daily. Apply once daily to groin area and BID to sacral     Family History    None       Tobacco Use    Smoking status: Unknown    Smokeless tobacco: Not on file   Substance and Sexual Activity    Alcohol use: Not on file    Drug use: Not on file    Sexual activity: Not on file     Review of Systems   Unable to perform ROS: Acuity of condition     Objective:     Vital Signs (Most Recent):  Temp: (!) 93.8 °F (34.3 °C) (12/20/24 0702)  Pulse: 75 (12/20/24 0739)  Resp: 18 (12/20/24 0739)  BP: (!) 89/59 (12/20/24 0630)  SpO2: 100 % (12/20/24 0739) Vital Signs (24h Range):  Temp:  [93.8 °F (34.3 °C)-99.2 °F (37.3 °C)] 93.8 °F (34.3 °C)  Pulse:  [59-78] 75  Resp:  [18-20] 18  SpO2:  [100 %] 100 %  BP: ()/(51-74) 89/59     Weight: 68.5 kg (151 lb 0.2 oz)  Body mass index is 23.65 kg/m².     Physical Exam  Constitutional:       Appearance: He is ill-appearing.      Comments: Thin male; intubated   HENT:      Mouth/Throat:      Mouth: Mucous membranes are moist.   Eyes:      Pupils: Pupils are equal, round, and reactive to light.   Cardiovascular:      Rate and Rhythm: Normal rate and regular rhythm.   Pulmonary:      Effort: Pulmonary effort is normal.      Breath sounds: Rhonchi present.   Abdominal:      General: There  is no distension.      Palpations: Abdomen is soft.      Tenderness: There is no abdominal tenderness.      Comments: Jtube noted; ileostomy noted   Genitourinary:     Comments: Rojas with yellow urine  Skin:     General: Skin is warm.      Comments: Wounds to sacrum; groin area              CRANIAL NERVES     CN III, IV, VI   Pupils are equal, round, and reactive to light.       Significant Labs: All pertinent labs within the past 24 hours have been reviewed.  Recent Lab Results  (Last 5 results in the past 24 hours)        12/20/24  0524   12/20/24  0340   12/19/24  1602   12/19/24  1106   12/19/24  0844        Base Deficit 1.4               Notified Note Called and read back by JOSE RT               Performed By: Toni               Provider Notified: JOSE WADDELL               Specimen source Arterial               Notified Time 12/20/2024 05:26               Notified By Toni               AC 18               Albumin   <0.6             ALP   527             ALT   9             Anion Gap   2             AST   33             Baso #   0.03             Basophil %   0.2             BILIRUBIN TOTAL   0.4  Comment: For infants and newborns, interpretation of results should be based  on gestational age, weight and in agreement with clinical  observations.    Premature Infant recommended reference ranges:  Up to 24 hours.............<8.0 mg/dL  Up to 48 hours............<12.0 mg/dL  3-5 days..................<15.0 mg/dL  6-29 days.................<15.0 mg/dL    For patients on Eltrombopag therapy, use of Dimension [place] TBIL is   not   recommended.               BUN   12             Calcium   9.0             Chloride   105             CO2   28             Creatinine   1.0             Differential Method   Automated             eGFR   >60.0             Eos #   0.6             Eos %   4.6             FiO2 40.0               Glucose   188             Gran # (ANC)   10.7             Gran %   84.9             Hematocrit    25.2             Hemoglobin   7.9             Immature Grans (Abs)   0.14  Comment: Mild elevation in immature granulocytes is non specific and   can be seen in a variety of conditions including stress response,   acute inflammation, trauma and pregnancy. Correlation with other   laboratory and clinical findings is essential.               Immature Granulocytes   1.1             Lymph #   0.6             Lymph %   4.9             Magnesium    1.6             MCH   27.1             MCHC   31.3             MCV   87             MODIFIED CELSO'S TEST NA               Mono #   0.6             Mono %   4.3             MPV   9.8             nRBC   0             O2DEVICE Ventilator               PEAK FLOW 60.0               PEEP 5.0               Platelet Count   250             POC HCO3 25.5               POC PCO2 38.1               POC PH 7.436               POC PO2 58.3  Comment: Toni notified JOSE RN at 12/20/2024 05:26  Called and read back by JOSE RT read back  Value below critical limit                 POC SATURATED O2 88.1  Comment: Toni notified JOSE WADDELL at 12/20/2024 05:26  Called and read back by JOSE RT read back  Value below critical limit                 POC Temp 37.0               POCT Glucose     175   206   244       Potassium   3.3             PROTEIN TOTAL   5.8             RBC   2.91             RDW   16.0             Sodium   135             Vt 400               WBC   12.66                                    Significant Imaging: I have reviewed all pertinent imaging results/findings within the past 24 hours.  I have reviewed and interpreted all pertinent imaging results/findings within the past 24 hours.

## 2024-12-20 NOTE — PROGRESS NOTES
Pharmacokinetic Initial Assessment: Gentamicin    Assessment:  Weight utilized for dose calculation: Ideal Body Weight  Dosing method utilized: extended interval dosing    Plan: Extended interval dosing regimen: Gentamicin 462.8 mg IV once, followed by a random level to be drawn on 12/21/24 at 0000, 8-12 hours after the first dose.    Pharmacy will continue to monitor.    Please contact pharmacy at extension [phone] with any questions regarding this assessment.    Thank you for the consult,    Bekah Garay       Patient brief summary:  Carlos Chahal is a 34 y.o. male initiated on aminoglycoside therapy for treatment of suspected lower respiratory infection    Drug Allergies:   Review of patient's allergies indicates:   Allergen Reactions    Guaifenesin Hives    Codeine Itching       Actual Body Weight:   68.5 kg    Adjust Body Weight:   67.1 kg    Ideal Body Weight:  66.1 kg    Renal Function:   Estimated Creatinine Clearance: 98.2 mL/min (based on SCr of 1 mg/dL).,     Dialysis Method (if applicable):  N/A    CBC (last 72 hours):  Recent Labs   Lab Result Units 12/18/24 0418 12/19/24  0338 12/20/24  0340   WBC K/uL 28.42* 22.84* 12.66   Hemoglobin g/dL 7.9* 7.8* 7.9*   Hematocrit % 24.9* 24.5* 25.2*   Platelets K/uL 343 331 250   Gran % % 91.0* 86.9* 84.9*   Lymph % % 3.0* 4.7* 4.9*   Mono % % 3.0* 3.9* 4.3   Eosinophil % % 2.0 3.5 4.6   Basophil % % 1.0 0.3 0.2   Differential Method  Manual Automated Automated       Metabolic Panel (last 72 hours):  Recent Labs   Lab Result Units 12/18/24  0418 12/19/24  0338 12/20/24  0340   Sodium mmol/L 134* 137 135*   Potassium mmol/L 3.9 3.3* 3.3*   Chloride mmol/L 104 106 105   CO2 mmol/L 25 27 28   Glucose mg/dL 199* 208* 188*   BUN mg/dL 13 12 12   Creatinine mg/dL 1.3 1.2 1.0   Albumin g/dL <0.6* <0.6* <0.6*   Total Bilirubin mg/dL 0.4 0.4 0.4   Alkaline Phosphatase U/L 439* 503* 527*   AST U/L 24 25 33   ALT U/L 10 8* 9*   Magnesium mg/dL 1.7 1.5* 1.6        Microbiologic Results:  Microbiology Results (last 7 days)       Procedure Component Value Units Date/Time    Culture, Respiratory with Gram Stain [[id number]]  (Abnormal)  (Susceptibility) Collected: 12/15/24 0935    Order Status: Completed Specimen: Respiratory from Endotracheal Aspirate Updated: 12/20/24 1256     Respiratory Culture ACINETOBACTER BAUMANNII   Many  Susceptibility pending        PROTEUS MIRABILIS  Few        KLEBSIELLA PNEUMONIAE   Few  ESBL        Gram Stain (Respiratory) Few WBC's     Gram Stain (Respiratory) Many Gram positive rods     Gram Stain (Respiratory) Few Gram negative cocci    Blood culture x two cultures. Draw prior to antibiotics. [[id number]] Collected: 12/15/24 0942    Order Status: Completed Specimen: Blood from Peripheral, Antecubital, Left Updated: 12/19/24 1612     Blood Culture, Routine No Growth to date      No Growth to date      No Growth to date      No Growth to date      No Growth to date    Narrative:      Aerobic and anaerobic    Blood culture x two cultures. Draw prior to antibiotics. [[id number]] Collected: 12/15/24 0923    Order Status: Completed Specimen: Blood from Peripheral, Forearm, Right Updated: 12/19/24 1612     Blood Culture, Routine No Growth to date      No Growth to date      No Growth to date      No Growth to date      No Growth to date    Narrative:      Aerobic and anaerobic    Urine culture [[id number]]  (Abnormal)  (Susceptibility) Collected: 12/15/24 0941    Order Status: Completed Specimen: Urine Updated: 12/17/24 2343     Urine Culture, Routine KLEBSIELLA PNEUMONIAE ESBL  >100,000 cfu/ml      Narrative:      Preferred Collection Type->Urine, Clean Catch  Specimen Source->Urine    Culture, Respiratory with Gram Stain [[id number]]     Order Status: Canceled Specimen: Respiratory

## 2024-12-20 NOTE — ASSESSMENT & PLAN NOTE
"This patient does have evidence of infective focus  My overall impression is septic shock due to MAP < 65 and SBP < 90.  Source: Respiratory and Urinary Tract  Antibiotics given-   Antibiotics (72h ago, onward)      Start     Stop Route Frequency Ordered    12/17/24 0900  meropenem injection 1 g         -- IV Every 8 hours (non-standard times) 12/17/24 0757          Latest lactate reviewed-  No results for input(s): "LACTATE", "POCLAC" in the last 72 hours.    Organ dysfunction indicated by Acute respiratory failure and Encephalopathy    Fluid challenge Ideal Body Weight- The patient's ideal body weight is Ideal body weight: 66.1 kg (145 lb 11.6 oz) which will be used to calculate fluid bolus of 30 ml/kg for treatment of septic shock.      Post- resuscitation assessment Yes Perfusion exam was performed within 6 hours of septic shock presentation after bolus shows Inadequate tissue perfusion assessed by non-invasive monitoring       Will Start Pressors- Levophed for MAP of 65  Source control achieved by: iv zosym. Vanc, ivfs  12/17 wean levophed as tolerates- abxs changed to merrem, cotn vanc - await culture final reports  12/18 wbc slightly imrpoved - cont iv abxs- await final sputum culture - on merrem fro esbl kleb in urine  12/19 cont iv merrem for esbl kleb in urine and proteus in sputum - dc vanc  12/20 wbc improvign - cotn iv merrem  "

## 2024-12-20 NOTE — ASSESSMENT & PLAN NOTE
Patient's FSGs are uncontrolled due to hyperglycemia on current medication regimen.  Last A1c reviewed-   Lab Results   Component Value Date    HGBA1C 6.6 (H) 12/16/2024     Most recent fingerstick glucose reviewed-   Recent Labs   Lab 12/19/24  0844 12/19/24  1106 12/19/24  1602   POCTGLUCOSE 244* 206* 175*       Current correctional scale  Low  Maintain anti-hyperglycemic dose as follows-   Antihyperglycemics (From admission, onward)    Start     Stop Route Frequency Ordered    12/16/24 0952  insulin aspart U-100 pen 0-5 Units         -- SubQ Every 4 hours PRN 12/16/24 0853        Hold Oral hypoglycemics while patient is in the hospital.  12/17 A1c improved to 6.6%

## 2024-12-20 NOTE — ASSESSMENT & PLAN NOTE
Patient's most recent potassium results are listed below.   Recent Labs     12/18/24  0418 12/19/24  0338 12/20/24  0340   K 3.9 3.3* 3.3*       Plan  - Replete potassium per protocol  - Monitor potassium Daily  - Patient's hypokalemia is worsening. Will replete with kcl  12/20 increase pot bicarb to bid

## 2024-12-20 NOTE — ASSESSMENT & PLAN NOTE
Patient has Abnormal Magnesium: hypomagnesemia. Will continue to monitor electrolytes closely. Will replace the affected electrolytes and repeat labs to be done after interventions completed. The patient's magnesium results have been reviewed and are listed below.  Recent Labs   Lab 12/20/24  0340   MG 1.6      12/17 mag imrpoving - repalce mag today  12/19 replace mag today  12/20 mag oxide bid

## 2024-12-20 NOTE — EICU
Intervention Initiated From:  [facility] / EICU    Sandy intervened regarding:  Rounding (Video assessment)  Comments: Video rounds done.  Resting quietly in bed.  Continues on vent support, sedated on propofol IV drip and continues on norepinephrine IV drip (see MAR for rates), no acute distress noted.

## 2024-12-20 NOTE — ASSESSMENT & PLAN NOTE
Anemia is likely due to chronic infections Most recent hemoglobin and hematocrit are listed below.  Recent Labs     12/18/24  0418 12/19/24  0338 12/20/24  0340   HGB 7.9* 7.8* 7.9*   HCT 24.9* 24.5* 25.2*       Plan  - Monitor serial CBC: Daily  - Transfuse PRBC if patient becomes hemodynamically unstable, symptomatic or H/H drops below 7/21.  - Patient has not received any PRBC transfusions to date  - Patient's anemia is currently worsening. Will adjust treatment as follows: 2uprbcs today  12/17 h/h Improved  12/20 h/h holding

## 2024-12-20 NOTE — ASSESSMENT & PLAN NOTE
Patient has a diagnosis of pneumonia. The cause of the pneumonia is suspected to be bacterial in etiology but organism is not known. The pneumonia is stable. The patient has the following signs/symptoms of pneumonia: persistent hypoxia  and sputum production. The patient does have a current oxygen requirement and the patient does not have a home oxygen requirement. I have reviewed the pertinent imaging. The following cultures have been collected: Blood cultures and Sputum culture The culture results are listed below.     Current antimicrobial regimen consists of the antibiotics listed below. Will monitor patient closely and continue current treatment plan unchanged.    Antibiotics (From admission, onward)      Start     Stop Route Frequency Ordered    12/17/24 0900  meropenem injection 1 g         -- IV Every 8 hours (non-standard times) 12/17/24 0757            Microbiology Results (last 7 days)       Procedure Component Value Units Date/Time    Blood culture x two cultures. Draw prior to antibiotics. [[id number]] Collected: 12/15/24 0942    Order Status: Completed Specimen: Blood from Peripheral, Antecubital, Left Updated: 12/19/24 1612     Blood Culture, Routine No Growth to date      No Growth to date      No Growth to date      No Growth to date      No Growth to date    Narrative:      Aerobic and anaerobic    Blood culture x two cultures. Draw prior to antibiotics. [[id number]] Collected: 12/15/24 0923    Order Status: Completed Specimen: Blood from Peripheral, Forearm, Right Updated: 12/19/24 1612     Blood Culture, Routine No Growth to date      No Growth to date      No Growth to date      No Growth to date      No Growth to date    Narrative:      Aerobic and anaerobic    Culture, Respiratory with Gram Stain [[id number]]  (Abnormal)  (Susceptibility) Collected: 12/15/24 0935    Order Status: Completed Specimen: Respiratory from Endotracheal Aspirate Updated: 12/19/24 1158     Respiratory Culture GRAM  NEGATIVE FLORA  Many  Identification and susceptibility pending        PROTEUS MIRABILIS  Few        KLEBSIELLA PNEUMONIAE   Few  Susceptibility pending       Gram Stain (Respiratory) Few WBC's     Gram Stain (Respiratory) Many Gram positive rods     Gram Stain (Respiratory) Few Gram negative cocci    Urine culture [[id number]]  (Abnormal)  (Susceptibility) Collected: 12/15/24 0941    Order Status: Completed Specimen: Urine Updated: 12/17/24 2343     Urine Culture, Routine KLEBSIELLA PNEUMONIAE ESBL  >100,000 cfu/ml      Narrative:      Preferred Collection Type->Urine, Clean Catch  Specimen Source->Urine    Culture, Respiratory with Gram Stain [[id number]]     Order Status: Canceled Specimen: Respiratory         12/17 dc zosyn with recent esbl kleb in urine - will change to merrem - cont vanc until final cultures obtained - change vent to simv; add nebs  12/18 cotn merrem and vanc - await final sputum cutlure - cont vent on ac control; nebs - wbc imrpoved slightly; lasix 20mg iv for edema today  12/19  dc vanc - proteus grwoign from sputum - cont merrem and nebs; lasix today - wean rr on vent  12/20 wnc improved - cont merrem - nebs and vent - another dose of lasix today for edema - fio2 increased

## 2024-12-21 NOTE — PLAN OF CARE
Problem: Skin Injury Risk Increased  Goal: Skin Health and Integrity  Outcome: Not Progressing     Problem: Mechanical Ventilation Invasive  Goal: Optimal Device Function  Outcome: Progressing  Goal: Mechanical Ventilation Liberation  Outcome: Progressing  Goal: Optimal Nutrition Delivery  Outcome: Not Progressing  Goal: Absence of Device-Related Skin and Tissue Injury  Outcome: Progressing  Goal: Absence of Ventilator-Induced Lung Injury  Outcome: Progressing     Problem: Artificial Airway  Goal: Optimal Device Function  Outcome: Progressing  Goal: Absence of Device-Related Skin or Tissue Injury  Outcome: Progressing     Problem: Adult Inpatient Plan of Care  Goal: Absence of Hospital-Acquired Illness or Injury  Outcome: Progressing  Goal: Optimal Comfort and Wellbeing  Outcome: Progressing     Problem: Diabetes Comorbidity  Goal: Blood Glucose Level Within Targeted Range  Outcome: Progressing     Problem: Wound  Goal: Absence of Infection Signs and Symptoms  Outcome: Progressing  Goal: Improved Oral Intake  Outcome: Not Progressing  Goal: Skin Health and Integrity  Outcome: Not Progressing  Goal: Optimal Wound Healing  Outcome: Not Progressing     Problem: Infection  Goal: Absence of Infection Signs and Symptoms  Outcome: Progressing     Problem: Pneumonia  Goal: Fluid Balance  Outcome: Progressing  Goal: Resolution of Infection Signs and Symptoms  Outcome: Progressing  Goal: Effective Oxygenation and Ventilation  Outcome: Progressing     Problem: Sepsis/Septic Shock  Goal: Optimal Coping  Outcome: Progressing  Goal: Absence of Bleeding  Outcome: Progressing  Goal: Blood Glucose Level Within Targeted Range  Outcome: Progressing  Goal: Absence of Infection Signs and Symptoms  Outcome: Progressing  Goal: Optimal Nutrition Intake  Outcome: Not Progressing     Problem: Fall Injury Risk  Goal: Absence of Fall and Fall-Related Injury  Outcome: Progressing     Problem: Electrolyte Imbalance  Goal: Electrolyte  Balance  Outcome: Progressing

## 2024-12-21 NOTE — PROGRESS NOTES
[facility] Medicine  Progress Note    Patient Name: Carlos Chahal  MRN: [id number]  Patient Class: IP- Inpatient   Admission Date: 12/15/2024  Length of Stay: 6 days  Attending Physician: Kim Diamond MD  Primary Care Provider: Jose Francisco Klein MD        Subjective     Principal Problem:Sepsis        HPI:  Carlos Chahal is a 34 y.o. male who presents to the ED from nursing home for altered mental status and difficulty breathing.  Patient is found to be hypoxic.  He has a history of anoxic brain injury     This am, pt is on ventilator and levophed at 0.25mcg and ivfs at 75ml/hr.      Spoke with father this am on condition of pt    Overview/Hospital Course:  12/17 ND became more awake yesterday with wound changes - low dose propofol added for pt - able to wean levophed to 0.15mcg.  did tolerate tf last night - check kub this am  12/18 ND pt had to be changed back to ac control last nigth after becoming tachypneic and had low tv.  Tolerating ac mode.  Levophed down to 0.1 mcg  12/19 ND pt toleratign vent - will wean RR today - cont to slowly wean levophed as tolerates - wbc slowly imrpoving - tolerating low dose tfs - will cont to increase tfs as tolerates  12/20 ND tolerating vent - cont to work on feeds and nutritional support  12/21 [place] weekend coverage, in no acute distress, stable vital signs, AC/18/400/5/40%, SpO2 100%. On proprofol for sedation, patient tracks voice and moving head and neck with lid opening. Mild bump in WBC, afebrile, may be associated with the reported residual >200 and tube feeds held. Abdomen, soft and no distension on exam. Will resume as tolerated and monitor.  Blood cx x2, final report no growth. Continue merrem (D5), gentamicin (D2) with mixed MDRO frida from sputum studies and UTI. Replete Mg, continue abx. Continue daily wound care.     Interval History: patient seen and examined.     Review of Systems   Unable to perform ROS: Acuity of condition      Objective:     Vital Signs (Most Recent):  Temp: 97 °F (36.1 °C) (12/21/24 0306)  Pulse: 77 (12/21/24 0552)  Resp: 18 (12/21/24 0552)  BP: 101/65 (12/21/24 0530)  SpO2: 100 % (12/21/24 0552) Vital Signs (24h Range):  Temp:  [97 °F (36.1 °C)-97.3 °F (36.3 °C)] 97 °F (36.1 °C)  Pulse:  [74-85] 77  Resp:  [3-22] 18  SpO2:  [100 %] 100 %  BP: ()/(52-77) 101/65     Weight: 68.5 kg (151 lb 0.2 oz)  Body mass index is 23.65 kg/m².    Intake/Output Summary (Last 24 hours) at 12/21/2024 0706  Last data filed at 12/21/2024 0558  Gross per 24 hour   Intake 2193.32 ml   Output 1425 ml   Net 768.32 ml         Physical Exam  Constitutional:       Appearance: He is ill-appearing.      Comments: Thin male; intubated   HENT:      Mouth/Throat:      Mouth: Mucous membranes are moist.   Eyes:      Pupils: Pupils are equal, round, and reactive to light.   Cardiovascular:      Rate and Rhythm: Normal rate and regular rhythm.   Pulmonary:      Effort: Pulmonary effort is normal.      Breath sounds: Rhonchi present.   Abdominal:      General: There is no distension.      Palpations: Abdomen is soft.      Tenderness: There is no abdominal tenderness.      Comments: Jtube noted; ileostomy noted   Genitourinary:     Comments: Rojas with yellow urine  Skin:     General: Skin is warm.      Comments: Wounds to sacrum; groin area             Significant Labs: All pertinent labs within the past 24 hours have been reviewed.  A1C:   Recent Labs   Lab 12/16/24  0933   HGBA1C 6.6*     ABGs:   Recent Labs   Lab 12/20/24  0524 12/21/24  0528   PH 7.436 7.424   PCO2 38.1 40.8   HCO3 25.5 26.3   POCSATURATED 88.1* 100.0*   PO2 58.3* 178*     BMP:   Recent Labs   Lab 12/21/24  0331   *      K 3.9      CO2 30*   BUN 13   CREATININE 0.9   CALCIUM 9.2   MG 1.4*     CBC:   Recent Labs   Lab 12/20/24  0340 12/21/24  0331   WBC 12.66 14.04*   HGB 7.9* 8.0*   HCT 25.2* 25.7*    265     CMP:   Recent Labs   Lab 12/20/24  0340  "12/21/24  0331   * 137   K 3.3* 3.9    107   CO2 28 30*   * 165*   BUN 12 13   CREATININE 1.0 0.9   CALCIUM 9.0 9.2   PROT 5.8* 5.9*   ALBUMIN <0.6* 0.6*   BILITOT 0.4 0.4   ALKPHOS 527* 623*   AST 33 34   ALT 9* 10   ANIONGAP 2* 0*     Recent Labs   Lab 12/20/24  0340 12/21/24  0331   MG 1.6 1.4*     POCT Glucose:   Recent Labs   Lab 12/20/24 2021 12/20/24  2350 12/21/24  0512   POCTGLUCOSE 157* 176* 185*   X-Ray Chest 1 View  Narrative: EXAMINATION:  XR CHEST 1 VIEW    CLINICAL HISTORY:  intubated - right sided pneumonia;    TECHNIQUE:  Frontal view obtained of the chest    COMPARISON:  12/18/2024    FINDINGS:  No cardiac silhouette enlargement.  The left lung is clear.  There is similar appearing dense consolidation within the right mid and lower lung zones obscuring the right hemidiaphragm and blunting the right costophrenic angle.  An ET tube is in place distal tip terminating at the clavicular head level.  A left upper extremity PICC is in place distal tip overlying the region of the SVC.  Impression:   1. Similar appearing dense consolidation in the right mid and lower lung zones consistent with pneumonia.  2. Support devices in place as above.    Electronically signed by: Angelo Ramachandran MD  Date:    12/20/2024  Time:    11:52     Significant Imaging: I have reviewed all pertinent imaging results/findings within the past 24 hours.    Assessment and Plan     * Sepsis  This patient does have evidence of infective focus  My overall impression is septic shock due to MAP < 65 and SBP < 90.  Source: Respiratory and Urinary Tract  Antibiotics given-   Antibiotics (72h ago, onward)      Start     Stop Route Frequency Ordered    12/21/24 1600  gentamicin (GARAMYCIN) 462.8 mg in 0.9% NaCl 100 mL IVPB         -- IV Every 24 hours (non-standard times) 12/21/24 0144    12/20/24 1438  gentamicin - pharmacy to dose  (gentamicin IVPB (PEDS and ADULTS))        Placed in "And" Linked Group    -- IV pharmacy to " "manage frequency 12/20/24 1338    12/17/24 0900  meropenem injection 1 g         -- IV Every 8 hours (non-standard times) 12/17/24 0757          Latest lactate reviewed-  No results for input(s): "LACTATE", "POCLAC" in the last 72 hours.    Organ dysfunction indicated by Acute respiratory failure and Encephalopathy    Fluid challenge Ideal Body Weight- The patient's ideal body weight is Ideal body weight: 66.1 kg (145 lb 11.6 oz) which will be used to calculate fluid bolus of 30 ml/kg for treatment of septic shock.      Post- resuscitation assessment Yes Perfusion exam was performed within 6 hours of septic shock presentation after bolus shows Inadequate tissue perfusion assessed by non-invasive monitoring       Will Start Pressors- Levophed for MAP of 65  Source control achieved by: iv zosym. Vanc, ivfs  12/17 wean levophed as tolerates- abxs changed to merrem, cotn vanc - await culture final reports  12/18 wbc slightly imrpoved - cont iv abxs- await final sputum culture - on merrem fro esbl kleb in urine  12/19 cont iv merrem for esbl kleb in urine and proteus in sputum - dc vanc  12/20 wbc improvign - cotn iv merrem    Hypokalemia  Patient's most recent potassium results are listed below.   Recent Labs     12/19/24  0338 12/20/24  0340 12/21/24  0331   K 3.3* 3.3* 3.9       Plan  - Replete potassium per protocol  - Monitor potassium Daily  - Patient's hypokalemia is stable, continue below and monitor  12/20 increase pot bicarb to bid      Severe malnutrition  Nutrition consulted. Most recent weight and BMI monitored-     Measurements:  Wt Readings from Last 1 Encounters:   12/16/24 68.5 kg (151 lb 0.2 oz)   Body mass index is 23.65 kg/m².    Patient has been screened and assessed by RD.    Malnutrition Type:  Context:    Level:      Malnutrition Characteristic Summary:       Interventions/Recommendations (treatment strategy):  1. Rec'd Continuous EN: Glucerna 1.5 @ 40mL/r increasing by 5 mL q6hrs to goal rate of " 55mL/hr with a continuous 40mL FWF to provide 1980kcal (94% EEN), 109g of protein (100% EPN), and 1962mL FW. 2. Néstor BID via PEG tube to promote wound healing and to provide additional nutrition.         - Do not mix Néstor with formula in a tube-feeding bag       - Pour one packet of Néstor in a clean, small container for mixing.         - Add 4 fl oz (120 mL) water at room temperature.         - Mix well with disposable spoon or tongue blade until all particles are completely hydrated.         - Verify correct tube placement.         - Flush feeding tube with 30 mL water.         -Administer Néstor through feeding tube using a 60-mL or larger syringe.         - Flush with an additional 30 mL water. 3. RD to follow and make rec's accordingly.    12/18 restart tfs today at 1ml/hr to see if can tolerate feeds  12/19 increase tfs as tolerates    Hypomagnesemia  Patient has Abnormal Magnesium: hypomagnesemia. Will continue to monitor electrolytes closely. Will replace the affected electrolytes and repeat labs to be done after interventions completed. The patient's magnesium results have been reviewed and are listed below.  Recent Labs   Lab 12/21/24  0331   MG 1.4*      12/17 mag imrpoving - repalce mag today  12/19 replace mag today  12/20 mag oxide bid  12/21 2g Mg    Pneumonia of right lower lobe due to infectious organism  Patient has a diagnosis of pneumonia. The cause of the pneumonia is suspected to be bacterial in etiology but organism is not known. The pneumonia is stable. The patient has the following signs/symptoms of pneumonia: persistent hypoxia  and sputum production. The patient does have a current oxygen requirement and the patient does not have a home oxygen requirement. I have reviewed the pertinent imaging. The following cultures have been collected: Blood cultures and Sputum culture The culture results are listed below.     Current antimicrobial regimen consists of the antibiotics listed below. Will  "monitor patient closely and continue current treatment plan unchanged.    Antibiotics (From admission, onward)      Start     Stop Route Frequency Ordered    12/21/24 1600  gentamicin (GARAMYCIN) 462.8 mg in 0.9% NaCl 100 mL IVPB         -- IV Every 24 hours (non-standard times) 12/21/24 0144    12/20/24 1438  gentamicin - pharmacy to dose  (gentamicin IVPB (PEDS and ADULTS))        Placed in "And" Linked Group    -- IV pharmacy to manage frequency 12/20/24 1338    12/17/24 0900  meropenem injection 1 g         -- IV Every 8 hours (non-standard times) 12/17/24 0757            Microbiology Results (last 7 days)       Procedure Component Value Units Date/Time    Blood culture x two cultures. Draw prior to antibiotics. [[id number]] Collected: 12/15/24 0942    Order Status: Completed Specimen: Blood from Peripheral, Antecubital, Left Updated: 12/20/24 1612     Blood Culture, Routine No growth after 5 days.    Narrative:      Aerobic and anaerobic    Blood culture x two cultures. Draw prior to antibiotics. [[id number]] Collected: 12/15/24 0923    Order Status: Completed Specimen: Blood from Peripheral, Forearm, Right Updated: 12/20/24 1612     Blood Culture, Routine No growth after 5 days.    Narrative:      Aerobic and anaerobic    Culture, Respiratory with Gram Stain [[id number]]  (Abnormal)  (Susceptibility) Collected: 12/15/24 0935    Order Status: Completed Specimen: Respiratory from Endotracheal Aspirate Updated: 12/20/24 1256     Respiratory Culture ACINETOBACTER BAUMANNII   Many  Susceptibility pending        PROTEUS MIRABILIS  Few        KLEBSIELLA PNEUMONIAE   Few  ESBL        Gram Stain (Respiratory) Few WBC's     Gram Stain (Respiratory) Many Gram positive rods     Gram Stain (Respiratory) Few Gram negative cocci    Urine culture [[id number]]  (Abnormal)  (Susceptibility) Collected: 12/15/24 0941    Order Status: Completed Specimen: Urine Updated: 12/17/24 2343     Urine Culture, Routine " KLEBSIELLA PNEUMONIAE ESBL  >100,000 cfu/ml      Narrative:      Preferred Collection Type->Urine, Clean Catch  Specimen Source->Urine    Culture, Respiratory with Gram Stain [[id number]]     Order Status: Canceled Specimen: Respiratory         12/17 dc zosyn with recent esbl kleb in urine - will change to merrem - cont vanc until final cultures obtained - change vent to simv; add nebs  12/18 cotn merrem and vanc - await final sputum cutlure - cont vent on ac control; nebs - wbc imrpoved slightly; lasix 20mg iv for edema today  12/19  dc vanc - proteus grwoign from sputum - cont merrem and nebs; lasix today - wean rr on vent  12/20 wnc improved - cont merrem - nebs and vent - another dose of lasix today for edema - fio2 increased    Sacral wound, sequela  Local wound care with dakins bid  Iv abxs      Open wound of groin  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  Recent Labs     12/19/24  0338 12/20/24  0340 12/21/24  0331   HGB 7.8* 7.9* 8.0*   HCT 24.5* 25.2* 25.7*       Plan  - Monitor serial CBC: Daily  - Transfuse PRBC if patient becomes hemodynamically unstable, symptomatic or H/H drops below 7/21.  - Patient has not received any PRBC transfusions to date  - Patient's anemia is currently worsening. Will adjust treatment as follows: 2uprbcs today  12/17 h/h Improved  12/20 h/h holding    History of anoxic brain injury        Urinary tract infection associated with indwelling urethral catheter  Await new urine culture =- currently on zosyn/vanc  12/17 change to merrem/vanc  12/18 has esbl klebsiella - cont merrem    Type 1 diabetes  Patient's FSGs are uncontrolled due to hyperglycemia on current medication regimen.  Last A1c reviewed-   Lab Results   Component Value Date    HGBA1C 6.6 (H) 12/16/2024     Most recent fingerstick glucose reviewed-   Recent Labs    Lab 12/20/24  1625 12/20/24  2021 12/20/24  2350 12/21/24  0512   POCTGLUCOSE 174* 157* 176* 185*       Current correctional scale  Low  Maintain anti-hyperglycemic dose as follows-   Antihyperglycemics (From admission, onward)      Start     Stop Route Frequency Ordered    12/16/24 0952  insulin aspart U-100 pen 0-5 Units         -- SubQ Every 4 hours PRN 12/16/24 0853          Hold Oral hypoglycemics while patient is in the hospital.  12/17 A1c improved to 6.6%      VTE Risk Mitigation (From admission, onward)           Ordered     enoxaparin injection 40 mg  Daily         12/16/24 0815     IP VTE LOW RISK PATIENT  Once         12/15/24 1201     Place sequential compression device  Until discontinued         12/15/24 1201                    Discharge Planning   JOSE MANUEL:      Code Status: Full Code   Medical Readiness for Discharge Date:   Discharge Plan A: Return to nursing home                        Brandon Pruitt DO  Department of Hospital Medicine   [place] [facility]

## 2024-12-21 NOTE — PLAN OF CARE
Care plan reviewed and updated. Cont with vent. IVF and abx. Contact precautions maintained. Sputum- +A. Baumanni. Abx adjusted. Rojas cath and Ileostomy intact. Potassium and Mag replaced. TF- residuals.

## 2024-12-21 NOTE — ASSESSMENT & PLAN NOTE
Patient's FSGs are uncontrolled due to hyperglycemia on current medication regimen.  Last A1c reviewed-   Lab Results   Component Value Date    HGBA1C 6.6 (H) 12/16/2024     Most recent fingerstick glucose reviewed-   Recent Labs   Lab 12/20/24  1625 12/20/24 2021 12/20/24  2350 12/21/24  0512   POCTGLUCOSE 174* 157* 176* 185*       Current correctional scale  Low  Maintain anti-hyperglycemic dose as follows-   Antihyperglycemics (From admission, onward)    Start     Stop Route Frequency Ordered    12/16/24 0952  insulin aspart U-100 pen 0-5 Units         -- SubQ Every 4 hours PRN 12/16/24 0853        Hold Oral hypoglycemics while patient is in the hospital.  12/17 A1c improved to 6.6%

## 2024-12-21 NOTE — ASSESSMENT & PLAN NOTE
"Patient has a diagnosis of pneumonia. The cause of the pneumonia is suspected to be bacterial in etiology but organism is not known. The pneumonia is stable. The patient has the following signs/symptoms of pneumonia: persistent hypoxia  and sputum production. The patient does have a current oxygen requirement and the patient does not have a home oxygen requirement. I have reviewed the pertinent imaging. The following cultures have been collected: Blood cultures and Sputum culture The culture results are listed below.     Current antimicrobial regimen consists of the antibiotics listed below. Will monitor patient closely and continue current treatment plan unchanged.    Antibiotics (From admission, onward)      Start     Stop Route Frequency Ordered    12/21/24 1600  gentamicin (GARAMYCIN) 462.8 mg in 0.9% NaCl 100 mL IVPB         -- IV Every 24 hours (non-standard times) 12/21/24 0144    12/20/24 1438  gentamicin - pharmacy to dose  (gentamicin IVPB (PEDS and ADULTS))        Placed in "And" Linked Group    -- IV pharmacy to manage frequency 12/20/24 1338    12/17/24 0900  meropenem injection 1 g         -- IV Every 8 hours (non-standard times) 12/17/24 0757            Microbiology Results (last 7 days)       Procedure Component Value Units Date/Time    Blood culture x two cultures. Draw prior to antibiotics. [[id number]] Collected: 12/15/24 0942    Order Status: Completed Specimen: Blood from Peripheral, Antecubital, Left Updated: 12/20/24 1612     Blood Culture, Routine No growth after 5 days.    Narrative:      Aerobic and anaerobic    Blood culture x two cultures. Draw prior to antibiotics. [[id number]] Collected: 12/15/24 0923    Order Status: Completed Specimen: Blood from Peripheral, Forearm, Right Updated: 12/20/24 1612     Blood Culture, Routine No growth after 5 days.    Narrative:      Aerobic and anaerobic    Culture, Respiratory with Gram Stain [[id number]]  (Abnormal)  (Susceptibility) Collected: " 12/15/24 0935    Order Status: Completed Specimen: Respiratory from Endotracheal Aspirate Updated: 12/20/24 1256     Respiratory Culture ACINETOBACTER BAUMANNII   Many  Susceptibility pending        PROTEUS MIRABILIS  Few        KLEBSIELLA PNEUMONIAE   Few  ESBL        Gram Stain (Respiratory) Few WBC's     Gram Stain (Respiratory) Many Gram positive rods     Gram Stain (Respiratory) Few Gram negative cocci    Urine culture [[id number]]  (Abnormal)  (Susceptibility) Collected: 12/15/24 0941    Order Status: Completed Specimen: Urine Updated: 12/17/24 2343     Urine Culture, Routine KLEBSIELLA PNEUMONIAE ESBL  >100,000 cfu/ml      Narrative:      Preferred Collection Type->Urine, Clean Catch  Specimen Source->Urine    Culture, Respiratory with Gram Stain [[id number]]     Order Status: Canceled Specimen: Respiratory         12/17 dc zosyn with recent esbl kleb in urine - will change to merrem - cont vanc until final cultures obtained - change vent to simv; add nebs  12/18 cotn merrem and vanc - await final sputum cutlure - cont vent on ac control; nebs - wbc imrpoved slightly; lasix 20mg iv for edema today  12/19  dc vanc - proteus grwoign from sputum - cont merrem and nebs; lasix today - wean rr on vent  12/20 wnc improved - cont merrem - nebs and vent - another dose of lasix today for edema - fio2 increased

## 2024-12-21 NOTE — ASSESSMENT & PLAN NOTE
Patient has Abnormal Magnesium: hypomagnesemia. Will continue to monitor electrolytes closely. Will replace the affected electrolytes and repeat labs to be done after interventions completed. The patient's magnesium results have been reviewed and are listed below.  Recent Labs   Lab 12/21/24  0331   MG 1.4*      12/17 mag imrpoving - repalce mag today  12/19 replace mag today  12/20 mag oxide bid  12/21 2g Mg

## 2024-12-21 NOTE — EICU
Intervention Initiated From:  [facility] / EICU    Sandy intervened regarding:  Rounding (Video assessment)  Comments: Video rounds done. Continues on vent support.  Resting quietly in bed, sedated on propofol IV drip and also continues on norepinephrine IV drip (see MAR for rates).  Bedside monitor showing SR 79, BP 88/61, MAP 70, POx 100%,   no acute distress noted.

## 2024-12-21 NOTE — PROGRESS NOTES
"Pharmacokinetic Follow Up: Gentamicin    Assessment of levels:   Extended interval dosing. Trough piror to second dose came back 2.3.  Will push out dosing to Q36h and will repeat trough prior to 0400 dose tomorrow at 0300.  Target trough <1    Regimen Plan:   Will check trough concentration 60 min prior to the dose on 12/22 at 0300    Drug levels (last 3 results):  No results for input(s): "AMIKACINPEAK", "AMIKACINTROU", "AMIKACINRAND", "AMIKACIN" in the last 72 hours.    Recent Labs   Lab Result Units 12/21/24  0058 12/21/24  1434   Gentamicin, Random ug/mL 5.1  --    Gentamicin, Trough ug/mL  --  2.3*       No results for input(s): "TOBRA8", "TOBRA10", "TOBRA12", "TOBRARND", "TOBRAMYCIN", "TOBRAPEAK", "TOBRATROUGH", "TOBRAMYCINPE", "TOBRAMYCINRA", "TOBRAMYCINTR" in the last 72 hours.    Pharmacy will continue to monitor.    Please contact pharmacy with any questions regarding this assessment.    Thank you for the consult,   Charlotte Pastor McArdle      Patient brief summary:  Carlos Chahal is a 34 y.o. male initiated on aminoglycoside therapy for treatment of lower respiratory infection    Drug Allergies:   Review of patient's allergies indicates:   Allergen Reactions    Guaifenesin Hives    Codeine Itching       Actual Body Weight:   68.5kg    Adjust Body Weight:   67.1kg    Ideal Body Weight:  66.1kg    Renal Function:   Estimated Creatinine Clearance: 109.1 mL/min (based on SCr of 0.9 mg/dL).,     Dialysis Method (if applicable):  N/A    CBC (last 72 hours):  Recent Labs   Lab Result Units 12/19/24  0338 12/20/24  0340 12/21/24  0331   WBC K/uL 22.84* 12.66 14.04*   Hemoglobin g/dL 7.8* 7.9* 8.0*   Hematocrit % 24.5* 25.2* 25.7*   Platelets K/uL 331 250 265   Gran % % 86.9* 84.9* 78.4*   Lymph % % 4.7* 4.9* 7.1*   Mono % % 3.9* 4.3 6.4   Eosinophil % % 3.5 4.6 6.2   Basophil % % 0.3 0.2 0.4   Differential Method  Automated Automated Automated       Metabolic Panel (last 72 hours):  Recent Labs   Lab Result " Units 12/19/24  0338 12/20/24  0340 12/21/24  0331   Sodium mmol/L 137 135* 137   Potassium mmol/L 3.3* 3.3* 3.9   Chloride mmol/L 106 105 107   CO2 mmol/L 27 28 30*   Glucose mg/dL 208* 188* 165*   BUN mg/dL 12 12 13   Creatinine mg/dL 1.2 1.0 0.9   Albumin g/dL <0.6* <0.6* 0.6*   Total Bilirubin mg/dL 0.4 0.4 0.4   Alkaline Phosphatase U/L 503* 527* 623*   AST U/L 25 33 34   ALT U/L 8* 9* 10   Magnesium mg/dL 1.5* 1.6 1.4*       Aminoglycoside Administrations:  aminoglycosides given in last 96 hours                     gentamicin (GARAMYCIN) 462.8 mg in 0.9% NaCl 100 mL IVPB (mg) 462.8 mg New Bag 12/20/24 1551                    Microbiologic Results:  Microbiology Results (last 7 days)       Procedure Component Value Units Date/Time    Blood culture x two cultures. Draw prior to antibiotics. [[id number]] Collected: 12/15/24 0942    Order Status: Completed Specimen: Blood from Peripheral, Antecubital, Left Updated: 12/20/24 1612     Blood Culture, Routine No growth after 5 days.    Narrative:      Aerobic and anaerobic    Blood culture x two cultures. Draw prior to antibiotics. [[id number]] Collected: 12/15/24 0923    Order Status: Completed Specimen: Blood from Peripheral, Forearm, Right Updated: 12/20/24 1612     Blood Culture, Routine No growth after 5 days.    Narrative:      Aerobic and anaerobic    Culture, Respiratory with Gram Stain [[id number]]  (Abnormal)  (Susceptibility) Collected: 12/15/24 0935    Order Status: Completed Specimen: Respiratory from Endotracheal Aspirate Updated: 12/20/24 1256     Respiratory Culture ACINETOBACTER BAUMANNII   Many  Susceptibility pending        PROTEUS MIRABILIS  Few        KLEBSIELLA PNEUMONIAE   Few  ESBL        Gram Stain (Respiratory) Few WBC's     Gram Stain (Respiratory) Many Gram positive rods     Gram Stain (Respiratory) Few Gram negative cocci    Urine culture [[id number]]  (Abnormal)  (Susceptibility) Collected: 12/15/24 0941    Order Status:  Completed Specimen: Urine Updated: 12/17/24 2343     Urine Culture, Routine KLEBSIELLA PNEUMONIAE ESBL  >100,000 cfu/ml      Narrative:      Preferred Collection Type->Urine, Clean Catch  Specimen Source->Urine    Culture, Respiratory with Gram Stain [[id number]]     Order Status: Canceled Specimen: Respiratory

## 2024-12-21 NOTE — ASSESSMENT & PLAN NOTE
"This patient does have evidence of infective focus  My overall impression is septic shock due to MAP < 65 and SBP < 90.  Source: Respiratory and Urinary Tract  Antibiotics given-   Antibiotics (72h ago, onward)    Start     Stop Route Frequency Ordered    12/21/24 1600  gentamicin (GARAMYCIN) 462.8 mg in 0.9% NaCl 100 mL IVPB         -- IV Every 24 hours (non-standard times) 12/21/24 0144    12/20/24 1438  gentamicin - pharmacy to dose  (gentamicin IVPB (PEDS and ADULTS))        Placed in "And" Linked Group    -- IV pharmacy to manage frequency 12/20/24 1338    12/17/24 0900  meropenem injection 1 g         -- IV Every 8 hours (non-standard times) 12/17/24 0757        Latest lactate reviewed-  No results for input(s): "LACTATE", "POCLAC" in the last 72 hours.    Organ dysfunction indicated by Acute respiratory failure and Encephalopathy    Fluid challenge Ideal Body Weight- The patient's ideal body weight is Ideal body weight: 66.1 kg (145 lb 11.6 oz) which will be used to calculate fluid bolus of 30 ml/kg for treatment of septic shock.      Post- resuscitation assessment Yes Perfusion exam was performed within 6 hours of septic shock presentation after bolus shows Inadequate tissue perfusion assessed by non-invasive monitoring       Will Start Pressors- Levophed for MAP of 65  Source control achieved by: iv zosym. Vanc, ivfs  12/17 wean levophed as tolerates- abxs changed to merrem, cotn vanc - await culture final reports  12/18 wbc slightly imrpoved - cont iv abxs- await final sputum culture - on merrem fro esbl kleb in urine  12/19 cont iv merrem for esbl kleb in urine and proteus in sputum - dc vanc  12/20 wbc improvign - cotn iv merrem  "

## 2024-12-21 NOTE — SUBJECTIVE & OBJECTIVE
Interval History: patient seen and examined.     Review of Systems   Unable to perform ROS: Acuity of condition     Objective:     Vital Signs (Most Recent):  Temp: 97 °F (36.1 °C) (12/21/24 0306)  Pulse: 77 (12/21/24 0552)  Resp: 18 (12/21/24 0552)  BP: 101/65 (12/21/24 0530)  SpO2: 100 % (12/21/24 0552) Vital Signs (24h Range):  Temp:  [97 °F (36.1 °C)-97.3 °F (36.3 °C)] 97 °F (36.1 °C)  Pulse:  [74-85] 77  Resp:  [3-22] 18  SpO2:  [100 %] 100 %  BP: ()/(52-77) 101/65     Weight: 68.5 kg (151 lb 0.2 oz)  Body mass index is 23.65 kg/m².    Intake/Output Summary (Last 24 hours) at 12/21/2024 0706  Last data filed at 12/21/2024 0558  Gross per 24 hour   Intake 2193.32 ml   Output 1425 ml   Net 768.32 ml         Physical Exam  Constitutional:       Appearance: He is ill-appearing.      Comments: Thin male; intubated   HENT:      Mouth/Throat:      Mouth: Mucous membranes are moist.   Eyes:      Pupils: Pupils are equal, round, and reactive to light.   Cardiovascular:      Rate and Rhythm: Normal rate and regular rhythm.   Pulmonary:      Effort: Pulmonary effort is normal.      Breath sounds: Rhonchi present.   Abdominal:      General: There is no distension.      Palpations: Abdomen is soft.      Tenderness: There is no abdominal tenderness.      Comments: Jtube noted; ileostomy noted   Genitourinary:     Comments: Rojas with yellow urine  Skin:     General: Skin is warm.      Comments: Wounds to sacrum; groin area             Significant Labs: All pertinent labs within the past 24 hours have been reviewed.  A1C:   Recent Labs   Lab 12/16/24  0933   HGBA1C 6.6*     ABGs:   Recent Labs   Lab 12/20/24 0524 12/21/24 0528   PH 7.436 7.424   PCO2 38.1 40.8   HCO3 25.5 26.3   POCSATURATED 88.1* 100.0*   PO2 58.3* 178*     BMP:   Recent Labs   Lab 12/21/24  0331   *      K 3.9      CO2 30*   BUN 13   CREATININE 0.9   CALCIUM 9.2   MG 1.4*     CBC:   Recent Labs   Lab 12/20/24  0340 12/21/24  0331    WBC 12.66 14.04*   HGB 7.9* 8.0*   HCT 25.2* 25.7*    265     CMP:   Recent Labs   Lab 12/20/24  0340 12/21/24  0331   * 137   K 3.3* 3.9    107   CO2 28 30*   * 165*   BUN 12 13   CREATININE 1.0 0.9   CALCIUM 9.0 9.2   PROT 5.8* 5.9*   ALBUMIN <0.6* 0.6*   BILITOT 0.4 0.4   ALKPHOS 527* 623*   AST 33 34   ALT 9* 10   ANIONGAP 2* 0*     Recent Labs   Lab 12/20/24  0340 12/21/24  0331   MG 1.6 1.4*     POCT Glucose:   Recent Labs   Lab 12/20/24 2021 12/20/24  2350 12/21/24  0512   POCTGLUCOSE 157* 176* 185*   X-Ray Chest 1 View  Narrative: EXAMINATION:  XR CHEST 1 VIEW    CLINICAL HISTORY:  intubated - right sided pneumonia;    TECHNIQUE:  Frontal view obtained of the chest    COMPARISON:  12/18/2024    FINDINGS:  No cardiac silhouette enlargement.  The left lung is clear.  There is similar appearing dense consolidation within the right mid and lower lung zones obscuring the right hemidiaphragm and blunting the right costophrenic angle.  An ET tube is in place distal tip terminating at the clavicular head level.  A left upper extremity PICC is in place distal tip overlying the region of the SVC.  Impression:   1. Similar appearing dense consolidation in the right mid and lower lung zones consistent with pneumonia.  2. Support devices in place as above.    Electronically signed by: Angelo Ramachandran MD  Date:    12/20/2024  Time:    11:52     Significant Imaging: I have reviewed all pertinent imaging results/findings within the past 24 hours.

## 2024-12-21 NOTE — ASSESSMENT & PLAN NOTE
Anemia is likely due to chronic infections Most recent hemoglobin and hematocrit are listed below.  Recent Labs     12/19/24  0338 12/20/24  0340 12/21/24  0331   HGB 7.8* 7.9* 8.0*   HCT 24.5* 25.2* 25.7*       Plan  - Monitor serial CBC: Daily  - Transfuse PRBC if patient becomes hemodynamically unstable, symptomatic or H/H drops below 7/21.  - Patient has not received any PRBC transfusions to date  - Patient's anemia is currently worsening. Will adjust treatment as follows: 2uprbcs today  12/17 h/h Improved  12/20 h/h holding

## 2024-12-21 NOTE — NURSING
Patient's pulmonary status has improved, O2 much better today FIO2 decreased. B/P still dependant on the Levophed. He is not tolerating the tube feedings, his abdomen is distended. Only 75cc drained from the Iliostomy. Iliostomy leaking this AM, Changed , and cleaned. Urine output also noted low. Lab results look good.

## 2024-12-22 NOTE — PLAN OF CARE
Problem: Skin Injury Risk Increased  Goal: Skin Health and Integrity  Outcome: Not Progressing     Problem: Mechanical Ventilation Invasive  Goal: Mechanical Ventilation Liberation  Outcome: Progressing  Goal: Optimal Nutrition Delivery  Outcome: Not Progressing  Goal: Absence of Device-Related Skin and Tissue Injury  Outcome: Not Progressing  Goal: Absence of Ventilator-Induced Lung Injury  Outcome: Progressing     Problem: Adult Inpatient Plan of Care  Goal: Absence of Hospital-Acquired Illness or Injury  Outcome: Progressing     Problem: Diabetes Comorbidity  Goal: Blood Glucose Level Within Targeted Range  Outcome: Progressing     Problem: Wound  Goal: Absence of Infection Signs and Symptoms  Outcome: Progressing  Goal: Improved Oral Intake  Outcome: Not Progressing  Goal: Skin Health and Integrity  Outcome: Not Progressing  Goal: Optimal Wound Healing  Outcome: Not Progressing     Problem: Infection  Goal: Absence of Infection Signs and Symptoms  Outcome: Progressing     Problem: Pneumonia  Goal: Fluid Balance  Outcome: Progressing  Goal: Resolution of Infection Signs and Symptoms  Outcome: Progressing  Goal: Effective Oxygenation and Ventilation  Outcome: Progressing     Problem: Sepsis/Septic Shock  Goal: Absence of Bleeding  Outcome: Progressing  Goal: Blood Glucose Level Within Targeted Range  Outcome: Progressing  Goal: Absence of Infection Signs and Symptoms  Outcome: Progressing  Goal: Optimal Nutrition Intake  Outcome: Not Progressing     Problem: Fall Injury Risk  Goal: Absence of Fall and Fall-Related Injury  Outcome: Progressing     Problem: Electrolyte Imbalance  Goal: Electrolyte Balance  Outcome: Progressing

## 2024-12-22 NOTE — PLAN OF CARE
Care plan reviewed and on-going.  Cont with vent and sedation. Ileostomy intact. Wound care completed without adverse reaction. Rojas cath draining without difficulty. Tolerating Tube feeding. Turn schedule maintained. Levophed infusing. PICC dressing change without adverse reaction

## 2024-12-22 NOTE — EICU
Intervention Initiated From:  [facility] / EICU    Sandy intervened regarding:  Rounding (Video assessment)    Comments: Video rounds done.  Resting quietly in bed.  Continues on vent support (refer to flowsheet for settings), sedated on propofol IV drip, also on norepinephrine IV drip, IVF's @ 50 cc/hr (see MAR for IV drip rates).  Bedside monitor showing SR HR 71, /72, RR 18, POx 100%,  no acute distress noted.

## 2024-12-22 NOTE — PROGRESS NOTES
[facility] Medicine  Progress Note    Patient Name: Carlos Chahal  MRN: [id number]  Patient Class: IP- Inpatient   Admission Date: 12/15/2024  Length of Stay: 7 days  Attending Physician: Kim Diamond MD  Primary Care Provider: Jose Francisco Klein MD        Subjective     Principal Problem:Sepsis        HPI:  Carlos Chahal is a 34 y.o. male who presents to the ED from nursing home for altered mental status and difficulty breathing.  Patient is found to be hypoxic.  He has a history of anoxic brain injury     This am, pt is on ventilator and levophed at 0.25mcg and ivfs at 75ml/hr.      Spoke with father this am on condition of pt    Overview/Hospital Course:  12/17 ND became more awake yesterday with wound changes - low dose propofol added for pt - able to wean levophed to 0.15mcg.  did tolerate tf last night - check kub this am  12/18 ND pt had to be changed back to ac control last nigth after becoming tachypneic and had low tv.  Tolerating ac mode.  Levophed down to 0.1 mcg  12/19 ND pt toleratign vent - will wean RR today - cont to slowly wean levophed as tolerates - wbc slowly imrpoving - tolerating low dose tfs - will cont to increase tfs as tolerates  12/20 ND tolerating vent - cont to work on feeds and nutritional support  12/21 [place] weekend coverage, in no acute distress, stable vital signs, AC/18/400/5/40%, SpO2 100%. On proprofol for sedation, patient tracks voice and moving head and neck with lid opening. Mild bump in WBC, afebrile, may be associated with the reported residual >200 and tube feeds held. Abdomen, soft and no distension on exam. Will resume as tolerated and monitor.  Blood cx x2, final report no growth. Continue merrem (D5), gentamicin (D2) with mixed MDRO frida from sputum studies and UTI. Replete Mg, continue abx. Continue daily wound care.   12/22 [place] weekend coverage, overnight rate change and tolerating AC12/400/5/40 SpO2 100%, proprofol 15 mcg/kg/min, levophed  0.1mcg/kg/min. Patient without gag reflex on suction. Tube feeds held overnight and resumed, remains on trickle feeds. Ileostomy output 100.   No associated abdominal distension, patient in no distress. Vent settings weaned. Leukocytosis resolved. Continue IV abx for MDRO coverage.     Interval History: Patient seen and examined.     Review of Systems   Unable to perform ROS: Acuity of condition     Objective:     Vital Signs (Most Recent):  Temp: 97.2 °F (36.2 °C) (12/22/24 0703)  Pulse: 77 (12/22/24 0600)  Resp: 16 (12/22/24 0600)  BP: 101/62 (12/22/24 0600)  SpO2: 100 % (12/22/24 0600) Vital Signs (24h Range):  Temp:  [96.6 °F (35.9 °C)-97.3 °F (36.3 °C)] 97.2 °F (36.2 °C)  Pulse:  [68-80] 77  Resp:  [12-18] 16  SpO2:  [100 %] 100 %  BP: ()/(55-79) 101/62     Weight: 68.5 kg (151 lb 0.2 oz)  Body mass index is 23.65 kg/m².    Intake/Output Summary (Last 24 hours) at 12/22/2024 0724  Last data filed at 12/22/2024 0507  Gross per 24 hour   Intake 2166.76 ml   Output 400 ml   Net 1766.76 ml         Physical Exam  Constitutional:       Appearance: He is ill-appearing.      Comments: Thin male; intubated   HENT:      Mouth/Throat:      Mouth: Mucous membranes are moist.   Eyes:      Pupils: Pupils are equal, round, and reactive to light.   Cardiovascular:      Rate and Rhythm: Normal rate and regular rhythm.   Pulmonary:      Effort: Prolonged expiration present.      Breath sounds: Transmitted upper airway sounds present.      Comments: Breath sounds at lung bases bilaterally   Abdominal:      General: There is no distension.      Palpations: Abdomen is soft.      Tenderness: There is no abdominal tenderness.      Comments: Jtube noted; ileostomy noted   Genitourinary:     Comments: Rojas with yellow urine  Skin:     General: Skin is warm and dry.      Comments: Wounds to sacrum; groin area             Significant Labs: All pertinent labs within the past 24 hours have been reviewed.  ABGs:   Recent Labs   Lab  "12/21/24  0528 12/22/24  0536   PH 7.424 7.369   PCO2 40.8 46.1*   HCO3 26.3 25.4   POCSATURATED 100.0* 100.0*   PO2 178* 174*     Bilirubin:   Recent Labs   Lab 12/08/24  1301 12/09/24  0528 12/18/24  0418 12/19/24  0338 12/20/24  0340 12/21/24  0331 12/22/24  0308   BILIDIR 0.3  --   --   --   --   --   --    BILITOT 0.5   < > 0.4 0.4 0.4 0.4 0.3    < > = values in this interval not displayed.      Recent Labs   Lab 12/22/24  0308   *      K 4.3      CO2 29   BUN 10   CREATININE 0.8   CALCIUM 9.4   MG 1.7     CBC:   Recent Labs   Lab 12/21/24  0331 12/22/24  0308   WBC 14.04* 11.33   HGB 8.0* 7.9*   HCT 25.7* 25.4*    247     CMP:   Recent Labs   Lab 12/21/24  0331 12/22/24  0308    137   K 3.9 4.3    106   CO2 30* 29   * 201*   BUN 13 10   CREATININE 0.9 0.8   CALCIUM 9.2 9.4   PROT 5.9* 6.0   ALBUMIN 0.6* 0.6*   BILITOT 0.4 0.3   ALKPHOS 623* 558*   AST 34 33   ALT 10 11   ANIONGAP 0* 2*     Magnesium:   Recent Labs   Lab 12/21/24  0331 12/22/24  0308   MG 1.4* 1.7     Significant Imaging: I have reviewed all pertinent imaging results/findings within the past 24 hours.    Assessment and Plan     * Sepsis  This patient does have evidence of infective focus  My overall impression is septic shock due to MAP < 65 and SBP < 90.  Source: Respiratory and Urinary Tract  Antibiotics given-   Antibiotics (72h ago, onward)      Start     Stop Route Frequency Ordered    12/22/24 1600  gentamicin (GARAMYCIN) 462.8 mg in 0.9% NaCl 100 mL IVPB         -- IV Every 48 hours (non-standard times) 12/22/24 0413    12/20/24 1438  gentamicin - pharmacy to dose  (gentamicin IVPB (PEDS and ADULTS))        Placed in "And" Linked Group    -- IV pharmacy to manage frequency 12/20/24 1338    12/17/24 0900  meropenem injection 1 g         -- IV Every 8 hours (non-standard times) 12/17/24 0757          Latest lactate reviewed-  No results for input(s): "LACTATE", "POCLAC" in the last 72 " hours.    Organ dysfunction indicated by Acute respiratory failure and Encephalopathy    Fluid challenge Ideal Body Weight- The patient's ideal body weight is Ideal body weight: 66.1 kg (145 lb 11.6 oz) which will be used to calculate fluid bolus of 30 ml/kg for treatment of septic shock.      Post- resuscitation assessment Yes Perfusion exam was performed within 6 hours of septic shock presentation after bolus shows Inadequate tissue perfusion assessed by non-invasive monitoring       Will Start Pressors- Levophed for MAP of 65  Source control achieved by: iv zosym. Vanc, ivfs  12/17 wean levophed as tolerates- abxs changed to merrem, cotn vanc - await culture final reports  12/18 wbc slightly imrpoved - cont iv abxs- await final sputum culture - on merrem fro esbl kleb in urine  12/19 cont iv merrem for esbl kleb in urine and proteus in sputum - dc vanc  12/20 wbc improvign - cotn iv merrem    Hypokalemia  Patient's most recent potassium results are listed below.   Recent Labs     12/20/24  0340 12/21/24  0331 12/22/24  0308   K 3.3* 3.9 4.3       Plan  - Replete potassium per protocol  - Monitor potassium Daily  - Patient's hypokalemia is stable, continue below and monitor  12/20 increase pot bicarb to bid      Severe malnutrition  Nutrition consulted. Most recent weight and BMI monitored-     Measurements:  Wt Readings from Last 1 Encounters:   12/16/24 68.5 kg (151 lb 0.2 oz)   Body mass index is 23.65 kg/m².    Patient has been screened and assessed by RD.    Malnutrition Type:  Context:    Level:      Malnutrition Characteristic Summary:       Interventions/Recommendations (treatment strategy):  1. Rec'd Continuous EN: Glucerna 1.5 @ 40mL/r increasing by 5 mL q6hrs to goal rate of 55mL/hr with a continuous 40mL FWF to provide 1980kcal (94% EEN), 109g of protein (100% EPN), and 1962mL FW. 2. Néstor BID via PEG tube to promote wound healing and to provide additional nutrition.         - Do not mix Néstor with  formula in a tube-feeding bag       - Pour one packet of Néstor in a clean, small container for mixing.         - Add 4 fl oz (120 mL) water at room temperature.         - Mix well with disposable spoon or tongue blade until all particles are completely hydrated.         - Verify correct tube placement.         - Flush feeding tube with 30 mL water.         -Administer Néstor through feeding tube using a 60-mL or larger syringe.         - Flush with an additional 30 mL water. 3. RD to follow and make rec's accordingly.    12/18 restart tfs today at 1ml/hr to see if can tolerate feeds  12/19 increase tfs as tolerates    Hypomagnesemia  Patient has Abnormal Magnesium: hypomagnesemia. Will continue to monitor electrolytes closely. Will replace the affected electrolytes and repeat labs to be done after interventions completed. The patient's magnesium results have been reviewed and are listed below.  Recent Labs   Lab 12/22/24  0308   MG 1.7      12/17 mag imrpoving - repalce mag today  12/19 replace mag today  12/20 mag oxide bid  12/21 2g Mg    Pneumonia of right lower lobe due to infectious organism  Patient has a diagnosis of pneumonia. The cause of the pneumonia is suspected to be bacterial in etiology but organism is not known. The pneumonia is stable. The patient has the following signs/symptoms of pneumonia: persistent hypoxia  and sputum production. The patient does have a current oxygen requirement and the patient does not have a home oxygen requirement. I have reviewed the pertinent imaging. The following cultures have been collected: Blood cultures and Sputum culture The culture results are listed below.     Current antimicrobial regimen consists of the antibiotics listed below. Will monitor patient closely and continue current treatment plan unchanged.    Antibiotics (From admission, onward)      Start     Stop Route Frequency Ordered    12/22/24 1600  gentamicin (GARAMYCIN) 462.8 mg in 0.9% NaCl 100 mL IVPB    "      -- IV Every 48 hours (non-standard times) 12/22/24 0413    12/20/24 1438  gentamicin - pharmacy to dose  (gentamicin IVPB (PEDS and ADULTS))        Placed in "And" Linked Group    -- IV pharmacy to manage frequency 12/20/24 1338    12/17/24 0900  meropenem injection 1 g         -- IV Every 8 hours (non-standard times) 12/17/24 0757            Microbiology Results (last 7 days)       Procedure Component Value Units Date/Time    Blood culture x two cultures. Draw prior to antibiotics. [[id number]] Collected: 12/15/24 0942    Order Status: Completed Specimen: Blood from Peripheral, Antecubital, Left Updated: 12/20/24 1612     Blood Culture, Routine No growth after 5 days.    Narrative:      Aerobic and anaerobic    Blood culture x two cultures. Draw prior to antibiotics. [[id number]] Collected: 12/15/24 0923    Order Status: Completed Specimen: Blood from Peripheral, Forearm, Right Updated: 12/20/24 1612     Blood Culture, Routine No growth after 5 days.    Narrative:      Aerobic and anaerobic    Culture, Respiratory with Gram Stain [[id number]]  (Abnormal)  (Susceptibility) Collected: 12/15/24 0935    Order Status: Completed Specimen: Respiratory from Endotracheal Aspirate Updated: 12/20/24 1256     Respiratory Culture ACINETOBACTER BAUMANNII   Many  Susceptibility pending        PROTEUS MIRABILIS  Few        KLEBSIELLA PNEUMONIAE   Few  ESBL        Gram Stain (Respiratory) Few WBC's     Gram Stain (Respiratory) Many Gram positive rods     Gram Stain (Respiratory) Few Gram negative cocci    Urine culture [[id number]]  (Abnormal)  (Susceptibility) Collected: 12/15/24 0941    Order Status: Completed Specimen: Urine Updated: 12/17/24 2343     Urine Culture, Routine KLEBSIELLA PNEUMONIAE ESBL  >100,000 cfu/ml      Narrative:      Preferred Collection Type->Urine, Clean Catch  Specimen Source->Urine    Culture, Respiratory with Gram Stain [[id number]]     Order Status: Canceled Specimen: Respiratory  "        12/17 dc zosyn with recent esbl kleb in urine - will change to merrem - cont vanc until final cultures obtained - change vent to simv; add nebs  12/18 cotn merrem and vanc - await final sputum cutlure - cont vent on ac control; nebs - wbc imrpoved slightly; lasix 20mg iv for edema today  12/19  dc vanc - proteus grwoign from sputum - cont merrem and nebs; lasix today - wean rr on vent  12/20 wnc improved - cont merrem - nebs and vent - another dose of lasix today for edema - fio2 increased    Sacral wound, sequela  Local wound care with dakins bid  Iv abxs      Open wound of groin  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  Recent Labs     12/20/24  0340 12/21/24  0331 12/22/24  0308   HGB 7.9* 8.0* 7.9*   HCT 25.2* 25.7* 25.4*       Plan  - Monitor serial CBC: Daily  - Transfuse PRBC if patient becomes hemodynamically unstable, symptomatic or H/H drops below 7/21.  - Patient has not received any PRBC transfusions to date  - Patient's anemia is currently worsening. Will adjust treatment as follows: 2uprbcs today  12/17 h/h Improved  12/20 h/h holding    History of anoxic brain injury        Urinary tract infection associated with indwelling urethral catheter  Await new urine culture =- currently on zosyn/vanc  12/17 change to merrem/vanc  12/18 has esbl klebsiella - cont merrem    Type 1 diabetes  Patient's FSGs are uncontrolled due to hyperglycemia on current medication regimen.  Last A1c reviewed-   Lab Results   Component Value Date    HGBA1C 6.6 (H) 12/16/2024     Most recent fingerstick glucose reviewed-   Recent Labs   Lab 12/21/24  0905 12/21/24  1123 12/21/24  1758   POCTGLUCOSE 191* 210* 158*       Current correctional scale  Low  Maintain anti-hyperglycemic dose as follows-   Antihyperglycemics (From admission, onward)      Start     Stop Route  Frequency Ordered    12/16/24 0952  insulin aspart U-100 pen 0-5 Units         -- SubQ Every 4 hours PRN 12/16/24 0853          Hold Oral hypoglycemics while patient is in the hospital.  12/17 A1c improved to 6.6%      VTE Risk Mitigation (From admission, onward)           Ordered     enoxaparin injection 40 mg  Daily         12/16/24 0815     IP VTE LOW RISK PATIENT  Once         12/15/24 1201     Place sequential compression device  Until discontinued         12/15/24 1201                    Discharge Planning   JOSE MANUEL:      Code Status: Full Code   Medical Readiness for Discharge Date:   Discharge Plan A: Return to nursing home                        Brandon Pruitt DO  Department of Hospital Medicine   [place] [facility]

## 2024-12-22 NOTE — PROGRESS NOTES
"Pharmacokinetic Follow Up: Gentamicin    Assessment of levels:   Trough prior to 2nd dose of q48h dosing 0.8g    Regimen Plan:   Continue current dose: Gentamicin 463 mg IV every 28 hours  Next trough prior to 12/26 dose at 1500    Drug levels (last 3 results):  No results for input(s): "AMIKACINPEAK", "AMIKACINTROU", "AMIKACINRAND", "AMIKACIN" in the last 72 hours.    Recent Labs   Lab Result Units 12/21/24  0058 12/21/24  1434 12/22/24  0308 12/22/24  1505   Gentamicin, Random ug/mL 5.1  --   --   --    Gentamicin, Trough ug/mL  --  2.3* 1.3 0.8       No results for input(s): "TOBRA8", "TOBRA10", "TOBRA12", "TOBRARND", "TOBRAMYCIN", "TOBRAPEAK", "TOBRATROUGH", "TOBRAMYCINPE", "TOBRAMYCINRA", "TOBRAMYCINTR" in the last 72 hours.    Pharmacy will continue to monitor.    Please contact pharmacy with any questions regarding this assessment.    Thank you for the consult,   Charlotte Pastor McArdle      Patient brief summary:  Carlos Chahal is a 34 y.o. male initiated on aminoglycoside therapy for treatment of lower respiratory infection    Drug Allergies:   Review of patient's allergies indicates:   Allergen Reactions    Guaifenesin Hives    Codeine Itching       Actual Body Weight:   68.5kg    Adjust Body Weight:   67.1kg    Ideal Body Weight:  66.1kg    Renal Function:   Estimated Creatinine Clearance: 122.8 mL/min (based on SCr of 0.8 mg/dL).,     Dialysis Method (if applicable):  N/A    CBC (last 72 hours):  Recent Labs   Lab Result Units 12/20/24  0340 12/21/24  0331 12/22/24  0308   WBC K/uL 12.66 14.04* 11.33   Hemoglobin g/dL 7.9* 8.0* 7.9*   Hematocrit % 25.2* 25.7* 25.4*   Platelets K/uL 250 265 247   Gran % % 84.9* 78.4* 75.2*   Lymph % % 4.9* 7.1* 9.4*   Mono % % 4.3 6.4 6.4   Eosinophil % % 4.6 6.2 7.5   Basophil % % 0.2 0.4 0.4   Differential Method  Automated Automated Automated       Metabolic Panel (last 72 hours):  Recent Labs   Lab Result Units 12/20/24  0340 12/21/24  0331 12/22/24  0308   Sodium " mmol/L 135* 137 137   Potassium mmol/L 3.3* 3.9 4.3   Chloride mmol/L 105 107 106   CO2 mmol/L 28 30* 29   Glucose mg/dL 188* 165* 201*   BUN mg/dL 12 13 10   Creatinine mg/dL 1.0 0.9 0.8   Albumin g/dL <0.6* 0.6* 0.6*   Total Bilirubin mg/dL 0.4 0.4 0.3   Alkaline Phosphatase U/L 527* 623* 558*   AST U/L 33 34 33   ALT U/L 9* 10 11   Magnesium mg/dL 1.6 1.4* 1.7       Aminoglycoside Administrations:  aminoglycosides given in last 96 hours                     gentamicin (GARAMYCIN) 462.8 mg in 0.9% NaCl 100 mL IVPB (mg) 462.8 mg New Bag 12/20/24 1551                    Microbiologic Results:  Microbiology Results (last 7 days)       Procedure Component Value Units Date/Time    Blood culture x two cultures. Draw prior to antibiotics. [[id number]] Collected: 12/15/24 0942    Order Status: Completed Specimen: Blood from Peripheral, Antecubital, Left Updated: 12/20/24 1612     Blood Culture, Routine No growth after 5 days.    Narrative:      Aerobic and anaerobic    Blood culture x two cultures. Draw prior to antibiotics. [[id number]] Collected: 12/15/24 0923    Order Status: Completed Specimen: Blood from Peripheral, Forearm, Right Updated: 12/20/24 1612     Blood Culture, Routine No growth after 5 days.    Narrative:      Aerobic and anaerobic    Culture, Respiratory with Gram Stain [[id number]]  (Abnormal)  (Susceptibility) Collected: 12/15/24 0935    Order Status: Completed Specimen: Respiratory from Endotracheal Aspirate Updated: 12/20/24 1256     Respiratory Culture ACINETOBACTER BAUMANNII   Many  Susceptibility pending        PROTEUS MIRABILIS  Few        KLEBSIELLA PNEUMONIAE   Few  ESBL        Gram Stain (Respiratory) Few WBC's     Gram Stain (Respiratory) Many Gram positive rods     Gram Stain (Respiratory) Few Gram negative cocci    Urine culture [[id number]]  (Abnormal)  (Susceptibility) Collected: 12/15/24 0941    Order Status: Completed Specimen: Urine Updated: 12/17/24 7419     Urine  Culture, Routine KLEBSIELLA PNEUMONIAE ESBL  >100,000 cfu/ml      Narrative:      Preferred Collection Type->Urine, Clean Catch  Specimen Source->Urine

## 2024-12-22 NOTE — ASSESSMENT & PLAN NOTE
"This patient does have evidence of infective focus  My overall impression is septic shock due to MAP < 65 and SBP < 90.  Source: Respiratory and Urinary Tract  Antibiotics given-   Antibiotics (72h ago, onward)    Start     Stop Route Frequency Ordered    12/22/24 1600  gentamicin (GARAMYCIN) 462.8 mg in 0.9% NaCl 100 mL IVPB         -- IV Every 48 hours (non-standard times) 12/22/24 0413    12/20/24 1438  gentamicin - pharmacy to dose  (gentamicin IVPB (PEDS and ADULTS))        Placed in "And" Linked Group    -- IV pharmacy to manage frequency 12/20/24 1338    12/17/24 0900  meropenem injection 1 g         -- IV Every 8 hours (non-standard times) 12/17/24 0757        Latest lactate reviewed-  No results for input(s): "LACTATE", "POCLAC" in the last 72 hours.    Organ dysfunction indicated by Acute respiratory failure and Encephalopathy    Fluid challenge Ideal Body Weight- The patient's ideal body weight is Ideal body weight: 66.1 kg (145 lb 11.6 oz) which will be used to calculate fluid bolus of 30 ml/kg for treatment of septic shock.      Post- resuscitation assessment Yes Perfusion exam was performed within 6 hours of septic shock presentation after bolus shows Inadequate tissue perfusion assessed by non-invasive monitoring       Will Start Pressors- Levophed for MAP of 65  Source control achieved by: iv zosym. Vanc, ivfs  12/17 wean levophed as tolerates- abxs changed to merrem, cotn vanc - await culture final reports  12/18 wbc slightly imrpoved - cont iv abxs- await final sputum culture - on merrem fro esbl kleb in urine  12/19 cont iv merrem for esbl kleb in urine and proteus in sputum - dc vanc  12/20 wbc improvign - cotn iv merrem  "

## 2024-12-22 NOTE — ASSESSMENT & PLAN NOTE
Patient's most recent potassium results are listed below.   Recent Labs     12/20/24  0340 12/21/24  0331 12/22/24  0308   K 3.3* 3.9 4.3       Plan  - Replete potassium per protocol  - Monitor potassium Daily  - Patient's hypokalemia is stable, continue below and monitor  12/20 increase pot bicarb to bid

## 2024-12-22 NOTE — PROGRESS NOTES
"Pharmacokinetic Follow Up: Gentamicin    Assessment of levels:   Extended interval dosing. Trough piror to second dose came back 1.3.  Will push out dosing to Q48h and will repeat trough prior to 16:00 dose today at 15:00.  Target trough <1      Drug levels (last 3 results):  No results for input(s): "AMIKACINPEAK", "AMIKACINTROU", "AMIKACINRAND", "AMIKACIN" in the last 72 hours.    Recent Labs   Lab Result Units 12/21/24  0058 12/21/24  1434 12/22/24  0308   Gentamicin, Random ug/mL 5.1  --   --    Gentamicin, Trough ug/mL  --  2.3* 1.3       No results for input(s): "TOBRA8", "TOBRA10", "TOBRA12", "TOBRARND", "TOBRAMYCIN", "TOBRAPEAK", "TOBRATROUGH", "TOBRAMYCINPE", "TOBRAMYCINRA", "TOBRAMYCINTR" in the last 72 hours.    Pharmacy will continue to monitor.    Please contact pharmacy with any questions regarding this assessment.    Thank you for the consult,   Tima Morris      Patient brief summary:  Carlos Chahal is a 34 y.o. male initiated on aminoglycoside therapy for treatment of lower respiratory infection    Drug Allergies:   Review of patient's allergies indicates:   Allergen Reactions    Guaifenesin Hives    Codeine Itching       Actual Body Weight:   68.5kg    Adjust Body Weight:   67.1kg    Ideal Body Weight:  66.1kg    Renal Function:   Estimated Creatinine Clearance: 122.8 mL/min (based on SCr of 0.8 mg/dL).,     Dialysis Method (if applicable):  N/A    CBC (last 72 hours):  Recent Labs   Lab Result Units 12/20/24  0340 12/21/24  0331 12/22/24  0308   WBC K/uL 12.66 14.04* 11.33   Hemoglobin g/dL 7.9* 8.0* 7.9*   Hematocrit % 25.2* 25.7* 25.4*   Platelets K/uL 250 265 247   Gran % % 84.9* 78.4* 75.2*   Lymph % % 4.9* 7.1* 9.4*   Mono % % 4.3 6.4 6.4   Eosinophil % % 4.6 6.2 7.5   Basophil % % 0.2 0.4 0.4   Differential Method  Automated Automated Automated       Metabolic Panel (last 72 hours):  Recent Labs   Lab Result Units 12/20/24  0340 12/21/24  0331 12/22/24  0308   Sodium mmol/L 135* 137 137 "   Potassium mmol/L 3.3* 3.9 4.3   Chloride mmol/L 105 107 106   CO2 mmol/L 28 30* 29   Glucose mg/dL 188* 165* 201*   BUN mg/dL 12 13 10   Creatinine mg/dL 1.0 0.9 0.8   Albumin g/dL <0.6* 0.6* 0.6*   Total Bilirubin mg/dL 0.4 0.4 0.3   Alkaline Phosphatase U/L 527* 623* 558*   AST U/L 33 34 33   ALT U/L 9* 10 11   Magnesium mg/dL 1.6 1.4* 1.7       Aminoglycoside Administrations:  aminoglycosides given in last 96 hours                     gentamicin (GARAMYCIN) 462.8 mg in 0.9% NaCl 100 mL IVPB (mg) 462.8 mg New Bag 12/20/24 1551                    Microbiologic Results:  Microbiology Results (last 7 days)       Procedure Component Value Units Date/Time    Blood culture x two cultures. Draw prior to antibiotics. [[id number]] Collected: 12/15/24 0942    Order Status: Completed Specimen: Blood from Peripheral, Antecubital, Left Updated: 12/20/24 1612     Blood Culture, Routine No growth after 5 days.    Narrative:      Aerobic and anaerobic    Blood culture x two cultures. Draw prior to antibiotics. [[id number]] Collected: 12/15/24 0923    Order Status: Completed Specimen: Blood from Peripheral, Forearm, Right Updated: 12/20/24 1612     Blood Culture, Routine No growth after 5 days.    Narrative:      Aerobic and anaerobic    Culture, Respiratory with Gram Stain [[id number]]  (Abnormal)  (Susceptibility) Collected: 12/15/24 0935    Order Status: Completed Specimen: Respiratory from Endotracheal Aspirate Updated: 12/20/24 1256     Respiratory Culture ACINETOBACTER BAUMANNII   Many  Susceptibility pending        PROTEUS MIRABILIS  Few        KLEBSIELLA PNEUMONIAE   Few  ESBL        Gram Stain (Respiratory) Few WBC's     Gram Stain (Respiratory) Many Gram positive rods     Gram Stain (Respiratory) Few Gram negative cocci    Urine culture [[id number]]  (Abnormal)  (Susceptibility) Collected: 12/15/24 0941    Order Status: Completed Specimen: Urine Updated: 12/17/24 2343     Urine Culture, Routine KLEBSIELLA  PNEUMONIAE ESBL  >100,000 cfu/ml      Narrative:      Preferred Collection Type->Urine, Clean Catch  Specimen Source->Urine    Culture, Respiratory with Gram Stain [[id number]]     Order Status: Canceled Specimen: Respiratory

## 2024-12-22 NOTE — ASSESSMENT & PLAN NOTE
Anemia is likely due to chronic infections Most recent hemoglobin and hematocrit are listed below.  Recent Labs     12/20/24  0340 12/21/24  0331 12/22/24  0308   HGB 7.9* 8.0* 7.9*   HCT 25.2* 25.7* 25.4*       Plan  - Monitor serial CBC: Daily  - Transfuse PRBC if patient becomes hemodynamically unstable, symptomatic or H/H drops below 7/21.  - Patient has not received any PRBC transfusions to date  - Patient's anemia is currently worsening. Will adjust treatment as follows: 2uprbcs today  12/17 h/h Improved  12/20 h/h holding

## 2024-12-22 NOTE — ASSESSMENT & PLAN NOTE
Patient's FSGs are uncontrolled due to hyperglycemia on current medication regimen.  Last A1c reviewed-   Lab Results   Component Value Date    HGBA1C 6.6 (H) 12/16/2024     Most recent fingerstick glucose reviewed-   Recent Labs   Lab 12/21/24  0905 12/21/24  1123 12/21/24  1758   POCTGLUCOSE 191* 210* 158*       Current correctional scale  Low  Maintain anti-hyperglycemic dose as follows-   Antihyperglycemics (From admission, onward)      Start     Stop Route Frequency Ordered    12/16/24 0952  insulin aspart U-100 pen 0-5 Units         -- SubQ Every 4 hours PRN 12/16/24 0853          Hold Oral hypoglycemics while patient is in the hospital.  12/17 A1c improved to 6.6%

## 2024-12-22 NOTE — PLAN OF CARE
Care plan reviewed and ongoing. Vent in use. Cont with IVF and Abx. Diprivan and Levophed. Gent trough- 2.3. TF- increased residual. Rojas cath draining without difficulty. Illeostomy- intact. SCD's in use. Contact precautions maintained.

## 2024-12-22 NOTE — ASSESSMENT & PLAN NOTE
"Patient has a diagnosis of pneumonia. The cause of the pneumonia is suspected to be bacterial in etiology but organism is not known. The pneumonia is stable. The patient has the following signs/symptoms of pneumonia: persistent hypoxia  and sputum production. The patient does have a current oxygen requirement and the patient does not have a home oxygen requirement. I have reviewed the pertinent imaging. The following cultures have been collected: Blood cultures and Sputum culture The culture results are listed below.     Current antimicrobial regimen consists of the antibiotics listed below. Will monitor patient closely and continue current treatment plan unchanged.    Antibiotics (From admission, onward)      Start     Stop Route Frequency Ordered    12/22/24 1600  gentamicin (GARAMYCIN) 462.8 mg in 0.9% NaCl 100 mL IVPB         -- IV Every 48 hours (non-standard times) 12/22/24 0413    12/20/24 1438  gentamicin - pharmacy to dose  (gentamicin IVPB (PEDS and ADULTS))        Placed in "And" Linked Group    -- IV pharmacy to manage frequency 12/20/24 1338    12/17/24 0900  meropenem injection 1 g         -- IV Every 8 hours (non-standard times) 12/17/24 0757            Microbiology Results (last 7 days)       Procedure Component Value Units Date/Time    Blood culture x two cultures. Draw prior to antibiotics. [[id number]] Collected: 12/15/24 0942    Order Status: Completed Specimen: Blood from Peripheral, Antecubital, Left Updated: 12/20/24 1612     Blood Culture, Routine No growth after 5 days.    Narrative:      Aerobic and anaerobic    Blood culture x two cultures. Draw prior to antibiotics. [[id number]] Collected: 12/15/24 0923    Order Status: Completed Specimen: Blood from Peripheral, Forearm, Right Updated: 12/20/24 1612     Blood Culture, Routine No growth after 5 days.    Narrative:      Aerobic and anaerobic    Culture, Respiratory with Gram Stain [[id number]]  (Abnormal)  (Susceptibility) Collected: " 12/15/24 0935    Order Status: Completed Specimen: Respiratory from Endotracheal Aspirate Updated: 12/20/24 1256     Respiratory Culture ACINETOBACTER BAUMANNII   Many  Susceptibility pending        PROTEUS MIRABILIS  Few        KLEBSIELLA PNEUMONIAE   Few  ESBL        Gram Stain (Respiratory) Few WBC's     Gram Stain (Respiratory) Many Gram positive rods     Gram Stain (Respiratory) Few Gram negative cocci    Urine culture [[id number]]  (Abnormal)  (Susceptibility) Collected: 12/15/24 0941    Order Status: Completed Specimen: Urine Updated: 12/17/24 2343     Urine Culture, Routine KLEBSIELLA PNEUMONIAE ESBL  >100,000 cfu/ml      Narrative:      Preferred Collection Type->Urine, Clean Catch  Specimen Source->Urine    Culture, Respiratory with Gram Stain [[id number]]     Order Status: Canceled Specimen: Respiratory         12/17 dc zosyn with recent esbl kleb in urine - will change to merrem - cont vanc until final cultures obtained - change vent to simv; add nebs  12/18 cotn merrem and vanc - await final sputum cutlure - cont vent on ac control; nebs - wbc imrpoved slightly; lasix 20mg iv for edema today  12/19  dc vanc - proteus grwoign from sputum - cont merrem and nebs; lasix today - wean rr on vent  12/20 wnc improved - cont merrem - nebs and vent - another dose of lasix today for edema - fio2 increased

## 2024-12-22 NOTE — SUBJECTIVE & OBJECTIVE
Interval History: Patient seen and examined.     Review of Systems   Unable to perform ROS: Acuity of condition     Objective:     Vital Signs (Most Recent):  Temp: 97.2 °F (36.2 °C) (12/22/24 0703)  Pulse: 77 (12/22/24 0600)  Resp: 16 (12/22/24 0600)  BP: 101/62 (12/22/24 0600)  SpO2: 100 % (12/22/24 0600) Vital Signs (24h Range):  Temp:  [96.6 °F (35.9 °C)-97.3 °F (36.3 °C)] 97.2 °F (36.2 °C)  Pulse:  [68-80] 77  Resp:  [12-18] 16  SpO2:  [100 %] 100 %  BP: ()/(55-79) 101/62     Weight: 68.5 kg (151 lb 0.2 oz)  Body mass index is 23.65 kg/m².    Intake/Output Summary (Last 24 hours) at 12/22/2024 0724  Last data filed at 12/22/2024 0507  Gross per 24 hour   Intake 2166.76 ml   Output 400 ml   Net 1766.76 ml         Physical Exam  Constitutional:       Appearance: He is ill-appearing.      Comments: Thin male; intubated   HENT:      Mouth/Throat:      Mouth: Mucous membranes are moist.   Eyes:      Pupils: Pupils are equal, round, and reactive to light.   Cardiovascular:      Rate and Rhythm: Normal rate and regular rhythm.   Pulmonary:      Effort: Prolonged expiration present.      Breath sounds: Transmitted upper airway sounds present.      Comments: Breath sounds at lung bases bilaterally   Abdominal:      General: There is no distension.      Palpations: Abdomen is soft.      Tenderness: There is no abdominal tenderness.      Comments: Jtube noted; ileostomy noted   Genitourinary:     Comments: Rojas with yellow urine  Skin:     General: Skin is warm and dry.      Comments: Wounds to sacrum; groin area             Significant Labs: All pertinent labs within the past 24 hours have been reviewed.  ABGs:   Recent Labs   Lab 12/21/24  0528 12/22/24  0536   PH 7.424 7.369   PCO2 40.8 46.1*   HCO3 26.3 25.4   POCSATURATED 100.0* 100.0*   PO2 178* 174*     Bilirubin:   Recent Labs   Lab 12/08/24  1301 12/09/24  0528 12/18/24  0418 12/19/24  0338 12/20/24  0340 12/21/24  0331 12/22/24  0308   BILIDIR 0.3  --    --   --   --   --   --    BILITOT 0.5   < > 0.4 0.4 0.4 0.4 0.3    < > = values in this interval not displayed.      Recent Labs   Lab 12/22/24  0308   *      K 4.3      CO2 29   BUN 10   CREATININE 0.8   CALCIUM 9.4   MG 1.7     CBC:   Recent Labs   Lab 12/21/24 0331 12/22/24  0308   WBC 14.04* 11.33   HGB 8.0* 7.9*   HCT 25.7* 25.4*    247     CMP:   Recent Labs   Lab 12/21/24 0331 12/22/24  0308    137   K 3.9 4.3    106   CO2 30* 29   * 201*   BUN 13 10   CREATININE 0.9 0.8   CALCIUM 9.2 9.4   PROT 5.9* 6.0   ALBUMIN 0.6* 0.6*   BILITOT 0.4 0.3   ALKPHOS 623* 558*   AST 34 33   ALT 10 11   ANIONGAP 0* 2*     Magnesium:   Recent Labs   Lab 12/21/24 0331 12/22/24  0308   MG 1.4* 1.7     Significant Imaging: I have reviewed all pertinent imaging results/findings within the past 24 hours.

## 2024-12-22 NOTE — ASSESSMENT & PLAN NOTE
Patient has Abnormal Magnesium: hypomagnesemia. Will continue to monitor electrolytes closely. Will replace the affected electrolytes and repeat labs to be done after interventions completed. The patient's magnesium results have been reviewed and are listed below.  Recent Labs   Lab 12/22/24  0308   MG 1.7      12/17 mag imrpoving - repalce mag today  12/19 replace mag today  12/20 mag oxide bid  12/21 2g Mg

## 2024-12-23 NOTE — ASSESSMENT & PLAN NOTE
Patient's FSGs are uncontrolled due to hyperglycemia on current medication regimen.  Last A1c reviewed-   Lab Results   Component Value Date    HGBA1C 6.6 (H) 12/16/2024     Most recent fingerstick glucose reviewed-   Recent Labs   Lab 12/22/24 2001 12/23/24  0014 12/23/24  0339 12/23/24  0757   POCTGLUCOSE 222* 243* 154* 170*       Current correctional scale  Low  Maintain anti-hyperglycemic dose as follows-   Antihyperglycemics (From admission, onward)    Start     Stop Route Frequency Ordered    12/16/24 0952  insulin aspart U-100 pen 0-5 Units         -- SubQ Every 4 hours PRN 12/16/24 0853        Hold Oral hypoglycemics while patient is in the hospital.  12/17 A1c improved to 6.6%

## 2024-12-23 NOTE — NURSING
Removed the foam dressings from the feet, cleaned them with the wipes,applied lotion , took updated pictures of the feet to compare to previous ones. Cleaned the left heel with dakins solution. Re applied foam dressings and the Boots.  Turned the tube feeding off and proceded with the sacral wound care, took updated pictures.  After the groin wound care was provided and patient was repositioned and turned restarted the tube feeding. Patient tolerated well .

## 2024-12-23 NOTE — ASSESSMENT & PLAN NOTE
Patient has Abnormal Magnesium: hypomagnesemia. Will continue to monitor electrolytes closely. Will replace the affected electrolytes and repeat labs to be done after interventions completed. The patient's magnesium results have been reviewed and are listed below.  Recent Labs   Lab 12/23/24  0343   MG 1.6      12/17 mag imrpoving - repalce mag today  12/19 replace mag today  12/20 mag oxide bid  12/21 2g Mg  12/23 dose of iv mag today to keep mag level about 2

## 2024-12-23 NOTE — ASSESSMENT & PLAN NOTE
Anemia is likely due to chronic infections Most recent hemoglobin and hematocrit are listed below.  Recent Labs     12/21/24  0331 12/22/24  0308 12/23/24  0343   HGB 8.0* 7.9* 7.6*   HCT 25.7* 25.4* 25.2*       Plan  - Monitor serial CBC: Daily  - Transfuse PRBC if patient becomes hemodynamically unstable, symptomatic or H/H drops below 7/21.  - Patient has not received any PRBC transfusions to date  - Patient's anemia is currently worsening. Will adjust treatment as follows: 2uprbcs today  12/17 h/h Improved  12/20 h/h holding

## 2024-12-23 NOTE — PROGRESS NOTES
[facility] Medicine  Progress Note    Patient Name: Carlos Chahal  MRN: [id number]  Patient Class: IP- Inpatient   Admission Date: 12/15/2024  Length of Stay: 8 days  Attending Physician: Kim Diamond MD  Primary Care Provider: Jose Francisco Klein MD        Subjective     Principal Problem:Sepsis        HPI:  Carlos Chahal is a 34 y.o. male who presents to the ED from nursing home for altered mental status and difficulty breathing.  Patient is found to be hypoxic.  He has a history of anoxic brain injury     This am, pt is on ventilator and levophed at 0.25mcg and ivfs at 75ml/hr.      Spoke with father this am on condition of pt    Overview/Hospital Course:  12/17 ND became more awake yesterday with wound changes - low dose propofol added for pt - able to wean levophed to 0.15mcg.  did tolerate tf last night - check kub this am  12/18 ND pt had to be changed back to ac control last nigth after becoming tachypneic and had low tv.  Tolerating ac mode.  Levophed down to 0.1 mcg  12/19 ND pt toleratign vent - will wean RR today - cont to slowly wean levophed as tolerates - wbc slowly imrpoving - tolerating low dose tfs - will cont to increase tfs as tolerates  12/20 ND tolerating vent - cont to work on feeds and nutritional support  12/21 [place] weekend coverage, in no acute distress, stable vital signs, AC/18/400/5/40%, SpO2 100%. On proprofol for sedation, patient tracks voice and moving head and neck with lid opening. Mild bump in WBC, afebrile, may be associated with the reported residual >200 and tube feeds held. Abdomen, soft and no distension on exam. Will resume as tolerated and monitor.  Blood cx x2, final report no growth. Continue merrem (D5), gentamicin (D2) with mixed MDRO frida from sputum studies and UTI. Replete Mg, continue abx. Continue daily wound care.   12/22 [place] weekend coverage, overnight rate change and tolerating AC12/400/5/40 SpO2 100%, proprofol 15 mcg/kg/min, levophed  0.1mcg/kg/min. Patient without gag reflex on suction. Tube feeds held overnight and resumed, remains on trickle feeds. Ileostomy output 100.   No associated abdominal distension, patient in no distress. Vent settings weaned. Leukocytosis resolved. Continue IV abx for MDRO coverage.   12/23 ND Pt still havign trouble tolerating tfs -change reglan iv; pt is more awake this am - will change to simv for a few hours today     Interval History: Patient seen and examined.     Review of Systems   Unable to perform ROS: Acuity of condition     Objective:     Vital Signs (Most Recent):  Temp: 97.2 °F (36.2 °C) (12/23/24 0702)  Pulse: 78 (12/23/24 0801)  Resp: 18 (12/23/24 0801)  BP: 105/65 (12/23/24 0702)  SpO2: 100 % (12/23/24 0801) Vital Signs (24h Range):  Temp:  [96.4 °F (35.8 °C)-97.5 °F (36.4 °C)] 97.2 °F (36.2 °C)  Pulse:  [77-91] 78  Resp:  [13-19] 18  SpO2:  [97 %-100 %] 100 %  BP: ()/(56-73) 105/65     Weight: 68.5 kg (151 lb 0.2 oz)  Body mass index is 23.65 kg/m².    Intake/Output Summary (Last 24 hours) at 12/23/2024 0826  Last data filed at 12/23/2024 0554  Gross per 24 hour   Intake 2736.46 ml   Output 1200 ml   Net 1536.46 ml         Physical Exam  Constitutional:       Appearance: He is ill-appearing.      Comments: Thin male; intubated   HENT:      Mouth/Throat:      Mouth: Mucous membranes are moist.   Eyes:      Pupils: Pupils are equal, round, and reactive to light.   Cardiovascular:      Rate and Rhythm: Normal rate and regular rhythm.   Pulmonary:      Effort: Prolonged expiration present.      Breath sounds: Transmitted upper airway sounds present.      Comments: Breath sounds at lung bases bilaterally   Abdominal:      General: There is no distension.      Palpations: Abdomen is soft.      Tenderness: There is no abdominal tenderness.      Comments: Jtube noted; ileostomy noted   Genitourinary:     Comments: Rojas with yellow urine  Skin:     General: Skin is warm and dry.      Comments: Wounds  "to sacrum; groin area             Significant Labs: All pertinent labs within the past 24 hours have been reviewed.  ABGs:   Recent Labs   Lab 12/22/24  0536 12/23/24  0541   PH 7.369 7.418   PCO2 46.1* 43.6   HCO3 25.4 27.3   POCSATURATED 100.0* 99.3   PO2 174* 114*     Bilirubin:   Recent Labs   Lab 12/08/24  1301 12/09/24  0528 12/19/24  0338 12/20/24  0340 12/21/24  0331 12/22/24  0308 12/23/24  0343   BILIDIR 0.3  --   --   --   --   --   --    BILITOT 0.5   < > 0.4 0.4 0.4 0.3 0.2    < > = values in this interval not displayed.      Recent Labs   Lab 12/23/24  0343   *      K 4.8      CO2 29   BUN 10   CREATININE 0.9   CALCIUM 9.8   MG 1.6     CBC:   Recent Labs   Lab 12/22/24  0308 12/23/24  0343   WBC 11.33 10.73   HGB 7.9* 7.6*   HCT 25.4* 25.2*    255     CMP:   Recent Labs   Lab 12/22/24  0308 12/23/24  0343    138   K 4.3 4.8    108   CO2 29 29   * 163*   BUN 10 10   CREATININE 0.8 0.9   CALCIUM 9.4 9.8   PROT 6.0 5.9*   ALBUMIN 0.6* 0.6*   BILITOT 0.3 0.2   ALKPHOS 558* 482*   AST 33 27   ALT 11 9*   ANIONGAP 2* 1*     Magnesium:   Recent Labs   Lab 12/22/24  0308 12/23/24  0343   MG 1.7 1.6     Significant Imaging: I have reviewed all pertinent imaging results/findings within the past 24 hours.    Assessment and Plan     * Sepsis  This patient does have evidence of infective focus  My overall impression is septic shock due to MAP < 65 and SBP < 90.  Source: Respiratory and Urinary Tract  Antibiotics given-   Antibiotics (72h ago, onward)      Start     Stop Route Frequency Ordered    12/22/24 1600  gentamicin (GARAMYCIN) 462.8 mg in 0.9% NaCl 100 mL IVPB         -- IV Every 48 hours (non-standard times) 12/22/24 0413    12/20/24 1438  gentamicin - pharmacy to dose  (gentamicin IVPB (PEDS and ADULTS))        Placed in "And" Linked Group    -- IV pharmacy to manage frequency 12/20/24 1338    12/17/24 0900  meropenem injection 1 g         -- IV Every 8 hours " "(non-standard times) 12/17/24 0757          Latest lactate reviewed-  No results for input(s): "LACTATE", "POCLAC" in the last 72 hours.    Organ dysfunction indicated by Acute respiratory failure and Encephalopathy    Fluid challenge Ideal Body Weight- The patient's ideal body weight is Ideal body weight: 66.1 kg (145 lb 11.6 oz) which will be used to calculate fluid bolus of 30 ml/kg for treatment of septic shock.      Post- resuscitation assessment Yes Perfusion exam was performed within 6 hours of septic shock presentation after bolus shows Inadequate tissue perfusion assessed by non-invasive monitoring       Will Start Pressors- Levophed for MAP of 65  Source control achieved by: iv zosym. Vanc, ivfs  12/17 wean levophed as tolerates- abxs changed to merrem, cotn vanc - await culture final reports  12/18 wbc slightly imrpoved - cont iv abxs- await final sputum culture - on merrem fro esbl kleb in urine  12/19 cont iv merrem for esbl kleb in urine and proteus in sputum - dc vanc  12/20 wbc improvign - cotn iv merrem    Hypokalemia  Patient's most recent potassium results are listed below.   Recent Labs     12/21/24  0331 12/22/24  0308 12/23/24  0343   K 3.9 4.3 4.8       Plan  - Replete potassium per protocol  - Monitor potassium Daily  - Patient's hypokalemia is stable, continue below and monitor  12/20 increase pot bicarb to bid  12/23 decrease pot bicard to daily dosing      Severe malnutrition  Nutrition consulted. Most recent weight and BMI monitored-     Measurements:  Wt Readings from Last 1 Encounters:   12/16/24 68.5 kg (151 lb 0.2 oz)   Body mass index is 23.65 kg/m².    Patient has been screened and assessed by RD.    Malnutrition Type:  Context:    Level:      Malnutrition Characteristic Summary:       Interventions/Recommendations (treatment strategy):  1. Rec'd Continuous EN: Glucerna 1.5 @ 40mL/r increasing by 5 mL q6hrs to goal rate of 55mL/hr with a continuous 40mL FWF to provide 1980kcal (94% " EEN), 109g of protein (100% EPN), and 1962mL FW. 2. Néstor BID via PEG tube to promote wound healing and to provide additional nutrition.         - Do not mix Néstor with formula in a tube-feeding bag       - Pour one packet of Néstor in a clean, small container for mixing.         - Add 4 fl oz (120 mL) water at room temperature.         - Mix well with disposable spoon or tongue blade until all particles are completely hydrated.         - Verify correct tube placement.         - Flush feeding tube with 30 mL water.         -Administer Néstor through feeding tube using a 60-mL or larger syringe.         - Flush with an additional 30 mL water. 3. RD to follow and make rec's accordingly.    12/18 restart tfs today at 1ml/hr to see if can tolerate feeds  12/19 increase tfs as tolerates  12/23 not tolerating tfs- will get tpn orders and start that until can tolerate tfs better - ncrease regaln 10 mg iv q 6hrs    Hypomagnesemia  Patient has Abnormal Magnesium: hypomagnesemia. Will continue to monitor electrolytes closely. Will replace the affected electrolytes and repeat labs to be done after interventions completed. The patient's magnesium results have been reviewed and are listed below.  Recent Labs   Lab 12/23/24  0343   MG 1.6      12/17 mag imrpoving - repalce mag today  12/19 replace mag today  12/20 mag oxide bid  12/21 2g Mg  12/23 dose of iv mag today to keep mag level about 2    Pneumonia of right lower lobe due to infectious organism  Patient has a diagnosis of pneumonia. The cause of the pneumonia is suspected to be bacterial in etiology but organism is not known. The pneumonia is stable. The patient has the following signs/symptoms of pneumonia: persistent hypoxia  and sputum production. The patient does have a current oxygen requirement and the patient does not have a home oxygen requirement. I have reviewed the pertinent imaging. The following cultures have been collected: Blood cultures and Sputum culture  "The culture results are listed below.     Current antimicrobial regimen consists of the antibiotics listed below. Will monitor patient closely and continue current treatment plan unchanged.    Antibiotics (From admission, onward)      Start     Stop Route Frequency Ordered    12/22/24 1600  gentamicin (GARAMYCIN) 462.8 mg in 0.9% NaCl 100 mL IVPB         -- IV Every 48 hours (non-standard times) 12/22/24 0413    12/20/24 1438  gentamicin - pharmacy to dose  (gentamicin IVPB (PEDS and ADULTS))        Placed in "And" Linked Group    -- IV pharmacy to manage frequency 12/20/24 1338    12/17/24 0900  meropenem injection 1 g         -- IV Every 8 hours (non-standard times) 12/17/24 0757            Microbiology Results (last 7 days)       Procedure Component Value Units Date/Time    Blood culture x two cultures. Draw prior to antibiotics. [[id number]] Collected: 12/15/24 0942    Order Status: Completed Specimen: Blood from Peripheral, Antecubital, Left Updated: 12/20/24 1612     Blood Culture, Routine No growth after 5 days.    Narrative:      Aerobic and anaerobic    Blood culture x two cultures. Draw prior to antibiotics. [[id number]] Collected: 12/15/24 0923    Order Status: Completed Specimen: Blood from Peripheral, Forearm, Right Updated: 12/20/24 1612     Blood Culture, Routine No growth after 5 days.    Narrative:      Aerobic and anaerobic    Culture, Respiratory with Gram Stain [[id number]]  (Abnormal)  (Susceptibility) Collected: 12/15/24 0935    Order Status: Completed Specimen: Respiratory from Endotracheal Aspirate Updated: 12/20/24 1256     Respiratory Culture ACINETOBACTER BAUMANNII   Many  Susceptibility pending        PROTEUS MIRABILIS  Few        KLEBSIELLA PNEUMONIAE   Few  ESBL        Gram Stain (Respiratory) Few WBC's     Gram Stain (Respiratory) Many Gram positive rods     Gram Stain (Respiratory) Few Gram negative cocci    Urine culture [[id number]]  (Abnormal)  (Susceptibility) " Collected: 12/15/24 0941    Order Status: Completed Specimen: Urine Updated: 12/17/24 2343     Urine Culture, Routine KLEBSIELLA PNEUMONIAE ESBL  >100,000 cfu/ml      Narrative:      Preferred Collection Type->Urine, Clean Catch  Specimen Source->Urine        12/17 dc zosyn with recent esbl kleb in urine - will change to merrem - cont vanc until final cultures obtained - change vent to simv; add nebs  12/18 cotn merrem and vanc - await final sputum cutlure - cont vent on ac control; nebs - wbc imrpoved slightly; lasix 20mg iv for edema today  12/19  dc vanc - proteus grwoign from sputum - cont merrem and nebs; lasix today - wean rr on vent  12/20 wnc improved - cont merrem - nebs and vent - another dose of lasix today for edema - fio2 increased  12/23 cont iv gent and merren due to sent of multiple bugs (acinetobacter/kleb/proteus)    Sacral wound, sequela  Local wound care with dakins bid  Iv abxs      Open wound of groin  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  Recent Labs     12/21/24  0331 12/22/24  0308 12/23/24  0343   HGB 8.0* 7.9* 7.6*   HCT 25.7* 25.4* 25.2*       Plan  - Monitor serial CBC: Daily  - Transfuse PRBC if patient becomes hemodynamically unstable, symptomatic or H/H drops below 7/21.  - Patient has not received any PRBC transfusions to date  - Patient's anemia is currently worsening. Will adjust treatment as follows: 2uprbcs today  12/17 h/h Improved  12/20 h/h holding    History of anoxic brain injury        Urinary tract infection associated with indwelling urethral catheter  Await new urine culture =- currently on zosyn/vanc  12/17 change to merrem/vanc  12/18 has esbl klebsiella - cont merrem  12/23 on merrem    Type 1 diabetes  Patient's FSGs are uncontrolled due to hyperglycemia on current medication regimen.  Last A1c reviewed-   Lab  Results   Component Value Date    HGBA1C 6.6 (H) 12/16/2024     Most recent fingerstick glucose reviewed-   Recent Labs   Lab 12/22/24 2001 12/23/24  0014 12/23/24  0339 12/23/24  0757   POCTGLUCOSE 222* 243* 154* 170*       Current correctional scale  Low  Maintain anti-hyperglycemic dose as follows-   Antihyperglycemics (From admission, onward)      Start     Stop Route Frequency Ordered    12/16/24 0952  insulin aspart U-100 pen 0-5 Units         -- SubQ Every 4 hours PRN 12/16/24 0853          Hold Oral hypoglycemics while patient is in the hospital.  12/17 A1c improved to 6.6%      VTE Risk Mitigation (From admission, onward)           Ordered     enoxaparin injection 40 mg  Daily         12/16/24 0815     IP VTE LOW RISK PATIENT  Once         12/15/24 1201     Place sequential compression device  Until discontinued         12/15/24 1201                    Discharge Planning   JOSE MANUEL:      Code Status: Full Code   Medical Readiness for Discharge Date:   Discharge Plan A: Return to nursing home                        Kim Diamond MD  Department of Hospital Medicine   [facility] - Intensive Care

## 2024-12-23 NOTE — ASSESSMENT & PLAN NOTE
Nutrition consulted. Most recent weight and BMI monitored-     Measurements:  Wt Readings from Last 1 Encounters:   12/16/24 68.5 kg (151 lb 0.2 oz)   Body mass index is 23.65 kg/m².    Patient has been screened and assessed by RD.    Malnutrition Type:  Context:    Level:      Malnutrition Characteristic Summary:       Interventions/Recommendations (treatment strategy):  1. Rec'd Continuous EN: Glucerna 1.5 @ 40mL/r increasing by 5 mL q6hrs to goal rate of 55mL/hr with a continuous 40mL FWF to provide 1980kcal (94% EEN), 109g of protein (100% EPN), and 1962mL FW. 2. Néstor BID via PEG tube to promote wound healing and to provide additional nutrition.         - Do not mix Néstor with formula in a tube-feeding bag       - Pour one packet of Néstor in a clean, small container for mixing.         - Add 4 fl oz (120 mL) water at room temperature.         - Mix well with disposable spoon or tongue blade until all particles are completely hydrated.         - Verify correct tube placement.         - Flush feeding tube with 30 mL water.         -Administer Néstor through feeding tube using a 60-mL or larger syringe.         - Flush with an additional 30 mL water. 3. RD to follow and make rec's accordingly.    12/18 restart tfs today at 1ml/hr to see if can tolerate feeds  12/19 increase tfs as tolerates  12/23 not tolerating tfs- will get tpn orders and start that until can tolerate tfs better - ncrease regaln 10 mg iv q 6hrs

## 2024-12-23 NOTE — ASSESSMENT & PLAN NOTE
"Patient has a diagnosis of pneumonia. The cause of the pneumonia is suspected to be bacterial in etiology but organism is not known. The pneumonia is stable. The patient has the following signs/symptoms of pneumonia: persistent hypoxia  and sputum production. The patient does have a current oxygen requirement and the patient does not have a home oxygen requirement. I have reviewed the pertinent imaging. The following cultures have been collected: Blood cultures and Sputum culture The culture results are listed below.     Current antimicrobial regimen consists of the antibiotics listed below. Will monitor patient closely and continue current treatment plan unchanged.    Antibiotics (From admission, onward)      Start     Stop Route Frequency Ordered    12/22/24 1600  gentamicin (GARAMYCIN) 462.8 mg in 0.9% NaCl 100 mL IVPB         -- IV Every 48 hours (non-standard times) 12/22/24 0413    12/20/24 1438  gentamicin - pharmacy to dose  (gentamicin IVPB (PEDS and ADULTS))        Placed in "And" Linked Group    -- IV pharmacy to manage frequency 12/20/24 1338    12/17/24 0900  meropenem injection 1 g         -- IV Every 8 hours (non-standard times) 12/17/24 0757            Microbiology Results (last 7 days)       Procedure Component Value Units Date/Time    Blood culture x two cultures. Draw prior to antibiotics. [[id number]] Collected: 12/15/24 0942    Order Status: Completed Specimen: Blood from Peripheral, Antecubital, Left Updated: 12/20/24 1612     Blood Culture, Routine No growth after 5 days.    Narrative:      Aerobic and anaerobic    Blood culture x two cultures. Draw prior to antibiotics. [[id number]] Collected: 12/15/24 0923    Order Status: Completed Specimen: Blood from Peripheral, Forearm, Right Updated: 12/20/24 1612     Blood Culture, Routine No growth after 5 days.    Narrative:      Aerobic and anaerobic    Culture, Respiratory with Gram Stain [[id number]]  (Abnormal)  (Susceptibility) Collected: " 12/15/24 0935    Order Status: Completed Specimen: Respiratory from Endotracheal Aspirate Updated: 12/20/24 1256     Respiratory Culture ACINETOBACTER BAUMANNII   Many  Susceptibility pending        PROTEUS MIRABILIS  Few        KLEBSIELLA PNEUMONIAE   Few  ESBL        Gram Stain (Respiratory) Few WBC's     Gram Stain (Respiratory) Many Gram positive rods     Gram Stain (Respiratory) Few Gram negative cocci    Urine culture [[id number]]  (Abnormal)  (Susceptibility) Collected: 12/15/24 0941    Order Status: Completed Specimen: Urine Updated: 12/17/24 2343     Urine Culture, Routine KLEBSIELLA PNEUMONIAE ESBL  >100,000 cfu/ml      Narrative:      Preferred Collection Type->Urine, Clean Catch  Specimen Source->Urine        12/17 dc zosyn with recent esbl kleb in urine - will change to merrem - cont vanc until final cultures obtained - change vent to simv; add nebs  12/18 cotn merrem and vanc - await final sputum cutlure - cont vent on ac control; nebs - wbc imrpoved slightly; lasix 20mg iv for edema today  12/19  dc vanc - proteus grwoign from sputum - cont merrem and nebs; lasix today - wean rr on vent  12/20 wnc improved - cont merrem - nebs and vent - another dose of lasix today for edema - fio2 increased  12/23 cont iv gent and merren due to sent of multiple bugs (acinetobacter/kleb/proteus)

## 2024-12-23 NOTE — RESPIRATORY THERAPY
12/23/24 0801   Patient Assessment/Suction   Level of Consciousness (AVPU) responds to pain   Respiratory Effort Unlabored   Expansion/Accessory Muscles/Retractions expansion symmetric;no retractions   All Lung Fields Breath Sounds clear;diminished   Rhythm/Pattern, Respiratory assisted mechanically   Cough Frequency no cough  (pt has no gag reflex -- Dr. Diamond notified)   Cough Type assisted   Suction Method oral;tracheal   Suction Pressure (mmHg) -120 mmHg   $ Suction Charges Inline Suction Procedure Stat Charge   Secretions Amount large   Secretions Color yellow;white   Secretions Characteristics thick   $ Swab or suction? Suction   Aspirate Toleration DIONI (no adverse reactions)   Sent to the lab? No   Skin Integrity   Area Observed Left;Right;Cheek;Upper lip;Lower lip;Corner lip   Skin Appearance without discoloration   PRE-TX-O2   Device (Oxygen Therapy) ventilator   $ Is the patient on High Flow Oxygen? Yes   Flow (L/min) (Oxygen Therapy) 60   Oxygen Concentration (%) 30   Oxygen Analyzed Concentration (%) 30 %   SpO2 100 %   Pulse Oximetry Type Continuous   $ Pulse Oximetry - Multiple Charge Pulse Oximetry - Multiple   Pulse 78   Resp 19   BP 98/60   Positioning HOB elevated 30 degrees   Positioning   Body Position position maintained   Head of Bed (HOB) Positioning HOB at 30 degrees   Aerosol Therapy   $ Aerosol Therapy Charges Aerosol Treatment   Daily Review of Necessity (SVN) completed   Respiratory Treatment Status (SVN) given   Treatment Route (SVN) in-line;ventilator   Patient Position HOB elevated   Post Treatment Assessment (SVN) breath sounds unchanged   Signs of Intolerance (SVN) none   Breath Sounds Post-Respiratory Treatment   Throughout All Fields Post-Treatment All Fields   Throughout All Fields Post-Treatment no change   Post-treatment Heart Rate (beats/min) 78   Post-treatment Resp Rate (breaths/min) 17        Airway - Non-Surgical 12/15/24 0922 Endotracheal Tube   Placement Date/Time:  12/15/24 0922   Present Prior to Hospital Arrival?: No  Method of Intubation: Glidescope  Inserted by: MD  Staff/Resident Name(s): Doug Ibrahim  Airway Device: Endotracheal Tube  Mask Ventilation: Easy  Intubated: Postinduction  B...   Secured at 23 cm   Measured At Lips   Secured Location (S)  Left  (ETT moved to left side, Coleen RN notified)   Secured by Commercial tube kaufman   Bite Block secure and patent   Site Condition Dry   Status Intact;Secured;Patent   Site Assessment Clean;Dry   Cuff Volume   (MLT)   Cuff Pressure   (cuff pressure in green zone on amauri-cuff device)   General Safety Checklist   Safety Promotion/Fall Prevention side rails raised   Airway Safety   Is Ambu Bag and Mask with Patient? Yes, Adult Ambu Bag and Mask   Suction set is at the bedside? Yes   Equipment Change   $ RT Equipment HME   Respiratory Interventions   Cough And Deep Breathing unable to perform   Airway/Ventilation Management airway patency maintained   Vent Select   Conventional Vent Y   Humidification Changed? Yes   $ Ventilator Subsequent 1   Charged w/in last 24h YES   Preset Conventional Ventilator Settings   Vent ID 1   Vent Type    Ventilation Type VC   Vent Mode (S)  SIMV   Humidity HME   Set Rate 12 BPM   Vt Set 400 mL   PEEP/CPAP 5 cmH20   Pressure Support (S)  10 cmH20   Waveform RAMP   Peak Flow 60 L/min   Plateau Set/Insp. Hold (sec) 0   Insp Rise Time  70 %   I-Trigger Type  V-TRIG   Trigger Sensitivity Flow/I-Trigger 3 L/min   Patient Ventilator Parameters   Resp Rate Total 19 br/min   All Fields Breath Sounds clear;diminished   Peak Airway Pressure 21 cmH20   Mean Airway Pressure 8.5 cmH20   Plateau Pressure 17 cmH20   Exhaled Vt 444 mL   Total Ve 7.08 L/m   Spont Ve 1.77 L   I:E Ratio Measured 1:5.40   Auto PEEP 0 cmH20   Tubing ID (mm) 7.5 mm   Tube Type ET   Inspired Tidal Volume (VTI) 275 mL   Conventional Ventilator Alarms   Alarms On Y   Ve High Alarm 24 L/min   Ve Low Alarm 4 L/min   Resp Rate  High Alarm 35 br/min   Press High Alarm 40 cmH2O   Apnea Rate 14   Apnea Volume (mL) 400 mL   Apnea Oxygen Concentration  100   Apnea Flow Rate (L/min) 60   T Apnea 10 sec(s)   IHI Ventilator Associated Pneumonia Bundle (Required)   Head of Bed Elevated  HOB 30   Oral Care CHG;Lip moisturizer applied;Mouth swabbed;Mouth moisturizer;Mouth suctioned   Vent Circut Breaks Minimized Yes   Ready to Wean/Extubation Screen   FIO2<=50 (chart decimal) 0.3   MV<16L (chart vol.) 7.08   PEEP <=8 (chart #) 5   Ready to Wean Parameters   F/VT Ratio<105 (RSBI) (!) 42.79   Negative Inspiratory Force   Negative Inspiratory Force (cm H2O) 0   Vital Capacity   Vital Capacity (mL) 0   Respiratory Evaluation   $ Care Plan Tech Time 30 min       Vent mode changed to SIMV with PS 10 cm H2O and PEEP +5 cm H2O per Dr. Diamond. Coleen WADDELL aware

## 2024-12-23 NOTE — ASSESSMENT & PLAN NOTE
Patient's most recent potassium results are listed below.   Recent Labs     12/21/24  0331 12/22/24  0308 12/23/24  0343   K 3.9 4.3 4.8       Plan  - Replete potassium per protocol  - Monitor potassium Daily  - Patient's hypokalemia is stable, continue below and monitor  12/20 increase pot bicarb to bid  12/23 decrease pot bicard to daily dosing

## 2024-12-23 NOTE — PLAN OF CARE
Problem: Skin Injury Risk Increased  Goal: Skin Health and Integrity  Outcome: Not Progressing     Problem: Mechanical Ventilation Invasive  Goal: Optimal Device Function  Outcome: Progressing  Goal: Optimal Nutrition Delivery  Outcome: Not Progressing  Goal: Absence of Device-Related Skin and Tissue Injury  Outcome: Progressing  Goal: Absence of Ventilator-Induced Lung Injury  Outcome: Progressing     Problem: Adult Inpatient Plan of Care  Goal: Absence of Hospital-Acquired Illness or Injury  Outcome: Progressing     Problem: Diabetes Comorbidity  Goal: Blood Glucose Level Within Targeted Range  Outcome: Progressing     Problem: Wound  Goal: Absence of Infection Signs and Symptoms  Outcome: Not Progressing  Goal: Improved Oral Intake  Outcome: Not Progressing  Goal: Optimal Pain Control and Function  Outcome: Progressing  Goal: Skin Health and Integrity  Outcome: Not Progressing  Goal: Optimal Wound Healing  Outcome: Not Progressing     Problem: Infection  Goal: Absence of Infection Signs and Symptoms  Outcome: Progressing     Problem: Pneumonia  Goal: Resolution of Infection Signs and Symptoms  Outcome: Progressing  Goal: Effective Oxygenation and Ventilation  Outcome: Progressing     Problem: Sepsis/Septic Shock  Goal: Absence of Infection Signs and Symptoms  Outcome: Progressing  Goal: Optimal Nutrition Intake  Outcome: Not Progressing     Problem: Electrolyte Imbalance  Goal: Electrolyte Balance  Outcome: Progressing

## 2024-12-23 NOTE — SUBJECTIVE & OBJECTIVE
Interval History: Patient seen and examined.     Review of Systems   Unable to perform ROS: Acuity of condition     Objective:     Vital Signs (Most Recent):  Temp: 97.2 °F (36.2 °C) (12/23/24 0702)  Pulse: 78 (12/23/24 0801)  Resp: 18 (12/23/24 0801)  BP: 105/65 (12/23/24 0702)  SpO2: 100 % (12/23/24 0801) Vital Signs (24h Range):  Temp:  [96.4 °F (35.8 °C)-97.5 °F (36.4 °C)] 97.2 °F (36.2 °C)  Pulse:  [77-91] 78  Resp:  [13-19] 18  SpO2:  [97 %-100 %] 100 %  BP: ()/(56-73) 105/65     Weight: 68.5 kg (151 lb 0.2 oz)  Body mass index is 23.65 kg/m².    Intake/Output Summary (Last 24 hours) at 12/23/2024 0826  Last data filed at 12/23/2024 0554  Gross per 24 hour   Intake 2736.46 ml   Output 1200 ml   Net 1536.46 ml         Physical Exam  Constitutional:       Appearance: He is ill-appearing.      Comments: Thin male; intubated   HENT:      Mouth/Throat:      Mouth: Mucous membranes are moist.   Eyes:      Pupils: Pupils are equal, round, and reactive to light.   Cardiovascular:      Rate and Rhythm: Normal rate and regular rhythm.   Pulmonary:      Effort: Prolonged expiration present.      Breath sounds: Transmitted upper airway sounds present.      Comments: Breath sounds at lung bases bilaterally   Abdominal:      General: There is no distension.      Palpations: Abdomen is soft.      Tenderness: There is no abdominal tenderness.      Comments: Jtube noted; ileostomy noted   Genitourinary:     Comments: Rojas with yellow urine  Skin:     General: Skin is warm and dry.      Comments: Wounds to sacrum; groin area             Significant Labs: All pertinent labs within the past 24 hours have been reviewed.  ABGs:   Recent Labs   Lab 12/22/24  0536 12/23/24  0541   PH 7.369 7.418   PCO2 46.1* 43.6   HCO3 25.4 27.3   POCSATURATED 100.0* 99.3   PO2 174* 114*     Bilirubin:   Recent Labs   Lab 12/08/24  1301 12/09/24  0528 12/19/24  0338 12/20/24  0340 12/21/24  0331 12/22/24  0308 12/23/24  0343   BILIDIR 0.3  --    --   --   --   --   --    BILITOT 0.5   < > 0.4 0.4 0.4 0.3 0.2    < > = values in this interval not displayed.      Recent Labs   Lab 12/23/24  0343   *      K 4.8      CO2 29   BUN 10   CREATININE 0.9   CALCIUM 9.8   MG 1.6     CBC:   Recent Labs   Lab 12/22/24 0308 12/23/24  0343   WBC 11.33 10.73   HGB 7.9* 7.6*   HCT 25.4* 25.2*    255     CMP:   Recent Labs   Lab 12/22/24 0308 12/23/24  0343    138   K 4.3 4.8    108   CO2 29 29   * 163*   BUN 10 10   CREATININE 0.8 0.9   CALCIUM 9.4 9.8   PROT 6.0 5.9*   ALBUMIN 0.6* 0.6*   BILITOT 0.3 0.2   ALKPHOS 558* 482*   AST 33 27   ALT 11 9*   ANIONGAP 2* 1*     Magnesium:   Recent Labs   Lab 12/22/24 0308 12/23/24  0343   MG 1.7 1.6     Significant Imaging: I have reviewed all pertinent imaging results/findings within the past 24 hours.

## 2024-12-23 NOTE — CONSULTS
[facility] - Intensive Care  Adult Nutrition  Consult Note    SUMMARY     Recommendations  1. Rec'd Continuous EN: Glucerna 1.5 @ 40mL/r increasing by 5 mL q6hrs to goal rate of 55mL/hr with a continuous 40mL FWF to provide 1980kcal (94% EEN), 109g of protein (100% EPN), and 1962mL FW.     2. Néstor BID via PEG tube to promote wound healing and to provide additional nutrition.             - Do not mix Néstor with formula in a tube-feeding bag           - Pour one packet of Néstor in a clean, small container for mixing.             - Add 4 fl oz (120 mL) water at room temperature.             - Mix well with disposable spoon or tongue blade until all particles are completely hydrated.             - Verify correct tube placement.             - Flush feeding tube with 30 mL water.             -Administer Néstor through feeding tube using a 60-mL or larger syringe.             - Flush with an additional 30 mL water.    3. Rec'd ClinimixE 5/20 @ 80mL/hr to provide 1690kcal (80% EEN), 96g of protein (88% EPN), and 1920mL TFV.        -Add 250mL of 20% lipids 3x/week to provide additional calories.        -Check Triglycerides before adding lipids.   4. RD to follow and make rec's accordingly.  Goals:   1. Pt will be started on EN by next RD follow up.     2. Pt will reach TF goal rate within 48hrs of initiation.   3. Pt will be started on TPN by next RD follow up.  Nutrition Goal Status: goal not met, new  Communication of RD Recs: reviewed with physician, reviewed with Pharmacy    Assessment and Plan    Nutrition Problem  Inadequate oral intake     Related to (etiology):   NPO/Intubation Status     Signs and Symptoms (as evidenced by):   0% PO intake      Interventions/Recommendations (treatment strategy):  1. Rec'd Continuous EN: Glucerna 1.5 @ 40mL/r increasing by 5 mL q6hrs to goal rate of 55mL/hr with a continuous 40mL FWF to provide 1980kcal (94% EEN), 109g of protein (100% EPN), and 1962mL FW.   2. Néstor BID via PEG tube  "to promote wound healing and to provide additional nutrition.           - Do not mix Néstor with formula in a tube-feeding bag         - Pour one packet of Néstor in a clean, small container for mixing.           - Add 4 fl oz (120 mL) water at room temperature.           - Mix well with disposable spoon or tongue blade until all particles are completely hydrated.           - Verify correct tube placement.           - Flush feeding tube with 30 mL water.           -Administer Néstor through feeding tube using a 60-mL or larger syringe.           - Flush with an additional 30 mL water.   3. RD to follow and make rec's accordingly.     Nutrition Diagnosis Status:   Continues     Malnutrition Assessment  NFPE not warranted at this time. RD to continue to monitor for signs and symptoms of malnutrition.     Nutrition Related Social Determinants of Health:  None identified at this time.    Reason for Assessment    Reason For Assessment: consult (via secure chat by MD for TPN)  Diagnosis: infection/sepsis  General Information Comments: RD re-consulted by MD via secure caht for TPN rec's. Pt is not toleraing TF well. Will provide TPN rec's and communicate with MD and Pharmacy. RD to follow and make rec's accordingly.  Nutrition Discharge Planning: TBD as care progresses    Nutrition Risk Screen    Nutrition Risk Screen: tube feeding or parenteral nutrition    Nutrition/Diet History    Patient Reported Diet/Restrictions/Preferences: no oral intake  Spiritual, Cultural Beliefs, Yarsanism Practices, Values that Affect Care: no  Food Allergies: NKFA  Factors Affecting Nutritional Intake: NPO  Nutrition Support Formula Prior to Admit: Glucerna 1.5    Anthropometrics    Temp: 97.2 °F (36.2 °C)  Height: 5' 7" (170.2 cm)  Height (inches): 67 in  Weight Method: Bed Scale  Weight: 68.5 kg (151 lb 0.2 oz)  Weight (lb): 151.02 lb  Ideal Body Weight (IBW), Male: 148 lb  % Ideal Body Weight, Male (lb): 102.04 %  BMI (Calculated): 23.6  BMI " Grade: 18.5-24.9 - normal    Lab/Procedures/Meds    Pertinent Labs Reviewed: reviewed  Pertinent Medications Reviewed: reviewed    Estimated/Assessed Needs    Weight Used For Calorie Calculations: 68.5 kg (151 lb 0.2 oz)  Energy Calorie Requirements (kcal): 2096  Energy Need Method: [facility]  Protein Requirements: 109-137 (1.6-2.0g/kg)  Weight Used For Protein Calculations: 68.5 kg (151 lb 0.2 oz)  Fluid Requirements (mL): 2096 (1mL/kcal)  Estimated Fluid Requirement Method: RDA Method  RDA Method (mL): 2096    Nutrition Prescription Ordered    Current Diet Order: NPO  Current Nutrition Support Formula Ordered: Glucerna 1.5  Current Nutrition Support Rate Ordered: 0 (ml)  Current Nutrition Support Frequency Ordered: Continuous (On Hold)  Oral Nutrition Supplement: None    Evaluation of Received Nutrient/Fluid Intake    Enteral Calories (kcal): 0  Enteral Protein (gm): 0  Enteral (Free Water) Fluid (mL): 0  Other Calories (kcal): 164 (Propofol infusing @ 6.2mL/hr)  Total Calories (kcal): 164  % Kcal Needs: 0.07%  % Protein Needs: 0%  I/O: + 533.2  Energy Calories Required: not meeting needs  Protein Required: not meeting needs  Tolerance: not tolerating  % Intake of Estimated Energy Needs: 0 - 25 %  % Meal Intake: NPO    Nutrition Risk    Level of Risk/Frequency of Follow-up: moderate     Monitor and Evaluation    Food and Nutrient Intake: enteral nutrition intake, parenteral nutrition intake  Food and Nutrient Adminstration: enteral and parenteral nutrition administration  Knowledge/Beliefs/Attitudes: beliefs and attitudes, food and nutrition knowledge/skill  Physical Activity and Function: nutrition-related ADLs and IADLs  Anthropometric Measurements: height/length, weight, weight change, body mass index  Biochemical Data, Medical Tests and Procedures: electrolyte and renal panel, gastrointestinal profile, glucose/endocrine profile, inflammatory profile, lipid profile  Nutrition-Focused Physical Findings: overall  appearance     Nutrition Follow-Up    RD Follow-up?: Yes

## 2024-12-23 NOTE — PLAN OF CARE
Patient remains intubated, sedated, and levophed. Pt tolerated few hours of SIMV this morning. TPN added this evening. Residuals of 200mls or over throughout shift, so tube feeds remain on hold all day. 2 gram mag rider given x 1. No major changes in pt status today. Wound care done per orders.

## 2024-12-23 NOTE — PLAN OF CARE
Recommendations  1. Rec'd Continuous EN: Glucerna 1.5 @ 40mL/r increasing by 5 mL q6hrs to goal rate of 55mL/hr with a continuous 40mL FWF to provide 1980kcal (94% EEN), 109g of protein (100% EPN), and 1962mL FW.     2. Néstor BID via PEG tube to promote wound healing and to provide additional nutrition.             - Do not mix Néstor with formula in a tube-feeding bag           - Pour one packet of Néstor in a clean, small container for mixing.             - Add 4 fl oz (120 mL) water at room temperature.             - Mix well with disposable spoon or tongue blade until all particles are completely hydrated.             - Verify correct tube placement.             - Flush feeding tube with 30 mL water.             -Administer Néstor through feeding tube using a 60-mL or larger syringe.             - Flush with an additional 30 mL water.    3. Rec'd ClinimixE 5/20 @ 80mL/hr to provide 1690kcal (80% EEN), 96g of protein (88% EPN), and 1920mL TFV.        -Add 250mL of 20% lipids 3x/week to provide additional calories.        -Check Triglycerides before adding lipids.   4. RD to follow and make rec's accordingly.  Goals:   1. Pt will be started on EN by next RD follow up.     2. Pt will reach TF goal rate within 48hrs of initiation.   3. Pt will be started on TPN by next RD follow up.  Nutrition Goal Status: goal not met, new

## 2024-12-23 NOTE — RESPIRATORY THERAPY
12/23/24 1107   Patient Assessment/Suction   Level of Consciousness (AVPU) responds to pain   Respiratory Effort Unlabored   Expansion/Accessory Muscles/Retractions expansion symmetric;no retractions   All Lung Fields Breath Sounds clear;diminished   Rhythm/Pattern, Respiratory assisted mechanically   Cough Frequency no cough   Cough Type assisted   Suction Method oral;tracheal   Suction Pressure (mmHg) -120 mmHg   $ Suction Charges Inline Suction Procedure Stat Charge   Secretions Amount moderate   Secretions Color yellow   Secretions Characteristics thick   $ Swab or suction? Suction   Aspirate Toleration DIONI (no adverse reactions)   Sent to the lab? No   Is this patient in SNF or Inpatient Rehab? No   Skin Integrity   Area Observed Left;Right;Cheek;Upper lip;Lower lip;Corner lip   Skin Appearance without discoloration   PRE-TX-O2   Device (Oxygen Therapy) ventilator   $ Is the patient on High Flow Oxygen? Yes   Flow (L/min) (Oxygen Therapy) 60   Oxygen Concentration (%) 30   Oxygen Analyzed Concentration (%) 30 %   SpO2 100 %   Pulse Oximetry Type Continuous   $ Pulse Oximetry - Multiple Charge Pulse Oximetry - Multiple   Pulse 76   Resp 14   /66   Positioning HOB elevated 30 degrees   Positioning   Body Position position maintained   Head of Bed (HOB) Positioning HOB at 30 degrees   Aerosol Therapy   $ Aerosol Therapy Charges Aerosol Treatment   Daily Review of Necessity (SVN) completed   Respiratory Treatment Status (SVN) given   Treatment Route (SVN) in-line;ventilator   Patient Position HOB elevated   Post Treatment Assessment (SVN) breath sounds unchanged   Signs of Intolerance (SVN) none   Breath Sounds Post-Respiratory Treatment   Throughout All Fields Post-Treatment All Fields   Throughout All Fields Post-Treatment no change   Post-treatment Heart Rate (beats/min) 76   Post-treatment Resp Rate (breaths/min) 17        Airway - Non-Surgical 12/15/24 0922 Endotracheal Tube   Placement Date/Time:  12/15/24 0922   Present Prior to Hospital Arrival?: No  Method of Intubation: Glidescope  Inserted by: MD  Staff/Resident Name(s): Doug Parksw  Airway Device: Endotracheal Tube  Mask Ventilation: Easy  Intubated: Postinduction  B...   Secured at 23 cm   Measured At Lips   Secured Location (S)  Left  (ETT on left side, Coleen RN notified)   Secured by Commercial tube kaufman   Bite Block secure and patent   Site Condition Dry   Status Intact;Secured;Patent   Site Assessment Clean;Dry   Cuff Volume   (MLT)   Cuff Pressure   (cuff pressure in green zone on amauri-cuff device)   General Safety Checklist   Safety Promotion/Fall Prevention side rails raised   Airway Safety   Is Ambu Bag and Mask with Patient? Yes, Adult Ambu Bag and Mask   Suction set is at the bedside? Yes   Respiratory Interventions   Cough And Deep Breathing unable to perform   Airway/Ventilation Management airway patency maintained   Vent Select   Conventional Vent Y   Humidification Changed? No  (not required at this time)   $ Ventilator Subsequent 1   Charged w/in last 24h YES   Preset Conventional Ventilator Settings   Vent ID 1   Vent Type    Ventilation Type VC   Vent Mode (S)  A/C   Humidity HME   Set Rate 12 BPM   Vt Set 400 mL   PEEP/CPAP 5 cmH20   Waveform RAMP   Peak Flow 60 L/min   Plateau Set/Insp. Hold (sec) 0   I-Trigger Type  V-TRIG   Trigger Sensitivity Flow/I-Trigger 3 L/min   Patient Ventilator Parameters   Resp Rate Total 14 br/min   All Fields Breath Sounds clear;diminished   Peak Airway Pressure 20 cmH20   Mean Airway Pressure 7.7 cmH20   Plateau Pressure 17 cmH20   Exhaled Vt 424 mL   Total Ve 5.74 L/m   I:E Ratio Measured 1:5.20   Auto PEEP 0 cmH20   Tubing ID (mm) 7.5 mm   Tube Type ET   Conventional Ventilator Alarms   Alarms On Y   Ve High Alarm 24 L/min   Ve Low Alarm 4 L/min   Resp Rate High Alarm 35 br/min   Press High Alarm 40 cmH2O   Apnea Rate 14   Apnea Volume (mL) 400 mL   Apnea Oxygen Concentration  100   Apnea  Flow Rate (L/min) 60   T Apnea 10 sec(s)   IHI Ventilator Associated Pneumonia Bundle (Required)   Head of Bed Elevated  HOB 30   Oral Care CHG;Lip moisturizer applied;Mouth swabbed;Mouth moisturizer;Mouth suctioned   Vent Circut Breaks Minimized Yes   Ready to Wean/Extubation Screen   FIO2<=50 (chart decimal) 0.3   MV<16L (chart vol.) 5.74   PEEP <=8 (chart #) 5   Ready to Wean Parameters   F/VT Ratio<105 (RSBI) (!) 33.02   Negative Inspiratory Force   Negative Inspiratory Force (cm H2O) 0   Vital Capacity   Vital Capacity (mL) 0   Respiratory Evaluation   $ Care Plan Tech Time 15 min     Vent mode changed back to AC per Dr. Diamond's orders. Coleen WADDELL notified

## 2024-12-23 NOTE — ASSESSMENT & PLAN NOTE
Await new urine culture =- currently on zosyn/vanc  12/17 change to merrem/vanc  12/18 has esbl klebsiella - cont merrem  12/23 on merrem

## 2024-12-24 NOTE — EICU
eICU Physician Virtual/Remote Brief Evaluation Note      Telephone call bedside RN   Blood sugar greater than 400  On ventilator.  Started on TPN because he was not tolerating tube feeds  On Levophed drip and low intensity sliding scale q.4h  Chart reviewed, discussed with RN   TPN this continued  Regular insulin 5 units IV in addition to subcu sliding scale coverage  Continue q.4h sliding scale      B. Dru Rebollar MD  eICU Attending  [phone]    This report has been created through the use of [employer] dictation software. Typographical and content errors may occur with this process. While efforts are made to detect and correct such errors, in some cases errors will persist. For this reason, wording in this document should be considered in the proper context and not strictly verbatim

## 2024-12-24 NOTE — ASSESSMENT & PLAN NOTE
Patient has Abnormal Magnesium: hypomagnesemia. Will continue to monitor electrolytes closely. Will replace the affected electrolytes and repeat labs to be done after interventions completed. The patient's magnesium results have been reviewed and are listed below.  Recent Labs   Lab 12/24/24  0413   MG 1.7      12/17 mag imrpoving - repalce mag today  12/19 replace mag today  12/20 mag oxide bid  12/21 2g Mg  12/23 dose of iv mag today to keep mag level about 2

## 2024-12-24 NOTE — ASSESSMENT & PLAN NOTE
"This patient does have evidence of infective focus  My overall impression is septic shock due to MAP < 65 and SBP < 90.  Source: Respiratory and Urinary Tract  Antibiotics given-   Antibiotics (72h ago, onward)      Start     Stop Route Frequency Ordered    12/22/24 1600  gentamicin (GARAMYCIN) 462.8 mg in 0.9% NaCl 100 mL IVPB         -- IV Every 48 hours (non-standard times) 12/22/24 0413    12/20/24 1438  gentamicin - pharmacy to dose  (gentamicin IVPB (PEDS and ADULTS))        Placed in "And" Linked Group    -- IV pharmacy to manage frequency 12/20/24 1338    12/17/24 0900  meropenem injection 1 g         12/26/24 2359 IV Every 8 hours (non-standard times) 12/17/24 0757          Latest lactate reviewed-  No results for input(s): "LACTATE", "POCLAC" in the last 72 hours.    Organ dysfunction indicated by Acute respiratory failure and Encephalopathy    Fluid challenge Ideal Body Weight- The patient's ideal body weight is Ideal body weight: 66.1 kg (145 lb 11.6 oz) which will be used to calculate fluid bolus of 30 ml/kg for treatment of septic shock.      Post- resuscitation assessment Yes Perfusion exam was performed within 6 hours of septic shock presentation after bolus shows Inadequate tissue perfusion assessed by non-invasive monitoring       Will Start Pressors- Levophed for MAP of 65  Source control achieved by: iv zosym. Vanc, ivfs  12/17 wean levophed as tolerates- abxs changed to merrem, cotn vanc - await culture final reports  12/18 wbc slightly imrpoved - cont iv abxs- await final sputum culture - on merrem fro esbl kleb in urine  12/19 cont iv merrem for esbl kleb in urine and proteus in sputum - dc vanc  12/20 wbc improvign - cotn iv merrem  12/24 wbc normal - cont iv abxs  "

## 2024-12-24 NOTE — NURSING
Glucose elevated, repeated and serum ordered. Insulin coverage as ordered. No apparent distress. Meds as ordered. Residual checked approximately 5 ml. Will attempt feedings at this time. Repositioned. Lab drawn. 100 ml of water brown stool from ileostomy. ABD pad changed to right thigh, excessive drainage.Tolerated well. Continue to observe.

## 2024-12-24 NOTE — ASSESSMENT & PLAN NOTE
Patient's most recent potassium results are listed below.   Recent Labs     12/22/24  0308 12/23/24  0343 12/24/24  0413   K 4.3 4.8 5.1       Plan  - Replete potassium per protocol  - Monitor potassium Daily  - Patient's hypokalemia is stable, continue below and monitor  12/20 increase pot bicarb to bid  12/23 decrease pot bicard to daily dosing  12/24 improved- stop potassium bicarb repalcement - monitor

## 2024-12-24 NOTE — NURSING
Pt remains on the vent. AC setting with rate of 12, Vte 400, PEEP and FiO2 30%. O2 sat 100%. No secretions with tracheal suctioning but moderate amount at back of mouth, clear white per resp staff. Resting. Yellowish-green drainage OU. Generalized edema, michael to arms and legs. Elevated on pillows. PEG with 100 ml residual despite feedings held today. 250 ml of brown stool from ileostomy. Rojas maintained. Cloudy urine. Levo, TPN, Lipids, Diprivan and IVF infusing per PICC. Glucose monitoring with Insulin coverage as ordered. Dressings maintained to the lower abdomen, thigh and buttocks. VSS. Reposition as ordered. Continue to observe.

## 2024-12-24 NOTE — CONSULTS
[facility] Intensive [facility]  General Surgery  Consult Note    Patient Name: Carlos Chahal  MRN: [id number]  Code Status: Full Code  Admission Date: 12/15/2024  Hospital Length of Stay: 14 days  Attending Physician: Kim Diamond MD  Primary Care Provider: Jose Francisco Klein MD    Patient information was obtained from past medical records, ER records, and primary team.     Inpatient consult to General Surgery  Consult performed by: Francoise Diaz MD  Consult ordered by: Kim Diamond MD  Reason for consult: decubitus, gastrostomy tube malfunction  Assessment/Recommendations: See assessment and plan        Subjective:     Principal Problem: Sepsis    History of Present Illness: Patient admitted for sepsis with UTI and pneumonia with multiple noted wounds.  Patient is unable to give any history as he is intubated and sedated.  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  He has been on the ventilator since admission due to hypoxemia with his pneumonia.  There have been issues with high residuals and feedings since admission.    No current facility-administered medications on file prior to encounter.     Current Outpatient Medications on File Prior to Encounter   Medication Sig    acetaminophen (TYLENOL) 325 MG tablet 325 mg by Per G Tube route every 6 (six) hours as needed.    ascorbic acid, vitamin C, (VITAMIN C) 500 MG tablet 500 mg by Per G Tube route once daily.    aspirin 81 MG Chew Take 81 mg by mouth once daily.    diazePAM (VALIUM) 5 MG tablet 2 tablets (10 mg total) by Per G Tube route 2 (two) times a day.    doxycycline (VIBRA-TABS) 100 MG tablet 1 tablet (100 mg total) by Per G Tube route every 12 (twelve) hours.    enoxaparin (LOVENOX) 40 mg/0.4 mL Syrg Inject 40 mg into the skin.    famotidine (PEPCID) 40 MG tablet 40 mg by Per G Tube route once daily.    FLUoxetine 20 MG capsule 20 mg by Per G Tube route once daily.    gabapentin  (NEURONTIN) 250 mg/5 mL solution 2.5 mLs (125 mg total) by Per G Tube route every 12 (twelve) hours.    insulin aspart U-100 (NOVOLOG FLEXPEN U-100 INSULIN) 100 unit/mL (3 mL) InPn pen Use per sliding scale insulin    levothyroxine (SYNTHROID) 150 MCG tablet 1 tablet (150 mcg total) by Per G Tube route before breakfast.    lipase-protease-amylase (ZENPEP) 20,000-63,000- 84,000 unit capsule Take 1 capsule by mouth 3 (three) times daily.    lurasidone (LATUDA) 40 mg Tab tablet Take 40 mg by mouth once daily.    melatonin (MELATIN) 3 mg tablet 2 tablets (6 mg total) by Per G Tube route nightly as needed for Insomnia.    metoclopramide HCl (REGLAN) 5 MG tablet 1 tablet (5 mg total) by Per G Tube route 3 (three) times daily before meals.    oxyCODONE (ROXICODONE) 10 mg Tab immediate release tablet 1 tablet (10 mg total) by Per G Tube route every 6 (six) hours as needed for Pain.    polyethylene glycol (GLYCOLAX) 17 gram PwPk 17 g by Per G Tube route once daily.    rosuvastatin (CRESTOR) 10 MG tablet Take 10 mg by mouth once daily.    sodium hypochlorite 0.125% (DAKIN'S SOLUTION) external solution Apply topically once daily. Apply once daily to groin area and BID to sacral       Review of patient's allergies indicates:   Allergen Reactions    Guaifenesin Hives    Codeine Itching       Past Medical History:   Diagnosis Date    Anoxic brain injury     Bipolar disorder, unspecified     Diabetes insipidus     Diabetes mellitus     Dysphagia, unspecified     Chucky gangrene     Gangrene     GERD (gastroesophageal reflux disease)     Hereditary and idiopathic neuropathy     Nontraumatic intracerebral hemorrhage, unspecified     Other muscle spasm     Other psychoactive substance abuse with withdrawal, uncomplicated     Skin transplant status     Thyroid disease      Past Surgical History:   Procedure Laterality Date    COLOSTOMY      INSERTION, PEG TUBE       Family History    None       Tobacco Use    Smoking status: Unknown     Smokeless tobacco: Not on file   Substance and Sexual Activity    Alcohol use: Not on file    Drug use: Not on file    Sexual activity: Not on file     Review of Systems   Unable to perform ROS: Intubated     Objective:     Vital Signs (Most Recent):  Temp: 98 °F (36.7 °C) (12/24/24 0702)  Pulse: 88 (12/24/24 1000)  Resp: 19 (12/24/24 1000)  BP: 101/60 (12/24/24 1000)  SpO2: 100 % (12/24/24 1000) Vital Signs (24h Range):  Temp:  [97 °F (36.1 °C)-98.1 °F (36.7 °C)] 98 °F (36.7 °C)  Pulse:  [78-93] 88  Resp:  [8-23] 19  SpO2:  [96 %-100 %] 100 %  BP: ()/(58-86) 101/60     Weight: 68.5 kg (151 lb 0.2 oz)  Body mass index is 23.65 kg/m².     Physical Exam  Constitutional:       Appearance: He is ill-appearing.      Comments: Thin male; intubated   HENT:      Mouth/Throat:      Mouth: Mucous membranes are moist.   Eyes:      Pupils: Pupils are equal, round, and reactive to light.   Cardiovascular:      Rate and Rhythm: Normal rate and regular rhythm.   Pulmonary:      Effort: Prolonged expiration present.      Breath sounds: Transmitted upper airway sounds present.      Comments: Breath sounds at lung bases bilaterally   Abdominal:      General: There is no distension.      Palpations: Abdomen is soft.      Tenderness: There is no abdominal tenderness.      Comments: G tube noted; Ostomy noted   Genitourinary:     Comments: Rojas with yellow urine  Skin:     General: Skin is warm and dry.      Comments: Wounds to sacrum; groin area reviewed on media           I have reviewed all pertinent lab results within the past 24 hours.  CBC:   Recent Labs   Lab 12/24/24 0413   WBC 10.37   RBC 2.77*   HGB 7.6*   HCT 25.1*      MCV 91   MCH 27.4   MCHC 30.3*     BMP:   Recent Labs   Lab 12/24/24 0413   *   *   K 5.1      CO2 28   BUN 12   CREATININE 1.1   CALCIUM 9.8   MG 1.7     CMP:   Recent Labs   Lab 12/24/24 0413   *   CALCIUM 9.8   ALBUMIN <0.6*   PROT 5.7*   *   K 5.1   CO2  28      BUN 12   CREATININE 1.1   ALKPHOS 461*   ALT 10   AST 23   BILITOT 0.2       Significant Diagnostics:  I have reviewed all pertinent imaging results/findings within the past 24 hours.    Assessment/Plan:     Severe malnutrition  Complicated by what appears to be gastroparesis.  We will attempt nasal feeding tube into his small intestines to start post pyloric feeds.  Continue Reglan per primary     Nutrition consulted. Most recent weight and BMI monitored-      Measurements:      Wt Readings from Last 1 Encounters:   12/16/24 68.5 kg (151 lb 0.2 oz)   Body mass index is 23.65 kg/m².     Patient has been screened and assessed by RD.     Malnutrition Type:  Context:    Level:       Malnutrition Characteristic Summary:        Interventions/Recommendations (treatment strategy):  1. Rec'd Continuous EN: Glucerna 1.5 @ 40mL/r increasing by 5 mL q6hrs to goal rate of 55mL/hr with a continuous 40mL FWF to provide 1980kcal (94% EEN), 109g of protein (100% EPN), and 1962mL FW.   2. Néstor BID via PEG tube to promote wound healing and to provide additional nutrition.           - Do not mix Néstor with formula in a tube-feeding bag         - Pour one packet of Néstor in a clean, small container for mixing.           - Add 4 fl oz (120 mL) water at room temperature.           - Mix well with disposable spoon or tongue blade until all particles are completely hydrated.           - Verify correct tube placement.           - Flush feeding tube with 30 mL water.           -Administer Néstor through feeding tube using a 60-mL or larger syringe.           - Flush with an additional 30 mL water.  3. Rec'd ClinimixE 5/20 @ 80mL/hr to provide 1690kcal (80% EEN), 96g of protein (88% EPN), and 1920mL TFV.      -Add 250mL of 20% lipids 3x/week to provide additional calories.      -Check Triglycerides before adding lipids. 4. RD to follow and make rec's accordingly.        Pneumonia of right lower lobe due to infectious  organism  Antibiotics and vent support per primary     Sacral wound, sequela  Needs formal operative debridement.  Overall clinical status as a limitation to this.  We will continue Dakin's moistened gauze until operative intervention can be obtained.     Open wound of groin  Wound care written     Urinary tract infection associated with indwelling urethral catheter  Per primary     Type 1 diabetes  Elevated sugars.  Management per primary          VTE Risk Mitigation (From admission, onward)           Ordered     enoxaparin injection 40 mg  Daily         12/16/24 0815     IP VTE LOW RISK PATIENT  Once         12/15/24 1201     Place sequential compression device  Until discontinued         12/15/24 1201                    Thank you for your consult. I will follow-up with patient. Please contact us if you have any additional questions.    Francoise Diaz MD  General Surgery  [place] - Intensive Care

## 2024-12-24 NOTE — PLAN OF CARE
[facility] - Intensive Care  Discharge Reassessment    Primary Care Provider: Jose Francisco Klein MD    Expected Discharge Date:     Reassessment (most recent)       Discharge Reassessment - 12/24/24 1005          Discharge Reassessment    Assessment Type Discharge Planning Reassessment     Did the patient's condition or plan change since previous assessment? Yes     Discharge Plan A Other   TBD    Discharge Plan B Other   TBD    DME Needed Upon Discharge  other (see comments)   TBD    Why the patient remains in the hospital Requires continued medical care        Post-Acute Status    Discharge Delays None known at this time                 The patient remains in the ICU on a vent. CM/SW will remain available. Please contact if you have any questions or concerns.      MARY Toledo, LMSW  [facility] Case Management Department  Office: [phone]   Office II: [phone]   Fax: [phone]

## 2024-12-24 NOTE — PLAN OF CARE
Problem: Skin Injury Risk Increased  Goal: Skin Health and Integrity  Outcome: Progressing     Problem: Mechanical Ventilation Invasive  Goal: Effective Communication  Outcome: Progressing  Goal: Optimal Device Function  Outcome: Progressing  Goal: Mechanical Ventilation Liberation  Outcome: Progressing  Goal: Optimal Nutrition Delivery  Outcome: Progressing  Goal: Absence of Device-Related Skin and Tissue Injury  Outcome: Progressing  Goal: Absence of Ventilator-Induced Lung Injury  Outcome: Progressing     Problem: Artificial Airway  Goal: Effective Communication  Outcome: Progressing  Goal: Optimal Device Function  Outcome: Progressing  Goal: Absence of Device-Related Skin or Tissue Injury  Outcome: Progressing     Problem: Adult Inpatient Plan of Care  Goal: Plan of Care Review  Outcome: Progressing  Goal: Patient-Specific Goal (Individualized)  Outcome: Progressing  Goal: Absence of Hospital-Acquired Illness or Injury  Outcome: Progressing  Goal: Optimal Comfort and Wellbeing  Outcome: Progressing  Goal: Readiness for Transition of Care  Outcome: Progressing     Problem: Diabetes Comorbidity  Goal: Blood Glucose Level Within Targeted Range  Outcome: Progressing     Problem: Wound  Goal: Optimal Coping  Outcome: Progressing  Goal: Optimal Functional Ability  Outcome: Progressing  Goal: Absence of Infection Signs and Symptoms  Outcome: Progressing  Goal: Improved Oral Intake  Outcome: Progressing  Goal: Optimal Pain Control and Function  Outcome: Progressing  Goal: Skin Health and Integrity  Outcome: Progressing  Goal: Optimal Wound Healing  Outcome: Progressing     Problem: Infection  Goal: Absence of Infection Signs and Symptoms  Outcome: Progressing     Problem: Pneumonia  Goal: Fluid Balance  Outcome: Progressing  Goal: Resolution of Infection Signs and Symptoms  Outcome: Progressing  Goal: Effective Oxygenation and Ventilation  Outcome: Progressing     Problem: Sepsis/Septic Shock  Goal: Optimal  Coping  Outcome: Progressing  Goal: Absence of Bleeding  Outcome: Progressing  Goal: Blood Glucose Level Within Targeted Range  Outcome: Progressing  Goal: Absence of Infection Signs and Symptoms  Outcome: Progressing  Goal: Optimal Nutrition Intake  Outcome: Progressing     Problem: Fall Injury Risk  Goal: Absence of Fall and Fall-Related Injury  Outcome: Progressing     Problem: Electrolyte Imbalance  Goal: Electrolyte Balance  Outcome: Progressing

## 2024-12-24 NOTE — PROGRESS NOTES
[facility] Medicine  Progress Note    Patient Name: Carlos Chahal  MRN: [id number]  Patient Class: IP- Inpatient   Admission Date: 12/15/2024  Length of Stay: 9 days  Attending Physician: Kim Diamond MD  Primary Care Provider: Jose Francisco Klein MD        Subjective     Principal Problem:Sepsis        HPI:  Carlos Chahal is a 34 y.o. male who presents to the ED from nursing home for altered mental status and difficulty breathing.  Patient is found to be hypoxic.  He has a history of anoxic brain injury     This am, pt is on ventilator and levophed at 0.25mcg and ivfs at 75ml/hr.      Spoke with father this am on condition of pt    Overview/Hospital Course:  12/17 ND became more awake yesterday with wound changes - low dose propofol added for pt - able to wean levophed to 0.15mcg.  did tolerate tf last night - check kub this am  12/18 ND pt had to be changed back to ac control last nigth after becoming tachypneic and had low tv.  Tolerating ac mode.  Levophed down to 0.1 mcg  12/19 ND pt toleratign vent - will wean RR today - cont to slowly wean levophed as tolerates - wbc slowly imrpoving - tolerating low dose tfs - will cont to increase tfs as tolerates  12/20 ND tolerating vent - cont to work on feeds and nutritional support  12/21 [place] weekend coverage, in no acute distress, stable vital signs, AC/18/400/5/40%, SpO2 100%. On proprofol for sedation, patient tracks voice and moving head and neck with lid opening. Mild bump in WBC, afebrile, may be associated with the reported residual >200 and tube feeds held. Abdomen, soft and no distension on exam. Will resume as tolerated and monitor.  Blood cx x2, final report no growth. Continue merrem (D5), gentamicin (D2) with mixed MDRO frida from sputum studies and UTI. Replete Mg, continue abx. Continue daily wound care.   12/22 [place] weekend coverage, overnight rate change and tolerating AC12/400/5/40 SpO2 100%, proprofol 15 mcg/kg/min, levophed  0.1mcg/kg/min. Patient without gag reflex on suction. Tube feeds held overnight and resumed, remains on trickle feeds. Ileostomy output 100.   No associated abdominal distension, patient in no distress. Vent settings weaned. Leukocytosis resolved. Continue IV abx for MDRO coverage.   12/23 ND Pt still havign trouble tolerating tfs -change reglan iv; pt is more awake this am - will change to simv for a few hours today   12/24 ND elevated bs with tpn- will add long acting insulin as 12u and restart tpn - ssi adjusted to moderate scale.    Interval History: Patient seen and examined.     Review of Systems   Unable to perform ROS: Acuity of condition     Objective:     Vital Signs (Most Recent):  Temp: 98 °F (36.7 °C) (12/24/24 0702)  Pulse: 90 (12/24/24 0718)  Resp: (!) 21 (12/24/24 0718)  BP: 103/64 (12/24/24 0700)  SpO2: 100 % (12/24/24 0718) Vital Signs (24h Range):  Temp:  [97 °F (36.1 °C)-98.1 °F (36.7 °C)] 98 °F (36.7 °C)  Pulse:  [75-93] 90  Resp:  [8-23] 21  SpO2:  [96 %-100 %] 100 %  BP: ()/(58-86) 103/64     Weight: 68.5 kg (151 lb 0.2 oz)  Body mass index is 23.65 kg/m².    Intake/Output Summary (Last 24 hours) at 12/24/2024 0750  Last data filed at 12/24/2024 0735  Gross per 24 hour   Intake 3132.32 ml   Output 1675 ml   Net 1457.32 ml         Physical Exam  Constitutional:       Appearance: He is ill-appearing.      Comments: Thin male; intubated   HENT:      Mouth/Throat:      Mouth: Mucous membranes are moist.   Eyes:      Pupils: Pupils are equal, round, and reactive to light.   Cardiovascular:      Rate and Rhythm: Normal rate and regular rhythm.   Pulmonary:      Effort: Prolonged expiration present.      Breath sounds: Transmitted upper airway sounds present.      Comments: Breath sounds at lung bases bilaterally   Abdominal:      General: There is no distension.      Palpations: Abdomen is soft.      Tenderness: There is no abdominal tenderness.      Comments: ELVIAtube noted; ileostomy noted    Genitourinary:     Comments: Rojas with yellow urine  Skin:     General: Skin is warm and dry.      Comments: Wounds to sacrum; groin area             Significant Labs: All pertinent labs within the past 24 hours have been reviewed.  ABGs:   Recent Labs   Lab 12/23/24  0541 12/24/24  0521   PH 7.418 7.424   PCO2 43.6 38.5   HCO3 27.3 25.1   POCSATURATED 99.3 97.7   PO2 114* 89.2     Bilirubin:   Recent Labs   Lab 12/08/24  1301 12/09/24  0528 12/20/24  0340 12/21/24  0331 12/22/24  0308 12/23/24  0343 12/24/24  0413   BILIDIR 0.3  --   --   --   --   --   --    BILITOT 0.5   < > 0.4 0.4 0.3 0.2 0.2    < > = values in this interval not displayed.      Recent Labs   Lab 12/24/24  0413   *   *   K 5.1      CO2 28   BUN 12   CREATININE 1.1   CALCIUM 9.8   MG 1.7     CBC:   Recent Labs   Lab 12/23/24  0343 12/24/24  0413   WBC 10.73 10.37   HGB 7.6* 7.6*   HCT 25.2* 25.1*    253     CMP:   Recent Labs   Lab 12/23/24  0343 12/24/24  0004 12/24/24  0413     --  134*   K 4.8  --  5.1     --  105   CO2 29  --  28   * 452* 460*   BUN 10  --  12   CREATININE 0.9  --  1.1   CALCIUM 9.8  --  9.8   PROT 5.9*  --  5.7*   ALBUMIN 0.6*  --  <0.6*   BILITOT 0.2  --  0.2   ALKPHOS 482*  --  461*   AST 27  --  23   ALT 9*  --  10   ANIONGAP 1*  --  1*     Magnesium:   Recent Labs   Lab 12/23/24  0343 12/24/24  0413   MG 1.6 1.7     Significant Imaging: I have reviewed all pertinent imaging results/findings within the past 24 hours.    Assessment and Plan     * Sepsis  This patient does have evidence of infective focus  My overall impression is septic shock due to MAP < 65 and SBP < 90.  Source: Respiratory and Urinary Tract  Antibiotics given-   Antibiotics (72h ago, onward)      Start     Stop Route Frequency Ordered    12/22/24 1600  gentamicin (GARAMYCIN) 462.8 mg in 0.9% NaCl 100 mL IVPB         -- IV Every 48 hours (non-standard times) 12/22/24 0413    12/20/24 1438  gentamicin -  "pharmacy to dose  (gentamicin IVPB (PEDS and ADULTS))        Placed in "And" Linked Group    -- IV pharmacy to manage frequency 12/20/24 1338    12/17/24 0900  meropenem injection 1 g         12/26/24 2359 IV Every 8 hours (non-standard times) 12/17/24 0757          Latest lactate reviewed-  No results for input(s): "LACTATE", "POCLAC" in the last 72 hours.    Organ dysfunction indicated by Acute respiratory failure and Encephalopathy    Fluid challenge Ideal Body Weight- The patient's ideal body weight is Ideal body weight: 66.1 kg (145 lb 11.6 oz) which will be used to calculate fluid bolus of 30 ml/kg for treatment of septic shock.      Post- resuscitation assessment Yes Perfusion exam was performed within 6 hours of septic shock presentation after bolus shows Inadequate tissue perfusion assessed by non-invasive monitoring       Will Start Pressors- Levophed for MAP of 65  Source control achieved by: iv zosym. Vanc, ivfs  12/17 wean levophed as tolerates- abxs changed to merrem, cotn vanc - await culture final reports  12/18 wbc slightly imrpoved - cont iv abxs- await final sputum culture - on merrem fro esbl kleb in urine  12/19 cont iv merrem for esbl kleb in urine and proteus in sputum - dc vanc  12/20 wbc improvign - cotn iv merrem  12/24 wbc normal - cont iv abxs    Hypokalemia  Patient's most recent potassium results are listed below.   Recent Labs     12/22/24  0308 12/23/24  0343 12/24/24  0413   K 4.3 4.8 5.1       Plan  - Replete potassium per protocol  - Monitor potassium Daily  - Patient's hypokalemia is stable, continue below and monitor  12/20 increase pot bicarb to bid  12/23 decrease pot bicard to daily dosing  12/24 improved- stop potassium bicarb repalcement - monitor    Severe malnutrition  Nutrition consulted. Most recent weight and BMI monitored-     Measurements:  Wt Readings from Last 1 Encounters:   12/16/24 68.5 kg (151 lb 0.2 oz)   Body mass index is 23.65 kg/m².    Patient has been " screened and assessed by RD.    Malnutrition Type:  Context:    Level:      Malnutrition Characteristic Summary:       Interventions/Recommendations (treatment strategy):  1. Rec'd Continuous EN: Glucerna 1.5 @ 40mL/r increasing by 5 mL q6hrs to goal rate of 55mL/hr with a continuous 40mL FWF to provide 1980kcal (94% EEN), 109g of protein (100% EPN), and 1962mL FW.   2. Néstor BID via PEG tube to promote wound healing and to provide additional nutrition.           - Do not mix Néstor with formula in a tube-feeding bag         - Pour one packet of Néstor in a clean, small container for mixing.           - Add 4 fl oz (120 mL) water at room temperature.           - Mix well with disposable spoon or tongue blade until all particles are completely hydrated.           - Verify correct tube placement.           - Flush feeding tube with 30 mL water.           -Administer Néstor through feeding tube using a 60-mL or larger syringe.           - Flush with an additional 30 mL water.  3. Rec'd ClinimixE 5/20 @ 80mL/hr to provide 1690kcal (80% EEN), 96g of protein (88% EPN), and 1920mL TFV.      -Add 250mL of 20% lipids 3x/week to provide additional calories.      -Check Triglycerides before adding lipids. 4. RD to follow and make rec's accordingly.    12/18 restart tfs today at 1ml/hr to see if can tolerate feeds  12/19 increase tfs as tolerates  12/23 not tolerating tfs- will get tpn orders and start that until can tolerate tfs better - ncrease regaln 10 mg iv q 6hrs  12/24 con ttfs as tolerates - started on tpn for further nutritional support    Hypomagnesemia  Patient has Abnormal Magnesium: hypomagnesemia. Will continue to monitor electrolytes closely. Will replace the affected electrolytes and repeat labs to be done after interventions completed. The patient's magnesium results have been reviewed and are listed below.  Recent Labs   Lab 12/24/24  0413   MG 1.7      12/17 mag imrpoving - repalce mag today  12/19 replace mag  "today  12/20 mag oxide bid  12/21 2g Mg  12/23 dose of iv mag today to keep mag level about 2    Pneumonia of right lower lobe due to infectious organism  Patient has a diagnosis of pneumonia. The cause of the pneumonia is suspected to be bacterial in etiology but organism is not known. The pneumonia is stable. The patient has the following signs/symptoms of pneumonia: persistent hypoxia  and sputum production. The patient does have a current oxygen requirement and the patient does not have a home oxygen requirement. I have reviewed the pertinent imaging. The following cultures have been collected: Blood cultures and Sputum culture The culture results are listed below.     Current antimicrobial regimen consists of the antibiotics listed below. Will monitor patient closely and continue current treatment plan unchanged.    Antibiotics (From admission, onward)      Start     Stop Route Frequency Ordered    12/22/24 1600  gentamicin (GARAMYCIN) 462.8 mg in 0.9% NaCl 100 mL IVPB         -- IV Every 48 hours (non-standard times) 12/22/24 0413    12/20/24 1438  gentamicin - pharmacy to dose  (gentamicin IVPB (PEDS and ADULTS))        Placed in "And" Linked Group    -- IV pharmacy to manage frequency 12/20/24 1338    12/17/24 0900  meropenem injection 1 g         12/26/24 2359 IV Every 8 hours (non-standard times) 12/17/24 0757            Microbiology Results (last 7 days)       Procedure Component Value Units Date/Time    Culture, Respiratory with Gram Stain [[id number]]  (Abnormal)  (Susceptibility) Collected: 12/15/24 0935    Order Status: Completed Specimen: Respiratory from Endotracheal Aspirate Updated: 12/23/24 1204     Respiratory Culture No S aureus or Pseudomonas isolated.      ACINETOBACTER BAUMANNII   Many        PROTEUS MIRABILIS  Few        KLEBSIELLA PNEUMONIAE   Few  ESBL        Gram Stain (Respiratory) Few WBC's     Gram Stain (Respiratory) Many Gram positive rods     Gram Stain (Respiratory) " Few Gram negative cocci    Blood culture x two cultures. Draw prior to antibiotics. [[id number]] Collected: 12/15/24 0942    Order Status: Completed Specimen: Blood from Peripheral, Antecubital, Left Updated: 12/20/24 1612     Blood Culture, Routine No growth after 5 days.    Narrative:      Aerobic and anaerobic    Blood culture x two cultures. Draw prior to antibiotics. [[id number]] Collected: 12/15/24 0923    Order Status: Completed Specimen: Blood from Peripheral, Forearm, Right Updated: 12/20/24 1612     Blood Culture, Routine No growth after 5 days.    Narrative:      Aerobic and anaerobic    Urine culture [[id number]]  (Abnormal)  (Susceptibility) Collected: 12/15/24 0941    Order Status: Completed Specimen: Urine Updated: 12/17/24 2343     Urine Culture, Routine KLEBSIELLA PNEUMONIAE ESBL  >100,000 cfu/ml      Narrative:      Preferred Collection Type->Urine, Clean Catch  Specimen Source->Urine        12/17 dc zosyn with recent esbl kleb in urine - will change to merrem - cont vanc until final cultures obtained - change vent to simv; add nebs  12/18 cotn merrem and vanc - await final sputum cutlure - cont vent on ac control; nebs - wbc imrpoved slightly; lasix 20mg iv for edema today  12/19  dc vanc - proteus grwoign from sputum - cont merrem and nebs; lasix today - wean rr on vent  12/20 wnc improved - cont merrem - nebs and vent - another dose of lasix today for edema - fio2 increased  12/23 cont iv gent and merren due to sent of multiple bugs (acinetobacter/kleb/proteus)  12/24 cont current abxs    Sacral wound, sequela  Local wound care with dakins bid  Iv abxs      Open wound of groin  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  Recent Labs     12/22/24  0308 12/23/24  0343 12/24/24  0413   HGB 7.9* 7.6* 7.6*   HCT 25.4* 25.2* 25.1*       Plan  -  Monitor serial CBC: Daily  - Transfuse PRBC if patient becomes hemodynamically unstable, symptomatic or H/H drops below 7/21.  - Patient has not received any PRBC transfusions to date  - Patient's anemia is currently worsening. Will adjust treatment as follows: 2uprbcs today  12/17 h/h Improved  12/20 h/h holding    History of anoxic brain injury        Urinary tract infection associated with indwelling urethral catheter  Await new urine culture =- currently on zosyn/vanc  12/17 change to merrem/vanc  12/18 has esbl klebsiella - cont merrem  12/23 on merrem    Type 1 diabetes  Patient's FSGs are uncontrolled due to hyperglycemia on current medication regimen.  Last A1c reviewed-   Lab Results   Component Value Date    HGBA1C 6.6 (H) 12/16/2024     Most recent fingerstick glucose reviewed-   Recent Labs   Lab 12/23/24  1923 12/23/24  2342 12/23/24  2349 12/24/24  0354   POCTGLUCOSE 393* 449* 471* 452*       Current correctional scale  Low  Maintain anti-hyperglycemic dose as follows-   Antihyperglycemics (From admission, onward)      Start     Stop Route Frequency Ordered    12/24/24 0900  insulin glargine U-100 (Lantus) pen 12 Units         -- SubQ Daily 12/24/24 0701    12/24/24 0807  insulin aspart U-100 pen 0-10 Units         -- SubQ Every 4 hours PRN 12/24/24 0707          Hold Oral hypoglycemics while patient is in the hospital.  12/17 A1c improved to 6.6%  12/24 due to tpn - add lantus 12 u daily - ssi changed to moderate scale      VTE Risk Mitigation (From admission, onward)           Ordered     enoxaparin injection 40 mg  Daily         12/16/24 0815     IP VTE LOW RISK PATIENT  Once         12/15/24 1201     Place sequential compression device  Until discontinued         12/15/24 1201                    Discharge Planning   JOSE MANUEL:      Code Status: Full Code   Medical Readiness for Discharge Date:   Discharge Plan A: Return to nursing home                        Kim Diamond MD  Department of Hospital  Medicine   [place] - Intensive Care

## 2024-12-24 NOTE — ASSESSMENT & PLAN NOTE
Patient's FSGs are uncontrolled due to hyperglycemia on current medication regimen.  Last A1c reviewed-   Lab Results   Component Value Date    HGBA1C 6.6 (H) 12/16/2024     Most recent fingerstick glucose reviewed-   Recent Labs   Lab 12/23/24  1923 12/23/24  2342 12/23/24  2349 12/24/24  0354   POCTGLUCOSE 393* 449* 471* 452*       Current correctional scale  Low  Maintain anti-hyperglycemic dose as follows-   Antihyperglycemics (From admission, onward)      Start     Stop Route Frequency Ordered    12/24/24 0900  insulin glargine U-100 (Lantus) pen 12 Units         -- SubQ Daily 12/24/24 0701    12/24/24 0807  insulin aspart U-100 pen 0-10 Units         -- SubQ Every 4 hours PRN 12/24/24 0707          Hold Oral hypoglycemics while patient is in the hospital.  12/17 A1c improved to 6.6%  12/24 due to tpn - add lantus 12 u daily - ssi changed to moderate scale

## 2024-12-24 NOTE — PLAN OF CARE
Pt remains intubated, sedated, and on levophed. Dr Diamond placed patient to SIMV on vent this morning, but RR got up in mid 30's so pt placed back on Assist control shortly after. Glucose slightly lower, remains in 300's. High residuals still noted, tube feeds remain held all shift. Pt remains on TPN. Dr Diaz consulted due to pt wounds, new wound care orders obtained and followed through. Dobhoff tube inserted to L nare @ 70cm, notified Dr Diaz to review xray, she ordered to insert tube to 75cm. MD states she will repeat Xray in AM.

## 2024-12-24 NOTE — ASSESSMENT & PLAN NOTE
Nutrition consulted. Most recent weight and BMI monitored-     Measurements:  Wt Readings from Last 1 Encounters:   12/16/24 68.5 kg (151 lb 0.2 oz)   Body mass index is 23.65 kg/m².    Patient has been screened and assessed by RD.    Malnutrition Type:  Context:    Level:      Malnutrition Characteristic Summary:       Interventions/Recommendations (treatment strategy):  1. Rec'd Continuous EN: Glucerna 1.5 @ 40mL/r increasing by 5 mL q6hrs to goal rate of 55mL/hr with a continuous 40mL FWF to provide 1980kcal (94% EEN), 109g of protein (100% EPN), and 1962mL FW.   2. Néstor BID via PEG tube to promote wound healing and to provide additional nutrition.           - Do not mix Néstor with formula in a tube-feeding bag         - Pour one packet of Néstor in a clean, small container for mixing.           - Add 4 fl oz (120 mL) water at room temperature.           - Mix well with disposable spoon or tongue blade until all particles are completely hydrated.           - Verify correct tube placement.           - Flush feeding tube with 30 mL water.           -Administer Néstor through feeding tube using a 60-mL or larger syringe.           - Flush with an additional 30 mL water.  3. Rec'd ClinimixE 5/20 @ 80mL/hr to provide 1690kcal (80% EEN), 96g of protein (88% EPN), and 1920mL TFV.      -Add 250mL of 20% lipids 3x/week to provide additional calories.      -Check Triglycerides before adding lipids. 4. RD to follow and make rec's accordingly.    12/18 restart tfs today at 1ml/hr to see if can tolerate feeds  12/19 increase tfs as tolerates  12/23 not tolerating tfs- will get tpn orders and start that until can tolerate tfs better - ncrease regaln 10 mg iv q 6hrs  12/24 con ttfs as tolerates - started on tpn for further nutritional support

## 2024-12-24 NOTE — EICU
Intervention Initiated From:  [facility] / [facility]    Sandy intervened regarding:  Rounding (Video assessment)    Nurse Notified:  No    Doctor Notified:  No    Comments: Video rounding complete. Pt vented and sedated. VSS.

## 2024-12-24 NOTE — SUBJECTIVE & OBJECTIVE
No current facility-administered medications on file prior to encounter.     Current Outpatient Medications on File Prior to Encounter   Medication Sig    acetaminophen (TYLENOL) 325 MG tablet 325 mg by Per G Tube route every 6 (six) hours as needed.    ascorbic acid, vitamin C, (VITAMIN C) 500 MG tablet 500 mg by Per G Tube route once daily.    aspirin 81 MG Chew Take 81 mg by mouth once daily.    diazePAM (VALIUM) 5 MG tablet 2 tablets (10 mg total) by Per G Tube route 2 (two) times a day.    doxycycline (VIBRA-TABS) 100 MG tablet 1 tablet (100 mg total) by Per G Tube route every 12 (twelve) hours.    enoxaparin (LOVENOX) 40 mg/0.4 mL Syrg Inject 40 mg into the skin.    famotidine (PEPCID) 40 MG tablet 40 mg by Per G Tube route once daily.    FLUoxetine 20 MG capsule 20 mg by Per G Tube route once daily.    gabapentin (NEURONTIN) 250 mg/5 mL solution 2.5 mLs (125 mg total) by Per G Tube route every 12 (twelve) hours.    insulin aspart U-100 (NOVOLOG FLEXPEN U-100 INSULIN) 100 unit/mL (3 mL) InPn pen Use per sliding scale insulin    levothyroxine (SYNTHROID) 150 MCG tablet 1 tablet (150 mcg total) by Per G Tube route before breakfast.    lipase-protease-amylase (ZENPEP) 20,000-63,000- 84,000 unit capsule Take 1 capsule by mouth 3 (three) times daily.    lurasidone (LATUDA) 40 mg Tab tablet Take 40 mg by mouth once daily.    melatonin (MELATIN) 3 mg tablet 2 tablets (6 mg total) by Per G Tube route nightly as needed for Insomnia.    metoclopramide HCl (REGLAN) 5 MG tablet 1 tablet (5 mg total) by Per G Tube route 3 (three) times daily before meals.    oxyCODONE (ROXICODONE) 10 mg Tab immediate release tablet 1 tablet (10 mg total) by Per G Tube route every 6 (six) hours as needed for Pain.    polyethylene glycol (GLYCOLAX) 17 gram PwPk 17 g by Per G Tube route once daily.    rosuvastatin (CRESTOR) 10 MG tablet Take 10 mg by mouth once daily.    sodium hypochlorite 0.125% (DAKIN'S SOLUTION) external solution Apply  topically once daily. Apply once daily to groin area and BID to sacral       Review of patient's allergies indicates:   Allergen Reactions    Guaifenesin Hives    Codeine Itching       Past Medical History:   Diagnosis Date    Anoxic brain injury     Bipolar disorder, unspecified     Diabetes insipidus     Diabetes mellitus     Dysphagia, unspecified     Chucky gangrene     Gangrene     GERD (gastroesophageal reflux disease)     Hereditary and idiopathic neuropathy     Nontraumatic intracerebral hemorrhage, unspecified     Other muscle spasm     Other psychoactive substance abuse with withdrawal, uncomplicated     Skin transplant status     Thyroid disease      Past Surgical History:   Procedure Laterality Date    COLOSTOMY      INSERTION, PEG TUBE       Family History    None       Tobacco Use    Smoking status: Unknown    Smokeless tobacco: Not on file   Substance and Sexual Activity    Alcohol use: Not on file    Drug use: Not on file    Sexual activity: Not on file     Review of Systems   Unable to perform ROS: Intubated     Objective:     Vital Signs (Most Recent):  Temp: 98 °F (36.7 °C) (12/24/24 0702)  Pulse: 88 (12/24/24 1000)  Resp: 19 (12/24/24 1000)  BP: 101/60 (12/24/24 1000)  SpO2: 100 % (12/24/24 1000) Vital Signs (24h Range):  Temp:  [97 °F (36.1 °C)-98.1 °F (36.7 °C)] 98 °F (36.7 °C)  Pulse:  [78-93] 88  Resp:  [8-23] 19  SpO2:  [96 %-100 %] 100 %  BP: ()/(58-86) 101/60     Weight: 68.5 kg (151 lb 0.2 oz)  Body mass index is 23.65 kg/m².     Physical Exam  Constitutional:       Appearance: He is ill-appearing.      Comments: Thin male; intubated   HENT:      Mouth/Throat:      Mouth: Mucous membranes are moist.   Eyes:      Pupils: Pupils are equal, round, and reactive to light.   Cardiovascular:      Rate and Rhythm: Normal rate and regular rhythm.   Pulmonary:      Effort: Prolonged expiration present.      Breath sounds: Transmitted upper airway sounds present.      Comments: Breath sounds  at lung bases bilaterally   Abdominal:      General: There is no distension.      Palpations: Abdomen is soft.      Tenderness: There is no abdominal tenderness.      Comments: Jtube noted; ileostomy noted   Genitourinary:     Comments: Rojas with yellow urine  Skin:     General: Skin is warm and dry.      Comments: Wounds to sacrum; groin area            I have reviewed all pertinent lab results within the past 24 hours.  CBC:   Recent Labs   Lab 12/24/24  0413   WBC 10.37   RBC 2.77*   HGB 7.6*   HCT 25.1*      MCV 91   MCH 27.4   MCHC 30.3*     BMP:   Recent Labs   Lab 12/24/24  0413   *   *   K 5.1      CO2 28   BUN 12   CREATININE 1.1   CALCIUM 9.8   MG 1.7     CMP:   Recent Labs   Lab 12/24/24  0413   *   CALCIUM 9.8   ALBUMIN <0.6*   PROT 5.7*   *   K 5.1   CO2 28      BUN 12   CREATININE 1.1   ALKPHOS 461*   ALT 10   AST 23   BILITOT 0.2       Significant Diagnostics:  I have reviewed all pertinent imaging results/findings within the past 24 hours.

## 2024-12-24 NOTE — SUBJECTIVE & OBJECTIVE
Interval History: Patient seen and examined.     Review of Systems   Unable to perform ROS: Acuity of condition     Objective:     Vital Signs (Most Recent):  Temp: 98 °F (36.7 °C) (12/24/24 0702)  Pulse: 90 (12/24/24 0718)  Resp: (!) 21 (12/24/24 0718)  BP: 103/64 (12/24/24 0700)  SpO2: 100 % (12/24/24 0718) Vital Signs (24h Range):  Temp:  [97 °F (36.1 °C)-98.1 °F (36.7 °C)] 98 °F (36.7 °C)  Pulse:  [75-93] 90  Resp:  [8-23] 21  SpO2:  [96 %-100 %] 100 %  BP: ()/(58-86) 103/64     Weight: 68.5 kg (151 lb 0.2 oz)  Body mass index is 23.65 kg/m².    Intake/Output Summary (Last 24 hours) at 12/24/2024 0750  Last data filed at 12/24/2024 0735  Gross per 24 hour   Intake 3132.32 ml   Output 1675 ml   Net 1457.32 ml         Physical Exam  Constitutional:       Appearance: He is ill-appearing.      Comments: Thin male; intubated   HENT:      Mouth/Throat:      Mouth: Mucous membranes are moist.   Eyes:      Pupils: Pupils are equal, round, and reactive to light.   Cardiovascular:      Rate and Rhythm: Normal rate and regular rhythm.   Pulmonary:      Effort: Prolonged expiration present.      Breath sounds: Transmitted upper airway sounds present.      Comments: Breath sounds at lung bases bilaterally   Abdominal:      General: There is no distension.      Palpations: Abdomen is soft.      Tenderness: There is no abdominal tenderness.      Comments: Jtube noted; ileostomy noted   Genitourinary:     Comments: Rojas with yellow urine  Skin:     General: Skin is warm and dry.      Comments: Wounds to sacrum; groin area             Significant Labs: All pertinent labs within the past 24 hours have been reviewed.  ABGs:   Recent Labs   Lab 12/23/24  0541 12/24/24  0521   PH 7.418 7.424   PCO2 43.6 38.5   HCO3 27.3 25.1   POCSATURATED 99.3 97.7   PO2 114* 89.2     Bilirubin:   Recent Labs   Lab 12/08/24  1301 12/09/24  0528 12/20/24  0340 12/21/24  0331 12/22/24  0308 12/23/24  0343 12/24/24  0413   BILIDIR 0.3  --   --    --   --   --   --    BILITOT 0.5   < > 0.4 0.4 0.3 0.2 0.2    < > = values in this interval not displayed.      Recent Labs   Lab 12/24/24 0413   *   *   K 5.1      CO2 28   BUN 12   CREATININE 1.1   CALCIUM 9.8   MG 1.7     CBC:   Recent Labs   Lab 12/23/24 0343 12/24/24 0413   WBC 10.73 10.37   HGB 7.6* 7.6*   HCT 25.2* 25.1*    253     CMP:   Recent Labs   Lab 12/23/24 0343 12/24/24  0004 12/24/24 0413     --  134*   K 4.8  --  5.1     --  105   CO2 29  --  28   * 452* 460*   BUN 10  --  12   CREATININE 0.9  --  1.1   CALCIUM 9.8  --  9.8   PROT 5.9*  --  5.7*   ALBUMIN 0.6*  --  <0.6*   BILITOT 0.2  --  0.2   ALKPHOS 482*  --  461*   AST 27  --  23   ALT 9*  --  10   ANIONGAP 1*  --  1*     Magnesium:   Recent Labs   Lab 12/23/24 0343 12/24/24 0413   MG 1.6 1.7     Significant Imaging: I have reviewed all pertinent imaging results/findings within the past 24 hours.

## 2024-12-24 NOTE — NURSING
Pt resting. Remains on the vent. Settings unchanged. Feedings continued with residuals less than 200 ml. Diprivan, Levo and  IVF as ordered. Dressing change to wounds. Open eyes and slow movement to arm. Dressing changed to PEG insertion site. Rojas maintained. Insulin coverage as ordered and critical lab on glucose (EICU notified). VSS. Repositioned. Continue to observe.

## 2024-12-24 NOTE — ASSESSMENT & PLAN NOTE
Anemia is likely due to chronic infections Most recent hemoglobin and hematocrit are listed below.  Recent Labs     12/22/24  0308 12/23/24  0343 12/24/24  0413   HGB 7.9* 7.6* 7.6*   HCT 25.4* 25.2* 25.1*       Plan  - Monitor serial CBC: Daily  - Transfuse PRBC if patient becomes hemodynamically unstable, symptomatic or H/H drops below 7/21.  - Patient has not received any PRBC transfusions to date  - Patient's anemia is currently worsening. Will adjust treatment as follows: 2uprbcs today  12/17 h/h Improved  12/20 h/h holding

## 2024-12-24 NOTE — ASSESSMENT & PLAN NOTE
"Patient has a diagnosis of pneumonia. The cause of the pneumonia is suspected to be bacterial in etiology but organism is not known. The pneumonia is stable. The patient has the following signs/symptoms of pneumonia: persistent hypoxia  and sputum production. The patient does have a current oxygen requirement and the patient does not have a home oxygen requirement. I have reviewed the pertinent imaging. The following cultures have been collected: Blood cultures and Sputum culture The culture results are listed below.     Current antimicrobial regimen consists of the antibiotics listed below. Will monitor patient closely and continue current treatment plan unchanged.    Antibiotics (From admission, onward)      Start     Stop Route Frequency Ordered    12/22/24 1600  gentamicin (GARAMYCIN) 462.8 mg in 0.9% NaCl 100 mL IVPB         -- IV Every 48 hours (non-standard times) 12/22/24 0413    12/20/24 1438  gentamicin - pharmacy to dose  (gentamicin IVPB (PEDS and ADULTS))        Placed in "And" Linked Group    -- IV pharmacy to manage frequency 12/20/24 1338    12/17/24 0900  meropenem injection 1 g         12/26/24 2359 IV Every 8 hours (non-standard times) 12/17/24 0757            Microbiology Results (last 7 days)       Procedure Component Value Units Date/Time    Culture, Respiratory with Gram Stain [[id number]]  (Abnormal)  (Susceptibility) Collected: 12/15/24 0935    Order Status: Completed Specimen: Respiratory from Endotracheal Aspirate Updated: 12/23/24 1204     Respiratory Culture No S aureus or Pseudomonas isolated.      ACINETOBACTER BAUMANNII   Many        PROTEUS MIRABILIS  Few        KLEBSIELLA PNEUMONIAE   Few  ESBL        Gram Stain (Respiratory) Few WBC's     Gram Stain (Respiratory) Many Gram positive rods     Gram Stain (Respiratory) Few Gram negative cocci    Blood culture x two cultures. Draw prior to antibiotics. [[id number]] Collected: 12/15/24 0942    Order Status: Completed " Specimen: Blood from Peripheral, Antecubital, Left Updated: 12/20/24 1612     Blood Culture, Routine No growth after 5 days.    Narrative:      Aerobic and anaerobic    Blood culture x two cultures. Draw prior to antibiotics. [[id number]] Collected: 12/15/24 0923    Order Status: Completed Specimen: Blood from Peripheral, Forearm, Right Updated: 12/20/24 1612     Blood Culture, Routine No growth after 5 days.    Narrative:      Aerobic and anaerobic    Urine culture [[id number]]  (Abnormal)  (Susceptibility) Collected: 12/15/24 0941    Order Status: Completed Specimen: Urine Updated: 12/17/24 2343     Urine Culture, Routine KLEBSIELLA PNEUMONIAE ESBL  >100,000 cfu/ml      Narrative:      Preferred Collection Type->Urine, Clean Catch  Specimen Source->Urine        12/17 dc zosyn with recent esbl kleb in urine - will change to merrem - cont vanc until final cultures obtained - change vent to simv; add nebs  12/18 cotn merrem and vanc - await final sputum cutlure - cont vent on ac control; nebs - wbc imrpoved slightly; lasix 20mg iv for edema today  12/19  dc vanc - proteus grwoign from sputum - cont merrem and nebs; lasix today - wean rr on vent  12/20 wnc improved - cont merrem - nebs and vent - another dose of lasix today for edema - fio2 increased  12/23 cont iv gent and merren due to sent of multiple bugs (acinetobacter/kleb/proteus)  12/24 cont current abxs

## 2024-12-25 NOTE — NURSING
Pt remains on the vent. Settings unchanged. NG tube and PEG maintained. Ileostomy producing soft and water brown stool. Varghese maintained with varghese care. Diprivan, Levo and TPN infusing. VSS. Continue to observe.

## 2024-12-25 NOTE — ASSESSMENT & PLAN NOTE
Nutrition consulted. Most recent weight and BMI monitored-     Measurements:  Wt Readings from Last 1 Encounters:   12/16/24 68.5 kg (151 lb 0.2 oz)   Body mass index is 23.65 kg/m².    Patient has been screened and assessed by RD.    Malnutrition Type:  Context:    Level:      Malnutrition Characteristic Summary:       Interventions/Recommendations (treatment strategy):  1. Rec'd Continuous EN: Glucerna 1.5 @ 40mL/r increasing by 5 mL q6hrs to goal rate of 55mL/hr with a continuous 40mL FWF to provide 1980kcal (94% EEN), 109g of protein (100% EPN), and 1962mL FW.   2. Néstor BID via PEG tube to promote wound healing and to provide additional nutrition.           - Do not mix Néstor with formula in a tube-feeding bag         - Pour one packet of Néstor in a clean, small container for mixing.           - Add 4 fl oz (120 mL) water at room temperature.           - Mix well with disposable spoon or tongue blade until all particles are completely hydrated.           - Verify correct tube placement.           - Flush feeding tube with 30 mL water.           -Administer Néstor through feeding tube using a 60-mL or larger syringe.           - Flush with an additional 30 mL water.  3. Rec'd ClinimixE 5/20 @ 80mL/hr to provide 1690kcal (80% EEN), 96g of protein (88% EPN), and 1920mL TFV.      -Add 250mL of 20% lipids 3x/week to provide additional calories.      -Check Triglycerides before adding lipids. 4. RD to follow and make rec's accordingly.    12/18 restart tfs today at 1ml/hr to see if can tolerate feeds  12/19 increase tfs as tolerates  12/23 not tolerating tfs- will get tpn orders and start that until can tolerate tfs better - ncrease regaln 10 mg iv q 6hrs  12/24 con ttfs as tolerates - started on tpn for further nutritional support  12/25 FM:  Check KUB.

## 2024-12-25 NOTE — ASSESSMENT & PLAN NOTE
Anemia is likely due to chronic infections Most recent hemoglobin and hematocrit are listed below.  Recent Labs     12/23/24  0343 12/24/24  0413 12/25/24  0345   HGB 7.6* 7.6* 7.3*   HCT 25.2* 25.1* 23.4*       Plan  - Monitor serial CBC: Daily  - Transfuse PRBC if patient becomes hemodynamically unstable, symptomatic or H/H drops below 7/21.  - Patient has not received any PRBC transfusions to date  - Patient's anemia is currently worsening. Will adjust treatment as follows: 2uprbcs today  12/17 h/h Improved  12/20 h/h holding

## 2024-12-25 NOTE — ASSESSMENT & PLAN NOTE
"Patient has a diagnosis of pneumonia. The cause of the pneumonia is suspected to be bacterial in etiology but organism is not known. The pneumonia is stable. The patient has the following signs/symptoms of pneumonia: persistent hypoxia  and sputum production. The patient does have a current oxygen requirement and the patient does not have a home oxygen requirement. I have reviewed the pertinent imaging. The following cultures have been collected: Blood cultures and Sputum culture The culture results are listed below.     Current antimicrobial regimen consists of the antibiotics listed below. Will monitor patient closely and continue current treatment plan unchanged.    Antibiotics (From admission, onward)      Start     Stop Route Frequency Ordered    12/22/24 1600  gentamicin (GARAMYCIN) 462.8 mg in 0.9% NaCl 100 mL IVPB         -- IV Every 48 hours (non-standard times) 12/22/24 0413    12/20/24 1438  gentamicin - pharmacy to dose  (gentamicin IVPB (PEDS and ADULTS))        Placed in "And" Linked Group    -- IV pharmacy to manage frequency 12/20/24 1338    12/17/24 0900  meropenem injection 1 g         12/26/24 2359 IV Every 8 hours (non-standard times) 12/17/24 0757            Microbiology Results (last 7 days)       Procedure Component Value Units Date/Time    Culture, Respiratory with Gram Stain [[id number]]  (Abnormal)  (Susceptibility) Collected: 12/15/24 0935    Order Status: Completed Specimen: Respiratory from Endotracheal Aspirate Updated: 12/23/24 1204     Respiratory Culture No S aureus or Pseudomonas isolated.      ACINETOBACTER BAUMANNII   Many        PROTEUS MIRABILIS  Few        KLEBSIELLA PNEUMONIAE   Few  ESBL        Gram Stain (Respiratory) Few WBC's     Gram Stain (Respiratory) Many Gram positive rods     Gram Stain (Respiratory) Few Gram negative cocci    Blood culture x two cultures. Draw prior to antibiotics. [[id number]] Collected: 12/15/24 0942    Order Status: Completed " Specimen: Blood from Peripheral, Antecubital, Left Updated: 12/20/24 1612     Blood Culture, Routine No growth after 5 days.    Narrative:      Aerobic and anaerobic    Blood culture x two cultures. Draw prior to antibiotics. [[id number]] Collected: 12/15/24 0923    Order Status: Completed Specimen: Blood from Peripheral, Forearm, Right Updated: 12/20/24 1612     Blood Culture, Routine No growth after 5 days.    Narrative:      Aerobic and anaerobic        12/17 dc zosyn with recent esbl kleb in urine - will change to merrem - cont vanc until final cultures obtained - change vent to simv; add nebs  12/18 cotn merrem and vanc - await final sputum cutlure - cont vent on ac control; nebs - wbc imrpoved slightly; lasix 20mg iv for edema today  12/19  dc vanc - proteus grwoign from sputum - cont merrem and nebs; lasix today - wean rr on vent  12/20 wnc improved - cont merrem - nebs and vent - another dose of lasix today for edema - fio2 increased  12/23 cont iv gent and merren due to sent of multiple bugs (acinetobacter/kleb/proteus)  12/24 cont current abxs  12/25 FM:  Cont GENT

## 2024-12-25 NOTE — ASSESSMENT & PLAN NOTE
"This patient does have evidence of infective focus  My overall impression is septic shock due to MAP < 65 and SBP < 90.  Source: Respiratory and Urinary Tract  Antibiotics given-   Antibiotics (72h ago, onward)      Start     Stop Route Frequency Ordered    12/22/24 1600  gentamicin (GARAMYCIN) 462.8 mg in 0.9% NaCl 100 mL IVPB         -- IV Every 48 hours (non-standard times) 12/22/24 0413    12/20/24 1438  gentamicin - pharmacy to dose  (gentamicin IVPB (PEDS and ADULTS))        Placed in "And" Linked Group    -- IV pharmacy to manage frequency 12/20/24 1338    12/17/24 0900  meropenem injection 1 g         12/26/24 2359 IV Every 8 hours (non-standard times) 12/17/24 0757          Latest lactate reviewed-  No results for input(s): "LACTATE", "POCLAC" in the last 72 hours.    Organ dysfunction indicated by Acute respiratory failure and Encephalopathy    Fluid challenge Ideal Body Weight- The patient's ideal body weight is Ideal body weight: 66.1 kg (145 lb 11.6 oz) which will be used to calculate fluid bolus of 30 ml/kg for treatment of septic shock.      Post- resuscitation assessment Yes Perfusion exam was performed within 6 hours of septic shock presentation after bolus shows Inadequate tissue perfusion assessed by non-invasive monitoring       Will Start Pressors- Levophed for MAP of 65  Source control achieved by: iv zosym. Vanc, ivfs  12/17 wean levophed as tolerates- abxs changed to merrem, cotn vanc - await culture final reports  12/18 wbc slightly imrpoved - cont iv abxs- await final sputum culture - on merrem fro esbl kleb in urine  12/19 cont iv merrem for esbl kleb in urine and proteus in sputum - dc vanc  12/20 wbc improvign - cotn iv merrem  12/24 wbc normal - cont iv abxs  12/25 FM:  Cont GENT.  "

## 2024-12-25 NOTE — EICU
eICU Note : Ventilator Rounds    On Mechanical ventilator :Settings: Mode:   TV: 400   RR   PEEP +5  FiO2   30%    PIP  SaO2 90  Last ABG : 7.43/38/58  Checked Readiness to wean: P/F ratio >200         F/TV   PEEP   Sedation on/off:  Vent Mode: A/C  Oxygen Concentration (%):  [30] 30  Problem:Lactate 5.6    Pertinent History and labs reviewed :    Sepsis    Type 1 diabetes    Urinary tract infection associated with indwelling urethral catheter    History of anoxic brain injury    Microcytic anemia    Open wound of groin    Sacral wound, sequela    Pneumonia of right lower lobe due to infectious organism    Hypomagnesemia    Severe malnutrition    Hypokalemia     Treatment /Intervention given: Continue current treatment    Stacy Millan M.D  eICU Physician

## 2024-12-25 NOTE — ASSESSMENT & PLAN NOTE
Patient's FSGs are uncontrolled due to hyperglycemia on current medication regimen.  Last A1c reviewed-   Lab Results   Component Value Date    HGBA1C 6.6 (H) 12/16/2024     Most recent fingerstick glucose reviewed-   Recent Labs   Lab 12/24/24  1152 12/24/24  1626 12/24/24  1944 12/24/24  2336   POCTGLUCOSE 364* 354* 287* 285*       Current correctional scale  Low  Maintain anti-hyperglycemic dose as follows-   Antihyperglycemics (From admission, onward)      Start     Stop Route Frequency Ordered    12/24/24 0900  insulin glargine U-100 (Lantus) pen 12 Units         -- SubQ Daily 12/24/24 0701    12/24/24 0807  insulin aspart U-100 pen 0-10 Units         -- SubQ Every 4 hours PRN 12/24/24 0707          Hold Oral hypoglycemics while patient is in the hospital.  12/17 A1c improved to 6.6%  12/24 due to tpn - add lantus 12 u daily - ssi changed to moderate scale  12/25 FM:  BS logs noted, continue coverage.

## 2024-12-25 NOTE — ASSESSMENT & PLAN NOTE
Patient's most recent potassium results are listed below.   Recent Labs     12/23/24  0343 12/24/24  0413 12/25/24  0345   K 4.8 5.1 4.7       Plan  - Replete potassium per protocol  - Monitor potassium Daily  - Patient's hypokalemia is stable, continue below and monitor  12/20 increase pot bicarb to bid  12/23 decrease pot bicard to daily dosing  12/24 improved- stop potassium bicarb repalcement - monitor

## 2024-12-25 NOTE — EICU
Intervention Initiated From:  [facility] / EICU    Sandy intervened regarding:  Rounding (Video assessment)    Nurse Notified:  No    Doctor Notified:  No    Comments: Rounding done. Remains intubated on vent 30% +5 PEEP. On TPN @ 90 ml/hour, Propofol 15 mcg/kg/min, and Norepinephrine 0.1 mcg/kg/min. B/P 108/66, HR 85, resp 20, sat 100. Side rails up x4

## 2024-12-25 NOTE — PLAN OF CARE
Problem: Skin Injury Risk Increased  Goal: Skin Health and Integrity  Outcome: Progressing     Problem: Mechanical Ventilation Invasive  Goal: Effective Communication  Outcome: Progressing  Goal: Optimal Device Function  Outcome: Progressing  Goal: Mechanical Ventilation Liberation  Outcome: Progressing  Goal: Optimal Nutrition Delivery  Outcome: Progressing  Goal: Absence of Device-Related Skin and Tissue Injury  Outcome: Progressing  Goal: Absence of Ventilator-Induced Lung Injury  Outcome: Progressing     Problem: Artificial Airway  Goal: Effective Communication  Outcome: Progressing  Goal: Optimal Device Function  Outcome: Progressing  Goal: Absence of Device-Related Skin or Tissue Injury  Outcome: Progressing     Problem: Adult Inpatient Plan of Care  Goal: Plan of Care Review  Outcome: Progressing  Goal: Patient-Specific Goal (Individualized)  Outcome: Progressing  Goal: Absence of Hospital-Acquired Illness or Injury  Outcome: Progressing  Goal: Optimal Comfort and Wellbeing  Outcome: Progressing  Goal: Readiness for Transition of Care  Outcome: Progressing     Problem: Diabetes Comorbidity  Goal: Blood Glucose Level Within Targeted Range  Outcome: Progressing     Problem: Wound  Goal: Optimal Coping  Outcome: Progressing  Goal: Optimal Functional Ability  Outcome: Progressing  Goal: Absence of Infection Signs and Symptoms  Outcome: Progressing  Goal: Improved Oral Intake  Outcome: Progressing  Goal: Optimal Pain Control and Function  Outcome: Progressing     Problem: Infection  Goal: Absence of Infection Signs and Symptoms  Outcome: Progressing     Problem: Pneumonia  Goal: Fluid Balance  Outcome: Progressing  Goal: Resolution of Infection Signs and Symptoms  Outcome: Progressing  Goal: Effective Oxygenation and Ventilation  Outcome: Progressing     Problem: Sepsis/Septic Shock  Goal: Optimal Coping  Outcome: Progressing  Goal: Absence of Bleeding  Outcome: Progressing  Goal: Blood Glucose Level Within  Targeted Range  Outcome: Progressing  Goal: Absence of Infection Signs and Symptoms  Outcome: Progressing  Goal: Optimal Nutrition Intake  Outcome: Progressing     Problem: Fall Injury Risk  Goal: Absence of Fall and Fall-Related Injury  Outcome: Progressing     Problem: Electrolyte Imbalance  Goal: Electrolyte Balance  Outcome: Progressing

## 2024-12-25 NOTE — ASSESSMENT & PLAN NOTE
Await new urine culture =- currently on zosyn/vanc  12/17 change to merrem/vanc  12/18 has esbl klebsiella - cont merrem  12/23 on merrem  12/25 FM:  Continue Gent, CS reviewed.

## 2024-12-25 NOTE — NURSING
Resting, opening eyes. Remains on vent, no setting changes thus far. O2 100%. Breathing over the vent (17 RR/min). NG left nares, not in use. PEG with greater than 60 ml of light green watery stomach content, no feeding noted. Feedings held. Rojas maintained, penile edema upper shaft unchanged. Generalized edema. Dressing to wounds maintained. Glucose monitoring with Insulin as ordered. PICC maintained with TPN, Levo, Diprivan and IVF as ordered. Medium brown watery and soft stool noted to ileostomy. VSS. Continue to observe.

## 2024-12-25 NOTE — SUBJECTIVE & OBJECTIVE
Interval History: Patient seen and examined.     Review of Systems   Unable to perform ROS: Acuity of condition     Objective:     Vital Signs (Most Recent):  Temp: 97.7 °F (36.5 °C) (12/25/24 0702)  Pulse: 76 (12/25/24 0737)  Resp: 20 (12/25/24 0737)  BP: 118/75 (12/25/24 0501)  SpO2: 100 % (12/25/24 0737) Vital Signs (24h Range):  Temp:  [97.3 °F (36.3 °C)-98.2 °F (36.8 °C)] 97.7 °F (36.5 °C)  Pulse:  [76-96] 76  Resp:  [14-23] 20  SpO2:  [100 %] 100 %  BP: ()/(50-79) 118/75     Weight: 68.5 kg (151 lb 0.2 oz)  Body mass index is 23.65 kg/m².    Intake/Output Summary (Last 24 hours) at 12/25/2024 0748  Last data filed at 12/25/2024 0606  Gross per 24 hour   Intake 2875.56 ml   Output 2025 ml   Net 850.56 ml         Physical Exam  Constitutional:       Appearance: He is ill-appearing.      Comments: Thin male; intubated   HENT:      Mouth/Throat:      Mouth: Mucous membranes are moist.   Eyes:      Pupils: Pupils are equal, round, and reactive to light.   Cardiovascular:      Rate and Rhythm: Normal rate and regular rhythm.      Heart sounds: Normal heart sounds.   Pulmonary:      Effort: Prolonged expiration present.      Breath sounds: Transmitted upper airway sounds present. Rhonchi present.      Comments: Breath sounds at lung bases bilaterally   Abdominal:      General: There is no distension.      Palpations: Abdomen is soft. There is no mass.      Tenderness: There is no abdominal tenderness.      Comments: Jtube noted; ileostomy noted   Genitourinary:     Comments: Rojas with yellow urine  Lymphadenopathy:      Cervical: No cervical adenopathy.   Skin:     General: Skin is warm and dry.      Comments: Wounds to sacrum; groin area             Significant Labs: All pertinent labs within the past 24 hours have been reviewed.  ABGs:   Recent Labs   Lab 12/24/24  0521 12/25/24  0507   PH 7.424 7.395   PCO2 38.5 47.4*   HCO3 25.1 27.7   POCSATURATED 97.7 99.8   PO2 89.2 129*     Bilirubin:   Recent Labs    Lab 12/08/24  1301 12/09/24  0528 12/21/24  0331 12/22/24  0308 12/23/24  0343 12/24/24 0413 12/25/24 0345   BILIDIR 0.3  --   --   --   --   --   --    BILITOT 0.5   < > 0.4 0.3 0.2 0.2 0.2    < > = values in this interval not displayed.      Recent Labs   Lab 12/25/24  0345   *   *   K 4.7      CO2 31*   BUN 18   CREATININE 1.0   CALCIUM 10.2   MG 1.4*     CBC:   Recent Labs   Lab 12/24/24 0413 12/25/24 0345   WBC 10.37 14.55*   HGB 7.6* 7.3*   HCT 25.1* 23.4*    232     CMP:   Recent Labs   Lab 12/24/24  0004 12/24/24 0413 12/25/24 0345   NA  --  134* 133*   K  --  5.1 4.7   CL  --  105 104   CO2  --  28 31*   * 460* 297*   BUN  --  12 18   CREATININE  --  1.1 1.0   CALCIUM  --  9.8 10.2   PROT  --  5.7* 5.5*   ALBUMIN  --  <0.6* <0.6*   BILITOT  --  0.2 0.2   ALKPHOS  --  461* 386*   AST  --  23 18   ALT  --  10 6*   ANIONGAP  --  1* -2*     Magnesium:   Recent Labs   Lab 12/24/24 0413 12/25/24  0345   MG 1.7 1.4*     Significant Imaging: I have reviewed all pertinent imaging results/findings within the past 24 hours.

## 2024-12-25 NOTE — PROGRESS NOTES
[facility] Medicine  Progress Note    Patient Name: Carlos Chahal  MRN: [id number]  Patient Class: IP- Inpatient   Admission Date: 12/15/2024  Length of Stay: 10 days  Attending Physician: Kim Diamond MD  Primary Care Provider: Jose Francisco Klein MD        Subjective     Principal Problem:Sepsis        HPI:  Carlos Chahal is a 34 y.o. male who presents to the ED from nursing home for altered mental status and difficulty breathing.  Patient is found to be hypoxic.  He has a history of anoxic brain injury     This am, pt is on ventilator and levophed at 0.25mcg and ivfs at 75ml/hr.      Spoke with father this am on condition of pt    Overview/Hospital Course:  12/17 ND became more awake yesterday with wound changes - low dose propofol added for pt - able to wean levophed to 0.15mcg.  did tolerate tf last night - check kub this am  12/18 ND pt had to be changed back to ac control last nigth after becoming tachypneic and had low tv.  Tolerating ac mode.  Levophed down to 0.1 mcg  12/19 ND pt toleratign vent - will wean RR today - cont to slowly wean levophed as tolerates - wbc slowly imrpoving - tolerating low dose tfs - will cont to increase tfs as tolerates  12/20 ND tolerating vent - cont to work on feeds and nutritional support  12/21 [place] weekend coverage, in no acute distress, stable vital signs, AC/18/400/5/40%, SpO2 100%. On proprofol for sedation, patient tracks voice and moving head and neck with lid opening. Mild bump in WBC, afebrile, may be associated with the reported residual >200 and tube feeds held. Abdomen, soft and no distension on exam. Will resume as tolerated and monitor.  Blood cx x2, final report no growth. Continue merrem (D5), gentamicin (D2) with mixed MDRO frida from sputum studies and UTI. Replete Mg, continue abx. Continue daily wound care.   12/22 [place] weekend coverage, overnight rate change and tolerating AC12/400/5/40 SpO2 100%, proprofol 15 mcg/kg/min, levophed  0.1mcg/kg/min. Patient without gag reflex on suction. Tube feeds held overnight and resumed, remains on trickle feeds. Ileostomy output 100.   No associated abdominal distension, patient in no distress. Vent settings weaned. Leukocytosis resolved. Continue IV abx for MDRO coverage.   12/23 ND Pt still havign trouble tolerating tfs -change reglan iv; pt is more awake this am - will change to simv for a few hours today   12/24 ND elevated bs with tpn- will add long acting insulin as 12u and restart tpn - ssi adjusted to moderate scale.  12/25 FM:  Holiday coverage, chart reviewed and case discussed with team.  Patient's Levophed is being weaned slowly and he is not tolerating his tube feeds.  Abdominal exam is soft patient has output via ostomy we will check KUB.  Patient is followed by surgery and has a polymicrobial respiratory and urinary tract infection.  Patient has multiple wounds and has a history of anoxic brain injury and is a long-term nursing home resident.    Interval History: Patient seen and examined.     Review of Systems   Unable to perform ROS: Acuity of condition     Objective:     Vital Signs (Most Recent):  Temp: 97.7 °F (36.5 °C) (12/25/24 0702)  Pulse: 76 (12/25/24 0737)  Resp: 20 (12/25/24 0737)  BP: 118/75 (12/25/24 0501)  SpO2: 100 % (12/25/24 0737) Vital Signs (24h Range):  Temp:  [97.3 °F (36.3 °C)-98.2 °F (36.8 °C)] 97.7 °F (36.5 °C)  Pulse:  [76-96] 76  Resp:  [14-23] 20  SpO2:  [100 %] 100 %  BP: ()/(50-79) 118/75     Weight: 68.5 kg (151 lb 0.2 oz)  Body mass index is 23.65 kg/m².    Intake/Output Summary (Last 24 hours) at 12/25/2024 0748  Last data filed at 12/25/2024 0606  Gross per 24 hour   Intake 2875.56 ml   Output 2025 ml   Net 850.56 ml         Physical Exam  Constitutional:       Appearance: He is ill-appearing.      Comments: Thin male; intubated   HENT:      Mouth/Throat:      Mouth: Mucous membranes are moist.   Eyes:      Pupils: Pupils are equal, round, and reactive  to light.   Cardiovascular:      Rate and Rhythm: Normal rate and regular rhythm.      Heart sounds: Normal heart sounds.   Pulmonary:      Effort: Prolonged expiration present.      Breath sounds: Transmitted upper airway sounds present. Rhonchi present.      Comments: Breath sounds at lung bases bilaterally   Abdominal:      General: There is no distension.      Palpations: Abdomen is soft. There is no mass.      Tenderness: There is no abdominal tenderness.      Comments: Jtube noted; ileostomy noted   Genitourinary:     Comments: Rojas with yellow urine  Lymphadenopathy:      Cervical: No cervical adenopathy.   Skin:     General: Skin is warm and dry.      Comments: Wounds to sacrum; groin area             Significant Labs: All pertinent labs within the past 24 hours have been reviewed.  ABGs:   Recent Labs   Lab 12/24/24  0521 12/25/24  0507   PH 7.424 7.395   PCO2 38.5 47.4*   HCO3 25.1 27.7   POCSATURATED 97.7 99.8   PO2 89.2 129*     Bilirubin:   Recent Labs   Lab 12/08/24  1301 12/09/24  0528 12/21/24  0331 12/22/24  0308 12/23/24  0343 12/24/24  0413 12/25/24  0345   BILIDIR 0.3  --   --   --   --   --   --    BILITOT 0.5   < > 0.4 0.3 0.2 0.2 0.2    < > = values in this interval not displayed.      Recent Labs   Lab 12/25/24  0345   *   *   K 4.7      CO2 31*   BUN 18   CREATININE 1.0   CALCIUM 10.2   MG 1.4*     CBC:   Recent Labs   Lab 12/24/24  0413 12/25/24  0345   WBC 10.37 14.55*   HGB 7.6* 7.3*   HCT 25.1* 23.4*    232     CMP:   Recent Labs   Lab 12/24/24  0004 12/24/24  0413 12/25/24  0345   NA  --  134* 133*   K  --  5.1 4.7   CL  --  105 104   CO2  --  28 31*   * 460* 297*   BUN  --  12 18   CREATININE  --  1.1 1.0   CALCIUM  --  9.8 10.2   PROT  --  5.7* 5.5*   ALBUMIN  --  <0.6* <0.6*   BILITOT  --  0.2 0.2   ALKPHOS  --  461* 386*   AST  --  23 18   ALT  --  10 6*   ANIONGAP  --  1* -2*     Magnesium:   Recent Labs   Lab 12/24/24  0413 12/25/24  0345   MG 1.7  "1.4*     Significant Imaging: I have reviewed all pertinent imaging results/findings within the past 24 hours.    Assessment and Plan     * Sepsis  This patient does have evidence of infective focus  My overall impression is septic shock due to MAP < 65 and SBP < 90.  Source: Respiratory and Urinary Tract  Antibiotics given-   Antibiotics (72h ago, onward)      Start     Stop Route Frequency Ordered    12/22/24 1600  gentamicin (GARAMYCIN) 462.8 mg in 0.9% NaCl 100 mL IVPB         -- IV Every 48 hours (non-standard times) 12/22/24 0413    12/20/24 1438  gentamicin - pharmacy to dose  (gentamicin IVPB (PEDS and ADULTS))        Placed in "And" Linked Group    -- IV pharmacy to manage frequency 12/20/24 1338    12/17/24 0900  meropenem injection 1 g         12/26/24 2359 IV Every 8 hours (non-standard times) 12/17/24 0757          Latest lactate reviewed-  No results for input(s): "LACTATE", "POCLAC" in the last 72 hours.    Organ dysfunction indicated by Acute respiratory failure and Encephalopathy    Fluid challenge Ideal Body Weight- The patient's ideal body weight is Ideal body weight: 66.1 kg (145 lb 11.6 oz) which will be used to calculate fluid bolus of 30 ml/kg for treatment of septic shock.      Post- resuscitation assessment Yes Perfusion exam was performed within 6 hours of septic shock presentation after bolus shows Inadequate tissue perfusion assessed by non-invasive monitoring       Will Start Pressors- Levophed for MAP of 65  Source control achieved by: iv zosym. Vanc, ivfs  12/17 wean levophed as tolerates- abxs changed to merrem, cotn vanc - await culture final reports  12/18 wbc slightly imrpoved - cont iv abxs- await final sputum culture - on merrem fro esbl kleb in urine  12/19 cont iv merrem for esbl kleb in urine and proteus in sputum - dc vanc  12/20 wbc improvign - cotn iv merrem  12/24 wbc normal - cont iv abxs  12/25 FM:  Cont GENT.    Hypokalemia  Patient's most recent potassium results are " listed below.   Recent Labs     12/23/24  0343 12/24/24  0413 12/25/24  0345   K 4.8 5.1 4.7       Plan  - Replete potassium per protocol  - Monitor potassium Daily  - Patient's hypokalemia is stable, continue below and monitor  12/20 increase pot bicarb to bid  12/23 decrease pot bicard to daily dosing  12/24 improved- stop potassium bicarb repalcement - monitor    Severe malnutrition  Nutrition consulted. Most recent weight and BMI monitored-     Measurements:  Wt Readings from Last 1 Encounters:   12/16/24 68.5 kg (151 lb 0.2 oz)   Body mass index is 23.65 kg/m².    Patient has been screened and assessed by RD.    Malnutrition Type:  Context:    Level:      Malnutrition Characteristic Summary:       Interventions/Recommendations (treatment strategy):  1. Rec'd Continuous EN: Glucerna 1.5 @ 40mL/r increasing by 5 mL q6hrs to goal rate of 55mL/hr with a continuous 40mL FWF to provide 1980kcal (94% EEN), 109g of protein (100% EPN), and 1962mL FW.   2. Néstor BID via PEG tube to promote wound healing and to provide additional nutrition.           - Do not mix Néstor with formula in a tube-feeding bag         - Pour one packet of Néstor in a clean, small container for mixing.           - Add 4 fl oz (120 mL) water at room temperature.           - Mix well with disposable spoon or tongue blade until all particles are completely hydrated.           - Verify correct tube placement.           - Flush feeding tube with 30 mL water.           -Administer Néstor through feeding tube using a 60-mL or larger syringe.           - Flush with an additional 30 mL water.  3. Rec'd ClinimixE 5/20 @ 80mL/hr to provide 1690kcal (80% EEN), 96g of protein (88% EPN), and 1920mL TFV.      -Add 250mL of 20% lipids 3x/week to provide additional calories.      -Check Triglycerides before adding lipids. 4. RD to follow and make rec's accordingly.    12/18 restart tfs today at 1ml/hr to see if can tolerate feeds  12/19 increase tfs as  "tolerates  12/23 not tolerating tfs- will get tpn orders and start that until can tolerate tfs better - ncrease regaln 10 mg iv q 6hrs  12/24 con ttfs as tolerates - started on tpn for further nutritional support  12/25 FM:  Check KUB.    Hypomagnesemia  Patient has Abnormal Magnesium: hypomagnesemia. Will continue to monitor electrolytes closely. Will replace the affected electrolytes and repeat labs to be done after interventions completed. The patient's magnesium results have been reviewed and are listed below.  Recent Labs   Lab 12/25/24  0345   MG 1.4*      12/17 mag imrpoving - repalce mag today  12/19 replace mag today  12/20 mag oxide bid  12/21 2g Mg  12/23 dose of iv mag today to keep mag level about 2  12/25 FM:  Replace, recheck.    Pneumonia of right lower lobe due to infectious organism  Patient has a diagnosis of pneumonia. The cause of the pneumonia is suspected to be bacterial in etiology but organism is not known. The pneumonia is stable. The patient has the following signs/symptoms of pneumonia: persistent hypoxia  and sputum production. The patient does have a current oxygen requirement and the patient does not have a home oxygen requirement. I have reviewed the pertinent imaging. The following cultures have been collected: Blood cultures and Sputum culture The culture results are listed below.     Current antimicrobial regimen consists of the antibiotics listed below. Will monitor patient closely and continue current treatment plan unchanged.    Antibiotics (From admission, onward)      Start     Stop Route Frequency Ordered    12/22/24 1600  gentamicin (GARAMYCIN) 462.8 mg in 0.9% NaCl 100 mL IVPB         -- IV Every 48 hours (non-standard times) 12/22/24 0413    12/20/24 1438  gentamicin - pharmacy to dose  (gentamicin IVPB (PEDS and ADULTS))        Placed in "And" Linked Group    -- IV pharmacy to manage frequency 12/20/24 1338    12/17/24 0900  meropenem injection 1 g         12/26/24 7415 " IV Every 8 hours (non-standard times) 12/17/24 0757            Microbiology Results (last 7 days)       Procedure Component Value Units Date/Time    Culture, Respiratory with Gram Stain [[id number]]  (Abnormal)  (Susceptibility) Collected: 12/15/24 0935    Order Status: Completed Specimen: Respiratory from Endotracheal Aspirate Updated: 12/23/24 1204     Respiratory Culture No S aureus or Pseudomonas isolated.      ACINETOBACTER BAUMANNII   Many        PROTEUS MIRABILIS  Few        KLEBSIELLA PNEUMONIAE   Few  ESBL        Gram Stain (Respiratory) Few WBC's     Gram Stain (Respiratory) Many Gram positive rods     Gram Stain (Respiratory) Few Gram negative cocci    Blood culture x two cultures. Draw prior to antibiotics. [[id number]] Collected: 12/15/24 0942    Order Status: Completed Specimen: Blood from Peripheral, Antecubital, Left Updated: 12/20/24 1612     Blood Culture, Routine No growth after 5 days.    Narrative:      Aerobic and anaerobic    Blood culture x two cultures. Draw prior to antibiotics. [[id number]] Collected: 12/15/24 0923    Order Status: Completed Specimen: Blood from Peripheral, Forearm, Right Updated: 12/20/24 1612     Blood Culture, Routine No growth after 5 days.    Narrative:      Aerobic and anaerobic        12/17 dc zosyn with recent esbl kleb in urine - will change to merrem - cont vanc until final cultures obtained - change vent to simv; add nebs  12/18 cotn merrem and vanc - await final sputum cutlure - cont vent on ac control; nebs - wbc imrpoved slightly; lasix 20mg iv for edema today  12/19  dc vanc - proteus grwoign from sputum - cont merrem and nebs; lasix today - wean rr on vent  12/20 wnc improved - cont merrem - nebs and vent - another dose of lasix today for edema - fio2 increased  12/23 cont iv gent and merren due to sent of multiple bugs (acinetobacter/kleb/proteus)  12/24 cont current abxs  12/25 FM:  Cont GENT    Sacral wound, sequela  Local wound care with  dakins bid  Iv abxs      Open wound of groin  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  Recent Labs     12/23/24  0343 12/24/24  0413 12/25/24  0345   HGB 7.6* 7.6* 7.3*   HCT 25.2* 25.1* 23.4*       Plan  - Monitor serial CBC: Daily  - Transfuse PRBC if patient becomes hemodynamically unstable, symptomatic or H/H drops below 7/21.  - Patient has not received any PRBC transfusions to date  - Patient's anemia is currently worsening. Will adjust treatment as follows: 2uprbcs today  12/17 h/h Improved  12/20 h/h holding    History of anoxic brain injury  12/25 FM:  Poor prognosis.      Urinary tract infection associated with indwelling urethral catheter  Await new urine culture =- currently on zosyn/vanc  12/17 change to merrem/vanc  12/18 has esbl klebsiella - cont merrem  12/23 on merrem  12/25 FM:  Continue Gent, CS reviewed.    Type 1 diabetes  Patient's FSGs are uncontrolled due to hyperglycemia on current medication regimen.  Last A1c reviewed-   Lab Results   Component Value Date    HGBA1C 6.6 (H) 12/16/2024     Most recent fingerstick glucose reviewed-   Recent Labs   Lab 12/24/24  1152 12/24/24  1626 12/24/24  1944 12/24/24  2336   POCTGLUCOSE 364* 354* 287* 285*       Current correctional scale  Low  Maintain anti-hyperglycemic dose as follows-   Antihyperglycemics (From admission, onward)      Start     Stop Route Frequency Ordered    12/24/24 0900  insulin glargine U-100 (Lantus) pen 12 Units         -- SubQ Daily 12/24/24 0701    12/24/24 0807  insulin aspart U-100 pen 0-10 Units         -- SubQ Every 4 hours PRN 12/24/24 0707          Hold Oral hypoglycemics while patient is in the hospital.  12/17 A1c improved to 6.6%  12/24 due to tpn - add lantus 12 u daily - ssi changed to moderate scale  12/25 FM:  BS logs noted, continue coverage.      VTE Risk  Mitigation (From admission, onward)           Ordered     enoxaparin injection 40 mg  Daily         12/16/24 0815     IP VTE LOW RISK PATIENT  Once         12/15/24 1201     Place sequential compression device  Until discontinued         12/15/24 1201                    Discharge Planning   JOSE MANUEL:      Code Status: Full Code   Medical Readiness for Discharge Date:   Discharge Plan A: Other (TBD)   Discharge Delays: None known at this time                    Khari Norris III, MD  Department of Hospital Medicine   [place] [facility]     No

## 2024-12-25 NOTE — ASSESSMENT & PLAN NOTE
Patient has Abnormal Magnesium: hypomagnesemia. Will continue to monitor electrolytes closely. Will replace the affected electrolytes and repeat labs to be done after interventions completed. The patient's magnesium results have been reviewed and are listed below.  Recent Labs   Lab 12/25/24  0345   MG 1.4*      12/17 mag imrpoving - repalce mag today  12/19 replace mag today  12/20 mag oxide bid  12/21 2g Mg  12/23 dose of iv mag today to keep mag level about 2  12/25 FM:  Replace, recheck.

## 2024-12-26 NOTE — EICU
Intervention Initiated From:  [facility] / [facility]    Sandy intervened regarding:  Rounding (Video assessment)    Comments: Video assessment completed. Pt lying in bed with eyes closed. Oral ETT to vent. Resp even and unlabored with vent assist. SaO2 100%.  NAD noted at this time.

## 2024-12-26 NOTE — PROGRESS NOTES
[facility] Medicine  Progress Note    Patient Name: Carlos Chahal  MRN: [id number]  Patient Class: IP- Inpatient   Admission Date: 12/15/2024  Length of Stay: 11 days  Attending Physician: Kim Diamond MD  Primary Care Provider: Jose Francisco Klein MD        Subjective     Principal Problem:Sepsis        HPI:  Carlos Chahal is a 34 y.o. male who presents to the ED from nursing home for altered mental status and difficulty breathing.  Patient is found to be hypoxic.  He has a history of anoxic brain injury     This am, pt is on ventilator and levophed at 0.25mcg and ivfs at 75ml/hr.      Spoke with father this am on condition of pt    Overview/Hospital Course:  12/17 ND became more awake yesterday with wound changes - low dose propofol added for pt - able to wean levophed to 0.15mcg.  did tolerate tf last night - check kub this am  12/18 ND pt had to be changed back to ac control last nigth after becoming tachypneic and had low tv.  Tolerating ac mode.  Levophed down to 0.1 mcg  12/19 ND pt toleratign vent - will wean RR today - cont to slowly wean levophed as tolerates - wbc slowly imrpoving - tolerating low dose tfs - will cont to increase tfs as tolerates  12/20 ND tolerating vent - cont to work on feeds and nutritional support  12/21 [place] weekend coverage, in no acute distress, stable vital signs, AC/18/400/5/40%, SpO2 100%. On proprofol for sedation, patient tracks voice and moving head and neck with lid opening. Mild bump in WBC, afebrile, may be associated with the reported residual >200 and tube feeds held. Abdomen, soft and no distension on exam. Will resume as tolerated and monitor.  Blood cx x2, final report no growth. Continue merrem (D5), gentamicin (D2) with mixed MDRO frida from sputum studies and UTI. Replete Mg, continue abx. Continue daily wound care.   12/22 [place] weekend coverage, overnight rate change and tolerating AC12/400/5/40 SpO2 100%, proprofol 15 mcg/kg/min, levophed  0.1mcg/kg/min. Patient without gag reflex on suction. Tube feeds held overnight and resumed, remains on trickle feeds. Ileostomy output 100.   No associated abdominal distension, patient in no distress. Vent settings weaned. Leukocytosis resolved. Continue IV abx for MDRO coverage.   12/23 ND Pt still havign trouble tolerating tfs -change reglan iv; pt is more awake this am - will change to simv for a few hours today   12/24 ND elevated bs with tpn- will add long acting insulin as 12u and restart tpn - ssi adjusted to moderate scale.  12/25 FM:  Holiday coverage, chart reviewed and case discussed with team.  Patient's Levophed is being weaned slowly and he is not tolerating his tube feeds.  Abdominal exam is soft patient has output via ostomy we will check KUB.  Patient is followed by surgery and has a polymicrobial respiratory and urinary tract infection.  Patient has multiple wounds and has a history of anoxic brain injury and is a long-term nursing home resident.  12/26 FM:  Patient is off of vasopressors this morning, patient's KUB noted and will rechallenge tube feedings today.  Patient is tolerating TPN labs noted and his hemoglobin has continued to trend downward Ms. With no visible blood loss.  Patient's other labs noted his white blood cell count is rising despite appropriate antibiotics based on respiratory and urinary cultures.  Patient's prognosis is poor.    Interval History: Patient seen and examined.     Review of Systems   Unable to perform ROS: Acuity of condition     Objective:     Vital Signs (Most Recent):  Temp: 97.2 °F (36.2 °C) (12/26/24 0701)  Pulse: 85 (12/26/24 0530)  Resp: 17 (12/26/24 0530)  BP: (!) 88/51 (12/26/24 0530)  SpO2: 100 % (12/26/24 0530) Vital Signs (24h Range):  Temp:  [96.5 °F (35.8 °C)-97.3 °F (36.3 °C)] 97.2 °F (36.2 °C)  Pulse:  [75-94] 85  Resp:  [14-20] 17  SpO2:  [96 %-100 %] 100 %  BP: ()/(51-73) 88/51     Weight: 68.5 kg (151 lb 0.2 oz)  Body mass index is  23.65 kg/m².    Intake/Output Summary (Last 24 hours) at 12/26/2024 0722  Last data filed at 12/26/2024 0445  Gross per 24 hour   Intake 2719.93 ml   Output 925 ml   Net 1794.93 ml         Physical Exam  Constitutional:       Appearance: He is ill-appearing.      Comments: Thin male; intubated   HENT:      Mouth/Throat:      Mouth: Mucous membranes are moist.   Eyes:      Pupils: Pupils are equal, round, and reactive to light.   Cardiovascular:      Rate and Rhythm: Normal rate and regular rhythm.      Heart sounds: Normal heart sounds.   Pulmonary:      Effort: Prolonged expiration present.      Breath sounds: Transmitted upper airway sounds present. Rhonchi present.      Comments: Breath sounds at lung bases bilaterally   Abdominal:      General: There is no distension.      Palpations: Abdomen is soft. There is no mass.      Tenderness: There is no abdominal tenderness.      Comments: Jtube noted; ileostomy noted   Genitourinary:     Comments: Rojas with yellow urine  Lymphadenopathy:      Cervical: No cervical adenopathy.   Skin:     General: Skin is warm and dry.      Comments: Wounds to sacrum; groin area             Significant Labs: All pertinent labs within the past 24 hours have been reviewed.  ABGs:   Recent Labs   Lab 12/25/24  0507 12/26/24  0509   PH 7.395 7.360   PCO2 47.4* 49.9*   HCO3 27.7 26.4   POCSATURATED 99.8 85.5*   PO2 129* 52.2*     Bilirubin:   Recent Labs   Lab 12/08/24  1301 12/09/24  0528 12/22/24  0308 12/23/24  0343 12/24/24  0413 12/25/24  0345 12/26/24  0500   BILIDIR 0.3  --   --   --   --   --   --    BILITOT 0.5   < > 0.3 0.2 0.2 0.2 0.2    < > = values in this interval not displayed.      Recent Labs   Lab 12/26/24  0500   *   *   K 4.5      CO2 29   BUN 27*   CREATININE 1.0   CALCIUM 10.7*   MG 1.6     CBC:   Recent Labs   Lab 12/25/24  0345 12/26/24  0412 12/26/24  0510   WBC 14.55* 17.38* 17.74*   HGB 7.3* 6.3* 6.7*   HCT 23.4* 22.9* 21.3*    170 171  "    CMP:   Recent Labs   Lab 12/25/24  0345 12/26/24  0500   * 131*   K 4.7 4.5    102   CO2 31* 29   * 265*   BUN 18 27*   CREATININE 1.0 1.0   CALCIUM 10.2 10.7*   PROT 5.5* 5.0*   ALBUMIN <0.6* <0.6*   BILITOT 0.2 0.2   ALKPHOS 386* 326*   AST 18 18   ALT 6* <6*   ANIONGAP -2* 0*     Magnesium:   Recent Labs   Lab 12/25/24  0345 12/26/24  0500   MG 1.4* 1.6     Significant Imaging: I have reviewed all pertinent imaging results/findings within the past 24 hours.    Assessment and Plan     * Sepsis  This patient does have evidence of infective focus  My overall impression is septic shock due to MAP < 65 and SBP < 90.  Source: Respiratory and Urinary Tract  Antibiotics given-   Antibiotics (72h ago, onward)      Start     Stop Route Frequency Ordered    12/22/24 1600  gentamicin (GARAMYCIN) 462.8 mg in 0.9% NaCl 100 mL IVPB         -- IV Every 48 hours (non-standard times) 12/22/24 0413    12/20/24 1438  gentamicin - pharmacy to dose  (gentamicin IVPB (PEDS and ADULTS))        Placed in "And" Linked Group    -- IV pharmacy to manage frequency 12/20/24 1338    12/17/24 0900  meropenem injection 1 g         12/26/24 2359 IV Every 8 hours (non-standard times) 12/17/24 0757          Latest lactate reviewed-  No results for input(s): "LACTATE", "POCLAC" in the last 72 hours.    Organ dysfunction indicated by Acute respiratory failure and Encephalopathy    Fluid challenge Ideal Body Weight- The patient's ideal body weight is Ideal body weight: 66.1 kg (145 lb 11.6 oz) which will be used to calculate fluid bolus of 30 ml/kg for treatment of septic shock.      Post- resuscitation assessment Yes Perfusion exam was performed within 6 hours of septic shock presentation after bolus shows Inadequate tissue perfusion assessed by non-invasive monitoring       Will Start Pressors- Levophed for MAP of 65  Source control achieved by: iv zosym. Vanc, ivfs  12/17 wean levophed as tolerates- abxs changed to merrem, " cotn vanc - await culture final reports  12/18 wbc slightly imrpoved - cont iv abxs- await final sputum culture - on merrem fro esbl kleb in urine  12/19 cont iv merrem for esbl kleb in urine and proteus in sputum - dc vanc  12/20 wbc improvign - cotn iv merrem  12/24 wbc normal - cont iv abxs  12/25 FM:  Cont GENT.  12/26 FM:  Cont GENT, prognosis poor, pulm/gu/skin infections.    Hypokalemia  Patient's most recent potassium results are listed below.   Recent Labs     12/24/24  0413 12/25/24  0345 12/26/24  0500   K 5.1 4.7 4.5       Plan  - Replete potassium per protocol  - Monitor potassium Daily  - Patient's hypokalemia is stable, continue below and monitor  12/20 increase pot bicarb to bid  12/23 decrease pot bicard to daily dosing  12/24 improved- stop potassium bicarb repalcement - monitor    Severe malnutrition  Nutrition consulted. Most recent weight and BMI monitored-     Measurements:  Wt Readings from Last 1 Encounters:   12/16/24 68.5 kg (151 lb 0.2 oz)   Body mass index is 23.65 kg/m².    Patient has been screened and assessed by RD.    Malnutrition Type:  Context:    Level:      Malnutrition Characteristic Summary:       Interventions/Recommendations (treatment strategy):  1. Rec'd Continuous EN: Glucerna 1.5 @ 40mL/r increasing by 5 mL q6hrs to goal rate of 55mL/hr with a continuous 40mL FWF to provide 1980kcal (94% EEN), 109g of protein (100% EPN), and 1962mL FW.   2. Néstor BID via PEG tube to promote wound healing and to provide additional nutrition.           - Do not mix Néstor with formula in a tube-feeding bag         - Pour one packet of Néstor in a clean, small container for mixing.           - Add 4 fl oz (120 mL) water at room temperature.           - Mix well with disposable spoon or tongue blade until all particles are completely hydrated.           - Verify correct tube placement.           - Flush feeding tube with 30 mL water.           -Administer Néstor through feeding tube using a  60-mL or larger syringe.           - Flush with an additional 30 mL water.  3. Rec'd ClinimixE 5/20 @ 80mL/hr to provide 1690kcal (80% EEN), 96g of protein (88% EPN), and 1920mL TFV.      -Add 250mL of 20% lipids 3x/week to provide additional calories.      -Check Triglycerides before adding lipids. 4. RD to follow and make rec's accordingly.    12/18 restart tfs today at 1ml/hr to see if can tolerate feeds  12/19 increase tfs as tolerates  12/23 not tolerating tfs- will get tpn orders and start that until can tolerate tfs better - ncrease regaln 10 mg iv q 6hrs  12/24 con ttfs as tolerates - started on tpn for further nutritional support  12/25 FM:  Check KUB.  12/26 FM:  Resume TF today, monitor.    Hypomagnesemia  Patient has Abnormal Magnesium: hypomagnesemia. Will continue to monitor electrolytes closely. Will replace the affected electrolytes and repeat labs to be done after interventions completed. The patient's magnesium results have been reviewed and are listed below.  Recent Labs   Lab 12/26/24  0500   MG 1.6      12/17 mag imrpoving - repalce mag today  12/19 replace mag today  12/20 mag oxide bid  12/21 2g Mg  12/23 dose of iv mag today to keep mag level about 2  12/25 FM:  Replace, recheck.    Pneumonia of right lower lobe due to infectious organism  Patient has a diagnosis of pneumonia. The cause of the pneumonia is suspected to be bacterial in etiology but organism is not known. The pneumonia is stable. The patient has the following signs/symptoms of pneumonia: persistent hypoxia  and sputum production. The patient does have a current oxygen requirement and the patient does not have a home oxygen requirement. I have reviewed the pertinent imaging. The following cultures have been collected: Blood cultures and Sputum culture The culture results are listed below.     Current antimicrobial regimen consists of the antibiotics listed below. Will monitor patient closely and continue current treatment plan  "unchanged.    Antibiotics (From admission, onward)      Start     Stop Route Frequency Ordered    12/22/24 1600  gentamicin (GARAMYCIN) 462.8 mg in 0.9% NaCl 100 mL IVPB         -- IV Every 48 hours (non-standard times) 12/22/24 0413    12/20/24 1438  gentamicin - pharmacy to dose  (gentamicin IVPB (PEDS and ADULTS))        Placed in "And" Linked Group    -- IV pharmacy to manage frequency 12/20/24 1338    12/17/24 0900  meropenem injection 1 g         12/26/24 2359 IV Every 8 hours (non-standard times) 12/17/24 0757            Microbiology Results (last 7 days)       Procedure Component Value Units Date/Time    Culture, Respiratory with Gram Stain [[id number]]  (Abnormal)  (Susceptibility) Collected: 12/15/24 0935    Order Status: Completed Specimen: Respiratory from Endotracheal Aspirate Updated: 12/23/24 1204     Respiratory Culture No S aureus or Pseudomonas isolated.      ACINETOBACTER BAUMANNII   Many        PROTEUS MIRABILIS  Few        KLEBSIELLA PNEUMONIAE   Few  ESBL        Gram Stain (Respiratory) Few WBC's     Gram Stain (Respiratory) Many Gram positive rods     Gram Stain (Respiratory) Few Gram negative cocci    Blood culture x two cultures. Draw prior to antibiotics. [[id number]] Collected: 12/15/24 0942    Order Status: Completed Specimen: Blood from Peripheral, Antecubital, Left Updated: 12/20/24 1612     Blood Culture, Routine No growth after 5 days.    Narrative:      Aerobic and anaerobic    Blood culture x two cultures. Draw prior to antibiotics. [[id number]] Collected: 12/15/24 0923    Order Status: Completed Specimen: Blood from Peripheral, Forearm, Right Updated: 12/20/24 1612     Blood Culture, Routine No growth after 5 days.    Narrative:      Aerobic and anaerobic        12/17 dc zosyn with recent esbl kleb in urine - will change to merrem - cont vanc until final cultures obtained - change vent to simv; add nebs  12/18 cotn merrem and vanc - await final sputum cutlure - cont " vent on ac control; nebs - wbc imrpoved slightly; lasix 20mg iv for edema today  12/19  dc vanc - proteus grwoign from sputum - cont merrem and nebs; lasix today - wean rr on vent  12/20 wnc improved - cont merrem - nebs and vent - another dose of lasix today for edema - fio2 increased  12/23 cont iv gent and merren due to sent of multiple bugs (acinetobacter/kleb/proteus)  12/24 cont current abxs  12/25 FM:  Cont GENT  12/26 FM:  Cont GENT, poor prognosis.    Sacral wound, sequela  Local wound care with dakins bid  Iv abxs      Open wound of groin  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  Recent Labs     12/25/24  0345 12/26/24  0412 12/26/24  0510   HGB 7.3* 6.3* 6.7*   HCT 23.4* 22.9* 21.3*       Plan  - Monitor serial CBC: Daily  - Transfuse PRBC if patient becomes hemodynamically unstable, symptomatic or H/H drops below 7/21.  - Patient has not received any PRBC transfusions to date  - Patient's anemia is currently worsening. Will adjust treatment as follows: 2uprbcs today  12/17 h/h Improved  12/20 h/h holding  12/26 FM:  Transfuse today.    History of anoxic brain injury  12/25 FM:  Poor prognosis.      Urinary tract infection associated with indwelling urethral catheter  Await new urine culture =- currently on zosyn/vanc  12/17 change to merrem/vanc  12/18 has esbl klebsiella - cont merrem  12/23 on merrem  12/25 FM:  Continue Gent, CS reviewed.  12/26 FM:  WBC increasing, monitor, on appropriate abx based on cultures.    Type 1 diabetes  Patient's FSGs are uncontrolled due to hyperglycemia on current medication regimen.  Last A1c reviewed-   Lab Results   Component Value Date    HGBA1C 6.6 (H) 12/16/2024     Most recent fingerstick glucose reviewed-   Recent Labs   Lab 12/25/24  1654 12/25/24  1925 12/25/24  2033 12/25/24  2340   POCTGLUCOSE 378* 356*  363* 323*       Current correctional scale  Low  Maintain anti-hyperglycemic dose as follows-   Antihyperglycemics (From admission, onward)      Start     Stop Route Frequency Ordered    12/24/24 0900  insulin glargine U-100 (Lantus) pen 12 Units         -- SubQ Daily 12/24/24 0701    12/24/24 0807  insulin aspart U-100 pen 0-10 Units         -- SubQ Every 4 hours PRN 12/24/24 0707          Hold Oral hypoglycemics while patient is in the hospital.  12/17 A1c improved to 6.6%  12/24 due to tpn - add lantus 12 u daily - ssi changed to moderate scale  12/25 FM:  BS logs noted, continue coverage.  12/26 FM:  Cont SSI.      VTE Risk Mitigation (From admission, onward)           Ordered     enoxaparin injection 40 mg  Daily         12/16/24 0815     IP VTE LOW RISK PATIENT  Once         12/15/24 1201     Place sequential compression device  Until discontinued         12/15/24 1201                    Discharge Planning   JOSE MANUEL:      Code Status: Full Code   Medical Readiness for Discharge Date:   Discharge Plan A: Other (TBD)   Discharge Delays: None known at this time                    Khari Norris III, MD  Department of Hospital Medicine   [place] [facility]

## 2024-12-26 NOTE — PROGRESS NOTES
[facility] - Intensive Care  Adult Nutrition  Progress Note    SUMMARY       Recommendations  1. Rec'd Continuous EN: Glucerna 1.5 @ 40mL/r increasing by 5 mL q6hrs to goal rate of 55mL/hr with a continuous 40mL FWF to provide 1980kcal (94% EEN), 109g of protein (100% EPN), and 1962mL FW.     2. Néstor BID via PEG tube to promote wound healing and to provide additional nutrition.             - Do not mix Néstor with formula in a tube-feeding bag           - Pour one packet of Néstor in a clean, small container for mixing.             - Add 4 fl oz (120 mL) water at room temperature.             - Mix well with disposable spoon or tongue blade until all particles are completely hydrated.             - Verify correct tube placement.             - Flush feeding tube with 30 mL water.             -Administer Néstor through feeding tube using a 60-mL or larger syringe.             - Flush with an additional 30 mL water.    3. Rec'd ClinimixE 5/20 @ 80mL/hr to provide 1690kcal (80% EEN), 96g of protein (88% EPN), and 1920mL TFV.        -Add 250mL of 20% lipids 3x/week to provide additional calories.        -Check Triglycerides before adding lipids.   4. RD to follow and make rec's accordingly.  Goals:   1. Pt will be started on EN by next RD follow up.     2. Pt will reach TF goal rate within 48hrs of initiation.   Nutrition Goal Status: goal not met, goal met  Communication of RD Recs: other (Documented in POC)      Assessment and Plan     Nutrition Problem  Inadequate oral intake     Related to (etiology):   NPO/Intubation Status     Signs and Symptoms (as evidenced by):   0% PO intake      Interventions/Recommendations (treatment strategy):  1. Rec'd Continuous EN: Glucerna 1.5 @ 40mL/r increasing by 5 mL q6hrs to goal rate of 55mL/hr with a continuous 40mL FWF to provide 1980kcal (94% EEN), 109g of protein (100% EPN), and 1962mL FW.   2. Néstor BID via PEG tube to promote wound healing and to provide additional nutrition.     "       - Do not mix Néstor with formula in a tube-feeding bag         - Pour one packet of Néstor in a clean, small container for mixing.           - Add 4 fl oz (120 mL) water at room temperature.           - Mix well with disposable spoon or tongue blade until all particles are completely hydrated.           - Verify correct tube placement.           - Flush feeding tube with 30 mL water.           -Administer Néstor through feeding tube using a 60-mL or larger syringe.           - Flush with an additional 30 mL water.   3. RD to follow and make rec's accordingly.     Nutrition Diagnosis Status:   Continues     Malnutrition Assessment  NFPE not warranted at this time. RD to continue to monitor for signs and symptoms of malnutrition.     Nutrition Related Social Determinants of Health:  None identified at this time.    Reason for Assessment    Reason For Assessment: RD follow-up  Diagnosis: infection/sepsis  General Information Comments: Followed up on pt this morning. Pt remains intubated on a ventilatetor. Pt is still not tolerating TF. Pt is however tolerating TPN @ 80mL/hr. Continue TPN. RD to follow and make rec's accordingly.  Nutrition Discharge Planning: TBD as care progresses    Nutrition Risk Screen    Nutrition Risk Screen: tube feeding or parenteral nutrition    Nutrition/Diet History    Patient Reported Diet/Restrictions/Preferences: no oral intake  Spiritual, Cultural Beliefs, [employer] Practices, Values that Affect Care: no  Food Allergies: NKFA  Factors Affecting Nutritional Intake: NPO  Nutrition Support Formula Prior to Admit: Glucerna 1.5    Anthropometrics    Temp: 97.2 °F (36.2 °C)  Height: 5' 7" (170.2 cm)  Height (inches): 67 in  Weight Method: Bed Scale  Weight: 68.5 kg (151 lb 0.2 oz)  Weight (lb): 151.02 lb  Ideal Body Weight (IBW), Male: 148 lb  % Ideal Body Weight, Male (lb): 102.04 %  BMI (Calculated): 23.6  BMI Grade: 18.5-24.9 - normal    Lab/Procedures/Meds    Pertinent Labs Reviewed: " reviewed  Pertinent Medications Reviewed: reviewed    Estimated/Assessed Needs    Weight Used For Calorie Calculations: 68.5 kg (151 lb 0.2 oz)  Energy Calorie Requirements (kcal): 2096  Energy Need Method: [facility]  Protein Requirements: 109-137 (1.6-2.0g/kg)  Weight Used For Protein Calculations: 68.5 kg (151 lb 0.2 oz)  Fluid Requirements (mL): 2096 (1mL/kcal)  Estimated Fluid Requirement Method: RDA Method  RDA Method (mL): 2096    Nutrition Prescription Ordered    Current Diet Order: NPO  Current Nutrition Support Formula Ordered: Clinimix E 5/20  Current Nutrition Support Rate Ordered: 80 (ml)  Current Nutrition Support Frequency Ordered: Continuous  Oral Nutrition Supplement: Néstor via PEG    Evaluation of Received Nutrient/Fluid Intake    Enteral Calories (kcal): 0  Enteral Protein (gm): 0  Enteral (Free Water) Fluid (mL): 0  Parenteral Calories (kcal): 1690  Parenteral Protein (gm): 96  Parenteral Fluid (mL): 1920  Other Calories (kcal): 164 (Propofol infusing @ 6.2mL/hr)  Total Calories (kcal): 164  % Kcal Needs: 80%  % Protein Needs: 88%  I/O: + 820.9  Energy Calories Required: meeting needs  Protein Required: meeting needs  Tolerance: tolerating  % Intake of Estimated Energy Needs: 75 - 100 %  % Meal Intake: NPO    Nutrition Risk    Level of Risk/Frequency of Follow-up: moderate     Monitor and Evaluation    Food and Nutrient Intake: enteral nutrition intake, parenteral nutrition intake  Food and Nutrient Adminstration: enteral and parenteral nutrition administration  Knowledge/Beliefs/Attitudes: beliefs and attitudes, food and nutrition knowledge/skill  Physical Activity and Function: nutrition-related ADLs and IADLs  Anthropometric Measurements: height/length, weight, weight change, body mass index  Biochemical Data, Medical Tests and Procedures: electrolyte and renal panel, gastrointestinal profile, glucose/endocrine profile, inflammatory profile, lipid profile  Nutrition-Focused Physical Findings:  overall appearance     Nutrition Follow-Up    RD Follow-up?: Yes

## 2024-12-26 NOTE — ASSESSMENT & PLAN NOTE
"This patient does have evidence of infective focus  My overall impression is septic shock due to MAP < 65 and SBP < 90.  Source: Respiratory and Urinary Tract  Antibiotics given-   Antibiotics (72h ago, onward)      Start     Stop Route Frequency Ordered    12/22/24 1600  gentamicin (GARAMYCIN) 462.8 mg in 0.9% NaCl 100 mL IVPB         -- IV Every 48 hours (non-standard times) 12/22/24 0413    12/20/24 1438  gentamicin - pharmacy to dose  (gentamicin IVPB (PEDS and ADULTS))        Placed in "And" Linked Group    -- IV pharmacy to manage frequency 12/20/24 1338    12/17/24 0900  meropenem injection 1 g         12/26/24 2359 IV Every 8 hours (non-standard times) 12/17/24 0757          Latest lactate reviewed-  No results for input(s): "LACTATE", "POCLAC" in the last 72 hours.    Organ dysfunction indicated by Acute respiratory failure and Encephalopathy    Fluid challenge Ideal Body Weight- The patient's ideal body weight is Ideal body weight: 66.1 kg (145 lb 11.6 oz) which will be used to calculate fluid bolus of 30 ml/kg for treatment of septic shock.      Post- resuscitation assessment Yes Perfusion exam was performed within 6 hours of septic shock presentation after bolus shows Inadequate tissue perfusion assessed by non-invasive monitoring       Will Start Pressors- Levophed for MAP of 65  Source control achieved by: iv zosym. Vanc, ivfs  12/17 wean levophed as tolerates- abxs changed to merrem, cotn vanc - await culture final reports  12/18 wbc slightly imrpoved - cont iv abxs- await final sputum culture - on merrem fro esbl kleb in urine  12/19 cont iv merrem for esbl kleb in urine and proteus in sputum - dc vanc  12/20 wbc improvign - cotn iv merrem  12/24 wbc normal - cont iv abxs  12/25 FM:  Cont GENT.  12/26 FM:  Cont GENT, prognosis poor, pulm/gu/skin infections.  "

## 2024-12-26 NOTE — ASSESSMENT & PLAN NOTE
Patient's most recent potassium results are listed below.   Recent Labs     12/24/24  0413 12/25/24  0345 12/26/24  0500   K 5.1 4.7 4.5       Plan  - Replete potassium per protocol  - Monitor potassium Daily  - Patient's hypokalemia is stable, continue below and monitor  12/20 increase pot bicarb to bid  12/23 decrease pot bicard to daily dosing  12/24 improved- stop potassium bicarb repalcement - monitor

## 2024-12-26 NOTE — PLAN OF CARE
Problem: Mechanical Ventilation Invasive  Goal: Optimal Nutrition Delivery  Outcome: Progressing     Problem: Skin Injury Risk Increased  Goal: Skin Health and Integrity  Outcome: Not Progressing     Problem: Mechanical Ventilation Invasive  Goal: Effective Communication  Outcome: Not Progressing     Problem: Wound  Goal: Optimal Wound Healing  Outcome: Not Progressing     Problem: Wound  Goal: Improved Oral Intake  Outcome: Unable to Meet

## 2024-12-26 NOTE — PLAN OF CARE
Recommendations  1. Rec'd Continuous EN: Glucerna 1.5 @ 40mL/r increasing by 5 mL q6hrs to goal rate of 55mL/hr with a continuous 40mL FWF to provide 1980kcal (94% EEN), 109g of protein (100% EPN), and 1962mL FW.     2. Néstor BID via PEG tube to promote wound healing and to provide additional nutrition.             - Do not mix Néstor with formula in a tube-feeding bag           - Pour one packet of Néstor in a clean, small container for mixing.             - Add 4 fl oz (120 mL) water at room temperature.             - Mix well with disposable spoon or tongue blade until all particles are completely hydrated.             - Verify correct tube placement.             - Flush feeding tube with 30 mL water.             -Administer Néstor through feeding tube using a 60-mL or larger syringe.             - Flush with an additional 30 mL water.    3. Rec'd ClinimixE 5/20 @ 80mL/hr to provide 1690kcal (80% EEN), 96g of protein (88% EPN), and 1920mL TFV.        -Add 250mL of 20% lipids 3x/week to provide additional calories.        -Check Triglycerides before adding lipids.   4. RD to follow and make rec's accordingly.  Goals:   1. Pt will be started on EN by next RD follow up.     2. Pt will reach TF goal rate within 48hrs of initiation.   Nutrition Goal Status: goal not met, goal met

## 2024-12-26 NOTE — ASSESSMENT & PLAN NOTE
Anemia is likely due to chronic infections Most recent hemoglobin and hematocrit are listed below.  Recent Labs     12/25/24  0345 12/26/24  0412 12/26/24  0510   HGB 7.3* 6.3* 6.7*   HCT 23.4* 22.9* 21.3*       Plan  - Monitor serial CBC: Daily  - Transfuse PRBC if patient becomes hemodynamically unstable, symptomatic or H/H drops below 7/21.  - Patient has not received any PRBC transfusions to date  - Patient's anemia is currently worsening. Will adjust treatment as follows: 2uprbcs today  12/17 h/h Improved  12/20 h/h holding  12/26 FM:  Transfuse today.

## 2024-12-26 NOTE — PLAN OF CARE
Care plan reviewed and ongoing. Cont with vent and abx. KUB completed. Mag replaced. Levophed weaned off. Rojas and Ileostomy intact

## 2024-12-26 NOTE — SUBJECTIVE & OBJECTIVE
Interval History: Patient seen and examined.     Review of Systems   Unable to perform ROS: Acuity of condition     Objective:     Vital Signs (Most Recent):  Temp: 97.2 °F (36.2 °C) (12/26/24 0701)  Pulse: 85 (12/26/24 0530)  Resp: 17 (12/26/24 0530)  BP: (!) 88/51 (12/26/24 0530)  SpO2: 100 % (12/26/24 0530) Vital Signs (24h Range):  Temp:  [96.5 °F (35.8 °C)-97.3 °F (36.3 °C)] 97.2 °F (36.2 °C)  Pulse:  [75-94] 85  Resp:  [14-20] 17  SpO2:  [96 %-100 %] 100 %  BP: ()/(51-73) 88/51     Weight: 68.5 kg (151 lb 0.2 oz)  Body mass index is 23.65 kg/m².    Intake/Output Summary (Last 24 hours) at 12/26/2024 0722  Last data filed at 12/26/2024 0445  Gross per 24 hour   Intake 2719.93 ml   Output 925 ml   Net 1794.93 ml         Physical Exam  Constitutional:       Appearance: He is ill-appearing.      Comments: Thin male; intubated   HENT:      Mouth/Throat:      Mouth: Mucous membranes are moist.   Eyes:      Pupils: Pupils are equal, round, and reactive to light.   Cardiovascular:      Rate and Rhythm: Normal rate and regular rhythm.      Heart sounds: Normal heart sounds.   Pulmonary:      Effort: Prolonged expiration present.      Breath sounds: Transmitted upper airway sounds present. Rhonchi present.      Comments: Breath sounds at lung bases bilaterally   Abdominal:      General: There is no distension.      Palpations: Abdomen is soft. There is no mass.      Tenderness: There is no abdominal tenderness.      Comments: Jtube noted; ileostomy noted   Genitourinary:     Comments: Rojas with yellow urine  Lymphadenopathy:      Cervical: No cervical adenopathy.   Skin:     General: Skin is warm and dry.      Comments: Wounds to sacrum; groin area             Significant Labs: All pertinent labs within the past 24 hours have been reviewed.  ABGs:   Recent Labs   Lab 12/25/24  0507 12/26/24  0509   PH 7.395 7.360   PCO2 47.4* 49.9*   HCO3 27.7 26.4   POCSATURATED 99.8 85.5*   PO2 129* 52.2*     Bilirubin:    Recent Labs   Lab 12/08/24  1301 12/09/24  0528 12/22/24  0308 12/23/24  0343 12/24/24  0413 12/25/24  0345 12/26/24  0500   BILIDIR 0.3  --   --   --   --   --   --    BILITOT 0.5   < > 0.3 0.2 0.2 0.2 0.2    < > = values in this interval not displayed.      Recent Labs   Lab 12/26/24  0500   *   *   K 4.5      CO2 29   BUN 27*   CREATININE 1.0   CALCIUM 10.7*   MG 1.6     CBC:   Recent Labs   Lab 12/25/24  0345 12/26/24  0412 12/26/24  0510   WBC 14.55* 17.38* 17.74*   HGB 7.3* 6.3* 6.7*   HCT 23.4* 22.9* 21.3*    170 171     CMP:   Recent Labs   Lab 12/25/24  0345 12/26/24  0500   * 131*   K 4.7 4.5    102   CO2 31* 29   * 265*   BUN 18 27*   CREATININE 1.0 1.0   CALCIUM 10.2 10.7*   PROT 5.5* 5.0*   ALBUMIN <0.6* <0.6*   BILITOT 0.2 0.2   ALKPHOS 386* 326*   AST 18 18   ALT 6* <6*   ANIONGAP -2* 0*     Magnesium:   Recent Labs   Lab 12/25/24  0345 12/26/24  0500   MG 1.4* 1.6     Significant Imaging: I have reviewed all pertinent imaging results/findings within the past 24 hours.

## 2024-12-26 NOTE — ASSESSMENT & PLAN NOTE
Patient's FSGs are uncontrolled due to hyperglycemia on current medication regimen.  Last A1c reviewed-   Lab Results   Component Value Date    HGBA1C 6.6 (H) 12/16/2024     Most recent fingerstick glucose reviewed-   Recent Labs   Lab 12/25/24  1654 12/25/24  1925 12/25/24  2033 12/25/24  2340   POCTGLUCOSE 378* 356* 363* 323*       Current correctional scale  Low  Maintain anti-hyperglycemic dose as follows-   Antihyperglycemics (From admission, onward)      Start     Stop Route Frequency Ordered    12/24/24 0900  insulin glargine U-100 (Lantus) pen 12 Units         -- SubQ Daily 12/24/24 0701    12/24/24 0807  insulin aspart U-100 pen 0-10 Units         -- SubQ Every 4 hours PRN 12/24/24 0707          Hold Oral hypoglycemics while patient is in the hospital.  12/17 A1c improved to 6.6%  12/24 due to tpn - add lantus 12 u daily - ssi changed to moderate scale  12/25 FM:  BS logs noted, continue coverage.  12/26 FM:  Cont SSI.

## 2024-12-26 NOTE — ASSESSMENT & PLAN NOTE
Nutrition consulted. Most recent weight and BMI monitored-     Measurements:  Wt Readings from Last 1 Encounters:   12/16/24 68.5 kg (151 lb 0.2 oz)   Body mass index is 23.65 kg/m².    Patient has been screened and assessed by RD.    Malnutrition Type:  Context:    Level:      Malnutrition Characteristic Summary:       Interventions/Recommendations (treatment strategy):  1. Rec'd Continuous EN: Glucerna 1.5 @ 40mL/r increasing by 5 mL q6hrs to goal rate of 55mL/hr with a continuous 40mL FWF to provide 1980kcal (94% EEN), 109g of protein (100% EPN), and 1962mL FW.   2. Néstor BID via PEG tube to promote wound healing and to provide additional nutrition.           - Do not mix Néstor with formula in a tube-feeding bag         - Pour one packet of Néstor in a clean, small container for mixing.           - Add 4 fl oz (120 mL) water at room temperature.           - Mix well with disposable spoon or tongue blade until all particles are completely hydrated.           - Verify correct tube placement.           - Flush feeding tube with 30 mL water.           -Administer Néstor through feeding tube using a 60-mL or larger syringe.           - Flush with an additional 30 mL water.  3. Rec'd ClinimixE 5/20 @ 80mL/hr to provide 1690kcal (80% EEN), 96g of protein (88% EPN), and 1920mL TFV.      -Add 250mL of 20% lipids 3x/week to provide additional calories.      -Check Triglycerides before adding lipids. 4. RD to follow and make rec's accordingly.    12/18 restart tfs today at 1ml/hr to see if can tolerate feeds  12/19 increase tfs as tolerates  12/23 not tolerating tfs- will get tpn orders and start that until can tolerate tfs better - ncrease regaln 10 mg iv q 6hrs  12/24 con ttfs as tolerates - started on tpn for further nutritional support  12/25 FM:  Check KUB.  12/26 FM:  Resume TF today, monitor.

## 2024-12-26 NOTE — ASSESSMENT & PLAN NOTE
Await new urine culture =- currently on zosyn/vanc  12/17 change to merrem/vanc  12/18 has esbl klebsiella - cont merrem  12/23 on merrem  12/25 FM:  Continue Gent, CS reviewed.  12/26 FM:  WBC increasing, monitor, on appropriate abx based on cultures.

## 2024-12-26 NOTE — EICU
eICU Physician Virtual/Remote Brief Evaluation Note      VENTILATOR REVIEW    Chest x-ray with endotracheal tube approximately 4 cm from jens dense left lower lobe consolidation  Weighted feeding tube with tip overlying body of stomach  ABG at 5:00 a.m. pH 7.39, pCO2 47, PO2 129, bicarb 27, O2 sat 99 on ventilator 400, peep 5, 30%  BP 93/55, P 79, R 17, O2 sat 100  Sedated on ventilator 400/12/5/30%, peak pressure 23   Continue current ventilator settings        NICKY Rebollar MD  eICU Attending  [phone]    This report has been created through the use of [employer] ([employer])-[employer] dictation software. Typographical and content errors may occur with this process. While efforts are made to detect and correct such errors, in some cases errors will persist. For this reason, wording in this document should be considered in the proper context and not strictly verbatim

## 2024-12-26 NOTE — EICU
Intervention Initiated From:  [facility] / EICU    Sandy intervened regarding:  Rounding (Video assessment)    Nurse Notified:  No    Doctor Notified:  No    Comments: Rounding done. Remains intubated on vent 30%, +5 PEEP. On TPN @ 80/hour. B/P 93/53, HR 79, Resp  17, sat 100. Side rails up x4

## 2024-12-26 NOTE — ASSESSMENT & PLAN NOTE
"Patient has a diagnosis of pneumonia. The cause of the pneumonia is suspected to be bacterial in etiology but organism is not known. The pneumonia is stable. The patient has the following signs/symptoms of pneumonia: persistent hypoxia  and sputum production. The patient does have a current oxygen requirement and the patient does not have a home oxygen requirement. I have reviewed the pertinent imaging. The following cultures have been collected: Blood cultures and Sputum culture The culture results are listed below.     Current antimicrobial regimen consists of the antibiotics listed below. Will monitor patient closely and continue current treatment plan unchanged.    Antibiotics (From admission, onward)      Start     Stop Route Frequency Ordered    12/22/24 1600  gentamicin (GARAMYCIN) 462.8 mg in 0.9% NaCl 100 mL IVPB         -- IV Every 48 hours (non-standard times) 12/22/24 0413    12/20/24 1438  gentamicin - pharmacy to dose  (gentamicin IVPB (PEDS and ADULTS))        Placed in "And" Linked Group    -- IV pharmacy to manage frequency 12/20/24 1338    12/17/24 0900  meropenem injection 1 g         12/26/24 2359 IV Every 8 hours (non-standard times) 12/17/24 0757            Microbiology Results (last 7 days)       Procedure Component Value Units Date/Time    Culture, Respiratory with Gram Stain [[id number]]  (Abnormal)  (Susceptibility) Collected: 12/15/24 0935    Order Status: Completed Specimen: Respiratory from Endotracheal Aspirate Updated: 12/23/24 1204     Respiratory Culture No S aureus or Pseudomonas isolated.      ACINETOBACTER BAUMANNII   Many        PROTEUS MIRABILIS  Few        KLEBSIELLA PNEUMONIAE   Few  ESBL        Gram Stain (Respiratory) Few WBC's     Gram Stain (Respiratory) Many Gram positive rods     Gram Stain (Respiratory) Few Gram negative cocci    Blood culture x two cultures. Draw prior to antibiotics. [[id number]] Collected: 12/15/24 0942    Order Status: Completed " Specimen: Blood from Peripheral, Antecubital, Left Updated: 12/20/24 1612     Blood Culture, Routine No growth after 5 days.    Narrative:      Aerobic and anaerobic    Blood culture x two cultures. Draw prior to antibiotics. [[id number]] Collected: 12/15/24 0923    Order Status: Completed Specimen: Blood from Peripheral, Forearm, Right Updated: 12/20/24 1612     Blood Culture, Routine No growth after 5 days.    Narrative:      Aerobic and anaerobic        12/17 dc zosyn with recent esbl kleb in urine - will change to merrem - cont vanc until final cultures obtained - change vent to simv; add nebs  12/18 cotn merrem and vanc - await final sputum cutlure - cont vent on ac control; nebs - wbc imrpoved slightly; lasix 20mg iv for edema today  12/19  dc vanc - proteus grwoign from sputum - cont merrem and nebs; lasix today - wean rr on vent  12/20 wnc improved - cont merrem - nebs and vent - another dose of lasix today for edema - fio2 increased  12/23 cont iv gent and merren due to sent of multiple bugs (acinetobacter/kleb/proteus)  12/24 cont current abxs  12/25 FM:  Cont GENT  12/26 FM:  Cont GENT, poor prognosis.

## 2024-12-26 NOTE — PROGRESS NOTES
"Pharmacokinetic Follow Up: Gentamicin    Assessment of levels:   Random concentration of 3.0 mcg/mL (47 hours of post-infusion) falls outside of the dosing recommendations of the [employer], this patient should receive traditional dosing of Gentamicin    Regimen Plan:   Will obtain a random levels at 1500 on 12/27/24 to help guide transition to traditional dosing regimen.     Drug levels (last 3 results):  No results for input(s): "AMIKACINPEAK", "AMIKACINTROU", "AMIKACINRAND", "AMIKACIN" in the last 72 hours.    Recent Labs   Lab Result Units 12/26/24  1458   Gentamicin, Trough ug/mL 3.0*       No results for input(s): "TOBRA8", "TOBRA10", "TOBRA12", "TOBRARND", "TOBRAMYCIN", "TOBRAPEAK", "TOBRATROUGH", "TOBRAMYCINPE", "TOBRAMYCINRA", "TOBRAMYCINTR" in the last 72 hours.    Pharmacy will continue to monitor.    Please contact pharmacy at extension [phone] with any questions regarding this assessment.    Thank you for the consult,   Bekah Garay      Patient brief summary:  Carlos Chahal is a 34 y.o. male initiated on aminoglycoside therapy for treatment of  pneumonia.    Drug Allergies:   Review of patient's allergies indicates:   Allergen Reactions    Guaifenesin Hives    Codeine Itching       Actual Body Weight:   68.5 kg    Adjust Body Weight:   67.1 kg    Ideal Body Weight:  66.1 kg    Renal Function:   Estimated Creatinine Clearance: 98.2 mL/min (based on SCr of 1 mg/dL).,     Dialysis Method (if applicable):  N/A    CBC (last 72 hours):  Recent Labs   Lab Result Units 12/24/24  0413 12/25/24  0345 12/26/24  0412 12/26/24  0510   WBC K/uL 10.37 14.55* 17.38* 17.74*   Hemoglobin g/dL 7.6* 7.3* 6.3* 6.7*   Hematocrit % 25.1* 23.4* 22.9* 21.3*   Platelets K/uL 253 232 170 171   Gran % % 72.1 74.6* 79.3*  --    Lymph % % 12.1* 8.9* 6.7*  --    Mono % % 6.1 7.4 6.4  --    Eosinophil % % 8.0 7.1 5.6  --    Basophil % % 0.4 0.4 0.4  --    Differential Method  Automated Automated Automated  --        Metabolic " Panel (last 72 hours):  Recent Labs   Lab Result Units 12/24/24  0004 12/24/24  0413 12/25/24  0345 12/26/24  0500   Sodium mmol/L  --  134* 133* 131*   Potassium mmol/L  --  5.1 4.7 4.5   Chloride mmol/L  --  105 104 102   CO2 mmol/L  --  28 31* 29   Glucose mg/dL 452* 460* 297* 265*   BUN mg/dL  --  12 18 27*   Creatinine mg/dL  --  1.1 1.0 1.0   Albumin g/dL  --  <0.6* <0.6* <0.6*   Total Bilirubin mg/dL  --  0.2 0.2 0.2   Alkaline Phosphatase U/L  --  461* 386* 326*   AST U/L  --  23 18 18   ALT U/L  --  10 6* <6*   Magnesium mg/dL  --  1.7 1.4* 1.6       Aminoglycoside Administrations:  aminoglycosides given in last 96 hours                     gentamicin (GARAMYCIN) 462.8 mg in 0.9% NaCl 100 mL IVPB (mg) 462.8 mg New Bag 12/24/24 1621     462.8 mg New Bag 12/22/24 1653                    Microbiologic Results:  Microbiology Results (last 7 days)       Procedure Component Value Units Date/Time    Culture, Respiratory with Gram Stain [[id number]]  (Abnormal)  (Susceptibility) Collected: 12/15/24 0935    Order Status: Completed Specimen: Respiratory from Endotracheal Aspirate Updated: 12/23/24 1204     Respiratory Culture No S aureus or Pseudomonas isolated.      ACINETOBACTER BAUMANNII   Many        PROTEUS MIRABILIS  Few        KLEBSIELLA PNEUMONIAE   Few  ESBL        Gram Stain (Respiratory) Few WBC's     Gram Stain (Respiratory) Many Gram positive rods     Gram Stain (Respiratory) Few Gram negative cocci    Blood culture x two cultures. Draw prior to antibiotics. [[id number]] Collected: 12/15/24 0942    Order Status: Completed Specimen: Blood from Peripheral, Antecubital, Left Updated: 12/20/24 1612     Blood Culture, Routine No growth after 5 days.    Narrative:      Aerobic and anaerobic    Blood culture x two cultures. Draw prior to antibiotics. [[id number]] Collected: 12/15/24 0923    Order Status: Completed Specimen: Blood from Peripheral, Forearm, Right Updated: 12/20/24 1612     Blood  Culture, Routine No growth after 5 days.    Narrative:      Aerobic and anaerobic

## 2024-12-27 PROBLEM — R60.9 EDEMA: Status: ACTIVE | Noted: 2024-12-27

## 2024-12-27 NOTE — CARE UPDATE
12/27/24 0555   PRE-TX-O2   Device (Oxygen Therapy) ventilator   $ Is the patient on High Flow Oxygen? Yes   Oxygen Concentration (%) (S)  50         Per EICU Dr Rebollar, and Sara WADDELL from abg results, FiO2 raised to 50%

## 2024-12-27 NOTE — EICU
eICU Intervention    ABG 7.37/48/51  On VAC 12/400/40%/5 PEEP  Adequately sedated on propofol  Ongoing TPN    FiO2 to be increased to 50%  CXR pending

## 2024-12-27 NOTE — NURSING
Late entry note- At 0909,Notified Dr Diamond of very high residual amount, 1000mls total and dark brown in color. Dr Diamond ordered CT of abd/pelvis, ok to give AM meds, place G tube to LIS and ok to discard GI residuals. Dr Diamond notified Dr Diaz. Dr diaz made rounds shortly after pulling residuals and stated she will place order for the CT scan the patient needs.

## 2024-12-27 NOTE — NURSING
Pt out of room from [phone] for CT of ABD. Test performed without incident. Patient taken via bed with Me, J Luis Rad Tech, and Flor RT. Pt being bagged with Ambu bag with 100% O2 per RT. Patient on ICU transport monitor for transport and test with IV infusions infusing per IV pump.

## 2024-12-27 NOTE — SUBJECTIVE & OBJECTIVE
Interval History: Patient seen and examined.     Review of Systems   Unable to perform ROS: Acuity of condition     Objective:     Vital Signs (Most Recent):  Temp: 97.6 °F (36.4 °C) (12/27/24 0701)  Pulse: 108 (12/27/24 0735)  Resp: (!) 22 (12/27/24 0735)  BP: (!) 99/57 (12/27/24 0415)  SpO2: 100 % (12/27/24 0735) Vital Signs (24h Range):  Temp:  [96.5 °F (35.8 °C)-97.6 °F (36.4 °C)] 97.6 °F (36.4 °C)  Pulse:  [] 108  Resp:  [13-25] 22  SpO2:  [97 %-100 %] 100 %  BP: ()/(50-75) 99/57     Weight: 68.5 kg (151 lb 0.2 oz)  Body mass index is 23.65 kg/m².    Intake/Output Summary (Last 24 hours) at 12/27/2024 0744  Last data filed at 12/27/2024 0558  Gross per 24 hour   Intake 3390.22 ml   Output 900 ml   Net 2490.22 ml         Physical Exam  Constitutional:       Appearance: He is ill-appearing.      Comments: Thin male; intubated   HENT:      Mouth/Throat:      Mouth: Mucous membranes are moist.   Eyes:      Pupils: Pupils are equal, round, and reactive to light.   Cardiovascular:      Rate and Rhythm: Normal rate and regular rhythm.      Heart sounds: Normal heart sounds.   Pulmonary:      Effort: Prolonged expiration present.      Breath sounds: Transmitted upper airway sounds present. Rhonchi present.      Comments: Breath sounds at lung bases bilaterally   Abdominal:      General: There is no distension.      Palpations: Abdomen is soft. There is no mass.      Tenderness: There is no abdominal tenderness.      Comments: Jtube noted; ileostomy noted   Genitourinary:     Comments: Rojas with yellow urine  Musculoskeletal:      Right lower leg: Edema present.      Left lower leg: Edema present.   Lymphadenopathy:      Cervical: No cervical adenopathy.   Skin:     General: Skin is warm and dry.      Comments: Wounds to sacrum; groin area             Significant Labs: All pertinent labs within the past 24 hours have been reviewed.  ABGs:   Recent Labs   Lab 12/26/24  0509 12/27/24  0529   PH 7.360 7.372    PCO2 49.9* 48.5*   HCO3 26.4 26.3   POCSATURATED 85.5* 85.4*   PO2 52.2* 51.3*     Bilirubin:   Recent Labs   Lab 12/08/24  1301 12/09/24  0528 12/23/24  0343 12/24/24  0413 12/25/24  0345 12/26/24  0500 12/27/24  0344   BILIDIR 0.3  --   --   --   --   --   --    BILITOT 0.5   < > 0.2 0.2 0.2 0.2 0.3    < > = values in this interval not displayed.      Recent Labs   Lab 12/27/24  0344   *   *   K 5.1      CO2 30*   BUN 40*   CREATININE 1.2   CALCIUM 11.0*   MG 1.7     CBC:   Recent Labs   Lab 12/26/24  0412 12/26/24  0510 12/27/24  0344   WBC 17.38* 17.74* 19.44*   HGB 6.3* 6.7* 9.9*   HCT 22.9* 21.3* 30.4*    171 174     CMP:   Recent Labs   Lab 12/26/24  0500 12/27/24  0344   * 131*   K 4.5 5.1    100   CO2 29 30*   * 279*   BUN 27* 40*   CREATININE 1.0 1.2   CALCIUM 10.7* 11.0*   PROT 5.0* 5.5*   ALBUMIN <0.6* <0.6*   BILITOT 0.2 0.3   ALKPHOS 326* 326*   AST 18 20   ALT <6* <6*   ANIONGAP 0* 1*     Magnesium:   Recent Labs   Lab 12/26/24  0500 12/27/24  0344   MG 1.6 1.7     Significant Imaging: I have reviewed all pertinent imaging results/findings within the past 24 hours.

## 2024-12-27 NOTE — PROGRESS NOTES
[facility] Medicine  Progress Note    Patient Name: Carlos Chahal  MRN: [id number]  Patient Class: IP- Inpatient   Admission Date: 12/15/2024  Length of Stay: 12 days  Attending Physician: Kim Diamond MD  Primary Care Provider: Jose Francisco Klein MD        Subjective     Principal Problem:Sepsis        HPI:  Carlos Chahal is a 34 y.o. male who presents to the ED from nursing home for altered mental status and difficulty breathing.  Patient is found to be hypoxic.  He has a history of anoxic brain injury     This am, pt is on ventilator and levophed at 0.25mcg and ivfs at 75ml/hr.      Spoke with father this am on condition of pt    Overview/Hospital Course:  12/17 ND became more awake yesterday with wound changes - low dose propofol added for pt - able to wean levophed to 0.15mcg.  did tolerate tf last night - check kub this am  12/18 ND pt had to be changed back to ac control last nigth after becoming tachypneic and had low tv.  Tolerating ac mode.  Levophed down to 0.1 mcg  12/19 ND pt toleratign vent - will wean RR today - cont to slowly wean levophed as tolerates - wbc slowly imrpoving - tolerating low dose tfs - will cont to increase tfs as tolerates  12/20 ND tolerating vent - cont to work on feeds and nutritional support  12/21 [place] weekend coverage, in no acute distress, stable vital signs, AC/18/400/5/40%, SpO2 100%. On proprofol for sedation, patient tracks voice and moving head and neck with lid opening. Mild bump in WBC, afebrile, may be associated with the reported residual >200 and tube feeds held. Abdomen, soft and no distension on exam. Will resume as tolerated and monitor.  Blood cx x2, final report no growth. Continue merrem (D5), gentamicin (D2) with mixed MDRO frida from sputum studies and UTI. Replete Mg, continue abx. Continue daily wound care.   12/22 [place] weekend coverage, overnight rate change and tolerating AC12/400/5/40 SpO2 100%, proprofol 15 mcg/kg/min, levophed  0.1mcg/kg/min. Patient without gag reflex on suction. Tube feeds held overnight and resumed, remains on trickle feeds. Ileostomy output 100.   No associated abdominal distension, patient in no distress. Vent settings weaned. Leukocytosis resolved. Continue IV abx for MDRO coverage.   12/23 ND Pt still havign trouble tolerating tfs -change reglan iv; pt is more awake this am - will change to simv for a few hours today   12/24 ND elevated bs with tpn- will add long acting insulin as 12u and restart tpn - ssi adjusted to moderate scale.  12/25 FM:  Holiday coverage, chart reviewed and case discussed with team.  Patient's Levophed is being weaned slowly and he is not tolerating his tube feeds.  Abdominal exam is soft patient has output via ostomy we will check KUB.  Patient is followed by surgery and has a polymicrobial respiratory and urinary tract infection.  Patient has multiple wounds and has a history of anoxic brain injury and is a long-term nursing home resident.  12/26 FM:  Patient is off of vasopressors this morning, patient's KUB noted and will rechallenge tube feedings today.  Patient is tolerating TPN labs noted and his hemoglobin has continued to trend downward Ms. With no visible blood loss.  Patient's other labs noted his white blood cell count is rising despite appropriate antibiotics based on respiratory and urinary cultures.  Patient's prognosis is poor.  12/27 ND pt  still not tolerating tfs - surgery following -  cont tpn; h/h improved after transfusion.  Updated aunt on pts condition    Interval History: Patient seen and examined.     Review of Systems   Unable to perform ROS: Acuity of condition     Objective:     Vital Signs (Most Recent):  Temp: 97.6 °F (36.4 °C) (12/27/24 0701)  Pulse: 108 (12/27/24 0735)  Resp: (!) 22 (12/27/24 0735)  BP: (!) 99/57 (12/27/24 0415)  SpO2: 100 % (12/27/24 0735) Vital Signs (24h Range):  Temp:  [96.5 °F (35.8 °C)-97.6 °F (36.4 °C)] 97.6 °F (36.4 °C)  Pulse:   [] 108  Resp:  [13-25] 22  SpO2:  [97 %-100 %] 100 %  BP: ()/(50-75) 99/57     Weight: 68.5 kg (151 lb 0.2 oz)  Body mass index is 23.65 kg/m².    Intake/Output Summary (Last 24 hours) at 12/27/2024 0744  Last data filed at 12/27/2024 0558  Gross per 24 hour   Intake 3390.22 ml   Output 900 ml   Net 2490.22 ml         Physical Exam  Constitutional:       Appearance: He is ill-appearing.      Comments: Thin male; intubated   HENT:      Mouth/Throat:      Mouth: Mucous membranes are moist.   Eyes:      Pupils: Pupils are equal, round, and reactive to light.   Cardiovascular:      Rate and Rhythm: Normal rate and regular rhythm.      Heart sounds: Normal heart sounds.   Pulmonary:      Effort: Prolonged expiration present.      Breath sounds: Transmitted upper airway sounds present. Rhonchi present.      Comments: Breath sounds at lung bases bilaterally   Abdominal:      General: There is no distension.      Palpations: Abdomen is soft. There is no mass.      Tenderness: There is no abdominal tenderness.      Comments: Jtube noted; ileostomy noted   Genitourinary:     Comments: Rojas with yellow urine  Musculoskeletal:      Right lower leg: Edema present.      Left lower leg: Edema present.   Lymphadenopathy:      Cervical: No cervical adenopathy.   Skin:     General: Skin is warm and dry.      Comments: Wounds to sacrum; groin area             Significant Labs: All pertinent labs within the past 24 hours have been reviewed.  ABGs:   Recent Labs   Lab 12/26/24  0509 12/27/24  0529   PH 7.360 7.372   PCO2 49.9* 48.5*   HCO3 26.4 26.3   POCSATURATED 85.5* 85.4*   PO2 52.2* 51.3*     Bilirubin:   Recent Labs   Lab 12/08/24  1301 12/09/24  0528 12/23/24  0343 12/24/24  0413 12/25/24  0345 12/26/24  0500 12/27/24  0344   BILIDIR 0.3  --   --   --   --   --   --    BILITOT 0.5   < > 0.2 0.2 0.2 0.2 0.3    < > = values in this interval not displayed.      Recent Labs   Lab 12/27/24  0344   *   *   K  "5.1      CO2 30*   BUN 40*   CREATININE 1.2   CALCIUM 11.0*   MG 1.7     CBC:   Recent Labs   Lab 12/26/24  0412 12/26/24  0510 12/27/24  0344   WBC 17.38* 17.74* 19.44*   HGB 6.3* 6.7* 9.9*   HCT 22.9* 21.3* 30.4*    171 174     CMP:   Recent Labs   Lab 12/26/24  0500 12/27/24  0344   * 131*   K 4.5 5.1    100   CO2 29 30*   * 279*   BUN 27* 40*   CREATININE 1.0 1.2   CALCIUM 10.7* 11.0*   PROT 5.0* 5.5*   ALBUMIN <0.6* <0.6*   BILITOT 0.2 0.3   ALKPHOS 326* 326*   AST 18 20   ALT <6* <6*   ANIONGAP 0* 1*     Magnesium:   Recent Labs   Lab 12/26/24  0500 12/27/24  0344   MG 1.6 1.7     Significant Imaging: I have reviewed all pertinent imaging results/findings within the past 24 hours.    Assessment and Plan     * Sepsis  This patient does have evidence of infective focus  My overall impression is septic shock due to MAP < 65 and SBP < 90.  Source: Respiratory and Urinary Tract  Antibiotics given-   Antibiotics (72h ago, onward)      Start     Stop Route Frequency Ordered    12/20/24 1438  gentamicin - pharmacy to dose  (gentamicin IVPB (PEDS and ADULTS))        Placed in "And" Linked Group    -- IV pharmacy to manage frequency 12/20/24 1338          Latest lactate reviewed-  No results for input(s): "LACTATE", "POCLAC" in the last 72 hours.    Organ dysfunction indicated by Acute respiratory failure and Encephalopathy    Fluid challenge Ideal Body Weight- The patient's ideal body weight is Ideal body weight: 66.1 kg (145 lb 11.6 oz) which will be used to calculate fluid bolus of 30 ml/kg for treatment of septic shock.      Post- resuscitation assessment Yes Perfusion exam was performed within 6 hours of septic shock presentation after bolus shows Inadequate tissue perfusion assessed by non-invasive monitoring       Will Start Pressors- Levophed for MAP of 65  Source control achieved by: iv zosym. Vanc, ivfs  12/17 wean levophed as tolerates- abxs changed to merrem, cotn vanc - await " culture final reports  12/18 wbc slightly imrpoved - cont iv abxs- await final sputum culture - on merrem fro esbl kleb in urine  12/19 cont iv merrem for esbl kleb in urine and proteus in sputum - dc vanc  12/20 wbc improvign - cotn iv merrem  12/24 wbc normal - cont iv abxs  12/25 FM:  Cont GENT.  12/26 FM:  Cont GENT, prognosis poor, pulm/gu/skin infections.  12/27 repeat bc and sputum culture due to increasing wbc - cont gent; completed 10 days of merrem    Edema  Lasix 20mg iv bid - monitor I/o      Hypokalemia  Patient's most recent potassium results are listed below.   Recent Labs     12/25/24  0345 12/26/24  0500 12/27/24  0344   K 4.7 4.5 5.1       Plan  - Replete potassium per protocol  - Monitor potassium Daily  - Patient's hypokalemia is stable, continue below and monitor  12/20 increase pot bicarb to bid  12/23 decrease pot bicard to daily dosing  12/24 improved- stop potassium bicarb repalcement - monitor    Severe malnutrition  Nutrition consulted. Most recent weight and BMI monitored-     Measurements:  Wt Readings from Last 1 Encounters:   12/16/24 68.5 kg (151 lb 0.2 oz)   Body mass index is 23.65 kg/m².    Patient has been screened and assessed by RD.    Malnutrition Type:  Context:    Level:      Malnutrition Characteristic Summary:       Interventions/Recommendations (treatment strategy):  1. Rec'd Continuous EN: Glucerna 1.5 @ 40mL/r increasing by 5 mL q6hrs to goal rate of 55mL/hr with a continuous 40mL FWF to provide 1980kcal (94% EEN), 109g of protein (100% EPN), and 1962mL FW.   2. Néstor BID via PEG tube to promote wound healing and to provide additional nutrition.           - Do not mix Néstor with formula in a tube-feeding bag         - Pour one packet of Néstor in a clean, small container for mixing.           - Add 4 fl oz (120 mL) water at room temperature.           - Mix well with disposable spoon or tongue blade until all particles are completely hydrated.           - Verify correct  tube placement.           - Flush feeding tube with 30 mL water.           -Administer Néstor through feeding tube using a 60-mL or larger syringe.           - Flush with an additional 30 mL water.  3. Rec'd ClinimixE 5/20 @ 80mL/hr to provide 1690kcal (80% EEN), 96g of protein (88% EPN), and 1920mL TFV.      -Add 250mL of 20% lipids 3x/week to provide additional calories.      -Check Triglycerides before adding lipids. 4. RD to follow and make rec's accordingly.    12/18 restart tfs today at 1ml/hr to see if can tolerate feeds  12/19 increase tfs as tolerates  12/23 not tolerating tfs- will get tpn orders and start that until can tolerate tfs better - ncrease regaln 10 mg iv q 6hrs  12/24 con ttfs as tolerates - started on tpn for further nutritional support  12/25 FM:  Check KUB.  12/26 FM:  Resume TF today, monitor.  12/27 on tpn - not tolerating tfs at this time    Hypomagnesemia  Patient has Abnormal Magnesium: hypomagnesemia. Will continue to monitor electrolytes closely. Will replace the affected electrolytes and repeat labs to be done after interventions completed. The patient's magnesium results have been reviewed and are listed below.  Recent Labs   Lab 12/27/24  0344   MG 1.7      12/17 mag imrpoving - repalce mag today  12/19 replace mag today  12/20 mag oxide bid  12/21 2g Mg  12/23 dose of iv mag today to keep mag level about 2  12/25 FM:  Replace, recheck.    Pneumonia of right lower lobe due to infectious organism  Patient has a diagnosis of pneumonia. The cause of the pneumonia is suspected to be bacterial in etiology but organism is not known. The pneumonia is stable. The patient has the following signs/symptoms of pneumonia: persistent hypoxia  and sputum production. The patient does have a current oxygen requirement and the patient does not have a home oxygen requirement. I have reviewed the pertinent imaging. The following cultures have been collected: Blood cultures and Sputum culture The  "culture results are listed below.     Current antimicrobial regimen consists of the antibiotics listed below. Will monitor patient closely and continue current treatment plan unchanged.    Antibiotics (From admission, onward)      Start     Stop Route Frequency Ordered    12/20/24 1438  gentamicin - pharmacy to dose  (gentamicin IVPB (PEDS and ADULTS))        Placed in "And" Linked Group    -- IV pharmacy to manage frequency 12/20/24 1338            Microbiology Results (last 7 days)       Procedure Component Value Units Date/Time    Culture, Respiratory with Gram Stain [[id number]]     Order Status: No result Specimen: Respiratory from Endotracheal Aspirate     Blood culture [[id number]]     Order Status: Sent Specimen: Blood     Blood culture [[id number]]     Order Status: Sent Specimen: Blood     Culture, Respiratory with Gram Stain [[id number]]  (Abnormal)  (Susceptibility) Collected: 12/15/24 0935    Order Status: Completed Specimen: Respiratory from Endotracheal Aspirate Updated: 12/23/24 1204     Respiratory Culture No S aureus or Pseudomonas isolated.      ACINETOBACTER BAUMANNII   Many        PROTEUS MIRABILIS  Few        KLEBSIELLA PNEUMONIAE   Few  ESBL        Gram Stain (Respiratory) Few WBC's     Gram Stain (Respiratory) Many Gram positive rods     Gram Stain (Respiratory) Few Gram negative cocci    Blood culture x two cultures. Draw prior to antibiotics. [[id number]] Collected: 12/15/24 0942    Order Status: Completed Specimen: Blood from Peripheral, Antecubital, Left Updated: 12/20/24 1612     Blood Culture, Routine No growth after 5 days.    Narrative:      Aerobic and anaerobic    Blood culture x two cultures. Draw prior to antibiotics. [[id number]] Collected: 12/15/24 0923    Order Status: Completed Specimen: Blood from Peripheral, Forearm, Right Updated: 12/20/24 1612     Blood Culture, Routine No growth after 5 days.    Narrative:      Aerobic and anaerobic        12/17 dc zosyn " with recent esbl kleb in urine - will change to merrem - cont vanc until final cultures obtained - change vent to simv; add nebs  12/18 cotn merrem and vanc - await final sputum cutlure - cont vent on ac control; nebs - wbc imrpoved slightly; lasix 20mg iv for edema today  12/19  dc vanc - proteus grwoign from sputum - cont merrem and nebs; lasix today - wean rr on vent  12/20 wnc improved - cont merrem - nebs and vent - another dose of lasix today for edema - fio2 increased  12/23 cont iv gent and merren due to sent of multiple bugs (acinetobacter/kleb/proteus)  12/24 cont current abxs  12/25 FM:  Cont GENT  12/26 FM:  Cont GENT, poor prognosis.  12/27 on gent - get new sputum culture today due to elevated wbc - cont vent and nebs    Sacral wound, sequela  Local wound care with dakins bid  Iv abxs  12/27 prob debridementt in or on 12/30    Open wound of groin  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  Recent Labs     12/26/24  0412 12/26/24  0510 12/27/24  0344   HGB 6.3* 6.7* 9.9*   HCT 22.9* 21.3* 30.4*       Plan  - Monitor serial CBC: Daily  - Transfuse PRBC if patient becomes hemodynamically unstable, symptomatic or H/H drops below 7/21.  - Patient has not received any PRBC transfusions to date  - Patient's anemia is currently worsening. Will adjust treatment as follows: 2uprbcs today  12/17 h/h Improved  12/20 h/h holding  12/26 FM:  Transfuse today.  12/27 hh improved after transfusion    History of anoxic brain injury  12/25 FM:  Poor prognosis.      Urinary tract infection associated with indwelling urethral catheter  Await new urine culture =- currently on zosyn/vanc  12/17 change to merrem/vanc  12/18 has esbl klebsiella - cont merrem  12/23 on merrem  12/25 FM:  Continue Gent, CS reviewed.  12/26 FM:  WBC increasing, monitor, on appropriate abx based  on cultures.    Type 1 diabetes  Patient's FSGs are uncontrolled due to hyperglycemia on current medication regimen.  Last A1c reviewed-   Lab Results   Component Value Date    HGBA1C 6.6 (H) 12/16/2024     Most recent fingerstick glucose reviewed-   Recent Labs   Lab 12/26/24  1722 12/26/24  2005 12/27/24  0000 12/27/24  0348   POCTGLUCOSE 321* 307* 342* 306*       Current correctional scale  Low  Maintain anti-hyperglycemic dose as follows-   Antihyperglycemics (From admission, onward)      Start     Stop Route Frequency Ordered    12/27/24 0900  insulin glargine U-100 (Lantus) pen 16 Units         -- SubQ Daily 12/27/24 0745    12/24/24 0807  insulin aspart U-100 pen 0-10 Units         -- SubQ Every 4 hours PRN 12/24/24 0707          Hold Oral hypoglycemics while patient is in the hospital.  12/17 A1c improved to 6.6%  12/24 due to tpn - add lantus 12 u daily - ssi changed to moderate scale  12/25 FM:  BS logs noted, continue coverage.  12/26 FM:  Cont SSI.  12/27 increase lantus to 16u daily      VTE Risk Mitigation (From admission, onward)           Ordered     enoxaparin injection 40 mg  Daily         12/16/24 0815     IP VTE LOW RISK PATIENT  Once         12/15/24 1201     Place sequential compression device  Until discontinued         12/15/24 1201                    Discharge Planning   JOSE MANUEL:      Code Status: Full Code   Medical Readiness for Discharge Date:   Discharge Plan A: Other (TBD)   Discharge Delays: None known at this time                    Kim Diamond MD  Department of Hospital Medicine   [place] [facility]

## 2024-12-27 NOTE — PLAN OF CARE
Care plan reviewed and on-going. Cont with abx and vent. TF on Hold at present. Cont with Clinimix. Rojas Cath draining without difficulty. Ileostomy intact. Turn schedule maintained. Contact precautions maintained. Awaiting KUB. 2 unit of PRBC today.

## 2024-12-27 NOTE — ASSESSMENT & PLAN NOTE
"Patient has a diagnosis of pneumonia. The cause of the pneumonia is suspected to be bacterial in etiology but organism is not known. The pneumonia is stable. The patient has the following signs/symptoms of pneumonia: persistent hypoxia  and sputum production. The patient does have a current oxygen requirement and the patient does not have a home oxygen requirement. I have reviewed the pertinent imaging. The following cultures have been collected: Blood cultures and Sputum culture The culture results are listed below.     Current antimicrobial regimen consists of the antibiotics listed below. Will monitor patient closely and continue current treatment plan unchanged.    Antibiotics (From admission, onward)      Start     Stop Route Frequency Ordered    12/20/24 1438  gentamicin - pharmacy to dose  (gentamicin IVPB (PEDS and ADULTS))        Placed in "And" Linked Group    -- IV pharmacy to manage frequency 12/20/24 1338            Microbiology Results (last 7 days)       Procedure Component Value Units Date/Time    Culture, Respiratory with Gram Stain [[id number]]     Order Status: No result Specimen: Respiratory from Endotracheal Aspirate     Blood culture [[id number]]     Order Status: Sent Specimen: Blood     Blood culture [[id number]]     Order Status: Sent Specimen: Blood     Culture, Respiratory with Gram Stain [[id number]]  (Abnormal)  (Susceptibility) Collected: 12/15/24 0935    Order Status: Completed Specimen: Respiratory from Endotracheal Aspirate Updated: 12/23/24 1204     Respiratory Culture No S aureus or Pseudomonas isolated.      ACINETOBACTER BAUMANNII   Many        PROTEUS MIRABILIS  Few        KLEBSIELLA PNEUMONIAE   Few  ESBL        Gram Stain (Respiratory) Few WBC's     Gram Stain (Respiratory) Many Gram positive rods     Gram Stain (Respiratory) Few Gram negative cocci    Blood culture x two cultures. Draw prior to antibiotics. [[id number]] Collected: 12/15/24 0942    Order Status: " Completed Specimen: Blood from Peripheral, Antecubital, Left Updated: 12/20/24 1612     Blood Culture, Routine No growth after 5 days.    Narrative:      Aerobic and anaerobic    Blood culture x two cultures. Draw prior to antibiotics. [[id number]] Collected: 12/15/24 0923    Order Status: Completed Specimen: Blood from Peripheral, Forearm, Right Updated: 12/20/24 1612     Blood Culture, Routine No growth after 5 days.    Narrative:      Aerobic and anaerobic        12/17 dc zosyn with recent esbl kleb in urine - will change to merrem - cont vanc until final cultures obtained - change vent to simv; add nebs  12/18 cotn merrem and vanc - await final sputum cutlure - cont vent on ac control; nebs - wbc imrpoved slightly; lasix 20mg iv for edema today  12/19  dc vanc - proteus grwoign from sputum - cont merrem and nebs; lasix today - wean rr on vent  12/20 wnc improved - cont merrem - nebs and vent - another dose of lasix today for edema - fio2 increased  12/23 cont iv gent and merren due to sent of multiple bugs (acinetobacter/kleb/proteus)  12/24 cont current abxs  12/25 FM:  Cont GENT  12/26 FM:  Cont GENT, poor prognosis.  12/27 on gent - get new sputum culture today due to elevated wbc - cont vent and nebs

## 2024-12-27 NOTE — ASSESSMENT & PLAN NOTE
"This patient does have evidence of infective focus  My overall impression is septic shock due to MAP < 65 and SBP < 90.  Source: Respiratory and Urinary Tract  Antibiotics given-   Antibiotics (72h ago, onward)      Start     Stop Route Frequency Ordered    12/20/24 1438  gentamicin - pharmacy to dose  (gentamicin IVPB (PEDS and ADULTS))        Placed in "And" Linked Group    -- IV pharmacy to manage frequency 12/20/24 1338          Latest lactate reviewed-  No results for input(s): "LACTATE", "POCLAC" in the last 72 hours.    Organ dysfunction indicated by Acute respiratory failure and Encephalopathy    Fluid challenge Ideal Body Weight- The patient's ideal body weight is Ideal body weight: 66.1 kg (145 lb 11.6 oz) which will be used to calculate fluid bolus of 30 ml/kg for treatment of septic shock.      Post- resuscitation assessment Yes Perfusion exam was performed within 6 hours of septic shock presentation after bolus shows Inadequate tissue perfusion assessed by non-invasive monitoring       Will Start Pressors- Levophed for MAP of 65  Source control achieved by: iv zosym. Vanc, ivfs  12/17 wean levophed as tolerates- abxs changed to merrem, cotn vanc - await culture final reports  12/18 wbc slightly imrpoved - cont iv abxs- await final sputum culture - on merrem fro esbl kleb in urine  12/19 cont iv merrem for esbl kleb in urine and proteus in sputum - dc vanc  12/20 wbc improvign - cotn iv merrem  12/24 wbc normal - cont iv abxs  12/25 FM:  Cont GENT.  12/26 FM:  Cont GENT, prognosis poor, pulm/gu/skin infections.  12/27 repeat bc and sputum culture due to increasing wbc - cont gent; completed 10 days of merrem  "

## 2024-12-27 NOTE — ASSESSMENT & PLAN NOTE
Patient's FSGs are uncontrolled due to hyperglycemia on current medication regimen.  Last A1c reviewed-   Lab Results   Component Value Date    HGBA1C 6.6 (H) 12/16/2024     Most recent fingerstick glucose reviewed-   Recent Labs   Lab 12/26/24  1722 12/26/24 2005 12/27/24  0000 12/27/24  0348   POCTGLUCOSE 321* 307* 342* 306*       Current correctional scale  Low  Maintain anti-hyperglycemic dose as follows-   Antihyperglycemics (From admission, onward)      Start     Stop Route Frequency Ordered    12/27/24 0900  insulin glargine U-100 (Lantus) pen 16 Units         -- SubQ Daily 12/27/24 0745    12/24/24 0807  insulin aspart U-100 pen 0-10 Units         -- SubQ Every 4 hours PRN 12/24/24 0707          Hold Oral hypoglycemics while patient is in the hospital.  12/17 A1c improved to 6.6%  12/24 due to tpn - add lantus 12 u daily - ssi changed to moderate scale  12/25 FM:  BS logs noted, continue coverage.  12/26 FM:  Cont SSI.  12/27 increase lantus to 16u daily

## 2024-12-27 NOTE — ASSESSMENT & PLAN NOTE
Nutrition consulted. Most recent weight and BMI monitored-     Measurements:  Wt Readings from Last 1 Encounters:   12/16/24 68.5 kg (151 lb 0.2 oz)   Body mass index is 23.65 kg/m².    Patient has been screened and assessed by RD.    Malnutrition Type:  Context:    Level:      Malnutrition Characteristic Summary:       Interventions/Recommendations (treatment strategy):  1. Rec'd Continuous EN: Glucerna 1.5 @ 40mL/r increasing by 5 mL q6hrs to goal rate of 55mL/hr with a continuous 40mL FWF to provide 1980kcal (94% EEN), 109g of protein (100% EPN), and 1962mL FW.   2. Néstor BID via PEG tube to promote wound healing and to provide additional nutrition.           - Do not mix Néstor with formula in a tube-feeding bag         - Pour one packet of Néstor in a clean, small container for mixing.           - Add 4 fl oz (120 mL) water at room temperature.           - Mix well with disposable spoon or tongue blade until all particles are completely hydrated.           - Verify correct tube placement.           - Flush feeding tube with 30 mL water.           -Administer Néstor through feeding tube using a 60-mL or larger syringe.           - Flush with an additional 30 mL water.  3. Rec'd ClinimixE 5/20 @ 80mL/hr to provide 1690kcal (80% EEN), 96g of protein (88% EPN), and 1920mL TFV.      -Add 250mL of 20% lipids 3x/week to provide additional calories.      -Check Triglycerides before adding lipids. 4. RD to follow and make rec's accordingly.    12/18 restart tfs today at 1ml/hr to see if can tolerate feeds  12/19 increase tfs as tolerates  12/23 not tolerating tfs- will get tpn orders and start that until can tolerate tfs better - ncrease regaln 10 mg iv q 6hrs  12/24 con ttfs as tolerates - started on tpn for further nutritional support  12/25 FM:  Check KUB.  12/26 FM:  Resume TF today, monitor.  12/27 on tpn - not tolerating tfs at this time

## 2024-12-27 NOTE — CARE UPDATE
12/27/24 1522   Patient Assessment/Suction   Suction Method tracheal;sample obtained and sent to lab   $ Suction Charges Inline Suction Procedure Stat Charge;Sputum Collection   Secretions Amount small   Secretions Color tan;red-streaked   Secretions Characteristics thin   Sputum Collection sample obtained per suctioning   $ Swab or suction? Suction   Aspirate Toleration DIONI (no adverse reactions)   Is this patient in SNF or Inpatient Rehab? Yes

## 2024-12-27 NOTE — EICU
Intervention Initiated From:  [facility] / EICU    Sandy intervened regarding:  Rounding (Video assessment)    Nurse Notified:  No    Doctor Notified:  No    Comments: Rounding done. Remains intubated on vent 30 % +5 PEEP. On TPN @ 80 ml/hour and Propofol 15 mcg/kg/min. Side rails up x4. B/P 100/62, HR 78, resp 17, sat 99

## 2024-12-27 NOTE — PLAN OF CARE
Problem: Pneumonia  Goal: Fluid Balance  Outcome: Progressing  Goal: Resolution of Infection Signs and Symptoms  Outcome: Progressing  Goal: Effective Oxygenation and Ventilation  Outcome: Progressing     Problem: Fall Injury Risk  Goal: Absence of Fall and Fall-Related Injury  Outcome: Progressing     Problem: Adult Inpatient Plan of Care  Goal: Plan of Care Review  Outcome: Not Progressing  Goal: Patient-Specific Goal (Individualized)  Outcome: Not Progressing  Goal: Absence of Hospital-Acquired Illness or Injury  Outcome: Not Progressing  Goal: Optimal Comfort and Wellbeing  Outcome: Not Progressing  Goal: Readiness for Transition of Care  Outcome: Not Progressing     Problem: Diabetes Comorbidity  Goal: Blood Glucose Level Within Targeted Range  Outcome: Not Progressing     Problem: Wound  Goal: Optimal Coping  Outcome: Not Progressing  Goal: Optimal Functional Ability  Outcome: Not Progressing  Goal: Absence of Infection Signs and Symptoms  Outcome: Not Progressing  Goal: Improved Oral Intake  Outcome: Not Progressing  Goal: Optimal Pain Control and Function  Outcome: Not Progressing  Goal: Skin Health and Integrity  Outcome: Not Progressing  Goal: Optimal Wound Healing  Outcome: Not Progressing     Problem: Infection  Goal: Absence of Infection Signs and Symptoms  Outcome: Not Progressing     Problem: Sepsis/Septic Shock  Goal: Optimal Coping  Outcome: Not Progressing  Goal: Absence of Bleeding  Outcome: Not Progressing  Goal: Blood Glucose Level Within Targeted Range  Outcome: Not Progressing  Goal: Absence of Infection Signs and Symptoms  Outcome: Not Progressing  Goal: Optimal Nutrition Intake  Outcome: Not Progressing     Problem: Electrolyte Imbalance  Goal: Electrolyte Balance  Outcome: Not Progressing

## 2024-12-27 NOTE — PLAN OF CARE
Recommendations  1. Rec'd Continuous EN: Glucerna 1.5 @ 40mL/r increasing by 5 mL q6hrs to goal rate of 55mL/hr with a continuous 40mL FWF to provide 1980kcal (94% EEN), 109g of protein (100% EPN), and 1962mL FW.     2. Néstor BID via PEG tube to promote wound healing and to provide additional nutrition.             - Do not mix Néstor with formula in a tube-feeding bag           - Pour one packet of Néstor in a clean, small container for mixing.             - Add 4 fl oz (120 mL) water at room temperature.             - Mix well with disposable spoon or tongue blade until all particles are completely hydrated.             - Verify correct tube placement.             - Flush feeding tube with 30 mL water.             -Administer Néstor through feeding tube using a 60-mL or larger syringe.             - Flush with an additional 30 mL water.  3. Rec'd ClinimixE 5/20 @ 80mL/hr to provide 1690kcal (80% EEN), 96g of protein (88% EPN), and 1920mL TFV.        -Add 250mL of 20% lipids 3x/week to provide additional calories.        -Check Triglycerides before adding lipids.   4. RD to follow and make rec's accordingly.  Goals:   1. Pt will be started on EN by next RD follow up.     2. Pt will reach TF goal rate within 48hrs of initiation.   3. Pt will be started on TPN by next RD follow up.  Nutrition Goal Status: goal not met, goal met

## 2024-12-27 NOTE — ASSESSMENT & PLAN NOTE
Patient has Abnormal Magnesium: hypomagnesemia. Will continue to monitor electrolytes closely. Will replace the affected electrolytes and repeat labs to be done after interventions completed. The patient's magnesium results have been reviewed and are listed below.  Recent Labs   Lab 12/27/24  0344   MG 1.7      12/17 mag imrpoving - repalce mag today  12/19 replace mag today  12/20 mag oxide bid  12/21 2g Mg  12/23 dose of iv mag today to keep mag level about 2  12/25 FM:  Replace, recheck.

## 2024-12-27 NOTE — CARE UPDATE
12/27/24 1133   [facility] Ventilator Associated Pneumonia Bundle (Required)   Oral Care (S)  Lip moisturizer applied;Mouth swabbed;Mouth moisturizer;Mouth suctioned;Suction toothette  (Pt has a loose tooth on the lower half of the jaw. JUDY Moody RN notified.)

## 2024-12-27 NOTE — PROGRESS NOTES
"Pharmacokinetic Follow Up: Gentamicin    Assessment of levels:   Random concentration of 2.1 mcg/mL (71 hours of post-infusion) falls outside of the dosing recommendations of the [facility], this patient should receive traditional dosing of Gentamicin    Regimen Plan:   Waiting for gentamicin level to come down within range before beginning traditional dosing regimen. Random level scheduled for tomorrow (12/28/24) at 1500.     Drug levels (last 3 results):  No results for input(s): "AMIKACINPEAK", "AMIKACINTROU", "AMIKACINRAND", "AMIKACIN" in the last 72 hours.    Recent Labs   Lab Result Units 12/26/24  1458 12/27/24  1501   Gentamicin, Random ug/mL  --  2.1   Gentamicin, Trough ug/mL 3.0*  --        No results for input(s): "TOBRA8", "TOBRA10", "TOBRA12", "TOBRARND", "TOBRAMYCIN", "TOBRAPEAK", "TOBRATROUGH", "TOBRAMYCINPE", "TOBRAMYCINRA", "TOBRAMYCINTR" in the last 72 hours.    Pharmacy will continue to monitor.    Please contact pharmacy at extension [phone] with any questions regarding this assessment.    Thank you for the consult,   Bekah Garay      Patient brief summary:  Carlos Chahal is a 34 y.o. male initiated on aminoglycoside therapy for treatment of  pneumonia    Drug Allergies:   Review of patient's allergies indicates:   Allergen Reactions    Guaifenesin Hives    Codeine Itching       Actual Body Weight:   68.5 kg    Adjust Body Weight:   67.1 kg    Ideal Body Weight:  66.1 kg    Renal Function:   Estimated Creatinine Clearance: 81.9 mL/min (based on SCr of 1.2 mg/dL).,     Dialysis Method (if applicable):  N/A    CBC (last 72 hours):  Recent Labs   Lab Result Units 12/25/24  0345 12/26/24  0412 12/26/24  0510 12/27/24  0344   WBC K/uL 14.55* 17.38* 17.74* 19.44*   Hemoglobin g/dL 7.3* 6.3* 6.7* 9.9*   Hematocrit % 23.4* 22.9* 21.3* 30.4*   Platelets K/uL 232 170 171 174   Gran % % 74.6* 79.3*  --  78.1*   Lymph % % 8.9* 6.7*  --  6.0*   Mono % % 7.4 6.4  --  7.4   Eosinophil % % 7.1 5.6  --  " 6.5   Basophil % % 0.4 0.4  --  0.4   Differential Method  Automated Automated  --  Automated       Metabolic Panel (last 72 hours):  Recent Labs   Lab Result Units 12/25/24  0345 12/26/24  0500 12/27/24  0344   Sodium mmol/L 133* 131* 131*   Potassium mmol/L 4.7 4.5 5.1   Chloride mmol/L 104 102 100   CO2 mmol/L 31* 29 30*   Glucose mg/dL 297* 265* 279*   BUN mg/dL 18 27* 40*   Creatinine mg/dL 1.0 1.0 1.2   Albumin g/dL <0.6* <0.6* <0.6*   Total Bilirubin mg/dL 0.2 0.2 0.3   Alkaline Phosphatase U/L 386* 326* 326*   AST U/L 18 18 20   ALT U/L 6* <6* <6*   Magnesium mg/dL 1.4* 1.6 1.7       Aminoglycoside Administrations:  aminoglycosides given in last 96 hours                     gentamicin (GARAMYCIN) 462.8 mg in 0.9% NaCl 100 mL IVPB (mg) 462.8 mg New Bag 12/24/24 1621                    Microbiologic Results:  Microbiology Results (last 7 days)       Procedure Component Value Units Date/Time    Culture, Respiratory with Gram Stain [[id number]] Collected: 12/27/24 1522    Order Status: Sent Specimen: Respiratory from Endotracheal Aspirate Updated: 12/27/24 1527    Blood culture [[id number]] Collected: 12/27/24 0838    Order Status: Sent Specimen: Blood     Blood culture [[id number]] Collected: 12/27/24 0837    Order Status: Sent Specimen: Blood     Culture, Respiratory with Gram Stain [[id number]]  (Abnormal)  (Susceptibility) Collected: 12/15/24 0935    Order Status: Completed Specimen: Respiratory from Endotracheal Aspirate Updated: 12/23/24 1204     Respiratory Culture No S aureus or Pseudomonas isolated.      ACINETOBACTER BAUMANNII   Many        PROTEUS MIRABILIS  Few        KLEBSIELLA PNEUMONIAE   Few  ESBL        Gram Stain (Respiratory) Few WBC's     Gram Stain (Respiratory) Many Gram positive rods     Gram Stain (Respiratory) Few Gram negative cocci    Blood culture x two cultures. Draw prior to antibiotics. [[id number]] Collected: 12/15/24 0942    Order Status: Completed Specimen: Blood  from Peripheral, Antecubital, Left Updated: 12/20/24 1612     Blood Culture, Routine No growth after 5 days.    Narrative:      Aerobic and anaerobic    Blood culture x two cultures. Draw prior to antibiotics. [[id number]] Collected: 12/15/24 0923    Order Status: Completed Specimen: Blood from Peripheral, Forearm, Right Updated: 12/20/24 1612     Blood Culture, Routine No growth after 5 days.    Narrative:      Aerobic and anaerobic

## 2024-12-27 NOTE — ASSESSMENT & PLAN NOTE
Patient's most recent potassium results are listed below.   Recent Labs     12/25/24  0345 12/26/24  0500 12/27/24  0344   K 4.7 4.5 5.1       Plan  - Replete potassium per protocol  - Monitor potassium Daily  - Patient's hypokalemia is stable, continue below and monitor  12/20 increase pot bicarb to bid  12/23 decrease pot bicard to daily dosing  12/24 improved- stop potassium bicarb repalcement - monitor

## 2024-12-27 NOTE — NURSING
Pt remains on vent. Abnl ABG results with changes to the FiO2 (increased to 50% per Dr. Rebollar in EICU). Generalized edema, michael to the abd and upper and lower extremities. PEG, and NG tube remains with dark green to black colored watery content on residuals check. Light brown watery stool from ileostomy, scant amount. Varghese maintained, varghese care as ordered.Repositioned every 2 hours as ordered, VSS. Afebrile this shift. Continue to observe.

## 2024-12-27 NOTE — ASSESSMENT & PLAN NOTE
Anemia is likely due to chronic infections Most recent hemoglobin and hematocrit are listed below.  Recent Labs     12/26/24  0412 12/26/24  0510 12/27/24  0344   HGB 6.3* 6.7* 9.9*   HCT 22.9* 21.3* 30.4*       Plan  - Monitor serial CBC: Daily  - Transfuse PRBC if patient becomes hemodynamically unstable, symptomatic or H/H drops below 7/21.  - Patient has not received any PRBC transfusions to date  - Patient's anemia is currently worsening. Will adjust treatment as follows: 2uprbcs today  12/17 h/h Improved  12/20 h/h holding  12/26 FM:  Transfuse today.  12/27 hh improved after transfusion

## 2024-12-27 NOTE — PROGRESS NOTES
[facility] - Intensive Care  Adult Nutrition  Progress Note    SUMMARY       Recommendations  1. Rec'd Continuous EN: Glucerna 1.5 @ 40mL/r increasing by 5 mL q6hrs to goal rate of 55mL/hr with a continuous 40mL FWF to provide 1980kcal (94% EEN), 109g of protein (100% EPN), and 1962mL FW.     2. Néstor BID via PEG tube to promote wound healing and to provide additional nutrition.             - Do not mix Néstor with formula in a tube-feeding bag           - Pour one packet of Néstor in a clean, small container for mixing.             - Add 4 fl oz (120 mL) water at room temperature.             - Mix well with disposable spoon or tongue blade until all particles are completely hydrated.             - Verify correct tube placement.             - Flush feeding tube with 30 mL water.             -Administer Néstor through feeding tube using a 60-mL or larger syringe.             - Flush with an additional 30 mL water.  3. Rec'd ClinimixE 5/20 @ 80mL/hr to provide 1690kcal (80% EEN), 96g of protein (88% EPN), and 1920mL TFV.        -Add 250mL of 20% lipids 3x/week to provide additional calories.        -Check Triglycerides before adding lipids.   4. RD to follow and make rec's accordingly.  Goals:   1. Pt will be started on EN by next RD follow up.     2. Pt will reach TF goal rate within 48hrs of initiation.   3. Pt will be started on TPN by next RD follow up.  Nutrition Goal Status: goal not met, goal met  Communication of RD Recs: other (Documented in POC)    Assessment and Plan     Nutrition Problem  Inadequate oral intake     Related to (etiology):   NPO/Intubation Status     Signs and Symptoms (as evidenced by):   0% PO intake      Interventions/Recommendations (treatment strategy):  1. Rec'd Continuous EN: Glucerna 1.5 @ 40mL/r increasing by 5 mL q6hrs to goal rate of 55mL/hr with a continuous 40mL FWF to provide 1980kcal (94% EEN), 109g of protein (100% EPN), and 1962mL FW.   2. Néstor BID via PEG tube to promote wound  "healing and to provide additional nutrition.           - Do not mix Néstor with formula in a tube-feeding bag         - Pour one packet of Néstor in a clean, small container for mixing.           - Add 4 fl oz (120 mL) water at room temperature.           - Mix well with disposable spoon or tongue blade until all particles are completely hydrated.           - Verify correct tube placement.           - Flush feeding tube with 30 mL water.           -Administer Néstor through feeding tube using a 60-mL or larger syringe.           - Flush with an additional 30 mL water.   3. RD to follow and make rec's accordingly.     Nutrition Diagnosis Status:   Continues     Malnutrition Assessment  NFPE not warranted at this time. RD to continue to monitor for signs and symptoms of malnutrition.     Nutrition Related Social Determinants of Health:  None identified at this time.    Reason for Assessment    Reason For Assessment: RD follow-up  Diagnosis: infection/sepsis  General Information Comments: Pt remains intubated and not tolerating TF. Continue TPN. RD to follow and make rec's accordingly.  Nutrition Discharge Planning: TBD as care progresses    Nutrition Risk Screen    Nutrition Risk Screen: tube feeding or parenteral nutrition    Nutrition/Diet History    Patient Reported Diet/Restrictions/Preferences: no oral intake  Spiritual, Cultural Beliefs, Adventist Practices, Values that Affect Care: no  Food Allergies: NKFA  Factors Affecting Nutritional Intake: NPO  Nutrition Support Formula Prior to Admit: Glucerna 1.5    Anthropometrics    Temp: 97.2 °F (36.2 °C)  Height: 5' 7" (170.2 cm)  Height (inches): 67 in  Weight Method: Bed Scale  Weight: 68.5 kg (151 lb 0.2 oz)  Weight (lb): 151.02 lb  Ideal Body Weight (IBW), Male: 148 lb  % Ideal Body Weight, Male (lb): 102.04 %  BMI (Calculated): 23.6  BMI Grade: 18.5-24.9 - normal    Lab/Procedures/Meds    Pertinent Labs Reviewed: reviewed  Pertinent Medications Reviewed: " fall fall reviewed    Estimated/Assessed Needs    Weight Used For Calorie Calculations: 68.5 kg (151 lb 0.2 oz)  Energy Calorie Requirements (kcal): 2096  Energy Need Method: [facility]  Protein Requirements: 109-137 (1.6-2.0g/kg)  Weight Used For Protein Calculations: 68.5 kg (151 lb 0.2 oz)  Fluid Requirements (mL): 2096 (1mL/kcal)  Estimated Fluid Requirement Method: RDA Method  RDA Method (mL): 2096    Nutrition Prescription Ordered    Current Diet Order: NPO  Current Nutrition Support Formula Ordered: Clinimix E 5/20  Current Nutrition Support Rate Ordered: 80 (ml)  Current Nutrition Support Frequency Ordered: Continuous  Oral Nutrition Supplement: Néstor via PEG    Evaluation of Received Nutrient/Fluid Intake    Enteral Calories (kcal): 0  Enteral Protein (gm): 0  Enteral (Free Water) Fluid (mL): 0  Parenteral Calories (kcal): 1690  Parenteral Protein (gm): 96  Parenteral Fluid (mL): 1920  Other Calories (kcal): 164 (Propofol infusing @ 6.2mL/hr)  Total Calories (kcal): 164  % Kcal Needs: 80%  % Protein Needs: 88%  I/O: -940  Energy Calories Required: meeting needs  Protein Required: meeting needs  Tolerance: tolerating  % Intake of Estimated Energy Needs: 75 - 100 %  % Meal Intake: NPO    Nutrition Risk    Level of Risk/Frequency of Follow-up: moderate     Monitor and Evaluation    Food and Nutrient Intake: enteral nutrition intake, parenteral nutrition intake  Food and Nutrient Adminstration: enteral and parenteral nutrition administration  Knowledge/Beliefs/Attitudes: beliefs and attitudes, food and nutrition knowledge/skill  Physical Activity and Function: nutrition-related ADLs and IADLs  Anthropometric Measurements: height/length, weight, weight change, body mass index  Biochemical Data, Medical Tests and Procedures: electrolyte and renal panel, gastrointestinal profile, glucose/endocrine profile, inflammatory profile, lipid profile  Nutrition-Focused Physical Findings: overall appearance     Nutrition  Follow-Up    RD Follow-up?: Yes       fall

## 2024-12-27 NOTE — PLAN OF CARE
Pt remains intubated and sedated. Unable to tolerate SIMV mode on vent this morning. Continued with high residuals from G tube. Notified MD due to 1000mls pulled this morning, orders received. CT ABD/Pelvis completed. G tube to LIS with output of additional 700mls on suction. Lasix 20mg given, urine output only 200mls. 150mls of stool per ostomy. Repeat blood cultures x 2 obtained as well as sputum culture. Advanced Dobhoff an additional 5cm, currently @ 80cm. Wound care to sacrum performed per orders. New pictures obtained of sacrum and bilat heels/legs.

## 2024-12-28 NOTE — PROGRESS NOTES
[facility] Medicine  Progress Note    Patient Name: Carlos Chahal  MRN: [id number]  Patient Class: IP- Inpatient   Admission Date: 12/15/2024  Length of Stay: 13 days  Attending Physician: Kim Diamond MD  Primary Care Provider: Jose Francisco Klein MD        Subjective     Principal Problem:Sepsis        HPI:  Carlos Chahal is a 34 y.o. male who presents to the ED from nursing home for altered mental status and difficulty breathing.  Patient is found to be hypoxic.  He has a history of anoxic brain injury     This am, pt is on ventilator and levophed at 0.25mcg and ivfs at 75ml/hr.      Spoke with father this am on condition of pt    Overview/Hospital Course:  12/17 ND became more awake yesterday with wound changes - low dose propofol added for pt - able to wean levophed to 0.15mcg.  did tolerate tf last night - check kub this am  12/18 ND pt had to be changed back to ac control last nigth after becoming tachypneic and had low tv.  Tolerating ac mode.  Levophed down to 0.1 mcg  12/19 ND pt toleratign vent - will wean RR today - cont to slowly wean levophed as tolerates - wbc slowly imrpoving - tolerating low dose tfs - will cont to increase tfs as tolerates  12/20 ND tolerating vent - cont to work on feeds and nutritional support  12/21 [place] weekend coverage, in no acute distress, stable vital signs, AC/18/400/5/40%, SpO2 100%. On proprofol for sedation, patient tracks voice and moving head and neck with lid opening. Mild bump in WBC, afebrile, may be associated with the reported residual >200 and tube feeds held. Abdomen, soft and no distension on exam. Will resume as tolerated and monitor.  Blood cx x2, final report no growth. Continue merrem (D5), gentamicin (D2) with mixed MDRO frida from sputum studies and UTI. Replete Mg, continue abx. Continue daily wound care.   12/22 [place] weekend coverage, overnight rate change and tolerating AC12/400/5/40 SpO2 100%, proprofol 15 mcg/kg/min, levophed  0.1mcg/kg/min. Patient without gag reflex on suction. Tube feeds held overnight and resumed, remains on trickle feeds. Ileostomy output 100.   No associated abdominal distension, patient in no distress. Vent settings weaned. Leukocytosis resolved. Continue IV abx for MDRO coverage.   12/23 ND Pt still havign trouble tolerating tfs -change reglan iv; pt is more awake this am - will change to simv for a few hours today   12/24 ND elevated bs with tpn- will add long acting insulin as 12u and restart tpn - ssi adjusted to moderate scale.  12/25 FM:  Holiday coverage, chart reviewed and case discussed with team.  Patient's Levophed is being weaned slowly and he is not tolerating his tube feeds.  Abdominal exam is soft patient has output via ostomy we will check KUB.  Patient is followed by surgery and has a polymicrobial respiratory and urinary tract infection.  Patient has multiple wounds and has a history of anoxic brain injury and is a long-term nursing home resident.  12/26 FM:  Patient is off of vasopressors this morning, patient's KUB noted and will rechallenge tube feedings today.  Patient is tolerating TPN labs noted and his hemoglobin has continued to trend downward Ms. With no visible blood loss.  Patient's other labs noted his white blood cell count is rising despite appropriate antibiotics based on respiratory and urinary cultures.  Patient's prognosis is poor.  12/27 ND pt  still not tolerating tfs - surgery following -  cont tpn; h/h improved after transfusion.  Updated aunt on pts condition  12/28/24:  Patient seen this morning.  Not tolerating tube feedings at this time, remains on vent support.  Poor prognosis at this time.  Patient with poor urine output despite diuretics.  Trial of albumin infusion followed by lasix today.          Review of Systems   Unable to perform ROS: Acuity of condition     Objective:     Vital Signs (Most Recent):  Temp: 97.2 °F (36.2 °C) (12/28/24 0701)  Pulse: 92 (12/28/24  0800)  Resp: (!) 22 (12/28/24 0800)  BP: 108/62 (12/28/24 0800)  SpO2: 100 % (12/28/24 0800) Vital Signs (24h Range):  Temp:  [97 °F (36.1 °C)-97.3 °F (36.3 °C)] 97.2 °F (36.2 °C)  Pulse:  [] 92  Resp:  [13-23] 22  SpO2:  [97 %-100 %] 100 %  BP: ()/(52-72) 108/62     Weight: 68.5 kg (151 lb 0.2 oz)  Body mass index is 23.65 kg/m².    Intake/Output Summary (Last 24 hours) at 12/28/2024 0921  Last data filed at 12/28/2024 0553  Gross per 24 hour   Intake 2542.97 ml   Output 2490 ml   Net 52.97 ml         Physical Exam  Constitutional:       Appearance: He is ill-appearing.      Comments: Thin male; intubated   HENT:      Mouth/Throat:      Mouth: Mucous membranes are moist.   Eyes:      Pupils: Pupils are equal, round, and reactive to light.   Cardiovascular:      Rate and Rhythm: Normal rate and regular rhythm.      Heart sounds: Normal heart sounds.   Pulmonary:      Effort: Prolonged expiration present.      Breath sounds: Transmitted upper airway sounds present. Rhonchi present.      Comments: Breath sounds at lung bases bilaterally   Abdominal:      General: There is no distension.      Palpations: Abdomen is soft. There is no mass.      Tenderness: There is no abdominal tenderness.      Comments: Jtube noted; ileostomy noted   Genitourinary:     Comments: Rojas with yellow urine  Musculoskeletal:      Right lower leg: Edema present.      Left lower leg: Edema present.   Lymphadenopathy:      Cervical: No cervical adenopathy.   Skin:     General: Skin is warm and dry.      Comments: Wounds to sacrum; groin area             Significant Labs: All pertinent labs within the past 24 hours have been reviewed.    Significant Imaging: I have reviewed all pertinent imaging results/findings within the past 24 hours.    Assessment and Plan     * Sepsis  This patient does have evidence of infective focus  My overall impression is septic shock due to MAP < 65 and SBP < 90.  Source: Respiratory and Urinary  "Tract  Antibiotics given-   Antibiotics (72h ago, onward)      Start     Stop Route Frequency Ordered    12/20/24 1438  gentamicin - pharmacy to dose  (gentamicin IVPB (PEDS and ADULTS))        Placed in "And" Linked Group    -- IV pharmacy to manage frequency 12/20/24 1338          Latest lactate reviewed-  No results for input(s): "LACTATE", "POCLAC" in the last 72 hours.    Organ dysfunction indicated by Acute respiratory failure and Encephalopathy    Fluid challenge Ideal Body Weight- The patient's ideal body weight is Ideal body weight: 66.1 kg (145 lb 11.6 oz) which will be used to calculate fluid bolus of 30 ml/kg for treatment of septic shock.      Post- resuscitation assessment Yes Perfusion exam was performed within 6 hours of septic shock presentation after bolus shows Inadequate tissue perfusion assessed by non-invasive monitoring       Will Start Pressors- Levophed for MAP of 65  Source control achieved by: iv zosym. Vanc, ivfs  12/17 wean levophed as tolerates- abxs changed to merrem, cotn vanc - await culture final reports  12/18 wbc slightly imrpoved - cont iv abxs- await final sputum culture - on merrem fro esbl kleb in urine  12/19 cont iv merrem for esbl kleb in urine and proteus in sputum - dc vanc  12/20 wbc improvign - cotn iv merrem  12/24 wbc normal - cont iv abxs  12/25 FM:  Cont GENT.  12/26 FM:  Cont GENT, prognosis poor, pulm/gu/skin infections.  12/27 repeat bc and sputum culture due to increasing wbc - cont gent; completed 10 days of merrem    Edema  Lasix 20mg iv bid - monitor I/o      Hypokalemia  Patient's most recent potassium results are listed below.   Recent Labs     12/26/24  0500 12/27/24  0344 12/28/24  0352   K 4.5 5.1 4.9       Plan  - Replete potassium per protocol  - Monitor potassium Daily  - Patient's hypokalemia is stable, continue below and monitor  12/20 increase pot bicarb to bid  12/23 decrease pot bicard to daily dosing  12/24 improved- stop potassium bicarb " repalcement - monitor    Severe malnutrition  Nutrition consulted. Most recent weight and BMI monitored-     Measurements:  Wt Readings from Last 1 Encounters:   12/16/24 68.5 kg (151 lb 0.2 oz)   Body mass index is 23.65 kg/m².    Patient has been screened and assessed by RD.    Malnutrition Type:  Context:    Level:      Malnutrition Characteristic Summary:       Interventions/Recommendations (treatment strategy):  1. Rec'd Continuous EN: Glucerna 1.5 @ 40mL/r increasing by 5 mL q6hrs to goal rate of 55mL/hr with a continuous 40mL FWF to provide 1980kcal (94% EEN), 109g of protein (100% EPN), and 1962mL FW.   2. Néstor BID via PEG tube to promote wound healing and to provide additional nutrition.           - Do not mix Néstor with formula in a tube-feeding bag         - Pour one packet of Néstor in a clean, small container for mixing.           - Add 4 fl oz (120 mL) water at room temperature.           - Mix well with disposable spoon or tongue blade until all particles are completely hydrated.           - Verify correct tube placement.           - Flush feeding tube with 30 mL water.           -Administer Néstor through feeding tube using a 60-mL or larger syringe.           - Flush with an additional 30 mL water.  3. Rec'd ClinimixE 5/20 @ 80mL/hr to provide 1690kcal (80% EEN), 96g of protein (88% EPN), and 1920mL TFV.      -Add 250mL of 20% lipids 3x/week to provide additional calories.      -Check Triglycerides before adding lipids. 4. RD to follow and make rec's accordingly.    12/18 restart tfs today at 1ml/hr to see if can tolerate feeds  12/19 increase tfs as tolerates  12/23 not tolerating tfs- will get tpn orders and start that until can tolerate tfs better - ncrease regaln 10 mg iv q 6hrs  12/24 con ttfs as tolerates - started on tpn for further nutritional support  12/25 FM:  Check KUB.  12/26 FM:  Resume TF today, monitor.  12/27 on tpn - not tolerating tfs at this time  12/28/24:  Albumin infusion  "today.     Hypomagnesemia  Patient has Abnormal Magnesium: hypomagnesemia. Will continue to monitor electrolytes closely. Will replace the affected electrolytes and repeat labs to be done after interventions completed. The patient's magnesium results have been reviewed and are listed below.  Recent Labs   Lab 12/28/24  0352   MG 1.7      12/17 mag imrpoving - repalce mag today  12/19 replace mag today  12/20 mag oxide bid  12/21 2g Mg  12/23 dose of iv mag today to keep mag level about 2  12/25 FM:  Replace, recheck.    Pneumonia of right lower lobe due to infectious organism  Patient has a diagnosis of pneumonia. The cause of the pneumonia is suspected to be bacterial in etiology but organism is not known. The pneumonia is stable. The patient has the following signs/symptoms of pneumonia: persistent hypoxia  and sputum production. The patient does have a current oxygen requirement and the patient does not have a home oxygen requirement. I have reviewed the pertinent imaging. The following cultures have been collected: Blood cultures and Sputum culture The culture results are listed below.     Current antimicrobial regimen consists of the antibiotics listed below. Will monitor patient closely and continue current treatment plan unchanged.    Antibiotics (From admission, onward)      Start     Stop Route Frequency Ordered    12/20/24 1438  gentamicin - pharmacy to dose  (gentamicin IVPB (PEDS and ADULTS))        Placed in "And" Linked Group    -- IV pharmacy to manage frequency 12/20/24 1338            Microbiology Results (last 7 days)       Procedure Component Value Units Date/Time    Culture, Respiratory with Gram Stain [[id number]] Collected: 12/27/24 1522    Order Status: Completed Specimen: Respiratory from Endotracheal Aspirate Updated: 12/28/24 0332     Gram Stain (Respiratory) Few Gram positive cocci     Gram Stain (Respiratory) Rare Gram positive rods     Gram Stain (Respiratory) Rare WBC's    Blood culture " [[id number]] Collected: 12/27/24 0837    Order Status: Completed Specimen: Blood Updated: 12/28/24 0115     Blood Culture, Routine No Growth to date    Blood culture [[id number]] Collected: 12/27/24 0838    Order Status: Completed Specimen: Blood Updated: 12/28/24 0115     Blood Culture, Routine No Growth to date    Culture, Respiratory with Gram Stain [[id number]]  (Abnormal)  (Susceptibility) Collected: 12/15/24 0935    Order Status: Completed Specimen: Respiratory from Endotracheal Aspirate Updated: 12/23/24 1204     Respiratory Culture No S aureus or Pseudomonas isolated.      ACINETOBACTER BAUMANNII   Many        PROTEUS MIRABILIS  Few        KLEBSIELLA PNEUMONIAE   Few  ESBL        Gram Stain (Respiratory) Few WBC's     Gram Stain (Respiratory) Many Gram positive rods     Gram Stain (Respiratory) Few Gram negative cocci        12/17 dc zosyn with recent esbl kleb in urine - will change to merrem - cont vanc until final cultures obtained - change vent to simv; add nebs  12/18 cotn merrem and vanc - await final sputum cutlure - cont vent on ac control; nebs - wbc imrpoved slightly; lasix 20mg iv for edema today  12/19  dc vanc - proteus grwoign from sputum - cont merrem and nebs; lasix today - wean rr on vent  12/20 wnc improved - cont merrem - nebs and vent - another dose of lasix today for edema - fio2 increased  12/23 cont iv gent and merren due to sent of multiple bugs (acinetobacter/kleb/proteus)  12/24 cont current abxs  12/25 FM:  Cont GENT  12/26 FM:  Cont GENT, poor prognosis.  12/27 on gent - get new sputum culture today due to elevated wbc - cont vent and nebs  12/28/24:Gram positive cocci/rods on sputum, blood cultures negative     Sacral wound, sequela  Local wound care with dakins bid  Iv abxs  12/27 prob debridementt in or on 12/30    Open wound of groin  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 - cont iv abxs and local wound care      Microcytic anemia  Anemia is likely due to chronic  infections Most recent hemoglobin and hematocrit are listed below.  Recent Labs     12/26/24  0510 12/27/24  0344 12/28/24  0352   HGB 6.7* 9.9* 8.7*   HCT 21.3* 30.4* 26.8*       Plan  - Monitor serial CBC: Daily  - Transfuse PRBC if patient becomes hemodynamically unstable, symptomatic or H/H drops below 7/21.  - Patient has not received any PRBC transfusions to date  - Patient's anemia is currently worsening. Will adjust treatment as follows: 2uprbcs today  12/17 h/h Improved  12/20 h/h holding  12/26 FM:  Transfuse today.  12/27 hh improved after transfusion  12/28/24:  Continue daily monitoring.    History of anoxic brain injury  12/25 FM:  Poor prognosis.      Urinary tract infection associated with indwelling urethral catheter  Await new urine culture =- currently on zosyn/vanc  12/17 change to merrem/vanc  12/18 has esbl klebsiella - cont merrem  12/23 on merrem  12/25 FM:  Continue Gent, CS reviewed.  12/26 FM:  WBC increasing, monitor, on appropriate abx based on cultures.  12/28/24:  Blood culture negative to date, sputum culture with some gram positive cocci and rods.  Continue abx for now.  Gentamicin per pharmacy to dose.     Type 1 diabetes  Patient's FSGs are uncontrolled due to hyperglycemia on current medication regimen.  Last A1c reviewed-   Lab Results   Component Value Date    HGBA1C 6.6 (H) 12/16/2024     Most recent fingerstick glucose reviewed-   Recent Labs   Lab 12/27/24  1113 12/27/24  1636 12/27/24  1944 12/27/24  2346   POCTGLUCOSE 229* 235* 221* 263*       Current correctional scale  Low  Maintain anti-hyperglycemic dose as follows-   Antihyperglycemics (From admission, onward)      Start     Stop Route Frequency Ordered    12/27/24 0900  insulin glargine U-100 (Lantus) pen 16 Units         -- SubQ Daily 12/27/24 0745    12/24/24 0807  insulin aspart U-100 pen 0-10 Units         -- SubQ Every 4 hours PRN 12/24/24 0707          Hold Oral hypoglycemics while patient is in the  [facility].  12/17 A1c improved to 6.6%  12/24 due to tpn - add lantus 12 u daily - ssi changed to moderate scale  12/25 FM:  BS logs noted, continue coverage.  12/26 FM:  Cont SSI.  12/27 increase lantus to 16u daily      VTE Risk Mitigation (From admission, onward)           Ordered     enoxaparin injection 40 mg  Daily         12/16/24 0815     IP VTE LOW RISK PATIENT  Once         12/15/24 1201     Place sequential compression device  Until discontinued         12/15/24 1201                    Discharge Planning   JOSE MANUEL:      Code Status: Full Code   Medical Readiness for Discharge Date:   Discharge Plan A: Other (TBD)   Discharge Delays: None known at this time                    LYNN Wakefield  Department of Hospital Medicine   [facility]

## 2024-12-28 NOTE — ASSESSMENT & PLAN NOTE
Await new urine culture =- currently on zosyn/vanc  12/17 change to merrem/vanc  12/18 has esbl klebsiella - cont merrem  12/23 on merrem  12/25 FM:  Continue Gent, CS reviewed.  12/26 FM:  WBC increasing, monitor, on appropriate abx based on cultures.  12/28/24:  Blood culture negative to date, sputum culture with some gram positive cocci and rods.  Continue abx for now.  Gentamicin per pharmacy to dose.

## 2024-12-28 NOTE — ASSESSMENT & PLAN NOTE
"Patient has a diagnosis of pneumonia. The cause of the pneumonia is suspected to be bacterial in etiology but organism is not known. The pneumonia is stable. The patient has the following signs/symptoms of pneumonia: persistent hypoxia  and sputum production. The patient does have a current oxygen requirement and the patient does not have a home oxygen requirement. I have reviewed the pertinent imaging. The following cultures have been collected: Blood cultures and Sputum culture The culture results are listed below.     Current antimicrobial regimen consists of the antibiotics listed below. Will monitor patient closely and continue current treatment plan unchanged.    Antibiotics (From admission, onward)      Start     Stop Route Frequency Ordered    12/20/24 1438  gentamicin - pharmacy to dose  (gentamicin IVPB (PEDS and ADULTS))        Placed in "And" Linked Group    -- IV pharmacy to manage frequency 12/20/24 1338            Microbiology Results (last 7 days)       Procedure Component Value Units Date/Time    Culture, Respiratory with Gram Stain [[id number]] Collected: 12/27/24 1522    Order Status: Completed Specimen: Respiratory from Endotracheal Aspirate Updated: 12/28/24 0332     Gram Stain (Respiratory) Few Gram positive cocci     Gram Stain (Respiratory) Rare Gram positive rods     Gram Stain (Respiratory) Rare WBC's    Blood culture [[id number]] Collected: 12/27/24 0837    Order Status: Completed Specimen: Blood Updated: 12/28/24 0115     Blood Culture, Routine No Growth to date    Blood culture [[id number]] Collected: 12/27/24 0838    Order Status: Completed Specimen: Blood Updated: 12/28/24 0115     Blood Culture, Routine No Growth to date    Culture, Respiratory with Gram Stain [[id number]]  (Abnormal)  (Susceptibility) Collected: 12/15/24 0935    Order Status: Completed Specimen: Respiratory from Endotracheal Aspirate Updated: 12/23/24 1204     Respiratory Culture No S aureus or Pseudomonas isolated. "      ACINETOBACTER BAUMANNII   Many        PROTEUS MIRABILIS  Few        KLEBSIELLA PNEUMONIAE   Few  ESBL        Gram Stain (Respiratory) Few WBC's     Gram Stain (Respiratory) Many Gram positive rods     Gram Stain (Respiratory) Few Gram negative cocci        12/17 dc zosyn with recent esbl kleb in urine - will change to merrem - cont vanc until final cultures obtained - change vent to simv; add nebs  12/18 cotn merrem and vanc - await final sputum cutlure - cont vent on ac control; nebs - wbc imrpoved slightly; lasix 20mg iv for edema today  12/19  dc vanc - proteus grwoign from sputum - cont merrem and nebs; lasix today - wean rr on vent  12/20 wnc improved - cont merrem - nebs and vent - another dose of lasix today for edema - fio2 increased  12/23 cont iv gent and merren due to sent of multiple bugs (acinetobacter/kleb/proteus)  12/24 cont current abxs  12/25 FM:  Cont GENT  12/26 FM:  Cont GENT, poor prognosis.  12/27 on gent - get new sputum culture today due to elevated wbc - cont vent and nebs  12/28/24:Gram positive cocci/rods on sputum, blood cultures negative

## 2024-12-28 NOTE — PLAN OF CARE
No changes in patient condition. Pt remains intubated and sedated. Remains on TPN, glucose better controlled. Albumin given today. Urine output =1000mls. 175mls of stool from ostomy so far. G tube remains on LIS. Wound care to sacrum completed per orders. Groin and thigh wound care completed per previous shift.

## 2024-12-28 NOTE — ASSESSMENT & PLAN NOTE
Patient's FSGs are uncontrolled due to hyperglycemia on current medication regimen.  Last A1c reviewed-   Lab Results   Component Value Date    HGBA1C 6.6 (H) 12/16/2024     Most recent fingerstick glucose reviewed-   Recent Labs   Lab 12/27/24  1113 12/27/24  1636 12/27/24  1944 12/27/24  2346   POCTGLUCOSE 229* 235* 221* 263*       Current correctional scale  Low  Maintain anti-hyperglycemic dose as follows-   Antihyperglycemics (From admission, onward)    Start     Stop Route Frequency Ordered    12/27/24 0900  insulin glargine U-100 (Lantus) pen 16 Units         -- SubQ Daily 12/27/24 0745    12/24/24 0807  insulin aspart U-100 pen 0-10 Units         -- SubQ Every 4 hours PRN 12/24/24 0707        Hold Oral hypoglycemics while patient is in the hospital.  12/17 A1c improved to 6.6%  12/24 due to tpn - add lantus 12 u daily - ssi changed to moderate scale  12/25 FM:  BS logs noted, continue coverage.  12/26 FM:  Cont SSI.  12/27 increase lantus to 16u daily

## 2024-12-28 NOTE — ASSESSMENT & PLAN NOTE
Anemia is likely due to chronic infections Most recent hemoglobin and hematocrit are listed below.  Recent Labs     12/26/24  0510 12/27/24  0344 12/28/24  0352   HGB 6.7* 9.9* 8.7*   HCT 21.3* 30.4* 26.8*       Plan  - Monitor serial CBC: Daily  - Transfuse PRBC if patient becomes hemodynamically unstable, symptomatic or H/H drops below 7/21.  - Patient has not received any PRBC transfusions to date  - Patient's anemia is currently worsening. Will adjust treatment as follows: 2uprbcs today  12/17 h/h Improved  12/20 h/h holding  12/26 FM:  Transfuse today.  12/27 hh improved after transfusion  12/28/24:  Continue daily monitoring.

## 2024-12-28 NOTE — ASSESSMENT & PLAN NOTE
Patient's most recent potassium results are listed below.   Recent Labs     12/26/24  0500 12/27/24  0344 12/28/24  0352   K 4.5 5.1 4.9       Plan  - Replete potassium per protocol  - Monitor potassium Daily  - Patient's hypokalemia is stable, continue below and monitor  12/20 increase pot bicarb to bid  12/23 decrease pot bicard to daily dosing  12/24 improved- stop potassium bicarb repalcement - monitor

## 2024-12-28 NOTE — NURSING
Pt remains on the vent, no setting changes this shift. ABNL ABG results. NG, PEG, Rojas and JADEN PICC maintained. TPN and Diprivan infusing. Wound care with large amount of secretions. Repositioned, heels off bed. VSS, afebrile. Contact precautions maintained. Continue to observe.

## 2024-12-28 NOTE — PLAN OF CARE
Problem: Pneumonia  Goal: Resolution of Infection Signs and Symptoms  Outcome: Progressing  Goal: Effective Oxygenation and Ventilation  Outcome: Progressing     Problem: Skin Injury Risk Increased  Goal: Skin Health and Integrity  Outcome: Not Progressing     Problem: Mechanical Ventilation Invasive  Goal: Effective Communication  Outcome: Not Progressing  Goal: Optimal Device Function  Outcome: Not Progressing  Goal: Mechanical Ventilation Liberation  Outcome: Not Progressing  Goal: Optimal Nutrition Delivery  Outcome: Not Progressing  Goal: Absence of Device-Related Skin and Tissue Injury  Outcome: Not Progressing  Goal: Absence of Ventilator-Induced Lung Injury  Outcome: Not Progressing     Problem: Artificial Airway  Goal: Effective Communication  Outcome: Not Progressing  Goal: Optimal Device Function  Outcome: Not Progressing  Goal: Absence of Device-Related Skin or Tissue Injury  Outcome: Not Progressing     Problem: Adult Inpatient Plan of Care  Goal: Plan of Care Review  Outcome: Not Progressing  Goal: Patient-Specific Goal (Individualized)  Outcome: Not Progressing  Goal: Absence of Hospital-Acquired Illness or Injury  Outcome: Not Progressing  Goal: Optimal Comfort and Wellbeing  Outcome: Not Progressing  Goal: Readiness for Transition of Care  Outcome: Not Progressing     Problem: Diabetes Comorbidity  Goal: Blood Glucose Level Within Targeted Range  Outcome: Not Progressing     Problem: Wound  Goal: Optimal Coping  Outcome: Not Progressing  Goal: Optimal Functional Ability  Outcome: Not Progressing  Goal: Absence of Infection Signs and Symptoms  Outcome: Not Progressing  Goal: Improved Oral Intake  Outcome: Not Progressing  Goal: Optimal Pain Control and Function  Outcome: Not Progressing  Goal: Skin Health and Integrity  Outcome: Not Progressing  Goal: Optimal Wound Healing  Outcome: Not Progressing     Problem: Infection  Goal: Absence of Infection Signs and Symptoms  Outcome: Not Progressing      Problem: Pneumonia  Goal: Fluid Balance  Outcome: Not Progressing     Problem: Sepsis/Septic Shock  Goal: Optimal Coping  Outcome: Not Progressing  Goal: Absence of Bleeding  Outcome: Not Progressing  Goal: Blood Glucose Level Within Targeted Range  Outcome: Not Progressing  Goal: Absence of Infection Signs and Symptoms  Outcome: Not Progressing  Goal: Optimal Nutrition Intake  Outcome: Not Progressing     Problem: Fall Injury Risk  Goal: Absence of Fall and Fall-Related Injury  Outcome: Not Progressing     Problem: Electrolyte Imbalance  Goal: Electrolyte Balance  Outcome: Not Progressing

## 2024-12-28 NOTE — ASSESSMENT & PLAN NOTE
Nutrition consulted. Most recent weight and BMI monitored-     Measurements:  Wt Readings from Last 1 Encounters:   12/16/24 68.5 kg (151 lb 0.2 oz)   Body mass index is 23.65 kg/m².    Patient has been screened and assessed by RD.    Malnutrition Type:  Context:    Level:      Malnutrition Characteristic Summary:       Interventions/Recommendations (treatment strategy):  1. Rec'd Continuous EN: Glucerna 1.5 @ 40mL/r increasing by 5 mL q6hrs to goal rate of 55mL/hr with a continuous 40mL FWF to provide 1980kcal (94% EEN), 109g of protein (100% EPN), and 1962mL FW.   2. Néstor BID via PEG tube to promote wound healing and to provide additional nutrition.           - Do not mix Néstor with formula in a tube-feeding bag         - Pour one packet of Néstor in a clean, small container for mixing.           - Add 4 fl oz (120 mL) water at room temperature.           - Mix well with disposable spoon or tongue blade until all particles are completely hydrated.           - Verify correct tube placement.           - Flush feeding tube with 30 mL water.           -Administer Néstor through feeding tube using a 60-mL or larger syringe.           - Flush with an additional 30 mL water.  3. Rec'd ClinimixE 5/20 @ 80mL/hr to provide 1690kcal (80% EEN), 96g of protein (88% EPN), and 1920mL TFV.      -Add 250mL of 20% lipids 3x/week to provide additional calories.      -Check Triglycerides before adding lipids. 4. RD to follow and make rec's accordingly.    12/18 restart tfs today at 1ml/hr to see if can tolerate feeds  12/19 increase tfs as tolerates  12/23 not tolerating tfs- will get tpn orders and start that until can tolerate tfs better - ncrease regaln 10 mg iv q 6hrs  12/24 con ttfs as tolerates - started on tpn for further nutritional support  12/25 FM:  Check KUB.  12/26 FM:  Resume TF today, monitor.  12/27 on tpn - not tolerating tfs at this time  12/28/24:  Albumin infusion today.

## 2024-12-28 NOTE — EICU
eICU Note :    Called by the [facility]:    Problem: ABG 7.37/50/59/87  7.37/40.5/31.3/85%      Pertinent History and labs reviewed : 34    Sepsis    Type 1 diabetes    Urinary tract infection associated with indwelling urethral catheter    History of anoxic brain injury    Microcytic anemia    Open wound of groin    Sacral wound, sequela    Pneumonia of right lower lobe due to infectious organism    Hypomagnesemia    Severe malnutrition    Hypokalemia    Edema         Treatment /Intervention given: Rt lateral decube position and aggressive suctioning        Stacy Millan M.D  eICU Physician

## 2024-12-28 NOTE — PROGRESS NOTES
"Pharmacokinetic Follow Up: Gentamicin     Assessment of levels:   Random concentration of 1.8 mcg/mL (with last dose given 12/24 1620.     Regimen Plan:   Waiting for gentamicin level to come down within range before beginning dosing again.  Random level scheduled for tomorrow (12/28/24) at 0430.  With increasing creatine I would be very hesitant to continue Gent without any cultures requiring gent.    DRUG levels (last 3 results):  No results for input(s): "AMIKACINPEAK", "AMIKACINTROU", "AMIKACINRAND", "AMIKACIN" in the last 72 hours.    Recent Labs   Lab Result Units 12/26/24  1458 12/27/24  1501 12/28/24  1503   Gentamicin, Random ug/mL  --  2.1 1.8   Gentamicin, Trough ug/mL 3.0*  --   --        No results for input(s): "TOBRA8", "TOBRA10", "TOBRA12", "TOBRARND", "TOBRAMYCIN", "TOBRAPEAK", "TOBRATROUGH", "TOBRAMYCINPE", "TOBRAMYCINRA", "TOBRAMYCINTR" in the last 72 hours.    Thank you for allowing pharmacy participate in this patient's care.     Sally Mckinney, PharmD  Clinical Pharmacist, [facility]/[employer] [place] Team  [email or web address]  office [phone]\      Patient brief summary:  Carlos Chahal is a 34 y.o. male initiated on aminoglycoside therapy for treatment of lower respiratory infection    Drug Allergies:   Review of patient's allergies indicates:   Allergen Reactions    Guaifenesin Hives    Codeine Itching       Actual Body Weight:   68.5khg  Adjust Body Weight:   67.1kg    Ideal Body Weight:  66.1kg    Renal Function:   Estimated Creatinine Clearance: 65.5 mL/min (A) (based on SCr of 1.5 mg/dL (H)).,     Dialysis Method (if applicable):  N/A    CBC (last 72 hours):  Recent Labs   Lab Result Units 12/26/24  0412 12/26/24  0510 12/27/24  0344 12/28/24  0352   WBC K/uL 17.38* 17.74* 19.44* 20.56*   Hemoglobin g/dL 6.3* 6.7* 9.9* 8.7*   Hematocrit % 22.9* 21.3* 30.4* 26.8*   Platelets K/uL 170 171 174 173   Gran % % 79.3*  --  78.1* 79.5*   Lymph % % 6.7*  --  6.0* 5.3*   Mono % % 6.4  --  7.4 8.3 "   Eosinophil % % 5.6  --  6.5 5.3   Basophil % % 0.4  --  0.4 0.3   Differential Method  Automated  --  Automated Automated       Metabolic Panel (last 72 hours):  Recent Labs   Lab Result Units 12/26/24  0500 12/27/24  0344 12/28/24  0352   Sodium mmol/L 131* 131* 127*   Potassium mmol/L 4.5 5.1 4.9   Chloride mmol/L 102 100 97   CO2 mmol/L 29 30* 32*   Glucose mg/dL 265* 279* 239*   BUN mg/dL 27* 40* 50*   Creatinine mg/dL 1.0 1.2 1.5*   Albumin g/dL <0.6* <0.6* <0.6*   Total Bilirubin mg/dL 0.2 0.3 0.4   Alkaline Phosphatase U/L 326* 326* 363*   AST U/L 18 20 41*   ALT U/L <6* <6* 10   Magnesium mg/dL 1.6 1.7 1.7       Aminoglycoside Administrations:  aminoglycosides given in last 96 hours        No antibiotic orders with administrations found.                    Microbiologic Results:  Microbiology Results (last 7 days)       Procedure Component Value Units Date/Time    Culture, Respiratory with Gram Stain [[id number]] Collected: 12/27/24 1522    Order Status: Completed Specimen: Respiratory from Endotracheal Aspirate Updated: 12/28/24 0332     Gram Stain (Respiratory) Few Gram positive cocci     Gram Stain (Respiratory) Rare Gram positive rods     Gram Stain (Respiratory) Rare WBC's    Blood culture [[id number]] Collected: 12/27/24 0837    Order Status: Completed Specimen: Blood Updated: 12/28/24 0115     Blood Culture, Routine No Growth to date    Blood culture [[id number]] Collected: 12/27/24 0838    Order Status: Completed Specimen: Blood Updated: 12/28/24 0115     Blood Culture, Routine No Growth to date    Culture, Respiratory with Gram Stain [[id number]]  (Abnormal)  (Susceptibility) Collected: 12/15/24 0935    Order Status: Completed Specimen: Respiratory from Endotracheal Aspirate Updated: 12/23/24 1204     Respiratory Culture No S aureus or Pseudomonas isolated.      ACINETOBACTER BAUMANNII   Many        PROTEUS MIRABILIS  Few        KLEBSIELLA PNEUMONIAE   Few  ESBL        Gram Stain  (Respiratory) Few WBC's     Gram Stain (Respiratory) Many Gram positive rods     Gram Stain (Respiratory) Few Gram negative cocci

## 2024-12-28 NOTE — ASSESSMENT & PLAN NOTE
Patient has Abnormal Magnesium: hypomagnesemia. Will continue to monitor electrolytes closely. Will replace the affected electrolytes and repeat labs to be done after interventions completed. The patient's magnesium results have been reviewed and are listed below.  Recent Labs   Lab 12/28/24  0352   MG 1.7      12/17 mag imrpoving - repalce mag today  12/19 replace mag today  12/20 mag oxide bid  12/21 2g Mg  12/23 dose of iv mag today to keep mag level about 2  12/25 FM:  Replace, recheck.

## 2024-12-28 NOTE — SUBJECTIVE & OBJECTIVE
Review of Systems   Unable to perform ROS: Acuity of condition     Objective:     Vital Signs (Most Recent):  Temp: 97.2 °F (36.2 °C) (12/28/24 0701)  Pulse: 92 (12/28/24 0800)  Resp: (!) 22 (12/28/24 0800)  BP: 108/62 (12/28/24 0800)  SpO2: 100 % (12/28/24 0800) Vital Signs (24h Range):  Temp:  [97 °F (36.1 °C)-97.3 °F (36.3 °C)] 97.2 °F (36.2 °C)  Pulse:  [] 92  Resp:  [13-23] 22  SpO2:  [97 %-100 %] 100 %  BP: ()/(52-72) 108/62     Weight: 68.5 kg (151 lb 0.2 oz)  Body mass index is 23.65 kg/m².    Intake/Output Summary (Last 24 hours) at 12/28/2024 0921  Last data filed at 12/28/2024 0553  Gross per 24 hour   Intake 2542.97 ml   Output 2490 ml   Net 52.97 ml         Physical Exam  Constitutional:       Appearance: He is ill-appearing.      Comments: Thin male; intubated   HENT:      Mouth/Throat:      Mouth: Mucous membranes are moist.   Eyes:      Pupils: Pupils are equal, round, and reactive to light.   Cardiovascular:      Rate and Rhythm: Normal rate and regular rhythm.      Heart sounds: Normal heart sounds.   Pulmonary:      Effort: Prolonged expiration present.      Breath sounds: Transmitted upper airway sounds present. Rhonchi present.      Comments: Breath sounds at lung bases bilaterally   Abdominal:      General: There is no distension.      Palpations: Abdomen is soft. There is no mass.      Tenderness: There is no abdominal tenderness.      Comments: Jtube noted; ileostomy noted   Genitourinary:     Comments: Rojas with yellow urine  Musculoskeletal:      Right lower leg: Edema present.      Left lower leg: Edema present.   Lymphadenopathy:      Cervical: No cervical adenopathy.   Skin:     General: Skin is warm and dry.      Comments: Wounds to sacrum; groin area             Significant Labs: All pertinent labs within the past 24 hours have been reviewed.    Significant Imaging: I have reviewed all pertinent imaging results/findings within the past 24 hours.

## 2024-12-29 NOTE — ASSESSMENT & PLAN NOTE
Needs formal operative debridement.  Overall clinical status as a limitation to this.  We will continue Dakin's moistened gauze with Santyl until operative intervention can be obtained.

## 2024-12-29 NOTE — ASSESSMENT & PLAN NOTE
Antibiotics and vent support per primary  Likely needs trach since unable to progress to spontaneous ventilation  CXR concerning for RLL infiltrate/atelectasis needing CPT

## 2024-12-29 NOTE — SUBJECTIVE & OBJECTIVE
Interval History: Still with issues with tube feeds and placement.  Less ostomy output.    Medications:  Continuous Infusions:   0.9% NaCl   Intravenous Continuous 5 mL/hr at 12/29/24 1218 Rate Verify at 12/29/24 1218    Amino acid 5% - dextrose 20% (CLINIMIX-E) solution with additives. CENTRAL LINE ONLY (1650mOsm/L)   Intravenous Continuous 80 mL/hr at 12/29/24 1218 Rate Verify at 12/29/24 1218    Amino acid 5% - dextrose 20% (CLINIMIX-E) solution with additives. CENTRAL LINE ONLY (1650mOsm/L)  80 mL/hr Intravenous Continuous        D10W   Intravenous Continuous PRN        NORepinephrine bitartrate-D5W  0-3 mcg/kg/min Intravenous Continuous   Stopped at 12/25/24 1430     Scheduled Meds:   albuterol sulfate  2.5 mg Nebulization Q4H WAKE    aspirin  81 mg Per G Tube Daily    collagenase   Topical (Top) Daily    enoxparin  40 mg Subcutaneous Daily    famotidine  20 mg Per G Tube BID    FLUoxetine  20 mg Per G Tube Daily    furosemide (LASIX) injection  20 mg Intravenous Q12H    insulin glargine U-100  16 Units Subcutaneous Daily    levothyroxine  150 mcg Per G Tube Before breakfast    lurasidone  40 mg Per G Tube Daily    magnesium oxide  400 mg Per G Tube BID    metoclopramide  10 mg Intravenous Q6H    sodium hypochlorite 0.125%   Topical (Top) Daily     PRN Meds:  Current Facility-Administered Medications:     0.9%  NaCl infusion (for blood administration), , Intravenous, Q24H PRN    D10W, , Intravenous, Continuous PRN    dextrose 50%, 12.5 g, Intravenous, PRN    dextrose 50%, 25 g, Intravenous, PRN    gentamicin - pharmacy to dose, , Intravenous, pharmacy to manage frequency    glucagon (human recombinant), 1 mg, Intramuscular, PRN    insulin aspart U-100, 0-10 Units, Subcutaneous, Q4H PRN    melatonin, 6 mg, Per G Tube, Nightly PRN    midazolam, 2 mg, Intravenous, Q1H PRN    propofoL, 0-50 mcg/kg/min, Intravenous, Daily PRN    sodium chloride 0.9%, 10 mL, Intravenous, PRN    Flushing PICC/Midline Protocol, , ,  Until Discontinued **AND** sodium chloride 0.9%, 10 mL, Intravenous, Q12H PRN     Review of patient's allergies indicates:   Allergen Reactions    Guaifenesin Hives    Codeine Itching     Objective:     Vital Signs (Most Recent):  VS reviewed Vital Signs (24h Range):  Temp:  [97 °F (36.1 °C)-97.4 °F (36.3 °C)] 97 °F (36.1 °C)  Pulse:  [76-93] 77  Resp:  [15-25] 17  SpO2:  [92 %-100 %] 100 %  BP: ()/(46-67) 85/51     Weight: 68.5 kg (151 lb 0.2 oz)  Body mass index is 23.65 kg/m².    Intake/Output - Last 3 Shifts         12/27 0700  12/28 0659 12/28 0700  12/29 0659 12/29 0700  12/30 0659    P.O. 0      I.V. (mL/kg) 264.1 (3.9) 335.9 (4.9) 89.4 (1.3)    Blood       NG/ 60 60    TPN 2138.9 1829.6 595.8    Total Intake(mL/kg) 2603 (38) 2225.5 (32.5) 745.2 (10.9)    Urine (mL/kg/hr) 740 (0.5) 2100 (1.3)     Drains 2400 900     Stool 350 375     Total Output 3490 3375     Net -887 -1149.5 +745.2                    Physical Exam  Vitals and nursing note reviewed.   Constitutional:       General: He is not in acute distress.     Appearance: He is ill-appearing.      Comments: Thin male; intubated   HENT:      Head: Normocephalic and atraumatic.      Nose:      Comments: NEFT in place L nare     Mouth/Throat:      Mouth: Mucous membranes are moist.   Eyes:      Pupils: Pupils are equal, round, and reactive to light.   Cardiovascular:      Rate and Rhythm: Normal rate and regular rhythm.   Pulmonary:      Effort: Prolonged expiration present. No respiratory distress.      Breath sounds: Transmitted upper airway sounds present. Rhonchi and rales present.      Comments: Decreased breath sounds at lung bases bilaterally  Abdominal:      General: There is no distension.      Palpations: Abdomen is soft.      Tenderness: There is no abdominal tenderness.      Comments: G tube noted; Ostomy L mid abdomen   Genitourinary:     Comments: Rojas with yellow urine  Musculoskeletal:         General: No swelling.      Cervical  back: Normal range of motion and neck supple.   Skin:     General: Skin is warm and dry.      Comments: Wounds to sacrum; groin area as noted on media images          Significant Labs:  I have reviewed all pertinent lab results within the past 24 hours.    Significant Diagnostics:  I have reviewed all pertinent imaging results/findings within the past 24 hours.

## 2024-12-29 NOTE — SUBJECTIVE & OBJECTIVE
Interval History: Nursing reports issues with tolerance of spontaneous ventilation.  RR rises quickly.  Still with greater than 900 bilious fluid out g tube.    Medications:  Continuous Infusions:   0.9% NaCl   Intravenous Continuous 5 mL/hr at 12/29/24 1218 Rate Verify at 12/29/24 1218    Amino acid 5% - dextrose 20% (CLINIMIX-E) solution with additives. CENTRAL LINE ONLY (1650mOsm/L)   Intravenous Continuous 80 mL/hr at 12/29/24 1218 Rate Verify at 12/29/24 1218    Amino acid 5% - dextrose 20% (CLINIMIX-E) solution with additives. CENTRAL LINE ONLY (1650mOsm/L)  80 mL/hr Intravenous Continuous        D10W   Intravenous Continuous PRN        NORepinephrine bitartrate-D5W  0-3 mcg/kg/min Intravenous Continuous   Stopped at 12/25/24 1430     Scheduled Meds:   albuterol sulfate  2.5 mg Nebulization Q4H WAKE    aspirin  81 mg Per G Tube Daily    collagenase   Topical (Top) Daily    enoxparin  40 mg Subcutaneous Daily    famotidine  20 mg Per G Tube BID    FLUoxetine  20 mg Per G Tube Daily    furosemide (LASIX) injection  20 mg Intravenous Q12H    insulin glargine U-100  16 Units Subcutaneous Daily    levothyroxine  150 mcg Per G Tube Before breakfast    lurasidone  40 mg Per G Tube Daily    magnesium oxide  400 mg Per G Tube BID    metoclopramide  10 mg Intravenous Q6H    sodium hypochlorite 0.125%   Topical (Top) Daily     PRN Meds:  Current Facility-Administered Medications:     0.9%  NaCl infusion (for blood administration), , Intravenous, Q24H PRN    D10W, , Intravenous, Continuous PRN    dextrose 50%, 12.5 g, Intravenous, PRN    dextrose 50%, 25 g, Intravenous, PRN    gentamicin - pharmacy to dose, , Intravenous, pharmacy to manage frequency    glucagon (human recombinant), 1 mg, Intramuscular, PRN    insulin aspart U-100, 0-10 Units, Subcutaneous, Q4H PRN    melatonin, 6 mg, Per G Tube, Nightly PRN    midazolam, 2 mg, Intravenous, Q1H PRN    propofoL, 0-50 mcg/kg/min, Intravenous, Daily PRN    sodium chloride  0.9%, 10 mL, Intravenous, PRN    Flushing PICC/Midline Protocol, , , Until Discontinued **AND** sodium chloride 0.9%, 10 mL, Intravenous, Q12H PRN     Review of patient's allergies indicates:   Allergen Reactions    Guaifenesin Hives    Codeine Itching     Objective:     Vital Signs (Most Recent):  Temp: 97 °F (36.1 °C) (12/29/24 1502)  Pulse: 77 (12/29/24 1515)  Resp: 17 (12/29/24 1515)  BP: (!) 85/51 (12/29/24 1515)  SpO2: 100 % (12/29/24 1515) Vital Signs (24h Range):  Temp:  [97 °F (36.1 °C)-97.4 °F (36.3 °C)] 97 °F (36.1 °C)  Pulse:  [76-93] 77  Resp:  [15-25] 17  SpO2:  [92 %-100 %] 100 %  BP: ()/(46-67) 85/51     Weight: 68.5 kg (151 lb 0.2 oz)  Body mass index is 23.65 kg/m².    Intake/Output - Last 3 Shifts         12/27 0700  12/28 0659 12/28 0700  12/29 0659 12/29 0700  12/30 0659    P.O. 0      I.V. (mL/kg) 264.1 (3.9) 335.9 (4.9) 89.4 (1.3)    Blood       NG/ 60 60    TPN 2138.9 1829.6 595.8    Total Intake(mL/kg) 2603 (38) 2225.5 (32.5) 745.2 (10.9)    Urine (mL/kg/hr) 740 (0.5) 2100 (1.3)     Drains 2400 900     Stool 350 375     Total Output 3490 3375     Net -887 -1149.5 +745.2                    Physical Exam  Vitals and nursing note reviewed.   Constitutional:       General: He is not in acute distress.     Appearance: He is ill-appearing.      Comments: Thin male; intubated   HENT:      Head: Normocephalic and atraumatic.      Nose:      Comments: NEFT in place L nare     Mouth/Throat:      Mouth: Mucous membranes are moist.   Eyes:      Pupils: Pupils are equal, round, and reactive to light.   Cardiovascular:      Rate and Rhythm: Normal rate and regular rhythm.   Pulmonary:      Effort: Prolonged expiration present. No respiratory distress.      Breath sounds: Transmitted upper airway sounds present. Rhonchi and rales present.      Comments: Decreased breath sounds at lung bases bilaterally, worse on right  Abdominal:      General: There is no distension.      Palpations: Abdomen is  soft.      Tenderness: There is no abdominal tenderness.      Comments: G tube noted; Ostomy L mid abdomen   Genitourinary:     Comments: Rojas with yellow urine  Skin:     General: Skin is warm and dry.      Comments: Wounds to sacrum; groin area as noted on media images          Significant Labs:  I have reviewed all pertinent lab results within the past 24 hours.  CBC:   Recent Labs   Lab 12/29/24 0349   WBC 17.69*   RBC 2.86*   HGB 8.0*   HCT 24.7*      MCV 86   MCH 28.0   MCHC 32.4     BMP:   Recent Labs   Lab 12/29/24  0349   *   *   K 4.5   CL 98   CO2 31*   BUN 52*   CREATININE 1.6*   CALCIUM 10.5   MG 1.6     CMP:   Recent Labs   Lab 12/29/24 0349   *   CALCIUM 10.5   ALBUMIN 0.7*   PROT 5.2*   *   K 4.5   CO2 31*   CL 98   BUN 52*   CREATININE 1.6*   ALKPHOS 328*   ALT 6*   AST 25   BILITOT 0.5       Significant Diagnostics:  I have reviewed all pertinent imaging results/findings within the past 24 hours.  CXR: I have reviewed all pertinent results/findings within the past 24 hours and my personal findings are:  atelectasis versus infiltrate to RLL

## 2024-12-29 NOTE — ASSESSMENT & PLAN NOTE
Patient's FSGs are uncontrolled due to hyperglycemia on current medication regimen.  Last A1c reviewed-   Lab Results   Component Value Date    HGBA1C 6.6 (H) 12/16/2024     Most recent fingerstick glucose reviewed-   Recent Labs   Lab 12/28/24 2011 12/28/24  2338 12/29/24  0349 12/29/24  0807   POCTGLUCOSE 202* 245* 242* 342*       Current correctional scale  Low  Maintain anti-hyperglycemic dose as follows-   Antihyperglycemics (From admission, onward)    Start     Stop Route Frequency Ordered    12/27/24 0900  insulin glargine U-100 (Lantus) pen 16 Units         -- SubQ Daily 12/27/24 0745    12/24/24 0807  insulin aspart U-100 pen 0-10 Units         -- SubQ Every 4 hours PRN 12/24/24 0707        Hold Oral hypoglycemics while patient is in the hospital.  12/17 A1c improved to 6.6%  12/24 due to tpn - add lantus 12 u daily - ssi changed to moderate scale  12/25 FM:  BS logs noted, continue coverage.  12/26 FM:  Cont SSI.  12/27 increase lantus to 16u daily

## 2024-12-29 NOTE — ASSESSMENT & PLAN NOTE
Needs formal operative debridement.  Overall clinical status as a limitation to this.  We will continue Dakin's moistened gauze until operative intervention can be obtained.

## 2024-12-29 NOTE — PROGRESS NOTES
[facility] - Intensive Care  General Surgery  Progress Note    Subjective:     History of Present Illness:  Patient admitted for sepsis with UTI and pneumonia with multiple noted wounds    Post-Op Info:  * No surgery found *         Interval History: Nursing reports issues with tolerance of spontaneous ventilation.  RR rises quickly.  Still with greater than 900 bilious fluid out g tube.    Medications:  Continuous Infusions:   0.9% NaCl   Intravenous Continuous 5 mL/hr at 12/29/24 1218 Rate Verify at 12/29/24 1218    Amino acid 5% - dextrose 20% (CLINIMIX-E) solution with additives. CENTRAL LINE ONLY (1650mOsm/L)   Intravenous Continuous 80 mL/hr at 12/29/24 1218 Rate Verify at 12/29/24 1218    Amino acid 5% - dextrose 20% (CLINIMIX-E) solution with additives. CENTRAL LINE ONLY (1650mOsm/L)  80 mL/hr Intravenous Continuous        D10W   Intravenous Continuous PRN        NORepinephrine bitartrate-D5W  0-3 mcg/kg/min Intravenous Continuous   Stopped at 12/25/24 1430     Scheduled Meds:   albuterol sulfate  2.5 mg Nebulization Q4H WAKE    aspirin  81 mg Per G Tube Daily    collagenase   Topical (Top) Daily    enoxparin  40 mg Subcutaneous Daily    famotidine  20 mg Per G Tube BID    FLUoxetine  20 mg Per G Tube Daily    furosemide (LASIX) injection  20 mg Intravenous Q12H    insulin glargine U-100  16 Units Subcutaneous Daily    levothyroxine  150 mcg Per G Tube Before breakfast    lurasidone  40 mg Per G Tube Daily    magnesium oxide  400 mg Per G Tube BID    metoclopramide  10 mg Intravenous Q6H    sodium hypochlorite 0.125%   Topical (Top) Daily     PRN Meds:  Current Facility-Administered Medications:     0.9%  NaCl infusion (for blood administration), , Intravenous, Q24H PRN    D10W, , Intravenous, Continuous PRN    dextrose 50%, 12.5 g, Intravenous, PRN    dextrose 50%, 25 g, Intravenous, PRN    gentamicin - pharmacy to dose, , Intravenous, pharmacy to manage frequency    glucagon (human recombinant), 1 mg,  Intramuscular, PRN    insulin aspart U-100, 0-10 Units, Subcutaneous, Q4H PRN    melatonin, 6 mg, Per G Tube, Nightly PRN    midazolam, 2 mg, Intravenous, Q1H PRN    propofoL, 0-50 mcg/kg/min, Intravenous, Daily PRN    sodium chloride 0.9%, 10 mL, Intravenous, PRN    Flushing PICC/Midline Protocol, , , Until Discontinued **AND** sodium chloride 0.9%, 10 mL, Intravenous, Q12H PRN     Review of patient's allergies indicates:   Allergen Reactions    Guaifenesin Hives    Codeine Itching     Objective:     Vital Signs (Most Recent):  Temp: 97 °F (36.1 °C) (12/29/24 1502)  Pulse: 77 (12/29/24 1515)  Resp: 17 (12/29/24 1515)  BP: (!) 85/51 (12/29/24 1515)  SpO2: 100 % (12/29/24 1515) Vital Signs (24h Range):  Temp:  [97 °F (36.1 °C)-97.4 °F (36.3 °C)] 97 °F (36.1 °C)  Pulse:  [76-93] 77  Resp:  [15-25] 17  SpO2:  [92 %-100 %] 100 %  BP: ()/(46-67) 85/51     Weight: 68.5 kg (151 lb 0.2 oz)  Body mass index is 23.65 kg/m².    Intake/Output - Last 3 Shifts         12/27 0700 12/28 0659 12/28 0700 12/29 0659 12/29 0700 12/30 0659    P.O. 0      I.V. (mL/kg) 264.1 (3.9) 335.9 (4.9) 89.4 (1.3)    Blood       NG/ 60 60    TPN 2138.9 1829.6 595.8    Total Intake(mL/kg) 2603 (38) 2225.5 (32.5) 745.2 (10.9)    Urine (mL/kg/hr) 740 (0.5) 2100 (1.3)     Drains 2400 900     Stool 350 375     Total Output 3490 3375     Net -887 -1149.5 +745.2                    Physical Exam  Vitals and nursing note reviewed.   Constitutional:       General: He is not in acute distress.     Appearance: He is ill-appearing.      Comments: Thin male; intubated   HENT:      Head: Normocephalic and atraumatic.      Nose:      Comments: NEFT in place L nare     Mouth/Throat:      Mouth: Mucous membranes are moist.   Eyes:      Pupils: Pupils are equal, round, and reactive to light.   Cardiovascular:      Rate and Rhythm: Normal rate and regular rhythm.   Pulmonary:      Effort: Prolonged expiration present. No respiratory distress.      Breath  sounds: Transmitted upper airway sounds present. Rhonchi and rales present.      Comments: Decreased breath sounds at lung bases bilaterally, worse on right  Abdominal:      General: There is no distension.      Palpations: Abdomen is soft.      Tenderness: There is no abdominal tenderness.      Comments: G tube noted; Ostomy L mid abdomen   Genitourinary:     Comments: Rojas with yellow urine  Skin:     General: Skin is warm and dry.      Comments: Wounds to sacrum; groin area as noted on media images          Significant Labs:  I have reviewed all pertinent lab results within the past 24 hours.  CBC:   Recent Labs   Lab 12/29/24  0349   WBC 17.69*   RBC 2.86*   HGB 8.0*   HCT 24.7*      MCV 86   MCH 28.0   MCHC 32.4     BMP:   Recent Labs   Lab 12/29/24 0349   *   *   K 4.5   CL 98   CO2 31*   BUN 52*   CREATININE 1.6*   CALCIUM 10.5   MG 1.6     CMP:   Recent Labs   Lab 12/29/24 0349   *   CALCIUM 10.5   ALBUMIN 0.7*   PROT 5.2*   *   K 4.5   CO2 31*   CL 98   BUN 52*   CREATININE 1.6*   ALKPHOS 328*   ALT 6*   AST 25   BILITOT 0.5       Significant Diagnostics:  I have reviewed all pertinent imaging results/findings within the past 24 hours.  CXR: I have reviewed all pertinent results/findings within the past 24 hours and my personal findings are:  atelectasis versus infiltrate to RLL  Assessment/Plan:     Severe malnutrition  Complicated by what appears to be gastroparesis.  We will attempt nasal feeding tube into his small intestines to start post pyloric feeds.  Unable to progress it enough to get post pyloric.  Likely G tube needs to be converted to G-J    Continue Reglan per primary    Nutrition consulted. Most recent weight and BMI monitored-     Measurements:  Wt Readings from Last 1 Encounters:   12/16/24 68.5 kg (151 lb 0.2 oz)   Body mass index is 23.65 kg/m².    Patient has been screened and assessed by RD.    Malnutrition Type:  Context:    Level:      Malnutrition  Characteristic Summary:       Interventions/Recommendations (treatment strategy):  1. Rec'd Continuous EN: Glucerna 1.5 @ 40mL/r increasing by 5 mL q6hrs to goal rate of 55mL/hr with a continuous 40mL FWF to provide 1980kcal (94% EEN), 109g of protein (100% EPN), and 1962mL FW.   2. Néstor BID via PEG tube to promote wound healing and to provide additional nutrition.           - Do not mix Néstor with formula in a tube-feeding bag         - Pour one packet of Néstor in a clean, small container for mixing.           - Add 4 fl oz (120 mL) water at room temperature.           - Mix well with disposable spoon or tongue blade until all particles are completely hydrated.           - Verify correct tube placement.           - Flush feeding tube with 30 mL water.           -Administer Néstor through feeding tube using a 60-mL or larger syringe.           - Flush with an additional 30 mL water.  3. Rec'd ClinimixE 5/20 @ 80mL/hr to provide 1690kcal (80% EEN), 96g of protein (88% EPN), and 1920mL TFV.      -Add 250mL of 20% lipids 3x/week to provide additional calories.      -Check Triglycerides before adding lipids. 4. RD to follow and make rec's accordingly.      Pneumonia of right lower lobe due to infectious organism  Antibiotics and vent support per primary  Likely needs trach since unable to progress to spontaneous ventilation  CXR concerning for RLL infiltrate/atelectasis needing CPT    Sacral wound, sequela  Needs formal operative debridement.  Overall clinical status as a limitation to this.  We will continue Dakin's moistened gauze with Santyl until operative intervention can be obtained.    Open wound of groin  Wound care written    Urinary tract infection associated with indwelling urethral catheter  Per primary    Type 1 diabetes  Elevated sugars.  Management per primary        Francoise Diaz MD  General Surgery  [facility] - Intensive Care

## 2024-12-29 NOTE — NURSING
Pt remains on the vent. FiO2 reduced. Pt tolerated well NG tube and PEG maintained with PEG at LIS with 500 ml this shift. Diprivan and TPN per PICC. Ileostomy noted with dark brown watery stool 200 ml. Varghese maintained with varghese care. VSS. Afebrile. Repositioned. Glucose monitoring with Insulin coverage as needed. Continue to observe.

## 2024-12-29 NOTE — EICU
Intervention Initiated From:  [facility] / EICU    Sandy intervened regarding:  Rounding (Video assessment)    Comments: Video rounding complete. Pt on ventilator, no acute distress noted. Propofol infusing, VS per flowsheet.

## 2024-12-29 NOTE — SUBJECTIVE & OBJECTIVE
Review of Systems   Unable to perform ROS: Acuity of condition     Objective:     Vital Signs (Most Recent):  Temp: 97.4 °F (36.3 °C) (12/29/24 0701)  Pulse: 84 (12/29/24 0800)  Resp: 15 (12/29/24 0800)  BP: (!) 97/56 (12/29/24 0800)  SpO2: 99 % (12/29/24 0800) Vital Signs (24h Range):  Temp:  [97.1 °F (36.2 °C)-97.4 °F (36.3 °C)] 97.4 °F (36.3 °C)  Pulse:  [82-95] 84  Resp:  [15-25] 15  SpO2:  [92 %-100 %] 99 %  BP: ()/(46-67) 97/56     Weight: 68.5 kg (151 lb 0.2 oz)  Body mass index is 23.65 kg/m².    Intake/Output Summary (Last 24 hours) at 12/29/2024 0859  Last data filed at 12/29/2024 0451  Gross per 24 hour   Intake 2225.49 ml   Output 3375 ml   Net -1149.51 ml         Physical Exam  Constitutional:       Appearance: He is ill-appearing.      Comments: Thin male; intubated   HENT:      Mouth/Throat:      Mouth: Mucous membranes are moist.   Eyes:      Pupils: Pupils are equal, round, and reactive to light.   Cardiovascular:      Rate and Rhythm: Normal rate and regular rhythm.      Heart sounds: Normal heart sounds.   Pulmonary:      Effort: Prolonged expiration present.      Breath sounds: Transmitted upper airway sounds present. Rhonchi present.      Comments: Breath sounds at lung bases bilaterally   Abdominal:      General: There is no distension.      Palpations: Abdomen is soft. There is no mass.      Tenderness: There is no abdominal tenderness.      Comments: Jtube noted; ileostomy noted   Genitourinary:     Comments: Rojas with yellow urine  Musculoskeletal:      Right lower leg: Edema present.      Left lower leg: Edema present.   Lymphadenopathy:      Cervical: No cervical adenopathy.   Skin:     General: Skin is warm and dry.      Comments: Wounds to sacrum; groin area             Significant Labs: All pertinent labs within the past 24 hours have been reviewed.    Significant Imaging: I have reviewed all pertinent imaging results/findings within the past 24 hours.

## 2024-12-29 NOTE — ASSESSMENT & PLAN NOTE
Anemia is likely due to chronic infections Most recent hemoglobin and hematocrit are listed below.  Recent Labs     12/27/24  0344 12/28/24  0352 12/29/24  0349   HGB 9.9* 8.7* 8.0*   HCT 30.4* 26.8* 24.7*       Plan  - Monitor serial CBC: Daily  - Transfuse PRBC if patient becomes hemodynamically unstable, symptomatic or H/H drops below 7/21.  - Patient has not received any PRBC transfusions to date  - Patient's anemia is currently worsening. Will adjust treatment as follows: 2uprbcs today  12/17 h/h Improved  12/20 h/h holding  12/26 FM:  Transfuse today.  12/27 hh improved after transfusion  12/28/24:  Continue daily monitoring.

## 2024-12-29 NOTE — ASSESSMENT & PLAN NOTE
Complicated by what appears to be gastroparesis.  We will attempt nasal feeding tube into his small intestines to start post pyloric feeds.  Continue Reglan per primary    Nutrition consulted. Most recent weight and BMI monitored-     Measurements:  Wt Readings from Last 1 Encounters:   12/16/24 68.5 kg (151 lb 0.2 oz)   Body mass index is 23.65 kg/m².    Patient has been screened and assessed by RD.    Malnutrition Type:  Context:    Level:      Malnutrition Characteristic Summary:       Interventions/Recommendations (treatment strategy):  1. Rec'd Continuous EN: Glucerna 1.5 @ 40mL/r increasing by 5 mL q6hrs to goal rate of 55mL/hr with a continuous 40mL FWF to provide 1980kcal (94% EEN), 109g of protein (100% EPN), and 1962mL FW.   2. Néstor BID via PEG tube to promote wound healing and to provide additional nutrition.           - Do not mix Néstor with formula in a tube-feeding bag         - Pour one packet of Néstor in a clean, small container for mixing.           - Add 4 fl oz (120 mL) water at room temperature.           - Mix well with disposable spoon or tongue blade until all particles are completely hydrated.           - Verify correct tube placement.           - Flush feeding tube with 30 mL water.           -Administer Néstor through feeding tube using a 60-mL or larger syringe.           - Flush with an additional 30 mL water.  3. Rec'd ClinimixE 5/20 @ 80mL/hr to provide 1690kcal (80% EEN), 96g of protein (88% EPN), and 1920mL TFV.      -Add 250mL of 20% lipids 3x/week to provide additional calories.      -Check Triglycerides before adding lipids. 4. RD to follow and make rec's accordingly.

## 2024-12-29 NOTE — ASSESSMENT & PLAN NOTE
"Patient has a diagnosis of pneumonia. The cause of the pneumonia is suspected to be bacterial in etiology but organism is not known. The pneumonia is stable. The patient has the following signs/symptoms of pneumonia: persistent hypoxia  and sputum production. The patient does have a current oxygen requirement and the patient does not have a home oxygen requirement. I have reviewed the pertinent imaging. The following cultures have been collected: Blood cultures and Sputum culture The culture results are listed below.     Current antimicrobial regimen consists of the antibiotics listed below. Will monitor patient closely and continue current treatment plan unchanged.    Antibiotics (From admission, onward)      Start     Stop Route Frequency Ordered    12/20/24 1438  gentamicin - pharmacy to dose  (gentamicin IVPB (PEDS and ADULTS))        Placed in "And" Linked Group    -- IV pharmacy to manage frequency 12/20/24 1338            Microbiology Results (last 7 days)       Procedure Component Value Units Date/Time    Culture, Respiratory with Gram Stain [[id number]]  (Abnormal) Collected: 12/27/24 1522    Order Status: Completed Specimen: Respiratory from Endotracheal Aspirate Updated: 12/29/24 0642     Respiratory Culture GRAM NEGATIVE FLORA  Many  Identification and susceptibility pending       Gram Stain (Respiratory) Few Gram positive cocci     Gram Stain (Respiratory) Rare Gram positive rods     Gram Stain (Respiratory) Rare WBC's    Blood culture [[id number]] Collected: 12/27/24 0837    Order Status: Completed Specimen: Blood Updated: 12/28/24 1813     Blood Culture, Routine No Growth to date      No Growth to date    Blood culture [[id number]] Collected: 12/27/24 0838    Order Status: Completed Specimen: Blood Updated: 12/28/24 1813     Blood Culture, Routine No Growth to date      No Growth to date    Culture, Respiratory with Gram Stain [[id number]]  (Abnormal)  (Susceptibility) Collected: 12/15/24 0935    Order " Status: Completed Specimen: Respiratory from Endotracheal Aspirate Updated: 12/23/24 1204     Respiratory Culture No S aureus or Pseudomonas isolated.      ACINETOBACTER BAUMANNII   Many        PROTEUS MIRABILIS  Few        KLEBSIELLA PNEUMONIAE   Few  ESBL        Gram Stain (Respiratory) Few WBC's     Gram Stain (Respiratory) Many Gram positive rods     Gram Stain (Respiratory) Few Gram negative cocci        12/17 dc zosyn with recent esbl kleb in urine - will change to merrem - cont vanc until final cultures obtained - change vent to simv; add nebs  12/18 cotn merrem and vanc - await final sputum cutlure - cont vent on ac control; nebs - wbc imrpoved slightly; lasix 20mg iv for edema today  12/19  dc vanc - proteus grwoign from sputum - cont merrem and nebs; lasix today - wean rr on vent  12/20 wnc improved - cont merrem - nebs and vent - another dose of lasix today for edema - fio2 increased  12/23 cont iv gent and merren due to sent of multiple bugs (acinetobacter/kleb/proteus)  12/24 cont current abxs  12/25 FM:  Cont GENT  12/26 FM:  Cont GENT, poor prognosis.  12/27 on gent - get new sputum culture today due to elevated wbc - cont vent and nebs  12/28/24:Gram positive cocci/rods on sputum, blood cultures negative   12/29/24:  Gram negative rods on sputum cx.

## 2024-12-29 NOTE — ASSESSMENT & PLAN NOTE
"This patient does have evidence of infective focus  My overall impression is septic shock due to MAP < 65 and SBP < 90.  Source: Respiratory and Urinary Tract  Antibiotics given-   Antibiotics (72h ago, onward)      Start     Stop Route Frequency Ordered    12/20/24 1438  gentamicin - pharmacy to dose  (gentamicin IVPB (PEDS and ADULTS))        Placed in "And" Linked Group    -- IV pharmacy to manage frequency 12/20/24 1338          Latest lactate reviewed-  No results for input(s): "LACTATE", "POCLAC" in the last 72 hours.    Organ dysfunction indicated by Acute respiratory failure and Encephalopathy    Fluid challenge Ideal Body Weight- The patient's ideal body weight is Ideal body weight: 66.1 kg (145 lb 11.6 oz) which will be used to calculate fluid bolus of 30 ml/kg for treatment of septic shock.      Post- resuscitation assessment Yes Perfusion exam was performed within 6 hours of septic shock presentation after bolus shows Inadequate tissue perfusion assessed by non-invasive monitoring       Will Start Pressors- Levophed for MAP of 65  Source control achieved by: iv zosym. Vanc, ivfs  12/17 wean levophed as tolerates- abxs changed to merrem, cotn vanc - await culture final reports  12/18 wbc slightly imrpoved - cont iv abxs- await final sputum culture - on merrem fro esbl kleb in urine  12/19 cont iv merrem for esbl kleb in urine and proteus in sputum - dc vanc  12/20 wbc improvign - cotn iv merrem  12/24 wbc normal - cont iv abxs  12/25 FM:  Cont GENT.  12/26 FM:  Cont GENT, prognosis poor, pulm/gu/skin infections.  12/27 repeat bc and sputum culture due to increasing wbc - cont gent; completed 10 days of merrem  12/29/24:  Sputum with gram negative rods, continue gentamicin, susceptibility pending   "

## 2024-12-29 NOTE — EICU
Intervention Initiated From:  [facility] / [facility]    Sandy intervened regarding:  Rounding (Video assessment)    Nurse Notified:  No    Doctor Notified:  No    Comments: Video rounding complete. Pt vented and sedated. VSS.   1 (mild pain)

## 2024-12-29 NOTE — PLAN OF CARE
No changes in patient condition. Pt remains intubated and on sedation. Pt appears more awake today though. Vitals stable, no fever. G tube remains on LIS. Rojas with 1000mls out. 100mls of stool per ostomy. TPN continued. Wound care done to sacrum. Repeat chest xray and KUB obtained today.

## 2024-12-29 NOTE — PROGRESS NOTES
[facility] - Intensive Care  General Surgery  Progress Note  12/27/24    Subjective:     History of Present Illness:  Patient admitted for sepsis with UTI and pneumonia with multiple noted wounds    Post-Op Info:  * No surgery found *         Interval History: Still with issues with tube feeds and placement.  Less ostomy output.    Medications:  Continuous Infusions:   0.9% NaCl   Intravenous Continuous 5 mL/hr at 12/29/24 1218 Rate Verify at 12/29/24 1218    Amino acid 5% - dextrose 20% (CLINIMIX-E) solution with additives. CENTRAL LINE ONLY (1650mOsm/L)   Intravenous Continuous 80 mL/hr at 12/29/24 1218 Rate Verify at 12/29/24 1218    Amino acid 5% - dextrose 20% (CLINIMIX-E) solution with additives. CENTRAL LINE ONLY (1650mOsm/L)  80 mL/hr Intravenous Continuous        D10W   Intravenous Continuous PRN        NORepinephrine bitartrate-D5W  0-3 mcg/kg/min Intravenous Continuous   Stopped at 12/25/24 1430     Scheduled Meds:   albuterol sulfate  2.5 mg Nebulization Q4H WAKE    aspirin  81 mg Per G Tube Daily    collagenase   Topical (Top) Daily    enoxparin  40 mg Subcutaneous Daily    famotidine  20 mg Per G Tube BID    FLUoxetine  20 mg Per G Tube Daily    furosemide (LASIX) injection  20 mg Intravenous Q12H    insulin glargine U-100  16 Units Subcutaneous Daily    levothyroxine  150 mcg Per G Tube Before breakfast    lurasidone  40 mg Per G Tube Daily    magnesium oxide  400 mg Per G Tube BID    metoclopramide  10 mg Intravenous Q6H    sodium hypochlorite 0.125%   Topical (Top) Daily     PRN Meds:  Current Facility-Administered Medications:     0.9%  NaCl infusion (for blood administration), , Intravenous, Q24H PRN    D10W, , Intravenous, Continuous PRN    dextrose 50%, 12.5 g, Intravenous, PRN    dextrose 50%, 25 g, Intravenous, PRN    gentamicin - pharmacy to dose, , Intravenous, pharmacy to manage frequency    glucagon (human recombinant), 1 mg, Intramuscular, PRN    insulin aspart U-100, 0-10 Units,  Subcutaneous, Q4H PRN    melatonin, 6 mg, Per G Tube, Nightly PRN    midazolam, 2 mg, Intravenous, Q1H PRN    propofoL, 0-50 mcg/kg/min, Intravenous, Daily PRN    sodium chloride 0.9%, 10 mL, Intravenous, PRN    Flushing PICC/Midline Protocol, , , Until Discontinued **AND** sodium chloride 0.9%, 10 mL, Intravenous, Q12H PRN     Review of patient's allergies indicates:   Allergen Reactions    Guaifenesin Hives    Codeine Itching     Objective:     Vital Signs (Most Recent):  VS reviewed Vital Signs (24h Range):  Temp:  [97 °F (36.1 °C)-97.4 °F (36.3 °C)] 97 °F (36.1 °C)  Pulse:  [76-93] 77  Resp:  [15-25] 17  SpO2:  [92 %-100 %] 100 %  BP: ()/(46-67) 85/51     Weight: 68.5 kg (151 lb 0.2 oz)  Body mass index is 23.65 kg/m².    Intake/Output - Last 3 Shifts         12/27 0700  12/28 0659 12/28 0700  12/29 0659 12/29 0700  12/30 0659    P.O. 0      I.V. (mL/kg) 264.1 (3.9) 335.9 (4.9) 89.4 (1.3)    Blood       NG/ 60 60    TPN 2138.9 1829.6 595.8    Total Intake(mL/kg) 2603 (38) 2225.5 (32.5) 745.2 (10.9)    Urine (mL/kg/hr) 740 (0.5) 2100 (1.3)     Drains 2400 900     Stool 350 375     Total Output 3490 3375     Net -887 -1149.5 +745.2                    Physical Exam  Vitals and nursing note reviewed.   Constitutional:       General: He is not in acute distress.     Appearance: He is ill-appearing.      Comments: Thin male; intubated   HENT:      Head: Normocephalic and atraumatic.      Nose:      Comments: NEFT in place L nare     Mouth/Throat:      Mouth: Mucous membranes are moist.   Eyes:      Pupils: Pupils are equal, round, and reactive to light.   Cardiovascular:      Rate and Rhythm: Normal rate and regular rhythm.   Pulmonary:      Effort: Prolonged expiration present. No respiratory distress.      Breath sounds: Transmitted upper airway sounds present. Rhonchi and rales present.      Comments: Decreased breath sounds at lung bases bilaterally  Abdominal:      General: There is no distension.       Palpations: Abdomen is soft.      Tenderness: There is no abdominal tenderness.      Comments: G tube noted; Ostomy L mid abdomen   Genitourinary:     Comments: Rojas with yellow urine  Musculoskeletal:         General: No swelling.      Cervical back: Normal range of motion and neck supple.   Skin:     General: Skin is warm and dry.      Comments: Wounds to sacrum; groin area as noted on media images          Significant Labs:  I have reviewed all pertinent lab results within the past 24 hours.    Significant Diagnostics:  I have reviewed all pertinent imaging results/findings within the past 24 hours.  Assessment/Plan:     Severe malnutrition  Complicated by what appears to be gastroparesis.  We will attempt nasal feeding tube into his small intestines to start post pyloric feeds.  Lots of residuals.  CT ordered to rule out obstruction.     Continue Reglan per primary    Nutrition consulted. Most recent weight and BMI monitored-     Measurements:  Wt Readings from Last 1 Encounters:   12/16/24 68.5 kg (151 lb 0.2 oz)   Body mass index is 23.65 kg/m².    Patient has been screened and assessed by RD.    Malnutrition Type:  Context:    Level:      Malnutrition Characteristic Summary:       Interventions/Recommendations (treatment strategy):  1. Rec'd Continuous EN: Glucerna 1.5 @ 40mL/r increasing by 5 mL q6hrs to goal rate of 55mL/hr with a continuous 40mL FWF to provide 1980kcal (94% EEN), 109g of protein (100% EPN), and 1962mL FW.   2. Néstor BID via PEG tube to promote wound healing and to provide additional nutrition.           - Do not mix Néstor with formula in a tube-feeding bag         - Pour one packet of Néstor in a clean, small container for mixing.           - Add 4 fl oz (120 mL) water at room temperature.           - Mix well with disposable spoon or tongue blade until all particles are completely hydrated.           - Verify correct tube placement.           - Flush feeding tube with 30 mL water.            -Administer Néstor through feeding tube using a 60-mL or larger syringe.           - Flush with an additional 30 mL water.  3. Rec'd ClinimixE 5/20 @ 80mL/hr to provide 1690kcal (80% EEN), 96g of protein (88% EPN), and 1920mL TFV.      -Add 250mL of 20% lipids 3x/week to provide additional calories.      -Check Triglycerides before adding lipids. 4. RD to follow and make rec's accordingly.      Pneumonia of right lower lobe due to infectious organism  Antibiotics and vent support per primary  Likely needs trach if unable to progress to spontaneous ventilation    Sacral wound, sequela  Needs formal operative debridement.  Overall clinical status as a limitation to this.  We will continue Dakin's moistened gauze with Santyl until operative intervention can be obtained.    Open wound of groin  Wound care written    Urinary tract infection associated with indwelling urethral catheter  Per primary    Type 1 diabetes  Elevated sugars.  Management per primary        Francoise Diaz MD  General Surgery  [facility] - Intensive Care

## 2024-12-29 NOTE — PROGRESS NOTES
[facility] Medicine  Progress Note    Patient Name: Carlos Chahal  MRN: [id number]  Patient Class: IP- Inpatient   Admission Date: 12/15/2024  Length of Stay: 14 days  Attending Physician: Kim Diamond MD  Primary Care Provider: Jose Francisco Klein MD        Subjective     Principal Problem:Sepsis        HPI:  Carlos Chahal is a 34 y.o. male who presents to the ED from nursing home for altered mental status and difficulty breathing.  Patient is found to be hypoxic.  He has a history of anoxic brain injury     This am, pt is on ventilator and levophed at 0.25mcg and ivfs at 75ml/hr.      Spoke with father this am on condition of pt    Overview/Hospital Course:  12/17 ND became more awake yesterday with wound changes - low dose propofol added for pt - able to wean levophed to 0.15mcg.  did tolerate tf last night - check kub this am  12/18 ND pt had to be changed back to ac control last nigth after becoming tachypneic and had low tv.  Tolerating ac mode.  Levophed down to 0.1 mcg  12/19 ND pt toleratign vent - will wean RR today - cont to slowly wean levophed as tolerates - wbc slowly imrpoving - tolerating low dose tfs - will cont to increase tfs as tolerates  12/20 ND tolerating vent - cont to work on feeds and nutritional support  12/21 [place] weekend coverage, in no acute distress, stable vital signs, AC/18/400/5/40%, SpO2 100%. On proprofol for sedation, patient tracks voice and moving head and neck with lid opening. Mild bump in WBC, afebrile, may be associated with the reported residual >200 and tube feeds held. Abdomen, soft and no distension on exam. Will resume as tolerated and monitor.  Blood cx x2, final report no growth. Continue merrem (D5), gentamicin (D2) with mixed MDRO frida from sputum studies and UTI. Replete Mg, continue abx. Continue daily wound care.   12/22 [place] weekend coverage, overnight rate change and tolerating AC12/400/5/40 SpO2 100%, proprofol 15 mcg/kg/min, levophed  0.1mcg/kg/min. Patient without gag reflex on suction. Tube feeds held overnight and resumed, remains on trickle feeds. Ileostomy output 100.   No associated abdominal distension, patient in no distress. Vent settings weaned. Leukocytosis resolved. Continue IV abx for MDRO coverage.   12/23 ND Pt still havign trouble tolerating tfs -change reglan iv; pt is more awake this am - will change to simv for a few hours today   12/24 ND elevated bs with tpn- will add long acting insulin as 12u and restart tpn - ssi adjusted to moderate scale.  12/25 FM:  Holiday coverage, chart reviewed and case discussed with team.  Patient's Levophed is being weaned slowly and he is not tolerating his tube feeds.  Abdominal exam is soft patient has output via ostomy we will check KUB.  Patient is followed by surgery and has a polymicrobial respiratory and urinary tract infection.  Patient has multiple wounds and has a history of anoxic brain injury and is a long-term nursing home resident.  12/26 FM:  Patient is off of vasopressors this morning, patient's KUB noted and will rechallenge tube feedings today.  Patient is tolerating TPN labs noted and his hemoglobin has continued to trend downward Ms. With no visible blood loss.  Patient's other labs noted his white blood cell count is rising despite appropriate antibiotics based on respiratory and urinary cultures.  Patient's prognosis is poor.  12/27 ND pt  still not tolerating tfs - surgery following -  cont tpn; h/h improved after transfusion.  Updated aunt on pts condition  12/28/24:  Patient seen this morning.  Not tolerating tube feedings at this time, remains on vent support.  Poor prognosis at this time.  Patient with poor urine output despite diuretics.  Trial of albumin infusion followed by lasix today.    12/29/24:  Good response to albumin/lasix combo yesterday with good urine output.  Renal function essentially unchanged.  Sputum culture with gram negative rods, susceptibility  pending.  Continue abx for now. Leukocytosis improving, H/H stable.         Review of Systems   Unable to perform ROS: Acuity of condition     Objective:     Vital Signs (Most Recent):  Temp: 97.4 °F (36.3 °C) (12/29/24 0701)  Pulse: 84 (12/29/24 0800)  Resp: 15 (12/29/24 0800)  BP: (!) 97/56 (12/29/24 0800)  SpO2: 99 % (12/29/24 0800) Vital Signs (24h Range):  Temp:  [97.1 °F (36.2 °C)-97.4 °F (36.3 °C)] 97.4 °F (36.3 °C)  Pulse:  [82-95] 84  Resp:  [15-25] 15  SpO2:  [92 %-100 %] 99 %  BP: ()/(46-67) 97/56     Weight: 68.5 kg (151 lb 0.2 oz)  Body mass index is 23.65 kg/m².    Intake/Output Summary (Last 24 hours) at 12/29/2024 0859  Last data filed at 12/29/2024 0451  Gross per 24 hour   Intake 2225.49 ml   Output 3375 ml   Net -1149.51 ml         Physical Exam  Constitutional:       Appearance: He is ill-appearing.      Comments: Thin male; intubated   HENT:      Mouth/Throat:      Mouth: Mucous membranes are moist.   Eyes:      Pupils: Pupils are equal, round, and reactive to light.   Cardiovascular:      Rate and Rhythm: Normal rate and regular rhythm.      Heart sounds: Normal heart sounds.   Pulmonary:      Effort: Prolonged expiration present.      Breath sounds: Transmitted upper airway sounds present. Rhonchi present.      Comments: Breath sounds at lung bases bilaterally   Abdominal:      General: There is no distension.      Palpations: Abdomen is soft. There is no mass.      Tenderness: There is no abdominal tenderness.      Comments: Jtube noted; ileostomy noted   Genitourinary:     Comments: Rojas with yellow urine  Musculoskeletal:      Right lower leg: Edema present.      Left lower leg: Edema present.   Lymphadenopathy:      Cervical: No cervical adenopathy.   Skin:     General: Skin is warm and dry.      Comments: Wounds to sacrum; groin area             Significant Labs: All pertinent labs within the past 24 hours have been reviewed.    Significant Imaging: I have reviewed all pertinent  "imaging results/findings within the past 24 hours.    Assessment and Plan     * Sepsis  This patient does have evidence of infective focus  My overall impression is septic shock due to MAP < 65 and SBP < 90.  Source: Respiratory and Urinary Tract  Antibiotics given-   Antibiotics (72h ago, onward)      Start     Stop Route Frequency Ordered    12/20/24 1438  gentamicin - pharmacy to dose  (gentamicin IVPB (PEDS and ADULTS))        Placed in "And" Linked Group    -- IV pharmacy to manage frequency 12/20/24 1338          Latest lactate reviewed-  No results for input(s): "LACTATE", "POCLAC" in the last 72 hours.    Organ dysfunction indicated by Acute respiratory failure and Encephalopathy    Fluid challenge Ideal Body Weight- The patient's ideal body weight is Ideal body weight: 66.1 kg (145 lb 11.6 oz) which will be used to calculate fluid bolus of 30 ml/kg for treatment of septic shock.      Post- resuscitation assessment Yes Perfusion exam was performed within 6 hours of septic shock presentation after bolus shows Inadequate tissue perfusion assessed by non-invasive monitoring       Will Start Pressors- Levophed for MAP of 65  Source control achieved by: iv zosym. Vanc, ivfs  12/17 wean levophed as tolerates- abxs changed to merrem, cotn vanc - await culture final reports  12/18 wbc slightly imrpoved - cont iv abxs- await final sputum culture - on merrem fro esbl kleb in urine  12/19 cont iv merrem for esbl kleb in urine and proteus in sputum - dc vanc  12/20 wbc improvign - cotn iv merrem  12/24 wbc normal - cont iv abxs  12/25 FM:  Cont GENT.  12/26 FM:  Cont GENT, prognosis poor, pulm/gu/skin infections.  12/27 repeat bc and sputum culture due to increasing wbc - cont gent; completed 10 days of merrem  12/29/24:  Sputum with gram negative rods, continue gentamicin, susceptibility pending     Edema  Lasix 20mg iv bid - monitor I/o      Hypokalemia  Patient's most recent potassium results are listed below. "   Recent Labs     12/27/24  0344 12/28/24  0352 12/29/24  0349   K 5.1 4.9 4.5       Plan  - Replete potassium per protocol  - Monitor potassium Daily  - Patient's hypokalemia is stable, continue below and monitor  12/20 increase pot bicarb to bid  12/23 decrease pot bicard to daily dosing  12/24 improved- stop potassium bicarb repalcement - monitor    Severe malnutrition  Nutrition consulted. Most recent weight and BMI monitored-     Measurements:  Wt Readings from Last 1 Encounters:   12/16/24 68.5 kg (151 lb 0.2 oz)   Body mass index is 23.65 kg/m².    Patient has been screened and assessed by RD.    Malnutrition Type:  Context:    Level:      Malnutrition Characteristic Summary:       Interventions/Recommendations (treatment strategy):  1. Rec'd Continuous EN: Glucerna 1.5 @ 40mL/r increasing by 5 mL q6hrs to goal rate of 55mL/hr with a continuous 40mL FWF to provide 1980kcal (94% EEN), 109g of protein (100% EPN), and 1962mL FW.   2. Néstor BID via PEG tube to promote wound healing and to provide additional nutrition.           - Do not mix Néstor with formula in a tube-feeding bag         - Pour one packet of Néstor in a clean, small container for mixing.           - Add 4 fl oz (120 mL) water at room temperature.           - Mix well with disposable spoon or tongue blade until all particles are completely hydrated.           - Verify correct tube placement.           - Flush feeding tube with 30 mL water.           -Administer Néstor through feeding tube using a 60-mL or larger syringe.           - Flush with an additional 30 mL water.  3. Rec'd ClinimixE 5/20 @ 80mL/hr to provide 1690kcal (80% EEN), 96g of protein (88% EPN), and 1920mL TFV.      -Add 250mL of 20% lipids 3x/week to provide additional calories.      -Check Triglycerides before adding lipids. 4. RD to follow and make rec's accordingly.    12/18 restart tfs today at 1ml/hr to see if can tolerate feeds  12/19 increase tfs as tolerates  12/23 not  "tolerating tfs- will get tpn orders and start that until can tolerate tfs better - ncrease regaln 10 mg iv q 6hrs  12/24 con ttfs as tolerates - started on tpn for further nutritional support  12/25 FM:  Check KUB.  12/26 FM:  Resume TF today, monitor.  12/27 on tpn - not tolerating tfs at this time  12/28/24:  Albumin infusion today.     Hypomagnesemia  Patient has Abnormal Magnesium: hypomagnesemia. Will continue to monitor electrolytes closely. Will replace the affected electrolytes and repeat labs to be done after interventions completed. The patient's magnesium results have been reviewed and are listed below.  Recent Labs   Lab 12/29/24  0349   MG 1.6      12/17 mag imrpoving - repalce mag today  12/19 replace mag today  12/20 mag oxide bid  12/21 2g Mg  12/23 dose of iv mag today to keep mag level about 2  12/25 FM:  Replace, recheck.    Pneumonia of right lower lobe due to infectious organism  Patient has a diagnosis of pneumonia. The cause of the pneumonia is suspected to be bacterial in etiology but organism is not known. The pneumonia is stable. The patient has the following signs/symptoms of pneumonia: persistent hypoxia  and sputum production. The patient does have a current oxygen requirement and the patient does not have a home oxygen requirement. I have reviewed the pertinent imaging. The following cultures have been collected: Blood cultures and Sputum culture The culture results are listed below.     Current antimicrobial regimen consists of the antibiotics listed below. Will monitor patient closely and continue current treatment plan unchanged.    Antibiotics (From admission, onward)      Start     Stop Route Frequency Ordered    12/20/24 1438  gentamicin - pharmacy to dose  (gentamicin IVPB (PEDS and ADULTS))        Placed in "And" Linked Group    -- IV pharmacy to manage frequency 12/20/24 1338            Microbiology Results (last 7 days)       Procedure Component Value Units Date/Time    " Culture, Respiratory with Gram Stain [[id number]]  (Abnormal) Collected: 12/27/24 1522    Order Status: Completed Specimen: Respiratory from Endotracheal Aspirate Updated: 12/29/24 0642     Respiratory Culture GRAM NEGATIVE FLORA  Many  Identification and susceptibility pending       Gram Stain (Respiratory) Few Gram positive cocci     Gram Stain (Respiratory) Rare Gram positive rods     Gram Stain (Respiratory) Rare WBC's    Blood culture [[id number]] Collected: 12/27/24 0837    Order Status: Completed Specimen: Blood Updated: 12/28/24 1813     Blood Culture, Routine No Growth to date      No Growth to date    Blood culture [[id number]] Collected: 12/27/24 0838    Order Status: Completed Specimen: Blood Updated: 12/28/24 1813     Blood Culture, Routine No Growth to date      No Growth to date    Culture, Respiratory with Gram Stain [[id number]]  (Abnormal)  (Susceptibility) Collected: 12/15/24 0935    Order Status: Completed Specimen: Respiratory from Endotracheal Aspirate Updated: 12/23/24 1204     Respiratory Culture No S aureus or Pseudomonas isolated.      ACINETOBACTER BAUMANNII   Many        PROTEUS MIRABILIS  Few        KLEBSIELLA PNEUMONIAE   Few  ESBL        Gram Stain (Respiratory) Few WBC's     Gram Stain (Respiratory) Many Gram positive rods     Gram Stain (Respiratory) Few Gram negative cocci        12/17 dc zosyn with recent esbl kleb in urine - will change to merrem - cont vanc until final cultures obtained - change vent to simv; add nebs  12/18 cotn merrem and vanc - await final sputum cutlure - cont vent on ac control; nebs - wbc imrpoved slightly; lasix 20mg iv for edema today  12/19  dc vanc - proteus grwoign from sputum - cont merrem and nebs; lasix today - wean rr on vent  12/20 wnc improved - cont merrem - nebs and vent - another dose of lasix today for edema - fio2 increased  12/23 cont iv gent and merren due to sent of multiple bugs (acinetobacter/kleb/proteus)  12/24 cont  current abxs  12/25 FM:  Cont GENT  12/26 FM:  Cont GENT, poor prognosis.  12/27 on gent - get new sputum culture today due to elevated wbc - cont vent and nebs  12/28/24:Gram positive cocci/rods on sputum, blood cultures negative   12/29/24:  Gram negative rods on sputum cx.     Sacral wound, sequela  Local wound care with dakins bid  Iv abxs  12/27 prob debridementt in or on 12/30    Open wound of groin  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  Recent Labs     12/27/24  0344 12/28/24  0352 12/29/24  0349   HGB 9.9* 8.7* 8.0*   HCT 30.4* 26.8* 24.7*       Plan  - Monitor serial CBC: Daily  - Transfuse PRBC if patient becomes hemodynamically unstable, symptomatic or H/H drops below 7/21.  - Patient has not received any PRBC transfusions to date  - Patient's anemia is currently worsening. Will adjust treatment as follows: 2uprbcs today  12/17 h/h Improved  12/20 h/h holding  12/26 FM:  Transfuse today.  12/27 hh improved after transfusion  12/28/24:  Continue daily monitoring.    History of anoxic brain injury  12/25 FM:  Poor prognosis.      Urinary tract infection associated with indwelling urethral catheter  Await new urine culture =- currently on zosyn/vanc  12/17 change to merrem/vanc  12/18 has esbl klebsiella - cont merrem  12/23 on merrem  12/25 FM:  Continue Gent, CS reviewed.  12/26 FM:  WBC increasing, monitor, on appropriate abx based on cultures.  12/28/24:  Blood culture negative to date, sputum culture with some gram positive cocci and rods.  Continue abx for now.  Gentamicin per pharmacy to dose.     Type 1 diabetes  Patient's FSGs are uncontrolled due to hyperglycemia on current medication regimen.  Last A1c reviewed-   Lab Results   Component Value Date    HGBA1C 6.6 (H) 12/16/2024     Most recent fingerstick glucose reviewed-   Recent Labs   Lab  12/28/24 2011 12/28/24  2338 12/29/24  0349 12/29/24  0807   POCTGLUCOSE 202* 245* 242* 342*       Current correctional scale  Low  Maintain anti-hyperglycemic dose as follows-   Antihyperglycemics (From admission, onward)      Start     Stop Route Frequency Ordered    12/27/24 0900  insulin glargine U-100 (Lantus) pen 16 Units         -- SubQ Daily 12/27/24 0745    12/24/24 0807  insulin aspart U-100 pen 0-10 Units         -- SubQ Every 4 hours PRN 12/24/24 0707          Hold Oral hypoglycemics while patient is in the hospital.  12/17 A1c improved to 6.6%  12/24 due to tpn - add lantus 12 u daily - ssi changed to moderate scale  12/25 FM:  BS logs noted, continue coverage.  12/26 FM:  Cont SSI.  12/27 increase lantus to 16u daily      VTE Risk Mitigation (From admission, onward)           Ordered     enoxaparin injection 40 mg  Daily         12/16/24 0815     IP VTE LOW RISK PATIENT  Once         12/15/24 1201     Place sequential compression device  Until discontinued         12/15/24 1201                    Discharge Planning   JOSE MANUEL:      Code Status: Full Code   Medical Readiness for Discharge Date:   Discharge Plan A: Other (TBD)   Discharge Delays: None known at this time            Time spent in critical care (in addition to E/M time mentioned above) spent on the evaluation and treatment of severe organ dysfunction, review of pertinent labs and imaging studies, discussions with consulting providers and discussions with patient/family: 30 min         LYNN Wakefield  Department of Hospital Medicine   [place] [facility]

## 2024-12-29 NOTE — PLAN OF CARE
Problem: Skin Injury Risk Increased  Goal: Skin Health and Integrity  Outcome: Progressing     Problem: Adult Inpatient Plan of Care  Goal: Absence of Hospital-Acquired Illness or Injury  Outcome: Progressing     Problem: Diabetes Comorbidity  Goal: Blood Glucose Level Within Targeted Range  Outcome: Progressing     Problem: Wound  Goal: Optimal Coping  Outcome: Progressing  Goal: Optimal Functional Ability  Outcome: Progressing  Goal: Absence of Infection Signs and Symptoms  Outcome: Progressing  Goal: Improved Oral Intake  Outcome: Progressing  Goal: Optimal Pain Control and Function  Outcome: Progressing  Goal: Optimal Wound Healing  Outcome: Progressing     Problem: Infection  Goal: Absence of Infection Signs and Symptoms  Outcome: Progressing     Problem: Pneumonia  Goal: Fluid Balance  Outcome: Progressing  Goal: Resolution of Infection Signs and Symptoms  Outcome: Progressing  Goal: Effective Oxygenation and Ventilation  Outcome: Progressing     Problem: Sepsis/Septic Shock  Goal: Optimal Coping  Outcome: Progressing  Goal: Absence of Bleeding  Outcome: Progressing  Goal: Blood Glucose Level Within Targeted Range  Outcome: Progressing  Goal: Absence of Infection Signs and Symptoms  Outcome: Progressing     Problem: Fall Injury Risk  Goal: Absence of Fall and Fall-Related Injury  Outcome: Progressing     Problem: Electrolyte Imbalance  Goal: Electrolyte Balance  Outcome: Progressing

## 2024-12-29 NOTE — ASSESSMENT & PLAN NOTE
Complicated by what appears to be gastroparesis.  We will attempt nasal feeding tube into his small intestines to start post pyloric feeds.  Unable to progress it enough to get post pyloric.  Likely G tube needs to be converted to G-J    Continue Reglan per primary    Nutrition consulted. Most recent weight and BMI monitored-     Measurements:  Wt Readings from Last 1 Encounters:   12/16/24 68.5 kg (151 lb 0.2 oz)   Body mass index is 23.65 kg/m².    Patient has been screened and assessed by RD.    Malnutrition Type:  Context:    Level:      Malnutrition Characteristic Summary:       Interventions/Recommendations (treatment strategy):  1. Rec'd Continuous EN: Glucerna 1.5 @ 40mL/r increasing by 5 mL q6hrs to goal rate of 55mL/hr with a continuous 40mL FWF to provide 1980kcal (94% EEN), 109g of protein (100% EPN), and 1962mL FW.   2. Néstor BID via PEG tube to promote wound healing and to provide additional nutrition.           - Do not mix Néstor with formula in a tube-feeding bag         - Pour one packet of Néstor in a clean, small container for mixing.           - Add 4 fl oz (120 mL) water at room temperature.           - Mix well with disposable spoon or tongue blade until all particles are completely hydrated.           - Verify correct tube placement.           - Flush feeding tube with 30 mL water.           -Administer Néstor through feeding tube using a 60-mL or larger syringe.           - Flush with an additional 30 mL water.  3. Rec'd ClinimixE 5/20 @ 80mL/hr to provide 1690kcal (80% EEN), 96g of protein (88% EPN), and 1920mL TFV.      -Add 250mL of 20% lipids 3x/week to provide additional calories.      -Check Triglycerides before adding lipids. 4. RD to follow and make rec's accordingly.

## 2024-12-29 NOTE — PROGRESS NOTES
"Pharmacokinetic Follow Up: Gentamicin    Assessment of levels:   Gentamicin levels: The trough concentration was exceeding target range of < 1 mcg/mL    Regimen Plan:     Will obtain a random level with am labs tomorrow morning (12/30/2024) to help guide transition to traditional dosign regimen once trough less than 1. There has been a bump in Scr. Providers wating on sens tomorrow before changing gent due to previous cultures only being sens to gent     Drug levels (last 3 results):  No results for input(s): "AMIKACINPEAK", "AMIKACINTROU", "AMIKACINRAND", "AMIKACIN" in the last 72 hours.    Recent Labs   Lab Result Units 12/26/24  1458 12/27/24  1501 12/28/24  1503 12/29/24  0349   Gentamicin, Random ug/mL  --  2.1 1.8 1.5   Gentamicin, Trough ug/mL 3.0*  --   --   --        No results for input(s): "TOBRA8", "TOBRA10", "TOBRA12", "TOBRARND", "TOBRAMYCIN", "TOBRAPEAK", "TOBRATROUGH", "TOBRAMYCINPE", "TOBRAMYCINRA", "TOBRAMYCINTR" in the last 72 hours.    Pharmacy will continue to monitor.    Please contact pharmacy at extension [phone] with any questions regarding this assessment.    Thank you for the consult,   RONY CENTENO      Patient brief summary:  Carlos Chahal is a 34 y.o. male initiated on aminoglycoside therapy for treatment of lower respiratory infection    Drug Allergies:   Review of patient's allergies indicates:   Allergen Reactions    Guaifenesin Hives    Codeine Itching       Actual Body Weight:   68.5 kg    Adjust Body Weight:   67.1 kg    Ideal Body Weight:  66.1 kg    Renal Function:   Estimated Creatinine Clearance: 61.4 mL/min (A) (based on SCr of 1.6 mg/dL (H)).,     Dialysis Method (if applicable):  N/A    CBC (last 72 hours):  Recent Labs   Lab Result Units 12/27/24  0344 12/28/24  0352 12/29/24  0349   WBC K/uL 19.44* 20.56* 17.69*   Hemoglobin g/dL 9.9* 8.7* 8.0*   Hematocrit % 30.4* 26.8* 24.7*   Platelets K/uL 174 173 154   Gran % % 78.1* 79.5* 78.6*   Lymph % % 6.0* 5.3* 5.1* "   Mono % % 7.4 8.3 8.7   Eosinophil % % 6.5 5.3 6.1   Basophil % % 0.4 0.3 0.3   Differential Method  Automated Automated Automated       Metabolic Panel (last 72 hours):  Recent Labs   Lab Result Units 12/27/24  0344 12/28/24  0352 12/29/24  0349   Sodium mmol/L 131* 127* 130*   Potassium mmol/L 5.1 4.9 4.5   Chloride mmol/L 100 97 98   CO2 mmol/L 30* 32* 31*   Glucose mg/dL 279* 239* 236*   BUN mg/dL 40* 50* 52*   Creatinine mg/dL 1.2 1.5* 1.6*   Albumin g/dL <0.6* <0.6* 0.7*   Total Bilirubin mg/dL 0.3 0.4 0.5   Alkaline Phosphatase U/L 326* 363* 328*   AST U/L 20 41* 25   ALT U/L <6* 10 6*   Magnesium mg/dL 1.7 1.7 1.6       Aminoglycoside Administrations:  aminoglycosides given in last 96 hours        No antibiotic orders with administrations found.                    Microbiologic Results:  Microbiology Results (last 7 days)       Procedure Component Value Units Date/Time    Culture, Respiratory with Gram Stain [[id number]]  (Abnormal) Collected: 12/27/24 1522    Order Status: Completed Specimen: Respiratory from Endotracheal Aspirate Updated: 12/29/24 0642     Respiratory Culture GRAM NEGATIVE FLORA  Many  Identification and susceptibility pending       Gram Stain (Respiratory) Few Gram positive cocci     Gram Stain (Respiratory) Rare Gram positive rods     Gram Stain (Respiratory) Rare WBC's    Blood culture [[id number]] Collected: 12/27/24 0837    Order Status: Completed Specimen: Blood Updated: 12/28/24 1813     Blood Culture, Routine No Growth to date      No Growth to date    Blood culture [[id number]] Collected: 12/27/24 0838    Order Status: Completed Specimen: Blood Updated: 12/28/24 1813     Blood Culture, Routine No Growth to date      No Growth to date    Culture, Respiratory with Gram Stain [[id number]]  (Abnormal)  (Susceptibility) Collected: 12/15/24 0935    Order Status: Completed Specimen: Respiratory from Endotracheal Aspirate Updated: 12/23/24 1204     Respiratory Culture No S aureus or  Pseudomonas isolated.      ACINETOBACTER BAUMANNII   Many        PROTEUS MIRABILIS  Few        KLEBSIELLA PNEUMONIAE   Few  ESBL        Gram Stain (Respiratory) Few WBC's     Gram Stain (Respiratory) Many Gram positive rods     Gram Stain (Respiratory) Few Gram negative cocci

## 2024-12-29 NOTE — ASSESSMENT & PLAN NOTE
Patient has Abnormal Magnesium: hypomagnesemia. Will continue to monitor electrolytes closely. Will replace the affected electrolytes and repeat labs to be done after interventions completed. The patient's magnesium results have been reviewed and are listed below.  Recent Labs   Lab 12/29/24  0349   MG 1.6      12/17 mag imrpoving - repalce mag today  12/19 replace mag today  12/20 mag oxide bid  12/21 2g Mg  12/23 dose of iv mag today to keep mag level about 2  12/25 FM:  Replace, recheck.

## 2024-12-29 NOTE — ASSESSMENT & PLAN NOTE
Patient's most recent potassium results are listed below.   Recent Labs     12/27/24  0344 12/28/24  0352 12/29/24  0349   K 5.1 4.9 4.5       Plan  - Replete potassium per protocol  - Monitor potassium Daily  - Patient's hypokalemia is stable, continue below and monitor  12/20 increase pot bicarb to bid  12/23 decrease pot bicard to daily dosing  12/24 improved- stop potassium bicarb repalcement - monitor

## 2024-12-30 NOTE — ASSESSMENT & PLAN NOTE
Patient's FSGs are uncontrolled due to hyperglycemia on current medication regimen.  Last A1c reviewed-   Lab Results   Component Value Date    HGBA1C 6.6 (H) 12/16/2024     Most recent fingerstick glucose reviewed-   Recent Labs   Lab 12/29/24  1605 12/29/24  2018 12/29/24  2341 12/30/24  0404   POCTGLUCOSE 244* 260* 216* 214*       Current correctional scale  Low  Maintain anti-hyperglycemic dose as follows-   Antihyperglycemics (From admission, onward)    Start     Stop Route Frequency Ordered    12/30/24 0900  insulin glargine U-100 (Lantus) pen 20 Units         -- SubQ Daily 12/30/24 0734    12/24/24 0807  insulin aspart U-100 pen 0-10 Units         -- SubQ Every 4 hours PRN 12/24/24 0707        Hold Oral hypoglycemics while patient is in the hospital.  12/17 A1c improved to 6.6%  12/24 due to tpn - add lantus 12 u daily - ssi changed to moderate scale  12/25 FM:  BS logs noted, continue coverage.  12/26 FM:  Cont SSI.  12/27 increase lantus to 16u daily

## 2024-12-30 NOTE — RESPIRATORY THERAPY
12/30/24 0736   Patient Assessment/Suction   Level of Consciousness (AVPU) responds to pain   Respiratory Effort Unlabored   Expansion/Accessory Muscles/Retractions expansion symmetric;no retractions   All Lung Fields Breath Sounds diminished   Rhythm/Pattern, Respiratory assisted mechanically   Cough Frequency no cough  (pt has no gag reflex, Dr. Diamond aware)   Cough Type assisted   Suction Method oral;tracheal   Suction Pressure (mmHg) -120 mmHg   $ Suction Charges Inline Suction Procedure Stat Charge   Secretions Amount large   Secretions Color yellow   Secretions Characteristics thick   $ Swab or suction? Suction  (moderate amount of thin, bloody oral secretions)   Aspirate Toleration DIONI (no adverse reactions)   Sent to the lab? No   Is this patient in SNF or Inpatient Rehab? No   Skin Integrity   Area Observed Left;Right;Cheek;Upper lip;Lower lip;Corner lip   Skin Appearance without discoloration   PRE-TX-O2   Device (Oxygen Therapy) ventilator   $ Is the patient on High Flow Oxygen? Yes   Flow (L/min) (Oxygen Therapy) 60   Oxygen Concentration (%) 40   Oxygen Analyzed Concentration (%) 40 %   SpO2 100 %   Pulse Oximetry Type Continuous   $ Pulse Oximetry - Multiple Charge Pulse Oximetry - Multiple   Pulse 76   Resp 20   BP (!) 93/56   Positioning HOB elevated 30 degrees   Positioning   Body Position position maintained   Head of Bed (HOB) Positioning HOB at 30 degrees   Aerosol Therapy   $ Aerosol Therapy Charges Aerosol Treatment   Daily Review of Necessity (SVN) completed   Respiratory Treatment Status (SVN) given   Treatment Route (SVN) in-line;ventilator   Patient Position HOB elevated   Post Treatment Assessment (SVN) breath sounds unchanged   Signs of Intolerance (SVN) none   Breath Sounds Post-Respiratory Treatment   Throughout All Fields Post-Treatment All Fields   Throughout All Fields Post-Treatment no change   Post-treatment Heart Rate (beats/min) 77   Post-treatment Resp Rate (breaths/min) 19    Chest Physiotherapy   $ Chest Physiotherapy Charges Subsequent   Daily Review of Necessity (CPT) completed   Type (CPT) percussion   Method (CPT) by hand   Patient Position (CPT) HOB elevated   Post Treatment Assessment (CPT) breath sounds unchanged   Signs of Intolerance (CPT) none        Airway - Non-Surgical 12/15/24 0922 Endotracheal Tube   Placement Date/Time: 12/15/24 0922   Present Prior to Hospital Arrival?: No  Method of Intubation: Glidescope  Inserted by: MD  Staff/Resident Name(s): Doug Ibrahim  Airway Device: Endotracheal Tube  Mask Ventilation: Easy  Intubated: Postinduction  B...   Secured at 23 cm   Measured At Lips   Secured Location (S)  Left  (ETT moved to left side, Katarzyna RN notified)   Secured by Commercial tube kaufman   Bite Block secure and patent   Site Condition Dry   Status Intact;Secured;Patent   Site Assessment Clean;Dry   Cuff Volume   (MLT)   General Safety Checklist   Safety Promotion/Fall Prevention side rails raised   Airway Safety   Is Ambu Bag and Mask with Patient? Yes, Adult Ambu Bag and Mask   Suction set is at the bedside? Yes   Equipment Change   $ RT Equipment HME   Respiratory Interventions   Cough And Deep Breathing unable to perform   Airway/Ventilation Management airway patency maintained   Vent Select   Conventional Vent Y   Humidification Changed? Yes   $ Ventilator Subsequent 1   Charged w/in last 24h YES   Preset Conventional Ventilator Settings   Vent ID 1   Vent Type    Ventilation Type VC   Vent Mode (S)  SIMV   Humidity HME   Set Rate 14 BPM   Vt Set 400 mL   PEEP/CPAP 5 cmH20   Pressure Support 10 cmH20   Waveform RAMP   Peak Flow 60 L/min   Plateau Set/Insp. Hold (sec) 0   Insp Rise Time  70 %   I-Trigger Type  V-TRIG   Trigger Sensitivity Flow/I-Trigger 3 L/min   Patient Ventilator Parameters   Resp Rate Total 20 br/min   All Fields Breath Sounds diminished   Peak Airway Pressure 21 cmH20   Mean Airway Pressure 8.8 cmH20   Plateau Pressure 25 cmH20    Exhaled Vt 430 mL   Total Ve 7.73 L/m   Spont Ve 1.68 L   I:E Ratio Measured 1:3.80   Auto PEEP 0 cmH20   Tubing ID (mm) 7.5 mm   Tube Type ET   Inspired Tidal Volume (VTI) 272 mL   Conventional Ventilator Alarms   Alarms On Y   Ve High Alarm 24 L/min   Ve Low Alarm 4 L/min   Resp Rate High Alarm 35 br/min   Press High Alarm 45 cmH2O   Apnea Rate 16   Apnea Volume (mL) 400 mL   Apnea Oxygen Concentration  100   Apnea Flow Rate (L/min) 60   T Apnea 10 sec(s)   IHI Ventilator Associated Pneumonia Bundle (Required)   Head of Bed Elevated  HOB 30   Oral Care CHG;Lip moisturizer applied;Mouth swabbed;Mouth moisturizer;Mouth suctioned   Vent Circut Breaks Minimized Yes   Ready to Wean/Extubation Screen   FIO2<=50 (chart decimal) 0.4   MV<16L (chart vol.) 7.73   PEEP <=8 (chart #) 5   Ready to Wean Parameters   F/VT Ratio<105 (RSBI) (!) 46.51   Negative Inspiratory Force   Negative Inspiratory Force (cm H2O) 0   Vital Capacity   Vital Capacity (mL) 0   Respiratory Evaluation   $ Care Plan Tech Time 15 min       Vent mode changed to SIMV per Dr. Diamond. Katarzyna RN notified

## 2024-12-30 NOTE — ASSESSMENT & PLAN NOTE
Anemia is likely due to chronic infections Most recent hemoglobin and hematocrit are listed below.  Recent Labs     12/28/24  0352 12/29/24  0349 12/30/24  0723   HGB 8.7* 8.0* 8.4*   HCT 26.8* 24.7* 25.9*       Plan  - Monitor serial CBC: Daily  - Transfuse PRBC if patient becomes hemodynamically unstable, symptomatic or H/H drops below 7/21.  - Patient has not received any PRBC transfusions to date  - Patient's anemia is currently worsening. Will adjust treatment as follows: 2uprbcs today  12/17 h/h Improved  12/20 h/h holding  12/26 FM:  Transfuse today.  12/27 hh improved after transfusion  12/28/24:  Continue daily monitoring.  12/30 h/h stable

## 2024-12-30 NOTE — PLAN OF CARE
Pt remains on the vent. Settings unchanged. Improvement to the edema to the extremities, 900 ml urine per varghese. NG tube and PEG tube maintained. Ileostomy with 440 light brown stool. Repositioned. Diprivan and TPN per PICC, flushed as ordered. VSS, afebrile. Continue to observe.

## 2024-12-30 NOTE — SUBJECTIVE & OBJECTIVE
Review of Systems   Unable to perform ROS: Acuity of condition     Objective:     Vital Signs (Most Recent):  Temp: 96.1 °F (35.6 °C) (12/30/24 0702)  Pulse: 76 (12/30/24 0736)  Resp: 19 (12/30/24 0736)  BP: 98/62 (12/30/24 0531)  SpO2: 100 % (12/30/24 0736) Vital Signs (24h Range):  Temp:  [96.1 °F (35.6 °C)-97.2 °F (36.2 °C)] 96.1 °F (35.6 °C)  Pulse:  [74-83] 76  Resp:  [10-25] 19  SpO2:  [99 %-100 %] 100 %  BP: ()/(50-71) 98/62     Weight: 68.5 kg (151 lb 0.2 oz)  Body mass index is 23.65 kg/m².    Intake/Output Summary (Last 24 hours) at 12/30/2024 0832  Last data filed at 12/30/2024 0724  Gross per 24 hour   Intake 2688.29 ml   Output 2690 ml   Net -1.71 ml         Physical Exam  Constitutional:       Appearance: He is ill-appearing.      Comments: Thin male; intubated   HENT:      Mouth/Throat:      Mouth: Mucous membranes are moist.   Eyes:      Pupils: Pupils are equal, round, and reactive to light.   Cardiovascular:      Rate and Rhythm: Normal rate and regular rhythm.      Heart sounds: Normal heart sounds.   Pulmonary:      Effort: Pulmonary effort is normal.      Breath sounds: Normal breath sounds. Transmitted upper airway sounds present.   Abdominal:      General: There is no distension.      Palpations: Abdomen is soft. There is no mass.      Tenderness: There is no abdominal tenderness.      Comments: Jtube noted; ileostomy noted   Genitourinary:     Comments: Rojas with yellow urine  Musculoskeletal:      Right lower leg: Edema present.      Left lower leg: Edema present.   Lymphadenopathy:      Cervical: No cervical adenopathy.   Skin:     General: Skin is warm and dry.      Comments: Wounds to sacrum; groin area             Significant Labs: All pertinent labs within the past 24 hours have been reviewed.    Significant Imaging: I have reviewed all pertinent imaging results/findings within the past 24 hours.

## 2024-12-30 NOTE — PROGRESS NOTES
[facility] Medicine  Progress Note    Patient Name: Carlos Chahal  MRN: [id number]  Patient Class: IP- Inpatient   Admission Date: 12/15/2024  Length of Stay: 15 days  Attending Physician: Kim Diamond MD  Primary Care Provider: Jose Francisco Klein MD        Subjective     Principal Problem:Sepsis        HPI:  Carlos Chahal is a 34 y.o. male who presents to the ED from nursing home for altered mental status and difficulty breathing.  Patient is found to be hypoxic.  He has a history of anoxic brain injury     This am, pt is on ventilator and levophed at 0.25mcg and ivfs at 75ml/hr.      Spoke with father this am on condition of pt    Overview/Hospital Course:  12/17 ND became more awake yesterday with wound changes - low dose propofol added for pt - able to wean levophed to 0.15mcg.  did tolerate tf last night - check kub this am  12/18 ND pt had to be changed back to ac control last nigth after becoming tachypneic and had low tv.  Tolerating ac mode.  Levophed down to 0.1 mcg  12/19 ND pt toleratign vent - will wean RR today - cont to slowly wean levophed as tolerates - wbc slowly imrpoving - tolerating low dose tfs - will cont to increase tfs as tolerates  12/20 ND tolerating vent - cont to work on feeds and nutritional support  12/21 [place] weekend coverage, in no acute distress, stable vital signs, AC/18/400/5/40%, SpO2 100%. On proprofol for sedation, patient tracks voice and moving head and neck with lid opening. Mild bump in WBC, afebrile, may be associated with the reported residual >200 and tube feeds held. Abdomen, soft and no distension on exam. Will resume as tolerated and monitor.  Blood cx x2, final report no growth. Continue merrem (D5), gentamicin (D2) with mixed MDRO frida from sputum studies and UTI. Replete Mg, continue abx. Continue daily wound care.   12/22 [place] weekend coverage, overnight rate change and tolerating AC12/400/5/40 SpO2 100%, proprofol 15 mcg/kg/min, levophed  0.1mcg/kg/min. Patient without gag reflex on suction. Tube feeds held overnight and resumed, remains on trickle feeds. Ileostomy output 100.   No associated abdominal distension, patient in no distress. Vent settings weaned. Leukocytosis resolved. Continue IV abx for MDRO coverage.   12/23 ND Pt still havign trouble tolerating tfs -change reglan iv; pt is more awake this am - will change to simv for a few hours today   12/24 ND elevated bs with tpn- will add long acting insulin as 12u and restart tpn - ssi adjusted to moderate scale.  12/25 FM:  Holiday coverage, chart reviewed and case discussed with team.  Patient's Levophed is being weaned slowly and he is not tolerating his tube feeds.  Abdominal exam is soft patient has output via ostomy we will check KUB.  Patient is followed by surgery and has a polymicrobial respiratory and urinary tract infection.  Patient has multiple wounds and has a history of anoxic brain injury and is a long-term nursing home resident.  12/26 FM:  Patient is off of vasopressors this morning, patient's KUB noted and will rechallenge tube feedings today.  Patient is tolerating TPN labs noted and his hemoglobin has continued to trend downward Ms. With no visible blood loss.  Patient's other labs noted his white blood cell count is rising despite appropriate antibiotics based on respiratory and urinary cultures.  Patient's prognosis is poor.  12/27 ND pt  still not tolerating tfs - surgery following -  cont tpn; h/h improved after transfusion.  Updated aunt on pts condition  12/28/24:  Patient seen this morning.  Not tolerating tube feedings at this time, remains on vent support.  Poor prognosis at this time.  Patient with poor urine output despite diuretics.  Trial of albumin infusion followed by lasix today.    12/29/24:  Good response to albumin/lasix combo yesterday with good urine output.  Renal function essentially unchanged.  Sputum culture with gram negative rods, susceptibility  pending.  Continue abx for now. Leukocytosis improving, H/H stable.   12/30 ND pt will be switched to simv today to start havign him do more work on breathing.  Awaiting sputum cx results - cxr appears improved and wbc improving.  Still not tolerating tfs        Review of Systems   Unable to perform ROS: Acuity of condition     Objective:     Vital Signs (Most Recent):  Temp: 96.1 °F (35.6 °C) (12/30/24 0702)  Pulse: 76 (12/30/24 0736)  Resp: 19 (12/30/24 0736)  BP: 98/62 (12/30/24 0531)  SpO2: 100 % (12/30/24 0736) Vital Signs (24h Range):  Temp:  [96.1 °F (35.6 °C)-97.2 °F (36.2 °C)] 96.1 °F (35.6 °C)  Pulse:  [74-83] 76  Resp:  [10-25] 19  SpO2:  [99 %-100 %] 100 %  BP: ()/(50-71) 98/62     Weight: 68.5 kg (151 lb 0.2 oz)  Body mass index is 23.65 kg/m².    Intake/Output Summary (Last 24 hours) at 12/30/2024 0832  Last data filed at 12/30/2024 0724  Gross per 24 hour   Intake 2688.29 ml   Output 2690 ml   Net -1.71 ml         Physical Exam  Constitutional:       Appearance: He is ill-appearing.      Comments: Thin male; intubated   HENT:      Mouth/Throat:      Mouth: Mucous membranes are moist.   Eyes:      Pupils: Pupils are equal, round, and reactive to light.   Cardiovascular:      Rate and Rhythm: Normal rate and regular rhythm.      Heart sounds: Normal heart sounds.   Pulmonary:      Effort: Pulmonary effort is normal.      Breath sounds: Normal breath sounds. Transmitted upper airway sounds present.   Abdominal:      General: There is no distension.      Palpations: Abdomen is soft. There is no mass.      Tenderness: There is no abdominal tenderness.      Comments: Jtube noted; ileostomy noted   Genitourinary:     Comments: Rojas with yellow urine  Musculoskeletal:      Right lower leg: Edema present.      Left lower leg: Edema present.   Lymphadenopathy:      Cervical: No cervical adenopathy.   Skin:     General: Skin is warm and dry.      Comments: Wounds to sacrum; groin area          "    Significant Labs: All pertinent labs within the past 24 hours have been reviewed.    Significant Imaging: I have reviewed all pertinent imaging results/findings within the past 24 hours.    Assessment and Plan     * Sepsis  This patient does have evidence of infective focus  My overall impression is septic shock due to MAP < 65 and SBP < 90.  Source: Respiratory and Urinary Tract  Antibiotics given-   Antibiotics (72h ago, onward)      Start     Stop Route Frequency Ordered    12/29/24 1125  gentamicin - pharmacy to dose         -- IV pharmacy to manage frequency 12/29/24 1025          Latest lactate reviewed-  No results for input(s): "LACTATE", "POCLAC" in the last 72 hours.    Organ dysfunction indicated by Acute respiratory failure and Encephalopathy    Fluid challenge Ideal Body Weight- The patient's ideal body weight is Ideal body weight: 66.1 kg (145 lb 11.6 oz) which will be used to calculate fluid bolus of 30 ml/kg for treatment of septic shock.      Post- resuscitation assessment Yes Perfusion exam was performed within 6 hours of septic shock presentation after bolus shows Inadequate tissue perfusion assessed by non-invasive monitoring       Will Start Pressors- Levophed for MAP of 65  Source control achieved by: iv zosym. Vanc, ivfs  12/17 wean levophed as tolerates- abxs changed to merrem, cotn vanc - await culture final reports  12/18 wbc slightly imrpoved - cont iv abxs- await final sputum culture - on merrem fro esbl kleb in urine  12/19 cont iv merrem for esbl kleb in urine and proteus in sputum - dc vanc  12/20 wbc improvign - cotn iv merrem  12/24 wbc normal - cont iv abxs  12/25 FM:  Cont GENT.  12/26 FM:  Cont GENT, prognosis poor, pulm/gu/skin infections.  12/27 repeat bc and sputum culture due to increasing wbc - cont gent; completed 10 days of merrem  12/29/24:  Sputum with gram negative rods, continue gentamicin, susceptibility pending   12/30 wbc improvign    Edema  Lasix 20mg iv bid - " monitor I/o  12/30 decreae lasix to daily - edema has imrpoved    Hypokalemia  Patient's most recent potassium results are listed below.   Recent Labs     12/28/24  0352 12/29/24  0349 12/30/24  0543   K 4.9 4.5 3.8       Plan  - Replete potassium per protocol  - Monitor potassium Daily  - Patient's hypokalemia is stable, continue below and monitor  12/20 increase pot bicarb to bid  12/23 decrease pot bicard to daily dosing  12/24 improved- stop potassium bicarb repalcement - monitor    Severe malnutrition  Nutrition consulted. Most recent weight and BMI monitored-     Measurements:  Wt Readings from Last 1 Encounters:   12/16/24 68.5 kg (151 lb 0.2 oz)   Body mass index is 23.65 kg/m².    Patient has been screened and assessed by RD.    Malnutrition Type:  Context:    Level:      Malnutrition Characteristic Summary:       Interventions/Recommendations (treatment strategy):  1. Rec'd Continuous EN: Glucerna 1.5 @ 40mL/r increasing by 5 mL q6hrs to goal rate of 55mL/hr with a continuous 40mL FWF to provide 1980kcal (94% EEN), 109g of protein (100% EPN), and 1962mL FW.   2. Néstor BID via PEG tube to promote wound healing and to provide additional nutrition.           - Do not mix Néstor with formula in a tube-feeding bag         - Pour one packet of Néstor in a clean, small container for mixing.           - Add 4 fl oz (120 mL) water at room temperature.           - Mix well with disposable spoon or tongue blade until all particles are completely hydrated.           - Verify correct tube placement.           - Flush feeding tube with 30 mL water.           -Administer Néstor through feeding tube using a 60-mL or larger syringe.           - Flush with an additional 30 mL water.  3. Rec'd ClinimixE 5/20 @ 80mL/hr to provide 1690kcal (80% EEN), 96g of protein (88% EPN), and 1920mL TFV.      -Add 250mL of 20% lipids 3x/week to provide additional calories.      -Check Triglycerides before adding lipids. 4. RD to follow and  make rec's accordingly.    12/18 restart tfs today at 1ml/hr to see if can tolerate feeds  12/19 increase tfs as tolerates  12/23 not tolerating tfs- will get tpn orders and start that until can tolerate tfs better - ncrease regaln 10 mg iv q 6hrs  12/24 con ttfs as tolerates - started on tpn for further nutritional support  12/25 FM:  Check KUB.  12/26 FM:  Resume TF today, monitor.  12/27 on tpn - not tolerating tfs at this time  12/28/24:  Albumin infusion today.     Hypomagnesemia  Patient has Abnormal Magnesium: hypomagnesemia. Will continue to monitor electrolytes closely. Will replace the affected electrolytes and repeat labs to be done after interventions completed. The patient's magnesium results have been reviewed and are listed below.  Recent Labs   Lab 12/30/24  0543   MG 1.5*      12/17 mag imrpoving - repalce mag today  12/19 replace mag today  12/20 mag oxide bid  12/21 2g Mg  12/23 dose of iv mag today to keep mag level about 2  12/25 FM:  Replace, recheck.  12/30 replace mag today    Pneumonia of right lower lobe due to infectious organism  Patient has a diagnosis of pneumonia. The cause of the pneumonia is suspected to be bacterial in etiology but organism is not known. The pneumonia is stable. The patient has the following signs/symptoms of pneumonia: persistent hypoxia  and sputum production. The patient does have a current oxygen requirement and the patient does not have a home oxygen requirement. I have reviewed the pertinent imaging. The following cultures have been collected: Blood cultures and Sputum culture The culture results are listed below.     Current antimicrobial regimen consists of the antibiotics listed below. Will monitor patient closely and continue current treatment plan unchanged.    Antibiotics (From admission, onward)      Start     Stop Route Frequency Ordered    12/29/24 1125  gentamicin - pharmacy to dose         -- IV pharmacy to manage frequency 12/29/24 1025             Microbiology Results (last 7 days)       Procedure Component Value Units Date/Time    Blood culture [[id number]] Collected: 12/27/24 0837    Order Status: Completed Specimen: Blood Updated: 12/29/24 1812     Blood Culture, Routine No Growth to date      No Growth to date      No Growth to date    Blood culture [[id number]] Collected: 12/27/24 0838    Order Status: Completed Specimen: Blood Updated: 12/29/24 1812     Blood Culture, Routine No Growth to date      No Growth to date      No Growth to date    Culture, Respiratory with Gram Stain [[id number]]  (Abnormal) Collected: 12/27/24 1522    Order Status: Completed Specimen: Respiratory from Endotracheal Aspirate Updated: 12/29/24 0642     Respiratory Culture GRAM NEGATIVE FLORA  Many  Identification and susceptibility pending       Gram Stain (Respiratory) Few Gram positive cocci     Gram Stain (Respiratory) Rare Gram positive rods     Gram Stain (Respiratory) Rare WBC's    Culture, Respiratory with Gram Stain [[id number]]  (Abnormal)  (Susceptibility) Collected: 12/15/24 0935    Order Status: Completed Specimen: Respiratory from Endotracheal Aspirate Updated: 12/23/24 1204     Respiratory Culture No S aureus or Pseudomonas isolated.      ACINETOBACTER BAUMANNII   Many        PROTEUS MIRABILIS  Few        KLEBSIELLA PNEUMONIAE   Few  ESBL        Gram Stain (Respiratory) Few WBC's     Gram Stain (Respiratory) Many Gram positive rods     Gram Stain (Respiratory) Few Gram negative cocci        12/17 dc zosyn with recent esbl kleb in urine - will change to merrem - cont vanc until final cultures obtained - change vent to simv; add nebs  12/18 cotn merrem and vanc - await final sputum cutlure - cont vent on ac control; nebs - wbc imrpoved slightly; lasix 20mg iv for edema today  12/19  dc vanc - proteus grwoign from sputum - cont merrem and nebs; lasix today - wean rr on vent  12/20 wnc improved - cont merrem - nebs and vent - another dose of lasix  today for edema - fio2 increased  12/23 cont iv gent and merren due to sent of multiple bugs (acinetobacter/kleb/proteus)  12/24 cont current abxs  12/25 FM:  Cont GENT  12/26 FM:  Cont GENT, poor prognosis.  12/27 on gent - get new sputum culture today due to elevated wbc - cont vent and nebs  12/28/24:Gram positive cocci/rods on sputum, blood cultures negative   12/29/24:  Gram negative rods on sputum cx.   12/30 await final results of sputum cx    Sacral wound, sequela  Local wound care with dakins bid  Iv abxs  12/27 prob debridementt in or on 12/30    Open wound of groin  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  Recent Labs     12/28/24  0352 12/29/24  0349 12/30/24  0723   HGB 8.7* 8.0* 8.4*   HCT 26.8* 24.7* 25.9*       Plan  - Monitor serial CBC: Daily  - Transfuse PRBC if patient becomes hemodynamically unstable, symptomatic or H/H drops below 7/21.  - Patient has not received any PRBC transfusions to date  - Patient's anemia is currently worsening. Will adjust treatment as follows: 2uprbcs today  12/17 h/h Improved  12/20 h/h holding  12/26 FM:  Transfuse today.  12/27 hh improved after transfusion  12/28/24:  Continue daily monitoring.  12/30 h/h stable    History of anoxic brain injury  12/25 FM:  Poor prognosis.      Urinary tract infection associated with indwelling urethral catheter  Await new urine culture =- currently on zosyn/vanc  12/17 change to merrem/vanc  12/18 has esbl klebsiella - cont merrem  12/23 on merrem  12/25 FM:  Continue Gent, CS reviewed.  12/26 FM:  WBC increasing, monitor, on appropriate abx based on cultures.  12/28/24:  Blood culture negative to date, sputum culture with some gram positive cocci and rods.  Continue abx for now.  Gentamicin per pharmacy to dose.     Type 1 diabetes  Patient's FSGs are uncontrolled due to  hyperglycemia on current medication regimen.  Last A1c reviewed-   Lab Results   Component Value Date    HGBA1C 6.6 (H) 12/16/2024     Most recent fingerstick glucose reviewed-   Recent Labs   Lab 12/29/24  1605 12/29/24  2018 12/29/24  2341 12/30/24  0404   POCTGLUCOSE 244* 260* 216* 214*       Current correctional scale  Low  Maintain anti-hyperglycemic dose as follows-   Antihyperglycemics (From admission, onward)      Start     Stop Route Frequency Ordered    12/30/24 0900  insulin glargine U-100 (Lantus) pen 20 Units         -- SubQ Daily 12/30/24 0734    12/24/24 0807  insulin aspart U-100 pen 0-10 Units         -- SubQ Every 4 hours PRN 12/24/24 0707          Hold Oral hypoglycemics while patient is in the hospital.  12/17 A1c improved to 6.6%  12/24 due to tpn - add lantus 12 u daily - ssi changed to moderate scale  12/25 FM:  BS logs noted, continue coverage.  12/26 FM:  Cont SSI.  12/27 increase lantus to 16u daily      VTE Risk Mitigation (From admission, onward)           Ordered     enoxaparin injection 40 mg  Daily         12/16/24 0815     IP VTE LOW RISK PATIENT  Once         12/15/24 1201     Place sequential compression device  Until discontinued         12/15/24 1201                    Discharge Planning   JOSE MANUEL:      Code Status: Full Code   Medical Readiness for Discharge Date:   Discharge Plan A: Other (TBD)   Discharge Delays: None known at this time                    Kim Diamond MD  Department of Hospital Medicine   [facility]

## 2024-12-30 NOTE — EICU
Intervention Initiated From:  [facility] / EICU    Sandy intervened regarding:  Rounding (Video assessment)    Comments: Remote video rounding performed. Patient found in bed, intubated, sedated. No signs of acute distress noted.     For questions or patient concerns, please reach out to eICU.

## 2024-12-30 NOTE — ASSESSMENT & PLAN NOTE
Patient's most recent potassium results are listed below.   Recent Labs     12/28/24  0352 12/29/24  0349 12/30/24  0543   K 4.9 4.5 3.8       Plan  - Replete potassium per protocol  - Monitor potassium Daily  - Patient's hypokalemia is stable, continue below and monitor  12/20 increase pot bicarb to bid  12/23 decrease pot bicard to daily dosing  12/24 improved- stop potassium bicarb repalcement - monitor

## 2024-12-30 NOTE — PLAN OF CARE
Problem: Skin Injury Risk Increased  Goal: Skin Health and Integrity  Outcome: Progressing     Problem: Mechanical Ventilation Invasive  Goal: Effective Communication  Outcome: Progressing  Goal: Optimal Device Function  Outcome: Progressing  Goal: Mechanical Ventilation Liberation  Outcome: Progressing  Goal: Optimal Nutrition Delivery  Outcome: Progressing  Goal: Absence of Device-Related Skin and Tissue Injury  Outcome: Progressing  Goal: Absence of Ventilator-Induced Lung Injury  Outcome: Progressing     Problem: Artificial Airway  Goal: Effective Communication  Outcome: Progressing  Goal: Optimal Device Function  Outcome: Progressing  Goal: Absence of Device-Related Skin or Tissue Injury  Outcome: Progressing     Problem: Adult Inpatient Plan of Care  Goal: Plan of Care Review  Outcome: Progressing  Goal: Patient-Specific Goal (Individualized)  Outcome: Progressing  Goal: Absence of Hospital-Acquired Illness or Injury  Outcome: Progressing  Goal: Optimal Comfort and Wellbeing  Outcome: Progressing  Goal: Readiness for Transition of Care  Outcome: Progressing     Problem: Diabetes Comorbidity  Goal: Blood Glucose Level Within Targeted Range  Outcome: Progressing     Problem: Wound  Goal: Optimal Coping  Outcome: Progressing  Goal: Optimal Functional Ability  Outcome: Progressing  Goal: Absence of Infection Signs and Symptoms  Outcome: Progressing  Goal: Improved Oral Intake  Outcome: Progressing  Goal: Optimal Pain Control and Function  Outcome: Progressing  Goal: Skin Health and Integrity  Outcome: Progressing  Goal: Optimal Wound Healing  Outcome: Progressing     Problem: Infection  Goal: Absence of Infection Signs and Symptoms  Outcome: Progressing     Problem: Pneumonia  Goal: Fluid Balance  Outcome: Progressing  Goal: Resolution of Infection Signs and Symptoms  Outcome: Progressing  Goal: Effective Oxygenation and Ventilation  Outcome: Progressing     Problem: Sepsis/Septic Shock  Goal: Optimal  Coping  Outcome: Progressing  Goal: Absence of Bleeding  Outcome: Progressing  Goal: Blood Glucose Level Within Targeted Range  Outcome: Progressing  Goal: Absence of Infection Signs and Symptoms  Outcome: Progressing  Goal: Optimal Nutrition Intake  Outcome: Progressing     Problem: Fall Injury Risk  Goal: Absence of Fall and Fall-Related Injury  Outcome: Progressing     Problem: Electrolyte Imbalance  Goal: Electrolyte Balance  Outcome: Progressing     Problem: Restraint, Nonviolent  Goal: Absence of Harm or Injury  Outcome: Progressing

## 2024-12-30 NOTE — RESPIRATORY THERAPY
12/30/24 1153   Patient Assessment/Suction   Level of Consciousness (AVPU) responds to pain   Respiratory Effort Unlabored   Expansion/Accessory Muscles/Retractions expansion symmetric;no retractions   All Lung Fields Breath Sounds diminished   Rhythm/Pattern, Respiratory assisted mechanically   Cough Frequency no cough   Cough Type assisted   Suction Method oral;tracheal   Suction Pressure (mmHg) -120 mmHg   $ Suction Charges Inline Suction Procedure Stat Charge   Secretions Amount large   Secretions Color yellow   Secretions Characteristics thick   $ Swab or suction? Suction  (moderate amount of thick, clear/bloody oral secretions)   Aspirate Toleration DIONI (no adverse reactions)   Sent to the lab? No   Is this patient in SNF or Inpatient Rehab? No   Skin Integrity   Area Observed Left;Right;Cheek;Upper lip;Lower lip;Corner lip   Skin Appearance without discoloration   PRE-TX-O2   Device (Oxygen Therapy) ventilator   $ Is the patient on High Flow Oxygen? Yes   Flow (L/min) (Oxygen Therapy) 60   Oxygen Concentration (%) 40   Oxygen Analyzed Concentration (%) 40 %   SpO2 100 %   Pulse Oximetry Type Continuous   $ Pulse Oximetry - Multiple Charge Pulse Oximetry - Multiple   Pulse 78   Resp 19   BP (!) 97/54   Positioning HOB elevated 30 degrees   Positioning   Body Position position maintained   Head of Bed (HOB) Positioning HOB at 30 degrees   Aerosol Therapy   $ Aerosol Therapy Charges Aerosol Treatment   Daily Review of Necessity (SVN) completed   Respiratory Treatment Status (SVN) given   Treatment Route (SVN) in-line;ventilator   Patient Position HOB elevated   Post Treatment Assessment (SVN) breath sounds unchanged   Signs of Intolerance (SVN) none   Breath Sounds Post-Respiratory Treatment   Throughout All Fields Post-Treatment All Fields   Throughout All Fields Post-Treatment no change   Post-treatment Heart Rate (beats/min) 78   Post-treatment Resp Rate (breaths/min) 20   Chest Physiotherapy   $ Chest  Physiotherapy Charges Subsequent   Daily Review of Necessity (CPT) completed   Type (CPT) percussion   Method (CPT) by hand   Patient Position (CPT) HOB elevated   Post Treatment Assessment (CPT) breath sounds unchanged   Signs of Intolerance (CPT) none        Airway - Non-Surgical 12/15/24 0922 Endotracheal Tube   Placement Date/Time: 12/15/24 0922   Present Prior to Hospital Arrival?: No  Method of Intubation: Glidescope  Inserted by: MD  Staff/Resident Name(s): Doug Parksw  Airway Device: Endotracheal Tube  Mask Ventilation: Easy  Intubated: Postinduction  B...   Secured at 23 cm   Measured At Lips   Secured Location Left   Secured by Commercial tube kaufman   Bite Block secure and patent   Site Condition Dry   Status Intact;Secured;Patent   Site Assessment Clean;Dry   Cuff Volume   (MLT)   General Safety Checklist   Safety Promotion/Fall Prevention side rails raised   Airway Safety   Is Ambu Bag and Mask with Patient? Yes, Adult Ambu Bag and Mask   Suction set is at the bedside? Yes   Respiratory Interventions   Cough And Deep Breathing unable to perform   Airway/Ventilation Management airway patency maintained   Vent Select   Conventional Vent Y   Humidification Changed? No  (not required at this time)   $ Ventilator Subsequent 1   Charged w/in last 24h YES   Preset Conventional Ventilator Settings   Vent ID 1   Vent Type    Ventilation Type VC   Vent Mode (S)  A/C   Humidity HME   Set Rate 12 BPM   Vt Set 400 mL   PEEP/CPAP 5 cmH20   Waveform RAMP   Peak Flow 60 L/min   Plateau Set/Insp. Hold (sec) 0   I-Trigger Type  V-TRIG   Trigger Sensitivity Flow/I-Trigger 3 L/min   Patient Ventilator Parameters   Resp Rate Total 19 br/min   All Fields Breath Sounds diminished   Peak Airway Pressure 19 cmH20   Mean Airway Pressure 8.8 cmH20   Plateau Pressure 25 cmH20   Exhaled Vt 428 mL   Total Ve 8.07 L/m   I:E Ratio Measured 1:2.90   Auto PEEP 0 cmH20   Tubing ID (mm) 7.5 mm   Tube Type ET   Conventional  Ventilator Alarms   Alarms On Y   Ve High Alarm 24 L/min   Ve Low Alarm 4 L/min   Resp Rate High Alarm 35 br/min   Press High Alarm 45 cmH2O   Apnea Rate 14   Apnea Volume (mL) 400 mL   Apnea Oxygen Concentration  100   Apnea Flow Rate (L/min) 60   T Apnea 10 sec(s)   IHI Ventilator Associated Pneumonia Bundle (Required)   Head of Bed Elevated  HOB 30   Oral Care CHG;Lip moisturizer applied;Mouth swabbed;Mouth moisturizer;Mouth suctioned   Vent Circut Breaks Minimized Yes   Ready to Wean/Extubation Screen   FIO2<=50 (chart decimal) 0.4   MV<16L (chart vol.) 8.07   PEEP <=8 (chart #) 5   Ready to Wean Parameters   F/VT Ratio<105 (RSBI) (!) 44.39   Negative Inspiratory Force   Negative Inspiratory Force (cm H2O) 0   Vital Capacity   Vital Capacity (mL) 0   Respiratory Evaluation   $ Care Plan Tech Time 15 min       Vent settings changed back to previous AC settings at this time per Dr. Diamond's orders. Katarzyna WADDELL notified

## 2024-12-30 NOTE — ASSESSMENT & PLAN NOTE
Patient has a diagnosis of pneumonia. The cause of the pneumonia is suspected to be bacterial in etiology but organism is not known. The pneumonia is stable. The patient has the following signs/symptoms of pneumonia: persistent hypoxia  and sputum production. The patient does have a current oxygen requirement and the patient does not have a home oxygen requirement. I have reviewed the pertinent imaging. The following cultures have been collected: Blood cultures and Sputum culture The culture results are listed below.     Current antimicrobial regimen consists of the antibiotics listed below. Will monitor patient closely and continue current treatment plan unchanged.    Antibiotics (From admission, onward)      Start     Stop Route Frequency Ordered    12/29/24 1125  gentamicin - pharmacy to dose         -- IV pharmacy to manage frequency 12/29/24 1025            Microbiology Results (last 7 days)       Procedure Component Value Units Date/Time    Blood culture [[id number]] Collected: 12/27/24 0837    Order Status: Completed Specimen: Blood Updated: 12/29/24 1812     Blood Culture, Routine No Growth to date      No Growth to date      No Growth to date    Blood culture [[id number]] Collected: 12/27/24 0838    Order Status: Completed Specimen: Blood Updated: 12/29/24 1812     Blood Culture, Routine No Growth to date      No Growth to date      No Growth to date    Culture, Respiratory with Gram Stain [[id number]]  (Abnormal) Collected: 12/27/24 1522    Order Status: Completed Specimen: Respiratory from Endotracheal Aspirate Updated: 12/29/24 0642     Respiratory Culture GRAM NEGATIVE FLORA  Many  Identification and susceptibility pending       Gram Stain (Respiratory) Few Gram positive cocci     Gram Stain (Respiratory) Rare Gram positive rods     Gram Stain (Respiratory) Rare WBC's    Culture, Respiratory with Gram Stain [[id number]]  (Abnormal)  (Susceptibility) Collected: 12/15/24 0935    Order Status: Completed  Specimen: Respiratory from Endotracheal Aspirate Updated: 12/23/24 1204     Respiratory Culture No S aureus or Pseudomonas isolated.      ACINETOBACTER BAUMANNII   Many        PROTEUS MIRABILIS  Few        KLEBSIELLA PNEUMONIAE   Few  ESBL        Gram Stain (Respiratory) Few WBC's     Gram Stain (Respiratory) Many Gram positive rods     Gram Stain (Respiratory) Few Gram negative cocci        12/17 dc zosyn with recent esbl kleb in urine - will change to merrem - cont vanc until final cultures obtained - change vent to simv; add nebs  12/18 cotn merrem and vanc - await final sputum cutlure - cont vent on ac control; nebs - wbc imrpoved slightly; lasix 20mg iv for edema today  12/19  dc vanc - proteus grwoign from sputum - cont merrem and nebs; lasix today - wean rr on vent  12/20 wnc improved - cont merrem - nebs and vent - another dose of lasix today for edema - fio2 increased  12/23 cont iv gent and merren due to sent of multiple bugs (acinetobacter/kleb/proteus)  12/24 cont current abxs  12/25 FM:  Cont GENT  12/26 FM:  Cont GENT, poor prognosis.  12/27 on gent - get new sputum culture today due to elevated wbc - cont vent and nebs  12/28/24:Gram positive cocci/rods on sputum, blood cultures negative   12/29/24:  Gram negative rods on sputum cx.   12/30 await final results of sputum cx

## 2024-12-30 NOTE — ASSESSMENT & PLAN NOTE
Patient has Abnormal Magnesium: hypomagnesemia. Will continue to monitor electrolytes closely. Will replace the affected electrolytes and repeat labs to be done after interventions completed. The patient's magnesium results have been reviewed and are listed below.  Recent Labs   Lab 12/30/24  0543   MG 1.5*      12/17 mag imrpoving - repalce mag today  12/19 replace mag today  12/20 mag oxide bid  12/21 2g Mg  12/23 dose of iv mag today to keep mag level about 2  12/25 FM:  Replace, recheck.  12/30 replace mag today

## 2024-12-30 NOTE — ASSESSMENT & PLAN NOTE
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 - cont iv abxs and local wound care     69M pmh htn, IDDM c/o lower back pain x~15years has been treated with ablation therapy and epidural injections in the past with some releif reports b/l LE weakness after the most recent ablation in May 2021 found to have Lumbar stenosis without neurogenic claudication here for PST for scheduled decompression laminectomy no fusion  CAPRINI SCORE    AGE RELATED RISK FACTORS                                                       MOBILITY RELATED FACTORS  [ ] Age 41-60 years                                            (1 Point)                  [ ] Bed rest                                                        (1 Point)  [x ] Age: 61-74 years                                           (2 Points)                [ ] Plaster cast                                                   (2 Points)  [ ] Age= 75 years                                              (3 Points)                 [ ] Bed bound for more than 72 hours                   (2 Points)    DISEASE RELATED RISK FACTORS                                               GENDER SPECIFIC FACTORS  [ ] Edema in the lower extremities                       (1 Point)                  [ ] Pregnancy                                                     (1 Point)  [ ] Varicose veins                                               (1 Point)                  [ ] Post-partum < 6 weeks                                   (1 Point)             [x ] BMI > 25 Kg/m2                                            (1 Point)                  [ ] Hormonal therapy  or oral contraception            (1 Point)                 [ ] Sepsis (in the previous month)                        (1 Point)                  [ ] History of pregnancy complications  [ ] Pneumonia or serious lung disease                                               [ ] Unexplained or recurrent                       (1 Point)           (in the previous month)                               (1 Point)  [ ] Abnormal pulmonary function test                     (1 Point)                 SURGERY RELATED RISK FACTORS  [ ] Acute myocardial infarction                              (1 Point)                 [ ]  Section                                            (1 Point)  [ ] Congestive heart failure (in the previous month)  (1 Point)                 [ ] Minor surgery                                                 (1 Point)   [ ] Inflammatory bowel disease                             (1 Point)                 [ ] Arthroscopic surgery                                        (2 Points)  [ ] Central venous access                                    (2 Points)                [ x] General surgery lasting more than 45 minutes   (2 Points)       [ ] Stroke (in the previous month)                          (5 Points)               [ ] Elective arthroplasty                                        (5 Points)                                                                                                                                               HEMATOLOGY RELATED FACTORS                                                 TRAUMA RELATED RISK FACTORS  [ ] Prior episodes of VTE                                     (3 Points)                 [ ] Fracture of the hip, pelvis, or leg                       (5 Points)  [ ] Positive family history for VTE                         (3 Points)                 [ ] Acute spinal cord injury (in the previous month)  (5 Points)  [ ] Prothrombin 22991 A                                      (3 Points)                 [ ] Paralysis  (less than 1 month)                          (5 Points)  [ ] Factor V Leiden                                             (3 Points)                 [ ] Multiple Trauma within 1 month                         (5 Points)  [ ] Lupus anticoagulants                                     (3 Points)                                                           [ ] Anticardiolipin antibodies                                (3 Points)                                                       [ ] High homocysteine in the blood                      (3 Points)                                             [ ] Other congenital or acquired thrombophilia       (3 Points)                                                [ ] Heparin induced thrombocytopenia                  (3 Points)                                          Total Score [      5    ]

## 2024-12-30 NOTE — ASSESSMENT & PLAN NOTE
"This patient does have evidence of infective focus  My overall impression is septic shock due to MAP < 65 and SBP < 90.  Source: Respiratory and Urinary Tract  Antibiotics given-   Antibiotics (72h ago, onward)      Start     Stop Route Frequency Ordered    12/29/24 1125  gentamicin - pharmacy to dose         -- IV pharmacy to manage frequency 12/29/24 1025          Latest lactate reviewed-  No results for input(s): "LACTATE", "POCLAC" in the last 72 hours.    Organ dysfunction indicated by Acute respiratory failure and Encephalopathy    Fluid challenge Ideal Body Weight- The patient's ideal body weight is Ideal body weight: 66.1 kg (145 lb 11.6 oz) which will be used to calculate fluid bolus of 30 ml/kg for treatment of septic shock.      Post- resuscitation assessment Yes Perfusion exam was performed within 6 hours of septic shock presentation after bolus shows Inadequate tissue perfusion assessed by non-invasive monitoring       Will Start Pressors- Levophed for MAP of 65  Source control achieved by: iv zosym. Vanc, ivfs  12/17 wean levophed as tolerates- abxs changed to merrem, cotn vanc - await culture final reports  12/18 wbc slightly imrpoved - cont iv abxs- await final sputum culture - on merrem fro esbl kleb in urine  12/19 cont iv merrem for esbl kleb in urine and proteus in sputum - dc vanc  12/20 wbc improvign - cotn iv merrem  12/24 wbc normal - cont iv abxs  12/25 FM:  Cont GENT.  12/26 FM:  Cont GENT, prognosis poor, pulm/gu/skin infections.  12/27 repeat bc and sputum culture due to increasing wbc - cont gent; completed 10 days of merrem  12/29/24:  Sputum with gram negative rods, continue gentamicin, susceptibility pending   12/30 wbc improvign  "

## 2024-12-31 ENCOUNTER — ANESTHESIA (OUTPATIENT)
Dept: SURGERY | Facility: HOSPITAL | Age: 34
End: 2024-12-31
Payer: MEDICARE

## 2024-12-31 ENCOUNTER — ANESTHESIA EVENT (OUTPATIENT)
Dept: SURGERY | Facility: HOSPITAL | Age: 34
End: 2024-12-31
Payer: MEDICARE

## 2024-12-31 NOTE — PROGRESS NOTES
"Pharmacokinetic Follow Up: Gentamicin    Assessment of levels:   Trough less than 1, drawn correctly. Will resume regimen with traditional dosing     Regimen Plan:   Will give a one time dose of 112.4mg with a peak to be drawn 1 hour post infusion on 12/31 at 0745    Drug levels (last 3 results):  No results for input(s): "AMIKACINPEAK", "AMIKACINTROU", "AMIKACINRAND", "AMIKACIN" in the last 72 hours.    Recent Labs   Lab Result Units 12/29/24  0349 12/30/24  0543 12/31/24  0335   Gentamicin, Random ug/mL 1.5 1.1 0.8       No results for input(s): "TOBRA8", "TOBRA10", "TOBRA12", "TOBRARND", "TOBRAMYCIN", "TOBRAPEAK", "TOBRATROUGH", "TOBRAMYCINPE", "TOBRAMYCINRA", "TOBRAMYCINTR" in the last 72 hours.    Pharmacy will continue to monitor.    Please contact pharmacy with any questions regarding this assessment.    Thank you for the consult,   Nanci Jackson      Patient brief summary:  Carlos Chahal is a 34 y.o. male initiated on aminoglycoside therapy for treatment of lower respiratory infection    Drug Allergies:   Review of patient's allergies indicates:   Allergen Reactions    Guaifenesin Hives    Codeine Itching       Actual Body Weight:   68.5kg    Adjust Body Weight:   67.1kg    Ideal Body Weight:  66.1kg    Renal Function:   Estimated Creatinine Clearance: 61.4 mL/min (A) (based on SCr of 1.6 mg/dL (H)).,     Dialysis Method (if applicable):  N/A    CBC (last 72 hours):  Recent Labs   Lab Result Units 12/29/24  0349 12/30/24  0723 12/31/24  0335   WBC K/uL 17.69* 14.32* 19.77*   Hemoglobin g/dL 8.0* 8.4* 8.0*   Hematocrit % 24.7* 25.9* 24.2*   Platelets K/uL 154 141* 157   Gran % % 78.6* 35.4* 74.1*   Lymph % % 5.1* 3.0* 5.2*   Mono % % 8.7 10.1 10.5   Eosinophil % % 6.1 7.1 8.3*   Basophil % % 0.3 0.0 0.6   Differential Method  Automated Manual Automated       Metabolic Panel (last 72 hours):  Recent Labs   Lab Result Units 12/29/24  0349 12/30/24  0543   Sodium mmol/L 130* 130*   Potassium mmol/L 4.5 3.8 "   Chloride mmol/L 98 97   CO2 mmol/L 31* 33*   Glucose mg/dL 236* 190*   BUN mg/dL 52* 57*   Creatinine mg/dL 1.6* 1.6*   Albumin g/dL 0.7* 0.6*   Total Bilirubin mg/dL 0.5 0.4   Alkaline Phosphatase U/L 328* 471*   AST U/L 25 44*   ALT U/L 6* 15   Magnesium mg/dL 1.6 1.8       Aminoglycoside Administrations:  aminoglycosides given in last 96 hours        No antibiotic orders with administrations found.                    Microbiologic Results:  Microbiology Results (last 7 days)       Procedure Component Value Units Date/Time    Blood culture [[id number]] Collected: 12/27/24 0837    Order Status: Completed Specimen: Blood Updated: 12/30/24 1812     Blood Culture, Routine No Growth to date      No Growth to date      No Growth to date      No Growth to date    Blood culture [[id number]] Collected: 12/27/24 0838    Order Status: Completed Specimen: Blood Updated: 12/30/24 1812     Blood Culture, Routine No Growth to date      No Growth to date      No Growth to date      No Growth to date    Culture, Respiratory with Gram Stain [[id number]]  (Abnormal) Collected: 12/27/24 1522    Order Status: Completed Specimen: Respiratory from Endotracheal Aspirate Updated: 12/30/24 1121     Respiratory Culture GRAM NEGATIVE FLORA  Many  Identification and susceptibility pending       Gram Stain (Respiratory) Few Gram positive cocci     Gram Stain (Respiratory) Rare Gram positive rods     Gram Stain (Respiratory) Rare WBC's

## 2024-12-31 NOTE — PLAN OF CARE
Care plan reviewed and on-going. Cont with vent. SIMV trial today. Cont with Clinmix and Lipids. Dobhoff in place. Meds adjusted. Accuchecks- coverage with sliding scale. Ileostomy intact.

## 2024-12-31 NOTE — ASSESSMENT & PLAN NOTE
Patient has a diagnosis of pneumonia. The cause of the pneumonia is suspected to be bacterial in etiology but organism is not known. The pneumonia is stable. The patient has the following signs/symptoms of pneumonia: persistent hypoxia  and sputum production. The patient does have a current oxygen requirement and the patient does not have a home oxygen requirement. I have reviewed the pertinent imaging. The following cultures have been collected: Blood cultures and Sputum culture The culture results are listed below.     Current antimicrobial regimen consists of the antibiotics listed below. Will monitor patient closely and continue current treatment plan unchanged.    Antibiotics (From admission, onward)      Start     Stop Route Frequency Ordered    12/30/24 1300  erythromycin base tablet 250 mg         -- PER NG TUBE 4 times daily 12/30/24 1111    12/29/24 1125  gentamicin - pharmacy to dose         -- IV pharmacy to manage frequency 12/29/24 1025            Microbiology Results (last 7 days)       Procedure Component Value Units Date/Time    Blood culture [[id number]] Collected: 12/27/24 0837    Order Status: Completed Specimen: Blood Updated: 12/30/24 1812     Blood Culture, Routine No Growth to date      No Growth to date      No Growth to date      No Growth to date    Blood culture [[id number]] Collected: 12/27/24 0838    Order Status: Completed Specimen: Blood Updated: 12/30/24 1812     Blood Culture, Routine No Growth to date      No Growth to date      No Growth to date      No Growth to date    Culture, Respiratory with Gram Stain [[id number]]  (Abnormal) Collected: 12/27/24 1522    Order Status: Completed Specimen: Respiratory from Endotracheal Aspirate Updated: 12/30/24 1121     Respiratory Culture GRAM NEGATIVE FLORA  Many  Identification and susceptibility pending       Gram Stain (Respiratory) Few Gram positive cocci     Gram Stain (Respiratory) Rare Gram positive rods     Gram Stain (Respiratory)  Rare WBC's        12/17 dc zosyn with recent esbl kleb in urine - will change to merrem - cont vanc until final cultures obtained - change vent to simv; add nebs  12/18 cotn merrem and vanc - await final sputum cutlure - cont vent on ac control; nebs - wbc imrpoved slightly; lasix 20mg iv for edema today  12/19  dc vanc - proteus grwoign from sputum - cont merrem and nebs; lasix today - wean rr on vent  12/20 wnc improved - cont merrem - nebs and vent - another dose of lasix today for edema - fio2 increased  12/23 cont iv gent and merren due to sent of multiple bugs (acinetobacter/kleb/proteus)  12/24 cont current abxs  12/25 FM:  Cont GENT  12/26 FM:  Cont GENT, poor prognosis.  12/27 on gent - get new sputum culture today due to elevated wbc - cont vent and nebs  12/28/24:Gram positive cocci/rods on sputum, blood cultures negative   12/29/24:  Gram negative rods on sputum cx.   12/30 await final results of sputum cx  12/31: Tailor antibiotics based on sputum culture results

## 2024-12-31 NOTE — ASSESSMENT & PLAN NOTE
Complicated by what appears to be gastroparesis.  We will attempt nasal feeding tube into his small intestines to start post pyloric feeds.  Unable to progress it enough to get post pyloric.  Likely G tube needs to be converted to G-J but still not tolerating feeds.    Continue Reglan and Erythromycin

## 2024-12-31 NOTE — ASSESSMENT & PLAN NOTE
"This patient does have evidence of infective focus  My overall impression is septic shock due to MAP < 65 and SBP < 90.  Source: Respiratory and Urinary Tract  Antibiotics given-   Antibiotics (72h ago, onward)      Start     Stop Route Frequency Ordered    12/30/24 1300  erythromycin base tablet 250 mg         -- PER NG TUBE 4 times daily 12/30/24 1111    12/29/24 1125  gentamicin - pharmacy to dose         -- IV pharmacy to manage frequency 12/29/24 1025          Latest lactate reviewed-  No results for input(s): "LACTATE", "POCLAC" in the last 72 hours.    Organ dysfunction indicated by Acute respiratory failure and Encephalopathy    Fluid challenge Ideal Body Weight- The patient's ideal body weight is Ideal body weight: 66.1 kg (145 lb 11.6 oz) which will be used to calculate fluid bolus of 30 ml/kg for treatment of septic shock.      Post- resuscitation assessment Yes Perfusion exam was performed within 6 hours of septic shock presentation after bolus shows Inadequate tissue perfusion assessed by non-invasive monitoring       Will Start Pressors- Levophed for MAP of 65  Source control achieved by: iv zosym. Vanc, ivfs    Patient has a leukocytosis.  Last trend as follows-   Lab Results   Component Value Date    WBC 19.77 (H) 12/31/2024    WBC 14.32 (H) 12/30/2024    WBC 17.69 (H) 12/29/2024 12/17 wean levophed as tolerates- abxs changed to merrem, cotn vanc - await culture final reports  12/18 wbc slightly imrpoved - cont iv abxs- await final sputum culture - on merrem fro esbl kleb in urine  12/19 cont iv merrem for esbl kleb in urine and proteus in sputum - dc vanc  12/20 wbc improvign - cotn iv merrem  12/24 wbc normal - cont iv abxs  12/25 FM:  Cont GENT.  12/26 FM:  Cont GENT, prognosis poor, pulm/gu/skin infections.  12/27 repeat bc and sputum culture due to increasing wbc - cont gent; completed 10 days of merrem  12/29/24:  Sputum with gram negative rods, continue gentamicin, susceptibility " pending   12/30 wbc improvign

## 2024-12-31 NOTE — SUBJECTIVE & OBJECTIVE
Interval History: No acute events.      Medications:  Continuous Infusions:   0.9% NaCl   Intravenous Continuous 5 mL/hr at 12/31/24 0559 Rate Verify at 12/31/24 0559    Amino acid 5% - dextrose 20% (CLINIMIX-E) solution with additives. CENTRAL LINE ONLY (1650mOsm/L)  80 mL/hr Intravenous Continuous 80 mL/hr at 12/31/24 0559 Rate Verify at 12/31/24 0559    D10W   Intravenous Continuous PRN        NORepinephrine bitartrate-D5W  0-3 mcg/kg/min Intravenous Continuous   Stopped at 12/25/24 1430     Scheduled Meds:   albuterol sulfate  2.5 mg Nebulization Q4H WAKE    aspirin  81 mg Per G Tube Daily    bisacodyL  10 mg Other Daily    collagenase   Topical (Top) Daily    enoxparin  40 mg Subcutaneous Daily    erythromycin base  250 mg Per NG tube QID    famotidine  20 mg Per G Tube BID    FLUoxetine  20 mg Per G Tube Daily    furosemide (LASIX) injection  20 mg Intravenous Daily    insulin glargine U-100  20 Units Subcutaneous Daily    levothyroxine  150 mcg Per G Tube Before breakfast    lurasidone  40 mg Per G Tube Daily    magnesium oxide  400 mg Per G Tube BID    metoclopramide  10 mg Intravenous Q6H    sodium hypochlorite 0.125%   Topical (Top) Daily     PRN Meds:  Current Facility-Administered Medications:     0.9%  NaCl infusion (for blood administration), , Intravenous, Q24H PRN    D10W, , Intravenous, Continuous PRN    dextrose 50%, 12.5 g, Intravenous, PRN    dextrose 50%, 25 g, Intravenous, PRN    gentamicin - pharmacy to dose, , Intravenous, pharmacy to manage frequency    glucagon (human recombinant), 1 mg, Intramuscular, PRN    insulin aspart U-100, 0-10 Units, Subcutaneous, Q4H PRN    melatonin, 6 mg, Per G Tube, Nightly PRN    midazolam, 2 mg, Intravenous, Q1H PRN    propofoL, 0-50 mcg/kg/min, Intravenous, Daily PRN    sodium chloride 0.9%, 10 mL, Intravenous, PRN    Flushing PICC/Midline Protocol, , , Until Discontinued **AND** sodium chloride 0.9%, 10 mL, Intravenous, Q12H PRN     Review of patient's  allergies indicates:   Allergen Reactions    Guaifenesin Hives    Codeine Itching     Objective:     Vital Signs (Most Recent):  Temp: 96.8 °F (36 °C) (12/31/24 0702)  Pulse: 86 (12/31/24 0746)  Resp: 18 (12/31/24 0746)  BP: (!) 101/56 (12/31/24 0746)  SpO2: 100 % (12/31/24 0746) Vital Signs (24h Range):  Temp:  [96.7 °F (35.9 °C)-97.1 °F (36.2 °C)] 96.8 °F (36 °C)  Pulse:  [78-91] 86  Resp:  [17-31] 18  SpO2:  [100 %] 100 %  BP: ()/(51-72) 101/56     Weight: 68.5 kg (151 lb 0.2 oz)  Body mass index is 23.65 kg/m².    Intake/Output - Last 3 Shifts         12/29 0700 12/30 0659 12/30 0700 12/31 0659 12/31 0700 01/01 0659    P.O.  240     I.V. (mL/kg) 164.8 (2.4) 440.3 (6.4)     NG/ 309     TPN 1052.3 3105     Total Intake(mL/kg) 1473.1 (21.5) 4094.3 (59.8)     Urine (mL/kg/hr) 1900 (1.2) 1200 (0.7)     Drains 250      Stool 540 1575     Total Output 2690 2775     Net -1216.9 +1319.3                     Physical Exam  Vitals and nursing note reviewed.   Constitutional:       General: He is not in acute distress.     Appearance: He is ill-appearing.      Comments: Thin male; intubated   HENT:      Head: Normocephalic and atraumatic.      Nose:      Comments: NEFT in place L nare     Mouth/Throat:      Mouth: Mucous membranes are moist.   Eyes:      Pupils: Pupils are equal, round, and reactive to light.   Cardiovascular:      Rate and Rhythm: Normal rate and regular rhythm.   Pulmonary:      Effort: Prolonged expiration present. No respiratory distress.      Breath sounds: Transmitted upper airway sounds present. Rhonchi and rales present.      Comments: Decreased breath sounds at lung bases bilaterally  Abdominal:      General: There is no distension.      Palpations: Abdomen is soft.      Tenderness: There is no abdominal tenderness.      Comments: G tube noted; Ostomy L mid abdomen   Genitourinary:     Comments: Rojas with yellow urine  Musculoskeletal:         General: No swelling.      Cervical  back: Normal range of motion and neck supple.   Skin:     General: Skin is warm and dry.      Comments: Wounds to sacrum; groin area as noted on media images          Significant Labs:  I have reviewed all pertinent lab results within the past 24 hours.  CBC:   Recent Labs   Lab 12/31/24 0335   WBC 19.77*   RBC 2.77*   HGB 8.0*   HCT 24.2*      MCV 87   MCH 28.9   MCHC 33.1     BMP:   Recent Labs   Lab 12/31/24 0335   *   *   K 4.2   CL 97   CO2 30*   BUN 66*   CREATININE 1.6*   CALCIUM 10.0   MG 1.9     CMP:   Recent Labs   Lab 12/31/24 0335   *   CALCIUM 10.0   ALBUMIN 0.6*   PROT 5.8*   *   K 4.2   CO2 30*   CL 97   BUN 66*   CREATININE 1.6*   ALKPHOS 680*   ALT 22   *   BILITOT 0.9       Significant Diagnostics:  I have reviewed all pertinent imaging results/findings within the past 24 hours.  AXR with tube cross midline indicating in SB but weight not advancing

## 2024-12-31 NOTE — SUBJECTIVE & OBJECTIVE
Review of Systems   Unable to perform ROS: Acuity of condition     Objective:     Vital Signs (Most Recent):  Temp: 96.8 °F (36 °C) (12/31/24 0702)  Pulse: 86 (12/31/24 0746)  Resp: 20 (12/31/24 0746)  BP: (!) 95/54 (12/31/24 0600)  SpO2: 100 % (12/31/24 0746) Vital Signs (24h Range):  Temp:  [96.7 °F (35.9 °C)-97.1 °F (36.2 °C)] 96.8 °F (36 °C)  Pulse:  [78-91] 86  Resp:  [17-31] 20  SpO2:  [100 %] 100 %  BP: ()/(51-72) 95/54     Weight: 68.5 kg (151 lb 0.2 oz)  Body mass index is 23.65 kg/m².    Intake/Output Summary (Last 24 hours) at 12/31/2024 0821  Last data filed at 12/31/2024 0600  Gross per 24 hour   Intake 2759.15 ml   Output 2775 ml   Net -15.85 ml         Physical Exam  Constitutional:       Appearance: He is ill-appearing.      Comments: Thin male; intubated   HENT:      Mouth/Throat:      Mouth: Mucous membranes are moist.   Eyes:      Pupils: Pupils are equal, round, and reactive to light.   Cardiovascular:      Rate and Rhythm: Normal rate and regular rhythm.      Heart sounds: Normal heart sounds.   Pulmonary:      Effort: Pulmonary effort is normal.      Breath sounds: Normal breath sounds. Transmitted upper airway sounds present.   Abdominal:      General: There is no distension.      Palpations: Abdomen is soft. There is no mass.      Tenderness: There is no abdominal tenderness.      Comments: Jtube noted; ileostomy noted   Genitourinary:     Comments: Rojas with yellow urine  Musculoskeletal:      Right lower leg: Edema present.      Left lower leg: Edema present.   Lymphadenopathy:      Cervical: No cervical adenopathy.   Skin:     General: Skin is warm and dry.      Comments: Wounds to sacrum; groin area             Significant Labs: All pertinent labs within the past 24 hours have been reviewed.    Significant Imaging: I have reviewed all pertinent imaging results/findings within the past 24 hours.

## 2024-12-31 NOTE — PROGRESS NOTES
"Pharmacokinetic Follow Up: Gentamicin    Assessment of levels:   Gentamicin levels: The trough concentration was exceeding target range of < 1 mcg/mL    Regimen Plan:   Will pulse dose for now.  Will decrease the dose to 2mg/kg to be given at 1600 and draw another peak 1 hour after infusion on 12/32/24 at 1700 to assess how to proceed with dosing patient's gentamicin.     Drug levels (last 3 results):  No results for input(s): "AMIKACINPEAK", "AMIKACINTROU", "AMIKACINRAND", "AMIKACIN" in the last 72 hours.    Recent Labs   Lab Result Units 12/29/24  0349 12/30/24  0543 12/31/24  0335 12/31/24  0905 12/31/24  1430   Gentamicin, Peak ug/mL  --   --   --  4.5*  --    Gentamicin, Random ug/mL 1.5 1.1 0.8  --   --    Gentamicin, Trough ug/mL  --   --   --   --  4.2*       No results for input(s): "TOBRA8", "TOBRA10", "TOBRA12", "TOBRARND", "TOBRAMYCIN", "TOBRAPEAK", "TOBRATROUGH", "TOBRAMYCINPE", "TOBRAMYCINRA", "TOBRAMYCINTR" in the last 72 hours.    Pharmacy will continue to monitor.    Please contact pharmacy at extension [phone] with any questions regarding this assessment.    Thank you for the consult,   Bekah Garay      Patient brief summary:  Carlos Chahal is a 34 y.o. male initiated on aminoglycoside therapy for treatment of  pneumonia    Drug Allergies:   Review of patient's allergies indicates:   Allergen Reactions    Guaifenesin Hives    Codeine Itching       Actual Body Weight:   68.5kg    Adjust Body Weight:   67.1kg    Ideal Body Weight:  66.1kg    Renal Function:   Estimated Creatinine Clearance: 61.4 mL/min (A) (based on SCr of 1.6 mg/dL (H)).,     Dialysis Method (if applicable):  N/A    CBC (last 72 hours):  Recent Labs   Lab Result Units 12/29/24  0349 12/30/24  0723 12/31/24  0335   WBC K/uL 17.69* 14.32* 19.77*   Hemoglobin g/dL 8.0* 8.4* 8.0*   Hematocrit % 24.7* 25.9* 24.2*   Platelets K/uL 154 141* 157   Gran % % 78.6* 35.4* 74.1*   Lymph % % 5.1* 3.0* 5.2*   Mono % % 8.7 10.1 10.5   Eosinophil " % % 6.1 7.1 8.3*   Basophil % % 0.3 0.0 0.6   Differential Method  Automated Manual Automated       Metabolic Panel (last 72 hours):  Recent Labs   Lab Result Units 12/29/24  0349 12/30/24  0543 12/31/24  0335   Sodium mmol/L 130* 130* 129*   Potassium mmol/L 4.5 3.8 4.2   Chloride mmol/L 98 97 97   CO2 mmol/L 31* 33* 30*   Glucose mg/dL 236* 190* 187*   BUN mg/dL 52* 57* 66*   Creatinine mg/dL 1.6* 1.6* 1.6*   Albumin g/dL 0.7* 0.6* 0.6*   Total Bilirubin mg/dL 0.5 0.4 0.9   Alkaline Phosphatase U/L 328* 471* 680*   AST U/L 25 44* 105*   ALT U/L 6* 15 22   Magnesium mg/dL 1.6 1.8 1.9       Aminoglycoside Administrations:  aminoglycosides given in last 96 hours                     gentamicin (GARAMYCIN) 112.4 mg in 0.9% NaCl 100 mL IVPB (mg) 112.4 mg New Bag 12/31/24 0732                    Microbiologic Results:  Microbiology Results (last 7 days)       Procedure Component Value Units Date/Time    Culture, Respiratory with Gram Stain [[id number]]  (Abnormal) Collected: 12/27/24 1522    Order Status: Completed Specimen: Respiratory from Endotracheal Aspirate Updated: 12/31/24 1118     Respiratory Culture GRAM NEGATIVE FLORA  Many  Identification and susceptibility pending       Gram Stain (Respiratory) Few Gram positive cocci     Gram Stain (Respiratory) Rare Gram positive rods     Gram Stain (Respiratory) Rare WBC's    Blood culture [[id number]] Collected: 12/27/24 0837    Order Status: Completed Specimen: Blood Updated: 12/30/24 1812     Blood Culture, Routine No Growth to date      No Growth to date      No Growth to date      No Growth to date    Blood culture [[id number]] Collected: 12/27/24 0838    Order Status: Completed Specimen: Blood Updated: 12/30/24 1812     Blood Culture, Routine No Growth to date      No Growth to date      No Growth to date      No Growth to date

## 2024-12-31 NOTE — PROGRESS NOTES
[place] - Intensive [facility]  General Surgery  Progress Note  12/31/24    Subjective:     History of Present Illness:  Patient admitted for sepsis with UTI and pneumonia with multiple noted wounds    Post-Op Info:  Procedure(s) (LRB):  DEBRIDEMENT, PRESSURE ULCER (N/A)   Day of Surgery     Interval History: No acute events.      Medications:  Continuous Infusions:   0.9% NaCl   Intravenous Continuous 5 mL/hr at 12/31/24 0559 Rate Verify at 12/31/24 0559    Amino acid 5% - dextrose 20% (CLINIMIX-E) solution with additives. CENTRAL LINE ONLY (1650mOsm/L)  80 mL/hr Intravenous Continuous 80 mL/hr at 12/31/24 0559 Rate Verify at 12/31/24 0559    D10W   Intravenous Continuous PRN        NORepinephrine bitartrate-D5W  0-3 mcg/kg/min Intravenous Continuous   Stopped at 12/25/24 1430     Scheduled Meds:   albuterol sulfate  2.5 mg Nebulization Q4H WAKE    aspirin  81 mg Per G Tube Daily    bisacodyL  10 mg Other Daily    collagenase   Topical (Top) Daily    enoxparin  40 mg Subcutaneous Daily    erythromycin base  250 mg Per NG tube QID    famotidine  20 mg Per G Tube BID    FLUoxetine  20 mg Per G Tube Daily    furosemide (LASIX) injection  20 mg Intravenous Daily    insulin glargine U-100  20 Units Subcutaneous Daily    levothyroxine  150 mcg Per G Tube Before breakfast    lurasidone  40 mg Per G Tube Daily    magnesium oxide  400 mg Per G Tube BID    metoclopramide  10 mg Intravenous Q6H    sodium hypochlorite 0.125%   Topical (Top) Daily     PRN Meds:  Current Facility-Administered Medications:     0.9%  NaCl infusion (for blood administration), , Intravenous, Q24H PRN    D10W, , Intravenous, Continuous PRN    dextrose 50%, 12.5 g, Intravenous, PRN    dextrose 50%, 25 g, Intravenous, PRN    gentamicin - pharmacy to dose, , Intravenous, pharmacy to manage frequency    glucagon (human recombinant), 1 mg, Intramuscular, PRN    insulin aspart U-100, 0-10 Units, Subcutaneous, Q4H PRN    melatonin, 6 mg, Per G Tube, Nightly  PRN    midazolam, 2 mg, Intravenous, Q1H PRN    propofoL, 0-50 mcg/kg/min, Intravenous, Daily PRN    sodium chloride 0.9%, 10 mL, Intravenous, PRN    Flushing PICC/Midline Protocol, , , Until Discontinued **AND** sodium chloride 0.9%, 10 mL, Intravenous, Q12H PRN     Review of patient's allergies indicates:   Allergen Reactions    Guaifenesin Hives    Codeine Itching     Objective:     Vital Signs (Most Recent):  Temp: 96.8 °F (36 °C) (12/31/24 0702)  Pulse: 86 (12/31/24 0746)  Resp: 18 (12/31/24 0746)  BP: (!) 101/56 (12/31/24 0746)  SpO2: 100 % (12/31/24 0746) Vital Signs (24h Range):  Temp:  [96.7 °F (35.9 °C)-97.1 °F (36.2 °C)] 96.8 °F (36 °C)  Pulse:  [78-91] 86  Resp:  [17-31] 18  SpO2:  [100 %] 100 %  BP: ()/(51-72) 101/56     Weight: 68.5 kg (151 lb 0.2 oz)  Body mass index is 23.65 kg/m².    Intake/Output - Last 3 Shifts         12/29 0700  12/30 0659 12/30 0700 12/31 0659 12/31 0700 01/01 0659    P.O.  240     I.V. (mL/kg) 164.8 (2.4) 440.3 (6.4)     NG/ 309     TPN 1052.3 3105     Total Intake(mL/kg) 1473.1 (21.5) 4094.3 (59.8)     Urine (mL/kg/hr) 1900 (1.2) 1200 (0.7)     Drains 250      Stool 540 1575     Total Output 2690 2775     Net -1216.9 +1319.3                     Physical Exam  Vitals and nursing note reviewed.   Constitutional:       General: He is not in acute distress.     Appearance: He is ill-appearing.      Comments: Thin male; intubated   HENT:      Head: Normocephalic and atraumatic.      Nose:      Comments: NEFT in place L nare     Mouth/Throat:      Mouth: Mucous membranes are moist.   Eyes:      Pupils: Pupils are equal, round, and reactive to light.   Cardiovascular:      Rate and Rhythm: Normal rate and regular rhythm.   Pulmonary:      Effort: Prolonged expiration present. No respiratory distress.      Breath sounds: Transmitted upper airway sounds present. Rhonchi and rales present.      Comments: Decreased breath sounds at lung bases bilaterally  Abdominal:       General: There is no distension.      Palpations: Abdomen is soft.      Tenderness: There is no abdominal tenderness.      Comments: G tube noted; Ostomy L mid abdomen   Genitourinary:     Comments: Rojas with yellow urine  Musculoskeletal:         General: No swelling.      Cervical back: Normal range of motion and neck supple.   Skin:     General: Skin is warm and dry.      Comments: Wounds to sacrum; groin area as noted on media images          Significant Labs:  I have reviewed all pertinent lab results within the past 24 hours.  CBC:   Recent Labs   Lab 12/31/24 0335   WBC 19.77*   RBC 2.77*   HGB 8.0*   HCT 24.2*      MCV 87   MCH 28.9   MCHC 33.1     BMP:   Recent Labs   Lab 12/31/24 0335   *   *   K 4.2   CL 97   CO2 30*   BUN 66*   CREATININE 1.6*   CALCIUM 10.0   MG 1.9     CMP:   Recent Labs   Lab 12/31/24 0335   *   CALCIUM 10.0   ALBUMIN 0.6*   PROT 5.8*   *   K 4.2   CO2 30*   CL 97   BUN 66*   CREATININE 1.6*   ALKPHOS 680*   ALT 22   *   BILITOT 0.9       Significant Diagnostics:  I have reviewed all pertinent imaging results/findings within the past 24 hours.  AXR with tube cross midline indicating in SB but weight not advancing  Assessment/Plan:     Severe malnutrition  Complicated by what appears to be gastroparesis.  We will attempt nasal feeding tube into his small intestines to start post pyloric feeds.  Unable to progress it enough to get post pyloric.  Likely G tube needs to be converted to G-J but still not tolerating feeds.    Continue Reglan and Erythromycin    Pneumonia of right lower lobe due to infectious organism  Antibiotics and vent support per primary  Likely needs trach since unable to progress to spontaneous ventilation  CXR concerning for RLL infiltrate/atelectasis needing CPT    Sacral wound, sequela  Needs formal operative debridement.  Overall clinical status as a limitation to this.  We will plan debridement today with vac  placement.    Open wound of groin  Wound care written    Urinary tract infection associated with indwelling urethral catheter  Per primary    Type 1 diabetes  Elevated sugars.  Management per primary        Francoise Diaz MD  General Surgery  [facility]

## 2024-12-31 NOTE — OP NOTE
[place] - Intensive Care  Surgery Department  Operative Note    SUMMARY     Date of Procedure: 12/31/2024     Procedure: Procedure(s) (LRB):  DEBRIDEMENT, PRESSURE ULCER (N/A)     Surgeons and Role:     * Francoise Diaz MD - Primary    Assisting Surgeon: None    Pre-Operative Diagnosis: Sacral wound, sequela [S31.000S]  Malnutrition    Post-Operative Diagnosis: Post-Op Diagnosis Codes:     * Sacral wound, sequela [S31.000S]     Anesthesia: Monitor Anesthesia Care/General    Operative Findings (including complications, if any): Sacral ulcer measures [id number] with 3cm undermining from 9-3 o'clock, large area on left buttock with complete desquamation and bleeding granulation    Description of Technical Procedures: After informed consent was obtained from his aunt including the risks of bleeding, pain, infection, scar and need for repeat operations, the patient agreed to the procedure.  He was taken from the ICU to OR B.  He was placed in a right lateral decubitus position to expose the ulcer.  The patient was placed on the OR ventilator and given anesthesia as appropriate.The area was prepped with betadine and draped in a sterile fashion.  Time out was performed.  The necrotic tissue was excised with scissors to viable tissue.  Bleeding was controlled with electrocautery.  The wound was irrigated with saline.  Vac was placed on the deepest portion of wound.  The desquamated area of buttock was dressed with alginate.  Vac was sealed.  Patient returned back to ICU stable.      Intravenous Fluids: maintenance    Estimated Blood Loss (EBL): 20mL           Implants: * No implants in log *    Specimens:   Specimen (24h ago, onward)      None                    Condition: Critical    Disposition: ICU - intubated and critically ill.    Attestation: Op Note Attestation: I performed the procedure.

## 2024-12-31 NOTE — PLAN OF CARE
[place] - Intensive Care  Discharge Reassessment    Primary Care Provider: Jose Francisco Klein MD    Expected Discharge Date:     Reassessment (most recent)       Discharge Reassessment - 12/31/24 1013          Discharge Reassessment    Assessment Type Discharge Planning Reassessment     Did the patient's condition or plan change since previous assessment? Yes     Communicated JOSE MANUEL with patient/caregiver Date not available/Unable to determine     Discharge Plan A Return to nursing home     Discharge Plan B Return to Nursing Home     DME Needed Upon Discharge  other (see comments)   TBD    Transition of Care Barriers None     Why the patient remains in the hospital Requires continued medical care        Post-Acute Status    Discharge Delays None known at this time                   Pt is a detention resident at [facility].     12/31 WC: Patient having ongoing need for ventilator support. Sputum culture still pending.

## 2024-12-31 NOTE — ASSESSMENT & PLAN NOTE
Anemia is likely due to chronic infections Most recent hemoglobin and hematocrit are listed below.  Recent Labs     12/29/24  0349 12/30/24  0723 12/31/24  0335   HGB 8.0* 8.4* 8.0*   HCT 24.7* 25.9* 24.2*       Plan  - Monitor serial CBC: Daily  - Transfuse PRBC if patient becomes hemodynamically unstable, symptomatic or H/H drops below 7/21.  - Patient has not received any PRBC transfusions to date  - Patient's anemia is currently worsening. Will adjust treatment as follows: 2uprbcs today  12/17 h/h Improved  12/20 h/h holding  12/26 FM:  Transfuse today.  12/27 hh improved after transfusion  12/28/24:  Continue daily monitoring.  12/30 h/h stable  12/31: Hemoglobin remained stable

## 2024-12-31 NOTE — ASSESSMENT & PLAN NOTE
Needs formal operative debridement.  Overall clinical status as a limitation to this.  We will plan debridement today with vac placement.

## 2024-12-31 NOTE — PROGRESS NOTES
[place] - Intensive Care  General Surgery  Progress Note  12/30/24    Subjective:     History of Present Illness:  Patient admitted for sepsis with UTI and pneumonia with multiple noted wounds    Post-Op Info:  * No surgery found *         Interval History: No acute events overnight.  Still high residuals.  Ostomy functioning more.    Medications:  Continuous Infusions:   0.9% NaCl   Intravenous Continuous 5 mL/hr at 12/30/24 1916 Rate Verify at 12/30/24 1916    Amino acid 5% - dextrose 20% (CLINIMIX-E) solution with additives. CENTRAL LINE ONLY (1650mOsm/L)  80 mL/hr Intravenous Continuous 80 mL/hr at 12/30/24 1916 Rate Verify at 12/30/24 1916    D10W   Intravenous Continuous PRN        NORepinephrine bitartrate-D5W  0-3 mcg/kg/min Intravenous Continuous   Stopped at 12/25/24 1430     Scheduled Meds:   albuterol sulfate  2.5 mg Nebulization Q4H WAKE    aspirin  81 mg Per G Tube Daily    bisacodyL  10 mg Other Daily    collagenase   Topical (Top) Daily    enoxparin  40 mg Subcutaneous Daily    erythromycin base  250 mg Per NG tube QID    famotidine  20 mg Per G Tube BID    fat emulsion 20%  250 mL Intravenous Q48H    FLUoxetine  20 mg Per G Tube Daily    furosemide (LASIX) injection  20 mg Intravenous Daily    insulin glargine U-100  20 Units Subcutaneous Daily    levothyroxine  150 mcg Per G Tube Before breakfast    lurasidone  40 mg Per G Tube Daily    magnesium oxide  400 mg Per G Tube BID    metoclopramide  10 mg Intravenous Q6H    sodium hypochlorite 0.125%   Topical (Top) Daily     PRN Meds:  Current Facility-Administered Medications:     0.9%  NaCl infusion (for blood administration), , Intravenous, Q24H PRN    D10W, , Intravenous, Continuous PRN    dextrose 50%, 12.5 g, Intravenous, PRN    dextrose 50%, 25 g, Intravenous, PRN    gentamicin - pharmacy to dose, , Intravenous, pharmacy to manage frequency    glucagon (human recombinant), 1 mg, Intramuscular, PRN    insulin aspart U-100, 0-10 Units,  Subcutaneous, Q4H PRN    melatonin, 6 mg, Per G Tube, Nightly PRN    midazolam, 2 mg, Intravenous, Q1H PRN    propofoL, 0-50 mcg/kg/min, Intravenous, Daily PRN    sodium chloride 0.9%, 10 mL, Intravenous, PRN    Flushing PICC/Midline Protocol, , , Until Discontinued **AND** sodium chloride 0.9%, 10 mL, Intravenous, Q12H PRN     Review of patient's allergies indicates:   Allergen Reactions    Guaifenesin Hives    Codeine Itching     Objective:     Vital Signs (Most Recent):  Temp: 96.7 °F (35.9 °C) (12/30/24 1916)  Pulse: 89 (12/30/24 2200)  Resp: (!) 24 (12/30/24 2200)  BP: (!) 102/56 (12/30/24 2200)  SpO2: 100 % (12/30/24 2200) Vital Signs (24h Range):  Temp:  [96.1 °F (35.6 °C)-97.2 °F (36.2 °C)] 96.7 °F (35.9 °C)  Pulse:  [74-91] 89  Resp:  [10-28] 24  SpO2:  [99 %-100 %] 100 %  BP: ()/(50-72) 102/56     Weight: 68.5 kg (151 lb 0.2 oz)  Body mass index is 23.65 kg/m².    Intake/Output - Last 3 Shifts         12/28 0700 12/29 0659 12/29 0700 12/30 0659 12/30 0700 12/31 0659    P.O.   240    I.V. (mL/kg) 335.9 (4.9) 164.8 (2.4) 321.1 (4.7)    NG/GT 60 256 60    TPN 1829.6 1052.3 2049.6    Total Intake(mL/kg) 2225.5 (32.5) 1473.1 (21.5) 2670.6 (39)    Urine (mL/kg/hr) 2100 (1.3) 1900 (1.2) 800 (0.7)    Drains 900 250     Stool 375 540 750    Total Output 3375 2690 1550    Net -1149.5 -1216.9 +1120.6                    Physical Exam  Vitals and nursing note reviewed.   Constitutional:       General: He is not in acute distress.     Appearance: He is ill-appearing.      Comments: Thin male; intubated   HENT:      Head: Normocephalic and atraumatic.      Nose:      Comments: NEFT in place L nare     Mouth/Throat:      Mouth: Mucous membranes are moist.   Eyes:      Pupils: Pupils are equal, round, and reactive to light.   Cardiovascular:      Rate and Rhythm: Normal rate and regular rhythm.   Pulmonary:      Effort: Prolonged expiration present. No respiratory distress.      Breath sounds: Transmitted upper  airway sounds present. Rhonchi and rales present.      Comments: Decreased breath sounds at lung bases bilaterally  Abdominal:      General: There is no distension.      Palpations: Abdomen is soft.      Tenderness: There is no abdominal tenderness.      Comments: G tube noted; Ostomy L mid abdomen   Genitourinary:     Comments: Rojas with yellow urine  Musculoskeletal:         General: No swelling.      Cervical back: Normal range of motion and neck supple.   Skin:     General: Skin is warm and dry.      Comments: Wounds to sacrum; groin area as noted on media images          Significant Labs:  I have reviewed all pertinent lab results within the past 24 hours.  CBC:   Recent Labs   Lab 12/30/24  0723   WBC 14.32*   RBC 3.00*   HGB 8.4*   HCT 25.9*   *   MCV 86   MCH 28.0   MCHC 32.4     BMP:   Recent Labs   Lab 12/30/24  0543   *   *   K 3.8   CL 97   CO2 33*   BUN 57*   CREATININE 1.6*   CALCIUM 10.2   MG 1.8     CMP:   Recent Labs   Lab 12/30/24  0543   *   CALCIUM 10.2   ALBUMIN 0.6*   PROT 5.5*   *   K 3.8   CO2 33*   CL 97   BUN 57*   CREATININE 1.6*   ALKPHOS 471*   ALT 15   AST 44*   BILITOT 0.4       Significant Diagnostics:  I have reviewed all pertinent imaging results/findings within the past 24 hours.  Assessment/Plan:     Severe malnutrition  Complicated by what appears to be gastroparesis.  We will attempt nasal feeding tube into his small intestines to start post pyloric feeds.  Unable to progress it enough to get post pyloric.  Likely G tube needs to be converted to G-J but still not tolerating post-pyloric feeds.    Continue Reglan per primary.  Added Erythromycin    Pneumonia of right lower lobe due to infectious organism  Antibiotics and vent support per primary  Likely needs trach since unable to progress to spontaneous ventilation  CXR concerning for RLL infiltrate/atelectasis needing CPT    Sacral wound, sequela  Needs formal operative debridement.  Overall  clinical status as a limitation to this.  We will continue Dakin's moistened gauze with Santyl until operative intervention can be obtained.  Maybe tomorrow    Open wound of groin  Wound care written    Urinary tract infection associated with indwelling urethral catheter  Per primary    Type 1 diabetes  Elevated sugars.  Management per primary        Francoise Diaz MD  General Surgery  [place] - Intensive [facility], [place]

## 2024-12-31 NOTE — ASSESSMENT & PLAN NOTE
Patient has Abnormal Magnesium: hypomagnesemia. Will continue to monitor electrolytes closely. Will replace the affected electrolytes and repeat labs to be done after interventions completed. The patient's magnesium results have been reviewed and are listed below.  Recent Labs   Lab 12/31/24  0335   MG 1.9      12/17 mag imrpoving - repalce mag today  12/19 replace mag today  12/20 mag oxide bid  12/21 2g Mg  12/23 dose of iv mag today to keep mag level about 2  12/25 FM:  Replace, recheck.  12/30 replace mag today

## 2024-12-31 NOTE — PLAN OF CARE
Problem: Skin Injury Risk Increased  Goal: Skin Health and Integrity  Outcome: Progressing     Problem: Mechanical Ventilation Invasive  Goal: Effective Communication  Outcome: Not Progressing  Goal: Optimal Device Function  Outcome: Not Progressing  Goal: Mechanical Ventilation Liberation  Outcome: Not Progressing  Goal: Optimal Nutrition Delivery  Outcome: Progressing  Goal: Absence of Device-Related Skin and Tissue Injury  Outcome: Progressing  Goal: Absence of Ventilator-Induced Lung Injury  Outcome: Progressing     Problem: Artificial Airway  Goal: Effective Communication  Outcome: Not Progressing  Goal: Optimal Device Function  Outcome: Progressing  Goal: Absence of Device-Related Skin or Tissue Injury  Outcome: Progressing     Problem: Adult Inpatient Plan of Care  Goal: Plan of Care Review  Outcome: Progressing  Goal: Absence of Hospital-Acquired Illness or Injury  Outcome: Progressing  Goal: Optimal Comfort and Wellbeing  Outcome: Progressing     Problem: Diabetes Comorbidity  Goal: Blood Glucose Level Within Targeted Range  Outcome: Progressing     Problem: Wound  Goal: Optimal Coping  Outcome: Progressing  Goal: Optimal Wound Healing  Outcome: Progressing     Problem: Infection  Goal: Absence of Infection Signs and Symptoms  Outcome: Not Progressing     Problem: Pneumonia  Goal: Effective Oxygenation and Ventilation  Outcome: Progressing     Problem: Sepsis/Septic Shock  Goal: Optimal Coping  Outcome: Progressing     Problem: Electrolyte Imbalance  Goal: Electrolyte Balance  Outcome: Progressing

## 2024-12-31 NOTE — ASSESSMENT & PLAN NOTE
Patient's FSGs are uncontrolled due to hyperglycemia on current medication regimen.  Last A1c reviewed-   Lab Results   Component Value Date    HGBA1C 6.6 (H) 12/16/2024     Most recent fingerstick glucose reviewed-   Recent Labs   Lab 12/30/24 2035 12/30/24 2327 12/31/24  0334 12/31/24  0736   POCTGLUCOSE 177* 134* 186* 276*       Current correctional scale  Low  Maintain anti-hyperglycemic dose as follows-   Antihyperglycemics (From admission, onward)    Start     Stop Route Frequency Ordered    12/30/24 0900  insulin glargine U-100 (Lantus) pen 20 Units         -- SubQ Daily 12/30/24 0734    12/24/24 0807  insulin aspart U-100 pen 0-10 Units         -- SubQ Every 4 hours PRN 12/24/24 0707        Hold Oral hypoglycemics while patient is in the hospital.  12/17 A1c improved to 6.6%  12/24 due to tpn - add lantus 12 u daily - ssi changed to moderate scale  12/25 FM:  BS logs noted, continue coverage.  12/26 FM:  Cont SSI.  12/27 increase lantus to 16u daily

## 2024-12-31 NOTE — RESPIRATORY THERAPY
12/31/24 1020   PRE-TX-O2   Device (Oxygen Therapy)   (Ventilator placed on Standby at this time. Patient brought to surgery per surgery team. Katarzyna WADDELL aware)   Respiratory Evaluation   $ Care Plan Tech Time 15 min

## 2024-12-31 NOTE — SUBJECTIVE & OBJECTIVE
Interval History: No     Medications:  Continuous Infusions:   0.9% NaCl   Intravenous Continuous 5 mL/hr at 12/30/24 1916 Rate Verify at 12/30/24 1916    Amino acid 5% - dextrose 20% (CLINIMIX-E) solution with additives. CENTRAL LINE ONLY (1650mOsm/L)  80 mL/hr Intravenous Continuous 80 mL/hr at 12/30/24 1916 Rate Verify at 12/30/24 1916    D10W   Intravenous Continuous PRN        NORepinephrine bitartrate-D5W  0-3 mcg/kg/min Intravenous Continuous   Stopped at 12/25/24 1430     Scheduled Meds:   albuterol sulfate  2.5 mg Nebulization Q4H WAKE    aspirin  81 mg Per G Tube Daily    bisacodyL  10 mg Other Daily    collagenase   Topical (Top) Daily    enoxparin  40 mg Subcutaneous Daily    erythromycin base  250 mg Per NG tube QID    famotidine  20 mg Per G Tube BID    fat emulsion 20%  250 mL Intravenous Q48H    FLUoxetine  20 mg Per G Tube Daily    furosemide (LASIX) injection  20 mg Intravenous Daily    insulin glargine U-100  20 Units Subcutaneous Daily    levothyroxine  150 mcg Per G Tube Before breakfast    lurasidone  40 mg Per G Tube Daily    magnesium oxide  400 mg Per G Tube BID    metoclopramide  10 mg Intravenous Q6H    sodium hypochlorite 0.125%   Topical (Top) Daily     PRN Meds:  Current Facility-Administered Medications:     0.9%  NaCl infusion (for blood administration), , Intravenous, Q24H PRN    D10W, , Intravenous, Continuous PRN    dextrose 50%, 12.5 g, Intravenous, PRN    dextrose 50%, 25 g, Intravenous, PRN    gentamicin - pharmacy to dose, , Intravenous, pharmacy to manage frequency    glucagon (human recombinant), 1 mg, Intramuscular, PRN    insulin aspart U-100, 0-10 Units, Subcutaneous, Q4H PRN    melatonin, 6 mg, Per G Tube, Nightly PRN    midazolam, 2 mg, Intravenous, Q1H PRN    propofoL, 0-50 mcg/kg/min, Intravenous, Daily PRN    sodium chloride 0.9%, 10 mL, Intravenous, PRN    Flushing PICC/Midline Protocol, , , Until Discontinued **AND** sodium chloride 0.9%, 10 mL, Intravenous, Q12H  PRN     Review of patient's allergies indicates:   Allergen Reactions    Guaifenesin Hives    Codeine Itching     Objective:     Vital Signs (Most Recent):  Temp: 96.7 °F (35.9 °C) (12/30/24 1916)  Pulse: 89 (12/30/24 2200)  Resp: (!) 24 (12/30/24 2200)  BP: (!) 102/56 (12/30/24 2200)  SpO2: 100 % (12/30/24 2200) Vital Signs (24h Range):  Temp:  [96.1 °F (35.6 °C)-97.2 °F (36.2 °C)] 96.7 °F (35.9 °C)  Pulse:  [74-91] 89  Resp:  [10-28] 24  SpO2:  [99 %-100 %] 100 %  BP: ()/(50-72) 102/56     Weight: 68.5 kg (151 lb 0.2 oz)  Body mass index is 23.65 kg/m².    Intake/Output - Last 3 Shifts         12/28 0700  12/29 0659 12/29 0700  12/30 0659 12/30 0700  12/31 0659    P.O.   240    I.V. (mL/kg) 335.9 (4.9) 164.8 (2.4) 321.1 (4.7)    NG/GT 60 256 60    TPN 1829.6 1052.3 2049.6    Total Intake(mL/kg) 2225.5 (32.5) 1473.1 (21.5) 2670.6 (39)    Urine (mL/kg/hr) 2100 (1.3) 1900 (1.2) 800 (0.7)    Drains 900 250     Stool 375 540 750    Total Output 3375 2690 1550    Net -1149.5 -1216.9 +1120.6                    Physical Exam  Vitals and nursing note reviewed.   Constitutional:       General: He is not in acute distress.     Appearance: He is ill-appearing.      Comments: Thin male; intubated   HENT:      Head: Normocephalic and atraumatic.      Nose:      Comments: NEFT in place L nare     Mouth/Throat:      Mouth: Mucous membranes are moist.   Eyes:      Pupils: Pupils are equal, round, and reactive to light.   Cardiovascular:      Rate and Rhythm: Normal rate and regular rhythm.   Pulmonary:      Effort: Prolonged expiration present. No respiratory distress.      Breath sounds: Transmitted upper airway sounds present. Rhonchi and rales present.      Comments: Decreased breath sounds at lung bases bilaterally  Abdominal:      General: There is no distension.      Palpations: Abdomen is soft.      Tenderness: There is no abdominal tenderness.      Comments: G tube noted; Ostomy L mid abdomen   Genitourinary:      Comments: Rojas with yellow urine  Musculoskeletal:         General: No swelling.      Cervical back: Normal range of motion and neck supple.   Skin:     General: Skin is warm and dry.      Comments: Wounds to sacrum; groin area as noted on media images          Significant Labs:  I have reviewed all pertinent lab results within the past 24 hours.  CBC:   Recent Labs   Lab 12/30/24  0723   WBC 14.32*   RBC 3.00*   HGB 8.4*   HCT 25.9*   *   MCV 86   MCH 28.0   MCHC 32.4     BMP:   Recent Labs   Lab 12/30/24  0543   *   *   K 3.8   CL 97   CO2 33*   BUN 57*   CREATININE 1.6*   CALCIUM 10.2   MG 1.8     CMP:   Recent Labs   Lab 12/30/24  0543   *   CALCIUM 10.2   ALBUMIN 0.6*   PROT 5.5*   *   K 3.8   CO2 33*   CL 97   BUN 57*   CREATININE 1.6*   ALKPHOS 471*   ALT 15   AST 44*   BILITOT 0.4       Significant Diagnostics:  I have reviewed all pertinent imaging results/findings within the past 24 hours.

## 2024-12-31 NOTE — ASSESSMENT & PLAN NOTE
Patient's most recent potassium results are listed below.   Recent Labs     12/29/24  0349 12/30/24  0543 12/31/24  0335   K 4.5 3.8 4.2       Plan  - Replete potassium per protocol  - Monitor potassium Daily  - Patient's hypokalemia is stable, continue below and monitor  12/20 increase pot bicarb to bid  12/23 decrease pot bicard to daily dosing  12/24 improved- stop potassium bicarb repalcement - monitor

## 2024-12-31 NOTE — ANESTHESIA PREPROCEDURE EVALUATION
12/31/2024  Carlos Chahal is a 34 y.o., male.      Pre-op Assessment    I have reviewed the Patient Summary Reports.    I have reviewed the NPO Status.   I have reviewed the Medications.     Review of Systems  Anesthesia Hx:  No problems with previous Anesthesia             Denies Family Hx of Anesthesia complications.    Denies Personal Hx of Anesthesia complications.                    Cardiovascular:  Cardiovascular Normal                  ECG has been reviewed.                            Pulmonary:  Pneumonia                      Renal/:  Renal/ Normal                 Hepatic/GI:     GERD                Neurological:    Neuromuscular Disease,       ANOXIC BRAIN INJURY                            Endocrine:  Diabetes, poorly controlled, type 1           Psych:     BIPOLAR             Lab Results   Component Value Date    WBC 19.77 (H) 12/31/2024    HGB 8.0 (L) 12/31/2024    HCT 24.2 (L) 12/31/2024    MCV 87 12/31/2024     12/31/2024      CMP  Sodium   Date Value Ref Range Status   12/31/2024 129 (L) 136 - 145 mmol/L Final     Potassium   Date Value Ref Range Status   12/31/2024 4.2 3.5 - 5.1 mmol/L Final     Chloride   Date Value Ref Range Status   12/31/2024 97 95 - 110 mmol/L Final     CO2   Date Value Ref Range Status   12/31/2024 30 (H) 23 - 29 mmol/L Final     Glucose   Date Value Ref Range Status   12/31/2024 187 (H) 70 - 110 mg/dL Final     BUN   Date Value Ref Range Status   12/31/2024 66 (H) 6 - 20 mg/dL Final     Creatinine   Date Value Ref Range Status   12/31/2024 1.6 (H) 0.5 - 1.4 mg/dL Final     Calcium   Date Value Ref Range Status   12/31/2024 10.0 8.7 - 10.5 mg/dL Final     Total Protein   Date Value Ref Range Status   12/31/2024 5.8 (L) 6.0 - 8.4 g/dL Final     Albumin   Date Value Ref Range Status   12/31/2024 0.6 (L) 3.5 - 5.2 g/dL Final     Total Bilirubin   Date Value Ref  Range Status   12/31/2024 0.9 0.1 - 1.0 mg/dL Final     Comment:     For infants and newborns, interpretation of results should be based  on gestational age, weight and in agreement with clinical  observations.    Premature Infant recommended reference ranges:  Up to 24 hours.............<8.0 mg/dL  Up to 48 hours............<12.0 mg/dL  3-5 days..................<15.0 mg/dL  6-29 days.................<15.0 mg/dL    For patients on Eltrombopag therapy, use of Dimension [place] TBIL is   not   recommended.       Alkaline Phosphatase   Date Value Ref Range Status   12/31/2024 680 (H) 55 - 135 U/L Final     AST   Date Value Ref Range Status   12/31/2024 105 (H) 10 - 40 U/L Final     ALT   Date Value Ref Range Status   12/31/2024 22 10 - 44 U/L Final     Anion Gap   Date Value Ref Range Status   12/31/2024 2 (L) 3 - 11 mmol/L Final     eGFR   Date Value Ref Range Status   12/31/2024 58.0 (A) >60 mL/min/1.73 m^2 Final        Physical Exam  General: Well nourished    Airway:  Mouth Opening: Normal  TM Distance: Normal  Tongue: Normal  Neck ROM: Normal ROM  Pre-Existing Airway: Oral Endotracheal tube    Chest/Lungs:  Rales, Basilar    Heart:  Rate: Normal  Rhythm: Regular Rhythm  Sounds: Normal        Anesthesia Plan  Type of Anesthesia, risks & benefits discussed:    Anesthesia Type: Gen ETT  Intra-op Monitoring Plan: Standard ASA Monitors  Post Op Pain Control Plan: multimodal analgesia  Induction:  IV  Airway Plan: Direct  Informed Consent: Informed consent signed with the Patient and all parties understand the risks and agree with anesthesia plan.  All questions answered.   ASA Score: 4  Day of Surgery Review of History & Physical: I have interviewed and examined the patient. I have reviewed the patient's H&P dated: There are no significant changes.     Ready For Surgery From Anesthesia Perspective.     .

## 2024-12-31 NOTE — EICU
Intervention Initiated From:  [facility] / EICU    Sandy intervened regarding:  Rounding (Video assessment)    Nurse Notified:  No    Doctor Notified:  No    Comments: Rounding done. Remains intubated on vent 40 %, +5 PEEP. Side rials up x4. On Propofol 15 mcg/kg/min, TPN 80 ml/hour and Lipids. B/P 105/56, HR 91, resp 25, Sat 100

## 2024-12-31 NOTE — PROGRESS NOTES
"Pharmacokinetic Follow Up: Gentamicin    Assessment of levels:   Gentamicin levels: The peak concentration was below the target range of 8-10 mcg/mL    Regimen Plan:   Will check trough concentration 60 min prior to the dose on 12/31/24 at 1430 before the 2nd dose to help guide how to adjust therapy going further.   Increased dose to 3mg/kg based on peak level being low.     Renal function has been steady over the last few days - will continue to monitor.     Drug levels (last 3 results):  No results for input(s): "AMIKACINPEAK", "AMIKACINTROU", "AMIKACINRAND", "AMIKACIN" in the last 72 hours.    Recent Labs   Lab Result Units 12/29/24  0349 12/30/24  0543 12/31/24  0335 12/31/24  0905   Gentamicin, Peak ug/mL  --   --   --  4.5*   Gentamicin, Random ug/mL 1.5 1.1 0.8  --        No results for input(s): "TOBRA8", "TOBRA10", "TOBRA12", "TOBRARND", "TOBRAMYCIN", "TOBRAPEAK", "TOBRATROUGH", "TOBRAMYCINPE", "TOBRAMYCINRA", "TOBRAMYCINTR" in the last 72 hours.    Pharmacy will continue to monitor.    Please contact pharmacy at extension [phone] with any questions regarding this assessment.    Thank you for the consult,   Bekah Garay      Patient brief summary:  Carlos Chahal is a 34 y.o. male initiated on aminoglycoside therapy for treatment of  pneumonia    Drug Allergies:   Review of patient's allergies indicates:   Allergen Reactions    Guaifenesin Hives    Codeine Itching       Actual Body Weight:   68.5 kg    Adjust Body Weight:   67.1    Ideal Body Weight:  66.1 kg    Renal Function:   Estimated Creatinine Clearance: 61.4 mL/min (A) (based on SCr of 1.6 mg/dL (H)).,     Dialysis Method (if applicable):  N/A    CBC (last 72 hours):  Recent Labs   Lab Result Units 12/29/24  0349 12/30/24  0723 12/31/24  0335   WBC K/uL 17.69* 14.32* 19.77*   Hemoglobin g/dL 8.0* 8.4* 8.0*   Hematocrit % 24.7* 25.9* 24.2*   Platelets K/uL 154 141* 157   Gran % % 78.6* 35.4* 74.1*   Lymph % % 5.1* 3.0* 5.2*   Mono % % 8.7 10.1 10.5 "   Eosinophil % % 6.1 7.1 8.3*   Basophil % % 0.3 0.0 0.6   Differential Method  Automated Manual Automated       Metabolic Panel (last 72 hours):  Recent Labs   Lab Result Units 12/29/24  0349 12/30/24  0543 12/31/24  0335   Sodium mmol/L 130* 130* 129*   Potassium mmol/L 4.5 3.8 4.2   Chloride mmol/L 98 97 97   CO2 mmol/L 31* 33* 30*   Glucose mg/dL 236* 190* 187*   BUN mg/dL 52* 57* 66*   Creatinine mg/dL 1.6* 1.6* 1.6*   Albumin g/dL 0.7* 0.6* 0.6*   Total Bilirubin mg/dL 0.5 0.4 0.9   Alkaline Phosphatase U/L 328* 471* 680*   AST U/L 25 44* 105*   ALT U/L 6* 15 22   Magnesium mg/dL 1.6 1.8 1.9       Aminoglycoside Administrations:  aminoglycosides given in last 96 hours                     gentamicin (GARAMYCIN) 112.4 mg in 0.9% NaCl 100 mL IVPB (mg) 112.4 mg New Bag 12/31/24 0732                    Microbiologic Results:  Microbiology Results (last 7 days)       Procedure Component Value Units Date/Time    Blood culture [[id number]] Collected: 12/27/24 0837    Order Status: Completed Specimen: Blood Updated: 12/30/24 1812     Blood Culture, Routine No Growth to date      No Growth to date      No Growth to date      No Growth to date    Blood culture [[id number]] Collected: 12/27/24 0838    Order Status: Completed Specimen: Blood Updated: 12/30/24 1812     Blood Culture, Routine No Growth to date      No Growth to date      No Growth to date      No Growth to date    Culture, Respiratory with Gram Stain [[id number]]  (Abnormal) Collected: 12/27/24 1522    Order Status: Completed Specimen: Respiratory from Endotracheal Aspirate Updated: 12/30/24 1121     Respiratory Culture GRAM NEGATIVE FLORA  Many  Identification and susceptibility pending       Gram Stain (Respiratory) Few Gram positive cocci     Gram Stain (Respiratory) Rare Gram positive rods     Gram Stain (Respiratory) Rare WBC's

## 2024-12-31 NOTE — ASSESSMENT & PLAN NOTE
Await new urine culture =- currently on zosyn/vanc  12/17 change to merrem/vanc  12/18 has esbl klebsiella - cont merrem  12/23 on merrem  12/25 FM:  Continue Gent, CS reviewed.  12/26 FM:  WBC increasing, monitor, on appropriate abx based on cultures.  12/28/24:  Blood culture negative to date, sputum culture with some gram positive cocci and rods.  Continue abx for now.  Gentamicin per pharmacy to dose.     Tailor antibiotics based on culture and sensitivity

## 2024-12-31 NOTE — ASSESSMENT & PLAN NOTE
Nutrition consulted. Most recent weight and BMI monitored-     Measurements:  Wt Readings from Last 1 Encounters:   12/16/24 68.5 kg (151 lb 0.2 oz)   Body mass index is 23.65 kg/m².    Patient has been screened and assessed by RD.    Malnutrition Type:  Context:    Level:      Malnutrition Characteristic Summary:       Interventions/Recommendations (treatment strategy):  1. Rec'd Continuous EN: Glucerna 1.5 @ 40mL/r increasing by 5 mL q6hrs to goal rate of 55mL/hr with a continuous 40mL FWF to provide 1980kcal (94% EEN), 109g of protein (100% EPN), and 1962mL FW.   2. Néstor BID via PEG tube to promote wound healing and to provide additional nutrition.           - Do not mix Néstor with formula in a tube-feeding bag         - Pour one packet of Néstor in a clean, small container for mixing.           - Add 4 fl oz (120 mL) water at room temperature.           - Mix well with disposable spoon or tongue blade until all particles are completely hydrated.           - Verify correct tube placement.           - Flush feeding tube with 30 mL water.           -Administer Néstor through feeding tube using a 60-mL or larger syringe.           - Flush with an additional 30 mL water.  3. Rec'd ClinimixE 5/20 @ 80mL/hr to provide 1690kcal (80% EEN), 96g of protein (88% EPN), and 1920mL TFV.      -Add 250mL of 20% lipids 3x/week to provide additional calories.      -Check Triglycerides before adding lipids. 4. RD to follow and make rec's accordingly.    12/18 restart tfs today at 1ml/hr to see if can tolerate feeds  12/19 increase tfs as tolerates  12/23 not tolerating tfs- will get tpn orders and start that until can tolerate tfs better - ncrease regaln 10 mg iv q 6hrs  12/24 con ttfs as tolerates - started on tpn for further nutritional support  12/25 FM:  Check KUB.  12/26 FM:  Resume TF today, monitor.  12/27 on tpn - not tolerating tfs at this time  12/28/24:  Albumin infusion today.   12/31:  tube feeds as tolerated

## 2025-01-01 VITALS
BODY MASS INDEX: 23.7 KG/M2 | RESPIRATION RATE: 16 BRPM | DIASTOLIC BLOOD PRESSURE: 40 MMHG | WEIGHT: 151 LBS | OXYGEN SATURATION: 87 % | TEMPERATURE: 98 F | HEIGHT: 67 IN | SYSTOLIC BLOOD PRESSURE: 76 MMHG

## 2025-01-01 LAB
ABO + RH BLD: NORMAL
ABO + RH BLD: NORMAL
AC: 12
AC: 12
AC: 16
ALBUMIN SERPL BCP-MCNC: 0.6 G/DL (ref 3.5–5.2)
ALBUMIN SERPL BCP-MCNC: 0.7 G/DL (ref 3.5–5.2)
ALBUMIN SERPL BCP-MCNC: <0.6 G/DL (ref 3.5–5.2)
ALP SERPL-CCNC: 1085 U/L (ref 55–135)
ALP SERPL-CCNC: 1135 U/L (ref 55–135)
ALP SERPL-CCNC: 1244 U/L (ref 55–135)
ALP SERPL-CCNC: 1265 U/L (ref 55–135)
ALP SERPL-CCNC: 1366 U/L (ref 55–135)
ALP SERPL-CCNC: 821 U/L (ref 55–135)
ALP SERPL-CCNC: 971 U/L (ref 55–135)
ALT SERPL W/O P-5'-P-CCNC: 33 U/L (ref 10–44)
ALT SERPL W/O P-5'-P-CCNC: 35 U/L (ref 10–44)
ALT SERPL W/O P-5'-P-CCNC: 35 U/L (ref 10–44)
ALT SERPL W/O P-5'-P-CCNC: 40 U/L (ref 10–44)
ALT SERPL W/O P-5'-P-CCNC: 52 U/L (ref 10–44)
ALT SERPL W/O P-5'-P-CCNC: 57 U/L (ref 10–44)
ALT SERPL W/O P-5'-P-CCNC: 57 U/L (ref 10–44)
ANION GAP SERPL CALC-SCNC: 14 MMOL/L (ref 3–11)
ANION GAP SERPL CALC-SCNC: 2 MMOL/L (ref 3–11)
ANION GAP SERPL CALC-SCNC: 3 MMOL/L (ref 3–11)
ANION GAP SERPL CALC-SCNC: 4 MMOL/L (ref 3–11)
ANION GAP SERPL CALC-SCNC: 5 MMOL/L (ref 3–11)
ANION GAP SERPL CALC-SCNC: 7 MMOL/L (ref 3–11)
ANION GAP SERPL CALC-SCNC: 9 MMOL/L (ref 3–11)
APTT PPP: 41.3 SEC (ref 21–32)
AST SERPL-CCNC: 121 U/L (ref 10–40)
AST SERPL-CCNC: 125 U/L (ref 10–40)
AST SERPL-CCNC: 176 U/L (ref 10–40)
AST SERPL-CCNC: 198 U/L (ref 10–40)
AST SERPL-CCNC: 77 U/L (ref 10–40)
AST SERPL-CCNC: 88 U/L (ref 10–40)
AST SERPL-CCNC: 97 U/L (ref 10–40)
BACTERIA BLD CULT: NORMAL
BACTERIA SPEC AEROBE CULT: ABNORMAL
BACTERIA SPEC AEROBE CULT: ABNORMAL
BASOPHILS # BLD AUTO: 0.05 K/UL (ref 0–0.2)
BASOPHILS # BLD AUTO: 0.05 K/UL (ref 0–0.2)
BASOPHILS # BLD AUTO: 0.07 K/UL (ref 0–0.2)
BASOPHILS # BLD AUTO: 0.08 K/UL (ref 0–0.2)
BASOPHILS # BLD AUTO: 0.1 K/UL (ref 0–0.2)
BASOPHILS # BLD AUTO: ABNORMAL K/UL (ref 0–0.2)
BASOPHILS # BLD AUTO: ABNORMAL K/UL (ref 0–0.2)
BASOPHILS NFR BLD: 0 % (ref 0–1.9)
BASOPHILS NFR BLD: 0 % (ref 0–1.9)
BASOPHILS NFR BLD: 0.2 % (ref 0–1.9)
BASOPHILS NFR BLD: 0.3 % (ref 0–1.9)
BASOPHILS NFR BLD: 0.5 % (ref 0–1.9)
BILIRUB SERPL-MCNC: 0.9 MG/DL (ref 0.1–1)
BILIRUB SERPL-MCNC: 1.3 MG/DL (ref 0.1–1)
BILIRUB SERPL-MCNC: 1.6 MG/DL (ref 0.1–1)
BILIRUB SERPL-MCNC: 1.8 MG/DL (ref 0.1–1)
BILIRUB SERPL-MCNC: 2.6 MG/DL (ref 0.1–1)
BILIRUB SERPL-MCNC: 3.3 MG/DL (ref 0.1–1)
BILIRUB SERPL-MCNC: 3.9 MG/DL (ref 0.1–1)
BLD GP AB SCN CELLS X3 SERPL QL: NORMAL
BLD GP AB SCN CELLS X3 SERPL QL: NORMAL
BLD PROD TYP BPU: NORMAL
BLOOD UNIT EXPIRATION DATE: NORMAL
BLOOD UNIT TYPE CODE: 5100
BLOOD UNIT TYPE CODE: 5100
BLOOD UNIT TYPE CODE: 9500
BLOOD UNIT TYPE CODE: 9500
BLOOD UNIT TYPE: NORMAL
BUN SERPL-MCNC: 72 MG/DL (ref 6–20)
BUN SERPL-MCNC: 73 MG/DL (ref 6–20)
BUN SERPL-MCNC: 82 MG/DL (ref 6–20)
BUN SERPL-MCNC: 84 MG/DL (ref 6–20)
BUN SERPL-MCNC: 86 MG/DL (ref 6–20)
BUN SERPL-MCNC: 86 MG/DL (ref 6–20)
BUN SERPL-MCNC: 88 MG/DL (ref 6–20)
CALCIUM SERPL-MCNC: 10 MG/DL (ref 8.7–10.5)
CALCIUM SERPL-MCNC: 10.1 MG/DL (ref 8.7–10.5)
CALCIUM SERPL-MCNC: 7.3 MG/DL (ref 8.7–10.5)
CALCIUM SERPL-MCNC: 8.3 MG/DL (ref 8.7–10.5)
CALCIUM SERPL-MCNC: 8.5 MG/DL (ref 8.7–10.5)
CALCIUM SERPL-MCNC: 8.7 MG/DL (ref 8.7–10.5)
CALCIUM SERPL-MCNC: 9.6 MG/DL (ref 8.7–10.5)
CHLORIDE SERPL-SCNC: 89 MMOL/L (ref 95–110)
CHLORIDE SERPL-SCNC: 94 MMOL/L (ref 95–110)
CHLORIDE SERPL-SCNC: 95 MMOL/L (ref 95–110)
CHLORIDE SERPL-SCNC: 95 MMOL/L (ref 95–110)
CHLORIDE SERPL-SCNC: 96 MMOL/L (ref 95–110)
CO2 SERPL-SCNC: 18 MMOL/L (ref 23–29)
CO2 SERPL-SCNC: 23 MMOL/L (ref 23–29)
CO2 SERPL-SCNC: 23 MMOL/L (ref 23–29)
CO2 SERPL-SCNC: 25 MMOL/L (ref 23–29)
CO2 SERPL-SCNC: 26 MMOL/L (ref 23–29)
CO2 SERPL-SCNC: 28 MMOL/L (ref 23–29)
CO2 SERPL-SCNC: 30 MMOL/L (ref 23–29)
CODING SYSTEM: NORMAL
CREAT SERPL-MCNC: 1.6 MG/DL (ref 0.5–1.4)
CREAT SERPL-MCNC: 1.8 MG/DL (ref 0.5–1.4)
CREAT SERPL-MCNC: 2.2 MG/DL (ref 0.5–1.4)
CREAT SERPL-MCNC: 2.4 MG/DL (ref 0.5–1.4)
CREAT SERPL-MCNC: 2.4 MG/DL (ref 0.5–1.4)
CREAT SERPL-MCNC: 2.6 MG/DL (ref 0.5–1.4)
CREAT SERPL-MCNC: 2.8 MG/DL (ref 0.5–1.4)
CROSSMATCH INTERPRETATION: NORMAL
DIFFERENTIAL METHOD BLD: ABNORMAL
DISPENSE STATUS: NORMAL
EOSINOPHIL # BLD AUTO: 1.1 K/UL (ref 0–0.5)
EOSINOPHIL # BLD AUTO: 1.2 K/UL (ref 0–0.5)
EOSINOPHIL # BLD AUTO: 1.2 K/UL (ref 0–0.5)
EOSINOPHIL # BLD AUTO: 1.5 K/UL (ref 0–0.5)
EOSINOPHIL # BLD AUTO: 1.6 K/UL (ref 0–0.5)
EOSINOPHIL # BLD AUTO: ABNORMAL K/UL (ref 0–0.5)
EOSINOPHIL # BLD AUTO: ABNORMAL K/UL (ref 0–0.5)
EOSINOPHIL NFR BLD: 10 % (ref 0–8)
EOSINOPHIL NFR BLD: 5.7 % (ref 0–8)
EOSINOPHIL NFR BLD: 6 % (ref 0–8)
EOSINOPHIL NFR BLD: 6.5 % (ref 0–8)
EOSINOPHIL NFR BLD: 7 % (ref 0–8)
EOSINOPHIL NFR BLD: 7.2 % (ref 0–8)
EOSINOPHIL NFR BLD: 9.8 % (ref 0–8)
ERYTHROCYTE [DISTWIDTH] IN BLOOD BY AUTOMATED COUNT: 15.1 % (ref 11.5–14.5)
ERYTHROCYTE [DISTWIDTH] IN BLOOD BY AUTOMATED COUNT: 15.2 % (ref 11.5–14.5)
ERYTHROCYTE [DISTWIDTH] IN BLOOD BY AUTOMATED COUNT: 15.4 % (ref 11.5–14.5)
ERYTHROCYTE [DISTWIDTH] IN BLOOD BY AUTOMATED COUNT: 15.5 % (ref 11.5–14.5)
ERYTHROCYTE [DISTWIDTH] IN BLOOD BY AUTOMATED COUNT: 15.8 % (ref 11.5–14.5)
ERYTHROCYTE [DISTWIDTH] IN BLOOD BY AUTOMATED COUNT: 16 % (ref 11.5–14.5)
ERYTHROCYTE [DISTWIDTH] IN BLOOD BY AUTOMATED COUNT: 16.3 % (ref 11.5–14.5)
EST. GFR  (NO RACE VARIABLE): 29.6 ML/MIN/1.73 M^2
EST. GFR  (NO RACE VARIABLE): 32.4 ML/MIN/1.73 M^2
EST. GFR  (NO RACE VARIABLE): 35.6 ML/MIN/1.73 M^2
EST. GFR  (NO RACE VARIABLE): 35.6 ML/MIN/1.73 M^2
EST. GFR  (NO RACE VARIABLE): 39.6 ML/MIN/1.73 M^2
EST. GFR  (NO RACE VARIABLE): 50.3 ML/MIN/1.73 M^2
EST. GFR  (NO RACE VARIABLE): 58 ML/MIN/1.73 M^2
FIO2: 100 %
FIO2: 30 %
FIO2: 30 %
FIO2: 40 %
FIO2: 50 %
GENTAMICIN SERPL-MCNC: 2.6 UG/ML
GENTAMICIN SERPL-MCNC: 3.6 UG/ML
GENTAMICIN SERPL-MCNC: 4.6 UG/ML
GENTAMICIN SERPL-MCNC: 6.1 UG/ML
GGT SERPL-CCNC: 342 U/L (ref 8–55)
GLUCOSE SERPL-MCNC: 108 MG/DL (ref 70–110)
GLUCOSE SERPL-MCNC: 117 MG/DL (ref 70–110)
GLUCOSE SERPL-MCNC: 137 MG/DL (ref 70–110)
GLUCOSE SERPL-MCNC: 160 MG/DL (ref 70–110)
GLUCOSE SERPL-MCNC: 164 MG/DL (ref 70–110)
GLUCOSE SERPL-MCNC: 204 MG/DL (ref 70–110)
GLUCOSE SERPL-MCNC: 205 MG/DL (ref 70–110)
GLUCOSE SERPL-MCNC: 90 MG/DL (ref 70–110)
GRAM STN SPEC: ABNORMAL
HCT VFR BLD AUTO: 17.8 % (ref 40–54)
HCT VFR BLD AUTO: 21.6 % (ref 40–54)
HCT VFR BLD AUTO: 21.6 % (ref 40–54)
HCT VFR BLD AUTO: 22.3 % (ref 40–54)
HCT VFR BLD AUTO: 23.6 % (ref 40–54)
HCT VFR BLD AUTO: 23.7 % (ref 40–54)
HCT VFR BLD AUTO: 26 % (ref 40–54)
HGB BLD-MCNC: 5.9 G/DL (ref 14–18)
HGB BLD-MCNC: 6.9 G/DL (ref 14–18)
HGB BLD-MCNC: 7.7 G/DL (ref 14–18)
HGB BLD-MCNC: 7.8 G/DL (ref 14–18)
HGB BLD-MCNC: 8 G/DL (ref 14–18)
HGB BLD-MCNC: 8.1 G/DL (ref 14–18)
HGB BLD-MCNC: 8.7 G/DL (ref 14–18)
IMM GRANULOCYTES # BLD AUTO: 0.2 K/UL (ref 0–0.04)
IMM GRANULOCYTES # BLD AUTO: 0.32 K/UL (ref 0–0.04)
IMM GRANULOCYTES # BLD AUTO: 0.36 K/UL (ref 0–0.04)
IMM GRANULOCYTES # BLD AUTO: 0.38 K/UL (ref 0–0.04)
IMM GRANULOCYTES # BLD AUTO: 0.79 K/UL (ref 0–0.04)
IMM GRANULOCYTES # BLD AUTO: ABNORMAL K/UL (ref 0–0.04)
IMM GRANULOCYTES # BLD AUTO: ABNORMAL K/UL (ref 0–0.04)
IMM GRANULOCYTES NFR BLD AUTO: 1.2 % (ref 0–0.5)
IMM GRANULOCYTES NFR BLD AUTO: 1.7 % (ref 0–0.5)
IMM GRANULOCYTES NFR BLD AUTO: 2 % (ref 0–0.5)
IMM GRANULOCYTES NFR BLD AUTO: 2.1 % (ref 0–0.5)
IMM GRANULOCYTES NFR BLD AUTO: 3.9 % (ref 0–0.5)
IMM GRANULOCYTES NFR BLD AUTO: ABNORMAL % (ref 0–0.5)
IMM GRANULOCYTES NFR BLD AUTO: ABNORMAL % (ref 0–0.5)
INR PPP: 1.2 (ref 0.8–1.2)
LYMPHOCYTES # BLD AUTO: 0.4 K/UL (ref 1–4.8)
LYMPHOCYTES # BLD AUTO: 0.7 K/UL (ref 1–4.8)
LYMPHOCYTES # BLD AUTO: 0.8 K/UL (ref 1–4.8)
LYMPHOCYTES # BLD AUTO: 0.9 K/UL (ref 1–4.8)
LYMPHOCYTES # BLD AUTO: 0.9 K/UL (ref 1–4.8)
LYMPHOCYTES # BLD AUTO: ABNORMAL K/UL (ref 1–4.8)
LYMPHOCYTES # BLD AUTO: ABNORMAL K/UL (ref 1–4.8)
LYMPHOCYTES NFR BLD: 2.1 % (ref 18–48)
LYMPHOCYTES NFR BLD: 3 % (ref 18–48)
LYMPHOCYTES NFR BLD: 3.4 % (ref 18–48)
LYMPHOCYTES NFR BLD: 4.2 % (ref 18–48)
LYMPHOCYTES NFR BLD: 5.1 % (ref 18–48)
LYMPHOCYTES NFR BLD: 6.2 % (ref 18–48)
LYMPHOCYTES NFR BLD: 9 % (ref 18–48)
Lab: ABNORMAL
Lab: ABNORMAL
MAGNESIUM SERPL-MCNC: 1.7 MG/DL (ref 1.6–2.6)
MAGNESIUM SERPL-MCNC: 1.8 MG/DL (ref 1.6–2.6)
MAGNESIUM SERPL-MCNC: 1.9 MG/DL (ref 1.6–2.6)
MAGNESIUM SERPL-MCNC: 1.9 MG/DL (ref 1.6–2.6)
MAGNESIUM SERPL-MCNC: 2 MG/DL (ref 1.6–2.6)
MAGNESIUM SERPL-MCNC: 2 MG/DL (ref 1.6–2.6)
MAGNESIUM SERPL-MCNC: 2.1 MG/DL (ref 1.6–2.6)
MCH RBC QN AUTO: 27.6 PG (ref 27–31)
MCH RBC QN AUTO: 28.7 PG (ref 27–31)
MCH RBC QN AUTO: 29.1 PG (ref 27–31)
MCH RBC QN AUTO: 29.2 PG (ref 27–31)
MCH RBC QN AUTO: 30.1 PG (ref 27–31)
MCH RBC QN AUTO: 30.6 PG (ref 27–31)
MCH RBC QN AUTO: 32.7 PG (ref 27–31)
MCHC RBC AUTO-ENTMCNC: 31.9 G/DL (ref 32–36)
MCHC RBC AUTO-ENTMCNC: 33.1 G/DL (ref 32–36)
MCHC RBC AUTO-ENTMCNC: 33.1 G/DL (ref 32–36)
MCHC RBC AUTO-ENTMCNC: 33.5 G/DL (ref 32–36)
MCHC RBC AUTO-ENTMCNC: 34.2 G/DL (ref 32–36)
MCHC RBC AUTO-ENTMCNC: 34.5 G/DL (ref 32–36)
MCHC RBC AUTO-ENTMCNC: 37 G/DL (ref 32–36)
MCV RBC AUTO: 86 FL (ref 82–98)
MCV RBC AUTO: 87 FL (ref 82–98)
MCV RBC AUTO: 87 FL (ref 82–98)
MCV RBC AUTO: 88 FL (ref 82–98)
MCV RBC AUTO: 89 FL (ref 82–98)
METAMYELOCYTES NFR BLD MANUAL: 3 %
METAMYELOCYTES NFR BLD MANUAL: 3 %
MODIFIED ALLEN'S TEST: ABNORMAL
MODIFIED ALLEN'S TEST: POSITIVE
MONOCYTES # BLD AUTO: 1.4 K/UL (ref 0.3–1)
MONOCYTES # BLD AUTO: 1.7 K/UL (ref 0.3–1)
MONOCYTES # BLD AUTO: 1.9 K/UL (ref 0.3–1)
MONOCYTES # BLD AUTO: 2.4 K/UL (ref 0.3–1)
MONOCYTES # BLD AUTO: 2.7 K/UL (ref 0.3–1)
MONOCYTES # BLD AUTO: ABNORMAL K/UL (ref 0.3–1)
MONOCYTES # BLD AUTO: ABNORMAL K/UL (ref 0.3–1)
MONOCYTES NFR BLD: 11 % (ref 4–15)
MONOCYTES NFR BLD: 11.2 % (ref 4–15)
MONOCYTES NFR BLD: 11.7 % (ref 4–15)
MONOCYTES NFR BLD: 13.9 % (ref 4–15)
MONOCYTES NFR BLD: 4 % (ref 4–15)
MONOCYTES NFR BLD: 6 % (ref 4–15)
MONOCYTES NFR BLD: 7.1 % (ref 4–15)
MYELOCYTES NFR BLD MANUAL: 1 %
MYELOCYTES NFR BLD MANUAL: 6 %
NEUTROPHILS # BLD AUTO: 11 K/UL (ref 1.8–7.7)
NEUTROPHILS # BLD AUTO: 11.8 K/UL (ref 1.8–7.7)
NEUTROPHILS # BLD AUTO: 14 K/UL (ref 1.8–7.7)
NEUTROPHILS # BLD AUTO: 16.4 K/UL (ref 1.8–7.7)
NEUTROPHILS # BLD AUTO: 16.4 K/UL (ref 1.8–7.7)
NEUTROPHILS NFR BLD: 28 % (ref 38–73)
NEUTROPHILS NFR BLD: 67 % (ref 38–73)
NEUTROPHILS NFR BLD: 71.7 % (ref 38–73)
NEUTROPHILS NFR BLD: 72.8 % (ref 38–73)
NEUTROPHILS NFR BLD: 73.1 % (ref 38–73)
NEUTROPHILS NFR BLD: 76.7 % (ref 38–73)
NEUTROPHILS NFR BLD: 80.7 % (ref 38–73)
NEUTS BAND NFR BLD MANUAL: 16 %
NEUTS BAND NFR BLD MANUAL: 38 %
NOTIFIED BY: ABNORMAL
NRBC BLD-RTO: 0 /100 WBC
NRBC BLD-RTO: 1 /100 WBC
NRBC BLD-RTO: 1 /100 WBC
NRBC BLD-RTO: 3 /100 WBC
NUM UNITS TRANS PACKED RBC: NORMAL
O2DEVICE: ABNORMAL
PCO2 BLDA: 36.8 MMHG (ref 35–45)
PCO2 BLDA: 39.3 MMHG (ref 35–45)
PCO2 BLDA: 41 MMHG (ref 35–45)
PCO2 BLDA: 43.3 MMHG (ref 35–45)
PCO2 BLDA: 48.3 MMHG (ref 35–45)
PEAK FLOW: 60
PEEP: 5
PEEP: 7
PH SMN: 7.21 [PH] (ref 7.34–7.45)
PH SMN: 7.38 [PH] (ref 7.34–7.45)
PH SMN: 7.39 [PH] (ref 7.34–7.45)
PLATELET # BLD AUTO: 101 K/UL (ref 150–450)
PLATELET # BLD AUTO: 106 K/UL (ref 150–450)
PLATELET # BLD AUTO: 114 K/UL (ref 150–450)
PLATELET # BLD AUTO: 121 K/UL (ref 150–450)
PLATELET # BLD AUTO: 155 K/UL (ref 150–450)
PLATELET # BLD AUTO: 166 K/UL (ref 150–450)
PLATELET # BLD AUTO: 81 K/UL (ref 150–450)
PMV BLD AUTO: 11.9 FL (ref 9.2–12.9)
PMV BLD AUTO: 12.1 FL (ref 9.2–12.9)
PMV BLD AUTO: 12.4 FL (ref 9.2–12.9)
PMV BLD AUTO: 12.4 FL (ref 9.2–12.9)
PMV BLD AUTO: 12.5 FL (ref 9.2–12.9)
PMV BLD AUTO: 12.8 FL (ref 9.2–12.9)
PMV BLD AUTO: 13.7 FL (ref 9.2–12.9)
PO2 BLDA: 118 MMHG (ref 80–100)
PO2 BLDA: 161 MMHG (ref 80–100)
PO2 BLDA: 225 MMHG (ref 80–100)
PO2 BLDA: 42.3 MMHG (ref 80–100)
PO2 BLDA: 76.7 MMHG (ref 80–100)
POC BASE DEFICIT: -0.1 MMOL/L (ref -2–2)
POC BASE DEFICIT: -1.6 MMOL/L (ref -2–2)
POC BASE DEFICIT: -13.1 MMOL/L (ref -2–2)
POC BASE DEFICIT: 0.8 MMOL/L (ref -2–2)
POC BASE DEFICIT: 3.2 MMOL/L (ref -2–2)
POC HCO3: 14.7 MMOL/L (ref 24–28)
POC HCO3: 23.2 MMOL/L (ref 24–28)
POC HCO3: 24.3 MMOL/L (ref 24–28)
POC HCO3: 24.7 MMOL/L (ref 24–28)
POC HCO3: 27 MMOL/L (ref 24–28)
POC NOTIFIED NOTE: ABNORMAL
POC PERFORMED BY: ABNORMAL
POC SATURATED O2: 100 % (ref 95–100)
POC SATURATED O2: 100 % (ref 95–100)
POC SATURATED O2: 78.4 % (ref 95–100)
POC SATURATED O2: 94.5 % (ref 95–100)
POC SATURATED O2: 99.8 % (ref 95–100)
POC TEMPERATURE: 37 C
POCT GLUCOSE: 100 MG/DL (ref 70–110)
POCT GLUCOSE: 101 MG/DL (ref 70–110)
POCT GLUCOSE: 104 MG/DL (ref 70–110)
POCT GLUCOSE: 114 MG/DL (ref 70–110)
POCT GLUCOSE: 115 MG/DL (ref 70–110)
POCT GLUCOSE: 130 MG/DL (ref 70–110)
POCT GLUCOSE: 132 MG/DL (ref 70–110)
POCT GLUCOSE: 133 MG/DL (ref 70–110)
POCT GLUCOSE: 134 MG/DL (ref 70–110)
POCT GLUCOSE: 135 MG/DL (ref 70–110)
POCT GLUCOSE: 136 MG/DL (ref 70–110)
POCT GLUCOSE: 141 MG/DL (ref 70–110)
POCT GLUCOSE: 148 MG/DL (ref 70–110)
POCT GLUCOSE: 156 MG/DL (ref 70–110)
POCT GLUCOSE: 176 MG/DL (ref 70–110)
POCT GLUCOSE: 178 MG/DL (ref 70–110)
POCT GLUCOSE: 182 MG/DL (ref 70–110)
POCT GLUCOSE: 185 MG/DL (ref 70–110)
POCT GLUCOSE: 193 MG/DL (ref 70–110)
POCT GLUCOSE: 209 MG/DL (ref 70–110)
POCT GLUCOSE: 209 MG/DL (ref 70–110)
POCT GLUCOSE: 21 MG/DL (ref 70–110)
POCT GLUCOSE: 212 MG/DL (ref 70–110)
POCT GLUCOSE: 213 MG/DL (ref 70–110)
POCT GLUCOSE: 217 MG/DL (ref 70–110)
POCT GLUCOSE: 221 MG/DL (ref 70–110)
POCT GLUCOSE: 225 MG/DL (ref 70–110)
POCT GLUCOSE: 36 MG/DL (ref 70–110)
POCT GLUCOSE: 38 MG/DL (ref 70–110)
POCT GLUCOSE: 48 MG/DL (ref 70–110)
POCT GLUCOSE: 66 MG/DL (ref 70–110)
POCT GLUCOSE: 82 MG/DL (ref 70–110)
POCT GLUCOSE: 90 MG/DL (ref 70–110)
POCT GLUCOSE: 91 MG/DL (ref 70–110)
POCT GLUCOSE: 94 MG/DL (ref 70–110)
POCT GLUCOSE: 96 MG/DL (ref 70–110)
POCT GLUCOSE: 99 MG/DL (ref 70–110)
POCT GLUCOSE: <20 MG/DL (ref 70–110)
POCT GLUCOSE: <20 MG/DL (ref 70–110)
POTASSIUM SERPL-SCNC: 4.2 MMOL/L (ref 3.5–5.1)
POTASSIUM SERPL-SCNC: 4.6 MMOL/L (ref 3.5–5.1)
POTASSIUM SERPL-SCNC: 4.8 MMOL/L (ref 3.5–5.1)
POTASSIUM SERPL-SCNC: 5 MMOL/L (ref 3.5–5.1)
POTASSIUM SERPL-SCNC: 5.1 MMOL/L (ref 3.5–5.1)
POTASSIUM SERPL-SCNC: 5.5 MMOL/L (ref 3.5–5.1)
POTASSIUM SERPL-SCNC: 6.1 MMOL/L (ref 3.5–5.1)
PROT SERPL-MCNC: 5.3 G/DL (ref 6–8.4)
PROT SERPL-MCNC: 5.3 G/DL (ref 6–8.4)
PROT SERPL-MCNC: 5.4 G/DL (ref 6–8.4)
PROT SERPL-MCNC: 5.5 G/DL (ref 6–8.4)
PROT SERPL-MCNC: 5.6 G/DL (ref 6–8.4)
PROT SERPL-MCNC: 6 G/DL (ref 6–8.4)
PROT SERPL-MCNC: 6.2 G/DL (ref 6–8.4)
PROTHROMBIN TIME: 13 SEC (ref 9–12.5)
PROVIDER NOTIFIED: ABNORMAL
RBC # BLD AUTO: 2.02 M/UL (ref 4.6–6.2)
RBC # BLD AUTO: 2.45 M/UL (ref 4.6–6.2)
RBC # BLD AUTO: 2.5 M/UL (ref 4.6–6.2)
RBC # BLD AUTO: 2.52 M/UL (ref 4.6–6.2)
RBC # BLD AUTO: 2.69 M/UL (ref 4.6–6.2)
RBC # BLD AUTO: 2.72 M/UL (ref 4.6–6.2)
RBC # BLD AUTO: 2.99 M/UL (ref 4.6–6.2)
SODIUM SERPL-SCNC: 121 MMOL/L (ref 136–145)
SODIUM SERPL-SCNC: 124 MMOL/L (ref 136–145)
SODIUM SERPL-SCNC: 125 MMOL/L (ref 136–145)
SODIUM SERPL-SCNC: 126 MMOL/L (ref 136–145)
SODIUM SERPL-SCNC: 126 MMOL/L (ref 136–145)
SODIUM SERPL-SCNC: 128 MMOL/L (ref 136–145)
SODIUM SERPL-SCNC: 128 MMOL/L (ref 136–145)
SPECIMEN OUTDATE: NORMAL
SPECIMEN OUTDATE: NORMAL
SPECIMEN SOURCE: ABNORMAL
TOXIC GRANULES BLD QL SMEAR: PRESENT
VT: 400
WBC # BLD AUTO: 15.05 K/UL (ref 3.9–12.7)
WBC # BLD AUTO: 16.19 K/UL (ref 3.9–12.7)
WBC # BLD AUTO: 16.52 K/UL (ref 3.9–12.7)
WBC # BLD AUTO: 19.18 K/UL (ref 3.9–12.7)
WBC # BLD AUTO: 20.33 K/UL (ref 3.9–12.7)
WBC # BLD AUTO: 21.36 K/UL (ref 3.9–12.7)
WBC # BLD AUTO: 28.67 K/UL (ref 3.9–12.7)
WBC TOXIC VACUOLES BLD QL SMEAR: PRESENT

## 2025-01-01 PROCEDURE — 86901 BLOOD TYPING SEROLOGIC RH(D): CPT | Performed by: INTERNAL MEDICINE

## 2025-01-01 PROCEDURE — 99900035 HC TECH TIME PER 15 MIN (STAT)

## 2025-01-01 PROCEDURE — 25000003 PHARM REV CODE 250: Performed by: SURGERY

## 2025-01-01 PROCEDURE — 82803 BLOOD GASES ANY COMBINATION: CPT

## 2025-01-01 PROCEDURE — 94761 N-INVAS EAR/PLS OXIMETRY MLT: CPT

## 2025-01-01 PROCEDURE — 94003 VENT MGMT INPAT SUBQ DAY: CPT

## 2025-01-01 PROCEDURE — 83735 ASSAY OF MAGNESIUM: CPT | Performed by: SURGERY

## 2025-01-01 PROCEDURE — 85007 BL SMEAR W/DIFF WBC COUNT: CPT | Performed by: SURGERY

## 2025-01-01 PROCEDURE — 99900031 HC PATIENT EDUCATION (STAT)

## 2025-01-01 PROCEDURE — 85025 COMPLETE CBC W/AUTO DIFF WBC: CPT | Performed by: SURGERY

## 2025-01-01 PROCEDURE — 25000003 PHARM REV CODE 250: Performed by: INTERNAL MEDICINE

## 2025-01-01 PROCEDURE — B4185 PARENTERAL SOL 10 GM LIPIDS: HCPCS | Performed by: INTERNAL MEDICINE

## 2025-01-01 PROCEDURE — 99900026 HC AIRWAY MAINTENANCE (STAT)

## 2025-01-01 PROCEDURE — 80053 COMPREHEN METABOLIC PANEL: CPT | Performed by: SURGERY

## 2025-01-01 PROCEDURE — 25000242 PHARM REV CODE 250 ALT 637 W/ HCPCS: Performed by: SURGERY

## 2025-01-01 PROCEDURE — 36415 COLL VENOUS BLD VENIPUNCTURE: CPT | Performed by: INTERNAL MEDICINE

## 2025-01-01 PROCEDURE — 36600 WITHDRAWAL OF ARTERIAL BLOOD: CPT

## 2025-01-01 PROCEDURE — 63600175 PHARM REV CODE 636 W HCPCS: Performed by: SURGERY

## 2025-01-01 PROCEDURE — 85610 PROTHROMBIN TIME: CPT | Performed by: SURGERY

## 2025-01-01 PROCEDURE — 36415 COLL VENOUS BLD VENIPUNCTURE: CPT | Performed by: SURGERY

## 2025-01-01 PROCEDURE — 63600175 PHARM REV CODE 636 W HCPCS: Performed by: INTERNAL MEDICINE

## 2025-01-01 PROCEDURE — 27100171 HC OXYGEN HIGH FLOW UP TO 24 HOURS

## 2025-01-01 PROCEDURE — 86920 COMPATIBILITY TEST SPIN: CPT | Performed by: INTERNAL MEDICINE

## 2025-01-01 PROCEDURE — 20000000 HC ICU ROOM

## 2025-01-01 PROCEDURE — 27000207 HC ISOLATION

## 2025-01-01 PROCEDURE — 85027 COMPLETE CBC AUTOMATED: CPT | Performed by: SURGERY

## 2025-01-01 PROCEDURE — 27000221 HC OXYGEN, UP TO 24 HOURS

## 2025-01-01 PROCEDURE — 85730 THROMBOPLASTIN TIME PARTIAL: CPT | Performed by: SURGERY

## 2025-01-01 PROCEDURE — 82947 ASSAY GLUCOSE BLOOD QUANT: CPT | Performed by: INTERNAL MEDICINE

## 2025-01-01 PROCEDURE — 87040 BLOOD CULTURE FOR BACTERIA: CPT | Mod: 59 | Performed by: INTERNAL MEDICINE

## 2025-01-01 PROCEDURE — P9016 RBC LEUKOCYTES REDUCED: HCPCS | Performed by: INTERNAL MEDICINE

## 2025-01-01 PROCEDURE — 80170 ASSAY OF GENTAMICIN: CPT | Mod: 91 | Performed by: INTERNAL MEDICINE

## 2025-01-01 PROCEDURE — 94640 AIRWAY INHALATION TREATMENT: CPT

## 2025-01-01 PROCEDURE — 94668 MNPJ CHEST WALL SBSQ: CPT

## 2025-01-01 PROCEDURE — 82977 ASSAY OF GGT: CPT | Performed by: INTERNAL MEDICINE

## 2025-01-01 PROCEDURE — 80170 ASSAY OF GENTAMICIN: CPT | Performed by: INTERNAL MEDICINE

## 2025-01-01 PROCEDURE — P9047 ALBUMIN (HUMAN), 25%, 50ML: HCPCS | Performed by: INTERNAL MEDICINE

## 2025-01-01 PROCEDURE — 36430 TRANSFUSION BLD/BLD COMPNT: CPT

## 2025-01-01 RX ORDER — FUROSEMIDE 10 MG/ML
20 INJECTION INTRAMUSCULAR; INTRAVENOUS DAILY
Status: DISCONTINUED | OUTPATIENT
Start: 2025-01-01 | End: 2025-01-01

## 2025-01-01 RX ORDER — ACETAMINOPHEN 325 MG/1
650 TABLET ORAL EVERY 6 HOURS PRN
Status: DISCONTINUED | OUTPATIENT
Start: 2025-01-01 | End: 2025-01-08 | Stop reason: HOSPADM

## 2025-01-01 RX ORDER — CARBOXYMETHYLCELLULOSE SODIUM 10 MG/ML
1 GEL OPHTHALMIC NIGHTLY
Status: DISCONTINUED | OUTPATIENT
Start: 2025-01-01 | End: 2025-01-01

## 2025-01-01 RX ORDER — HYDROCODONE BITARTRATE AND ACETAMINOPHEN 500; 5 MG/1; MG/1
TABLET ORAL
Status: DISCONTINUED | OUTPATIENT
Start: 2025-01-01 | End: 2025-01-01

## 2025-01-01 RX ORDER — ACETYLCYSTEINE 200 MG/ML
2 SOLUTION ORAL; RESPIRATORY (INHALATION) 4 TIMES DAILY
Status: DISCONTINUED | OUTPATIENT
Start: 2025-01-01 | End: 2025-01-08 | Stop reason: HOSPADM

## 2025-01-01 RX ORDER — METOCLOPRAMIDE HYDROCHLORIDE 5 MG/ML
5 INJECTION INTRAMUSCULAR; INTRAVENOUS EVERY 6 HOURS
Status: DISCONTINUED | OUTPATIENT
Start: 2025-01-01 | End: 2025-01-08 | Stop reason: HOSPADM

## 2025-01-01 RX ORDER — ACETAMINOPHEN 650 MG/1
650 SUPPOSITORY RECTAL EVERY 8 HOURS PRN
Status: DISCONTINUED | OUTPATIENT
Start: 2025-01-01 | End: 2025-01-01

## 2025-01-01 RX ORDER — FUROSEMIDE 10 MG/ML
40 INJECTION INTRAMUSCULAR; INTRAVENOUS ONCE
Status: COMPLETED | OUTPATIENT
Start: 2025-01-01 | End: 2025-01-01

## 2025-01-01 RX ORDER — HYDROCODONE BITARTRATE AND ACETAMINOPHEN 500; 5 MG/1; MG/1
TABLET ORAL
Status: DISCONTINUED | OUTPATIENT
Start: 2025-01-01 | End: 2025-01-08 | Stop reason: HOSPADM

## 2025-01-01 RX ORDER — FAMOTIDINE 20 MG/1
20 TABLET, FILM COATED ORAL DAILY
Status: DISCONTINUED | OUTPATIENT
Start: 2025-01-01 | End: 2025-01-08 | Stop reason: HOSPADM

## 2025-01-01 RX ORDER — DEXTROSE MONOHYDRATE 100 MG/ML
INJECTION, SOLUTION INTRAVENOUS CONTINUOUS
Status: DISCONTINUED | OUTPATIENT
Start: 2025-01-01 | End: 2025-01-01

## 2025-01-01 RX ORDER — FAMOTIDINE 20 MG/1
20 TABLET, FILM COATED ORAL DAILY
Status: DISCONTINUED | OUTPATIENT
Start: 2025-01-01 | End: 2025-01-01

## 2025-01-01 RX ORDER — DOPAMINE HYDROCHLORIDE 160 MG/100ML
0-20 INJECTION, SOLUTION INTRAVENOUS CONTINUOUS
Status: DISCONTINUED | OUTPATIENT
Start: 2025-01-01 | End: 2025-01-08 | Stop reason: HOSPADM

## 2025-01-01 RX ORDER — ALBUMIN HUMAN 250 G/1000ML
12.5 SOLUTION INTRAVENOUS ONCE
Status: COMPLETED | OUTPATIENT
Start: 2025-01-01 | End: 2025-01-01

## 2025-01-01 RX ORDER — FAMOTIDINE 20 MG/1
20 TABLET, FILM COATED ORAL DAILY
Status: CANCELLED | OUTPATIENT
Start: 2025-01-01

## 2025-01-01 RX ORDER — FUROSEMIDE 10 MG/ML
20 INJECTION INTRAMUSCULAR; INTRAVENOUS EVERY 12 HOURS
Status: DISCONTINUED | OUTPATIENT
Start: 2025-01-01 | End: 2025-01-01

## 2025-01-01 RX ORDER — BISACODYL 10 MG/1
10 SUPPOSITORY RECTAL DAILY PRN
Status: DISCONTINUED | OUTPATIENT
Start: 2025-01-01 | End: 2025-01-08 | Stop reason: HOSPADM

## 2025-01-01 RX ADMIN — ERYTHROMYCIN 250 MG: 250 TABLET, COATED ORAL at 08:01

## 2025-01-01 RX ADMIN — Medication 0.05 MCG/KG/MIN: at 05:01

## 2025-01-01 RX ADMIN — SODIUM CHLORIDE: 9 INJECTION, SOLUTION INTRAVENOUS at 03:01

## 2025-01-01 RX ADMIN — ALBUTEROL SULFATE 2.5 MG: 2.5 SOLUTION RESPIRATORY (INHALATION) at 07:01

## 2025-01-01 RX ADMIN — PROPOFOL 25 MCG/KG/MIN: 10 INJECTION, EMULSION INTRAVENOUS at 01:01

## 2025-01-01 RX ADMIN — Medication 0.5 MCG/KG/MIN: at 06:01

## 2025-01-01 RX ADMIN — ERYTHROMYCIN 250 MG: 250 TABLET, COATED ORAL at 01:01

## 2025-01-01 RX ADMIN — ERYTHROMYCIN 250 MG: 250 TABLET, COATED ORAL at 09:01

## 2025-01-01 RX ADMIN — ALBUTEROL SULFATE 2.5 MG: 2.5 SOLUTION RESPIRATORY (INHALATION) at 11:01

## 2025-01-01 RX ADMIN — METOCLOPRAMIDE 10 MG: 5 INJECTION, SOLUTION INTRAMUSCULAR; INTRAVENOUS at 12:01

## 2025-01-01 RX ADMIN — LEVOTHYROXINE SODIUM 150 MCG: 100 TABLET ORAL at 05:01

## 2025-01-01 RX ADMIN — ACETYLCYSTEINE 2 ML: 200 SOLUTION ORAL; RESPIRATORY (INHALATION) at 11:01

## 2025-01-01 RX ADMIN — ALBUTEROL SULFATE 2.5 MG: 2.5 SOLUTION RESPIRATORY (INHALATION) at 03:01

## 2025-01-01 RX ADMIN — INSULIN GLARGINE 20 UNITS: 100 INJECTION, SOLUTION SUBCUTANEOUS at 09:01

## 2025-01-01 RX ADMIN — PROPOFOL 25 MCG/KG/MIN: 10 INJECTION, EMULSION INTRAVENOUS at 07:01

## 2025-01-01 RX ADMIN — INSULIN ASPART 4 UNITS: 100 INJECTION, SOLUTION INTRAVENOUS; SUBCUTANEOUS at 11:01

## 2025-01-01 RX ADMIN — ALBUTEROL SULFATE 2.5 MG: 2.5 SOLUTION RESPIRATORY (INHALATION) at 08:01

## 2025-01-01 RX ADMIN — ASCORBIC ACID, VITAMIN A PALMITATE, CHOLECALCIFEROL, THIAMINE HYDROCHLORIDE, RIBOFLAVIN-5 PHOSPHATE SODIUM, PYRIDOXINE HYDROCHLORIDE, NIACINAMIDE, DEXPANTHENOL, ALPHA-TOCOPHEROL ACETATE, VITAMIN K1, FOLIC ACID, BIOTIN, CYANOCOBALAMIN: 200; 3300; 200; 6; 3.6; 6; 40; 15; 10; 150; 600; 60; 5 INJECTION, SOLUTION INTRAVENOUS at 04:01

## 2025-01-01 RX ADMIN — INSULIN ASPART 4 UNITS: 100 INJECTION, SOLUTION INTRAVENOUS; SUBCUTANEOUS at 04:01

## 2025-01-01 RX ADMIN — SODIUM CHLORIDE: 9 INJECTION, SOLUTION INTRAVENOUS at 05:01

## 2025-01-01 RX ADMIN — FLUOXETINE HYDROCHLORIDE 20 MG: 20 CAPSULE ORAL at 08:01

## 2025-01-01 RX ADMIN — Medication 0.15 MCG/KG/MIN: at 01:01

## 2025-01-01 RX ADMIN — SODIUM ZIRCONIUM CYCLOSILICATE 10 G: 10 POWDER, FOR SUSPENSION ORAL at 10:01

## 2025-01-01 RX ADMIN — ACETYLCYSTEINE 2 ML: 200 SOLUTION ORAL; RESPIRATORY (INHALATION) at 03:01

## 2025-01-01 RX ADMIN — PROPOFOL 25 MCG/KG/MIN: 10 INJECTION, EMULSION INTRAVENOUS at 04:01

## 2025-01-01 RX ADMIN — LURASIDONE HYDROCHLORIDE 40 MG: 40 TABLET, FILM COATED ORAL at 09:01

## 2025-01-01 RX ADMIN — LEUCINE, PHENYLALANINE, LYSINE, METHIONINE, ISOLEUCINE, VALINE, HISTIDINE, THREONINE, TRYPTOPHAN, ALANINE, GLYCINE, ARGININE, PROLINE, SERINE, TYROSINE, SODIUM ACETATE, DIBASIC POTASSIUM PHOSPHATE, MAGNESIUM CHLORIDE, SODIUM CHLORIDE, CALCIUM CHLORIDE, DEXTROSE
1035; 575; 33; 20; 515; 240; 300; 365; 290; 51; 200; 280; 261; 340; 250; 340; 59; 210; 90; 20; 290 INJECTION INTRAVENOUS at 04:01

## 2025-01-01 RX ADMIN — COLLAGENASE SANTYL: 250 OINTMENT TOPICAL at 09:01

## 2025-01-01 RX ADMIN — Medication 0.2 MCG/KG/MIN: at 03:01

## 2025-01-01 RX ADMIN — Medication 400 MG: at 09:01

## 2025-01-01 RX ADMIN — AMPICILLIN SODIUM AND SULBACTAM SODIUM 9 G: 2; 1 INJECTION, POWDER, FOR SOLUTION INTRAMUSCULAR; INTRAVENOUS at 06:01

## 2025-01-01 RX ADMIN — METOCLOPRAMIDE 10 MG: 5 INJECTION, SOLUTION INTRAMUSCULAR; INTRAVENOUS at 06:01

## 2025-01-01 RX ADMIN — LURASIDONE HYDROCHLORIDE 40 MG: 40 TABLET, FILM COATED ORAL at 08:01

## 2025-01-01 RX ADMIN — PROPOFOL 20 MCG/KG/MIN: 10 INJECTION, EMULSION INTRAVENOUS at 07:01

## 2025-01-01 RX ADMIN — MEROPENEM 2 G: 2 INJECTION, POWDER, FOR SOLUTION INTRAVENOUS at 08:01

## 2025-01-01 RX ADMIN — METOCLOPRAMIDE 10 MG: 5 INJECTION, SOLUTION INTRAMUSCULAR; INTRAVENOUS at 11:01

## 2025-01-01 RX ADMIN — FAMOTIDINE 20 MG: 20 TABLET, FILM COATED ORAL at 08:01

## 2025-01-01 RX ADMIN — MINERAL OIL AND WHITE PETROLATUM: 150; 830 OINTMENT OPHTHALMIC at 08:01

## 2025-01-01 RX ADMIN — FLUOXETINE HYDROCHLORIDE 20 MG: 20 CAPSULE ORAL at 09:01

## 2025-01-01 RX ADMIN — ACETYLCYSTEINE 2 ML: 200 SOLUTION ORAL; RESPIRATORY (INHALATION) at 07:01

## 2025-01-01 RX ADMIN — DEXTROSE MONOHYDRATE 25 G: 25 INJECTION, SOLUTION INTRAVENOUS at 11:01

## 2025-01-01 RX ADMIN — ERYTHROMYCIN 250 MG: 250 TABLET, COATED ORAL at 10:01

## 2025-01-01 RX ADMIN — METOCLOPRAMIDE 10 MG: 5 INJECTION, SOLUTION INTRAMUSCULAR; INTRAVENOUS at 05:01

## 2025-01-01 RX ADMIN — INSULIN ASPART 1 UNITS: 100 INJECTION, SOLUTION INTRAVENOUS; SUBCUTANEOUS at 12:01

## 2025-01-01 RX ADMIN — ERYTHROMYCIN 250 MG: 250 TABLET, COATED ORAL at 05:01

## 2025-01-01 RX ADMIN — FAMOTIDINE 20 MG: 20 TABLET, FILM COATED ORAL at 09:01

## 2025-01-01 RX ADMIN — SODIUM CHLORIDE: 9 INJECTION, SOLUTION INTRAVENOUS at 08:01

## 2025-01-01 RX ADMIN — PROPOFOL 15 MCG/KG/MIN: 10 INJECTION, EMULSION INTRAVENOUS at 04:01

## 2025-01-01 RX ADMIN — BISACODYL 10 MG: 10 SUPPOSITORY RECTAL at 09:01

## 2025-01-01 RX ADMIN — ENOXAPARIN SODIUM 40 MG: 40 INJECTION SUBCUTANEOUS at 06:01

## 2025-01-01 RX ADMIN — PROPOFOL 20 MCG/KG/MIN: 10 INJECTION, EMULSION INTRAVENOUS at 01:01

## 2025-01-01 RX ADMIN — AMPICILLIN SODIUM AND SULBACTAM SODIUM 9 G: 2; 1 INJECTION, POWDER, FOR SOLUTION INTRAMUSCULAR; INTRAVENOUS at 09:01

## 2025-01-01 RX ADMIN — AMPICILLIN SODIUM AND SULBACTAM SODIUM 9 G: 2; 1 INJECTION, POWDER, FOR SOLUTION INTRAMUSCULAR; INTRAVENOUS at 05:01

## 2025-01-01 RX ADMIN — INSULIN GLARGINE 20 UNITS: 100 INJECTION, SOLUTION SUBCUTANEOUS at 08:01

## 2025-01-01 RX ADMIN — MEROPENEM 2 G: 2 INJECTION, POWDER, FOR SOLUTION INTRAVENOUS at 10:01

## 2025-01-01 RX ADMIN — METOCLOPRAMIDE 5 MG: 5 INJECTION, SOLUTION INTRAMUSCULAR; INTRAVENOUS at 05:01

## 2025-01-01 RX ADMIN — NOREPINEPHRINE BITARTRATE 0.6 MCG/KG/MIN: 1 INJECTION, SOLUTION, CONCENTRATE INTRAVENOUS at 04:01

## 2025-01-01 RX ADMIN — ACETYLCYSTEINE 2 ML: 200 SOLUTION ORAL; RESPIRATORY (INHALATION) at 08:01

## 2025-01-01 RX ADMIN — MEROPENEM 2 G: 2 INJECTION, POWDER, FOR SOLUTION INTRAVENOUS at 09:01

## 2025-01-01 RX ADMIN — ERYTHROMYCIN 250 MG: 250 TABLET, COATED ORAL at 06:01

## 2025-01-01 RX ADMIN — ASPIRIN 81 MG CHEWABLE TABLET 81 MG: 81 TABLET CHEWABLE at 08:01

## 2025-01-01 RX ADMIN — PROPOFOL 25 MCG/KG/MIN: 10 INJECTION, EMULSION INTRAVENOUS at 06:01

## 2025-01-01 RX ADMIN — NOREPINEPHRINE BITARTRATE 0.55 MCG/KG/MIN: 1 INJECTION, SOLUTION, CONCENTRATE INTRAVENOUS at 04:01

## 2025-01-01 RX ADMIN — Medication 400 MG: at 08:01

## 2025-01-01 RX ADMIN — METOCLOPRAMIDE 5 MG: 5 INJECTION, SOLUTION INTRAMUSCULAR; INTRAVENOUS at 06:01

## 2025-01-01 RX ADMIN — AMPICILLIN SODIUM AND SULBACTAM SODIUM 9 G: 2; 1 INJECTION, POWDER, FOR SOLUTION INTRAMUSCULAR; INTRAVENOUS at 08:01

## 2025-01-01 RX ADMIN — METOCLOPRAMIDE 5 MG: 5 INJECTION, SOLUTION INTRAMUSCULAR; INTRAVENOUS at 11:01

## 2025-01-01 RX ADMIN — ASPIRIN 81 MG CHEWABLE TABLET 81 MG: 81 TABLET CHEWABLE at 09:01

## 2025-01-01 RX ADMIN — ENOXAPARIN SODIUM 40 MG: 40 INJECTION SUBCUTANEOUS at 04:01

## 2025-01-01 RX ADMIN — INSULIN ASPART 2 UNITS: 100 INJECTION, SOLUTION INTRAVENOUS; SUBCUTANEOUS at 11:01

## 2025-01-01 RX ADMIN — Medication 0.1 MCG/KG/MIN: at 12:01

## 2025-01-01 RX ADMIN — DEXTROSE MONOHYDRATE 25 G: 25 INJECTION, SOLUTION INTRAVENOUS at 05:01

## 2025-01-01 RX ADMIN — MEROPENEM 2 G: 1 INJECTION INTRAVENOUS at 07:01

## 2025-01-01 RX ADMIN — FUROSEMIDE 20 MG: 10 INJECTION, SOLUTION INTRAMUSCULAR; INTRAVENOUS at 09:01

## 2025-01-01 RX ADMIN — INSULIN GLARGINE 20 UNITS: 100 INJECTION, SOLUTION SUBCUTANEOUS at 10:01

## 2025-01-01 RX ADMIN — NOREPINEPHRINE BITARTRATE 0.5 MCG/KG/MIN: 1 INJECTION, SOLUTION, CONCENTRATE INTRAVENOUS at 08:01

## 2025-01-01 RX ADMIN — Medication 0.25 MCG/KG/MIN: at 04:01

## 2025-01-01 RX ADMIN — MEROPENEM 2 G: 2 INJECTION, POWDER, FOR SOLUTION INTRAVENOUS at 06:01

## 2025-01-01 RX ADMIN — ERYTHROMYCIN 250 MG: 250 TABLET, COATED ORAL at 04:01

## 2025-01-01 RX ADMIN — I.V. FAT EMULSION 250 ML: 20 EMULSION INTRAVENOUS at 04:01

## 2025-01-01 RX ADMIN — DEXTROSE MONOHYDRATE: 100 INJECTION, SOLUTION INTRAVENOUS at 08:01

## 2025-01-01 RX ADMIN — Medication 0.5 MCG/KG/MIN: at 09:01

## 2025-01-01 RX ADMIN — INSULIN ASPART 1 UNITS: 100 INJECTION, SOLUTION INTRAVENOUS; SUBCUTANEOUS at 11:01

## 2025-01-01 RX ADMIN — Medication 0.3 MCG/KG/MIN: at 05:01

## 2025-01-01 RX ADMIN — MEROPENEM 2 G: 1 INJECTION INTRAVENOUS at 04:01

## 2025-01-01 RX ADMIN — I.V. FAT EMULSION 250 ML: 20 EMULSION INTRAVENOUS at 02:01

## 2025-01-01 RX ADMIN — ACETYLCYSTEINE 2 ML: 200 SOLUTION ORAL; RESPIRATORY (INHALATION) at 09:01

## 2025-01-01 RX ADMIN — DEXTROSE MONOHYDRATE 25 G: 25 INJECTION, SOLUTION INTRAVENOUS at 03:01

## 2025-01-01 RX ADMIN — INSULIN ASPART 4 UNITS: 100 INJECTION, SOLUTION INTRAVENOUS; SUBCUTANEOUS at 05:01

## 2025-01-01 RX ADMIN — LEUCINE, PHENYLALANINE, LYSINE, METHIONINE, ISOLEUCINE, VALINE, HISTIDINE, THREONINE, TRYPTOPHAN, ALANINE, GLYCINE, ARGININE, PROLINE, SERINE, TYROSINE, SODIUM ACETATE, DIBASIC POTASSIUM PHOSPHATE, MAGNESIUM CHLORIDE, SODIUM CHLORIDE, CALCIUM CHLORIDE, DEXTROSE
365; 280; 290; 200; 300; 290; 240; 210; 90; 1035; 515; 575; 340; 250; 20; 340; 261; 51; 59; 33; 20 INJECTION INTRAVENOUS at 04:01

## 2025-01-01 RX ADMIN — PROPOFOL 20 MCG/KG/MIN: 10 INJECTION, EMULSION INTRAVENOUS at 12:01

## 2025-01-01 RX ADMIN — DEXTROSE MONOHYDRATE: 100 INJECTION, SOLUTION INTRAVENOUS at 05:01

## 2025-01-01 RX ADMIN — Medication 0.25 MCG/KG/MIN: at 05:01

## 2025-01-01 RX ADMIN — SODIUM CHLORIDE: 9 INJECTION, SOLUTION INTRAVENOUS at 07:01

## 2025-01-01 RX ADMIN — BISACODYL 10 MG: 10 SUPPOSITORY RECTAL at 08:01

## 2025-01-01 RX ADMIN — AMPICILLIN SODIUM AND SULBACTAM SODIUM 9 G: 2; 1 INJECTION, POWDER, FOR SOLUTION INTRAMUSCULAR; INTRAVENOUS at 11:01

## 2025-01-01 RX ADMIN — SODIUM ZIRCONIUM CYCLOSILICATE 10 G: 10 POWDER, FOR SUSPENSION ORAL at 08:01

## 2025-01-01 RX ADMIN — PROPOFOL 25 MCG/KG/MIN: 10 INJECTION, EMULSION INTRAVENOUS at 03:01

## 2025-01-01 RX ADMIN — PROPOFOL 25 MCG/KG/MIN: 10 INJECTION, EMULSION INTRAVENOUS at 08:01

## 2025-01-01 RX ADMIN — AMPICILLIN SODIUM AND SULBACTAM SODIUM 9 G: 2; 1 INJECTION, POWDER, FOR SOLUTION INTRAMUSCULAR; INTRAVENOUS at 01:01

## 2025-01-01 RX ADMIN — ACETAMINOPHEN 650 MG: 325 TABLET ORAL at 04:01

## 2025-01-01 RX ADMIN — DEXTROSE MONOHYDRATE 12.5 G: 25 INJECTION, SOLUTION INTRAVENOUS at 11:01

## 2025-01-01 RX ADMIN — MINERAL OIL AND WHITE PETROLATUM: 150; 830 OINTMENT OPHTHALMIC at 09:01

## 2025-01-01 RX ADMIN — PROPOFOL 15 MCG/KG/MIN: 10 INJECTION, EMULSION INTRAVENOUS at 02:01

## 2025-01-01 RX ADMIN — COLLAGENASE SANTYL: 250 OINTMENT TOPICAL at 10:01

## 2025-01-01 RX ADMIN — AMPICILLIN SODIUM AND SULBACTAM SODIUM 9 G: 2; 1 INJECTION, POWDER, FOR SOLUTION INTRAMUSCULAR; INTRAVENOUS at 04:01

## 2025-01-01 RX ADMIN — Medication 0.35 MCG/KG/MIN: at 05:01

## 2025-01-01 RX ADMIN — Medication 0.5 MCG/KG/MIN: at 07:01

## 2025-01-01 RX ADMIN — METOCLOPRAMIDE 5 MG: 5 INJECTION, SOLUTION INTRAMUSCULAR; INTRAVENOUS at 12:01

## 2025-01-01 RX ADMIN — LEVOTHYROXINE SODIUM 150 MCG: 100 TABLET ORAL at 06:01

## 2025-01-01 RX ADMIN — ERYTHROMYCIN 250 MG: 250 TABLET, COATED ORAL at 02:01

## 2025-01-01 RX ADMIN — INSULIN ASPART 2 UNITS: 100 INJECTION, SOLUTION INTRAVENOUS; SUBCUTANEOUS at 08:01

## 2025-01-01 RX ADMIN — METOCLOPRAMIDE 5 MG: 5 INJECTION, SOLUTION INTRAMUSCULAR; INTRAVENOUS at 04:01

## 2025-01-01 RX ADMIN — PROPOFOL 25 MCG/KG/MIN: 10 INJECTION, EMULSION INTRAVENOUS at 10:01

## 2025-01-01 RX ADMIN — FUROSEMIDE 40 MG: 10 INJECTION, SOLUTION INTRAVENOUS at 10:01

## 2025-01-01 RX ADMIN — AMPICILLIN SODIUM AND SULBACTAM SODIUM 9 G: 2; 1 INJECTION, POWDER, FOR SOLUTION INTRAMUSCULAR; INTRAVENOUS at 03:01

## 2025-01-01 RX ADMIN — Medication 0.1 MCG/KG/MIN: at 10:01

## 2025-01-01 RX ADMIN — COLLAGENASE SANTYL: 250 OINTMENT TOPICAL at 08:01

## 2025-01-01 RX ADMIN — Medication 0.05 MCG/KG/MIN: at 09:01

## 2025-01-01 RX ADMIN — ALBUMIN (HUMAN) 12.5 G: 12.5 SOLUTION INTRAVENOUS at 09:01

## 2025-01-01 RX ADMIN — INSULIN ASPART 2 UNITS: 100 INJECTION, SOLUTION INTRAVENOUS; SUBCUTANEOUS at 07:01

## 2025-01-01 RX ADMIN — ALBUTEROL SULFATE 2.5 MG: 2.5 SOLUTION RESPIRATORY (INHALATION) at 09:01

## 2025-01-01 RX ADMIN — PROPOFOL 25 MCG/KG/MIN: 10 INJECTION, EMULSION INTRAVENOUS at 09:01

## 2025-01-01 RX ADMIN — I.V. FAT EMULSION 250 ML: 20 EMULSION INTRAVENOUS at 09:01

## 2025-01-01 RX ADMIN — Medication 0.5 MCG/KG/MIN: at 01:01

## 2025-01-01 RX ADMIN — SODIUM CHLORIDE: 9 INJECTION, SOLUTION INTRAVENOUS at 04:01

## 2025-01-01 RX ADMIN — Medication 0.05 MCG/KG/MIN: at 10:01

## 2025-01-01 RX ADMIN — METOCLOPRAMIDE 5 MG: 5 INJECTION, SOLUTION INTRAMUSCULAR; INTRAVENOUS at 01:01

## 2025-01-01 RX ADMIN — PROPOFOL 20 MCG/KG/MIN: 10 INJECTION, EMULSION INTRAVENOUS at 10:01

## 2025-01-01 RX ADMIN — PROPOFOL 20 MCG/KG/MIN: 10 INJECTION, EMULSION INTRAVENOUS at 03:01

## 2025-01-01 RX ADMIN — FUROSEMIDE 20 MG: 10 INJECTION, SOLUTION INTRAMUSCULAR; INTRAVENOUS at 08:01

## 2025-01-01 RX ADMIN — Medication 0.5 MCG/KG/MIN: at 04:01

## 2025-01-01 RX ADMIN — SODIUM CHLORIDE: 9 INJECTION, SOLUTION INTRAVENOUS at 01:01

## 2025-01-01 RX ADMIN — Medication 0.5 MCG/KG/MIN: at 11:01

## 2025-01-01 NOTE — PROGRESS NOTES
"Pharmacokinetic Follow Up: Gentamicin    Assessment of levels:   Gentamicin levels: The trough concentration was exceeding target range of < 1 mcg/mL     Regimen Plan:   Draw random gent level on 1/2 at 0430     Drug levels (last 3 results):  No results for input(s): "AMIKACINPEAK", "AMIKACINTROU", "AMIKACINRAND", "AMIKACIN" in the last 72 hours.    Recent Labs   Lab Result Units 12/31/24  0335 12/31/24  0905 12/31/24  1430 12/31/24  1709 01/01/25  0349 01/01/25  1708   Gentamicin, Peak ug/mL  --  4.5*  --  8.1  --   --    Gentamicin, Random ug/mL 0.8  --   --   --  6.1 4.6   Gentamicin, Trough ug/mL  --   --  4.2*  --   --   --        No results for input(s): "TOBRA8", "TOBRA10", "TOBRA12", "TOBRARND", "TOBRAMYCIN", "TOBRAPEAK", "TOBRATROUGH", "TOBRAMYCINPE", "TOBRAMYCINRA", "TOBRAMYCINTR" in the last 72 hours.    Pharmacy will continue to monitor.    Please contact pharmacy at extension with any questions regarding this assessment.    Thank you for the consult,   Joon Merida      Patient brief summary:  Carlos Chahal is a 34 y.o. male initiated on aminoglycoside therapy for treatment of lower respiratory infection    Drug Allergies:   Review of patient's allergies indicates:   Allergen Reactions    Guaifenesin Hives    Codeine Itching       Actual Body Weight:   68.5 kg    Adjust Body Weight:   67.1 kg    Ideal Body Weight:  66.1 kg    Renal Function:   Estimated Creatinine Clearance: 61.4 mL/min (A) (based on SCr of 1.6 mg/dL (H)).,     Dialysis Method (if applicable):  N/A    CBC (last 72 hours):  Recent Labs   Lab Result Units 12/30/24  0723 12/31/24  0335 01/01/25  0349   WBC K/uL 14.32* 19.77* 16.52*   Hemoglobin g/dL 8.4* 8.0* 6.9*   Hematocrit % 25.9* 24.2* 21.6*   Platelets K/uL 141* 157 121*   Gran % % 35.4* 74.1* 71.7   Lymph % % 3.0* 5.2* 5.1*   Mono % % 10.1 10.5 11.7   Eosinophil % % 7.1 8.3* 9.8*   Basophil % % 0.0 0.6 0.5   Differential Method  Manual Automated Automated       Metabolic Panel " (last 72 hours):  Recent Labs   Lab Result Units 12/30/24  0543 12/31/24  0335 01/01/25  0349   Sodium mmol/L 130* 129* 128*   Potassium mmol/L 3.8 4.2 4.2   Chloride mmol/L 97 97 96   CO2 mmol/L 33* 30* 30*   Glucose mg/dL 190* 187* 205*   BUN mg/dL 57* 66* 72*   Creatinine mg/dL 1.6* 1.6* 1.6*   Albumin g/dL 0.6* 0.6* <0.6*   Total Bilirubin mg/dL 0.4 0.9 0.9   Alkaline Phosphatase U/L 471* 680* 821*   AST U/L 44* 105* 121*   ALT U/L 15 22 33   Magnesium mg/dL 1.8 1.9 1.9       Aminoglycoside Administrations:  aminoglycosides given in last 96 hours                     gentamicin (GARAMYCIN) 134.4 mg in 0.9% NaCl 100 mL IVPB (mg) 134.4 mg New Bag 12/31/24 1603    gentamicin (GARAMYCIN) 112.4 mg in 0.9% NaCl 100 mL IVPB (mg) 112.4 mg New Bag 12/31/24 0732                    Microbiologic Results:  Microbiology Results (last 7 days)       Procedure Component Value Units Date/Time    Blood culture [[id number]] Collected: 12/27/24 0837    Order Status: Completed Specimen: Blood Updated: 12/31/24 1812     Blood Culture, Routine No Growth to date      No Growth to date      No Growth to date      No Growth to date      No Growth to date    Blood culture [[id number]] Collected: 12/27/24 0838    Order Status: Completed Specimen: Blood Updated: 12/31/24 1812     Blood Culture, Routine No Growth to date      No Growth to date      No Growth to date      No Growth to date      No Growth to date    Culture, Respiratory with Gram Stain [[id number]]  (Abnormal) Collected: 12/27/24 1522    Order Status: Completed Specimen: Respiratory from Endotracheal Aspirate Updated: 12/31/24 1118     Respiratory Culture GRAM NEGATIVE FLORA  Many  Identification and susceptibility pending       Gram Stain (Respiratory) Few Gram positive cocci     Gram Stain (Respiratory) Rare Gram positive rods     Gram Stain (Respiratory) Rare WBC's

## 2025-01-01 NOTE — ASSESSMENT & PLAN NOTE
Nutrition consulted. Most recent weight and BMI monitored-     Measurements:  Wt Readings from Last 1 Encounters:   12/16/24 68.5 kg (151 lb 0.2 oz)   Body mass index is 23.65 kg/m².    Patient has been screened and assessed by RD.    Malnutrition Type:  Context:    Level:      Malnutrition Characteristic Summary:       Interventions/Recommendations (treatment strategy):  1. Rec'd Continuous EN: Glucerna 1.5 @ 40mL/r increasing by 5 mL q6hrs to goal rate of 55mL/hr with a continuous 40mL FWF to provide 1980kcal (94% EEN), 109g of protein (100% EPN), and 1962mL FW.   2. Néstor BID via PEG tube to promote wound healing and to provide additional nutrition.           - Do not mix Néstor with formula in a tube-feeding bag         - Pour one packet of Néstor in a clean, small container for mixing.           - Add 4 fl oz (120 mL) water at room temperature.           - Mix well with disposable spoon or tongue blade until all particles are completely hydrated.           - Verify correct tube placement.           - Flush feeding tube with 30 mL water.           -Administer Néstor through feeding tube using a 60-mL or larger syringe.           - Flush with an additional 30 mL water.  3. Rec'd ClinimixE 5/20 @ 80mL/hr to provide 1690kcal (80% EEN), 96g of protein (88% EPN), and 1920mL TFV.      -Add 250mL of 20% lipids 3x/week to provide additional calories.      -Check Triglycerides before adding lipids. 4. RD to follow and make rec's accordingly.    12/18 restart tfs today at 1ml/hr to see if can tolerate feeds  12/19 increase tfs as tolerates  12/23 not tolerating tfs- will get tpn orders and start that until can tolerate tfs better - ncrease regaln 10 mg iv q 6hrs  12/24 con ttfs as tolerates - started on tpn for further nutritional support  12/25 FM:  Check KUB.  12/26 FM:  Resume TF today, monitor.  12/27 on tpn - not tolerating tfs at this time  12/28/24:  Albumin infusion today.   12/31:  tube feeds as tolerated    Patient  has protein malnutrition.  Last trend as follows-   Lab Results   Component Value Date    ALBUMIN <0.6 (L) 01/01/2025    ALBUMIN 0.6 (L) 12/31/2024    ALBUMIN 0.6 (L) 12/30/2024

## 2025-01-01 NOTE — ASSESSMENT & PLAN NOTE
Patient's most recent potassium results are listed below.   Recent Labs     12/30/24  0543 12/31/24  0335 01/01/25  0349   K 3.8 4.2 4.2       Plan  - Replete potassium per protocol  - Monitor potassium Daily  - Patient's hypokalemia is stable, continue below and monitor  12/20 increase pot bicarb to bid  12/23 decrease pot bicard to daily dosing  12/24 improved- stop potassium bicarb repalcement - monitor

## 2025-01-01 NOTE — ASSESSMENT & PLAN NOTE
Patient has a diagnosis of pneumonia. The cause of the pneumonia is suspected to be bacterial in etiology but organism is not known. The pneumonia is stable. The patient has the following signs/symptoms of pneumonia: persistent hypoxia  and sputum production. The patient does have a current oxygen requirement and the patient does not have a home oxygen requirement. I have reviewed the pertinent imaging. The following cultures have been collected: Blood cultures and Sputum culture The culture results are listed below.     Current antimicrobial regimen consists of the antibiotics listed below. Will monitor patient closely and continue current treatment plan unchanged.    Antibiotics (From admission, onward)      Start     Stop Route Frequency Ordered    12/30/24 1300  erythromycin base tablet 250 mg         -- PER NG TUBE 4 times daily 12/30/24 1111    12/29/24 1125  gentamicin - pharmacy to dose         -- IV pharmacy to manage frequency 12/29/24 1025            Microbiology Results (last 7 days)       Procedure Component Value Units Date/Time    Blood culture [[id number]] Collected: 12/27/24 0837    Order Status: Completed Specimen: Blood Updated: 12/31/24 1812     Blood Culture, Routine No Growth to date      No Growth to date      No Growth to date      No Growth to date      No Growth to date    Blood culture [[id number]] Collected: 12/27/24 0838    Order Status: Completed Specimen: Blood Updated: 12/31/24 1812     Blood Culture, Routine No Growth to date      No Growth to date      No Growth to date      No Growth to date      No Growth to date    Culture, Respiratory with Gram Stain [[id number]]  (Abnormal) Collected: 12/27/24 1522    Order Status: Completed Specimen: Respiratory from Endotracheal Aspirate Updated: 12/31/24 1118     Respiratory Culture GRAM NEGATIVE FLORA  Many  Identification and susceptibility pending       Gram Stain (Respiratory) Few Gram positive cocci     Gram Stain (Respiratory) Rare Gram  positive rods     Gram Stain (Respiratory) Rare WBC's        12/17 dc zosyn with recent esbl kleb in urine - will change to merrem - cont vanc until final cultures obtained - change vent to simv; add nebs  12/18 cotn merrem and vanc - await final sputum cutlure - cont vent on ac control; nebs - wbc imrpoved slightly; lasix 20mg iv for edema today  12/19  dc vanc - proteus grwoign from sputum - cont merrem and nebs; lasix today - wean rr on vent  12/20 wnc improved - cont merrem - nebs and vent - another dose of lasix today for edema - fio2 increased  12/23 cont iv gent and merren due to sent of multiple bugs (acinetobacter/kleb/proteus)  12/24 cont current abxs  12/25 FM:  Cont GENT  12/26 FM:  Cont GENT, poor prognosis.  12/27 on gent - get new sputum culture today due to elevated wbc - cont vent and nebs  12/28/24:Gram positive cocci/rods on sputum, blood cultures negative   12/29/24:  Gram negative rods on sputum cx.   12/30 await final results of sputum cx  12/31: Tailor antibiotics based on sputum culture results  1/1/24:  tailor based on c/s

## 2025-01-01 NOTE — ASSESSMENT & PLAN NOTE
Anemia is likely due to chronic infections Most recent hemoglobin and hematocrit are listed below.  Recent Labs     12/30/24  0723 12/31/24  0335 01/01/25  0349   HGB 8.4* 8.0* 6.9*   HCT 25.9* 24.2* 21.6*       Plan  - Monitor serial CBC: Daily  - Transfuse PRBC if patient becomes hemodynamically unstable, symptomatic or H/H drops below 7/21.  - Patient has not received any PRBC transfusions to date  - Patient's anemia is currently worsening. Will adjust treatment as follows: 2uprbcs today  12/17 h/h Improved  12/20 h/h holding  12/26 FM:  Transfuse today.  12/27 hh improved after transfusion  12/28/24:  Continue daily monitoring.  12/30 h/h stable  12/31: Hemoglobin remained stable  1/1/24:  transfuse today

## 2025-01-01 NOTE — PLAN OF CARE
Pt remains intubated and on sedation. Vitals stable. 1 unit of blood transfused today. Wound care done to R thigh and groin area per orders. Wound vac to sacrum, seal intact and no issue with dressing. Tolerating tube feedings so far @ 30ml/hr, highest residual all shift = 180mls. TPN continued as well.

## 2025-01-01 NOTE — NURSING
Resp informed of EICU rec on FiO2. Adjustment made. Leakage from Ileostomy back. Stoma care and appliance changed.

## 2025-01-01 NOTE — PLAN OF CARE
Recommendations  1. Rec'd Continuous EN: Glucerna 1.5 @ 40mL/r increasing by 5 mL q6hrs to goal rate of 55mL/hr with a continuous 40mL FWF to provide 1980kcal (94% EEN), 109g of protein (100% EPN), and 1962mL FW.     2. Néstor BID via PEG tube to promote wound healing and to provide additional nutrition.             - Do not mix Néstor with formula in a tube-feeding bag           - Pour one packet of Néstor in a clean, small container for mixing.             - Add 4 fl oz (120 mL) water at room temperature.             - Mix well with disposable spoon or tongue blade until all particles are completely hydrated.             - Verify correct tube placement.             - Flush feeding tube with 30 mL water.             -Administer Néstor through feeding tube using a 60-mL or larger syringe.             - Flush with an additional 30 mL water.    3. Rec'd ClinimixE 5/20 @ 80mL/hr to provide 1690kcal (80% EEN), 96g of protein (88% EPN), and 1920mL TFV.        -Add 250mL of 20% lipids 3x/week to provide additional calories.        -Check Triglycerides before adding lipids.   4. RD to follow and make rec's accordingly.  Goals:   1. Pt will be started on EN by next RD follow up.     2. Pt will reach TF goal rate within 48hrs of initiation.   3. Pt will be started on TPN by next RD follow up.  Nutrition Goal Status: goal met, progressing towards goal

## 2025-01-01 NOTE — EICU
eICU Physician Virtual/Remote Brief Evaluation Note      VENTILATOR REVIEW    Chest x-ray with left lower lobe infiltrate, endotracheal tube approximately 4.5 cm from jens, left arm PICC in good position just above cavoatrial junction, feeding tube in stomach with tip not visible below the bottom of the film.  BP 91/51 (66), P 74, RR 30, O2 sat 100,   Sedated on ventilator 400/12/5/40%, peak pressure 23  ABG at 5:30 a.m. this morning pH 7.38, pCO2 48, PO2 173, bicarb 27, O2 sat 99 on ventilator tidal volume 400, peep 5, 40%  Titrate FiO2 to O2 sat 92-94      NICKY Rebollar MD  eICU Attending  [phone]    This report has been created through the use of [employer] dictation software. Typographical and content errors may occur with this process. While efforts are made to detect and correct such errors, in some cases errors will persist. For this reason, wording in this document should be considered in the proper context and not strictly verbatim

## 2025-01-01 NOTE — PROGRESS NOTES
[place] [facility] - Intensive Care  Adult Nutrition  Progress Note    SUMMARY       Recommendations  1. Rec'd Continuous EN: Glucerna 1.5 @ 40mL/r increasing by 5 mL q6hrs to goal rate of 55mL/hr with a continuous 40mL FWF to provide 1980kcal (94% EEN), 109g of protein (100% EPN), and 1962mL FW.     2. Néstor BID via PEG tube to promote wound healing and to provide additional nutrition.             - Do not mix Néstor with formula in a tube-feeding bag           - Pour one packet of Néstor in a clean, small container for mixing.             - Add 4 fl oz (120 mL) water at room temperature.             - Mix well with disposable spoon or tongue blade until all particles are completely hydrated.             - Verify correct tube placement.             - Flush feeding tube with 30 mL water.             -Administer Néstor through feeding tube using a 60-mL or larger syringe.             - Flush with an additional 30 mL water.    3. Rec'd ClinimixE 5/20 @ 80mL/hr to provide 1690kcal (80% EEN), 96g of protein (88% EPN), and 1920mL TFV.        -Add 250mL of 20% lipids 3x/week to provide additional calories.        -Check Triglycerides before adding lipids.   4. RD to follow and make rec's accordingly.  Goals:   1. Pt will be started on EN by next RD follow up.     2. Pt will reach TF goal rate within 48hrs of initiation.   3. Pt will be started on TPN by next RD follow up.  Nutrition Goal Status: goal met, progressing towards goal  Communication of RD Recs: reviewed with RN     Assessment and Plan     Nutrition Problem  Inadequate oral intake     Related to (etiology):   NPO/Intubation Status     Signs and Symptoms (as evidenced by):   0% PO intake      Interventions/Recommendations (treatment strategy):  1. Rec'd Continuous EN: Glucerna 1.5 @ 40mL/r increasing by 5 mL q6hrs to goal rate of 55mL/hr with a continuous 40mL FWF to provide 1980kcal (94% EEN), 109g of protein (100% EPN), and 1962mL FW.   2. Néstor BID via PEG tube to promote  "wound healing and to provide additional nutrition.           - Do not mix Néstor with formula in a tube-feeding bag         - Pour one packet of Néstor in a clean, small container for mixing.           - Add 4 fl oz (120 mL) water at room temperature.           - Mix well with disposable spoon or tongue blade until all particles are completely hydrated.           - Verify correct tube placement.           - Flush feeding tube with 30 mL water.           -Administer Néstor through feeding tube using a 60-mL or larger syringe.           - Flush with an additional 30 mL water.   3. RD to follow and make rec's accordingly.     Nutrition Diagnosis Status:   Continues     Malnutrition Assessment  NFPE not warranted at this time. RD to continue to monitor for signs and symptoms of malnutrition.     Nutrition Related Social Determinants of Health:  None identified at this time.    Reason for Assessment    Reason For Assessment: RD follow-up  Diagnosis: infection/sepsis  General Information Comments: Followed up on pt this morning. Pt was restarted on EN and is tolerating well @ 20mL/hr. Pt is also still on TPM. As pt continues to progress towards EN goal rate, decrease TPN rate. RD to follow and make red's accordingly.  Nutrition Discharge Planning: TBD as care progresses    Nutrition Risk Screen    Nutrition Risk Screen: tube feeding or parenteral nutrition    Nutrition/Diet History    Patient Reported Diet/Restrictions/Preferences: no oral intake  Spiritual, Cultural Beliefs, Restorationist Practices, Values that Affect Care: no  Food Allergies: NKFA  Factors Affecting Nutritional Intake: NPO  Nutrition Support Formula Prior to Admit: Glucerna 1.5    Anthropometrics    Temp: 97 °F (36.1 °C)  Height: 5' 7" (170.2 cm)  Height (inches): 67 in  Weight Method: Bed Scale  Weight: 68.5 kg (151 lb 0.2 oz)  Weight (lb): 151.02 lb  Ideal Body Weight (IBW), Male: 148 lb  % Ideal Body Weight, Male (lb): 102.04 %  BMI (Calculated): 23.6  BMI " Grade: 18.5-24.9 - normal    Lab/Procedures/Meds    Pertinent Labs Reviewed: reviewed  Pertinent Medications Reviewed: reviewed    Estimated/Assessed Needs    Weight Used For Calorie Calculations: 68.5 kg (151 lb 0.2 oz)  Energy Calorie Requirements (kcal): 2096  Energy Need Method: [facility]  Protein Requirements: 109-137 (1.6-2.0g/kg)  Weight Used For Protein Calculations: 68.5 kg (151 lb 0.2 oz)  Fluid Requirements (mL): 2096 (1mL/kcal)  Estimated Fluid Requirement Method: RDA Method  RDA Method (mL): 2096    Nutrition Prescription Ordered    Current Diet Order: NPO  Current Nutrition Support Formula Ordered: Clinimix E 5/20, Glucerna 1.5  Current Nutrition Support Rate Ordered:  (Glucerna @ 20mL and Clinimix @ 80mL)  Current Nutrition Support Frequency Ordered: Continuous  Oral Nutrition Supplement: None    Evaluation of Received Nutrient/Fluid Intake    Enteral Calories (kcal): 720  Enteral Protein (gm): 39  Enteral (Free Water) Fluid (mL): 364  Parenteral Calories (kcal): 1690  Parenteral Protein (gm): 96  Parenteral Fluid (mL): 1920  Other Calories (kcal): 164 (Propofol infusing @ 6.2mL/hr)  Total Calories (kcal): 2574  % Kcal Needs: 122%  Total Protein (gm): 135  % Protein Needs: 98%  I/O: -24.6  Energy Calories Required: meeting needs  Protein Required: meeting needs  Tolerance: tolerating  % Intake of Estimated Energy Needs: 75 - 100 %  % Meal Intake: NPO    Nutrition Risk    Level of Risk/Frequency of Follow-up: moderate     Monitor and Evaluation    Food and Nutrient Intake: enteral nutrition intake, parenteral nutrition intake  Food and Nutrient Adminstration: enteral and parenteral nutrition administration  Knowledge/Beliefs/Attitudes: food and nutrition knowledge/skill, beliefs and attitudes  Physical Activity and Function: nutrition-related ADLs and IADLs  Anthropometric Measurements: height/length, weight, weight change, body mass index  Biochemical Data, Medical Tests and Procedures:  gastrointestinal profile, electrolyte and renal panel, glucose/endocrine profile, inflammatory profile, lipid profile  Nutrition-Focused Physical Findings: overall appearance     Nutrition Follow-Up    RD Follow-up?: Yes

## 2025-01-01 NOTE — PROGRESS NOTES
[facility] Medicine  Progress Note    Patient Name: Carlos Chahal  MRN: [id number]  Patient Class: IP- Inpatient   Admission Date: 12/15/2024  Length of Stay: 17 days  Attending Physician: Kim Diamond MD  Primary Care Provider: Jose Francisco Klein MD        Subjective     Principal Problem:Sepsis        HPI:  Carlos Chahal is a 34 y.o. male who presents to the ED from nursing home for altered mental status and difficulty breathing.  Patient is found to be hypoxic.  He has a history of anoxic brain injury     This am, pt is on ventilator and levophed at 0.25mcg and ivfs at 75ml/hr.      Spoke with father this am on condition of pt    Overview/Hospital Course:  12/17 ND became more awake yesterday with wound changes - low dose propofol added for pt - able to wean levophed to 0.15mcg.  did tolerate tf last night - check kub this am  12/18 ND pt had to be changed back to ac control last nigth after becoming tachypneic and had low tv.  Tolerating ac mode.  Levophed down to 0.1 mcg  12/19 ND pt toleratign vent - will wean RR today - cont to slowly wean levophed as tolerates - wbc slowly imrpoving - tolerating low dose tfs - will cont to increase tfs as tolerates  12/20 ND tolerating vent - cont to work on feeds and nutritional support  12/21 [place] weekend coverage, in no acute distress, stable vital signs, AC/18/400/5/40%, SpO2 100%. On proprofol for sedation, patient tracks voice and moving head and neck with lid opening. Mild bump in WBC, afebrile, may be associated with the reported residual >200 and tube feeds held. Abdomen, soft and no distension on exam. Will resume as tolerated and monitor.  Blood cx x2, final report no growth. Continue merrem (D5), gentamicin (D2) with mixed MDRO frida from sputum studies and UTI. Replete Mg, continue abx. Continue daily wound care.   12/22 [place] weekend coverage, overnight rate change and tolerating AC12/400/5/40 SpO2 100%, proprofol 15 mcg/kg/min, levophed  0.1mcg/kg/min. Patient without gag reflex on suction. Tube feeds held overnight and resumed, remains on trickle feeds. Ileostomy output 100.   No associated abdominal distension, patient in no distress. Vent settings weaned. Leukocytosis resolved. Continue IV abx for MDRO coverage.   12/23 ND Pt still havign trouble tolerating tfs -change reglan iv; pt is more awake this am - will change to simv for a few hours today   12/24 ND elevated bs with tpn- will add long acting insulin as 12u and restart tpn - ssi adjusted to moderate scale.  12/25 FM:  Holiday coverage, chart reviewed and case discussed with team.  Patient's Levophed is being weaned slowly and he is not tolerating his tube feeds.  Abdominal exam is soft patient has output via ostomy we will check KUB.  Patient is followed by surgery and has a polymicrobial respiratory and urinary tract infection.  Patient has multiple wounds and has a history of anoxic brain injury and is a long-term nursing home resident.  12/26 FM:  Patient is off of vasopressors this morning, patient's KUB noted and will rechallenge tube feedings today.  Patient is tolerating TPN labs noted and his hemoglobin has continued to trend downward Ms. With no visible blood loss.  Patient's other labs noted his white blood cell count is rising despite appropriate antibiotics based on respiratory and urinary cultures.  Patient's prognosis is poor.  12/27 ND pt  still not tolerating tfs - surgery following -  cont tpn; h/h improved after transfusion.  Updated aunt on pts condition  12/28/24:  Patient seen this morning.  Not tolerating tube feedings at this time, remains on vent support.  Poor prognosis at this time.  Patient with poor urine output despite diuretics.  Trial of albumin infusion followed by lasix today.    12/29/24:  Good response to albumin/lasix combo yesterday with good urine output.  Renal function essentially unchanged.  Sputum culture with gram negative rods, susceptibility  pending.  Continue abx for now. Leukocytosis improving, H/H stable.   12/30 ND pt will be switched to simv today to start havign him do more work on breathing.  Awaiting sputum cx results - cxr appears improved and wbc improving.  Still not tolerating tfs  12/31 WC: Patient having ongoing need for ventilator support.  Sputum culture still pending.  Abdominal x-ray reveals no evidence of bowel obstruction.  Tube feeds as tolerated.  1/1/25 WC:  remains vent dependent.  TF as tolerated.        Review of Systems   Unable to perform ROS: Acuity of condition     Objective:     Vital Signs (Most Recent):  Temp: 97 °F (36.1 °C) (01/01/25 0701)  Pulse: 68 (01/01/25 0735)  Resp: 18 (01/01/25 0735)  BP: (!) 94/56 (01/01/25 0735)  SpO2: 100 % (01/01/25 0735) Vital Signs (24h Range):  Temp:  [96.5 °F (35.8 °C)-97.3 °F (36.3 °C)] 97 °F (36.1 °C)  Pulse:  [65-86] 68  Resp:  [14-36] 18  SpO2:  [100 %] 100 %  BP: ()/(44-63) 94/56     Weight: 68.5 kg (151 lb 0.2 oz)  Body mass index is 23.65 kg/m².    Intake/Output Summary (Last 24 hours) at 1/1/2025 0857  Last data filed at 1/1/2025 0515  Gross per 24 hour   Intake 2719.61 ml   Output 2150 ml   Net 569.61 ml         Physical Exam  Constitutional:       Appearance: He is ill-appearing.      Comments: Thin male; intubated   HENT:      Mouth/Throat:      Mouth: Mucous membranes are moist.   Eyes:      Pupils: Pupils are equal, round, and reactive to light.   Cardiovascular:      Rate and Rhythm: Normal rate and regular rhythm.      Heart sounds: Normal heart sounds.   Pulmonary:      Effort: Pulmonary effort is normal.      Breath sounds: Normal breath sounds. Transmitted upper airway sounds present.   Abdominal:      General: There is no distension.      Palpations: Abdomen is soft. There is no mass.      Tenderness: There is no abdominal tenderness.      Comments: Jtube noted; ileostomy noted   Genitourinary:     Comments: Rojas with yellow urine  Musculoskeletal:      Right  "lower leg: Edema present.      Left lower leg: Edema present.   Lymphadenopathy:      Cervical: No cervical adenopathy.   Skin:     General: Skin is warm and dry.      Comments: Wounds to sacrum; groin area             Significant Labs: All pertinent labs within the past 24 hours have been reviewed.    Significant Imaging: I have reviewed all pertinent imaging results/findings within the past 24 hours.    Assessment and Plan     * Sepsis  This patient does have evidence of infective focus  My overall impression is septic shock due to MAP < 65 and SBP < 90.  Source: Respiratory and Urinary Tract  Antibiotics given-   Antibiotics (72h ago, onward)      Start     Stop Route Frequency Ordered    12/30/24 1300  erythromycin base tablet 250 mg         -- PER NG TUBE 4 times daily 12/30/24 1111    12/29/24 1125  gentamicin - pharmacy to dose         -- IV pharmacy to manage frequency 12/29/24 1025          Latest lactate reviewed-  No results for input(s): "LACTATE", "POCLAC" in the last 72 hours.    Organ dysfunction indicated by Acute respiratory failure and Encephalopathy    Fluid challenge Ideal Body Weight- The patient's ideal body weight is Ideal body weight: 66.1 kg (145 lb 11.6 oz) which will be used to calculate fluid bolus of 30 ml/kg for treatment of septic shock.      Post- resuscitation assessment Yes Perfusion exam was performed within 6 hours of septic shock presentation after bolus shows Inadequate tissue perfusion assessed by non-invasive monitoring       Will Start Pressors- Levophed for MAP of 65  Source control achieved by: iv zosym. Vanc, ivfs    Patient has a leukocytosis.  Last trend as follows-   Lab Results   Component Value Date    WBC 16.52 (H) 01/01/2025    WBC 19.77 (H) 12/31/2024    WBC 14.32 (H) 12/30/2024 12/17 wean levophed as tolerates- abxs changed to merrem, cotn vanc - await culture final reports  12/18 wbc slightly imrpoved - cont iv abxs- await final sputum culture - on merrem " fro esbl kleb in urine  12/19 cont iv merrem for esbl kleb in urine and proteus in sputum - dc vanc  12/20 wbc improvign - cotn iv merrem  12/24 wbc normal - cont iv abxs  12/25 FM:  Cont GENT.  12/26 FM:  Cont GENT, prognosis poor, pulm/gu/skin infections.  12/27 repeat bc and sputum culture due to increasing wbc - cont gent; completed 10 days of merrem  12/29/24:  Sputum with gram negative rods, continue gentamicin, susceptibility pending   12/30 wbc improvign    Edema  Lasix 20mg iv bid - monitor I/o  12/30 decreae lasix to daily - edema has imrpoved    Hypokalemia  Patient's most recent potassium results are listed below.   Recent Labs     12/30/24  0543 12/31/24  0335 01/01/25  0349   K 3.8 4.2 4.2       Plan  - Replete potassium per protocol  - Monitor potassium Daily  - Patient's hypokalemia is stable, continue below and monitor  12/20 increase pot bicarb to bid  12/23 decrease pot bicard to daily dosing  12/24 improved- stop potassium bicarb repalcement - monitor    Severe malnutrition  Nutrition consulted. Most recent weight and BMI monitored-     Measurements:  Wt Readings from Last 1 Encounters:   12/16/24 68.5 kg (151 lb 0.2 oz)   Body mass index is 23.65 kg/m².    Patient has been screened and assessed by RD.    Malnutrition Type:  Context:    Level:      Malnutrition Characteristic Summary:       Interventions/Recommendations (treatment strategy):  1. Rec'd Continuous EN: Glucerna 1.5 @ 40mL/r increasing by 5 mL q6hrs to goal rate of 55mL/hr with a continuous 40mL FWF to provide 1980kcal (94% EEN), 109g of protein (100% EPN), and 1962mL FW.   2. Néstor BID via PEG tube to promote wound healing and to provide additional nutrition.           - Do not mix Néstor with formula in a tube-feeding bag         - Pour one packet of Néstor in a clean, small container for mixing.           - Add 4 fl oz (120 mL) water at room temperature.           - Mix well with disposable spoon or tongue blade until all particles  are completely hydrated.           - Verify correct tube placement.           - Flush feeding tube with 30 mL water.           -Administer Néstor through feeding tube using a 60-mL or larger syringe.           - Flush with an additional 30 mL water.  3. Rec'd ClinimixE 5/20 @ 80mL/hr to provide 1690kcal (80% EEN), 96g of protein (88% EPN), and 1920mL TFV.      -Add 250mL of 20% lipids 3x/week to provide additional calories.      -Check Triglycerides before adding lipids. 4. RD to follow and make rec's accordingly.    12/18 restart tfs today at 1ml/hr to see if can tolerate feeds  12/19 increase tfs as tolerates  12/23 not tolerating tfs- will get tpn orders and start that until can tolerate tfs better - ncrease regaln 10 mg iv q 6hrs  12/24 con ttfs as tolerates - started on tpn for further nutritional support  12/25 FM:  Check KUB.  12/26 FM:  Resume TF today, monitor.  12/27 on tpn - not tolerating tfs at this time  12/28/24:  Albumin infusion today.   12/31:  tube feeds as tolerated    Patient has protein malnutrition.  Last trend as follows-   Lab Results   Component Value Date    ALBUMIN <0.6 (L) 01/01/2025    ALBUMIN 0.6 (L) 12/31/2024    ALBUMIN 0.6 (L) 12/30/2024         Hypomagnesemia  Patient has Abnormal Magnesium: hypomagnesemia. Will continue to monitor electrolytes closely. Will replace the affected electrolytes and repeat labs to be done after interventions completed. The patient's magnesium results have been reviewed and are listed below.  Recent Labs   Lab 01/01/25  0349   MG 1.9      12/17 mag imrpoving - repalce mag today  12/19 replace mag today  12/20 mag oxide bid  12/21 2g Mg  12/23 dose of iv mag today to keep mag level about 2  12/25 FM:  Replace, recheck.  12/30 replace mag today    Pneumonia of right lower lobe due to infectious organism  Patient has a diagnosis of pneumonia. The cause of the pneumonia is suspected to be bacterial in etiology but organism is not known. The pneumonia is  stable. The patient has the following signs/symptoms of pneumonia: persistent hypoxia  and sputum production. The patient does have a current oxygen requirement and the patient does not have a home oxygen requirement. I have reviewed the pertinent imaging. The following cultures have been collected: Blood cultures and Sputum culture The culture results are listed below.     Current antimicrobial regimen consists of the antibiotics listed below. Will monitor patient closely and continue current treatment plan unchanged.    Antibiotics (From admission, onward)      Start     Stop Route Frequency Ordered    12/30/24 1300  erythromycin base tablet 250 mg         -- PER NG TUBE 4 times daily 12/30/24 1111    12/29/24 1125  gentamicin - pharmacy to dose         -- IV pharmacy to manage frequency 12/29/24 1025            Microbiology Results (last 7 days)       Procedure Component Value Units Date/Time    Blood culture [[id number]] Collected: 12/27/24 0837    Order Status: Completed Specimen: Blood Updated: 12/31/24 1812     Blood Culture, Routine No Growth to date      No Growth to date      No Growth to date      No Growth to date      No Growth to date    Blood culture [[id number]] Collected: 12/27/24 0838    Order Status: Completed Specimen: Blood Updated: 12/31/24 1812     Blood Culture, Routine No Growth to date      No Growth to date      No Growth to date      No Growth to date      No Growth to date    Culture, Respiratory with Gram Stain [[id number]]  (Abnormal) Collected: 12/27/24 1522    Order Status: Completed Specimen: Respiratory from Endotracheal Aspirate Updated: 12/31/24 1118     Respiratory Culture GRAM NEGATIVE FLORA  Many  Identification and susceptibility pending       Gram Stain (Respiratory) Few Gram positive cocci     Gram Stain (Respiratory) Rare Gram positive rods     Gram Stain (Respiratory) Rare WBC's        12/17 dc zosyn with recent esbl kleb in urine - will change to merrem - cont vanc until  final cultures obtained - change vent to simv; add nebs  12/18 cotn merrem and vanc - await final sputum cutlure - cont vent on ac control; nebs - wbc imrpoved slightly; lasix 20mg iv for edema today  12/19  dc vanc - proteus grwoign from sputum - cont merrem and nebs; lasix today - wean rr on vent  12/20 wnc improved - cont merrem - nebs and vent - another dose of lasix today for edema - fio2 increased  12/23 cont iv gent and merren due to sent of multiple bugs (acinetobacter/kleb/proteus)  12/24 cont current abxs  12/25 FM:  Cont GENT  12/26 FM:  Cont GENT, poor prognosis.  12/27 on gent - get new sputum culture today due to elevated wbc - cont vent and nebs  12/28/24:Gram positive cocci/rods on sputum, blood cultures negative   12/29/24:  Gram negative rods on sputum cx.   12/30 await final results of sputum cx  12/31: Tailor antibiotics based on sputum culture results  1/1/24:  tailor based on c/s    Sacral wound, sequela  Local wound care with dakins bid  Iv abxs  12/27 prob debridementt in or on 12/30    Open wound of groin  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  Recent Labs     12/30/24  0723 12/31/24  0335 01/01/25  0349   HGB 8.4* 8.0* 6.9*   HCT 25.9* 24.2* 21.6*       Plan  - Monitor serial CBC: Daily  - Transfuse PRBC if patient becomes hemodynamically unstable, symptomatic or H/H drops below 7/21.  - Patient has not received any PRBC transfusions to date  - Patient's anemia is currently worsening. Will adjust treatment as follows: 2uprbcs today  12/17 h/h Improved  12/20 h/h holding  12/26 FM:  Transfuse today.  12/27 hh improved after transfusion  12/28/24:  Continue daily monitoring.  12/30 h/h stable  12/31: Hemoglobin remained stable  1/1/24:  transfuse today    History of anoxic brain injury  12/25 FM:  Poor prognosis.      Urinary tract  infection associated with indwelling urethral catheter  Await new urine culture =- currently on zosyn/vanc  12/17 change to merrem/vanc  12/18 has esbl klebsiella - cont merrem  12/23 on merrem  12/25 FM:  Continue Gent, CS reviewed.  12/26 FM:  WBC increasing, monitor, on appropriate abx based on cultures.  12/28/24:  Blood culture negative to date, sputum culture with some gram positive cocci and rods.  Continue abx for now.  Gentamicin per pharmacy to dose.     Tailor antibiotics based on culture and sensitivity    Type 1 diabetes  Patient's FSGs are uncontrolled due to hyperglycemia on current medication regimen.  Last A1c reviewed-   Lab Results   Component Value Date    HGBA1C 6.6 (H) 12/16/2024     Most recent fingerstick glucose reviewed-   Recent Labs   Lab 12/31/24 2003 12/31/24  2340 01/01/25  0349 01/01/25  0747   POCTGLUCOSE 152* 200* 213* 182*       Current correctional scale  Low  Maintain anti-hyperglycemic dose as follows-   Antihyperglycemics (From admission, onward)      Start     Stop Route Frequency Ordered    12/30/24 0900  insulin glargine U-100 (Lantus) pen 20 Units         -- SubQ Daily 12/30/24 0734    12/24/24 0807  insulin aspart U-100 pen 0-10 Units         -- SubQ Every 4 hours PRN 12/24/24 0707          Hold Oral hypoglycemics while patient is in the hospital.  12/17 A1c improved to 6.6%  12/24 due to tpn - add lantus 12 u daily - ssi changed to moderate scale  12/25 FM:  BS logs noted, continue coverage.  12/26 FM:  Cont SSI.  12/27 increase lantus to 16u daily      VTE Risk Mitigation (From admission, onward)           Ordered     enoxaparin injection 40 mg  Daily         12/16/24 0815     IP VTE LOW RISK PATIENT  Once         12/15/24 1201     Place sequential compression device  Until discontinued         12/15/24 1201                    Discharge Planning   JOSE MANUEL:      Code Status: Full Code   Medical Readiness for Discharge Date:   Discharge Plan A: Return to nursing home    Discharge Delays: None known at this time        Cc time 30 min            Doug Riggs Jr, MD  Department of Hospital Medicine   [place] [facility]

## 2025-01-01 NOTE — ASSESSMENT & PLAN NOTE
"This patient does have evidence of infective focus  My overall impression is septic shock due to MAP < 65 and SBP < 90.  Source: Respiratory and Urinary Tract  Antibiotics given-   Antibiotics (72h ago, onward)      Start     Stop Route Frequency Ordered    12/30/24 1300  erythromycin base tablet 250 mg         -- PER NG TUBE 4 times daily 12/30/24 1111    12/29/24 1125  gentamicin - pharmacy to dose         -- IV pharmacy to manage frequency 12/29/24 1025          Latest lactate reviewed-  No results for input(s): "LACTATE", "POCLAC" in the last 72 hours.    Organ dysfunction indicated by Acute respiratory failure and Encephalopathy    Fluid challenge Ideal Body Weight- The patient's ideal body weight is Ideal body weight: 66.1 kg (145 lb 11.6 oz) which will be used to calculate fluid bolus of 30 ml/kg for treatment of septic shock.      Post- resuscitation assessment Yes Perfusion exam was performed within 6 hours of septic shock presentation after bolus shows Inadequate tissue perfusion assessed by non-invasive monitoring       Will Start Pressors- Levophed for MAP of 65  Source control achieved by: iv zosym. Vanc, ivfs    Patient has a leukocytosis.  Last trend as follows-   Lab Results   Component Value Date    WBC 16.52 (H) 01/01/2025    WBC 19.77 (H) 12/31/2024    WBC 14.32 (H) 12/30/2024 12/17 wean levophed as tolerates- abxs changed to merrem, cotn vanc - await culture final reports  12/18 wbc slightly imrpoved - cont iv abxs- await final sputum culture - on merrem fro esbl kleb in urine  12/19 cont iv merrem for esbl kleb in urine and proteus in sputum - dc vanc  12/20 wbc improvign - cotn iv merrem  12/24 wbc normal - cont iv abxs  12/25 FM:  Cont GENT.  12/26 FM:  Cont GENT, prognosis poor, pulm/gu/skin infections.  12/27 repeat bc and sputum culture due to increasing wbc - cont gent; completed 10 days of merrem  12/29/24:  Sputum with gram negative rods, continue gentamicin, susceptibility " pending   12/30 wbc improvign

## 2025-01-01 NOTE — SUBJECTIVE & OBJECTIVE
Review of Systems   Unable to perform ROS: Acuity of condition     Objective:     Vital Signs (Most Recent):  Temp: 97 °F (36.1 °C) (01/01/25 0701)  Pulse: 68 (01/01/25 0735)  Resp: 18 (01/01/25 0735)  BP: (!) 94/56 (01/01/25 0735)  SpO2: 100 % (01/01/25 0735) Vital Signs (24h Range):  Temp:  [96.5 °F (35.8 °C)-97.3 °F (36.3 °C)] 97 °F (36.1 °C)  Pulse:  [65-86] 68  Resp:  [14-36] 18  SpO2:  [100 %] 100 %  BP: ()/(44-63) 94/56     Weight: 68.5 kg (151 lb 0.2 oz)  Body mass index is 23.65 kg/m².    Intake/Output Summary (Last 24 hours) at 1/1/2025 0857  Last data filed at 1/1/2025 0515  Gross per 24 hour   Intake 2719.61 ml   Output 2150 ml   Net 569.61 ml         Physical Exam  Constitutional:       Appearance: He is ill-appearing.      Comments: Thin male; intubated   HENT:      Mouth/Throat:      Mouth: Mucous membranes are moist.   Eyes:      Pupils: Pupils are equal, round, and reactive to light.   Cardiovascular:      Rate and Rhythm: Normal rate and regular rhythm.      Heart sounds: Normal heart sounds.   Pulmonary:      Effort: Pulmonary effort is normal.      Breath sounds: Normal breath sounds. Transmitted upper airway sounds present.   Abdominal:      General: There is no distension.      Palpations: Abdomen is soft. There is no mass.      Tenderness: There is no abdominal tenderness.      Comments: Jtube noted; ileostomy noted   Genitourinary:     Comments: Rojas with yellow urine  Musculoskeletal:      Right lower leg: Edema present.      Left lower leg: Edema present.   Lymphadenopathy:      Cervical: No cervical adenopathy.   Skin:     General: Skin is warm and dry.      Comments: Wounds to sacrum; groin area             Significant Labs: All pertinent labs within the past 24 hours have been reviewed.    Significant Imaging: I have reviewed all pertinent imaging results/findings within the past 24 hours.

## 2025-01-01 NOTE — PROGRESS NOTES
"Pharmacokinetic Follow Up: Gentamicin    Assessment of levels:   Gentamicin levels: The trough concentration was exceeding target range of < 1 mcg/mL    Regimen Plan:   Draw random gent level on 1/1 at 1630    Drug levels (last 3 results):  No results for input(s): "AMIKACINPEAK", "AMIKACINTROU", "AMIKACINRAND", "AMIKACIN" in the last 72 hours.    Recent Labs   Lab Result Units 12/30/24  0543 12/31/24  0335 12/31/24  0905 12/31/24  1430 12/31/24  1709 01/01/25  0349   Gentamicin, Peak ug/mL  --   --  4.5*  --  8.1  --    Gentamicin, Random ug/mL 1.1 0.8  --   --   --  6.1   Gentamicin, Trough ug/mL  --   --   --  4.2*  --   --        No results for input(s): "TOBRA8", "TOBRA10", "TOBRA12", "TOBRARND", "TOBRAMYCIN", "TOBRAPEAK", "TOBRATROUGH", "TOBRAMYCINPE", "TOBRAMYCINRA", "TOBRAMYCINTR" in the last 72 hours.    Pharmacy will continue to monitor.    Please contact pharmacy with any questions regarding this assessment.    Thank you for the consult,   Nanci Jackson      Patient brief summary:  Carlos Chahal is a 34 y.o. male initiated on aminoglycoside therapy for treatment of lower respiratory infection    Drug Allergies:   Review of patient's allergies indicates:   Allergen Reactions    Guaifenesin Hives    Codeine Itching       Actual Body Weight:   68.5kg    Adjust Body Weight:   67.1kg    Ideal Body Weight:  66.1kg    Renal Function:   Estimated Creatinine Clearance: 61.4 mL/min (A) (based on SCr of 1.6 mg/dL (H)).,     Dialysis Method (if applicable):  N/A    CBC (last 72 hours):  Recent Labs   Lab Result Units 12/30/24  0723 12/31/24  0335 01/01/25  0349   WBC K/uL 14.32* 19.77* 16.52*   Hemoglobin g/dL 8.4* 8.0* 6.9*   Hematocrit % 25.9* 24.2* 21.6*   Platelets K/uL 141* 157 121*   Gran % % 35.4* 74.1* 71.7   Lymph % % 3.0* 5.2* 5.1*   Mono % % 10.1 10.5 11.7   Eosinophil % % 7.1 8.3* 9.8*   Basophil % % 0.0 0.6 0.5   Differential Method  Manual Automated Automated       Metabolic Panel (last 72 " hours):  Recent Labs   Lab Result Units 12/30/24  0543 12/31/24  0335 01/01/25  0349   Sodium mmol/L 130* 129* 128*   Potassium mmol/L 3.8 4.2 4.2   Chloride mmol/L 97 97 96   CO2 mmol/L 33* 30* 30*   Glucose mg/dL 190* 187* 205*   BUN mg/dL 57* 66* 72*   Creatinine mg/dL 1.6* 1.6* 1.6*   Albumin g/dL 0.6* 0.6* <0.6*   Total Bilirubin mg/dL 0.4 0.9 0.9   Alkaline Phosphatase U/L 471* 680* 821*   AST U/L 44* 105* 121*   ALT U/L 15 22 33   Magnesium mg/dL 1.8 1.9 1.9       Aminoglycoside Administrations:  aminoglycosides given in last 96 hours                     gentamicin (GARAMYCIN) 134.4 mg in 0.9% NaCl 100 mL IVPB (mg) 134.4 mg New Bag 12/31/24 1603    gentamicin (GARAMYCIN) 112.4 mg in 0.9% NaCl 100 mL IVPB (mg) 112.4 mg New Bag 12/31/24 0732                    Microbiologic Results:  Microbiology Results (last 7 days)       Procedure Component Value Units Date/Time    Blood culture [[id number]] Collected: 12/27/24 0837    Order Status: Completed Specimen: Blood Updated: 12/31/24 1812     Blood Culture, Routine No Growth to date      No Growth to date      No Growth to date      No Growth to date      No Growth to date    Blood culture [[id number]] Collected: 12/27/24 0838    Order Status: Completed Specimen: Blood Updated: 12/31/24 1812     Blood Culture, Routine No Growth to date      No Growth to date      No Growth to date      No Growth to date      No Growth to date    Culture, Respiratory with Gram Stain [[id number]]  (Abnormal) Collected: 12/27/24 1522    Order Status: Completed Specimen: Respiratory from Endotracheal Aspirate Updated: 12/31/24 1118     Respiratory Culture GRAM NEGATIVE FLORA  Many  Identification and susceptibility pending       Gram Stain (Respiratory) Few Gram positive cocci     Gram Stain (Respiratory) Rare Gram positive rods     Gram Stain (Respiratory) Rare WBC's

## 2025-01-01 NOTE — ASSESSMENT & PLAN NOTE
Patient's FSGs are uncontrolled due to hyperglycemia on current medication regimen.  Last A1c reviewed-   Lab Results   Component Value Date    HGBA1C 6.6 (H) 12/16/2024     Most recent fingerstick glucose reviewed-   Recent Labs   Lab 12/31/24 2003 12/31/24  2340 01/01/25  0349 01/01/25  0747   POCTGLUCOSE 152* 200* 213* 182*       Current correctional scale  Low  Maintain anti-hyperglycemic dose as follows-   Antihyperglycemics (From admission, onward)    Start     Stop Route Frequency Ordered    12/30/24 0900  insulin glargine U-100 (Lantus) pen 20 Units         -- SubQ Daily 12/30/24 0734    12/24/24 0807  insulin aspart U-100 pen 0-10 Units         -- SubQ Every 4 hours PRN 12/24/24 0707        Hold Oral hypoglycemics while patient is in the hospital.  12/17 A1c improved to 6.6%  12/24 due to tpn - add lantus 12 u daily - ssi changed to moderate scale  12/25 FM:  BS logs noted, continue coverage.  12/26 FM:  Cont SSI.  12/27 increase lantus to 16u daily

## 2025-01-01 NOTE — ASSESSMENT & PLAN NOTE
Patient has Abnormal Magnesium: hypomagnesemia. Will continue to monitor electrolytes closely. Will replace the affected electrolytes and repeat labs to be done after interventions completed. The patient's magnesium results have been reviewed and are listed below.  Recent Labs   Lab 01/01/25  0349   MG 1.9      12/17 mag imrpoving - repalce mag today  12/19 replace mag today  12/20 mag oxide bid  12/21 2g Mg  12/23 dose of iv mag today to keep mag level about 2  12/25 FM:  Replace, recheck.  12/30 replace mag today

## 2025-01-01 NOTE — NURSING
Pt remains on the vent, setting changed per Dr. Rebollar with EICU. O2 100%. Breathing over the vent setting. No further leakage from ileostomy. Wound vac maintained at 125 mm Hg. TPN and Diprivan infusing JADEN PICC. Glucerna feeding as ordered. No residuals. Glucose monitoring and insulin coverage as ordered. Afebrile this shift. Continue to observe.

## 2025-01-02 NOTE — PROGRESS NOTES
"Pharmacokinetic Follow Up: Gentamicin    Assessment of levels:   Exceeds goal trough of 1mcg/ml    Regimen Plan:   Draw random 1/3 at 04:30    Drug levels (last 3 results):  No results for input(s): "AMIKACINPEAK", "AMIKACINTROU", "AMIKACINRAND", "AMIKACIN" in the last 72 hours.    Recent Labs   Lab Result Units 12/31/24  0905 12/31/24  1430 12/31/24  1709 01/01/25  0349 01/01/25  1708 01/02/25  0342   Gentamicin, Peak ug/mL 4.5*  --  8.1  --   --   --    Gentamicin, Random ug/mL  --   --   --  6.1 4.6 3.6   Gentamicin, Trough ug/mL  --  4.2*  --   --   --   --        No results for input(s): "TOBRA8", "TOBRA10", "TOBRA12", "TOBRARND", "TOBRAMYCIN", "TOBRAPEAK", "TOBRATROUGH", "TOBRAMYCINPE", "TOBRAMYCINRA", "TOBRAMYCINTR" in the last 72 hours.    Pharmacy will continue to monitor.    Please contact pharmacy with any questions regarding this assessment.    Thank you for the consult,   Tima Morris      Patient brief summary:  Carlos Chahal is a 34 y.o. male initiated on aminoglycoside therapy for treatment of  pneumonia    Drug Allergies:   Review of patient's allergies indicates:   Allergen Reactions    Guaifenesin Hives    Codeine Itching       Actual Body Weight:   68.5kg    Adjust Body Weight:   67.1kg    Ideal Body Weight:  66.1kg    Renal Function:   Estimated Creatinine Clearance: 54.6 mL/min (A) (based on SCr of 1.8 mg/dL (H)).,     Dialysis Method (if applicable):  N/A    CBC (last 72 hours):  Recent Labs   Lab Result Units 12/30/24  0723 12/31/24  0335 01/01/25  0349 01/02/25  0342   WBC K/uL 14.32* 19.77* 16.52* 15.05*   Hemoglobin g/dL 8.4* 8.0* 6.9* 8.7*   Hematocrit % 25.9* 24.2* 21.6* 26.0*   Platelets K/uL 141* 157 121* 166   Gran % % 35.4* 74.1* 71.7 72.8   Lymph % % 3.0* 5.2* 5.1* 6.2*   Mono % % 10.1 10.5 11.7 11.2   Eosinophil % % 7.1 8.3* 9.8* 7.2   Basophil % % 0.0 0.6 0.5 0.5   Differential Method  Manual Automated Automated Automated       Metabolic Panel (last 72 hours):  Recent Labs "   Lab Result Units 12/30/24  0543 12/31/24  0335 01/01/25  0349 01/02/25  0342   Sodium mmol/L 130* 129* 128* 128*   Potassium mmol/L 3.8 4.2 4.2 4.8   Chloride mmol/L 97 97 96 96   CO2 mmol/L 33* 30* 30* 28   Glucose mg/dL 190* 187* 205* 108   BUN mg/dL 57* 66* 72* 73*   Creatinine mg/dL 1.6* 1.6* 1.6* 1.8*   Albumin g/dL 0.6* 0.6* <0.6* <0.6*   Total Bilirubin mg/dL 0.4 0.9 0.9 1.3*   Alkaline Phosphatase U/L 471* 680* 821* 1,265*   AST U/L 44* 105* 121* 176*   ALT U/L 15 22 33 57*   Magnesium mg/dL 1.8 1.9 1.9 1.8       Aminoglycoside Administrations:  aminoglycosides given in last 96 hours                     gentamicin (GARAMYCIN) 134.4 mg in 0.9% NaCl 100 mL IVPB (mg) 134.4 mg New Bag 12/31/24 1603    gentamicin (GARAMYCIN) 112.4 mg in 0.9% NaCl 100 mL IVPB (mg) 112.4 mg New Bag 12/31/24 0732                    Microbiologic Results:  Microbiology Results (last 7 days)       Procedure Component Value Units Date/Time    Blood culture [[id number]] Collected: 12/27/24 0837    Order Status: Completed Specimen: Blood Updated: 01/01/25 1812     Blood Culture, Routine No growth after 5 days.    Blood culture [[id number]] Collected: 12/27/24 0838    Order Status: Completed Specimen: Blood Updated: 01/01/25 1812     Blood Culture, Routine No growth after 5 days.    Culture, Respiratory with Gram Stain [[id number]]  (Abnormal) Collected: 12/27/24 1522    Order Status: Completed Specimen: Respiratory from Endotracheal Aspirate Updated: 12/31/24 1118     Respiratory Culture GRAM NEGATIVE FLORA  Many  Identification and susceptibility pending       Gram Stain (Respiratory) Few Gram positive cocci     Gram Stain (Respiratory) Rare Gram positive rods     Gram Stain (Respiratory) Rare WBC's

## 2025-01-02 NOTE — NURSING
RR increased Upper 30's-40. HR increased to mid 100's was upper 70's to lower 80's. Temp 102.0 axillary.

## 2025-01-02 NOTE — NURSING
Soft B/P readings with slight increase in Diprivan with increase RR. Levo restarted to get MAP >/= 65-75. Improvement with temp. 100.4 ax. Feeding, TPN, IVF and Diprivan maintained. Rojas and wound vac in place. Continue to observe.

## 2025-01-02 NOTE — ASSESSMENT & PLAN NOTE
Local wound care with dakins bid  Iv abxs  12/27 prob debridementt in or on 12/30 1/2/25 sp debridement =- wound vac in place

## 2025-01-02 NOTE — ASSESSMENT & PLAN NOTE
Patient has Abnormal Magnesium: hypomagnesemia. Will continue to monitor electrolytes closely. Will replace the affected electrolytes and repeat labs to be done after interventions completed. The patient's magnesium results have been reviewed and are listed below.  Recent Labs   Lab 01/02/25  0342   MG 1.8      12/17 mag imrpoving - repalce mag today  12/19 replace mag today  12/20 mag oxide bid  12/21 2g Mg  12/23 dose of iv mag today to keep mag level about 2  12/25 FM:  Replace, recheck.  12/30 replace mag today

## 2025-01-02 NOTE — ASSESSMENT & PLAN NOTE
"This patient does have evidence of infective focus  My overall impression is septic shock due to MAP < 65 and SBP < 90.  Source: Respiratory and Urinary Tract  Antibiotics given-   Antibiotics (72h ago, onward)      Start     Stop Route Frequency Ordered    12/30/24 1300  erythromycin base tablet 250 mg         -- PER NG TUBE 4 times daily 12/30/24 1111    12/29/24 1125  gentamicin - pharmacy to dose         -- IV pharmacy to manage frequency 12/29/24 1025          Latest lactate reviewed-  No results for input(s): "LACTATE", "POCLAC" in the last 72 hours.    Organ dysfunction indicated by Acute respiratory failure and Encephalopathy    Fluid challenge Ideal Body Weight- The patient's ideal body weight is Ideal body weight: 66.1 kg (145 lb 11.6 oz) which will be used to calculate fluid bolus of 30 ml/kg for treatment of septic shock.      Post- resuscitation assessment Yes Perfusion exam was performed within 6 hours of septic shock presentation after bolus shows Inadequate tissue perfusion assessed by non-invasive monitoring       Will Start Pressors- Levophed for MAP of 65  Source control achieved by: iv zosym. Vanc, ivfs    Patient has a leukocytosis.  Last trend as follows-   Lab Results   Component Value Date    WBC 15.05 (H) 01/02/2025    WBC 16.52 (H) 01/01/2025    WBC 19.77 (H) 12/31/2024 12/17 wean levophed as tolerates- abxs changed to merrem, cotn vanc - await culture final reports  12/18 wbc slightly imrpoved - cont iv abxs- await final sputum culture - on merrem fro esbl kleb in urine  12/19 cont iv merrem for esbl kleb in urine and proteus in sputum - dc vanc  12/20 wbc improvign - cotn iv merrem  12/24 wbc normal - cont iv abxs  12/25 FM:  Cont GENT.  12/26 FM:  Cont GENT, prognosis poor, pulm/gu/skin infections.  12/27 repeat bc and sputum culture due to increasing wbc - cont gent; completed 10 days of merrem  12/29/24:  Sputum with gram negative rods, continue gentamicin, susceptibility " pending   12/30 wbc improvign  1/2/25 wbc improved but had fever overnight - repeat bcx 2 today - cont iv abxs - back on levophed for bp control

## 2025-01-02 NOTE — ASSESSMENT & PLAN NOTE
Anemia is likely due to chronic infections Most recent hemoglobin and hematocrit are listed below.  Recent Labs     12/31/24  0335 01/01/25  0349 01/02/25  0342   HGB 8.0* 6.9* 8.7*   HCT 24.2* 21.6* 26.0*       Plan  - Monitor serial CBC: Daily  - Transfuse PRBC if patient becomes hemodynamically unstable, symptomatic or H/H drops below 7/21.  - Patient has not received any PRBC transfusions to date  - Patient's anemia is currently worsening. Will adjust treatment as follows: 2uprbcs today  12/17 h/h Improved  12/20 h/h holding  12/26 FM:  Transfuse today.  12/27 hh improved after transfusion  12/28/24:  Continue daily monitoring.  12/30 h/h stable  12/31: Hemoglobin remained stable  1/1/24:  transfuse today  1/2/25 h/h imp[roved after transfusion

## 2025-01-02 NOTE — NURSING
Remains on the vent. Setting unchanged for now. Breathing over the set rate. Diprivan, TPN, IVF and Feeding maintained. Rojas and wound vac in place. 250 ml per ileostomy, 125 ml residual. Afebrrile. VSS. Bleeding with oral care, small amount. Continue to observe.

## 2025-01-02 NOTE — ASSESSMENT & PLAN NOTE
Nutrition consulted. Most recent weight and BMI monitored-     Measurements:  Wt Readings from Last 1 Encounters:   12/16/24 68.5 kg (151 lb 0.2 oz)   Body mass index is 23.65 kg/m².    Patient has been screened and assessed by RD.    Malnutrition Type:  Context:    Level:      Malnutrition Characteristic Summary:       Interventions/Recommendations (treatment strategy):  1. Rec'd Continuous EN: Glucerna 1.5 @ 40mL/r increasing by 5 mL q6hrs to goal rate of 55mL/hr with a continuous 40mL FWF to provide 1980kcal (94% EEN), 109g of protein (100% EPN), and 1962mL FW.   2. Néstor BID via PEG tube to promote wound healing and to provide additional nutrition.           - Do not mix Nsétor with formula in a tube-feeding bag         - Pour one packet of Néstor in a clean, small container for mixing.           - Add 4 fl oz (120 mL) water at room temperature.           - Mix well with disposable spoon or tongue blade until all particles are completely hydrated.           - Verify correct tube placement.           - Flush feeding tube with 30 mL water.           -Administer Néstor through feeding tube using a 60-mL or larger syringe.           - Flush with an additional 30 mL water.  3. Rec'd ClinimixE 5/20 @ 80mL/hr to provide 1690kcal (80% EEN), 96g of protein (88% EPN), and 1920mL TFV.      -Add 250mL of 20% lipids 3x/week to provide additional calories.      -Check Triglycerides before adding lipids. 4. RD to follow and make rec's accordingly.    12/18 restart tfs today at 1ml/hr to see if can tolerate feeds  12/19 increase tfs as tolerates  12/23 not tolerating tfs- will get tpn orders and start that until can tolerate tfs better - ncrease regaln 10 mg iv q 6hrs  12/24 con ttfs as tolerates - started on tpn for further nutritional support  12/25 FM:  Check KUB.  12/26 FM:  Resume TF today, monitor.  12/27 on tpn - not tolerating tfs at this time  12/28/24:  Albumin infusion today.   12/31:  tube feeds as tolerated    Patient  has protein malnutrition.  Last trend as follows-   Lab Results   Component Value Date    ALBUMIN <0.6 (L) 01/02/2025    ALBUMIN <0.6 (L) 01/01/2025    ALBUMIN 0.6 (L) 12/31/2024 1/2/25 tolerating tfs better with dibhoff tube - cont tpn and IL for now

## 2025-01-02 NOTE — PROGRESS NOTES
Pharmacist Renal Dose Adjustment Note    Carlos Chahal is a 34 y.o. male being treated with the medication famotidine    Patient Data:    Vital Signs (Most Recent):  Temp: 98 °F (36.7 °C) (01/02/25 0702)  Pulse: 104 (01/02/25 0718)  Resp: (!) 33 (01/02/25 0718)  BP: (!) 90/55 (01/02/25 0715)  SpO2: 100 % (01/02/25 0718) Vital Signs (72h Range):  Temp:  [95.4 °F (35.2 °C)-102 °F (38.9 °C)]   Pulse:  []   Resp:  [14-41]   BP: ()/(41-72)   SpO2:  [98 %-100 %]      Recent Labs   Lab 12/31/24  0335 01/01/25  0349 01/02/25  0342   CREATININE 1.6* 1.6* 1.8*     Serum creatinine: 1.8 mg/dL (H) 01/02/25 0342  Estimated creatinine clearance: 54.6 mL/min (A)    Medication:famotidine dose: 20 mg frequency bid will be changed to medication:famotidine dose:20 mg frequency:daily    Pharmacist's Name: Lin Ruiz  Pharmacist's Extension: [phone]

## 2025-01-02 NOTE — ANESTHESIA POSTPROCEDURE EVALUATION
Anesthesia Post Evaluation    Patient: Carlos Chahal    Procedure(s) Performed: Procedure(s) (LRB):  DEBRIDEMENT, PRESSURE ULCER (N/A)    Final Anesthesia Type: general      Patient location during evaluation: ICU  Patient participation: Yes- Able to Participate  Level of consciousness: sedated  Post-procedure vital signs: reviewed and not stable  Pain management: adequate  Airway patency: patent    PONV status at discharge: No PONV  Anesthetic complications: no      Cardiovascular status: hypotensive  Respiratory status: ETT  Hydration status: euvolemic  Follow-up not needed.              Vitals Value Taken Time   BP 86/51 01/02/25 0925   Temp 36.7 °C (98 °F) 01/02/25 0702   Pulse 100 01/02/25 0928   Resp 31 01/02/25 0928   SpO2 100 % 01/02/25 0928   Vitals shown include unfiled device data.      No case tracking events are documented in the log.      Pain/Gaurang Score: Pain Rating Prior to Med Admin: 0 (1/2/2025  4:07 AM)  Pain Rating Post Med Admin: 0 (1/2/2025  5:07 AM)

## 2025-01-02 NOTE — CARE UPDATE
Latest Reference Range & Units 01/02/25 05:58   POC PH 7.345 - 7.450  7.384   POC PCO2 35.0 - 45.0 mmHg 43.3   POC PO2 80.0 - 100 mmHg 42.3 (LL)   POC HCO3 24.0 - 28.0 mmol/l 24.7   POC SATURATED O2 95.0 - 100.0 % 78.4 (LL)   (LL): Data is critically low       01/02/25 0601   PRE-TX-O2   Device (Oxygen Therapy) ventilator   Oxygen Concentration (%) (S)  60  (after abg sO2 result of 78%, increased O2)       After abg results, increased O2 to 60% from 30%.    RN aware.

## 2025-01-02 NOTE — ASSESSMENT & PLAN NOTE
Complicated by what appears to be gastroparesis.  We will attempt nasal feeding tube into his small intestines to start post pyloric feeds.  Unable to progress it enough to get post pyloric.  Likely G tube needs to be converted to G-J but just beginning to tolerate feeds  Continue Reglan and Erythromycin

## 2025-01-02 NOTE — TRANSFER OF CARE
"Anesthesia Transfer of Care Note    Patient: Carlos Chahal    Procedure(s) Performed: Procedure(s) (LRB):  DEBRIDEMENT, PRESSURE ULCER (N/A)    Patient location: ICU    Anesthesia Type: general    Transport from OR: Transported from OR on room air with adequate spontaneous ventilation. Continuous ECG monitoring in transport. Continuous SpO2 monitoring in transport. Continuos invasive BP monitoring in transport. Transported from OR intubated on 100% O2 by AMBU with adequate controlled ventilation    Post pain: adequate analgesia    Post assessment: no apparent anesthetic complications    Post vital signs: stable    Level of consciousness: sedated    Nausea/Vomiting: no nausea/vomiting    Complications: none    Transfer of care protocol was followed      Last vitals: Visit Vitals  BP (!) 92/55   Pulse 104   Temp 36.7 °C (98 °F)   Resp (!) 34   Ht 5' 7" (1.702 m)   Wt 68.5 kg (151 lb 0.2 oz)   SpO2 100%   BMI 23.65 kg/m²     "

## 2025-01-02 NOTE — PROGRESS NOTES
[facility], [place]  General Surgery  Progress Note  1/2/25    Subjective:     History of Present Illness:  Patient admitted for sepsis with UTI and pneumonia with multiple noted wounds    Post-Op Info:  Procedure(s) (LRB):  DEBRIDEMENT, PRESSURE ULCER (N/A)   2 Days Post-Op     Interval History:  Patient has had a decline in his vascular status and required restarting of his Levophed.  Decreased urine output reported per nursing staff.  He was tolerating tube feeds at 30/hour but they have been discontinued pending an ultrasound for elevated liver functions.    Medications:  Continuous Infusions:   0.9% NaCl   Intravenous Continuous 50 mL/hr at 01/02/25 1616 Rate Verify at 01/02/25 1616    Amino acid 5% - dextrose 20% (CLINIMIX-E) solution with additives. CENTRAL LINE ONLY (1650mOsm/L)  80 mL/hr Intravenous Continuous 80 mL/hr at 01/02/25 1616 Rate Verify at 01/02/25 1616    D10W   Intravenous Continuous PRN        NORepinephrine bitartrate-D5W  0-3 mcg/kg/min Intravenous Continuous 12.8 mL/hr at 01/02/25 1616 0.05 mcg/kg/min at 01/02/25 1616     Scheduled Meds:   albuterol sulfate  2.5 mg Nebulization Q4H WAKE    aspirin  81 mg Per G Tube Daily    bisacodyL  10 mg Other Daily    collagenase   Topical (Top) Daily    enoxparin  40 mg Subcutaneous Daily    erythromycin base  250 mg Per NG tube QID    [START ON 1/3/2025] famotidine  20 mg Per G Tube Daily    FLUoxetine  20 mg Per G Tube Daily    furosemide (LASIX) injection  20 mg Intravenous Q12H    insulin glargine U-100  20 Units Subcutaneous Daily    levothyroxine  150 mcg Per G Tube Before breakfast    lurasidone  40 mg Per G Tube Daily    magnesium oxide  400 mg Per G Tube BID    metoclopramide  10 mg Intravenous Q6H    sodium hypochlorite 0.125%   Topical (Top) Daily     PRN Meds:  Current Facility-Administered Medications:     0.9%  NaCl infusion (for blood administration), , Intravenous, Q24H PRN    acetaminophen, 650 mg, Per G Tube, Q6H PRN    D10W, ,  Intravenous, Continuous PRN    dextrose 50%, 12.5 g, Intravenous, PRN    dextrose 50%, 25 g, Intravenous, PRN    gentamicin - pharmacy to dose, , Intravenous, pharmacy to manage frequency    glucagon (human recombinant), 1 mg, Intramuscular, PRN    insulin aspart U-100, 0-10 Units, Subcutaneous, Q4H PRN    melatonin, 6 mg, Per G Tube, Nightly PRN    midazolam, 2 mg, Intravenous, Q1H PRN    propofoL, 0-50 mcg/kg/min, Intravenous, Daily PRN    sodium chloride 0.9%, 10 mL, Intravenous, PRN    Flushing PICC/Midline Protocol, , , Until Discontinued **AND** sodium chloride 0.9%, 10 mL, Intravenous, Q12H PRN     Review of patient's allergies indicates:   Allergen Reactions    Guaifenesin Hives    Codeine Itching     Objective:     Vital Signs (Most Recent):  Temp: 99 °F (37.2 °C) (01/02/25 1501)  Pulse: 91 (01/02/25 1530)  Resp: (!) 26 (01/02/25 1530)  BP: (!) 98/57 (01/02/25 1530)  SpO2: 100 % (01/02/25 1530) Vital Signs (24h Range):  Temp:  [97 °F (36.1 °C)-102 °F (38.9 °C)] 99 °F (37.2 °C)  Pulse:  [] 91  Resp:  [24-41] 26  SpO2:  [98 %-100 %] 100 %  BP: ()/(41-58) 98/57     Weight: 68.5 kg (151 lb 0.2 oz)  Body mass index is 23.65 kg/m².    Intake/Output - Last 3 Shifts         12/31 0700  01/01 0659 01/01 0700  01/02 0659 01/02 0700  01/03 0659    P.O. 120 0     I.V. (mL/kg) 252.7 (3.7) 1200 (17.5) 738.8 (10.8)    Blood  335.8     NG/ 868 85    IV Piggyback 200      TPN 1839.9 2204 833.4    Total Intake(mL/kg) 2719.6 (39.7) 4607.8 (67.3) 1657.2 (24.2)    Urine (mL/kg/hr) 1350 (0.8) 1075 (0.7) 125 (0.2)    Drains 100      Other 75 25     Stool 625 775 100    Total Output 2150 1875 225    Net +569.6 +2732.8 +1432.2                    Physical Exam  Vitals and nursing note reviewed.   Constitutional:       General: He is not in acute distress.     Appearance: He is ill-appearing.      Comments: Thin male; intubated   HENT:      Head: Normocephalic and atraumatic.      Nose:      Comments: NEFT in place  L nare     Mouth/Throat:      Mouth: Mucous membranes are moist.   Eyes:      Pupils: Pupils are equal, round, and reactive to light.   Cardiovascular:      Rate and Rhythm: Normal rate and regular rhythm.   Pulmonary:      Effort: Prolonged expiration present. No respiratory distress.      Breath sounds: Transmitted upper airway sounds present. Rhonchi and rales present.      Comments: Decreased breath sounds at lung bases bilaterally  Abdominal:      General: There is no distension.      Palpations: Abdomen is soft.      Tenderness: There is no abdominal tenderness.      Comments: G tube noted; Ostomy L mid abdomen   Genitourinary:     Comments: Rojas with yellow urine  Musculoskeletal:         General: No swelling.      Cervical back: Normal range of motion and neck supple.   Skin:     General: Skin is warm and dry.      Comments: Wounds to sacrum; groin area as noted on media images  Vac intact per nursing          Significant Labs:  I have reviewed all pertinent lab results within the past 24 hours.  CBC:   Recent Labs   Lab 01/02/25  0342   WBC 15.05*   RBC 2.99*   HGB 8.7*   HCT 26.0*      MCV 87   MCH 29.1   MCHC 33.5     BMP:   Recent Labs   Lab 01/02/25  0342      *   K 4.8   CL 96   CO2 28   BUN 73*   CREATININE 1.8*   CALCIUM 10.0   MG 1.8     CMP:   Recent Labs   Lab 01/02/25  0342      CALCIUM 10.0   ALBUMIN <0.6*   PROT 6.2   *   K 4.8   CO2 28   CL 96   BUN 73*   CREATININE 1.8*   ALKPHOS 1,265*   ALT 57*   *   BILITOT 1.3*       Significant Diagnostics:  I have reviewed all pertinent imaging results/findings within the past 24 hours.  CXR: I have reviewed all pertinent results/findings within the past 24 hours and my personal findings are:  Right middle lobe and right lower lobe opacification/consolidation  Assessment/Plan:     * Sepsis  Hemodynamic instability again with requirement of pressor support    Severe malnutrition  Complicated by what appears to be  gastroparesis.  We will attempt nasal feeding tube into his small intestines to start post pyloric feeds.  Unable to progress it enough to get post pyloric.  Likely G tube needs to be converted to G-J but just beginning to tolerate feeds  Continue Reglan and Erythromycin    Pneumonia of right lower lobe due to infectious organism  Antibiotics and vent support per primary  Likely needs trach since unable to progress to spontaneous ventilation  CXR concerning for RLL infiltrate/atelectasis needing CPT  Add mucolytic agent due to continued poor breath sounds    Sacral wound, sequela  Continue VAC therapy   Plan for changes tomorrow at the bedside    Open wound of groin  Wound care written    Urinary tract infection associated with indwelling urethral catheter  Per primary    Type 1 diabetes  Elevated sugars.  Management per primary        Francoise Diaz MD  General Surgery  [facility]

## 2025-01-02 NOTE — ASSESSMENT & PLAN NOTE
Lasix 20mg iv bid - monitor I/o  12/30 decreae lasix to daily - edema has imrpoved  1/2/25 give extra of lasix today due to edema

## 2025-01-02 NOTE — ASSESSMENT & PLAN NOTE
Patient's FSGs are uncontrolled due to hyperglycemia on current medication regimen.  Last A1c reviewed-   Lab Results   Component Value Date    HGBA1C 6.6 (H) 12/16/2024     Most recent fingerstick glucose reviewed-   Recent Labs   Lab 01/01/25  1631 01/01/25  1929 01/01/25  2340 01/02/25  0341   POCTGLUCOSE 221* 217* 176* 115*       Current correctional scale  Low  Maintain anti-hyperglycemic dose as follows-   Antihyperglycemics (From admission, onward)    Start     Stop Route Frequency Ordered    12/30/24 0900  insulin glargine U-100 (Lantus) pen 20 Units         -- SubQ Daily 12/30/24 0734    12/24/24 0807  insulin aspart U-100 pen 0-10 Units         -- SubQ Every 4 hours PRN 12/24/24 0707        Hold Oral hypoglycemics while patient is in the hospital.  12/17 A1c improved to 6.6%  12/24 due to tpn - add lantus 12 u daily - ssi changed to moderate scale  12/25 FM:  BS logs noted, continue coverage.  12/26 FM:  Cont SSI.  12/27 increase lantus to 16u daily

## 2025-01-02 NOTE — ASSESSMENT & PLAN NOTE
Patient's most recent potassium results are listed below.   Recent Labs     12/31/24  0335 01/01/25  0349 01/02/25  0342   K 4.2 4.2 4.8       Plan  - Replete potassium per protocol  - Monitor potassium Daily  - Patient's hypokalemia is stable, continue below and monitor  12/20 increase pot bicarb to bid  12/23 decrease pot bicard to daily dosing  12/24 improved- stop potassium bicarb repalcement - monitor

## 2025-01-02 NOTE — PLAN OF CARE
Patient remains intubated and sedated. No fever this shift. Pt was tachypnic most of the shift, but this evening rate back down into the 20's. Held tube feeds most of the day due to pending abdominal ultrasound. Wound vac dressing reinforced during pt's bath. Blood cultures repeated x2 today. Levophed currently off at this time, will continue to monitor how pt tolerates. Urine output= 125mls for shift. Dr Diamond and Dr Diaz aware.

## 2025-01-02 NOTE — ASSESSMENT & PLAN NOTE
Antibiotics and vent support per primary  Likely needs trach since unable to progress to spontaneous ventilation  CXR concerning for RLL infiltrate/atelectasis needing CPT  Add mucolytic agent due to continued poor breath sounds

## 2025-01-02 NOTE — SUBJECTIVE & OBJECTIVE
Review of Systems   Unable to perform ROS: Acuity of condition     Objective:     Vital Signs (Most Recent):  Temp: 98 °F (36.7 °C) (01/02/25 0702)  Pulse: 104 (01/02/25 0718)  Resp: (!) 33 (01/02/25 0718)  BP: (!) 90/55 (01/02/25 0715)  SpO2: 100 % (01/02/25 0718) Vital Signs (24h Range):  Temp:  [95.4 °F (35.2 °C)-102 °F (38.9 °C)] 98 °F (36.7 °C)  Pulse:  [] 104  Resp:  [17-41] 33  SpO2:  [98 %-100 %] 100 %  BP: ()/(41-58) 90/55     Weight: 68.5 kg (151 lb 0.2 oz)  Body mass index is 23.65 kg/m².    Intake/Output Summary (Last 24 hours) at 1/2/2025 0734  Last data filed at 1/2/2025 0731  Gross per 24 hour   Intake 4859.14 ml   Output 1875 ml   Net 2984.14 ml         Physical Exam  Constitutional:       Appearance: He is ill-appearing.      Comments: Thin male; intubated   HENT:      Mouth/Throat:      Mouth: Mucous membranes are moist.   Eyes:      Pupils: Pupils are equal, round, and reactive to light.   Cardiovascular:      Rate and Rhythm: Normal rate and regular rhythm.      Heart sounds: Normal heart sounds.   Pulmonary:      Effort: Pulmonary effort is normal.      Breath sounds: Normal breath sounds. Transmitted upper airway sounds present.   Abdominal:      General: There is no distension.      Palpations: Abdomen is soft. There is no mass.      Tenderness: There is no abdominal tenderness.      Comments: Jtube noted; ileostomy noted   Genitourinary:     Comments: Rojas with yellow urine  Musculoskeletal:      Right lower leg: Edema present.      Left lower leg: Edema present.   Lymphadenopathy:      Cervical: No cervical adenopathy.   Skin:     General: Skin is warm and dry.      Comments: Wounds to sacrum with wound vac in place; groin area             Significant Labs: All pertinent labs within the past 24 hours have been reviewed.    Significant Imaging: I have reviewed all pertinent imaging results/findings within the past 24 hours.

## 2025-01-02 NOTE — SUBJECTIVE & OBJECTIVE
Interval History:  Patient has had a decline in his vascular status and required restarting of his Levophed.  Decreased urine output reported per nursing staff.  He was tolerating tube feeds at 30/hour but they have been discontinued pending an ultrasound for elevated liver functions.    Medications:  Continuous Infusions:   0.9% NaCl   Intravenous Continuous 50 mL/hr at 01/02/25 1616 Rate Verify at 01/02/25 1616    Amino acid 5% - dextrose 20% (CLINIMIX-E) solution with additives. CENTRAL LINE ONLY (1650mOsm/L)  80 mL/hr Intravenous Continuous 80 mL/hr at 01/02/25 1616 Rate Verify at 01/02/25 1616    D10W   Intravenous Continuous PRN        NORepinephrine bitartrate-D5W  0-3 mcg/kg/min Intravenous Continuous 12.8 mL/hr at 01/02/25 1616 0.05 mcg/kg/min at 01/02/25 1616     Scheduled Meds:   albuterol sulfate  2.5 mg Nebulization Q4H WAKE    aspirin  81 mg Per G Tube Daily    bisacodyL  10 mg Other Daily    collagenase   Topical (Top) Daily    enoxparin  40 mg Subcutaneous Daily    erythromycin base  250 mg Per NG tube QID    [START ON 1/3/2025] famotidine  20 mg Per G Tube Daily    FLUoxetine  20 mg Per G Tube Daily    furosemide (LASIX) injection  20 mg Intravenous Q12H    insulin glargine U-100  20 Units Subcutaneous Daily    levothyroxine  150 mcg Per G Tube Before breakfast    lurasidone  40 mg Per G Tube Daily    magnesium oxide  400 mg Per G Tube BID    metoclopramide  10 mg Intravenous Q6H    sodium hypochlorite 0.125%   Topical (Top) Daily     PRN Meds:  Current Facility-Administered Medications:     0.9%  NaCl infusion (for blood administration), , Intravenous, Q24H PRN    acetaminophen, 650 mg, Per G Tube, Q6H PRN    D10W, , Intravenous, Continuous PRN    dextrose 50%, 12.5 g, Intravenous, PRN    dextrose 50%, 25 g, Intravenous, PRN    gentamicin - pharmacy to dose, , Intravenous, pharmacy to manage frequency    glucagon (human recombinant), 1 mg, Intramuscular, PRN    insulin aspart U-100, 0-10 Units,  Subcutaneous, Q4H PRN    melatonin, 6 mg, Per G Tube, Nightly PRN    midazolam, 2 mg, Intravenous, Q1H PRN    propofoL, 0-50 mcg/kg/min, Intravenous, Daily PRN    sodium chloride 0.9%, 10 mL, Intravenous, PRN    Flushing PICC/Midline Protocol, , , Until Discontinued **AND** sodium chloride 0.9%, 10 mL, Intravenous, Q12H PRN     Review of patient's allergies indicates:   Allergen Reactions    Guaifenesin Hives    Codeine Itching     Objective:     Vital Signs (Most Recent):  Temp: 99 °F (37.2 °C) (01/02/25 1501)  Pulse: 91 (01/02/25 1530)  Resp: (!) 26 (01/02/25 1530)  BP: (!) 98/57 (01/02/25 1530)  SpO2: 100 % (01/02/25 1530) Vital Signs (24h Range):  Temp:  [97 °F (36.1 °C)-102 °F (38.9 °C)] 99 °F (37.2 °C)  Pulse:  [] 91  Resp:  [24-41] 26  SpO2:  [98 %-100 %] 100 %  BP: ()/(41-58) 98/57     Weight: 68.5 kg (151 lb 0.2 oz)  Body mass index is 23.65 kg/m².    Intake/Output - Last 3 Shifts         12/31 0700  01/01 0659 01/01 0700  01/02 0659 01/02 0700  01/03 0659    P.O. 120 0     I.V. (mL/kg) 252.7 (3.7) 1200 (17.5) 738.8 (10.8)    Blood  335.8     NG/ 868 85    IV Piggyback 200      TPN 1839.9 2204 833.4    Total Intake(mL/kg) 2719.6 (39.7) 4607.8 (67.3) 1657.2 (24.2)    Urine (mL/kg/hr) 1350 (0.8) 1075 (0.7) 125 (0.2)    Drains 100      Other 75 25     Stool 625 775 100    Total Output 2150 1875 225    Net +569.6 +2732.8 +1432.2                    Physical Exam  Vitals and nursing note reviewed.   Constitutional:       General: He is not in acute distress.     Appearance: He is ill-appearing.      Comments: Thin male; intubated   HENT:      Head: Normocephalic and atraumatic.      Nose:      Comments: NEFT in place L nare     Mouth/Throat:      Mouth: Mucous membranes are moist.   Eyes:      Pupils: Pupils are equal, round, and reactive to light.   Cardiovascular:      Rate and Rhythm: Normal rate and regular rhythm.   Pulmonary:      Effort: Prolonged expiration present. No respiratory  distress.      Breath sounds: Transmitted upper airway sounds present. Rhonchi and rales present.      Comments: Decreased breath sounds at lung bases bilaterally  Abdominal:      General: There is no distension.      Palpations: Abdomen is soft.      Tenderness: There is no abdominal tenderness.      Comments: G tube noted; Ostomy L mid abdomen   Genitourinary:     Comments: Rojas with yellow urine  Musculoskeletal:         General: No swelling.      Cervical back: Normal range of motion and neck supple.   Skin:     General: Skin is warm and dry.      Comments: Wounds to sacrum; groin area as noted on media images  Vac intact per nursing          Significant Labs:  I have reviewed all pertinent lab results within the past 24 hours.  CBC:   Recent Labs   Lab 01/02/25 0342   WBC 15.05*   RBC 2.99*   HGB 8.7*   HCT 26.0*      MCV 87   MCH 29.1   MCHC 33.5     BMP:   Recent Labs   Lab 01/02/25 0342      *   K 4.8   CL 96   CO2 28   BUN 73*   CREATININE 1.8*   CALCIUM 10.0   MG 1.8     CMP:   Recent Labs   Lab 01/02/25 0342      CALCIUM 10.0   ALBUMIN <0.6*   PROT 6.2   *   K 4.8   CO2 28   CL 96   BUN 73*   CREATININE 1.8*   ALKPHOS 1,265*   ALT 57*   *   BILITOT 1.3*       Significant Diagnostics:  I have reviewed all pertinent imaging results/findings within the past 24 hours.  CXR: I have reviewed all pertinent results/findings within the past 24 hours and my personal findings are:  Right middle lobe and right lower lobe opacification/consolidation

## 2025-01-03 PROBLEM — R79.89 ELEVATED LFTS: Status: ACTIVE | Noted: 2025-01-03

## 2025-01-03 PROBLEM — E87.5 HYPERKALEMIA: Status: ACTIVE | Noted: 2025-01-03

## 2025-01-03 NOTE — ASSESSMENT & PLAN NOTE
"This patient does have evidence of infective focus  My overall impression is septic shock due to MAP < 65 and SBP < 90.  Source: Respiratory and Urinary Tract  Antibiotics given-   Antibiotics (72h ago, onward)      Start     Stop Route Frequency Ordered    12/30/24 1300  erythromycin base tablet 250 mg         -- PER NG TUBE 4 times daily 12/30/24 1111    12/29/24 1125  gentamicin - pharmacy to dose         -- IV pharmacy to manage frequency 12/29/24 1025          Latest lactate reviewed-  No results for input(s): "LACTATE", "POCLAC" in the last 72 hours.    Organ dysfunction indicated by Acute respiratory failure and Encephalopathy    Fluid challenge Ideal Body Weight- The patient's ideal body weight is Ideal body weight: 66.1 kg (145 lb 11.6 oz) which will be used to calculate fluid bolus of 30 ml/kg for treatment of septic shock.      Post- resuscitation assessment Yes Perfusion exam was performed within 6 hours of septic shock presentation after bolus shows Inadequate tissue perfusion assessed by non-invasive monitoring       Will Start Pressors- Levophed for MAP of 65  Source control achieved by: iv zosym. Vanc, ivfs    Patient has a leukocytosis.  Last trend as follows-   Lab Results   Component Value Date    WBC 19.18 (H) 01/03/2025    WBC 15.05 (H) 01/02/2025    WBC 16.52 (H) 01/01/2025 12/17 wean levophed as tolerates- abxs changed to merrem, cotn vanc - await culture final reports  12/18 wbc slightly imrpoved - cont iv abxs- await final sputum culture - on merrem fro esbl kleb in urine  12/19 cont iv merrem for esbl kleb in urine and proteus in sputum - dc vanc  12/20 wbc improvign - cotn iv merrem  12/24 wbc normal - cont iv abxs  12/25 FM:  Cont GENT.  12/26 FM:  Cont GENT, prognosis poor, pulm/gu/skin infections.  12/27 repeat bc and sputum culture due to increasing wbc - cont gent; completed 10 days of merrem  12/29/24:  Sputum with gram negative rods, continue gentamicin, susceptibility " pending   12/30 wbc improvign  1/2/25 wbc improved but had fever overnight - repeat bcx 2 today - cont iv abxs - back on levophed for bp control  1/3 off of levophed - cont gent; wbc increased but no further temp spikes; bc ng x 1 day

## 2025-01-03 NOTE — ASSESSMENT & PLAN NOTE
Anemia is likely due to chronic infections Most recent hemoglobin and hematocrit are listed below.  Recent Labs     01/01/25  0349 01/02/25  0342 01/03/25  0436   HGB 6.9* 8.7* 7.8*   HCT 21.6* 26.0* 23.6*       Plan  - Monitor serial CBC: Daily  - Transfuse PRBC if patient becomes hemodynamically unstable, symptomatic or H/H drops below 7/21.  - Patient has not received any PRBC transfusions to date  - Patient's anemia is currently worsening. Will adjust treatment as follows: 2uprbcs today  12/17 h/h Improved  12/20 h/h holding  12/26 FM:  Transfuse today.  12/27 hh improved after transfusion  12/28/24:  Continue daily monitoring.  12/30 h/h stable  12/31: Hemoglobin remained stable  1/1/24:  transfuse today  1/2/25 h/h imp[roved after transfusion  1/3 h/h holding

## 2025-01-03 NOTE — PROGRESS NOTES
[facility]  Progress Note    Patient Name: Carlos Chahal  MRN: [id number]  Patient Class: IP- Inpatient   Admission Date: 12/15/2024  Length of Stay: 19 days  Attending Physician: Kim Diamond MD  Primary Care Provider: Jose Francisco Klein MD        Subjective     Principal Problem:Sepsis        HPI:  Carlos Chahal is a 34 y.o. male who presents to the ED from nursing home for altered mental status and difficulty breathing.  Patient is found to be hypoxic.  He has a history of anoxic brain injury     This am, pt is on ventilator and levophed at 0.25mcg and ivfs at 75ml/hr.      Spoke with father this am on condition of pt    Overview/Hospital Course:  12/17 ND became more awake yesterday with wound changes - low dose propofol added for pt - able to wean levophed to 0.15mcg.  did tolerate tf last night - check kub this am  12/18 ND pt had to be changed back to ac control last nigth after becoming tachypneic and had low tv.  Tolerating ac mode.  Levophed down to 0.1 mcg  12/19 ND pt toleratign vent - will wean RR today - cont to slowly wean levophed as tolerates - wbc slowly imrpoving - tolerating low dose tfs - will cont to increase tfs as tolerates  12/20 ND tolerating vent - cont to work on feeds and nutritional support  12/21 [place] weekend coverage, in no acute distress, stable vital signs, AC/18/400/5/40%, SpO2 100%. On proprofol for sedation, patient tracks voice and moving head and neck with lid opening. Mild bump in WBC, afebrile, may be associated with the reported residual >200 and tube feeds held. Abdomen, soft and no distension on exam. Will resume as tolerated and monitor.  Blood cx x2, final report no growth. Continue merrem (D5), gentamicin (D2) with mixed MDRO frida from sputum studies and UTI. Replete Mg, continue abx. Continue daily wound care.   12/22 [place] weekend coverage, overnight rate change and tolerating AC12/400/5/40 SpO2 100%, proprofol 15 mcg/kg/min, levophed  0.1mcg/kg/min. Patient without gag reflex on suction. Tube feeds held overnight and resumed, remains on trickle feeds. Ileostomy output 100.   No associated abdominal distension, patient in no distress. Vent settings weaned. Leukocytosis resolved. Continue IV abx for MDRO coverage.   12/23 ND Pt still havign trouble tolerating tfs -change reglan iv; pt is more awake this am - will change to simv for a few hours today   12/24 ND elevated bs with tpn- will add long acting insulin as 12u and restart tpn - ssi adjusted to moderate scale.  12/25 FM:  Holiday coverage, chart reviewed and case discussed with team.  Patient's Levophed is being weaned slowly and he is not tolerating his tube feeds.  Abdominal exam is soft patient has output via ostomy we will check KUB.  Patient is followed by surgery and has a polymicrobial respiratory and urinary tract infection.  Patient has multiple wounds and has a history of anoxic brain injury and is a long-term nursing home resident.  12/26 FM:  Patient is off of vasopressors this morning, patient's KUB noted and will rechallenge tube feedings today.  Patient is tolerating TPN labs noted and his hemoglobin has continued to trend downward Ms. With no visible blood loss.  Patient's other labs noted his white blood cell count is rising despite appropriate antibiotics based on respiratory and urinary cultures.  Patient's prognosis is poor.  12/27 ND pt  still not tolerating tfs - surgery following -  cont tpn; h/h improved after transfusion.  Updated aunt on pts condition  12/28/24:  Patient seen this morning.  Not tolerating tube feedings at this time, remains on vent support.  Poor prognosis at this time.  Patient with poor urine output despite diuretics.  Trial of albumin infusion followed by lasix today.    12/29/24:  Good response to albumin/lasix combo yesterday with good urine output.  Renal function essentially unchanged.  Sputum culture with gram negative rods, susceptibility  pending.  Continue abx for now. Leukocytosis improving, H/H stable.   12/30 ND pt will be switched to simv today to start havign him do more work on breathing.  Awaiting sputum cx results - cxr appears improved and wbc improving.  Still not tolerating tfs  12/31 WC: Patient having ongoing need for ventilator support.  Sputum culture still pending.  Abdominal x-ray reveals no evidence of bowel obstruction.  Tube feeds as tolerated.  1/1/25 WC:  remains vent dependent.  TF as tolerated.  1/2/25 ND PT had to placed back on levophed overnight due to lower bp and had some tachypnea -= abg showed lower pO2 - fio2 increased  tolerating feeds via dibhoff tube.   Also had new temp overnight - leia repeat bc x 2  1/3/25 ND decreased uop yesterday.   Had abd us and reviewed.  Pt off of levophed.   No further temp spike        Review of Systems   Unable to perform ROS: Acuity of condition     Objective:     Vital Signs (Most Recent):  Temp: 96.8 °F (36 °C) (01/03/25 0702)  Pulse: 86 (01/03/25 0715)  Resp: (!) 29 (01/03/25 0715)  BP: (!) 91/50 (01/03/25 0530)  SpO2: 100 % (01/03/25 0715) Vital Signs (24h Range):  Temp:  [93 °F (33.9 °C)-99.1 °F (37.3 °C)] 96.8 °F (36 °C)  Pulse:  [] 86  Resp:  [24-34] 29  SpO2:  [100 %] 100 %  BP: ()/(50-65) 91/50     Weight: 68.5 kg (151 lb 0.2 oz)  Body mass index is 23.65 kg/m².    Intake/Output Summary (Last 24 hours) at 1/3/2025 0745  Last data filed at 1/3/2025 0779  Gross per 24 hour   Intake 3769.23 ml   Output 1205 ml   Net 2564.23 ml         Physical Exam  Constitutional:       Appearance: He is ill-appearing.      Comments: Thin male; intubated   HENT:      Mouth/Throat:      Mouth: Mucous membranes are moist.   Eyes:      Pupils: Pupils are equal, round, and reactive to light.   Cardiovascular:      Rate and Rhythm: Normal rate and regular rhythm.      Heart sounds: Normal heart sounds.   Pulmonary:      Effort: Pulmonary effort is normal.      Breath sounds: Normal breath  "sounds. Transmitted upper airway sounds present.   Abdominal:      General: There is no distension.      Palpations: Abdomen is soft. There is no mass.      Tenderness: There is no abdominal tenderness.      Comments: Jtube noted; ileostomy noted   Genitourinary:     Comments: Rojas with yellow urine  Musculoskeletal:      Right lower leg: Edema present.      Left lower leg: Edema present.   Lymphadenopathy:      Cervical: No cervical adenopathy.   Skin:     General: Skin is warm and dry.      Comments: Wounds to sacrum with wound vac in place; groin area             Significant Labs: All pertinent labs within the past 24 hours have been reviewed.    Significant Imaging: I have reviewed all pertinent imaging results/findings within the past 24 hours.    Assessment and Plan     * Sepsis  This patient does have evidence of infective focus  My overall impression is septic shock due to MAP < 65 and SBP < 90.  Source: Respiratory and Urinary Tract  Antibiotics given-   Antibiotics (72h ago, onward)      Start     Stop Route Frequency Ordered    12/30/24 1300  erythromycin base tablet 250 mg         -- PER NG TUBE 4 times daily 12/30/24 1111    12/29/24 1125  gentamicin - pharmacy to dose         -- IV pharmacy to manage frequency 12/29/24 1025          Latest lactate reviewed-  No results for input(s): "LACTATE", "POCLAC" in the last 72 hours.    Organ dysfunction indicated by Acute respiratory failure and Encephalopathy    Fluid challenge Ideal Body Weight- The patient's ideal body weight is Ideal body weight: 66.1 kg (145 lb 11.6 oz) which will be used to calculate fluid bolus of 30 ml/kg for treatment of septic shock.      Post- resuscitation assessment Yes Perfusion exam was performed within 6 hours of septic shock presentation after bolus shows Inadequate tissue perfusion assessed by non-invasive monitoring       Will Start Pressors- Levophed for MAP of 65  Source control achieved by: iv [email or web address] [email or web address]    Patient " has a leukocytosis.  Last trend as follows-   Lab Results   Component Value Date    WBC 19.18 (H) 01/03/2025    WBC 15.05 (H) 01/02/2025    WBC 16.52 (H) 01/01/2025 12/17 wean levophed as tolerates- abxs changed to merrem, cotn vanc - await culture final reports  12/18 wbc slightly imrpoved - cont iv abxs- await final sputum culture - on merrem fro esbl kleb in urine  12/19 cont iv merrem for esbl kleb in urine and proteus in sputum - dc vanc  12/20 wbc improvign - cotn iv merrem  12/24 wbc normal - cont iv abxs  12/25 FM:  Cont GENT.  12/26 FM:  Cont GENT, prognosis poor, pulm/gu/skin infections.  12/27 repeat bc and sputum culture due to increasing wbc - cont gent; completed 10 days of merrem  12/29/24:  Sputum with gram negative rods, continue gentamicin, susceptibility pending   12/30 wbc improvign  1/2/25 wbc improved but had fever overnight - repeat bcx 2 today - cont iv abxs - back on levophed for bp control  1/3 off of levophed - cont gent; wbc increased but no further temp spikes; bc ng x 1 day    Elevated LFTs  Abd us done 1/2/25 and reviewed - slightly improved today; check ggt    Hyperkalemia  Hyperkalemia is likely due to OTONIEL.The patients most recent potassium results are listed below.  Recent Labs     01/01/25  0349 01/02/25  0342 01/03/25  0436   K 4.2 4.8 6.1*     Plan  - Monitor for arrhythmias with EKG and/or continuous telemetry.   - Treat the hyperkalemia with Potassium Binders.   - Monitor potassium: Daily  - The patient's hyperkalemia is  being treated            Edema  Lasix 20mg iv bid - monitor I/o  12/30 decreae lasix to daily - edema has imrpoved  1/2/25 give extra of lasix today due to edema  1/3 stop lasix due to worsening kidney fxn    Hypokalemia  Patient's most recent potassium results are listed below.   Recent Labs     01/01/25  0349 01/02/25  0342 01/03/25  0436   K 4.2 4.8 6.1*       Plan  - Replete potassium per protocol  - Monitor potassium Daily  - Patient's hypokalemia  is stable, continue below and monitor  12/20 increase pot bicarb to bid  12/23 decrease pot bicard to daily dosing  12/24 improved- stop potassium bicarb repalcement - monitor    1/3 resolved    Severe malnutrition  Nutrition consulted. Most recent weight and BMI monitored-     Measurements:  Wt Readings from Last 1 Encounters:   12/16/24 68.5 kg (151 lb 0.2 oz)   Body mass index is 23.65 kg/m².    Patient has been screened and assessed by RD.    Malnutrition Type:  Context:    Level:      Malnutrition Characteristic Summary:       Interventions/Recommendations (treatment strategy):  1. Rec'd Continuous EN: Glucerna 1.5 @ 40mL/r increasing by 5 mL q6hrs to goal rate of 55mL/hr with a continuous 40mL FWF to provide 1980kcal (94% EEN), 109g of protein (100% EPN), and 1962mL FW.   2. Néstor BID via PEG tube to promote wound healing and to provide additional nutrition.           - Do not mix Néstor with formula in a tube-feeding bag         - Pour one packet of Néstor in a clean, small container for mixing.           - Add 4 fl oz (120 mL) water at room temperature.           - Mix well with disposable spoon or tongue blade until all particles are completely hydrated.           - Verify correct tube placement.           - Flush feeding tube with 30 mL water.           -Administer Néstor through feeding tube using a 60-mL or larger syringe.           - Flush with an additional 30 mL water.  3. Rec'd ClinimixE 5/20 @ 80mL/hr to provide 1690kcal (80% EEN), 96g of protein (88% EPN), and 1920mL TFV.      -Add 250mL of 20% lipids 3x/week to provide additional calories.      -Check Triglycerides before adding lipids. 4. RD to follow and make rec's accordingly.    12/18 restart tfs today at 1ml/hr to see if can tolerate feeds  12/19 increase tfs as tolerates  12/23 not tolerating tfs- will get tpn orders and start that until can tolerate tfs better - ncrease regaln 10 mg iv q 6hrs  12/24 con ttfs as tolerates - started on tpn for  further nutritional support  12/25 FM:  Check KUB.  12/26 FM:  Resume TF today, monitor.  12/27 on tpn - not tolerating tfs at this time  12/28/24:  Albumin infusion today.   12/31:  tube feeds as tolerated    Patient has protein malnutrition.  Last trend as follows-   Lab Results   Component Value Date    ALBUMIN 0.7 (L) 01/03/2025    ALBUMIN <0.6 (L) 01/02/2025    ALBUMIN <0.6 (L) 01/01/2025 1/2/25 tolerating tfs better with dibhoff tube - cont tpn and IL for now    Hypomagnesemia  Patient has Abnormal Magnesium: hypomagnesemia. Will continue to monitor electrolytes closely. Will replace the affected electrolytes and repeat labs to be done after interventions completed. The patient's magnesium results have been reviewed and are listed below.  Recent Labs   Lab 01/03/25  0436   MG 2.0      12/17 mag imrpoving - repalce mag today  12/19 replace mag today  12/20 mag oxide bid  12/21 2g Mg  12/23 dose of iv mag today to keep mag level about 2  12/25 FM:  Replace, recheck.  12/30 replace mag today  1/3 mag level imporved    Pneumonia of right lower lobe due to infectious organism  Patient has a diagnosis of pneumonia. The cause of the pneumonia is suspected to be bacterial in etiology but organism is not known. The pneumonia is stable. The patient has the following signs/symptoms of pneumonia: persistent hypoxia  and sputum production. The patient does have a current oxygen requirement and the patient does not have a home oxygen requirement. I have reviewed the pertinent imaging. The following cultures have been collected: Blood cultures and Sputum culture The culture results are listed below.     Current antimicrobial regimen consists of the antibiotics listed below. Will monitor patient closely and continue current treatment plan unchanged.    Antibiotics (From admission, onward)      Start     Stop Route Frequency Ordered    12/30/24 1300  erythromycin base tablet 250 mg         -- PER NG TUBE 4 times daily  12/30/24 1111    12/29/24 1125  gentamicin - pharmacy to dose         -- IV pharmacy to manage frequency 12/29/24 1025            Microbiology Results (last 7 days)       Procedure Component Value Units Date/Time    Blood culture [[id number]] Collected: 01/02/25 0818    Order Status: Completed Specimen: Blood from Peripheral, Forearm, Left Updated: 01/03/25 0115     Blood Culture, Routine No Growth to date    Blood culture [[id number]] Collected: 01/02/25 0805    Order Status: Completed Specimen: Blood from Peripheral, Wrist, Left Updated: 01/03/25 0115     Blood Culture, Routine No Growth to date    Culture, Respiratory with Gram Stain [[id number]]  (Abnormal)  (Susceptibility) Collected: 12/27/24 1522    Order Status: Completed Specimen: Respiratory from Endotracheal Aspirate Updated: 01/02/25 1157     Respiratory Culture ACINETOBACTER BAUMANNII   Many  Susceptibility pending       Gram Stain (Respiratory) Few Gram positive cocci     Gram Stain (Respiratory) Rare Gram positive rods     Gram Stain (Respiratory) Rare WBC's    Blood culture [[id number]] Collected: 12/27/24 0837    Order Status: Completed Specimen: Blood Updated: 01/01/25 1812     Blood Culture, Routine No growth after 5 days.    Blood culture [[id number]] Collected: 12/27/24 0838    Order Status: Completed Specimen: Blood Updated: 01/01/25 1812     Blood Culture, Routine No growth after 5 days.        12/17 dc zosyn with recent esbl kleb in urine - will change to merrem - cont vanc until final cultures obtained - change vent to simv; add nebs  12/18 cotn merrem and vanc - await final sputum cutlure - cont vent on ac control; nebs - wbc imrpoved slightly; lasix 20mg iv for edema today  12/19  dc vanc - proteus grwoign from sputum - cont merrem and nebs; lasix today - wean rr on vent  12/20 wnc improved - cont merrem - nebs and vent - another dose of lasix today for edema - fio2 increased  12/23 cont iv gent and merren due to sent of multiple bugs  (acinetobacter/kleb/proteus)  12/24 cont current abxs  12/25 FM:  Cont GENT  12/26 FM:  Cont GENT, poor prognosis.  12/27 on gent - get new sputum culture today due to elevated wbc - cont vent and nebs  12/28/24:Gram positive cocci/rods on sputum, blood cultures negative   12/29/24:  Gram negative rods on sputum cx.   12/30 await final results of sputum cx  12/31: Tailor antibiotics based on sputum culture results  1/1/24:  tailor based on c/s  1/2/25 still awaitin g final culture reports with I&D of sputum; cont current abxs  1/3 sputum culture grew out actinobacter - cont gent    Sacral wound, sequela  Local wound care with dakins bid  Iv abxs  12/27 prob debridementt in or on 12/30 1/2/25 sp debridement =- wound vac in place    Open wound of groin  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  Recent Labs     01/01/25  0349 01/02/25  0342 01/03/25  0436   HGB 6.9* 8.7* 7.8*   HCT 21.6* 26.0* 23.6*       Plan  - Monitor serial CBC: Daily  - Transfuse PRBC if patient becomes hemodynamically unstable, symptomatic or H/H drops below 7/21.  - Patient has not received any PRBC transfusions to date  - Patient's anemia is currently worsening. Will adjust treatment as follows: 2uprbcs today  12/17 h/h Improved  12/20 h/h holding  12/26 FM:  Transfuse today.  12/27 hh improved after transfusion  12/28/24:  Continue daily monitoring.  12/30 h/h stable  12/31: Hemoglobin remained stable  1/1/24:  transfuse today  1/2/25 h/h imp[roved after transfusion  1/3 h/h holding    History of anoxic brain injury  12/25 FM:  Poor prognosis.      Urinary tract infection associated with indwelling urethral catheter  Await new urine culture =- currently on zosyn/vanc  12/17 change to merrem/vanc  12/18 has esbl klebsiella - cont merrem  12/23 on merrem  12/25 FM:  Continue Gent, CS  reviewed.  12/26 FM:  WBC increasing, monitor, on appropriate abx based on cultures.  12/28/24:  Blood culture negative to date, sputum culture with some gram positive cocci and rods.  Continue abx for now.  Gentamicin per pharmacy to dose.     Tailor antibiotics based on culture and sensitivity    Type 1 diabetes  Patient's FSGs are uncontrolled due to hyperglycemia on current medication regimen.  Last A1c reviewed-   Lab Results   Component Value Date    HGBA1C 6.6 (H) 12/16/2024     Most recent fingerstick glucose reviewed-   Recent Labs   Lab 01/02/25  1154 01/02/25  1708 01/02/25  2346 01/03/25  0521   POCTGLUCOSE 132* 135* 193* 225*       Current correctional scale  Low  Maintain anti-hyperglycemic dose as follows-   Antihyperglycemics (From admission, onward)      Start     Stop Route Frequency Ordered    12/30/24 0900  insulin glargine U-100 (Lantus) pen 20 Units         -- SubQ Daily 12/30/24 0734    12/24/24 0807  insulin aspart U-100 pen 0-10 Units         -- SubQ Every 4 hours PRN 12/24/24 0707          Hold Oral hypoglycemics while patient is in the hospital.  12/17 A1c improved to 6.6%  12/24 due to tpn - add lantus 12 u daily - ssi changed to moderate scale  12/25 FM:  BS logs noted, continue coverage.  12/26 FM:  Cont SSI.  12/27 increase lantus to 16u daily  1/3 bs have been better controlled      VTE Risk Mitigation (From admission, onward)           Ordered     enoxaparin injection 40 mg  Daily         12/16/24 0815     IP VTE LOW RISK PATIENT  Once         12/15/24 1201     Place sequential compression device  Until discontinued         12/15/24 1201                    Discharge Planning   JOSE MANUEL:      Code Status: Full Code   Medical Readiness for Discharge Date:   Discharge Plan A: Return to nursing home   Discharge Delays: None known at this time                    Kim Diamond MD  Department of Hospital Medicine   [place] [facility]

## 2025-01-03 NOTE — PLAN OF CARE
Problem: Artificial Airway  Goal: Effective Communication  Outcome: Progressing  Goal: Optimal Device Function  Outcome: Progressing  Goal: Absence of Device-Related Skin or Tissue Injury  Outcome: Progressing     Problem: Diabetes Comorbidity  Goal: Blood Glucose Level Within Targeted Range  Outcome: Progressing     Problem: Wound  Goal: Optimal Functional Ability  Outcome: Progressing  Goal: Absence of Infection Signs and Symptoms  Outcome: Progressing  Goal: Skin Health and Integrity  Outcome: Progressing  Goal: Optimal Wound Healing  Outcome: Progressing     Problem: Infection  Goal: Absence of Infection Signs and Symptoms  Outcome: Progressing     Problem: Sepsis/Septic Shock  Goal: Absence of Bleeding  Outcome: Not Progressing   Patient is not tolerating tube feeding,VSS, afebrile. Tube feeding residual looks pink, blood tinged. Patient is breathing above the ventilator, Heart rate remains in the 80's.  A wound vac has been applied to the sacrum  sore, vacuum is sealed.

## 2025-01-03 NOTE — PROGRESS NOTES
[facility] - Intensive Care  Adult Nutrition  Progress Note    SUMMARY       Recommendations  1. Rec'd Continuous EN: Glucerna 1.5 @ 40mL/r increasing by 5 mL q6hrs to goal rate of 55mL/hr with a continuous 40mL FWF to provide 1980kcal (94% EEN), 109g of protein (100% EPN), and 1962mL FW.     2. Néstor BID via PEG tube to promote wound healing and to provide additional nutrition.             - Do not mix Néstor with formula in a tube-feeding bag           - Pour one packet of Néstor in a clean, small container for mixing.             - Add 4 fl oz (120 mL) water at room temperature.             - Mix well with disposable spoon or tongue blade until all particles are completely hydrated.             - Verify correct tube placement.             - Flush feeding tube with 30 mL water.             -Administer Néstor through feeding tube using a 60-mL or larger syringe.             - Flush with an additional 30 mL water.    3. Rec'd ClinimixE 5/20 @ 80mL/hr to provide 1690kcal (80% EEN), 96g of protein (88% EPN), and 1920mL TFV.        -Add 250mL of 20% lipids 3x/week to provide additional calories.        -Check Triglycerides before adding lipids.   4. RD to follow and make rec's accordingly.  Goals:   1. Pt will be started on EN by next RD follow up.     2. Pt will reach TF goal rate within 48hrs of initiation.   3. Pt will be started on TPN by next RD follow up.  Nutrition Goal Status: goal met, progressing towards goal  Communication of RD Recs: reviewed with RN     Assessment and Plan     Nutrition Problem  Inadequate oral intake     Related to (etiology):   NPO/Intubation Status     Signs and Symptoms (as evidenced by):   0% PO intake      Interventions/Recommendations (treatment strategy):  1. Rec'd Continuous EN: Glucerna 1.5 @ 40mL/r increasing by 5 mL q6hrs to goal rate of 55mL/hr with a continuous 40mL FWF to provide 1980kcal (94% EEN), 109g of protein (100% EPN), and 1962mL FW.   2. Néstor BID via PEG tube to promote  "wound healing and to provide additional nutrition.           - Do not mix Néstor with formula in a tube-feeding bag         - Pour one packet of Néstor in a clean, small container for mixing.           - Add 4 fl oz (120 mL) water at room temperature.           - Mix well with disposable spoon or tongue blade until all particles are completely hydrated.           - Verify correct tube placement.           - Flush feeding tube with 30 mL water.           -Administer Néstor through feeding tube using a 60-mL or larger syringe.           - Flush with an additional 30 mL water.   3. RD to follow and make rec's accordingly.     Nutrition Diagnosis Status:   Continues     Malnutrition Assessment  NFPE not warranted at this time. RD to continue to monitor for signs and symptoms of malnutrition.     Nutrition Related Social Determinants of Health:  None identified at this time.    Reason for Assessment    Reason For Assessment: RD follow-up  Diagnosis: infection/sepsis  General Information Comments: RD followed up on pt this morning. Pt's TF was on hold, but as per RN TF was re-started @ 10mL/hr this morning. Continue TPN until pt can tolerate EN being increased towards goal rate. RD to follow and make rec's accordingly.  Nutrition Discharge Planning: TBD as care progresses    Nutrition Risk Screen    Nutrition Risk Screen: tube feeding or parenteral nutrition    Nutrition/Diet History    Patient Reported Diet/Restrictions/Preferences: no oral intake  Spiritual, Cultural Beliefs, Zoroastrian Practices, Values that Affect Care: no  Food Allergies: NKFA  Factors Affecting Nutritional Intake: NPO  Nutrition Support Formula Prior to Admit: Glucerna 1.5    Anthropometrics    Temp: 96.8 °F (36 °C)  Height: 5' 7" (170.2 cm)  Height (inches): 67 in  Weight Method: Bed Scale  Weight: 68.5 kg (151 lb 0.2 oz)  Weight (lb): 151.02 lb  Ideal Body Weight (IBW), Male: 148 lb  % Ideal Body Weight, Male (lb): 102.04 %  BMI (Calculated): " 23.6  BMI Grade: 18.5-24.9 - normal    Lab/Procedures/Meds    Pertinent Labs Reviewed: reviewed  Pertinent Medications Reviewed: reviewed    Estimated/Assessed Needs    Weight Used For Calorie Calculations: 68.5 kg (151 lb 0.2 oz)  Energy Calorie Requirements (kcal): 2096  Energy Need Method: [facility]  Protein Requirements: 109-137 (1.6-2.0g/kg)  Weight Used For Protein Calculations: 68.5 kg (151 lb 0.2 oz)  Fluid Requirements (mL): 2096 (1mL/kcal)  Estimated Fluid Requirement Method: RDA Method  RDA Method (mL): 2096    Nutrition Prescription Ordered    Current Diet Order: NPO  Current Nutrition Support Formula Ordered: Clinimix E 5/20, Glucerna 1.5  Current Nutrition Support Rate Ordered:  ((Glucerna @ 10mL and Clinimix @ 80mL))  Current Nutrition Support Frequency Ordered: Continuous  Oral Nutrition Supplement: None    Evaluation of Received Nutrient/Fluid Intake    Enteral Calories (kcal): 360  Enteral Protein (gm): 19  Enteral (Free Water) Fluid (mL): 182  Parenteral Calories (kcal): 1690  Parenteral Protein (gm): 96  Parenteral Fluid (mL): 1920  Other Calories (kcal): 164 (Propofol infusing @ 6.2mL/hr)  Total Calories (kcal): 2214  % Kcal Needs: 105%  Total Protein (gm): 115  % Protein Needs: 105%  I/O: -150  Energy Calories Required: meeting needs  Protein Required: meeting needs  Tolerance: tolerating  % Intake of Estimated Energy Needs: 75 - 100 %  % Meal Intake: NPO    Nutrition Risk    Level of Risk/Frequency of Follow-up: moderate     Monitor and Evaluation    Food and Nutrient Intake: enteral nutrition intake, parenteral nutrition intake  Food and Nutrient Adminstration: enteral and parenteral nutrition administration  Knowledge/Beliefs/Attitudes: beliefs and attitudes, food and nutrition knowledge/skill  Physical Activity and Function: nutrition-related ADLs and IADLs  Anthropometric Measurements: height/length, weight, weight change, body mass index  Biochemical Data, Medical Tests and Procedures:  electrolyte and renal panel, gastrointestinal profile, glucose/endocrine profile, inflammatory profile, lipid profile  Nutrition-Focused Physical Findings: overall appearance     Nutrition Follow-Up    RD Follow-up?: Yes

## 2025-01-03 NOTE — ASSESSMENT & PLAN NOTE
Patient has a diagnosis of pneumonia. The cause of the pneumonia is suspected to be bacterial in etiology but organism is not known. The pneumonia is stable. The patient has the following signs/symptoms of pneumonia: persistent hypoxia  and sputum production. The patient does have a current oxygen requirement and the patient does not have a home oxygen requirement. I have reviewed the pertinent imaging. The following cultures have been collected: Blood cultures and Sputum culture The culture results are listed below.     Current antimicrobial regimen consists of the antibiotics listed below. Will monitor patient closely and continue current treatment plan unchanged.    Antibiotics (From admission, onward)      Start     Stop Route Frequency Ordered    12/30/24 1300  erythromycin base tablet 250 mg         -- PER NG TUBE 4 times daily 12/30/24 1111    12/29/24 1125  gentamicin - pharmacy to dose         -- IV pharmacy to manage frequency 12/29/24 1025            Microbiology Results (last 7 days)       Procedure Component Value Units Date/Time    Blood culture [[id number]] Collected: 01/02/25 0818    Order Status: Completed Specimen: Blood from Peripheral, Forearm, Left Updated: 01/03/25 0115     Blood Culture, Routine No Growth to date    Blood culture [[id number]] Collected: 01/02/25 0805    Order Status: Completed Specimen: Blood from Peripheral, Wrist, Left Updated: 01/03/25 0115     Blood Culture, Routine No Growth to date    Culture, Respiratory with Gram Stain [[id number]]  (Abnormal)  (Susceptibility) Collected: 12/27/24 1522    Order Status: Completed Specimen: Respiratory from Endotracheal Aspirate Updated: 01/02/25 1157     Respiratory Culture ACINETOBACTER BAUMANNII   Many  Susceptibility pending       Gram Stain (Respiratory) Few Gram positive cocci     Gram Stain (Respiratory) Rare Gram positive rods     Gram Stain (Respiratory) Rare WBC's    Blood culture [[id number]] Collected: 12/27/24 0837     Order Status: Completed Specimen: Blood Updated: 01/01/25 1812     Blood Culture, Routine No growth after 5 days.    Blood culture [[id number]] Collected: 12/27/24 0838    Order Status: Completed Specimen: Blood Updated: 01/01/25 1812     Blood Culture, Routine No growth after 5 days.        12/17 dc zosyn with recent esbl kleb in urine - will change to merrem - cont vanc until final cultures obtained - change vent to simv; add nebs  12/18 cotn merrem and vanc - await final sputum cutlure - cont vent on ac control; nebs - wbc imrpoved slightly; lasix 20mg iv for edema today  12/19  dc vanc - proteus grwoign from sputum - cont merrem and nebs; lasix today - wean rr on vent  12/20 wnc improved - cont merrem - nebs and vent - another dose of lasix today for edema - fio2 increased  12/23 cont iv gent and merren due to sent of multiple bugs (acinetobacter/kleb/proteus)  12/24 cont current abxs  12/25 FM:  Cont GENT  12/26 FM:  Cont GENT, poor prognosis.  12/27 on gent - get new sputum culture today due to elevated wbc - cont vent and nebs  12/28/24:Gram positive cocci/rods on sputum, blood cultures negative   12/29/24:  Gram negative rods on sputum cx.   12/30 await final results of sputum cx  12/31: Tailor antibiotics based on sputum culture results  1/1/24:  tailor based on c/s  1/2/25 still awaitin g final culture reports with I&D of sputum; cont current abxs  1/3 sputum culture grew out actinobacter - cont gent

## 2025-01-03 NOTE — EICU
Intervention Initiated From:  [facility] / EICU    Sandy intervened regarding:  Rounding (Video assessment)    Comments: pt is resting in bed. VSS. Pt remains on vent with fio2 of 50% and tolerating well. Ivf of NS, tpn, and Propofol drip are infusing.   19:08-Informed eicu md- that pt was on a vent.

## 2025-01-03 NOTE — ASSESSMENT & PLAN NOTE
Hyperkalemia is likely due to OTONIEL.The patients most recent potassium results are listed below.  Recent Labs     01/01/25  0349 01/02/25  0342 01/03/25  0436   K 4.2 4.8 6.1*     Plan  - Monitor for arrhythmias with EKG and/or continuous telemetry.   - Treat the hyperkalemia with Potassium Binders.   - Monitor potassium: Daily  - The patient's hyperkalemia is  being treated

## 2025-01-03 NOTE — ASSESSMENT & PLAN NOTE
Patient's most recent potassium results are listed below.   Recent Labs     01/01/25  0349 01/02/25  0342 01/03/25  0436   K 4.2 4.8 6.1*       Plan  - Replete potassium per protocol  - Monitor potassium Daily  - Patient's hypokalemia is stable, continue below and monitor  12/20 increase pot bicarb to bid  12/23 decrease pot bicard to daily dosing  12/24 improved- stop potassium bicarb repalcement - monitor    1/3 resolved

## 2025-01-03 NOTE — PHYSICIAN QUERY
Please provide further clarification on the procedure performed on the  sacral pressure ulcer:  Excisional debridement of subcutaneous tissue and/or fascia

## 2025-01-03 NOTE — SUBJECTIVE & OBJECTIVE
Review of Systems   Unable to perform ROS: Acuity of condition     Objective:     Vital Signs (Most Recent):  Temp: 96.8 °F (36 °C) (01/03/25 0702)  Pulse: 86 (01/03/25 0715)  Resp: (!) 29 (01/03/25 0715)  BP: (!) 91/50 (01/03/25 0530)  SpO2: 100 % (01/03/25 0715) Vital Signs (24h Range):  Temp:  [93 °F (33.9 °C)-99.1 °F (37.3 °C)] 96.8 °F (36 °C)  Pulse:  [] 86  Resp:  [24-34] 29  SpO2:  [100 %] 100 %  BP: ()/(50-65) 91/50     Weight: 68.5 kg (151 lb 0.2 oz)  Body mass index is 23.65 kg/m².    Intake/Output Summary (Last 24 hours) at 1/3/2025 0737  Last data filed at 1/3/2025 0727  Gross per 24 hour   Intake 3769.23 ml   Output 1205 ml   Net 2564.23 ml         Physical Exam  Constitutional:       Appearance: He is ill-appearing.      Comments: Thin male; intubated   HENT:      Mouth/Throat:      Mouth: Mucous membranes are moist.   Eyes:      Pupils: Pupils are equal, round, and reactive to light.   Cardiovascular:      Rate and Rhythm: Normal rate and regular rhythm.      Heart sounds: Normal heart sounds.   Pulmonary:      Effort: Pulmonary effort is normal.      Breath sounds: Normal breath sounds. Transmitted upper airway sounds present.   Abdominal:      General: There is no distension.      Palpations: Abdomen is soft. There is no mass.      Tenderness: There is no abdominal tenderness.      Comments: Jtube noted; ileostomy noted   Genitourinary:     Comments: Rojas with yellow urine  Musculoskeletal:      Right lower leg: Edema present.      Left lower leg: Edema present.   Lymphadenopathy:      Cervical: No cervical adenopathy.   Skin:     General: Skin is warm and dry.      Comments: Wounds to sacrum with wound vac in place; groin area             Significant Labs: All pertinent labs within the past 24 hours have been reviewed.    Significant Imaging: I have reviewed all pertinent imaging results/findings within the past 24 hours.

## 2025-01-03 NOTE — EICU
Intervention Initiated From:  [facility] / EICU    Sandy intervened regarding:  Rounding (Video assessment)    Comments:  Video rounding completed.  Pt resting quiet on ventilator support w/ FiO2 50%.  No distress noted.  Infusing TPN, Propofol, Norepinephrine gtts as per MAR.  B/P 92/55, , RR 34, POx 100%.

## 2025-01-03 NOTE — ASSESSMENT & PLAN NOTE
Patient has Abnormal Magnesium: hypomagnesemia. Will continue to monitor electrolytes closely. Will replace the affected electrolytes and repeat labs to be done after interventions completed. The patient's magnesium results have been reviewed and are listed below.  Recent Labs   Lab 01/03/25  0436   MG 2.0      12/17 mag imrpoving - repalce mag today  12/19 replace mag today  12/20 mag oxide bid  12/21 2g Mg  12/23 dose of iv mag today to keep mag level about 2  12/25 FM:  Replace, recheck.  12/30 replace mag today  1/3 mag level imporved

## 2025-01-03 NOTE — EICU
Intervention Initiated From:  [facility] / EICU    Sandy intervened regarding:  Rounding (Video assessment)    Comments:  Video rounding completed.  Pt resting quiet on ventilator support w/ FiO2 40%.  No distress noted.  Infusing TPN at 80 cc/hr.  B/P 95/53, HR 86, RR 28, POx 100%.

## 2025-01-03 NOTE — PROGRESS NOTES
"Pharmacokinetic Follow Up: Gentamicin    Assessment of levels:   Trough exceeds goal of < 1    Regimen Plan:   Draw random level 1/4 at 15:30    Drug levels (last 3 results):  No results for input(s): "AMIKACINPEAK", "AMIKACINTROU", "AMIKACINRAND", "AMIKACIN" in the last 72 hours.    Recent Labs   Lab Result Units 12/31/24  0905 12/31/24  1430 12/31/24  1709 01/01/25  0349 01/01/25  1708 01/02/25  0342 01/03/25  0436   Gentamicin, Peak ug/mL 4.5*  --  8.1  --   --   --   --    Gentamicin, Random ug/mL  --   --   --    < > 4.6 3.6 2.6   Gentamicin, Trough ug/mL  --  4.2*  --   --   --   --   --     < > = values in this interval not displayed.       No results for input(s): "TOBRA8", "TOBRA10", "TOBRA12", "TOBRARND", "TOBRAMYCIN", "TOBRAPEAK", "TOBRATROUGH", "TOBRAMYCINPE", "TOBRAMYCINRA", "TOBRAMYCINTR" in the last 72 hours.    Pharmacy will continue to monitor.    Please contact pharmacy with any questions regarding this assessment.    Thank you for the consult,   Tima Morris      Patient brief summary:  Carlos Chahal is a 34 y.o. male initiated on aminoglycoside therapy for treatment of  pneumonia    Drug Allergies:   Review of patient's allergies indicates:   Allergen Reactions    Guaifenesin Hives    Codeine Itching       Actual Body Weight:   68.5kg    Adjust Body Weight:   67.1kg    Ideal Body Weight:  66.1kg    Renal Function:   Estimated Creatinine Clearance: 44.7 mL/min (A) (based on SCr of 2.2 mg/dL (H)).,     Dialysis Method (if applicable):  N/A    CBC (last 72 hours):  Recent Labs   Lab Result Units 01/01/25  0349 01/02/25  0342 01/03/25  0436   WBC K/uL 16.52* 15.05* 19.18*   Hemoglobin g/dL 6.9* 8.7* 7.8*   Hematocrit % 21.6* 26.0* 23.6*   Platelets K/uL 121* 166 155   Gran % % 71.7 72.8 73.1*   Lymph % % 5.1* 6.2* 4.2*   Mono % % 11.7 11.2 13.9   Eosinophil % % 9.8* 7.2 6.5   Basophil % % 0.5 0.5 0.3   Differential Method  Automated Automated Automated       Metabolic Panel (last 72 " hours):  Recent Labs   Lab Result Units 01/01/25  0349 01/02/25  0342 01/03/25  0436   Sodium mmol/L 128* 128* 124*   Potassium mmol/L 4.2 4.8 6.1*   Chloride mmol/L 96 96 95   CO2 mmol/L 30* 28 26   Glucose mg/dL 205* 108 204*   BUN mg/dL 72* 73* 82*   Creatinine mg/dL 1.6* 1.8* 2.2*   Albumin g/dL <0.6* <0.6* 0.7*   Total Bilirubin mg/dL 0.9 1.3* 1.6*   Alkaline Phosphatase U/L 821* 1,265* 1,085*   AST U/L 121* 176* 125*   ALT U/L 33 57* 52*   Magnesium mg/dL 1.9 1.8 2.0       Aminoglycoside Administrations:  aminoglycosides given in last 96 hours                     gentamicin (GARAMYCIN) 134.4 mg in 0.9% NaCl 100 mL IVPB (mg) 134.4 mg New Bag 12/31/24 1603    gentamicin (GARAMYCIN) 112.4 mg in 0.9% NaCl 100 mL IVPB (mg) 112.4 mg New Bag 12/31/24 0732                    Microbiologic Results:  Microbiology Results (last 7 days)       Procedure Component Value Units Date/Time    Blood culture [[id number]] Collected: 01/02/25 0818    Order Status: Completed Specimen: Blood from Peripheral, Forearm, Left Updated: 01/03/25 0115     Blood Culture, Routine No Growth to date    Blood culture [[id number]] Collected: 01/02/25 0805    Order Status: Completed Specimen: Blood from Peripheral, Wrist, Left Updated: 01/03/25 0115     Blood Culture, Routine No Growth to date    Culture, Respiratory with Gram Stain [[id number]]  (Abnormal)  (Susceptibility) Collected: 12/27/24 1522    Order Status: Completed Specimen: Respiratory from Endotracheal Aspirate Updated: 01/02/25 1157     Respiratory Culture ACINETOBACTER BAUMANNII   Many  Susceptibility pending       Gram Stain (Respiratory) Few Gram positive cocci     Gram Stain (Respiratory) Rare Gram positive rods     Gram Stain (Respiratory) Rare WBC's    Blood culture [[id number]] Collected: 12/27/24 0837    Order Status: Completed Specimen: Blood Updated: 01/01/25 1812     Blood Culture, Routine No growth after 5 days.    Blood culture [[id number]] Collected: 12/27/24 0838     Order Status: Completed Specimen: Blood Updated: 01/01/25 1812     Blood Culture, Routine No growth after 5 days.

## 2025-01-03 NOTE — ASSESSMENT & PLAN NOTE
Lasix 20mg iv bid - monitor I/o  12/30 decreae lasix to daily - edema has imrpoved  1/2/25 give extra of lasix today due to edema  1/3 stop lasix due to worsening kidney fxn

## 2025-01-03 NOTE — ASSESSMENT & PLAN NOTE
Nutrition consulted. Most recent weight and BMI monitored-     Measurements:  Wt Readings from Last 1 Encounters:   12/16/24 68.5 kg (151 lb 0.2 oz)   Body mass index is 23.65 kg/m².    Patient has been screened and assessed by RD.    Malnutrition Type:  Context:    Level:      Malnutrition Characteristic Summary:       Interventions/Recommendations (treatment strategy):  1. Rec'd Continuous EN: Glucerna 1.5 @ 40mL/r increasing by 5 mL q6hrs to goal rate of 55mL/hr with a continuous 40mL FWF to provide 1980kcal (94% EEN), 109g of protein (100% EPN), and 1962mL FW.   2. Néstor BID via PEG tube to promote wound healing and to provide additional nutrition.           - Do not mix Néstor with formula in a tube-feeding bag         - Pour one packet of Néstor in a clean, small container for mixing.           - Add 4 fl oz (120 mL) water at room temperature.           - Mix well with disposable spoon or tongue blade until all particles are completely hydrated.           - Verify correct tube placement.           - Flush feeding tube with 30 mL water.           -Administer Néstor through feeding tube using a 60-mL or larger syringe.           - Flush with an additional 30 mL water.  3. Rec'd ClinimixE 5/20 @ 80mL/hr to provide 1690kcal (80% EEN), 96g of protein (88% EPN), and 1920mL TFV.      -Add 250mL of 20% lipids 3x/week to provide additional calories.      -Check Triglycerides before adding lipids. 4. RD to follow and make rec's accordingly.    12/18 restart tfs today at 1ml/hr to see if can tolerate feeds  12/19 increase tfs as tolerates  12/23 not tolerating tfs- will get tpn orders and start that until can tolerate tfs better - ncrease regaln 10 mg iv q 6hrs  12/24 con ttfs as tolerates - started on tpn for further nutritional support  12/25 FM:  Check KUB.  12/26 FM:  Resume TF today, monitor.  12/27 on tpn - not tolerating tfs at this time  12/28/24:  Albumin infusion today.   12/31:  tube feeds as tolerated    Patient  has protein malnutrition.  Last trend as follows-   Lab Results   Component Value Date    ALBUMIN 0.7 (L) 01/03/2025    ALBUMIN <0.6 (L) 01/02/2025    ALBUMIN <0.6 (L) 01/01/2025 1/2/25 tolerating tfs better with dibhoff tube - cont tpn and IL for now

## 2025-01-03 NOTE — ASSESSMENT & PLAN NOTE
Patient's FSGs are uncontrolled due to hyperglycemia on current medication regimen.  Last A1c reviewed-   Lab Results   Component Value Date    HGBA1C 6.6 (H) 12/16/2024     Most recent fingerstick glucose reviewed-   Recent Labs   Lab 01/02/25  1154 01/02/25  1708 01/02/25  2346 01/03/25  0521   POCTGLUCOSE 132* 135* 193* 225*       Current correctional scale  Low  Maintain anti-hyperglycemic dose as follows-   Antihyperglycemics (From admission, onward)      Start     Stop Route Frequency Ordered    12/30/24 0900  insulin glargine U-100 (Lantus) pen 20 Units         -- SubQ Daily 12/30/24 0734    12/24/24 0807  insulin aspart U-100 pen 0-10 Units         -- SubQ Every 4 hours PRN 12/24/24 0707          Hold Oral hypoglycemics while patient is in the hospital.  12/17 A1c improved to 6.6%  12/24 due to tpn - add lantus 12 u daily - ssi changed to moderate scale  12/25 FM:  BS logs noted, continue coverage.  12/26 FM:  Cont SSI.  12/27 increase lantus to 16u daily  1/3 bs have been better controlled

## 2025-01-04 NOTE — ASSESSMENT & PLAN NOTE
Hyperkalemia is likely due to OTONIEL.The patients most recent potassium results are listed below.  Recent Labs     01/02/25  0342 01/03/25  0436 01/04/25  0354   K 4.8 6.1* 5.5*       Plan  - Monitor for arrhythmias with EKG and/or continuous telemetry.   - Treat the hyperkalemia with Potassium Binders.   - Monitor potassium: Daily  - The patient's hyperkalemia is  being treated

## 2025-01-04 NOTE — NURSING
The patient is resting B/P is increasing slowly. Checked the patient no drainage noted on the pads or between the his legs. Restarted the tube feeding at 20 cc/hr, no flush.

## 2025-01-04 NOTE — PROGRESS NOTES
Pharmacist Renal Dose Adjustment Note    Carlos Chahal is a 34 y.o. male being treated with the medication meropenem    Patient Data:    Vital Signs (Most Recent):  Temp: 97.8 °F (36.6 °C) (01/04/25 0702)  Pulse: 74 (01/04/25 0630)  Resp: (!) 29 (01/04/25 0630)  BP: (!) 83/46 (01/04/25 0630)  SpO2: 100 % (01/04/25 0630) Vital Signs (72h Range):  Temp:  [93 °F (33.9 °C)-102 °F (38.9 °C)]   Pulse:  []   Resp:  [17-41]   BP: ()/(41-65)   SpO2:  [98 %-100 %]      Recent Labs   Lab 01/02/25  0342 01/03/25  0436 01/04/25  0354   CREATININE 1.8* 2.2* 2.4*     Serum creatinine: 2.4 mg/dL (H) 01/04/25 0354  Estimated creatinine clearance: 40.9 mL/min (A)    Medication:meropenem dose: 2000 mg frequency q 8 h will be changed to medication:merpenem dose:2000 mg frequency:q 12 h    Pharmacist's Name: Yumiko Teixeira  Pharmacist's Extension: [phone]

## 2025-01-04 NOTE — NURSING
PRBC's in hand. Unable to scan or release blood. Blood brought back to blood bank. Spoke with Shay. Instructed will need to be Typed and Crossed again. Reports will take an hour to have blood ready.  Dr. Diamond notified. New orders noted.

## 2025-01-04 NOTE — ASSESSMENT & PLAN NOTE
Anemia is likely due to chronic infections Most recent hemoglobin and hematocrit are listed below.  Recent Labs     01/02/25  0342 01/03/25  0436 01/04/25  0354   HGB 8.7* 7.8* 5.9*   HCT 26.0* 23.6* 17.8*       Plan  - Monitor serial CBC: Daily  - Transfuse PRBC if patient becomes hemodynamically unstable, symptomatic or H/H drops below 7/21.  - Patient has not received any PRBC transfusions to date  - Patient's anemia is currently worsening. Will adjust treatment as follows: 2uprbcs today  12/17 h/h Improved  12/20 h/h holding  12/26 FM:  Transfuse today.  12/27 hh improved after transfusion  12/28/24:  Continue daily monitoring.  12/30 h/h stable  12/31: Hemoglobin remained stable  1/1/24:  transfuse today  1/2/25 h/h imp[roved after transfusion  1/3 h/h holding  1/4 trasnfuse 2u prbcs today due to recent bleeding from sacral wound with decrease in h/h and bp

## 2025-01-04 NOTE — PLAN OF CARE
Problem: Skin Injury Risk Increased  Goal: Skin Health and Integrity  Outcome: Not Progressing     Problem: Mechanical Ventilation Invasive  Goal: Mechanical Ventilation Liberation  Outcome: Progressing  Goal: Optimal Nutrition Delivery  Outcome: Not Progressing  Goal: Absence of Device-Related Skin and Tissue Injury  Outcome: Not Progressing  Goal: Absence of Ventilator-Induced Lung Injury  Outcome: Progressing     Problem: Adult Inpatient Plan of Care  Goal: Absence of Hospital-Acquired Illness or Injury  Outcome: Progressing     Problem: Diabetes Comorbidity  Goal: Blood Glucose Level Within Targeted Range  Outcome: Progressing     Problem: Wound  Goal: Optimal Functional Ability  Outcome: Not Progressing  Goal: Absence of Infection Signs and Symptoms  Outcome: Not Progressing  Goal: Skin Health and Integrity  Outcome: Not Progressing  Goal: Optimal Wound Healing  Outcome: Not Progressing     Problem: Infection  Goal: Absence of Infection Signs and Symptoms  Outcome: Not Progressing     Problem: Pneumonia  Goal: Fluid Balance  Outcome: Not Progressing     Problem: Sepsis/Septic Shock  Goal: Absence of Infection Signs and Symptoms  Outcome: Not Progressing       Patient's B/P has been low, wounds are not healing, he is not tolerating nutrition. Urinary output is also low. Patient has lost blood and fluid from his sacral wound.

## 2025-01-04 NOTE — EICU
Intervention Initiated From:  [facility] / EICU    Sandy intervened regarding:  Rounding (Video assessment)    Comments: Video rounds done.  Resting quietly in bed, on vent support (see flowsheet for settings), no acute distress noted.  Bedside RN in room & discussed pt now placed back on levophed for low BP (see MAR).

## 2025-01-04 NOTE — ASSESSMENT & PLAN NOTE
Patient has a diagnosis of pneumonia. The cause of the pneumonia is suspected to be bacterial in etiology but organism is not known. The pneumonia is stable. The patient has the following signs/symptoms of pneumonia: persistent hypoxia  and sputum production. The patient does have a current oxygen requirement and the patient does not have a home oxygen requirement. I have reviewed the pertinent imaging. The following cultures have been collected: Blood cultures and Sputum culture The culture results are listed below.     Current antimicrobial regimen consists of the antibiotics listed below. Will monitor patient closely and continue current treatment plan unchanged.    Antibiotics (From admission, onward)      Start     Stop Route Frequency Ordered    01/04/25 1700  meropenem 2 g in 0.9% NaCl 100 mL IVPB         -- IV Every 12 hours (non-standard times) 01/04/25 0706    01/04/25 1300  ampicillin-sulbactam (UNASYN) 9 g in 0.9% NaCl 250 mL IVPB         -- IV Every 8 hours (non-standard times) 01/04/25 0804    01/03/25 1930  meropenem 2 g in 0.9% NaCl 100 mL IVPB         -- IV Every 8 hours (non-standard times) 01/03/25 1535    12/30/24 1300  erythromycin base tablet 250 mg         -- PER NG TUBE 4 times daily 12/30/24 1111            Microbiology Results (last 7 days)       Procedure Component Value Units Date/Time    Blood culture [[id number]] Collected: 01/02/25 0818    Order Status: Completed Specimen: Blood from Peripheral, Forearm, Left Updated: 01/03/25 2012     Blood Culture, Routine No Growth to date      No Growth to date    Blood culture [[id number]] Collected: 01/02/25 0805    Order Status: Completed Specimen: Blood from Peripheral, Wrist, Left Updated: 01/03/25 2012     Blood Culture, Routine No Growth to date      No Growth to date    Culture, Respiratory with Gram Stain [[id number]]  (Abnormal)  (Susceptibility) Collected: 12/27/24 1522    Order Status: Completed Specimen: Respiratory from Endotracheal  Aspirate Updated: 01/03/25 1146     Respiratory Culture No S aureus or Pseudomonas isolated.      ACINETOBACTER BAUMANNII   Many       Gram Stain (Respiratory) Few Gram positive cocci     Gram Stain (Respiratory) Rare Gram positive rods     Gram Stain (Respiratory) Rare WBC's    Blood culture [[id number]] Collected: 12/27/24 0837    Order Status: Completed Specimen: Blood Updated: 01/01/25 1812     Blood Culture, Routine No growth after 5 days.    Blood culture [[id number]] Collected: 12/27/24 0838    Order Status: Completed Specimen: Blood Updated: 01/01/25 1812     Blood Culture, Routine No growth after 5 days.        12/17 dc zosyn with recent esbl kleb in urine - will change to merrem - cont vanc until final cultures obtained - change vent to simv; add nebs  12/18 cotn merrem and vanc - await final sputum cutlure - cont vent on ac control; nebs - wbc imrpoved slightly; lasix 20mg iv for edema today  12/19  dc vanc - proteus grwoign from sputum - cont merrem and nebs; lasix today - wean rr on vent  12/20 wnc improved - cont merrem - nebs and vent - another dose of lasix today for edema - fio2 increased  12/23 cont iv gent and merren due to sent of multiple bugs (acinetobacter/kleb/proteus)  12/24 cont current abxs  12/25 FM:  Cont GENT  12/26 FM:  Cont GENT, poor prognosis.  12/27 on gent - get new sputum culture today due to elevated wbc - cont vent and nebs  12/28/24:Gram positive cocci/rods on sputum, blood cultures negative   12/29/24:  Gram negative rods on sputum cx.   12/30 await final results of sputum cx  12/31: Tailor antibiotics based on sputum culture results  1/1/24:  tailor based on c/s  1/2/25 still awaitin g final culture reports with I&D of sputum; cont current abxs  1/3 sputum culture grew out actinobacter - cont gent  1/4 changed to unasyn and merrem per pharm id recs for better tissue pentration for actinobacter

## 2025-01-04 NOTE — ASSESSMENT & PLAN NOTE
Patient's FSGs are uncontrolled due to hyperglycemia on current medication regimen.  Last A1c reviewed-   Lab Results   Component Value Date    HGBA1C 6.6 (H) 12/16/2024     Most recent fingerstick glucose reviewed-   Recent Labs   Lab 01/03/25  1147 01/03/25  1718 01/03/25  2324   POCTGLUCOSE 209* 178* 148*       Current correctional scale  Low  Maintain anti-hyperglycemic dose as follows-   Antihyperglycemics (From admission, onward)    Start     Stop Route Frequency Ordered    12/30/24 0900  insulin glargine U-100 (Lantus) pen 20 Units         -- SubQ Daily 12/30/24 0734    12/24/24 0807  insulin aspart U-100 pen 0-10 Units         -- SubQ Every 4 hours PRN 12/24/24 0707        Hold Oral hypoglycemics while patient is in the hospital.  12/17 A1c improved to 6.6%  12/24 due to tpn - add lantus 12 u daily - ssi changed to moderate scale  12/25 FM:  BS logs noted, continue coverage.  12/26 FM:  Cont SSI.  12/27 increase lantus to 16u daily  1/3 bs have been better controlled

## 2025-01-04 NOTE — PROGRESS NOTES
[facility] - Intensive Care  General Surgery  Progress Note  1/3/25    Subjective:     History of Present Illness:  Patient admitted for sepsis with UTI and pneumonia with multiple noted wounds    Post-Op Info:  Procedure(s) (LRB):  DEBRIDEMENT, PRESSURE ULCER (N/A)   3 Days Post-Op     Interval History: No new issues.    TF held for US.  Was tolerating 30ml/hr prior.  Ostomy functioning.    Medications:  Continuous Infusions:   0.9% NaCl   Intravenous Continuous 50 mL/hr at 01/03/25 0601 Rate Verify at 01/03/25 0601    Amino acid 5% - dextrose 20% (CLINIMIX-E) solution with additives. CENTRAL LINE ONLY (1650mOsm/L)  80 mL/hr Intravenous Continuous 80 mL/hr at 01/03/25 1658 New Bag at 01/03/25 1658    D10W   Intravenous Continuous PRN        NORepinephrine bitartrate-D5W  0-3 mcg/kg/min Intravenous Continuous   Stopped at 01/02/25 1714     Scheduled Meds:   acetylcysteine 200 mg/ml (20%)  2 mL Nebulization QID    albuterol sulfate  2.5 mg Nebulization Q4H WAKE    ampicillin-sulbactam  9 g Intravenous Q8H    aspirin  81 mg Per G Tube Daily    collagenase   Topical (Top) Daily    enoxparin  40 mg Subcutaneous Daily    erythromycin base  250 mg Per NG tube QID    famotidine  20 mg Per G Tube Daily    fat emulsion 20%  250 mL Intravenous Every Mon, Wed, Fri    FLUoxetine  20 mg Per G Tube Daily    insulin glargine U-100  20 Units Subcutaneous Daily    levothyroxine  150 mcg Per G Tube Before breakfast    lurasidone  40 mg Per G Tube Daily    magnesium oxide  400 mg Per G Tube BID    meropenem IV (PEDS and ADULTS)  2 g Intravenous Q8H    metoclopramide  10 mg Intravenous Q6H    sodium hypochlorite 0.125%   Topical (Top) Daily    sodium zirconium cyclosilicate  10 g Per G Tube BID    white petrolatum-mineral oiL   Both Eyes QHS     PRN Meds:  Current Facility-Administered Medications:     0.9%  NaCl infusion (for blood administration), , Intravenous, Q24H PRN    acetaminophen, 650 mg, Per G Tube, Q6H PRN    bisacodyL, 10  mg, Other, Daily PRN    D10W, , Intravenous, Continuous PRN    dextrose 50%, 12.5 g, Intravenous, PRN    dextrose 50%, 25 g, Intravenous, PRN    glucagon (human recombinant), 1 mg, Intramuscular, PRN    insulin aspart U-100, 0-10 Units, Subcutaneous, Q4H PRN    melatonin, 6 mg, Per G Tube, Nightly PRN    midazolam, 2 mg, Intravenous, Q1H PRN    propofoL, 0-50 mcg/kg/min, Intravenous, Daily PRN    sodium chloride 0.9%, 10 mL, Intravenous, PRN    Flushing PICC/Midline Protocol, , , Until Discontinued **AND** sodium chloride 0.9%, 10 mL, Intravenous, Q12H PRN     Review of patient's allergies indicates:   Allergen Reactions    Guaifenesin Hives    Codeine Itching     Objective:     Vital Signs (Most Recent):  Temp: 96.8 °F (36 °C) (01/03/25 0702)  Pulse: 75 (01/03/25 1544)  Resp: (!) 26 (01/03/25 1544)  BP: (!) 94/52 (01/03/25 1500)  SpO2: 100 % (01/03/25 1544) Vital Signs (24h Range):  Temp:  [93 °F (33.9 °C)-97.4 °F (36.3 °C)] 96.8 °F (36 °C)  Pulse:  [75-90] 75  Resp:  [24-32] 26  SpO2:  [100 %] 100 %  BP: ()/(50-59) 94/52     Weight: 68.5 kg (151 lb 0.2 oz)  Body mass index is 23.65 kg/m².    Intake/Output - Last 3 Shifts         01/01 0700  01/02 0659 01/02 0700  01/03 0659 01/03 0700  01/04 0659    P.O. 0      I.V. (mL/kg) 1200 (17.5) 1544.2 (22.5)     Blood 335.8      NG/ 546     IV Piggyback       TPN 2204 1930.4     Total Intake(mL/kg) 4607.8 (67.3) 4020.6 (58.7)     Urine (mL/kg/hr) 1075 (0.7) 625 (0.4)     Drains       Other 25 55     Stool 775 375 500    Total Output 1875 1055 500    Net +2732.8 +2965.6 -500                    Physical Exam  Vitals and nursing note reviewed.   Constitutional:       General: He is not in acute distress.     Appearance: He is ill-appearing.      Comments: Thin male; intubated   HENT:      Head: Normocephalic and atraumatic.      Nose:      Comments: NEFT in place L nare     Mouth/Throat:      Mouth: Mucous membranes are moist.   Eyes:      Pupils: Pupils are  equal, round, and reactive to light.   Cardiovascular:      Rate and Rhythm: Normal rate and regular rhythm.   Pulmonary:      Effort: Prolonged expiration present. No respiratory distress.      Breath sounds: Transmitted upper airway sounds present. Rhonchi and rales present.      Comments: Decreased breath sounds at lung bases bilaterally  Abdominal:      General: There is no distension.      Palpations: Abdomen is soft.      Tenderness: There is no abdominal tenderness.      Comments: G tube noted; Ostomy L mid abdomen   Genitourinary:     Comments: Rojas with yellow urine  Musculoskeletal:         General: No swelling.      Cervical back: Normal range of motion and neck supple.   Skin:     General: Skin is warm and dry.      Comments: Wounds to sacrum; groin area as noted on media images  Vac intact per nursing          Significant Labs:  I have reviewed all pertinent lab results within the past 24 hours.  CBC:   Recent Labs   Lab 01/03/25  0436   WBC 19.18*   RBC 2.72*   HGB 7.8*   HCT 23.6*      MCV 87   MCH 28.7   MCHC 33.1     BMP:   Recent Labs   Lab 01/03/25  0436   *   *   K 6.1*   CL 95   CO2 26   BUN 82*   CREATININE 2.2*   CALCIUM 9.6   MG 2.0     CMP:   Recent Labs   Lab 01/03/25  0436   *   CALCIUM 9.6   ALBUMIN 0.7*   PROT 6.0   *   K 6.1*   CO2 26   CL 95   BUN 82*   CREATININE 2.2*   ALKPHOS 1,085*   ALT 52*   *   BILITOT 1.6*       Significant Diagnostics:  I have reviewed all pertinent imaging results/findings within the past 24 hours.  Assessment/Plan:     * Sepsis  Hemodynamic instability again with requirement of pressor support, IVF for volume expansion    Severe malnutrition  Complicated by what appears to be gastroparesis.  Continue nasal feeding tube into his small intestines for post pyloric feeds.   Likely G tube needs to be converted to G-J but just beginning to tolerate feeds  Continue Reglan and Erythromycin.  Dulcolax in ostomy  prn    Pneumonia of right lower lobe due to infectious organism  Antibiotics and vent support per primary  Likely needs trach since unable to progress to spontaneous ventilation  CXR concerning for RLL infiltrate/atelectasis getting CPT  Added mucolytic agent due to continued poor breath sounds    Sacral wound, sequela  Continue VAC therapy   Plan for changes tomorrow at the bedside    Open wound of groin  Wound care written    Urinary tract infection associated with indwelling urethral catheter  Per primary    Type 1 diabetes  Elevated sugars.  Management per primary        Francoise Diaz MD  General Surgery  [facility] - Intensive [facility]

## 2025-01-04 NOTE — ASSESSMENT & PLAN NOTE
Patient has Abnormal Magnesium: hypomagnesemia. Will continue to monitor electrolytes closely. Will replace the affected electrolytes and repeat labs to be done after interventions completed. The patient's magnesium results have been reviewed and are listed below.  Recent Labs   Lab 01/04/25  0354   MG 2.0      12/17 mag imrpoving - repalce mag today  12/19 replace mag today  12/20 mag oxide bid  12/21 2g Mg  12/23 dose of iv mag today to keep mag level about 2  12/25 FM:  Replace, recheck.  12/30 replace mag today  1/3 mag level imporved

## 2025-01-04 NOTE — NURSING
Checked patient's residual through the peg tube. 325 cc of brown fluid withdrawn. Medication given, discarded the residual.    Noted that the patient has serous fluid on the pads, and a pool of blood between his legs. The wound vac is intact, draining bloody drainage, no leaks noted. Contacted Dr. Mcfarland  to inform her ,since she just changed the wound vac dressing.        Turned the patient approximately 200 cc noted, some clotted. The dressing was leaking from the bottom. Replaced some gauze, and reinforced the dressing in the area.          Blood pressure dropped into the low 80's. Restarted the Levophed at 0.05 mcg/kg/min.

## 2025-01-04 NOTE — SUBJECTIVE & OBJECTIVE
Interval History: No new    Medications:  Continuous Infusions:   0.9% NaCl   Intravenous Continuous 50 mL/hr at 01/03/25 0601 Rate Verify at 01/03/25 0601    Amino acid 5% - dextrose 20% (CLINIMIX-E) solution with additives. CENTRAL LINE ONLY (1650mOsm/L)  80 mL/hr Intravenous Continuous 80 mL/hr at 01/03/25 1658 New Bag at 01/03/25 1658    D10W   Intravenous Continuous PRN        NORepinephrine bitartrate-D5W  0-3 mcg/kg/min Intravenous Continuous   Stopped at 01/02/25 1714     Scheduled Meds:   acetylcysteine 200 mg/ml (20%)  2 mL Nebulization QID    albuterol sulfate  2.5 mg Nebulization Q4H WAKE    ampicillin-sulbactam  9 g Intravenous Q8H    aspirin  81 mg Per G Tube Daily    collagenase   Topical (Top) Daily    enoxparin  40 mg Subcutaneous Daily    erythromycin base  250 mg Per NG tube QID    famotidine  20 mg Per G Tube Daily    fat emulsion 20%  250 mL Intravenous Every Mon, Wed, Fri    FLUoxetine  20 mg Per G Tube Daily    insulin glargine U-100  20 Units Subcutaneous Daily    levothyroxine  150 mcg Per G Tube Before breakfast    lurasidone  40 mg Per G Tube Daily    magnesium oxide  400 mg Per G Tube BID    meropenem IV (PEDS and ADULTS)  2 g Intravenous Q8H    metoclopramide  10 mg Intravenous Q6H    sodium hypochlorite 0.125%   Topical (Top) Daily    sodium zirconium cyclosilicate  10 g Per G Tube BID    white petrolatum-mineral oiL   Both Eyes QHS     PRN Meds:  Current Facility-Administered Medications:     0.9%  NaCl infusion (for blood administration), , Intravenous, Q24H PRN    acetaminophen, 650 mg, Per G Tube, Q6H PRN    bisacodyL, 10 mg, Other, Daily PRN    D10W, , Intravenous, Continuous PRN    dextrose 50%, 12.5 g, Intravenous, PRN    dextrose 50%, 25 g, Intravenous, PRN    glucagon (human recombinant), 1 mg, Intramuscular, PRN    insulin aspart U-100, 0-10 Units, Subcutaneous, Q4H PRN    melatonin, 6 mg, Per G Tube, Nightly PRN    midazolam, 2 mg, Intravenous, Q1H PRN    propofoL, 0-50  mcg/kg/min, Intravenous, Daily PRN    sodium chloride 0.9%, 10 mL, Intravenous, PRN    Flushing PICC/Midline Protocol, , , Until Discontinued **AND** sodium chloride 0.9%, 10 mL, Intravenous, Q12H PRN     Review of patient's allergies indicates:   Allergen Reactions    Guaifenesin Hives    Codeine Itching     Objective:     Vital Signs (Most Recent):  Temp: 96.8 °F (36 °C) (01/03/25 0702)  Pulse: 75 (01/03/25 1544)  Resp: (!) 26 (01/03/25 1544)  BP: (!) 94/52 (01/03/25 1500)  SpO2: 100 % (01/03/25 1544) Vital Signs (24h Range):  Temp:  [93 °F (33.9 °C)-97.4 °F (36.3 °C)] 96.8 °F (36 °C)  Pulse:  [75-90] 75  Resp:  [24-32] 26  SpO2:  [100 %] 100 %  BP: ()/(50-59) 94/52     Weight: 68.5 kg (151 lb 0.2 oz)  Body mass index is 23.65 kg/m².    Intake/Output - Last 3 Shifts         01/01 0700  01/02 0659 01/02 0700  01/03 0659 01/03 0700  01/04 0659    P.O. 0      I.V. (mL/kg) 1200 (17.5) 1544.2 (22.5)     Blood 335.8      NG/ 546     IV Piggyback       TPN 2204 1930.4     Total Intake(mL/kg) 4607.8 (67.3) 4020.6 (58.7)     Urine (mL/kg/hr) 1075 (0.7) 625 (0.4)     Drains       Other 25 55     Stool 775 375 500    Total Output 1875 1055 500    Net +2732.8 +2965.6 -500                    Physical Exam  Vitals and nursing note reviewed.   Constitutional:       General: He is not in acute distress.     Appearance: He is ill-appearing.      Comments: Thin male; intubated   HENT:      Head: Normocephalic and atraumatic.      Nose:      Comments: NEFT in place L nare     Mouth/Throat:      Mouth: Mucous membranes are moist.   Eyes:      Pupils: Pupils are equal, round, and reactive to light.   Cardiovascular:      Rate and Rhythm: Normal rate and regular rhythm.   Pulmonary:      Effort: Prolonged expiration present. No respiratory distress.      Breath sounds: Transmitted upper airway sounds present. Rhonchi and rales present.      Comments: Decreased breath sounds at lung bases bilaterally  Abdominal:       General: There is no distension.      Palpations: Abdomen is soft.      Tenderness: There is no abdominal tenderness.      Comments: G tube noted; Ostomy L mid abdomen   Genitourinary:     Comments: Rojas with yellow urine  Musculoskeletal:         General: No swelling.      Cervical back: Normal range of motion and neck supple.   Skin:     General: Skin is warm and dry.      Comments: Wounds to sacrum; groin area as noted on media images  Vac intact per nursing          Significant Labs:  I have reviewed all pertinent lab results within the past 24 hours.  CBC:   Recent Labs   Lab 01/03/25  0436   WBC 19.18*   RBC 2.72*   HGB 7.8*   HCT 23.6*      MCV 87   MCH 28.7   MCHC 33.1     BMP:   Recent Labs   Lab 01/03/25  0436   *   *   K 6.1*   CL 95   CO2 26   BUN 82*   CREATININE 2.2*   CALCIUM 9.6   MG 2.0     CMP:   Recent Labs   Lab 01/03/25  0436   *   CALCIUM 9.6   ALBUMIN 0.7*   PROT 6.0   *   K 6.1*   CO2 26   CL 95   BUN 82*   CREATININE 2.2*   ALKPHOS 1,085*   ALT 52*   *   BILITOT 1.6*       Significant Diagnostics:  I have reviewed all pertinent imaging results/findings within the past 24 hours.

## 2025-01-04 NOTE — ASSESSMENT & PLAN NOTE
Nutrition consulted. Most recent weight and BMI monitored-     Measurements:  Wt Readings from Last 1 Encounters:   12/16/24 68.5 kg (151 lb 0.2 oz)   Body mass index is 23.65 kg/m².    Patient has been screened and assessed by RD.    Malnutrition Type:  Context:    Level:      Malnutrition Characteristic Summary:       Interventions/Recommendations (treatment strategy):  1. Rec'd Continuous EN: Glucerna 1.5 @ 40mL/r increasing by 5 mL q6hrs to goal rate of 55mL/hr with a continuous 40mL FWF to provide 1980kcal (94% EEN), 109g of protein (100% EPN), and 1962mL FW.   2. Néstor BID via PEG tube to promote wound healing and to provide additional nutrition.           - Do not mix Néstor with formula in a tube-feeding bag         - Pour one packet of Néstor in a clean, small container for mixing.           - Add 4 fl oz (120 mL) water at room temperature.           - Mix well with disposable spoon or tongue blade until all particles are completely hydrated.           - Verify correct tube placement.           - Flush feeding tube with 30 mL water.           -Administer Néstor through feeding tube using a 60-mL or larger syringe.           - Flush with an additional 30 mL water.  3. Rec'd ClinimixE 5/20 @ 80mL/hr to provide 1690kcal (80% EEN), 96g of protein (88% EPN), and 1920mL TFV.      -Add 250mL of 20% lipids 3x/week to provide additional calories.      -Check Triglycerides before adding lipids. 4. RD to follow and make rec's accordingly.    12/18 restart tfs today at 1ml/hr to see if can tolerate feeds  12/19 increase tfs as tolerates  12/23 not tolerating tfs- will get tpn orders and start that until can tolerate tfs better - ncrease regaln 10 mg iv q 6hrs  12/24 con ttfs as tolerates - started on tpn for further nutritional support  12/25 FM:  Check KUB.  12/26 FM:  Resume TF today, monitor.  12/27 on tpn - not tolerating tfs at this time  12/28/24:  Albumin infusion today.   12/31:  tube feeds as tolerated    Patient  has protein malnutrition.  Last trend as follows-   Lab Results   Component Value Date    ALBUMIN 0.6 (L) 01/04/2025    ALBUMIN 0.7 (L) 01/03/2025    ALBUMIN <0.6 (L) 01/02/2025 1/2/25 tolerating tfs better with dibhoff tube - cont tpn and IL for now

## 2025-01-04 NOTE — PROGRESS NOTES
[facility] Medicine  Progress Note    Patient Name: Carlos Chahal  MRN: [id number]  Patient Class: IP- Inpatient   Admission Date: 12/15/2024  Length of Stay: 20 days  Attending Physician: Kim Diamond MD  Primary Care Provider: Jose Francisco Klein MD        Subjective     Principal Problem:Sepsis        HPI:  Carlos Chahal is a 34 y.o. male who presents to the ED from nursing home for altered mental status and difficulty breathing.  Patient is found to be hypoxic.  He has a history of anoxic brain injury     This am, pt is on ventilator and levophed at 0.25mcg and ivfs at 75ml/hr.      Spoke with father this am on condition of pt    Overview/Hospital Course:  12/17 ND became more awake yesterday with wound changes - low dose propofol added for pt - able to wean levophed to 0.15mcg.  did tolerate tf last night - check kub this am  12/18 ND pt had to be changed back to ac control last nigth after becoming tachypneic and had low tv.  Tolerating ac mode.  Levophed down to 0.1 mcg  12/19 ND pt toleratign vent - will wean RR today - cont to slowly wean levophed as tolerates - wbc slowly imrpoving - tolerating low dose tfs - will cont to increase tfs as tolerates  12/20 ND tolerating vent - cont to work on feeds and nutritional support  12/21 [place] weekend coverage, in no acute distress, stable vital signs, AC/18/400/5/40%, SpO2 100%. On proprofol for sedation, patient tracks voice and moving head and neck with lid opening. Mild bump in WBC, afebrile, may be associated with the reported residual >200 and tube feeds held. Abdomen, soft and no distension on exam. Will resume as tolerated and monitor.  Blood cx x2, final report no growth. Continue merrem (D5), gentamicin (D2) with mixed MDRO frida from sputum studies and UTI. Replete Mg, continue abx. Continue daily wound care.   12/22 [place] weekend coverage, overnight rate change and tolerating AC12/400/5/40 SpO2 100%, proprofol 15 mcg/kg/min, levophed  0.1mcg/kg/min. Patient without gag reflex on suction. Tube feeds held overnight and resumed, remains on trickle feeds. Ileostomy output 100.   No associated abdominal distension, patient in no distress. Vent settings weaned. Leukocytosis resolved. Continue IV abx for MDRO coverage.   12/23 ND Pt still havign trouble tolerating tfs -change reglan iv; pt is more awake this am - will change to simv for a few hours today   12/24 ND elevated bs with tpn- will add long acting insulin as 12u and restart tpn - ssi adjusted to moderate scale.  12/25 FM:  Holiday coverage, chart reviewed and case discussed with team.  Patient's Levophed is being weaned slowly and he is not tolerating his tube feeds.  Abdominal exam is soft patient has output via ostomy we will check KUB.  Patient is followed by surgery and has a polymicrobial respiratory and urinary tract infection.  Patient has multiple wounds and has a history of anoxic brain injury and is a long-term nursing home resident.  12/26 FM:  Patient is off of vasopressors this morning, patient's KUB noted and will rechallenge tube feedings today.  Patient is tolerating TPN labs noted and his hemoglobin has continued to trend downward Ms. With no visible blood loss.  Patient's other labs noted his white blood cell count is rising despite appropriate antibiotics based on respiratory and urinary cultures.  Patient's prognosis is poor.  12/27 ND pt  still not tolerating tfs - surgery following -  cont tpn; h/h improved after transfusion.  Updated aunt on pts condition  12/28/24:  Patient seen this morning.  Not tolerating tube feedings at this time, remains on vent support.  Poor prognosis at this time.  Patient with poor urine output despite diuretics.  Trial of albumin infusion followed by lasix today.    12/29/24:  Good response to albumin/lasix combo yesterday with good urine output.  Renal function essentially unchanged.  Sputum culture with gram negative rods, susceptibility  pending.  Continue abx for now. Leukocytosis improving, H/H stable.   12/30 ND pt will be switched to simv today to start havign him do more work on breathing.  Awaiting sputum cx results - cxr appears improved and wbc improving.  Still not tolerating tfs  12/31 WC: Patient having ongoing need for ventilator support.  Sputum culture still pending.  Abdominal x-ray reveals no evidence of bowel obstruction.  Tube feeds as tolerated.  1/1/25 WC:  remains vent dependent.  TF as tolerated.  1/2/25 ND PT had to placed back on levophed overnight due to lower bp and had some tachypnea -= abg showed lower pO2 - fio2 increased  tolerating feeds via dibhoff tube.   Also had new temp overnight - leia repeat bc x 2  1/3/25 ND decreased uop yesterday.   Had abd us and reviewed.  Pt off of levophed.   No further temp spike  1/4/25 ND pt had abxs adjusted yesterday for better coverage for actinobacter in lung - current ly on unasyn and merrem per pharm id.  Pt had large blood clot from sacral area last nigth.  And has some decreased bp - back on low dose levophed.          Review of Systems   Unable to perform ROS: Acuity of condition     Objective:     Vital Signs (Most Recent):  Temp: 97.8 °F (36.6 °C) (01/04/25 0702)  Pulse: 77 (01/04/25 0806)  Resp: (!) 31 (01/04/25 0806)  BP: (!) 83/46 (01/04/25 0630)  SpO2: 100 % (01/04/25 0806) Vital Signs (24h Range):  Temp:  [96.9 °F (36.1 °C)-97.9 °F (36.6 °C)] 97.8 °F (36.6 °C)  Pulse:  [69-85] 77  Resp:  [26-33] 31  SpO2:  [100 %] 100 %  BP: ()/(43-55) 83/46     Weight: 68.5 kg (151 lb 0.2 oz)  Body mass index is 23.65 kg/m².    Intake/Output Summary (Last 24 hours) at 1/4/2025 0816  Last data filed at 1/4/2025 0452  Gross per 24 hour   Intake 5119.11 ml   Output 1580 ml   Net 3539.11 ml         Physical Exam  Constitutional:       Appearance: He is ill-appearing.      Comments: Thin male; intubated   HENT:      Mouth/Throat:      Mouth: Mucous membranes are moist.   Eyes:       "Pupils: Pupils are equal, round, and reactive to light.   Cardiovascular:      Rate and Rhythm: Normal rate and regular rhythm.      Heart sounds: Normal heart sounds.   Pulmonary:      Effort: Pulmonary effort is normal.      Breath sounds: Normal breath sounds. Transmitted upper airway sounds present.   Abdominal:      General: There is no distension.      Palpations: Abdomen is soft. There is no mass.      Tenderness: There is no abdominal tenderness.      Comments: Jtube noted; ileostomy noted   Genitourinary:     Comments: Rojas with yellow urine  Musculoskeletal:      Right lower leg: Edema present.      Left lower leg: Edema present.   Lymphadenopathy:      Cervical: No cervical adenopathy.   Skin:     General: Skin is warm and dry.      Comments: Wounds to sacrum with wound vac in place; groin area             Significant Labs: All pertinent labs within the past 24 hours have been reviewed.    Significant Imaging: I have reviewed all pertinent imaging results/findings within the past 24 hours.    Assessment and Plan     * Sepsis  This patient does have evidence of infective focus  My overall impression is septic shock due to MAP < 65 and SBP < 90.  Source: Respiratory and Urinary Tract  Antibiotics given-   Antibiotics (72h ago, onward)      Start     Stop Route Frequency Ordered    01/04/25 1700  meropenem 2 g in 0.9% NaCl 100 mL IVPB         -- IV Every 12 hours (non-standard times) 01/04/25 0706    01/04/25 1300  ampicillin-sulbactam (UNASYN) 9 g in 0.9% NaCl 250 mL IVPB         -- IV Every 8 hours (non-standard times) 01/04/25 0804    01/03/25 1930  meropenem 2 g in 0.9% NaCl 100 mL IVPB         -- IV Every 8 hours (non-standard times) 01/03/25 1535    12/30/24 1300  erythromycin base tablet 250 mg         -- PER NG TUBE 4 times daily 12/30/24 1111          Latest lactate reviewed-  No results for input(s): "LACTATE", "POCLAC" in the last 72 hours.    Organ dysfunction indicated by Acute respiratory " failure and Encephalopathy    Fluid challenge Ideal Body Weight- The patient's ideal body weight is Ideal body weight: 66.1 kg (145 lb 11.6 oz) which will be used to calculate fluid bolus of 30 ml/kg for treatment of septic shock.      Post- resuscitation assessment Yes Perfusion exam was performed within 6 hours of septic shock presentation after bolus shows Inadequate tissue perfusion assessed by non-invasive monitoring       Will Start Pressors- Levophed for MAP of 65  Source control achieved by: iv zosym. Vanc, ivfs    Patient has a leukocytosis.  Last trend as follows-   Lab Results   Component Value Date    WBC 21.36 (H) 01/04/2025    WBC 19.18 (H) 01/03/2025    WBC 15.05 (H) 01/02/2025 12/17 wean levophed as tolerates- abxs changed to merrem, cotn vanc - await culture final reports  12/18 wbc slightly imrpoved - cont iv abxs- await final sputum culture - on merrem fro esbl kleb in urine  12/19 cont iv merrem for esbl kleb in urine and proteus in sputum - dc vanc  12/20 wbc improvign - cotn iv merrem  12/24 wbc normal - cont iv abxs  12/25 FM:  Cont GENT.  12/26 FM:  Cont GENT, prognosis poor, pulm/gu/skin infections.  12/27 repeat bc and sputum culture due to increasing wbc - cont gent; completed 10 days of merrem  12/29/24:  Sputum with gram negative rods, continue gentamicin, susceptibility pending   12/30 wbc improvign  1/2/25 wbc improved but had fever overnight - repeat bcx 2 today - cont iv abxs - back on levophed for bp control  1/3 off of levophed - cont gent; wbc increased but no further temp spikes; bc ng x 1 day  1/4 bc - ng x 2 days - con unasyn and merrem for better actinobacter coverage    Elevated LFTs  Abd us done 1/2/25 and reviewed - slightly improved today; check ggt  1/4 lfts slowly improving    Hyperkalemia  Hyperkalemia is likely due to OTONIEL.The patients most recent potassium results are listed below.  Recent Labs     01/02/25  0342 01/03/25  0436 01/04/25  0354   K 4.8 6.1* 5.5*        Plan  - Monitor for arrhythmias with EKG and/or continuous telemetry.   - Treat the hyperkalemia with Potassium Binders.   - Monitor potassium: Daily  - The patient's hyperkalemia is  being treated            Edema  Lasix 20mg iv bid - monitor I/o  12/30 decreae lasix to daily - edema has imrpoved  1/2/25 give extra of lasix today due to edema  1/3 stop lasix due to worsening kidney fxn    Hypokalemia  Patient's most recent potassium results are listed below.   Recent Labs     01/02/25  0342 01/03/25  0436 01/04/25  0354   K 4.8 6.1* 5.5*       Plan  - Replete potassium per protocol  - Monitor potassium Daily  - Patient's hypokalemia is stable, continue below and monitor  12/20 increase pot bicarb to bid  12/23 decrease pot bicard to daily dosing  12/24 improved- stop potassium bicarb repalcement - monitor    1/3 resolved    Severe malnutrition  Nutrition consulted. Most recent weight and BMI monitored-     Measurements:  Wt Readings from Last 1 Encounters:   12/16/24 68.5 kg (151 lb 0.2 oz)   Body mass index is 23.65 kg/m².    Patient has been screened and assessed by RD.    Malnutrition Type:  Context:    Level:      Malnutrition Characteristic Summary:       Interventions/Recommendations (treatment strategy):  1. Rec'd Continuous EN: Glucerna 1.5 @ 40mL/r increasing by 5 mL q6hrs to goal rate of 55mL/hr with a continuous 40mL FWF to provide 1980kcal (94% EEN), 109g of protein (100% EPN), and 1962mL FW.   2. Néstor BID via PEG tube to promote wound healing and to provide additional nutrition.           - Do not mix Néstor with formula in a tube-feeding bag         - Pour one packet of Néstor in a clean, small container for mixing.           - Add 4 fl oz (120 mL) water at room temperature.           - Mix well with disposable spoon or tongue blade until all particles are completely hydrated.           - Verify correct tube placement.           - Flush feeding tube with 30 mL water.           -Administer Néstor  through feeding tube using a 60-mL or larger syringe.           - Flush with an additional 30 mL water.  3. Rec'd ClinimixE 5/20 @ 80mL/hr to provide 1690kcal (80% EEN), 96g of protein (88% EPN), and 1920mL TFV.      -Add 250mL of 20% lipids 3x/week to provide additional calories.      -Check Triglycerides before adding lipids. 4. RD to follow and make rec's accordingly.    12/18 restart tfs today at 1ml/hr to see if can tolerate feeds  12/19 increase tfs as tolerates  12/23 not tolerating tfs- will get tpn orders and start that until can tolerate tfs better - ncrease regaln 10 mg iv q 6hrs  12/24 con ttfs as tolerates - started on tpn for further nutritional support  12/25 FM:  Check KUB.  12/26 FM:  Resume TF today, monitor.  12/27 on tpn - not tolerating tfs at this time  12/28/24:  Albumin infusion today.   12/31:  tube feeds as tolerated    Patient has protein malnutrition.  Last trend as follows-   Lab Results   Component Value Date    ALBUMIN 0.6 (L) 01/04/2025    ALBUMIN 0.7 (L) 01/03/2025    ALBUMIN <0.6 (L) 01/02/2025 1/2/25 tolerating tfs better with dibhoff tube - cont tpn and IL for now    Hypomagnesemia  Patient has Abnormal Magnesium: hypomagnesemia. Will continue to monitor electrolytes closely. Will replace the affected electrolytes and repeat labs to be done after interventions completed. The patient's magnesium results have been reviewed and are listed below.  Recent Labs   Lab 01/04/25  0354   MG 2.0      12/17 mag imrpoving - repalce mag today  12/19 replace mag today  12/20 mag oxide bid  12/21 2g Mg  12/23 dose of iv mag today to keep mag level about 2  12/25 FM:  Replace, recheck.  12/30 replace mag today  1/3 mag level imporved    Pneumonia of right lower lobe due to infectious organism  Patient has a diagnosis of pneumonia. The cause of the pneumonia is suspected to be bacterial in etiology but organism is not known. The pneumonia is stable. The patient has the following signs/symptoms  of pneumonia: persistent hypoxia  and sputum production. The patient does have a current oxygen requirement and the patient does not have a home oxygen requirement. I have reviewed the pertinent imaging. The following cultures have been collected: Blood cultures and Sputum culture The culture results are listed below.     Current antimicrobial regimen consists of the antibiotics listed below. Will monitor patient closely and continue current treatment plan unchanged.    Antibiotics (From admission, onward)      Start     Stop Route Frequency Ordered    01/04/25 1700  meropenem 2 g in 0.9% NaCl 100 mL IVPB         -- IV Every 12 hours (non-standard times) 01/04/25 0706    01/04/25 1300  ampicillin-sulbactam (UNASYN) 9 g in 0.9% NaCl 250 mL IVPB         -- IV Every 8 hours (non-standard times) 01/04/25 0804    01/03/25 1930  meropenem 2 g in 0.9% NaCl 100 mL IVPB         -- IV Every 8 hours (non-standard times) 01/03/25 1535    12/30/24 1300  erythromycin base tablet 250 mg         -- PER NG TUBE 4 times daily 12/30/24 1111            Microbiology Results (last 7 days)       Procedure Component Value Units Date/Time    Blood culture [[id number]] Collected: 01/02/25 0818    Order Status: Completed Specimen: Blood from Peripheral, Forearm, Left Updated: 01/03/25 2012     Blood Culture, Routine No Growth to date      No Growth to date    Blood culture [[id number]] Collected: 01/02/25 0805    Order Status: Completed Specimen: Blood from Peripheral, Wrist, Left Updated: 01/03/25 2012     Blood Culture, Routine No Growth to date      No Growth to date    Culture, Respiratory with Gram Stain [[id number]]  (Abnormal)  (Susceptibility) Collected: 12/27/24 1522    Order Status: Completed Specimen: Respiratory from Endotracheal Aspirate Updated: 01/03/25 1146     Respiratory Culture No S aureus or Pseudomonas isolated.      ACINETOBACTER BAUMANNII   Many       Gram Stain (Respiratory) Few Gram positive cocci     Gram Stain  (Respiratory) Rare Gram positive rods     Gram Stain (Respiratory) Rare WBC's    Blood culture [[id number]] Collected: 12/27/24 0837    Order Status: Completed Specimen: Blood Updated: 01/01/25 1812     Blood Culture, Routine No growth after 5 days.    Blood culture [[id number]] Collected: 12/27/24 0838    Order Status: Completed Specimen: Blood Updated: 01/01/25 1812     Blood Culture, Routine No growth after 5 days.        12/17 dc zosyn with recent esbl kleb in urine - will change to merrem - cont vanc until final cultures obtained - change vent to simv; add nebs  12/18 cotn merrem and vanc - await final sputum cutlure - cont vent on ac control; nebs - wbc imrpoved slightly; lasix 20mg iv for edema today  12/19  dc vanc - proteus grwoign from sputum - cont merrem and nebs; lasix today - wean rr on vent  12/20 wnc improved - cont merrem - nebs and vent - another dose of lasix today for edema - fio2 increased  12/23 cont iv gent and merren due to sent of multiple bugs (acinetobacter/kleb/proteus)  12/24 cont current abxs  12/25 FM:  Cont GENT  12/26 FM:  Cont GENT, poor prognosis.  12/27 on gent - get new sputum culture today due to elevated wbc - cont vent and nebs  12/28/24:Gram positive cocci/rods on sputum, blood cultures negative   12/29/24:  Gram negative rods on sputum cx.   12/30 await final results of sputum cx  12/31: Tailor antibiotics based on sputum culture results  1/1/24:  tailor based on c/s  1/2/25 still awaitin g final culture reports with I&D of sputum; cont current abxs  1/3 sputum culture grew out actinobacter - cont gent  1/4 changed to unasyn and merrem per pharm id recs for better tissue pentration for actinobacter    Sacral wound, sequela  Local wound care with dakins bid  Iv abxs  12/27 prob debridementt in or on 12/30 1/2/25 sp debridement =- wound vac in place    Open wound of groin  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 - cont iv abxs and local wound care      Microcytic anemia  Anemia  is likely due to chronic infections Most recent hemoglobin and hematocrit are listed below.  Recent Labs     01/02/25  0342 01/03/25  0436 01/04/25  0354   HGB 8.7* 7.8* 5.9*   HCT 26.0* 23.6* 17.8*       Plan  - Monitor serial CBC: Daily  - Transfuse PRBC if patient becomes hemodynamically unstable, symptomatic or H/H drops below 7/21.  - Patient has not received any PRBC transfusions to date  - Patient's anemia is currently worsening. Will adjust treatment as follows: 2uprbcs today  12/17 h/h Improved  12/20 h/h holding  12/26 FM:  Transfuse today.  12/27 hh improved after transfusion  12/28/24:  Continue daily monitoring.  12/30 h/h stable  12/31: Hemoglobin remained stable  1/1/24:  transfuse today  1/2/25 h/h imp[roved after transfusion  1/3 h/h holding  1/4 trasnfuse 2u prbcs today due to recent bleeding from sacral wound with decrease in h/h and bp    History of anoxic brain injury  12/25 FM:  Poor prognosis.      Urinary tract infection associated with indwelling urethral catheter  Await new urine culture =- currently on zosyn/vanc  12/17 change to merrem/vanc  12/18 has esbl klebsiella - cont merrem  12/23 on merrem  12/25 FM:  Continue Gent, CS reviewed.  12/26 FM:  WBC increasing, monitor, on appropriate abx based on cultures.  12/28/24:  Blood culture negative to date, sputum culture with some gram positive cocci and rods.  Continue abx for now.  Gentamicin per pharmacy to dose.     Tailor antibiotics based on culture and sensitivity    Type 1 diabetes  Patient's FSGs are uncontrolled due to hyperglycemia on current medication regimen.  Last A1c reviewed-   Lab Results   Component Value Date    HGBA1C 6.6 (H) 12/16/2024     Most recent fingerstick glucose reviewed-   Recent Labs   Lab 01/03/25  1147 01/03/25  1718 01/03/25  2324   POCTGLUCOSE 209* 178* 148*       Current correctional scale  Low  Maintain anti-hyperglycemic dose as follows-   Antihyperglycemics (From admission, onward)      Start     Stop  Route Frequency Ordered    12/30/24 0900  insulin glargine U-100 (Lantus) pen 20 Units         -- SubQ Daily 12/30/24 0734    12/24/24 0807  insulin aspart U-100 pen 0-10 Units         -- SubQ Every 4 hours PRN 12/24/24 0707          Hold Oral hypoglycemics while patient is in the hospital.  12/17 A1c improved to 6.6%  12/24 due to tpn - add lantus 12 u daily - ssi changed to moderate scale  12/25 FM:  BS logs noted, continue coverage.  12/26 FM:  Cont SSI.  12/27 increase lantus to 16u daily  1/3 bs have been better controlled      VTE Risk Mitigation (From admission, onward)           Ordered     IP VTE LOW RISK PATIENT  Once         12/15/24 1201     Place sequential compression device  Until discontinued         12/15/24 1201                    Discharge Planning   JOSE MANUEL:      Code Status: Full Code   Medical Readiness for Discharge Date:   Discharge Plan A: Return to nursing home   Discharge Delays: None known at this time                    Kim Diamond MD  Department of Hospital Medicine   [facility]

## 2025-01-04 NOTE — ASSESSMENT & PLAN NOTE
Patient's most recent potassium results are listed below.   Recent Labs     01/02/25  0342 01/03/25  0436 01/04/25  0354   K 4.8 6.1* 5.5*       Plan  - Replete potassium per protocol  - Monitor potassium Daily  - Patient's hypokalemia is stable, continue below and monitor  12/20 increase pot bicarb to bid  12/23 decrease pot bicard to daily dosing  12/24 improved- stop potassium bicarb repalcement - monitor    1/3 resolved

## 2025-01-04 NOTE — SUBJECTIVE & OBJECTIVE
Review of Systems   Unable to perform ROS: Acuity of condition     Objective:     Vital Signs (Most Recent):  Temp: 97.8 °F (36.6 °C) (01/04/25 0702)  Pulse: 77 (01/04/25 0806)  Resp: (!) 31 (01/04/25 0806)  BP: (!) 83/46 (01/04/25 0630)  SpO2: 100 % (01/04/25 0806) Vital Signs (24h Range):  Temp:  [96.9 °F (36.1 °C)-97.9 °F (36.6 °C)] 97.8 °F (36.6 °C)  Pulse:  [69-85] 77  Resp:  [26-33] 31  SpO2:  [100 %] 100 %  BP: ()/(43-55) 83/46     Weight: 68.5 kg (151 lb 0.2 oz)  Body mass index is 23.65 kg/m².    Intake/Output Summary (Last 24 hours) at 1/4/2025 0816  Last data filed at 1/4/2025 0452  Gross per 24 hour   Intake 5119.11 ml   Output 1580 ml   Net 3539.11 ml         Physical Exam  Constitutional:       Appearance: He is ill-appearing.      Comments: Thin male; intubated   HENT:      Mouth/Throat:      Mouth: Mucous membranes are moist.   Eyes:      Pupils: Pupils are equal, round, and reactive to light.   Cardiovascular:      Rate and Rhythm: Normal rate and regular rhythm.      Heart sounds: Normal heart sounds.   Pulmonary:      Effort: Pulmonary effort is normal.      Breath sounds: Normal breath sounds. Transmitted upper airway sounds present.   Abdominal:      General: There is no distension.      Palpations: Abdomen is soft. There is no mass.      Tenderness: There is no abdominal tenderness.      Comments: Jtube noted; ileostomy noted   Genitourinary:     Comments: Rojas with yellow urine  Musculoskeletal:      Right lower leg: Edema present.      Left lower leg: Edema present.   Lymphadenopathy:      Cervical: No cervical adenopathy.   Skin:     General: Skin is warm and dry.      Comments: Wounds to sacrum with wound vac in place; groin area             Significant Labs: All pertinent labs within the past 24 hours have been reviewed.    Significant Imaging: I have reviewed all pertinent imaging results/findings within the past 24 hours.

## 2025-01-04 NOTE — ASSESSMENT & PLAN NOTE
"This patient does have evidence of infective focus  My overall impression is septic shock due to MAP < 65 and SBP < 90.  Source: Respiratory and Urinary Tract  Antibiotics given-   Antibiotics (72h ago, onward)      Start     Stop Route Frequency Ordered    01/04/25 1700  meropenem 2 g in 0.9% NaCl 100 mL IVPB         -- IV Every 12 hours (non-standard times) 01/04/25 0706    01/04/25 1300  ampicillin-sulbactam (UNASYN) 9 g in 0.9% NaCl 250 mL IVPB         -- IV Every 8 hours (non-standard times) 01/04/25 0804    01/03/25 1930  meropenem 2 g in 0.9% NaCl 100 mL IVPB         -- IV Every 8 hours (non-standard times) 01/03/25 1535    12/30/24 1300  erythromycin base tablet 250 mg         -- PER NG TUBE 4 times daily 12/30/24 1111          Latest lactate reviewed-  No results for input(s): "LACTATE", "POCLAC" in the last 72 hours.    Organ dysfunction indicated by Acute respiratory failure and Encephalopathy    Fluid challenge Ideal Body Weight- The patient's ideal body weight is Ideal body weight: 66.1 kg (145 lb 11.6 oz) which will be used to calculate fluid bolus of 30 ml/kg for treatment of septic shock.      Post- resuscitation assessment Yes Perfusion exam was performed within 6 hours of septic shock presentation after bolus shows Inadequate tissue perfusion assessed by non-invasive monitoring       Will Start Pressors- Levophed for MAP of 65  Source control achieved by: iv zosym. Vanc, ivfs    Patient has a leukocytosis.  Last trend as follows-   Lab Results   Component Value Date    WBC 21.36 (H) 01/04/2025    WBC 19.18 (H) 01/03/2025    WBC 15.05 (H) 01/02/2025 12/17 wean levophed as tolerates- abxs changed to merrem, cotn vanc - await culture final reports  12/18 wbc slightly imrpoved - cont iv abxs- await final sputum culture - on merrem fro esbl kleb in urine  12/19 cont iv merrem for esbl kleb in urine and proteus in sputum - dc vanc  12/20 wbc improvign - cotn iv merrem  12/24 wbc normal - cont iv " abxs  12/25 FM:  Cont GENT.  12/26 FM:  Cont GENT, prognosis poor, pulm/gu/skin infections.  12/27 repeat bc and sputum culture due to increasing wbc - cont gent; completed 10 days of merrem  12/29/24:  Sputum with gram negative rods, continue gentamicin, susceptibility pending   12/30 wbc improvign  1/2/25 wbc improved but had fever overnight - repeat bcx 2 today - cont iv abxs - back on levophed for bp control  1/3 off of levophed - cont gent; wbc increased but no further temp spikes; bc ng x 1 day  1/4 bc - ng x 2 days - con unasyn and merrem for better actinobacter coverage

## 2025-01-05 NOTE — ASSESSMENT & PLAN NOTE
Hyperkalemia is likely due to OTONIEL.The patients most recent potassium results are listed below.  Recent Labs     01/03/25  0436 01/04/25  0354 01/05/25  0355   K 6.1* 5.5* 5.1       Plan  - Monitor for arrhythmias with EKG and/or continuous telemetry.   - Treat the hyperkalemia with Potassium Binders.   - Monitor potassium: Daily  - The patient's hyperkalemia is  being treated  1/5 imrpovign with katerinekema

## 2025-01-05 NOTE — PROGRESS NOTES
[facility]  Progress Note    Patient Name: Carlos Chahal  MRN: [id number]  Patient Class: IP- Inpatient   Admission Date: 12/15/2024  Length of Stay: 21 days  Attending Physician: Kim Diamond MD  Primary Care Provider: Jose Francisco Klein MD        Subjective     Principal Problem:Sepsis        HPI:  Carlos Chahal is a 34 y.o. male who presents to the ED from nursing home for altered mental status and difficulty breathing.  Patient is found to be hypoxic.  He has a history of anoxic brain injury     This am, pt is on ventilator and levophed at 0.25mcg and ivfs at 75ml/hr.      Spoke with father this am on condition of pt    Overview/Hospital Course:  12/17 ND became more awake yesterday with wound changes - low dose propofol added for pt - able to wean levophed to 0.15mcg.  did tolerate tf last night - check kub this am  12/18 ND pt had to be changed back to ac control last nigth after becoming tachypneic and had low tv.  Tolerating ac mode.  Levophed down to 0.1 mcg  12/19 ND pt toleratign vent - will wean RR today - cont to slowly wean levophed as tolerates - wbc slowly imrpoving - tolerating low dose tfs - will cont to increase tfs as tolerates  12/20 ND tolerating vent - cont to work on feeds and nutritional support  12/21 [place] weekend coverage, in no acute distress, stable vital signs, AC/18/400/5/40%, SpO2 100%. On proprofol for sedation, patient tracks voice and moving head and neck with lid opening. Mild bump in WBC, afebrile, may be associated with the reported residual >200 and tube feeds held. Abdomen, soft and no distension on exam. Will resume as tolerated and monitor.  Blood cx x2, final report no growth. Continue merrem (D5), gentamicin (D2) with mixed MDRO frida from sputum studies and UTI. Replete Mg, continue abx. Continue daily wound care.   12/22 [place] weekend coverage, overnight rate change and tolerating AC12/400/5/40 SpO2 100%, proprofol 15 mcg/kg/min, levophed  0.1mcg/kg/min. Patient without gag reflex on suction. Tube feeds held overnight and resumed, remains on trickle feeds. Ileostomy output 100.   No associated abdominal distension, patient in no distress. Vent settings weaned. Leukocytosis resolved. Continue IV abx for MDRO coverage.   12/23 ND Pt still havign trouble tolerating tfs -change reglan iv; pt is more awake this am - will change to simv for a few hours today   12/24 ND elevated bs with tpn- will add long acting insulin as 12u and restart tpn - ssi adjusted to moderate scale.  12/25 FM:  Holiday coverage, chart reviewed and case discussed with team.  Patient's Levophed is being weaned slowly and he is not tolerating his tube feeds.  Abdominal exam is soft patient has output via ostomy we will check KUB.  Patient is followed by surgery and has a polymicrobial respiratory and urinary tract infection.  Patient has multiple wounds and has a history of anoxic brain injury and is a long-term nursing home resident.  12/26 FM:  Patient is off of vasopressors this morning, patient's KUB noted and will rechallenge tube feedings today.  Patient is tolerating TPN labs noted and his hemoglobin has continued to trend downward Ms. With no visible blood loss.  Patient's other labs noted his white blood cell count is rising despite appropriate antibiotics based on respiratory and urinary cultures.  Patient's prognosis is poor.  12/27 ND pt  still not tolerating tfs - surgery following -  cont tpn; h/h improved after transfusion.  Updated aunt on pts condition  12/28/24:  Patient seen this morning.  Not tolerating tube feedings at this time, remains on vent support.  Poor prognosis at this time.  Patient with poor urine output despite diuretics.  Trial of albumin infusion followed by lasix today.    12/29/24:  Good response to albumin/lasix combo yesterday with good urine output.  Renal function essentially unchanged.  Sputum culture with gram negative rods, susceptibility  pending.  Continue abx for now. Leukocytosis improving, H/H stable.   12/30 ND pt will be switched to simv today to start havign him do more work on breathing.  Awaiting sputum cx results - cxr appears improved and wbc improving.  Still not tolerating tfs  12/31 WC: Patient having ongoing need for ventilator support.  Sputum culture still pending.  Abdominal x-ray reveals no evidence of bowel obstruction.  Tube feeds as tolerated.  1/1/25 WC:  remains vent dependent.  TF as tolerated.  1/2/25 ND PT had to placed back on levophed overnight due to lower bp and had some tachypnea -= abg showed lower pO2 - fio2 increased  tolerating feeds via dibhoff tube.   Also had new temp overnight - leia repeat bc x 2  1/3/25 ND decreased uop yesterday.   Had abd us and reviewed.  Pt off of levophed.   No further temp spike  1/4/25 ND pt had abxs adjusted yesterday for better coverage for actinobacter in lung - current ly on unasyn and merrem per pharm id.  Pt had large blood clot from sacral area last nigth.  And has some decreased bp - back on low dose levophed.    1/5/25 ND pt still not toleratign tube feeds overnight.  Sp 2 uprbcs yesterday with improvement in h/h.  Havign more edema and more blistering of skin with the edema.   Updated aunt on pts condition and that he has poor prognosis - will discuss with his dad  about poss dnr status        Review of Systems   Unable to perform ROS: Acuity of condition     Objective:     Vital Signs (Most Recent):  Temp: 97.7 °F (36.5 °C) (01/05/25 0713)  Pulse: 69 (01/05/25 0727)  Resp: (!) 31 (01/05/25 0727)  BP: (!) 93/50 (01/05/25 0630)  SpO2: 99 % (01/05/25 0727) Vital Signs (24h Range):  Temp:  [97 °F (36.1 °C)-97.8 °F (36.6 °C)] 97.7 °F (36.5 °C)  Pulse:  [64-78] 69  Resp:  [27-35] 31  SpO2:  [96 %-100 %] 99 %  BP: ()/(49-60) 93/50     Weight: 68.5 kg (151 lb 0.2 oz)  Body mass index is 23.65 kg/m².    Intake/Output Summary (Last 24 hours) at 1/5/2025 0800  Last data filed at  1/5/2025 0523  Gross per 24 hour   Intake 5807.66 ml   Output 2100 ml   Net 3707.66 ml         Physical Exam  Constitutional:       Appearance: He is ill-appearing.      Comments: Thin male; intubated   HENT:      Mouth/Throat:      Mouth: Mucous membranes are moist.   Eyes:      Pupils: Pupils are equal, round, and reactive to light.   Cardiovascular:      Rate and Rhythm: Normal rate and regular rhythm.      Heart sounds: Normal heart sounds.   Pulmonary:      Effort: Pulmonary effort is normal.      Breath sounds: Normal breath sounds. Transmitted upper airway sounds present.   Abdominal:      General: There is no distension.      Palpations: Abdomen is soft. There is no mass.      Tenderness: There is no abdominal tenderness.      Comments: Jtube noted; ileostomy noted   Genitourinary:     Comments: Rojas with yellow urine  Musculoskeletal:      Right lower leg: Edema present.      Left lower leg: Edema present.   Lymphadenopathy:      Cervical: No cervical adenopathy.   Skin:     General: Skin is warm and dry.      Comments: Wounds to sacrum with wound vac in place; groin area             Significant Labs: All pertinent labs within the past 24 hours have been reviewed.    Significant Imaging: I have reviewed all pertinent imaging results/findings within the past 24 hours.    Assessment and Plan     * Sepsis  This patient does have evidence of infective focus  My overall impression is septic shock due to MAP < 65 and SBP < 90.  Source: Respiratory and Urinary Tract  Antibiotics given-   Antibiotics (72h ago, onward)      Start     Stop Route Frequency Ordered    01/05/25 0900  meropenem 2 g in 0.9% NaCl 100 mL IVPB         -- IV Every 12 hours (non-standard times) 01/05/25 0731    01/04/25 1300  ampicillin-sulbactam (UNASYN) 9 g in 0.9% NaCl 250 mL IVPB         -- IV Every 8 hours (non-standard times) 01/04/25 0804    12/30/24 1300  erythromycin base tablet 250 mg         -- PER NG TUBE 4 times daily 12/30/24  "1111          Latest lactate reviewed-  No results for input(s): "LACTATE", "POCLAC" in the last 72 hours.    Organ dysfunction indicated by Acute respiratory failure and Encephalopathy    Fluid challenge Ideal Body Weight- The patient's ideal body weight is Ideal body weight: 66.1 kg (145 lb 11.6 oz) which will be used to calculate fluid bolus of 30 ml/kg for treatment of septic shock.      Post- resuscitation assessment Yes Perfusion exam was performed within 6 hours of septic shock presentation after bolus shows Inadequate tissue perfusion assessed by non-invasive monitoring       Will Start Pressors- Levophed for MAP of 65  Source control achieved by: iv zosym. Vanc, ivfs    Patient has a leukocytosis.  Last trend as follows-   Lab Results   Component Value Date    WBC 20.33 (H) 01/05/2025    WBC 21.36 (H) 01/04/2025    WBC 19.18 (H) 01/03/2025 12/17 wean levophed as tolerates- abxs changed to merrem, cotn vanc - await culture final reports  12/18 wbc slightly imrpoved - cont iv abxs- await final sputum culture - on merrem fro esbl kleb in urine  12/19 cont iv merrem for esbl kleb in urine and proteus in sputum - dc vanc  12/20 wbc improvign - cotn iv merrem  12/24 wbc normal - cont iv abxs  12/25 FM:  Cont GENT.  12/26 FM:  Cont GENT, prognosis poor, pulm/gu/skin infections.  12/27 repeat bc and sputum culture due to increasing wbc - cont gent; completed 10 days of merrem  12/29/24:  Sputum with gram negative rods, continue gentamicin, susceptibility pending   12/30 wbc improvign  1/2/25 wbc improved but had fever overnight - repeat bcx 2 today - cont iv abxs - back on levophed for bp control  1/3 off of levophed - cont gent; wbc increased but no further temp spikes; bc ng x 1 day  1/4 bc - ng x 2 days - con unasyn and merrem for better actinobacter coverage    Elevated LFTs  Abd us done 1/2/25 and reviewed - slightly improved today; check ggt  1/4 lfts slowly improving    Hyperkalemia  Hyperkalemia is " likely due to OTONIEL.The patients most recent potassium results are listed below.  Recent Labs     01/03/25  0436 01/04/25  0354 01/05/25  0355   K 6.1* 5.5* 5.1       Plan  - Monitor for arrhythmias with EKG and/or continuous telemetry.   - Treat the hyperkalemia with Potassium Binders.   - Monitor potassium: Daily  - The patient's hyperkalemia is  being treated  1/5 imrpovign with lokema          Edema  Lasix 20mg iv bid - monitor I/o  12/30 decreae lasix to daily - edema has imrpoved  1/2/25 give extra of lasix today due to edema  1/3 stop lasix due to worsening kidney fxn  1/5 give dose of albumin then lasix push to help edema    Hypokalemia  Patient's most recent potassium results are listed below.   Recent Labs     01/03/25  0436 01/04/25  0354 01/05/25  0355   K 6.1* 5.5* 5.1       Plan  - Replete potassium per protocol  - Monitor potassium Daily  - Patient's hypokalemia is stable, continue below and monitor  12/20 increase pot bicarb to bid  12/23 decrease pot bicard to daily dosing  12/24 improved- stop potassium bicarb repalcement - monitor    1/3 resolved    Severe malnutrition  Nutrition consulted. Most recent weight and BMI monitored-     Measurements:  Wt Readings from Last 1 Encounters:   12/16/24 68.5 kg (151 lb 0.2 oz)   Body mass index is 23.65 kg/m².    Patient has been screened and assessed by RD.    Malnutrition Type:  Context:    Level:      Malnutrition Characteristic Summary:       Interventions/Recommendations (treatment strategy):  1. Rec'd Continuous EN: Glucerna 1.5 @ 40mL/r increasing by 5 mL q6hrs to goal rate of 55mL/hr with a continuous 40mL FWF to provide 1980kcal (94% EEN), 109g of protein (100% EPN), and 1962mL FW.   2. Néstor BID via PEG tube to promote wound healing and to provide additional nutrition.           - Do not mix Néstor with formula in a tube-feeding bag         - Pour one packet of Néstor in a clean, small container for mixing.           - Add 4 fl oz (120 mL) water at  room temperature.           - Mix well with disposable spoon or tongue blade until all particles are completely hydrated.           - Verify correct tube placement.           - Flush feeding tube with 30 mL water.           -Administer Néstor through feeding tube using a 60-mL or larger syringe.           - Flush with an additional 30 mL water.  3. Rec'd ClinimixE 5/20 @ 80mL/hr to provide 1690kcal (80% EEN), 96g of protein (88% EPN), and 1920mL TFV.      -Add 250mL of 20% lipids 3x/week to provide additional calories.      -Check Triglycerides before adding lipids. 4. RD to follow and make rec's accordingly.    12/18 restart tfs today at 1ml/hr to see if can tolerate feeds  12/19 increase tfs as tolerates  12/23 not tolerating tfs- will get tpn orders and start that until can tolerate tfs better - ncrease regaln 10 mg iv q 6hrs  12/24 con ttfs as tolerates - started on tpn for further nutritional support  12/25 FM:  Check KUB.  12/26 FM:  Resume TF today, monitor.  12/27 on tpn - not tolerating tfs at this time  12/28/24:  Albumin infusion today.   12/31:  tube feeds as tolerated    Patient has protein malnutrition.  Last trend as follows-   Lab Results   Component Value Date    ALBUMIN 0.6 (L) 01/05/2025    ALBUMIN 0.6 (L) 01/04/2025    ALBUMIN 0.7 (L) 01/03/2025 1/2/25 tolerating tfs better with dibhoff tube - cont tpn and IL for now  1/5 will attempt tube feeds again and cont as tolerates    Hypomagnesemia  Patient has Abnormal Magnesium: hypomagnesemia. Will continue to monitor electrolytes closely. Will replace the affected electrolytes and repeat labs to be done after interventions completed. The patient's magnesium results have been reviewed and are listed below.  Recent Labs   Lab 01/05/25  0355   MG 2.1      12/17 mag imrpoving - repalce mag today  12/19 replace mag today  12/20 mag oxide bid  12/21 2g Mg  12/23 dose of iv mag today to keep mag level about 2  12/25 FM:  Replace, recheck.  12/30 replace  mag today  1/3 mag level imporved    Pneumonia of right lower lobe due to infectious organism  Patient has a diagnosis of pneumonia. The cause of the pneumonia is suspected to be bacterial in etiology but organism is not known. The pneumonia is stable. The patient has the following signs/symptoms of pneumonia: persistent hypoxia  and sputum production. The patient does have a current oxygen requirement and the patient does not have a home oxygen requirement. I have reviewed the pertinent imaging. The following cultures have been collected: Blood cultures and Sputum culture The culture results are listed below.     Current antimicrobial regimen consists of the antibiotics listed below. Will monitor patient closely and continue current treatment plan unchanged.    Antibiotics (From admission, onward)      Start     Stop Route Frequency Ordered    01/05/25 0900  meropenem 2 g in 0.9% NaCl 100 mL IVPB         -- IV Every 12 hours (non-standard times) 01/05/25 0731    01/04/25 1300  ampicillin-sulbactam (UNASYN) 9 g in 0.9% NaCl 250 mL IVPB         -- IV Every 8 hours (non-standard times) 01/04/25 0804    12/30/24 1300  erythromycin base tablet 250 mg         -- PER NG TUBE 4 times daily 12/30/24 1111            Microbiology Results (last 7 days)       Procedure Component Value Units Date/Time    Blood culture [[id number]] Collected: 01/02/25 0818    Order Status: Completed Specimen: Blood from Peripheral, Forearm, Left Updated: 01/04/25 2012     Blood Culture, Routine No Growth to date      No Growth to date      No Growth to date    Blood culture [[id number]] Collected: 01/02/25 0805    Order Status: Completed Specimen: Blood from Peripheral, Wrist, Left Updated: 01/04/25 2012     Blood Culture, Routine No Growth to date      No Growth to date      No Growth to date    Culture, Respiratory with Gram Stain [[id number]]  (Abnormal)  (Susceptibility) Collected: 12/27/24 1522    Order Status: Completed Specimen:  Respiratory from Endotracheal Aspirate Updated: 01/03/25 1146     Respiratory Culture No S aureus or Pseudomonas isolated.      ACINETOBACTER BAUMANNII   Many       Gram Stain (Respiratory) Few Gram positive cocci     Gram Stain (Respiratory) Rare Gram positive rods     Gram Stain (Respiratory) Rare WBC's    Blood culture [[id number]] Collected: 12/27/24 0837    Order Status: Completed Specimen: Blood Updated: 01/01/25 1812     Blood Culture, Routine No growth after 5 days.    Blood culture [[id number]] Collected: 12/27/24 0838    Order Status: Completed Specimen: Blood Updated: 01/01/25 1812     Blood Culture, Routine No growth after 5 days.        12/17 dc zosyn with recent esbl kleb in urine - will change to merrem - cont vanc until final cultures obtained - change vent to simv; add nebs  12/18 cotn merrem and vanc - await final sputum cutlure - cont vent on ac control; nebs - wbc imrpoved slightly; lasix 20mg iv for edema today  12/19  dc vanc - proteus grwoign from sputum - cont merrem and nebs; lasix today - wean rr on vent  12/20 wnc improved - cont merrem - nebs and vent - another dose of lasix today for edema - fio2 increased  12/23 cont iv gent and merren due to sent of multiple bugs (acinetobacter/kleb/proteus)  12/24 cont current abxs  12/25 FM:  Cont GENT  12/26 FM:  Cont GENT, poor prognosis.  12/27 on gent - get new sputum culture today due to elevated wbc - cont vent and nebs  12/28/24:Gram positive cocci/rods on sputum, blood cultures negative   12/29/24:  Gram negative rods on sputum cx.   12/30 await final results of sputum cx  12/31: Tailor antibiotics based on sputum culture results  1/1/24:  tailor based on c/s  1/2/25 still awaitin g final culture reports with I&D of sputum; cont current abxs  1/3 sputum culture grew out actinobacter - cont gent  1/4 changed to unasyn and merrem per pharm id recs for better tissue pentration for actinobacter    Sacral wound, sequela  Local wound care with  dakins bid  Iv abxs  12/27 prob debridementt in or on 12/30 1/2/25 sp debridement =- wound vac in place    Open wound of groin  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  Recent Labs     01/03/25  0436 01/04/25  0354 01/05/25  0355   HGB 7.8* 5.9* 8.1*   HCT 23.6* 17.8* 23.7*       Plan  - Monitor serial CBC: Daily  - Transfuse PRBC if patient becomes hemodynamically unstable, symptomatic or H/H drops below 7/21.  - Patient has not received any PRBC transfusions to date  - Patient's anemia is currently worsening. Will adjust treatment as follows: 2uprbcs today  12/17 h/h Improved  12/20 h/h holding  12/26 FM:  Transfuse today.  12/27 hh improved after transfusion  12/28/24:  Continue daily monitoring.  12/30 h/h stable  12/31: Hemoglobin remained stable  1/1/24:  transfuse today  1/2/25 h/h imp[roved after transfusion  1/3 h/h holding  1/4 trasnfuse 2u prbcs today due to recent bleeding from sacral wound with decrease in h/h and bp  1/5 sp 2 uprbcs yesterday with improvement in h/h    History of anoxic brain injury  12/25 FM:  Poor prognosis.      Urinary tract infection associated with indwelling urethral catheter  Await new urine culture =- currently on zosyn/vanc  12/17 change to merrem/vanc  12/18 has esbl klebsiella - cont merrem  12/23 on merrem  12/25 FM:  Continue Gent, CS reviewed.  12/26 FM:  WBC increasing, monitor, on appropriate abx based on cultures.  12/28/24:  Blood culture negative to date, sputum culture with some gram positive cocci and rods.  Continue abx for now.  Gentamicin per pharmacy to dose.     Tailor antibiotics based on culture and sensitivity    Type 1 diabetes  Patient's FSGs are uncontrolled due to hyperglycemia on current medication regimen.  Last A1c reviewed-   Lab Results   Component Value Date    HGBA1C 6.6 (H) 12/16/2024      Most recent fingerstick glucose reviewed-   Recent Labs   Lab 01/04/25  1140 01/04/25  1806 01/04/25  2347   POCTGLUCOSE 212* 141* 133*       Current correctional scale  Low  Maintain anti-hyperglycemic dose as follows-   Antihyperglycemics (From admission, onward)      Start     Stop Route Frequency Ordered    12/30/24 0900  insulin glargine U-100 (Lantus) pen 20 Units         -- SubQ Daily 12/30/24 0734    12/24/24 0807  insulin aspart U-100 pen 0-10 Units         -- SubQ Every 4 hours PRN 12/24/24 0707          Hold Oral hypoglycemics while patient is in the hospital.  12/17 A1c improved to 6.6%  12/24 due to tpn - add lantus 12 u daily - ssi changed to moderate scale  12/25 FM:  BS logs noted, continue coverage.  12/26 FM:  Cont SSI.  12/27 increase lantus to 16u daily  1/3 bs have been better controlled      VTE Risk Mitigation (From admission, onward)           Ordered     IP VTE LOW RISK PATIENT  Once         12/15/24 1201     Place sequential compression device  Until discontinued         12/15/24 1201                    Discharge Planning   JOSE MANUEL:      Code Status: Full Code   Medical Readiness for Discharge Date:   Discharge Plan A: Return to nursing home   Discharge Delays: None known at this time                    Kim Diamond MD  Department of Hospital Medicine   [place] - Intensive Care

## 2025-01-05 NOTE — ASSESSMENT & PLAN NOTE
Patient's most recent potassium results are listed below.   Recent Labs     01/03/25  0436 01/04/25  0354 01/05/25  0355   K 6.1* 5.5* 5.1       Plan  - Replete potassium per protocol  - Monitor potassium Daily  - Patient's hypokalemia is stable, continue below and monitor  12/20 increase pot bicarb to bid  12/23 decrease pot bicard to daily dosing  12/24 improved- stop potassium bicarb repalcement - monitor    1/3 resolved

## 2025-01-05 NOTE — NURSING
Spoke with Dr. Diamond. Hold IVF and abx during blood transfusion. Will check with pharmacy to retime abx.

## 2025-01-05 NOTE — NURSING
Patient's decubitus with the sacral wound vac is leaking bloody drainage again , changed the pads, no leak in the wound vac.

## 2025-01-05 NOTE — SUBJECTIVE & OBJECTIVE
Interval History: Pressors weaned off again.  Dressing reinforced inferior where bleeding noted.Intermittent increased volumes with feeds.      Medications:  Continuous Infusions:   0.9% NaCl   Intravenous Continuous 50 mL/hr at 01/04/25 1821 Rate Verify at 01/04/25 1821    Amino acid 5% - dextrose 20% (CLINIMIX-E) solution  (1L provides 50gm AA, 200gm CHO (680 kcal/L dextrose), Na 35, K 30, Mg 5, Ca 4.5, Acetate 80, Cl 39, Phos 15) (880 total kcal/L)   Intravenous Continuous 80 mL/hr at 01/04/25 1821 Rate Verify at 01/04/25 1821    D10W   Intravenous Continuous PRN        NORepinephrine bitartrate-D5W  0-3 mcg/kg/min Intravenous Continuous 12.8 mL/hr at 01/04/25 1821 0.05 mcg/kg/min at 01/04/25 1821     Scheduled Meds:   acetylcysteine 200 mg/ml (20%)  2 mL Nebulization QID    albuterol sulfate  2.5 mg Nebulization Q4H WAKE    ampicillin-sulbactam  9 g Intravenous Q8H    collagenase   Topical (Top) Daily    erythromycin base  250 mg Per NG tube QID    famotidine  20 mg Per G Tube Daily    FLUoxetine  20 mg Per G Tube Daily    insulin glargine U-100  20 Units Subcutaneous Daily    levothyroxine  150 mcg Per G Tube Before breakfast    lurasidone  40 mg Per G Tube Daily    magnesium oxide  400 mg Per G Tube BID    meropenem IV (PEDS and ADULTS)  2 g Intravenous Q12H    metoclopramide  10 mg Intravenous Q6H    sodium hypochlorite 0.125%   Topical (Top) Daily    sodium zirconium cyclosilicate  10 g Per G Tube BID    white petrolatum-mineral oiL   Both Eyes QHS     PRN Meds:  Current Facility-Administered Medications:     0.9%  NaCl infusion (for blood administration), , Intravenous, Q24H PRN    acetaminophen, 650 mg, Per G Tube, Q6H PRN    bisacodyL, 10 mg, Other, Daily PRN    D10W, , Intravenous, Continuous PRN    dextrose 50%, 12.5 g, Intravenous, PRN    dextrose 50%, 25 g, Intravenous, PRN    glucagon (human recombinant), 1 mg, Intramuscular, PRN    insulin aspart U-100, 0-10 Units, Subcutaneous, Q4H PRN     melatonin, 6 mg, Per G Tube, Nightly PRN    midazolam, 2 mg, Intravenous, Q1H PRN    propofoL, 0-50 mcg/kg/min, Intravenous, Daily PRN    sodium chloride 0.9%, 10 mL, Intravenous, PRN    Flushing PICC/Midline Protocol, , , Until Discontinued **AND** sodium chloride 0.9%, 10 mL, Intravenous, Q12H PRN     Review of patient's allergies indicates:   Allergen Reactions    Guaifenesin Hives    Codeine Itching     Objective:     Vital Signs (Most Recent):  Temp: 97 °F (36.1 °C) (01/04/25 1915)  Pulse: 69 (01/04/25 2212)  Resp: (!) 31 (01/04/25 2130)  BP: (!) 96/54 (01/04/25 2130)  SpO2: 97 % (01/04/25 2130) Vital Signs (24h Range):  Temp:  [96.9 °F (36.1 °C)-97.8 °F (36.6 °C)] 97 °F (36.1 °C)  Pulse:  [65-84] 69  Resp:  [27-35] 31  SpO2:  [96 %-100 %] 97 %  BP: ()/(43-60) 96/54     Weight: 68.5 kg (151 lb 0.2 oz)  Body mass index is 23.65 kg/m².    Intake/Output - Last 3 Shifts         01/02 0700  01/03 0659 01/03 0700  01/04 0659 01/04 0700  01/05 0659    P.O.       I.V. (mL/kg) 1544.2 (22.5) 2142.6 (31.3) 1340.4 (19.6)    Blood   667.5    NG/ 313 225    IV Piggyback  600.6 337.6    TPN 1930.4 2062.9 1075.6    Total Intake(mL/kg) 4020.6 (58.7) 5119.1 (74.7) 3646.1 (53.2)    Urine (mL/kg/hr) 625 (0.4) 750 (0.5) 600 (0.6)    Drains  325     Other 55 80 150    Stool 375 575 450    Total Output 1055 1730 1200    Net +2965.6 +3389.1 +2446.1                    Physical Exam  Vitals and nursing note reviewed.   Constitutional:       General: He is not in acute distress.     Appearance: He is ill-appearing.      Comments: Thin male; intubated   HENT:      Head: Normocephalic and atraumatic.      Nose:      Comments: NEFT in place L nare     Mouth/Throat:      Mouth: Mucous membranes are moist.   Eyes:      Pupils: Pupils are equal, round, and reactive to light.   Cardiovascular:      Rate and Rhythm: Normal rate and regular rhythm.   Pulmonary:      Effort: Prolonged expiration present. No respiratory distress.       Breath sounds: Transmitted upper airway sounds present. Rhonchi and rales present.      Comments: Decreased breath sounds at lung bases bilaterally  Abdominal:      General: There is no distension.      Palpations: Abdomen is soft.      Tenderness: There is no abdominal tenderness.      Comments: G tube noted; Ostomy L mid abdomen   Genitourinary:     Comments: Rojas with yellow urine  Musculoskeletal:         General: No swelling.      Cervical back: Normal range of motion and neck supple.   Skin:     General: Skin is warm and dry.      Comments: Wounds to sacrum and groin area with dressings changed by nursing today          Significant Labs:  I have reviewed all pertinent lab results within the past 24 hours.  CBC:   Recent Labs   Lab 01/04/25 0354   WBC 21.36*   RBC 2.02*   HGB 5.9*   HCT 17.8*   *   MCV 88   MCH 29.2   MCHC 33.1     BMP:   Recent Labs   Lab 01/04/25  0354   *   *   K 5.5*   CL 96   CO2 25   BUN 88*   CREATININE 2.4*   CALCIUM 8.7   MG 2.0     CMP:   Recent Labs   Lab 01/04/25  0354   *   CALCIUM 8.7   ALBUMIN 0.6*   PROT 5.3*   *   K 5.5*   CO2 25   CL 96   BUN 88*   CREATININE 2.4*   ALKPHOS 971*   ALT 40   AST 88*   BILITOT 1.8*       Significant Diagnostics:  I have reviewed all pertinent imaging results/findings within the past 24 hours.  US concerning for ascites but no liver abnormality

## 2025-01-05 NOTE — ASSESSMENT & PLAN NOTE
Patient has Abnormal Magnesium: hypomagnesemia. Will continue to monitor electrolytes closely. Will replace the affected electrolytes and repeat labs to be done after interventions completed. The patient's magnesium results have been reviewed and are listed below.  Recent Labs   Lab 01/05/25  0355   MG 2.1      12/17 mag imrpoving - repalce mag today  12/19 replace mag today  12/20 mag oxide bid  12/21 2g Mg  12/23 dose of iv mag today to keep mag level about 2  12/25 FM:  Replace, recheck.  12/30 replace mag today  1/3 mag level imporved

## 2025-01-05 NOTE — CARE UPDATE
Spoke with pt's aunt who confirmed with his father to make him a dnr.   Orders placed.  They understand that pt has a poor prognosis and is very ill.

## 2025-01-05 NOTE — SUBJECTIVE & OBJECTIVE
Review of Systems   Unable to perform ROS: Acuity of condition     Objective:     Vital Signs (Most Recent):  Temp: 97.7 °F (36.5 °C) (01/05/25 0713)  Pulse: 69 (01/05/25 0727)  Resp: (!) 31 (01/05/25 0727)  BP: (!) 93/50 (01/05/25 0630)  SpO2: 99 % (01/05/25 0727) Vital Signs (24h Range):  Temp:  [97 °F (36.1 °C)-97.8 °F (36.6 °C)] 97.7 °F (36.5 °C)  Pulse:  [64-78] 69  Resp:  [27-35] 31  SpO2:  [96 %-100 %] 99 %  BP: ()/(49-60) 93/50     Weight: 68.5 kg (151 lb 0.2 oz)  Body mass index is 23.65 kg/m².    Intake/Output Summary (Last 24 hours) at 1/5/2025 0800  Last data filed at 1/5/2025 0523  Gross per 24 hour   Intake 5807.66 ml   Output 2100 ml   Net 3707.66 ml         Physical Exam  Constitutional:       Appearance: He is ill-appearing.      Comments: Thin male; intubated   HENT:      Mouth/Throat:      Mouth: Mucous membranes are moist.   Eyes:      Pupils: Pupils are equal, round, and reactive to light.   Cardiovascular:      Rate and Rhythm: Normal rate and regular rhythm.      Heart sounds: Normal heart sounds.   Pulmonary:      Effort: Pulmonary effort is normal.      Breath sounds: Normal breath sounds. Transmitted upper airway sounds present.   Abdominal:      General: There is no distension.      Palpations: Abdomen is soft. There is no mass.      Tenderness: There is no abdominal tenderness.      Comments: Jtube noted; ileostomy noted   Genitourinary:     Comments: Rojas with yellow urine  Musculoskeletal:      Right lower leg: Edema present.      Left lower leg: Edema present.   Lymphadenopathy:      Cervical: No cervical adenopathy.   Skin:     General: Skin is warm and dry.      Comments: Wounds to sacrum with wound vac in place; groin area             Significant Labs: All pertinent labs within the past 24 hours have been reviewed.    Significant Imaging: I have reviewed all pertinent imaging results/findings within the past 24 hours.

## 2025-01-05 NOTE — ASSESSMENT & PLAN NOTE
Complicated by what appears to be gastroparesis.  Continue nasal feeding tube into his small intestines for post pyloric feeds as tolerated.    Likely G tube needs to be converted to G-J but just beginning to tolerate feeds  Continue Reglan and Erythromycin

## 2025-01-05 NOTE — ASSESSMENT & PLAN NOTE
Anemia is likely due to chronic infections Most recent hemoglobin and hematocrit are listed below.  Recent Labs     01/03/25  0436 01/04/25  0354 01/05/25  0355   HGB 7.8* 5.9* 8.1*   HCT 23.6* 17.8* 23.7*       Plan  - Monitor serial CBC: Daily  - Transfuse PRBC if patient becomes hemodynamically unstable, symptomatic or H/H drops below 7/21.  - Patient has not received any PRBC transfusions to date  - Patient's anemia is currently worsening. Will adjust treatment as follows: 2uprbcs today  12/17 h/h Improved  12/20 h/h holding  12/26 FM:  Transfuse today.  12/27 hh improved after transfusion  12/28/24:  Continue daily monitoring.  12/30 h/h stable  12/31: Hemoglobin remained stable  1/1/24:  transfuse today  1/2/25 h/h imp[roved after transfusion  1/3 h/h holding  1/4 trasnfuse 2u prbcs today due to recent bleeding from sacral wound with decrease in h/h and bp  1/5 sp 2 uprbcs yesterday with improvement in h/h

## 2025-01-05 NOTE — EICU
Intervention Initiated From:  [facility] / EICU    Sandy intervened regarding:  Rounding (Video assessment)    Comments:  Video rounding completed.  Pt resting quiet on ventilator support, FiO2 30%.  No distress noted.  Infusing TPN, Propofol & Norepinephrine gtts as per MAR.  B/P 98/51, HR 69, RR 33, POx 99%.

## 2025-01-05 NOTE — ASSESSMENT & PLAN NOTE
Nutrition consulted. Most recent weight and BMI monitored-     Measurements:  Wt Readings from Last 1 Encounters:   12/16/24 68.5 kg (151 lb 0.2 oz)   Body mass index is 23.65 kg/m².    Patient has been screened and assessed by RD.    Malnutrition Type:  Context:    Level:      Malnutrition Characteristic Summary:       Interventions/Recommendations (treatment strategy):  1. Rec'd Continuous EN: Glucerna 1.5 @ 40mL/r increasing by 5 mL q6hrs to goal rate of 55mL/hr with a continuous 40mL FWF to provide 1980kcal (94% EEN), 109g of protein (100% EPN), and 1962mL FW.   2. Néstor BID via PEG tube to promote wound healing and to provide additional nutrition.           - Do not mix Néstor with formula in a tube-feeding bag         - Pour one packet of Néstor in a clean, small container for mixing.           - Add 4 fl oz (120 mL) water at room temperature.           - Mix well with disposable spoon or tongue blade until all particles are completely hydrated.           - Verify correct tube placement.           - Flush feeding tube with 30 mL water.           -Administer Néstor through feeding tube using a 60-mL or larger syringe.           - Flush with an additional 30 mL water.  3. Rec'd ClinimixE 5/20 @ 80mL/hr to provide 1690kcal (80% EEN), 96g of protein (88% EPN), and 1920mL TFV.      -Add 250mL of 20% lipids 3x/week to provide additional calories.      -Check Triglycerides before adding lipids. 4. RD to follow and make rec's accordingly.    12/18 restart tfs today at 1ml/hr to see if can tolerate feeds  12/19 increase tfs as tolerates  12/23 not tolerating tfs- will get tpn orders and start that until can tolerate tfs better - ncrease regaln 10 mg iv q 6hrs  12/24 con ttfs as tolerates - started on tpn for further nutritional support  12/25 FM:  Check KUB.  12/26 FM:  Resume TF today, monitor.  12/27 on tpn - not tolerating tfs at this time  12/28/24:  Albumin infusion today.   12/31:  tube feeds as tolerated    Patient  has protein malnutrition.  Last trend as follows-   Lab Results   Component Value Date    ALBUMIN 0.6 (L) 01/05/2025    ALBUMIN 0.6 (L) 01/04/2025    ALBUMIN 0.7 (L) 01/03/2025 1/2/25 tolerating tfs better with dibhoff tube - cont tpn and IL for now  1/5 will attempt tube feeds again and cont as tolerates

## 2025-01-05 NOTE — ASSESSMENT & PLAN NOTE
Patient's FSGs are uncontrolled due to hyperglycemia on current medication regimen.  Last A1c reviewed-   Lab Results   Component Value Date    HGBA1C 6.6 (H) 12/16/2024     Most recent fingerstick glucose reviewed-   Recent Labs   Lab 01/04/25  1140 01/04/25  1806 01/04/25  2347   POCTGLUCOSE 212* 141* 133*       Current correctional scale  Low  Maintain anti-hyperglycemic dose as follows-   Antihyperglycemics (From admission, onward)    Start     Stop Route Frequency Ordered    12/30/24 0900  insulin glargine U-100 (Lantus) pen 20 Units         -- SubQ Daily 12/30/24 0734    12/24/24 0807  insulin aspart U-100 pen 0-10 Units         -- SubQ Every 4 hours PRN 12/24/24 0707        Hold Oral hypoglycemics while patient is in the hospital.  12/17 A1c improved to 6.6%  12/24 due to tpn - add lantus 12 u daily - ssi changed to moderate scale  12/25 FM:  BS logs noted, continue coverage.  12/26 FM:  Cont SSI.  12/27 increase lantus to 16u daily  1/3 bs have been better controlled

## 2025-01-05 NOTE — ASSESSMENT & PLAN NOTE
Lasix 20mg iv bid - monitor I/o  12/30 decreae lasix to daily - edema has imrpoved  1/2/25 give extra of lasix today due to edema  1/3 stop lasix due to worsening kidney fxn  1/5 give dose of albumin then lasix push to help edema

## 2025-01-05 NOTE — ASSESSMENT & PLAN NOTE
Patient has a diagnosis of pneumonia. The cause of the pneumonia is suspected to be bacterial in etiology but organism is not known. The pneumonia is stable. The patient has the following signs/symptoms of pneumonia: persistent hypoxia  and sputum production. The patient does have a current oxygen requirement and the patient does not have a home oxygen requirement. I have reviewed the pertinent imaging. The following cultures have been collected: Blood cultures and Sputum culture The culture results are listed below.     Current antimicrobial regimen consists of the antibiotics listed below. Will monitor patient closely and continue current treatment plan unchanged.    Antibiotics (From admission, onward)      Start     Stop Route Frequency Ordered    01/05/25 0900  meropenem 2 g in 0.9% NaCl 100 mL IVPB         -- IV Every 12 hours (non-standard times) 01/05/25 0731    01/04/25 1300  ampicillin-sulbactam (UNASYN) 9 g in 0.9% NaCl 250 mL IVPB         -- IV Every 8 hours (non-standard times) 01/04/25 0804    12/30/24 1300  erythromycin base tablet 250 mg         -- PER NG TUBE 4 times daily 12/30/24 1111            Microbiology Results (last 7 days)       Procedure Component Value Units Date/Time    Blood culture [[id number]] Collected: 01/02/25 0818    Order Status: Completed Specimen: Blood from Peripheral, Forearm, Left Updated: 01/04/25 2012     Blood Culture, Routine No Growth to date      No Growth to date      No Growth to date    Blood culture [[id number]] Collected: 01/02/25 0805    Order Status: Completed Specimen: Blood from Peripheral, Wrist, Left Updated: 01/04/25 2012     Blood Culture, Routine No Growth to date      No Growth to date      No Growth to date    Culture, Respiratory with Gram Stain [[id number]]  (Abnormal)  (Susceptibility) Collected: 12/27/24 1522    Order Status: Completed Specimen: Respiratory from Endotracheal Aspirate Updated: 01/03/25 1146     Respiratory Culture No S aureus or  Pseudomonas isolated.      ACINETOBACTER BAUMANNII   Many       Gram Stain (Respiratory) Few Gram positive cocci     Gram Stain (Respiratory) Rare Gram positive rods     Gram Stain (Respiratory) Rare WBC's    Blood culture [[id number]] Collected: 12/27/24 0837    Order Status: Completed Specimen: Blood Updated: 01/01/25 1812     Blood Culture, Routine No growth after 5 days.    Blood culture [[id number]] Collected: 12/27/24 0838    Order Status: Completed Specimen: Blood Updated: 01/01/25 1812     Blood Culture, Routine No growth after 5 days.        12/17 dc zosyn with recent esbl kleb in urine - will change to merrem - cont vanc until final cultures obtained - change vent to simv; add nebs  12/18 cotn merrem and vanc - await final sputum cutlure - cont vent on ac control; nebs - wbc imrpoved slightly; lasix 20mg iv for edema today  12/19  dc vanc - proteus grwoign from sputum - cont merrem and nebs; lasix today - wean rr on vent  12/20 wnc improved - cont merrem - nebs and vent - another dose of lasix today for edema - fio2 increased  12/23 cont iv gent and merren due to sent of multiple bugs (acinetobacter/kleb/proteus)  12/24 cont current abxs  12/25 FM:  Cont GENT  12/26 FM:  Cont GENT, poor prognosis.  12/27 on gent - get new sputum culture today due to elevated wbc - cont vent and nebs  12/28/24:Gram positive cocci/rods on sputum, blood cultures negative   12/29/24:  Gram negative rods on sputum cx.   12/30 await final results of sputum cx  12/31: Tailor antibiotics based on sputum culture results  1/1/24:  tailor based on c/s  1/2/25 still awaitin g final culture reports with I&D of sputum; cont current abxs  1/3 sputum culture grew out actinobacter - cont gent  1/4 changed to unasyn and merrem per pharm id recs for better tissue pentration for actinobacter

## 2025-01-05 NOTE — EICU
Intervention Initiated From:  [facility] / EICU    Sandy intervened regarding:  Rounding (Video assessment)  Comments: Video rounds done.  Resting quietly in bed w/ eyes closed.  Bedside monitor showing SR, HR 70, /59, RR 32, POx 97%.  Continues on vent support (AC 16, , PEEP 5, FiO2 30%), sedated on propofol IV drip and also on norepinephrine IV drip (see MAR for IV  drip rates), Clinimix also in progress,  no acute distress noted.

## 2025-01-05 NOTE — PLAN OF CARE
Patient is still not tolerating the tube feeding. He is edematous, he is developing blisters .   His wounds are not healing. He is still on the ventilator. He is very weak. His urinary output is low. He is not progressing with nutrition,wound healing, B/P. .

## 2025-01-05 NOTE — PROGRESS NOTES
[facility] Intensive [facility]  General Surgery  Progress Note  1/4/25    Subjective:     History of Present Illness:  Patient admitted for sepsis with UTI and pneumonia with multiple noted wounds    Post-Op Info:  Procedure(s) (LRB):  DEBRIDEMENT, PRESSURE ULCER (N/A)   4 Days Post-Op     Interval History: Pressors weaned off again.  Dressing reinforced inferior where bleeding noted.Intermittent increased volumes with feeds.      Medications:  Continuous Infusions:   0.9% NaCl   Intravenous Continuous 50 mL/hr at 01/04/25 1821 Rate Verify at 01/04/25 1821    Amino acid 5% - dextrose 20% (CLINIMIX-E) solution  (1L provides 50gm AA, 200gm CHO (680 kcal/L dextrose), Na 35, K 30, Mg 5, Ca 4.5, Acetate 80, Cl 39, Phos 15) (880 total kcal/L)   Intravenous Continuous 80 mL/hr at 01/04/25 1821 Rate Verify at 01/04/25 1821    D10W   Intravenous Continuous PRN        NORepinephrine bitartrate-D5W  0-3 mcg/kg/min Intravenous Continuous 12.8 mL/hr at 01/04/25 1821 0.05 mcg/kg/min at 01/04/25 1821     Scheduled Meds:   acetylcysteine 200 mg/ml (20%)  2 mL Nebulization QID    albuterol sulfate  2.5 mg Nebulization Q4H WAKE    ampicillin-sulbactam  9 g Intravenous Q8H    collagenase   Topical (Top) Daily    erythromycin base  250 mg Per NG tube QID    famotidine  20 mg Per G Tube Daily    FLUoxetine  20 mg Per G Tube Daily    insulin glargine U-100  20 Units Subcutaneous Daily    levothyroxine  150 mcg Per G Tube Before breakfast    lurasidone  40 mg Per G Tube Daily    magnesium oxide  400 mg Per G Tube BID    meropenem IV (PEDS and ADULTS)  2 g Intravenous Q12H    metoclopramide  10 mg Intravenous Q6H    sodium hypochlorite 0.125%   Topical (Top) Daily    sodium zirconium cyclosilicate  10 g Per G Tube BID    white petrolatum-mineral oiL   Both Eyes QHS     PRN Meds:  Current Facility-Administered Medications:     0.9%  NaCl infusion (for blood administration), , Intravenous, Q24H PRN    acetaminophen, 650 mg, Per G Tube, Q6H PRN     bisacodyL, 10 mg, Other, Daily PRN    D10W, , Intravenous, Continuous PRN    dextrose 50%, 12.5 g, Intravenous, PRN    dextrose 50%, 25 g, Intravenous, PRN    glucagon (human recombinant), 1 mg, Intramuscular, PRN    insulin aspart U-100, 0-10 Units, Subcutaneous, Q4H PRN    melatonin, 6 mg, Per G Tube, Nightly PRN    midazolam, 2 mg, Intravenous, Q1H PRN    propofoL, 0-50 mcg/kg/min, Intravenous, Daily PRN    sodium chloride 0.9%, 10 mL, Intravenous, PRN    Flushing PICC/Midline Protocol, , , Until Discontinued **AND** sodium chloride 0.9%, 10 mL, Intravenous, Q12H PRN     Review of patient's allergies indicates:   Allergen Reactions    Guaifenesin Hives    Codeine Itching     Objective:     Vital Signs (Most Recent):  Temp: 97 °F (36.1 °C) (01/04/25 1915)  Pulse: 69 (01/04/25 2212)  Resp: (!) 31 (01/04/25 2130)  BP: (!) 96/54 (01/04/25 2130)  SpO2: 97 % (01/04/25 2130) Vital Signs (24h Range):  Temp:  [96.9 °F (36.1 °C)-97.8 °F (36.6 °C)] 97 °F (36.1 °C)  Pulse:  [65-84] 69  Resp:  [27-35] 31  SpO2:  [96 %-100 %] 97 %  BP: ()/(43-60) 96/54     Weight: 68.5 kg (151 lb 0.2 oz)  Body mass index is 23.65 kg/m².    Intake/Output - Last 3 Shifts         01/02 0700  01/03 0659 01/03 0700 01/04 0659 01/04 0700  01/05 0659    P.O.       I.V. (mL/kg) 1544.2 (22.5) 2142.6 (31.3) 1340.4 (19.6)    Blood   667.5    NG/ 313 225    IV Piggyback  600.6 337.6    TPN 1930.4 2062.9 1075.6    Total Intake(mL/kg) 4020.6 (58.7) 5119.1 (74.7) 3646.1 (53.2)    Urine (mL/kg/hr) 625 (0.4) 750 (0.5) 600 (0.6)    Drains  325     Other 55 80 150    Stool 375 575 450    Total Output 1055 1730 1200    Net +2965.6 +3389.1 +2446.1                    Physical Exam  Vitals and nursing note reviewed.   Constitutional:       General: He is not in acute distress.     Appearance: He is ill-appearing.      Comments: Thin male; intubated   HENT:      Head: Normocephalic and atraumatic.      Nose:      Comments: NEFT in place L nare      Mouth/Throat:      Mouth: Mucous membranes are moist.   Eyes:      Pupils: Pupils are equal, round, and reactive to light.   Cardiovascular:      Rate and Rhythm: Normal rate and regular rhythm.   Pulmonary:      Effort: Prolonged expiration present. No respiratory distress.      Breath sounds: Transmitted upper airway sounds present. Rhonchi and rales present.      Comments: Decreased breath sounds at lung bases bilaterally  Abdominal:      General: There is no distension.      Palpations: Abdomen is soft.      Tenderness: There is no abdominal tenderness.      Comments: G tube noted; Ostomy L mid abdomen   Genitourinary:     Comments: Rojas with yellow urine  Musculoskeletal:         General: No swelling.      Cervical back: Normal range of motion and neck supple.   Skin:     General: Skin is warm and dry.      Comments: Wounds to sacrum and groin area with dressings changed by nursing today          Significant Labs:  I have reviewed all pertinent lab results within the past 24 hours.  CBC:   Recent Labs   Lab 01/04/25  0354   WBC 21.36*   RBC 2.02*   HGB 5.9*   HCT 17.8*   *   MCV 88   MCH 29.2   MCHC 33.1     BMP:   Recent Labs   Lab 01/04/25  0354   *   *   K 5.5*   CL 96   CO2 25   BUN 88*   CREATININE 2.4*   CALCIUM 8.7   MG 2.0     CMP:   Recent Labs   Lab 01/04/25  0354   *   CALCIUM 8.7   ALBUMIN 0.6*   PROT 5.3*   *   K 5.5*   CO2 25   CL 96   BUN 88*   CREATININE 2.4*   ALKPHOS 971*   ALT 40   AST 88*   BILITOT 1.8*       Significant Diagnostics:  I have reviewed all pertinent imaging results/findings within the past 24 hours.  US concerning for ascites but no liver abnormality  Assessment/Plan:     * Sepsis  Hemodynamic instability again with requirement of pressor support.  Weaned off now.    Elevated LFTs  ?TPN induced  Cycle with window fluids    Severe malnutrition  Complicated by what appears to be gastroparesis.  Continue nasal feeding tube into his small  intestines for post pyloric feeds as tolerated.    Likely G tube needs to be converted to G-J but just beginning to tolerate feeds  Continue Reglan and Erythromycin    Pneumonia of right lower lobe due to infectious organism  Antibiotics and vent support per primary  Likely needs trach since unable to progress to spontaneous ventilation  CXR concerning for RLL infiltrate/atelectasis needing CPT  Add mucolytic agent due to continued poor breath sounds    Sacral wound, sequela  Continue VAC therapy   Changed yesterday.    Aspirin and Lovenox stopped for bleeding    Open wound of groin  Wound care written    Microcytic anemia  Transfuse as needed.  Good volume resuscitator.    Urinary tract infection associated with indwelling urethral catheter  Per primary    Type 1 diabetes  Elevated sugars.  Management per primary        Francoise Diaz MD  General Surgery  [facility] - Intensive Care

## 2025-01-05 NOTE — ASSESSMENT & PLAN NOTE
"This patient does have evidence of infective focus  My overall impression is septic shock due to MAP < 65 and SBP < 90.  Source: Respiratory and Urinary Tract  Antibiotics given-   Antibiotics (72h ago, onward)    Start     Stop Route Frequency Ordered    01/05/25 0900  meropenem 2 g in 0.9% NaCl 100 mL IVPB         -- IV Every 12 hours (non-standard times) 01/05/25 0731    01/04/25 1300  ampicillin-sulbactam (UNASYN) 9 g in 0.9% NaCl 250 mL IVPB         -- IV Every 8 hours (non-standard times) 01/04/25 0804    12/30/24 1300  erythromycin base tablet 250 mg         -- PER NG TUBE 4 times daily 12/30/24 1111        Latest lactate reviewed-  No results for input(s): "LACTATE", "POCLAC" in the last 72 hours.    Organ dysfunction indicated by Acute respiratory failure and Encephalopathy    Fluid challenge Ideal Body Weight- The patient's ideal body weight is Ideal body weight: 66.1 kg (145 lb 11.6 oz) which will be used to calculate fluid bolus of 30 ml/kg for treatment of septic shock.      Post- resuscitation assessment Yes Perfusion exam was performed within 6 hours of septic shock presentation after bolus shows Inadequate tissue perfusion assessed by non-invasive monitoring       Will Start Pressors- Levophed for MAP of 65  Source control achieved by: iv zosym. Vanc, ivfs    Patient has a leukocytosis.  Last trend as follows-   Lab Results   Component Value Date    WBC 20.33 (H) 01/05/2025    WBC 21.36 (H) 01/04/2025    WBC 19.18 (H) 01/03/2025 12/17 wean levophed as tolerates- abxs changed to merrem, cotn vanc - await culture final reports  12/18 wbc slightly imrpoved - cont iv abxs- await final sputum culture - on merrem fro esbl kleb in urine  12/19 cont iv merrem for esbl kleb in urine and proteus in sputum - dc vanc  12/20 wbc improvign - cotn iv merrem  12/24 wbc normal - cont iv abxs  12/25 FM:  Cont GENT.  12/26 FM:  Cont GENT, prognosis poor, pulm/gu/skin infections.  12/27 repeat bc and sputum " culture due to increasing wbc - cont gent; completed 10 days of merrem  12/29/24:  Sputum with gram negative rods, continue gentamicin, susceptibility pending   12/30 wbc improvign  1/2/25 wbc improved but had fever overnight - repeat bcx 2 today - cont iv abxs - back on levophed for bp control  1/3 off of levophed - cont gent; wbc increased but no further temp spikes; bc ng x 1 day  1/4 bc - ng x 2 days - con unasyn and merrem for better actinobacter coverage

## 2025-01-06 NOTE — PROGRESS NOTES
[facility] Intensive [facility]  General Surgery  Progress Note  1/5/25    Subjective:     History of Present Illness:  Patient admitted for sepsis with UTI and pneumonia with multiple noted wounds    Post-Op Info:  Procedure(s) (LRB):  DEBRIDEMENT, PRESSURE ULCER (N/A)   5 Days Post-Op     Interval History: Not always tolerating feeds.  Requireed pressors again.  Family made pt DNR.    Medications:  Continuous Infusions:   D10W   Intravenous Continuous PRN        D10W   Intravenous Continuous 50 mL/hr at 01/05/25 1845 Rate Verify at 01/05/25 1845    NORepinephrine bitartrate-D5W  0-3 mcg/kg/min Intravenous Continuous 25.7 mL/hr at 01/06/25 0038 0.1 mcg/kg/min at 01/06/25 0038     Scheduled Meds:   acetylcysteine 200 mg/ml (20%)  2 mL Nebulization QID    albuterol sulfate  2.5 mg Nebulization Q4H WAKE    ampicillin-sulbactam  9 g Intravenous Q8H    collagenase   Topical (Top) Daily    erythromycin base  250 mg Per NG tube QID    famotidine  20 mg Per G Tube Daily    FLUoxetine  20 mg Per G Tube Daily    insulin glargine U-100  20 Units Subcutaneous Daily    levothyroxine  150 mcg Per G Tube Before breakfast    lurasidone  40 mg Per G Tube Daily    magnesium oxide  400 mg Per G Tube BID    meropenem IV (PEDS and ADULTS)  2 g Intravenous Q12H    metoclopramide  5 mg Intravenous Q6H    sodium hypochlorite 0.125%   Topical (Top) Daily    white petrolatum-mineral oiL   Both Eyes QHS     PRN Meds:  Current Facility-Administered Medications:     0.9%  NaCl infusion (for blood administration), , Intravenous, Q24H PRN    acetaminophen, 650 mg, Per G Tube, Q6H PRN    bisacodyL, 10 mg, Other, Daily PRN    D10W, , Intravenous, Continuous PRN    dextrose 50%, 12.5 g, Intravenous, PRN    dextrose 50%, 25 g, Intravenous, PRN    glucagon (human recombinant), 1 mg, Intramuscular, PRN    insulin aspart U-100, 0-10 Units, Subcutaneous, Q4H PRN    melatonin, 6 mg, Per G Tube, Nightly PRN    midazolam, 2 mg, Intravenous, Q1H PRN    propofoL,  0-50 mcg/kg/min, Intravenous, Daily PRN    sodium chloride 0.9%, 10 mL, Intravenous, PRN    Flushing PICC/Midline Protocol, , , Until Discontinued **AND** sodium chloride 0.9%, 10 mL, Intravenous, Q12H PRN     Review of patient's allergies indicates:   Allergen Reactions    Guaifenesin Hives    Codeine Itching     Objective:     Vital Signs (Most Recent):  VS reviewed Vital Signs (24h Range):  Temp:  [96.8 °F (36 °C)-97.9 °F (36.6 °C)] 97.9 °F (36.6 °C)  Pulse:  [52-70] 58  Resp:  [26-41] 33  SpO2:  [95 %-100 %] 100 %  BP: ()/(40-70) 88/52     Weight: 68.5 kg (151 lb 0.2 oz)  Body mass index is 23.65 kg/m².    Intake/Output - Last 3 Shifts         01/04 0700  01/05 0659 01/05 0700  01/06 0659 01/06 0700  01/07 0659    P.O. 240 300     I.V. (mL/kg) 2101.4 (30.7) 2625 (38.3)     Blood 667.5 0.6     NG/ 327     IV Piggyback 678.4 963.2     TPN 1910.4 1369.9     Total Intake(mL/kg) 6047.7 (88.3) 5585.7 (81.5)     Urine (mL/kg/hr) 1000 (0.6) 650 (0.4)     Drains 150      Other 200      Stool 750 400     Total Output 2100 1050     Net +3947.7 +4535.7                     Physical Exam  Vitals and nursing note reviewed.   Constitutional:       General: He is not in acute distress.     Appearance: He is ill-appearing.      Comments: Thin male; intubated   HENT:      Head: Normocephalic and atraumatic.      Nose:      Comments: NEFT in place L nare     Mouth/Throat:      Mouth: Mucous membranes are moist.   Eyes:      Pupils: Pupils are equal, round, and reactive to light.   Cardiovascular:      Rate and Rhythm: Normal rate and regular rhythm.   Pulmonary:      Effort: Prolonged expiration present. No respiratory distress.      Breath sounds: Transmitted upper airway sounds present. Rhonchi and rales present.      Comments: Decreased breath sounds at lung bases bilaterally  Abdominal:      General: There is no distension.      Palpations: Abdomen is soft.      Tenderness: There is no abdominal tenderness.       Comments: G tube noted; Ostomy L mid abdomen   Genitourinary:     Comments: Rojas with yellow urine  Musculoskeletal:         General: No swelling.      Cervical back: Normal range of motion and neck supple.   Skin:     General: Skin is warm and dry.      Comments: Wounds to sacrum and groin area with dressings changed by nursing today          Significant Labs:  I have reviewed all pertinent lab results within the past 24 hours.    Significant Diagnostics:  I have reviewed all pertinent imaging results/findings within the past 24 hours.  Assessment/Plan:     * Sepsis  Hemodynamic instability again with requirement of pressor support.  Back on Levophed.    Elevated LFTs  ?TPN induced  Cycle with window fluids    Severe malnutrition  Complicated by what appears to be gastroparesis.  Continue nasal feeding tube into his small intestines for post pyloric feeds as tolerated.    Likely G tube needs to be converted to G-J but not fully tolerating feeds  Continue Reglan and Erythromycin    Pneumonia of right lower lobe due to infectious organism  Antibiotics and vent support per primary  Likely needs trach since unable to progress to spontaneous ventilation when hemodynamically stable  CXR concerning for RLL infiltrate/atelectasis needing CPT  Added mucolytic agent due to continued poor breath sounds    Sacral wound, sequela  Continue VAC therapy   Changed Friday.    Aspirin and Lovenox stopped for bleeding    Open wound of groin  Wound care written    Microcytic anemia  Transfuse as needed.  Good volume resuscitator.    Urinary tract infection associated with indwelling urethral catheter  Per primary    Type 1 diabetes  Elevated sugars.  Management per primary        Francoise Diaz MD  General Surgery  [facility] - Intensive [facility]

## 2025-01-06 NOTE — PROGRESS NOTES
[facility] - Intensive Care  Adult Nutrition  Progress Note    SUMMARY       Recommendations  1. Rec'd Continuous EN: Glucerna 1.5 @ 40mL/r increasing by 5 mL q6hrs to goal rate of 55mL/hr with a continuous 40mL FWF to provide 1980kcal (94% EEN), 109g of protein (100% EPN), and 1962mL FW.     2. Néstor BID via PEG tube to promote wound healing and to provide additional nutrition.             - Do not mix Néstor with formula in a tube-feeding bag           - Pour one packet of Néstor in a clean, small container for mixing.             - Add 4 fl oz (120 mL) water at room temperature.             - Mix well with disposable spoon or tongue blade until all particles are completely hydrated.             - Verify correct tube placement.             - Flush feeding tube with 30 mL water.             -Administer Néstor through feeding tube using a 60-mL or larger syringe.             - Flush with an additional 30 mL water.    3. Rec'd ClinimixE 5/20 @ 80mL/hr to provide 1690kcal (80% EEN), 96g of protein (88% EPN), and 1920mL TFV.        -Add 250mL of 20% lipids 3x/week to provide additional calories.        -Check Triglycerides before adding lipids.  4. RD to follow and make rec's accordingly.  Goals:   1. Pt will be started on EN by next RD follow up.     2. Pt will reach TF goal rate within 48hrs of initiation.   3. Pt will be started on TPN by next RD follow up.  Nutrition Goal Status: goal not met, goal met  Communication of RD Recs: reviewed with RN    Assessment and Plan     Nutrition Problem  Inadequate oral intake     Related to (etiology):   NPO/Intubation Status     Signs and Symptoms (as evidenced by):   0% PO intake      Interventions/Recommendations (treatment strategy):  1. Rec'd Continuous EN: Glucerna 1.5 @ 40mL/r increasing by 5 mL q6hrs to goal rate of 55mL/hr with a continuous 40mL FWF to provide 1980kcal (94% EEN), 109g of protein (100% EPN), and 1962mL FW.   2. Néstor BID via PEG tube to promote wound healing  "and to provide additional nutrition.           - Do not mix Néstor with formula in a tube-feeding bag         - Pour one packet of Néstor in a clean, small container for mixing.           - Add 4 fl oz (120 mL) water at room temperature.           - Mix well with disposable spoon or tongue blade until all particles are completely hydrated.           - Verify correct tube placement.           - Flush feeding tube with 30 mL water.           -Administer Néstor through feeding tube using a 60-mL or larger syringe.           - Flush with an additional 30 mL water.   3. RD to follow and make rec's accordingly.     Nutrition Diagnosis Status:   Continues     Malnutrition Assessment  NFPE not warranted at this time. RD to continue to monitor for signs and symptoms of malnutrition.     Nutrition Related Social Determinants of Health:  None identified at this time.    Reason for Assessment    Reason For Assessment: RD follow-up  Diagnosis: infection/sepsis  General Information Comments: Followed up on pt this morning. Pt remains intubated. Pt not tolerating TF. Pt is still on TPN, but TPN is turned off between 8:00 am and 4:00pm. Spoke with RN and RD stated pt is now a DNR. RD to follow and make rec's accordingly.  Nutrition Discharge Planning: TBD as care progresses    Nutrition Risk Screen    Nutrition Risk Screen: tube feeding or parenteral nutrition    Nutrition/Diet History    Patient Reported Diet/Restrictions/Preferences: no oral intake  Spiritual, Cultural Beliefs, Congregation Practices, Values that Affect Care: no  Food Allergies: NKFA  Factors Affecting Nutritional Intake: NPO  Nutrition Support Formula Prior to Admit: Glucerna 1.5    Anthropometrics    Temp: 97.9 °F (36.6 °C)  Height: 5' 7" (170.2 cm)  Height (inches): 67 in  Weight Method: Bed Scale  Weight: 68.5 kg (151 lb 0.2 oz)  Weight (lb): 151.02 lb  Ideal Body Weight (IBW), Male: 148 lb  % Ideal Body Weight, Male (lb): 102.04 %  BMI (Calculated): 23.6  BMI " Grade: 18.5-24.9 - normal    Lab/Procedures/Meds    Pertinent Labs Reviewed: reviewed  Pertinent Medications Reviewed: reviewed    Estimated/Assessed Needs    Weight Used For Calorie Calculations: 68.5 kg (151 lb 0.2 oz)  Energy Calorie Requirements (kcal): 2096  Energy Need Method: [facility]  Protein Requirements: 109-137 (1.6-2.0g/kg)  Weight Used For Protein Calculations: 68.5 kg (151 lb 0.2 oz)  Fluid Requirements (mL): 2096 (1mL/kcal)  Estimated Fluid Requirement Method: RDA Method  RDA Method (mL): 2096    Nutrition Prescription Ordered    Current Diet Order: NPO  Current Nutrition Support Formula Ordered: Clinimix E 5/20, Glucerna 1.5  Current Nutrition Support Rate Ordered:  ((Glucerna @ 10mL and Clinimix @ 80mL))  Current Nutrition Support Frequency Ordered: Continuous  Oral Nutrition Supplement: None    Evaluation of Received Nutrient/Fluid Intake    Enteral Calories (kcal): 0  Enteral Protein (gm): 0  Enteral (Free Water) Fluid (mL): 0  Parenteral Calories (kcal): 0  Parenteral Protein (gm): 0  Parenteral Fluid (mL): 0  Other Calories (kcal): 164 (Propofol infusing @ 6.2mL/hr)  Total Calories (kcal): 2214  % Kcal Needs: 105%  Total Protein (gm): 115  % Protein Needs: 105%  I/O: -150  Energy Calories Required: not meeting needs  Protein Required: not meeting needs  Tolerance: tolerating  % Intake of Estimated Energy Needs: 0 - 25 %  % Meal Intake: NPO    Nutrition Risk    Level of Risk/Frequency of Follow-up: moderate     Monitor and Evaluation    Food and Nutrient Intake: enteral nutrition intake, parenteral nutrition intake  Food and Nutrient Adminstration: enteral and parenteral nutrition administration  Knowledge/Beliefs/Attitudes: food and nutrition knowledge/skill, beliefs and attitudes  Physical Activity and Function: nutrition-related ADLs and IADLs  Anthropometric Measurements: height/length, weight, weight change, body mass index  Biochemical Data, Medical Tests and Procedures: electrolyte and  renal panel, gastrointestinal profile, inflammatory profile, glucose/endocrine profile, lipid profile  Nutrition-Focused Physical Findings: overall appearance     Nutrition Follow-Up    RD Follow-up?: Yes

## 2025-01-06 NOTE — SUBJECTIVE & OBJECTIVE
"Nutrition Assessment   Reason for Consult/Assessment: Follow up    Diagnosis and Hx: Reviewed     Pertinent Nutrition History: met with patient -reports appetite / po intake PTA ok    Pertinent Nutrition Information: labs reviewed medication -reviewed                                 Diet Order: Cardiac, Oral nutrition supplement/snacks         Nutrition supplement/snacks: Ensure Max bid - patient reports consuming 100%        Diet tolerance: appetite good, tolerating diet well, Family brings in ethnic foods from outside at times  Food Allergies: No    Demographic/Anthropometrics Information  Gender: male   Patient Age: 77year old  Height:   Ht Readings from Last 1 Encounters:   12/06/22 5' 5"" (1.651 m)      Weight:   Wt Readings from Last 1 Encounters:   12/07/22 78 kg      BMI:   BMI Readings from Last 1 Encounters:   12/07/22 28.62 kg/mÂ²          % Weight Change: no weight changes per weight history           Estimated Needs:  Calculated Energy Needs: 8705-2927  kcal               Calculated protein needs:   g       Calculated fluid needs: 1940 ml            NFPE  Nutrition Focused Physical Exam  Physical Exam Completed: Yes (partial laying in bed)  Reason Not Completed: Patient unavailable        Skin Assessment/Wounds  Wounds Present: Wounds present (R great toe DFU)  Edema: No sign of fluid accumulation             TREATMENT PLAN: Monitoring & Interventions   Intervention: Meals and snacks, Nutrition supplement therapy, Nutrition education         Meals & snacks:  cardiac diet currently Rx- will change to moderate consistent CHO diet to better meet patient needs. Encouraged good po intake.   Stresswed to patient food brought in by family must be maintained in proper refrigeration until ready to eat- patient states he understands                                                              Nutrition supplement therapy: will continue Ensure max bid per patient agreemen     Nutrition education: Nutrition " Interval History: No    Medications:  Continuous Infusions:   D10W   Intravenous Continuous PRN        D10W   Intravenous Continuous 50 mL/hr at 01/05/25 1845 Rate Verify at 01/05/25 1845    NORepinephrine bitartrate-D5W  0-3 mcg/kg/min Intravenous Continuous 25.7 mL/hr at 01/06/25 0038 0.1 mcg/kg/min at 01/06/25 0038     Scheduled Meds:   acetylcysteine 200 mg/ml (20%)  2 mL Nebulization QID    albuterol sulfate  2.5 mg Nebulization Q4H WAKE    ampicillin-sulbactam  9 g Intravenous Q8H    collagenase   Topical (Top) Daily    erythromycin base  250 mg Per NG tube QID    famotidine  20 mg Per G Tube Daily    FLUoxetine  20 mg Per G Tube Daily    insulin glargine U-100  20 Units Subcutaneous Daily    levothyroxine  150 mcg Per G Tube Before breakfast    lurasidone  40 mg Per G Tube Daily    magnesium oxide  400 mg Per G Tube BID    meropenem IV (PEDS and ADULTS)  2 g Intravenous Q12H    metoclopramide  5 mg Intravenous Q6H    sodium hypochlorite 0.125%   Topical (Top) Daily    white petrolatum-mineral oiL   Both Eyes QHS     PRN Meds:  Current Facility-Administered Medications:     0.9%  NaCl infusion (for blood administration), , Intravenous, Q24H PRN    acetaminophen, 650 mg, Per G Tube, Q6H PRN    bisacodyL, 10 mg, Other, Daily PRN    D10W, , Intravenous, Continuous PRN    dextrose 50%, 12.5 g, Intravenous, PRN    dextrose 50%, 25 g, Intravenous, PRN    glucagon (human recombinant), 1 mg, Intramuscular, PRN    insulin aspart U-100, 0-10 Units, Subcutaneous, Q4H PRN    melatonin, 6 mg, Per G Tube, Nightly PRN    midazolam, 2 mg, Intravenous, Q1H PRN    propofoL, 0-50 mcg/kg/min, Intravenous, Daily PRN    sodium chloride 0.9%, 10 mL, Intravenous, PRN    Flushing PICC/Midline Protocol, , , Until Discontinued **AND** sodium chloride 0.9%, 10 mL, Intravenous, Q12H PRN     Review of patient's allergies indicates:   Allergen Reactions    Guaifenesin Hives    Codeine Itching     Objective:     Vital Signs (Most  Recent):  Temp: 97.9 °F (36.6 °C) (01/06/25 0702)  Pulse: (!) 58 (01/06/25 0721)  Resp: (!) 33 (01/06/25 0721)  BP: (!) 88/52 (01/06/25 0721)  SpO2: 100 % (01/06/25 0721) Vital Signs (24h Range):  Temp:  [96.8 °F (36 °C)-97.9 °F (36.6 °C)] 97.9 °F (36.6 °C)  Pulse:  [52-70] 58  Resp:  [26-41] 33  SpO2:  [95 %-100 %] 100 %  BP: ()/(40-70) 88/52     Weight: 68.5 kg (151 lb 0.2 oz)  Body mass index is 23.65 kg/m².    Intake/Output - Last 3 Shifts         01/04 0700 01/05 0659 01/05 0700 01/06 0659 01/06 0700 01/07 0659    P.O. 240 300     I.V. (mL/kg) 2101.4 (30.7) 2625 (38.3)     Blood 667.5 0.6     NG/ 327     IV Piggyback 678.4 963.2     TPN 1910.4 1369.9     Total Intake(mL/kg) 6047.7 (88.3) 5585.7 (81.5)     Urine (mL/kg/hr) 1000 (0.6) 650 (0.4)     Drains 150      Other 200      Stool 750 400     Total Output 2100 1050     Net +3947.7 +4535.7                     Physical Exam  Vitals and nursing note reviewed.   Constitutional:       General: He is not in acute distress.     Appearance: He is ill-appearing.      Comments: Thin male; intubated   HENT:      Head: Normocephalic and atraumatic.      Nose:      Comments: NEFT in place L nare     Mouth/Throat:      Mouth: Mucous membranes are moist.   Eyes:      Pupils: Pupils are equal, round, and reactive to light.   Cardiovascular:      Rate and Rhythm: Normal rate and regular rhythm.   Pulmonary:      Effort: Prolonged expiration present. No respiratory distress.      Breath sounds: Transmitted upper airway sounds present. Rhonchi and rales present.      Comments: Decreased breath sounds at lung bases bilaterally  Abdominal:      General: There is no distension.      Palpations: Abdomen is soft.      Tenderness: There is no abdominal tenderness.      Comments: G tube noted; Ostomy L mid abdomen   Genitourinary:     Comments: Rojas with yellow urine  Musculoskeletal:         General: No swelling.      Cervical back: Normal range of motion and neck  tips for wound healing added to AVS for home. Goal: Increase oral intake to >/equal 75% of meals and supplements, Improve skin integrity   Intervention goal status: Some progress toward goal- consuming % meals and 100% oral supplements  Time frame to achieve goal: By discharge     Dietitian will monitor: Food, beverage, and nutrient intake      Nutrition Diagnosis / PES  Nutrition Diagnosis: Increased nutrient needs  Related to:  Increased nutritional demands for healing   As evidenced by: Calculated needs         Primary Nutrition Diagnosis status: Active nutrition diagnosis supple.   Skin:     General: Skin is warm and dry.      Comments: Wounds to sacrum and groin area with dressings changed by nursing today          Significant Labs:  I have reviewed all pertinent lab results within the past 24 hours.    Significant Diagnostics:  I have reviewed all pertinent imaging results/findings within the past 24 hours.

## 2025-01-06 NOTE — ASSESSMENT & PLAN NOTE
Lasix 20mg iv bid - monitor I/o  12/30 decreae lasix to daily - edema has imrpoved  1/2/25 give extra of lasix today due to edema  1/3 stop lasix due to worsening kidney fxn  1/5 give dose of albumin then lasix push to help edema  1/6 albumin/lasix didn't help much yesterday

## 2025-01-06 NOTE — NURSING
Pt vital signs declining: BP 84/43 Norepinephrine increased per titration order. Dr. Diamond and Dr. Diaz aware.  New order for dopamine per Dr. Diaz. Wound vac not to be changed until BP improves per Dr. Diaz.

## 2025-01-06 NOTE — ASSESSMENT & PLAN NOTE
Hyperkalemia is likely due to OTONIEL.The patients most recent potassium results are listed below.  Recent Labs     01/04/25  0354 01/05/25  0355 01/06/25  0343   K 5.5* 5.1 4.6       Plan  - Monitor for arrhythmias with EKG and/or continuous telemetry.   - Treat the hyperkalemia with Potassium Binders.   - Monitor potassium: Daily  - The patient's hyperkalemia is  being treated  1/5 imrpovign with lokema  1/6 dc lokema - potassium level better

## 2025-01-06 NOTE — PROGRESS NOTES
Pharmacist Renal Dose Adjustment Note    Carlos Chahal is a 34 y.o. male being treated with the medication metoclopramide    Patient Data:    Vital Signs (Most Recent):  Temp: 97.1 °F (36.2 °C) (01/05/25 2310)  Pulse: 64 (01/05/25 2329)  Resp: (!) 37 (01/05/25 2329)  BP: (!) 97/55 (01/05/25 2301)  SpO2: 98 % (01/05/25 2329) Vital Signs (72h Range):  Temp:  [96.8 °F (36 °C)-97.9 °F (36.6 °C)]   Pulse:  [52-90]   Resp:  [26-40]   BP: ()/(40-70)   SpO2:  [95 %-100 %]      Recent Labs   Lab 01/03/25  0436 01/04/25  0354 01/05/25  0355   CREATININE 2.2* 2.4* 2.4*     Serum creatinine: 2.4 mg/dL (H) 01/05/25 0355  Estimated creatinine clearance: 40.9 mL/min (A)    Medication:metoclopramide dose: 10mg frequency q6h will be changed to medication:metoclopramide dose:5mg frequency:q6h    Pharmacist's Name: Tima Morris

## 2025-01-06 NOTE — PLAN OF CARE
Care plan reviewed and on-going. Cont with vent. Clinimix X16 hours. Accuchecks- 38/36. IVF changed to D10. Covered per orders. Ileostomy intact. Rojas cath draining without adverse reaction. Wound vac intact- reinforced. Cont with Levophed and Diprivan. Albumin and Lasix given. DNR status.

## 2025-01-06 NOTE — EICU
Intervention Initiated From:  [facility] / EICU    Sandy intervened regarding:  Rounding (Video assessment)    Comments: Video rounds done.  Lying quietly in bed.  Continues on vent support (AC 16, total RR 36, , FiO2 30%, PEEP 5), sedated on propofol IV drip, D10W, TPN & norepinephrine IV drips in progress (refer to MAR for rates).  Bedside monitor showing SR 65, BP 96/52, POX 97%,  no acute distress noted.

## 2025-01-06 NOTE — ASSESSMENT & PLAN NOTE
Patient has a diagnosis of pneumonia. The cause of the pneumonia is suspected to be bacterial in etiology but organism is not known. The pneumonia is stable. The patient has the following signs/symptoms of pneumonia: persistent hypoxia  and sputum production. The patient does have a current oxygen requirement and the patient does not have a home oxygen requirement. I have reviewed the pertinent imaging. The following cultures have been collected: Blood cultures and Sputum culture The culture results are listed below.     Current antimicrobial regimen consists of the antibiotics listed below. Will monitor patient closely and continue current treatment plan unchanged.    Antibiotics (From admission, onward)      Start     Stop Route Frequency Ordered    01/05/25 0900  meropenem 2 g in 0.9% NaCl 100 mL IVPB         -- IV Every 12 hours (non-standard times) 01/05/25 0731    01/04/25 1300  ampicillin-sulbactam (UNASYN) 9 g in 0.9% NaCl 250 mL IVPB         -- IV Every 8 hours (non-standard times) 01/04/25 0804    12/30/24 1300  erythromycin base tablet 250 mg         -- PER NG TUBE 4 times daily 12/30/24 1111            Microbiology Results (last 7 days)       Procedure Component Value Units Date/Time    Blood culture [[id number]] Collected: 01/02/25 0818    Order Status: Completed Specimen: Blood from Peripheral, Forearm, Left Updated: 01/05/25 2012     Blood Culture, Routine No Growth to date      No Growth to date      No Growth to date      No Growth to date    Blood culture [[id number]] Collected: 01/02/25 0805    Order Status: Completed Specimen: Blood from Peripheral, Wrist, Left Updated: 01/05/25 2012     Blood Culture, Routine No Growth to date      No Growth to date      No Growth to date      No Growth to date    Culture, Respiratory with Gram Stain [[id number]]  (Abnormal)  (Susceptibility) Collected: 12/27/24 1522    Order Status: Completed Specimen: Respiratory from Endotracheal Aspirate Updated: 01/03/25  1146     Respiratory Culture No S aureus or Pseudomonas isolated.      ACINETOBACTER BAUMANNII   Many       Gram Stain (Respiratory) Few Gram positive cocci     Gram Stain (Respiratory) Rare Gram positive rods     Gram Stain (Respiratory) Rare WBC's    Blood culture [[id number]] Collected: 12/27/24 0837    Order Status: Completed Specimen: Blood Updated: 01/01/25 1812     Blood Culture, Routine No growth after 5 days.    Blood culture [[id number]] Collected: 12/27/24 0838    Order Status: Completed Specimen: Blood Updated: 01/01/25 1812     Blood Culture, Routine No growth after 5 days.        12/17 dc zosyn with recent esbl kleb in urine - will change to merrem - cont vanc until final cultures obtained - change vent to simv; add nebs  12/18 cotn merrem and vanc - await final sputum cutlure - cont vent on ac control; nebs - wbc imrpoved slightly; lasix 20mg iv for edema today  12/19  dc vanc - proteus grwoign from sputum - cont merrem and nebs; lasix today - wean rr on vent  12/20 wnc improved - cont merrem - nebs and vent - another dose of lasix today for edema - fio2 increased  12/23 cont iv gent and merren due to sent of multiple bugs (acinetobacter/kleb/proteus)  12/24 cont current abxs  12/25 FM:  Cont GENT  12/26 FM:  Cont GENT, poor prognosis.  12/27 on gent - get new sputum culture today due to elevated wbc - cont vent and nebs  12/28/24:Gram positive cocci/rods on sputum, blood cultures negative   12/29/24:  Gram negative rods on sputum cx.   12/30 await final results of sputum cx  12/31: Tailor antibiotics based on sputum culture results  1/1/24:  tailor based on c/s  1/2/25 still awaitin g final culture reports with I&D of sputum; cont current abxs  1/3 sputum culture grew out actinobacter - cont gent  1/4 changed to unasyn and merrem per pharm id recs for better tissue pentration for actinobacter  1/6 cont unasyn/merrem - slight decrease in wbc count

## 2025-01-06 NOTE — ASSESSMENT & PLAN NOTE
Patient's FSGs are uncontrolled due to hyperglycemia on current medication regimen.  Last A1c reviewed-   Lab Results   Component Value Date    HGBA1C 6.6 (H) 12/16/2024     Most recent fingerstick glucose reviewed-   Recent Labs   Lab 01/05/25  1727 01/05/25  1729 01/05/25  1756 01/05/25  2341   POCTGLUCOSE 36* 38* 156* 134*       Current correctional scale  Low  Maintain anti-hyperglycemic dose as follows-   Antihyperglycemics (From admission, onward)    Start     Stop Route Frequency Ordered    12/30/24 0900  insulin glargine U-100 (Lantus) pen 20 Units         -- SubQ Daily 12/30/24 0734    12/24/24 0807  insulin aspart U-100 pen 0-10 Units         -- SubQ Every 4 hours PRN 12/24/24 0707        Hold Oral hypoglycemics while patient is in the hospital.  12/17 A1c improved to 6.6%  12/24 due to tpn - add lantus 12 u daily - ssi changed to moderate scale  12/25 FM:  BS logs noted, continue coverage.  12/26 FM:  Cont SSI.  12/27 increase lantus to 16u daily  1/3 bs have been better controlled

## 2025-01-06 NOTE — CARE UPDATE
01/06/25 1634   PRE-TX-O2   Device (Oxygen Therapy) ventilator   Oxygen Concentration (%) (S)  100   SpO2 (!) 86 %   Pulse (!) 46   Resp (!) 26   Vent Select   Charged w/in last 24h YES   Preset Conventional Ventilator Settings   Ventilation Type VC   Vent Mode A/C   Set Rate 16 BPM   Vt Set 400 mL   PEEP/CPAP (S)  7 cmH20   Peak Flow 60 L/min   Plateau Set/Insp. Hold (sec) 0   I-Trigger Type  V-TRIG   Trigger Sensitivity Flow/I-Trigger 3 L/min   Patient Ventilator Parameters   Resp Rate Total 22 br/min   Peak Airway Pressure 21 cmH20   Mean Airway Pressure 11 cmH20   Plateau Pressure 28 cmH20   Exhaled Vt 402 mL   Total Ve 10.3 L/m   I:E Ratio Measured 1:2.30   Auto PEEP 0 cmH20   Conventional Ventilator Alarms   Ve High Alarm 24 L/min   Ve Low Alarm 4 L/min   Resp Rate High Alarm 50 br/min   Press High Alarm 55 cmH2O   Apnea Rate 18   Apnea Volume (mL) 400 mL   Apnea Oxygen Concentration  100   Apnea Flow Rate (L/min) 60   T Apnea 10 sec(s)   Ready to Wean/Extubation Screen   FIO2<=50 (chart decimal) (!) 1   MV<16L (chart vol.) 10.3   PEEP <=8 (chart #) 7   Ready to Wean Parameters   F/VT Ratio<105 (RSBI) (!) 64.68   Negative Inspiratory Force   Negative Inspiratory Force (cm H2O) 0   Vital Capacity   Vital Capacity (mL) 0

## 2025-01-06 NOTE — ASSESSMENT & PLAN NOTE
Patient's most recent potassium results are listed below.   Recent Labs     01/04/25  0354 01/05/25  0355 01/06/25  0343   K 5.5* 5.1 4.6       Plan  - Replete potassium per protocol  - Monitor potassium Daily  - Patient's hypokalemia is stable, continue below and monitor  12/20 increase pot bicarb to bid  12/23 decrease pot bicard to daily dosing  12/24 improved- stop potassium bicarb repalcement - monitor    1/3 resolved

## 2025-01-06 NOTE — SUBJECTIVE & OBJECTIVE
Review of Systems   Unable to perform ROS: Acuity of condition     Objective:     Vital Signs (Most Recent):  Temp: 96.8 °F (36 °C) (01/06/25 0309)  Pulse: (!) 58 (01/06/25 0721)  Resp: (!) 33 (01/06/25 0721)  BP: (!) 93/55 (01/06/25 0600)  SpO2: 100 % (01/06/25 0721) Vital Signs (24h Range):  Temp:  [96.8 °F (36 °C)-97.6 °F (36.4 °C)] 96.8 °F (36 °C)  Pulse:  [52-70] 58  Resp:  [26-41] 33  SpO2:  [95 %-100 %] 100 %  BP: ()/(40-70) 93/55     Weight: 68.5 kg (151 lb 0.2 oz)  Body mass index is 23.65 kg/m².    Intake/Output Summary (Last 24 hours) at 1/6/2025 0725  Last data filed at 1/6/2025 0559  Gross per 24 hour   Intake 5585.73 ml   Output 1000 ml   Net 4585.73 ml         Physical Exam  Constitutional:       Appearance: He is ill-appearing.      Comments: Thin male; intubated   HENT:      Mouth/Throat:      Mouth: Mucous membranes are moist.   Eyes:      Pupils: Pupils are equal, round, and reactive to light.   Cardiovascular:      Rate and Rhythm: Normal rate and regular rhythm.      Heart sounds: Normal heart sounds.   Pulmonary:      Effort: Pulmonary effort is normal.      Breath sounds: Normal breath sounds. Transmitted upper airway sounds present.   Abdominal:      General: There is no distension.      Palpations: Abdomen is soft. There is no mass.      Tenderness: There is no abdominal tenderness.      Comments: Jtube noted; ileostomy noted   Genitourinary:     Comments: Rojas with yellow urine  Musculoskeletal:      Right lower leg: Edema present.      Left lower leg: Edema present.   Lymphadenopathy:      Cervical: No cervical adenopathy.   Skin:     General: Skin is warm and dry.      Comments: Wounds to sacrum with wound vac in place; groin area             Significant Labs: All pertinent labs within the past 24 hours have been reviewed.    Significant Imaging: I have reviewed all pertinent imaging results/findings within the past 24 hours.

## 2025-01-06 NOTE — PLAN OF CARE
Continue POC, DNR status. NADN, VSS, tele NSR. Pt ventilated and sedated. Clinimix, D10, Levophed infusing through left brachial PICC. Merrem and Unasyn infused. BG WNL. Left nare dobhoff now off TF d/t high residual, PEG tube clamped. Ileostomy intact, Rojas patent and draining. Wound vac to sacrum reinforced at lower end. Wound care performed to graft sites on right thigh and transverse abdomen, heel foams changed.    Problem: Skin Injury Risk Increased  Goal: Skin Health and Integrity  Outcome: Progressing     Problem: Mechanical Ventilation Invasive  Goal: Effective Communication  Outcome: Progressing  Goal: Optimal Device Function  Outcome: Progressing  Goal: Mechanical Ventilation Liberation  Outcome: Progressing  Goal: Optimal Nutrition Delivery  Outcome: Progressing  Goal: Absence of Device-Related Skin and Tissue Injury  Outcome: Progressing  Goal: Absence of Ventilator-Induced Lung Injury  Outcome: Progressing     Problem: Artificial Airway  Goal: Effective Communication  Outcome: Progressing  Goal: Optimal Device Function  Outcome: Progressing  Goal: Absence of Device-Related Skin or Tissue Injury  Outcome: Progressing     Problem: Adult Inpatient Plan of Care  Goal: Plan of Care Review  Outcome: Progressing  Goal: Patient-Specific Goal (Individualized)  Outcome: Progressing  Goal: Absence of Hospital-Acquired Illness or Injury  Outcome: Progressing  Goal: Optimal Comfort and Wellbeing  Outcome: Progressing  Goal: Readiness for Transition of Care  Outcome: Progressing     Problem: Diabetes Comorbidity  Goal: Blood Glucose Level Within Targeted Range  Outcome: Progressing     Problem: Wound  Goal: Optimal Coping  Outcome: Progressing  Goal: Optimal Functional Ability  Outcome: Progressing  Goal: Absence of Infection Signs and Symptoms  Outcome: Progressing  Goal: Improved Oral Intake  Outcome: Progressing  Goal: Optimal Pain Control and Function  Outcome: Progressing  Goal: Skin Health and  Integrity  Outcome: Progressing  Goal: Optimal Wound Healing  Outcome: Progressing     Problem: Infection  Goal: Absence of Infection Signs and Symptoms  Outcome: Progressing     Problem: Pneumonia  Goal: Fluid Balance  Outcome: Progressing  Goal: Resolution of Infection Signs and Symptoms  Outcome: Progressing  Goal: Effective Oxygenation and Ventilation  Outcome: Progressing     Problem: Sepsis/Septic Shock  Goal: Optimal Coping  Outcome: Progressing  Goal: Absence of Bleeding  Outcome: Progressing  Goal: Blood Glucose Level Within Targeted Range  Outcome: Progressing  Goal: Absence of Infection Signs and Symptoms  Outcome: Progressing  Goal: Optimal Nutrition Intake  Outcome: Progressing     Problem: Fall Injury Risk  Goal: Absence of Fall and Fall-Related Injury  Outcome: Progressing     Problem: Electrolyte Imbalance  Goal: Electrolyte Balance  Outcome: Progressing

## 2025-01-06 NOTE — PROGRESS NOTES
[facility] Medicine  Progress Note    Patient Name: Carlos Chahal  MRN: [id number]  Patient Class: IP- Inpatient   Admission Date: 12/15/2024  Length of Stay: 22 days  Attending Physician: Kim Diamond MD  Primary Care Provider: Jose Francisco Klein MD        Subjective     Principal Problem:Sepsis        HPI:  Carlos Chahal is a 34 y.o. male who presents to the ED from nursing home for altered mental status and difficulty breathing.  Patient is found to be hypoxic.  He has a history of anoxic brain injury     This am, pt is on ventilator and levophed at 0.25mcg and ivfs at 75ml/hr.      Spoke with father this am on condition of pt    Overview/Hospital Course:  12/17 ND became more awake yesterday with wound changes - low dose propofol added for pt - able to wean levophed to 0.15mcg.  did tolerate tf last night - check kub this am  12/18 ND pt had to be changed back to ac control last nigth after becoming tachypneic and had low tv.  Tolerating ac mode.  Levophed down to 0.1 mcg  12/19 ND pt toleratign vent - will wean RR today - cont to slowly wean levophed as tolerates - wbc slowly imrpoving - tolerating low dose tfs - will cont to increase tfs as tolerates  12/20 ND tolerating vent - cont to work on feeds and nutritional support  12/21 [place] weekend coverage, in no acute distress, stable vital signs, AC/18/400/5/40%, SpO2 100%. On proprofol for sedation, patient tracks voice and moving head and neck with lid opening. Mild bump in WBC, afebrile, may be associated with the reported residual >200 and tube feeds held. Abdomen, soft and no distension on exam. Will resume as tolerated and monitor.  Blood cx x2, final report no growth. Continue merrem (D5), gentamicin (D2) with mixed MDRO frida from sputum studies and UTI. Replete Mg, continue abx. Continue daily wound care.   12/22 [place] weekend coverage, overnight rate change and tolerating AC12/400/5/40 SpO2 100%, proprofol 15 mcg/kg/min, levophed  0.1mcg/kg/min. Patient without gag reflex on suction. Tube feeds held overnight and resumed, remains on trickle feeds. Ileostomy output 100.   No associated abdominal distension, patient in no distress. Vent settings weaned. Leukocytosis resolved. Continue IV abx for MDRO coverage.   12/23 ND Pt still havign trouble tolerating tfs -change reglan iv; pt is more awake this am - will change to simv for a few hours today   12/24 ND elevated bs with tpn- will add long acting insulin as 12u and restart tpn - ssi adjusted to moderate scale.  12/25 FM:  Holiday coverage, chart reviewed and case discussed with team.  Patient's Levophed is being weaned slowly and he is not tolerating his tube feeds.  Abdominal exam is soft patient has output via ostomy we will check KUB.  Patient is followed by surgery and has a polymicrobial respiratory and urinary tract infection.  Patient has multiple wounds and has a history of anoxic brain injury and is a long-term nursing home resident.  12/26 FM:  Patient is off of vasopressors this morning, patient's KUB noted and will rechallenge tube feedings today.  Patient is tolerating TPN labs noted and his hemoglobin has continued to trend downward Ms. With no visible blood loss.  Patient's other labs noted his white blood cell count is rising despite appropriate antibiotics based on respiratory and urinary cultures.  Patient's prognosis is poor.  12/27 ND pt  still not tolerating tfs - surgery following -  cont tpn; h/h improved after transfusion.  Updated aunt on pts condition  12/28/24:  Patient seen this morning.  Not tolerating tube feedings at this time, remains on vent support.  Poor prognosis at this time.  Patient with poor urine output despite diuretics.  Trial of albumin infusion followed by lasix today.    12/29/24:  Good response to albumin/lasix combo yesterday with good urine output.  Renal function essentially unchanged.  Sputum culture with gram negative rods, susceptibility  pending.  Continue abx for now. Leukocytosis improving, H/H stable.   12/30 ND pt will be switched to simv today to start havign him do more work on breathing.  Awaiting sputum cx results - cxr appears improved and wbc improving.  Still not tolerating tfs  12/31 WC: Patient having ongoing need for ventilator support.  Sputum culture still pending.  Abdominal x-ray reveals no evidence of bowel obstruction.  Tube feeds as tolerated.  1/1/25 WC:  remains vent dependent.  TF as tolerated.  1/2/25 ND PT had to placed back on levophed overnight due to lower bp and had some tachypnea -= abg showed lower pO2 - fio2 increased  tolerating feeds via dibhoff tube.   Also had new temp overnight - leia repeat bc x 2  1/3/25 ND decreased uop yesterday.   Had abd us and reviewed.  Pt off of levophed.   No further temp spike  1/4/25 ND pt had abxs adjusted yesterday for better coverage for actinobacter in lung - current ly on unasyn and merrem per pharm id.  Pt had large blood clot from sacral area last nigth.  And has some decreased bp - back on low dose levophed.    1/5/25 ND pt still not toleratign tube feeds overnight.  Sp 2 uprbcs yesterday with improvement in h/h.  Havign more edema and more blistering of skin with the edema.   Updated aunt on pts condition and that he has poor prognosis - will discuss with his dad  about poss dnr status  1/6/25 ND pt still not tolerating tube feeds - pt has poor denitition which has been causign bleedign from oral cavity.        Review of Systems   Unable to perform ROS: Acuity of condition     Objective:     Vital Signs (Most Recent):  Temp: 96.8 °F (36 °C) (01/06/25 0309)  Pulse: (!) 58 (01/06/25 0721)  Resp: (!) 33 (01/06/25 0721)  BP: (!) 93/55 (01/06/25 0600)  SpO2: 100 % (01/06/25 0721) Vital Signs (24h Range):  Temp:  [96.8 °F (36 °C)-97.6 °F (36.4 °C)] 96.8 °F (36 °C)  Pulse:  [52-70] 58  Resp:  [26-41] 33  SpO2:  [95 %-100 %] 100 %  BP: ()/(40-70) 93/55     Weight: 68.5 kg (151  lb 0.2 oz)  Body mass index is 23.65 kg/m².    Intake/Output Summary (Last 24 hours) at 1/6/2025 0725  Last data filed at 1/6/2025 0559  Gross per 24 hour   Intake 5585.73 ml   Output 1000 ml   Net 4585.73 ml         Physical Exam  Constitutional:       Appearance: He is ill-appearing.      Comments: Thin male; intubated   HENT:      Mouth/Throat:      Mouth: Mucous membranes are moist.   Eyes:      Pupils: Pupils are equal, round, and reactive to light.   Cardiovascular:      Rate and Rhythm: Normal rate and regular rhythm.      Heart sounds: Normal heart sounds.   Pulmonary:      Effort: Pulmonary effort is normal.      Breath sounds: Normal breath sounds. Transmitted upper airway sounds present.   Abdominal:      General: There is no distension.      Palpations: Abdomen is soft. There is no mass.      Tenderness: There is no abdominal tenderness.      Comments: Jtube noted; ileostomy noted   Genitourinary:     Comments: Rojas with yellow urine  Musculoskeletal:      Right lower leg: Edema present.      Left lower leg: Edema present.   Lymphadenopathy:      Cervical: No cervical adenopathy.   Skin:     General: Skin is warm and dry.      Comments: Wounds to sacrum with wound vac in place; groin area             Significant Labs: All pertinent labs within the past 24 hours have been reviewed.    Significant Imaging: I have reviewed all pertinent imaging results/findings within the past 24 hours.    Assessment and Plan     * Sepsis  This patient does have evidence of infective focus  My overall impression is septic shock due to MAP < 65 and SBP < 90.  Source: Respiratory and Urinary Tract  Antibiotics given-   Antibiotics (72h ago, onward)      Start     Stop Route Frequency Ordered    01/05/25 0900  meropenem 2 g in 0.9% NaCl 100 mL IVPB         -- IV Every 12 hours (non-standard times) 01/05/25 0731    01/04/25 1300  ampicillin-sulbactam (UNASYN) 9 g in 0.9% NaCl 250 mL IVPB         -- IV Every 8 hours  "(non-standard times) 01/04/25 0804    12/30/24 1300  erythromycin base tablet 250 mg         -- PER NG TUBE 4 times daily 12/30/24 1111          Latest lactate reviewed-  No results for input(s): "LACTATE", "POCLAC" in the last 72 hours.    Organ dysfunction indicated by Acute respiratory failure and Encephalopathy    Fluid challenge Ideal Body Weight- The patient's ideal body weight is Ideal body weight: 66.1 kg (145 lb 11.6 oz) which will be used to calculate fluid bolus of 30 ml/kg for treatment of septic shock.      Post- resuscitation assessment Yes Perfusion exam was performed within 6 hours of septic shock presentation after bolus shows Inadequate tissue perfusion assessed by non-invasive monitoring       Will Start Pressors- Levophed for MAP of 65  Source control achieved by: iv zosym. Vanc, ivfs    Patient has a leukocytosis.  Last trend as follows-   Lab Results   Component Value Date    WBC 16.19 (H) 01/06/2025    WBC 20.33 (H) 01/05/2025    WBC 21.36 (H) 01/04/2025 12/17 wean levophed as tolerates- abxs changed to merrem, cotn vanc - await culture final reports  12/18 wbc slightly imrpoved - cont iv abxs- await final sputum culture - on merrem fro esbl kleb in urine  12/19 cont iv merrem for esbl kleb in urine and proteus in sputum - dc vanc  12/20 wbc improvign - cotn iv merrem  12/24 wbc normal - cont iv abxs  12/25 FM:  Cont GENT.  12/26 FM:  Cont GENT, prognosis poor, pulm/gu/skin infections.  12/27 repeat bc and sputum culture due to increasing wbc - cont gent; completed 10 days of merrem  12/29/24:  Sputum with gram negative rods, continue gentamicin, susceptibility pending   12/30 wbc improvign  1/2/25 wbc improved but had fever overnight - repeat bcx 2 today - cont iv abxs - back on levophed for bp control  1/3 off of levophed - cont gent; wbc increased but no further temp spikes; bc ng x 1 day  1/4 bc - ng x 2 days - con unasyn and merrem for better actinobacter coverage  1/6 cont iv " abxs    Elevated LFTs  Abd us done 1/2/25 and reviewed - slightly improved today; check ggt  1/4 lfts slowly improving    Hyperkalemia  Hyperkalemia is likely due to OTONIEL.The patients most recent potassium results are listed below.  Recent Labs     01/04/25  0354 01/05/25  0355 01/06/25  0343   K 5.5* 5.1 4.6       Plan  - Monitor for arrhythmias with EKG and/or continuous telemetry.   - Treat the hyperkalemia with Potassium Binders.   - Monitor potassium: Daily  - The patient's hyperkalemia is  being treated  1/5 imrpovign with lokema  1/6 dc lokema - potassium level better          Edema  Lasix 20mg iv bid - monitor I/o  12/30 decreae lasix to daily - edema has imrpoved  1/2/25 give extra of lasix today due to edema  1/3 stop lasix due to worsening kidney fxn  1/5 give dose of albumin then lasix push to help edema  1/6 albumin/lasix didn't help much yesterday    Hypokalemia  Patient's most recent potassium results are listed below.   Recent Labs     01/04/25  0354 01/05/25  0355 01/06/25  0343   K 5.5* 5.1 4.6       Plan  - Replete potassium per protocol  - Monitor potassium Daily  - Patient's hypokalemia is stable, continue below and monitor  12/20 increase pot bicarb to bid  12/23 decrease pot bicard to daily dosing  12/24 improved- stop potassium bicarb repalcement - monitor    1/3 resolved    Severe malnutrition  Nutrition consulted. Most recent weight and BMI monitored-     Measurements:  Wt Readings from Last 1 Encounters:   12/16/24 68.5 kg (151 lb 0.2 oz)   Body mass index is 23.65 kg/m².    Patient has been screened and assessed by RD.    Malnutrition Type:  Context:    Level:      Malnutrition Characteristic Summary:       Interventions/Recommendations (treatment strategy):  1. Rec'd Continuous EN: Glucerna 1.5 @ 40mL/r increasing by 5 mL q6hrs to goal rate of 55mL/hr with a continuous 40mL FWF to provide 1980kcal (94% EEN), 109g of protein (100% EPN), and 1962mL FW.   2. Néstor BID via PEG tube to promote  wound healing and to provide additional nutrition.           - Do not mix Néstor with formula in a tube-feeding bag         - Pour one packet of Néstor in a clean, small container for mixing.           - Add 4 fl oz (120 mL) water at room temperature.           - Mix well with disposable spoon or tongue blade until all particles are completely hydrated.           - Verify correct tube placement.           - Flush feeding tube with 30 mL water.           -Administer Néstor through feeding tube using a 60-mL or larger syringe.           - Flush with an additional 30 mL water.  3. Rec'd ClinimixE 5/20 @ 80mL/hr to provide 1690kcal (80% EEN), 96g of protein (88% EPN), and 1920mL TFV.      -Add 250mL of 20% lipids 3x/week to provide additional calories.      -Check Triglycerides before adding lipids. 4. RD to follow and make rec's accordingly.    12/18 restart tfs today at 1ml/hr to see if can tolerate feeds  12/19 increase tfs as tolerates  12/23 not tolerating tfs- will get tpn orders and start that until can tolerate tfs better - ncrease regaln 10 mg iv q 6hrs  12/24 con ttfs as tolerates - started on tpn for further nutritional support  12/25 FM:  Check KUB.  12/26 FM:  Resume TF today, monitor.  12/27 on tpn - not tolerating tfs at this time  12/28/24:  Albumin infusion today.   12/31:  tube feeds as tolerated    Patient has protein malnutrition.  Last trend as follows-   Lab Results   Component Value Date    ALBUMIN <0.6 (L) 01/06/2025    ALBUMIN 0.6 (L) 01/05/2025    ALBUMIN 0.6 (L) 01/04/2025 1/2/25 tolerating tfs better with dibhoff tube - cont tpn and IL for now  1/5 will attempt tube feeds again and cont as tolerates    Hypomagnesemia  Patient has Abnormal Magnesium: hypomagnesemia. Will continue to monitor electrolytes closely. Will replace the affected electrolytes and repeat labs to be done after interventions completed. The patient's magnesium results have been reviewed and are listed below.  Recent Labs    Lab 01/06/25  0343   MG 1.9      12/17 mag imrpoving - repalce mag today  12/19 replace mag today  12/20 mag oxide bid  12/21 2g Mg  12/23 dose of iv mag today to keep mag level about 2  12/25 FM:  Replace, recheck.  12/30 replace mag today  1/3 mag level imporved    Pneumonia of right lower lobe due to infectious organism  Patient has a diagnosis of pneumonia. The cause of the pneumonia is suspected to be bacterial in etiology but organism is not known. The pneumonia is stable. The patient has the following signs/symptoms of pneumonia: persistent hypoxia  and sputum production. The patient does have a current oxygen requirement and the patient does not have a home oxygen requirement. I have reviewed the pertinent imaging. The following cultures have been collected: Blood cultures and Sputum culture The culture results are listed below.     Current antimicrobial regimen consists of the antibiotics listed below. Will monitor patient closely and continue current treatment plan unchanged.    Antibiotics (From admission, onward)      Start     Stop Route Frequency Ordered    01/05/25 0900  meropenem 2 g in 0.9% NaCl 100 mL IVPB         -- IV Every 12 hours (non-standard times) 01/05/25 0731    01/04/25 1300  ampicillin-sulbactam (UNASYN) 9 g in 0.9% NaCl 250 mL IVPB         -- IV Every 8 hours (non-standard times) 01/04/25 0804    12/30/24 1300  erythromycin base tablet 250 mg         -- PER NG TUBE 4 times daily 12/30/24 1111            Microbiology Results (last 7 days)       Procedure Component Value Units Date/Time    Blood culture [[id number]] Collected: 01/02/25 0818    Order Status: Completed Specimen: Blood from Peripheral, Forearm, Left Updated: 01/05/25 2012     Blood Culture, Routine No Growth to date      No Growth to date      No Growth to date      No Growth to date    Blood culture [[id number]] Collected: 01/02/25 0805    Order Status: Completed Specimen: Blood from Peripheral, Wrist, Left Updated:  01/05/25 2012     Blood Culture, Routine No Growth to date      No Growth to date      No Growth to date      No Growth to date    Culture, Respiratory with Gram Stain [[id number]]  (Abnormal)  (Susceptibility) Collected: 12/27/24 1522    Order Status: Completed Specimen: Respiratory from Endotracheal Aspirate Updated: 01/03/25 1146     Respiratory Culture No S aureus or Pseudomonas isolated.      ACINETOBACTER BAUMANNII   Many       Gram Stain (Respiratory) Few Gram positive cocci     Gram Stain (Respiratory) Rare Gram positive rods     Gram Stain (Respiratory) Rare WBC's    Blood culture [[id number]] Collected: 12/27/24 0837    Order Status: Completed Specimen: Blood Updated: 01/01/25 1812     Blood Culture, Routine No growth after 5 days.    Blood culture [[id number]] Collected: 12/27/24 0838    Order Status: Completed Specimen: Blood Updated: 01/01/25 1812     Blood Culture, Routine No growth after 5 days.        12/17 dc zosyn with recent esbl kleb in urine - will change to merrem - cont vanc until final cultures obtained - change vent to simv; add nebs  12/18 cotn merrem and vanc - await final sputum cutlure - cont vent on ac control; nebs - wbc imrpoved slightly; lasix 20mg iv for edema today  12/19  dc vanc - proteus grwoign from sputum - cont merrem and nebs; lasix today - wean rr on vent  12/20 wnc improved - cont merrem - nebs and vent - another dose of lasix today for edema - fio2 increased  12/23 cont iv gent and merren due to sent of multiple bugs (acinetobacter/kleb/proteus)  12/24 cont current abxs  12/25 FM:  Cont GENT  12/26 FM:  Cont GENT, poor prognosis.  12/27 on gent - get new sputum culture today due to elevated wbc - cont vent and nebs  12/28/24:Gram positive cocci/rods on sputum, blood cultures negative   12/29/24:  Gram negative rods on sputum cx.   12/30 await final results of sputum cx  12/31: Tailor antibiotics based on sputum culture results  1/1/24:  tailor based on c/s  1/2/25  still awaitin g final culture reports with I&D of sputum; cont current abxs  1/3 sputum culture grew out actinobacter - cont gent  1/4 changed to unasyn and merrem per pharm id recs for better tissue pentration for actinobacter  1/6 cont unasyn/merrem - slight decrease in wbc count    Sacral wound, sequela  Local wound care with dakins bid  Iv abxs  12/27 prob debridementt in or on 12/30 1/2/25 sp debridement =- wound vac in place    Open wound of groin  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  Recent Labs     01/04/25  0354 01/05/25  0355 01/06/25  0343   HGB 5.9* 8.1* 7.7*   HCT 17.8* 23.7* 22.3*       Plan  - Monitor serial CBC: Daily  - Transfuse PRBC if patient becomes hemodynamically unstable, symptomatic or H/H drops below 7/21.  - Patient has not received any PRBC transfusions to date  - Patient's anemia is currently worsening. Will adjust treatment as follows: 2uprbcs today  12/17 h/h Improved  12/20 h/h holding  12/26 FM:  Transfuse today.  12/27 hh improved after transfusion  12/28/24:  Continue daily monitoring.  12/30 h/h stable  12/31: Hemoglobin remained stable  1/1/24:  transfuse today  1/2/25 h/h imp[roved after transfusion  1/3 h/h holding  1/4 trasnfuse 2u prbcs today due to recent bleeding from sacral wound with decrease in h/h and bp  1/5 sp 2 uprbcs yesterday with improvement in h/h  1/6 slight decrease in h/h    History of anoxic brain injury  12/25 FM:  Poor prognosis.      Urinary tract infection associated with indwelling urethral catheter  Await new urine culture =- currently on zosyn/vanc  12/17 change to merrem/vanc  12/18 has esbl klebsiella - cont merrem  12/23 on merrem  12/25 FM:  Continue Gent, CS reviewed.  12/26 FM:  WBC increasing, monitor, on appropriate abx based on cultures.  12/28/24:  Blood culture negative to date,  sputum culture with some gram positive cocci and rods.  Continue abx for now.  Gentamicin per pharmacy to dose.     Tailor antibiotics based on culture and sensitivity    Type 1 diabetes  Patient's FSGs are uncontrolled due to hyperglycemia on current medication regimen.  Last A1c reviewed-   Lab Results   Component Value Date    HGBA1C 6.6 (H) 12/16/2024     Most recent fingerstick glucose reviewed-   Recent Labs   Lab 01/05/25  1727 01/05/25  1729 01/05/25  1756 01/05/25  2341   POCTGLUCOSE 36* 38* 156* 134*       Current correctional scale  Low  Maintain anti-hyperglycemic dose as follows-   Antihyperglycemics (From admission, onward)      Start     Stop Route Frequency Ordered    12/30/24 0900  insulin glargine U-100 (Lantus) pen 20 Units         -- SubQ Daily 12/30/24 0734    12/24/24 0807  insulin aspart U-100 pen 0-10 Units         -- SubQ Every 4 hours PRN 12/24/24 0707          Hold Oral hypoglycemics while patient is in the hospital.  12/17 A1c improved to 6.6%  12/24 due to tpn - add lantus 12 u daily - ssi changed to moderate scale  12/25 FM:  BS logs noted, continue coverage.  12/26 FM:  Cont SSI.  12/27 increase lantus to 16u daily  1/3 bs have been better controlled      VTE Risk Mitigation (From admission, onward)           Ordered     IP VTE LOW RISK PATIENT  Once         12/15/24 1201     Place sequential compression device  Until discontinued         12/15/24 1201                    Discharge Planning   JOSE MANUEL:      Code Status: DNR   Medical Readiness for Discharge Date:   Discharge Plan A: Return to nursing home   Discharge Delays: None known at this time                    Kim Diamond MD  Department of Hospital Medicine   [place] [facility]

## 2025-01-06 NOTE — ASSESSMENT & PLAN NOTE
Anemia is likely due to chronic infections Most recent hemoglobin and hematocrit are listed below.  Recent Labs     01/04/25  0354 01/05/25  0355 01/06/25  0343   HGB 5.9* 8.1* 7.7*   HCT 17.8* 23.7* 22.3*       Plan  - Monitor serial CBC: Daily  - Transfuse PRBC if patient becomes hemodynamically unstable, symptomatic or H/H drops below 7/21.  - Patient has not received any PRBC transfusions to date  - Patient's anemia is currently worsening. Will adjust treatment as follows: 2uprbcs today  12/17 h/h Improved  12/20 h/h holding  12/26 FM:  Transfuse today.  12/27 hh improved after transfusion  12/28/24:  Continue daily monitoring.  12/30 h/h stable  12/31: Hemoglobin remained stable  1/1/24:  transfuse today  1/2/25 h/h imp[roved after transfusion  1/3 h/h holding  1/4 trasnfuse 2u prbcs today due to recent bleeding from sacral wound with decrease in h/h and bp  1/5 sp 2 uprbcs yesterday with improvement in h/h  1/6 slight decrease in h/h

## 2025-01-06 NOTE — ASSESSMENT & PLAN NOTE
Patient has Abnormal Magnesium: hypomagnesemia. Will continue to monitor electrolytes closely. Will replace the affected electrolytes and repeat labs to be done after interventions completed. The patient's magnesium results have been reviewed and are listed below.  Recent Labs   Lab 01/06/25  0343   MG 1.9      12/17 mag imrpoving - repalce mag today  12/19 replace mag today  12/20 mag oxide bid  12/21 2g Mg  12/23 dose of iv mag today to keep mag level about 2  12/25 FM:  Replace, recheck.  12/30 replace mag today  1/3 mag level imporved

## 2025-01-06 NOTE — ASSESSMENT & PLAN NOTE
"This patient does have evidence of infective focus  My overall impression is septic shock due to MAP < 65 and SBP < 90.  Source: Respiratory and Urinary Tract  Antibiotics given-   Antibiotics (72h ago, onward)      Start     Stop Route Frequency Ordered    01/05/25 0900  meropenem 2 g in 0.9% NaCl 100 mL IVPB         -- IV Every 12 hours (non-standard times) 01/05/25 0731    01/04/25 1300  ampicillin-sulbactam (UNASYN) 9 g in 0.9% NaCl 250 mL IVPB         -- IV Every 8 hours (non-standard times) 01/04/25 0804    12/30/24 1300  erythromycin base tablet 250 mg         -- PER NG TUBE 4 times daily 12/30/24 1111          Latest lactate reviewed-  No results for input(s): "LACTATE", "POCLAC" in the last 72 hours.    Organ dysfunction indicated by Acute respiratory failure and Encephalopathy    Fluid challenge Ideal Body Weight- The patient's ideal body weight is Ideal body weight: 66.1 kg (145 lb 11.6 oz) which will be used to calculate fluid bolus of 30 ml/kg for treatment of septic shock.      Post- resuscitation assessment Yes Perfusion exam was performed within 6 hours of septic shock presentation after bolus shows Inadequate tissue perfusion assessed by non-invasive monitoring       Will Start Pressors- Levophed for MAP of 65  Source control achieved by: iv zosym. Vanc, ivfs    Patient has a leukocytosis.  Last trend as follows-   Lab Results   Component Value Date    WBC 16.19 (H) 01/06/2025    WBC 20.33 (H) 01/05/2025    WBC 21.36 (H) 01/04/2025 12/17 wean levophed as tolerates- abxs changed to merrem, cotn vanc - await culture final reports  12/18 wbc slightly imrpoved - cont iv abxs- await final sputum culture - on merrem fro esbl kleb in urine  12/19 cont iv merrem for esbl kleb in urine and proteus in sputum - dc vanc  12/20 wbc improvign - cotn iv merrem  12/24 wbc normal - cont iv abxs  12/25 FM:  Cont GENT.  12/26 FM:  Cont GENT, prognosis poor, pulm/gu/skin infections.  12/27 repeat bc and sputum " culture due to increasing wbc - cont gent; completed 10 days of merrem  12/29/24:  Sputum with gram negative rods, continue gentamicin, susceptibility pending   12/30 wbc improvign  1/2/25 wbc improved but had fever overnight - repeat bcx 2 today - cont iv abxs - back on levophed for bp control  1/3 off of levophed - cont gent; wbc increased but no further temp spikes; bc ng x 1 day  1/4 bc - ng x 2 days - con unasyn and merrem for better actinobacter coverage  1/6 cont iv abxs

## 2025-01-07 NOTE — EICU
Intervention Initiated From:  [facility] / EICU    Sandy intervened regarding:  Rounding (Video assessment)    Comments: Video assessment completed. Pt lying in bed. ETT to vent. Resp even and unlabored with vent support. Staff present at bedside. No e-ICU needs identified at this time.

## 2025-01-07 NOTE — EICU
eICU Physician Virtual/Remote Brief Evaluation Note      Telephone call bedside RN   Blood glucose 20 and patient received hypoglycemia protocol and to be started on D10  Per RN patient on TPN during the day and was to be started on D10 when the TPN completed.  She was short of IV lines and patient had to receive antibiotics which were not compatible D10.  Glucose was 136 prior to this  Would consider changing TPN to run over 24 hours.    If this is for some reason not possible RN advised to inquire if in the future the D10 can be run through the TPN line.        NICKY Rebollar MD  [facility] Attending  [phone]    This report has been created through the use of [employer] dictation software. Typographical and content errors may occur with this process. While efforts are made to detect and correct such errors, in some cases errors will persist. For this reason, wording in this document should be considered in the proper context and not strictly verbatim

## 2025-01-07 NOTE — PLAN OF CARE
POC reviewed w/ family this shift. Purposeful rounding ongoing. VSS on ventilator. Meds administered per MAR. Blood glucose monitored and stable at this time. Norepinephrine titrated per MAR to maintain MAP between 65 and 75. Propofol infusing per MAR. TPN infusing per orders. Rojas had decreased output this shift. Pt required oral suctioning approximately q2h d/t blood and saliva accumulation; 2 large clots suctioned out of pt's mouth this shift. Pt remains stable at this time.     Problem: Skin Injury Risk Increased  Goal: Skin Health and Integrity  1/7/2025 1720 by Zakiya Junior RN  Outcome: Not Progressing  1/7/2025 1720 by Zakiya Junior RN  Outcome: Progressing     Problem: Mechanical Ventilation Invasive  Goal: Effective Communication  1/7/2025 1720 by Zakiya Junior RN  Outcome: Progressing  1/7/2025 1720 by Zakiya Junior RN  Outcome: Progressing  Goal: Optimal Device Function  1/7/2025 1720 by Zakiya Junior RN  Outcome: Progressing  1/7/2025 1720 by Zakiya Junior RN  Outcome: Progressing  Goal: Mechanical Ventilation Liberation  1/7/2025 1720 by Zakiya Junior RN  Outcome: Progressing  1/7/2025 1720 by Zakiya Junior RN  Outcome: Progressing  Goal: Optimal Nutrition Delivery  1/7/2025 1720 by Zakiya Junior, RN  Outcome: Progressing  1/7/2025 1720 by Zakiya Junior RN  Outcome: Progressing  Goal: Absence of Device-Related Skin and Tissue Injury  1/7/2025 1720 by Zakiya Junior RN  Outcome: Progressing  1/7/2025 1720 by Zakiya Junior RN  Outcome: Progressing  Goal: Absence of Ventilator-Induced Lung Injury  1/7/2025 1720 by Zakiya Junior RN  Outcome: Progressing  1/7/2025 1720 by Zakiya Junior RN  Outcome: Progressing     Problem: Artificial Airway  Goal: Effective Communication  1/7/2025 1720 by Zakiya Junior, RN  Outcome: Progressing  1/7/2025 1720 by Zakiya Junior RN  Outcome: Progressing  Goal: Optimal Device Function  1/7/2025 1720 by Zakiya Junior  JODIE, RN  Outcome: Progressing  1/7/2025 1720 by Zakiya Junior, RN  Outcome: Progressing  Goal: Absence of Device-Related Skin or Tissue Injury  1/7/2025 1720 by Zakiya Junior, RN  Outcome: Progressing  1/7/2025 1720 by Zakiya Junior, RN  Outcome: Progressing     Problem: Adult Inpatient Plan of Care  Goal: Plan of Care Review  1/7/2025 1720 by Zakiya Junior, RN  Outcome: Progressing  1/7/2025 1720 by Zakiya Junior RN  Outcome: Progressing  Goal: Patient-Specific Goal (Individualized)  1/7/2025 1720 by Zakiya Junior, RN  Outcome: Progressing  1/7/2025 1720 by Zakiya Junior, RN  Outcome: Progressing  Goal: Absence of Hospital-Acquired Illness or Injury  1/7/2025 1720 by Zakiya Junior, RN  Outcome: Progressing  1/7/2025 1720 by Zakiya Junior, RN  Outcome: Progressing  Goal: Optimal Comfort and Wellbeing  1/7/2025 1720 by Zakiya Junior, RN  Outcome: Progressing  1/7/2025 1720 by Zakiya Junior RN  Outcome: Progressing  Goal: Readiness for Transition of Care  1/7/2025 1720 by Zakiya Junior, RN  Outcome: Progressing  1/7/2025 1720 by Zakiya Junior, RN  Outcome: Progressing     Problem: Diabetes Comorbidity  Goal: Blood Glucose Level Within Targeted Range  1/7/2025 1720 by Zakiya Junior, RN  Outcome: Progressing  1/7/2025 1720 by Zakiya Junior, RN  Outcome: Progressing     Problem: Wound  Goal: Optimal Coping  1/7/2025 1720 by Zakiya Junior, RN  Outcome: Progressing  1/7/2025 1720 by Zakiya Junior, RN  Outcome: Progressing  Goal: Optimal Functional Ability  1/7/2025 1720 by Zakiya Junior, RN  Outcome: Progressing  1/7/2025 1720 by Zakiya Junior, RN  Outcome: Progressing  Goal: Absence of Infection Signs and Symptoms  1/7/2025 1720 by Zakiya Junior RN  Outcome: Progressing  1/7/2025 1720 by Zakiya Junior, RN  Outcome: Progressing  Goal: Improved Oral Intake  1/7/2025 1720 by Zakiya Junior RN  Outcome: Progressing  1/7/2025 1720 by Zakiya Junior,  RN  Outcome: Progressing  Goal: Optimal Pain Control and Function  1/7/2025 1720 by Zakiya Junior, RN  Outcome: Progressing  1/7/2025 1720 by Zakiya Junior, RN  Outcome: Progressing  Goal: Skin Health and Integrity  1/7/2025 1720 by Zakiya Junior, RN  Outcome: Progressing  1/7/2025 1720 by Zakiya Junior, RN  Outcome: Progressing  Goal: Optimal Wound Healing  1/7/2025 1720 by Zakiya Junior, RN  Outcome: Progressing  1/7/2025 1720 by Zakiya Junior, RN  Outcome: Progressing     Problem: Pneumonia  Goal: Fluid Balance  1/7/2025 1720 by Zakiya Junior, RN  Outcome: Progressing  1/7/2025 1720 by Zakiya Junior, RN  Outcome: Progressing  Goal: Resolution of Infection Signs and Symptoms  1/7/2025 1720 by Zakiya Junior, RN  Outcome: Progressing  1/7/2025 1720 by Zakiya Junior, RN  Outcome: Progressing  Goal: Effective Oxygenation and Ventilation  1/7/2025 1720 by Zakiya Junior, RN  Outcome: Progressing  1/7/2025 1720 by Zakiya Junior RN  Outcome: Progressing     Problem: Infection  Goal: Absence of Infection Signs and Symptoms  1/7/2025 1720 by Zakiya Junior, RN  Outcome: Progressing  1/7/2025 1720 by Zakiya Junior RN  Outcome: Progressing     Problem: Sepsis/Septic Shock  Goal: Optimal Coping  1/7/2025 1720 by Zakiya Junior, RN  Outcome: Progressing  1/7/2025 1720 by Zakiya Junior RN  Outcome: Progressing  Goal: Absence of Bleeding  1/7/2025 1720 by Zakiya Junior, RN  Outcome: Not Progressing  1/7/2025 1720 by Zakiya Junior, RN  Outcome: Progressing  Goal: Blood Glucose Level Within Targeted Range  1/7/2025 1720 by Zakiya Junior, RN  Outcome: Progressing  1/7/2025 1720 by Zakiya Junior RN  Outcome: Progressing  Goal: Absence of Infection Signs and Symptoms  1/7/2025 1720 by Zakiya Junior RN  Outcome: Progressing  1/7/2025 1720 by Zakiya Junior RN  Outcome: Progressing  Goal: Optimal Nutrition Intake  1/7/2025 1720 by Zakiya Junior RN  Outcome:  Progressing  1/7/2025 1720 by Zakiya Junior RN  Outcome: Progressing     Problem: Electrolyte Imbalance  Goal: Electrolyte Balance  1/7/2025 1720 by Zakiya Junior RN  Outcome: Progressing  1/7/2025 1720 by Zakiya Junior RN  Outcome: Progressing     Problem: Fall Injury Risk  Goal: Absence of Fall and Fall-Related Injury  1/7/2025 1720 by Zakiya Junior RN  Outcome: Progressing  1/7/2025 1720 by Zakiya Junior RN  Outcome: Progressing

## 2025-01-07 NOTE — NURSING
Pt noted with drop in O2 (60-40's). Eyes wide open, starring, head shaking from side to side. Large amount of blood coming out of his mouth. EICU called concerning above stated. Pt suction, glucose checked; 21 and 50% dextrose given. New orders per Dr. Rebollar in EICU. Pt O2 return to 100%. Continue to observe.

## 2025-01-07 NOTE — ASSESSMENT & PLAN NOTE
Continue VAC therapy .  Will attempt to change tomorrow if more stable.   Aspirin and Lovenox stopped for bleeding

## 2025-01-07 NOTE — ASSESSMENT & PLAN NOTE
Hemodynamic instability again with requirement of pressor support.  On Levophed.  Add Dopamine for ronaldo with hypotension.

## 2025-01-07 NOTE — PROGRESS NOTES
[place] - Intensive [facility]  General Surgery  Progress Note  1/6/25    Subjective:     History of Present Illness:  Patient admitted for sepsis with UTI and pneumonia with multiple noted wounds    Post-Op Info:  Procedure(s) (LRB):  DEBRIDEMENT, PRESSURE ULCER (N/A)   6 Days Post-Op     Interval History: Continued hypotension.  Hypoxia and bradycardia noted.    Medications:  Continuous Infusions:   Amino acid 5% - dextrose 20% (CLINIMIX-E) solution with additives. CENTRAL LINE ONLY (1650mOsm/L)   Intravenous Continuous 80 mL/hr at 01/06/25 1654 New Bag at 01/06/25 1654    D10W   Intravenous Continuous PRN        D10W   Intravenous Continuous 50 mL/hr at 01/06/25 2005 New Bag at 01/06/25 2005    DOPamine  0-20 mcg/kg/min Intravenous Continuous        NORepinephrine bitartrate-D5W  0-3 mcg/kg/min Intravenous Continuous 127.5 mL/hr at 01/06/25 1928 0.5 mcg/kg/min at 01/06/25 1928     Scheduled Meds:   acetylcysteine 200 mg/ml (20%)  2 mL Nebulization QID    albuterol sulfate  2.5 mg Nebulization Q4H WAKE    ampicillin-sulbactam  9 g Intravenous Q8H    collagenase   Topical (Top) Daily    erythromycin base  250 mg Per NG tube QID    famotidine  20 mg Per G Tube Daily    fat emulsion 20%  250 mL Intravenous Every Mon, Wed, Fri    FLUoxetine  20 mg Per G Tube Daily    insulin glargine U-100  20 Units Subcutaneous Daily    levothyroxine  150 mcg Per G Tube Before breakfast    lurasidone  40 mg Per G Tube Daily    magnesium oxide  400 mg Per G Tube BID    meropenem IV (PEDS and ADULTS)  2 g Intravenous Q12H    metoclopramide  5 mg Intravenous Q6H    sodium hypochlorite 0.125%   Topical (Top) Daily    white petrolatum-mineral oiL   Both Eyes QHS     PRN Meds:  Current Facility-Administered Medications:     0.9%  NaCl infusion (for blood administration), , Intravenous, Q24H PRN    acetaminophen, 650 mg, Per G Tube, Q6H PRN    bisacodyL, 10 mg, Other, Daily PRN    D10W, , Intravenous, Continuous PRN    dextrose 50%, 12.5 g,  Intravenous, PRN    dextrose 50%, 25 g, Intravenous, PRN    glucagon (human recombinant), 1 mg, Intramuscular, PRN    insulin aspart U-100, 0-10 Units, Subcutaneous, Q4H PRN    melatonin, 6 mg, Per G Tube, Nightly PRN    midazolam, 2 mg, Intravenous, Q1H PRN    propofoL, 0-50 mcg/kg/min, Intravenous, Daily PRN    sodium chloride 0.9%, 10 mL, Intravenous, PRN    Flushing PICC/Midline Protocol, , , Until Discontinued **AND** sodium chloride 0.9%, 10 mL, Intravenous, Q12H PRN     Review of patient's allergies indicates:   Allergen Reactions    Guaifenesin Hives    Codeine Itching     Objective:     Vital Signs (Most Recent):  Temp: 97 °F (36.1 °C) (01/06/25 1929)  Pulse: 71 (01/06/25 1909)  Resp: (!) 39 (01/06/25 1909)  BP: (!) 97/52 (01/06/25 1802)  SpO2: (!) 80 % (01/06/25 1909) Vital Signs (24h Range):  Temp:  [96.8 °F (36 °C)-97.9 °F (36.6 °C)] 97 °F (36.1 °C)  Pulse:  [46-71] 71  Resp:  [26-41] 39  SpO2:  [80 %-100 %] 80 %  BP: ()/(45-70) 97/52     Weight: 68.5 kg (151 lb 0.2 oz)  Body mass index is 23.65 kg/m².    Intake/Output - Last 3 Shifts         01/04 0700  01/05 0659 01/05 0700 01/06 0659 01/06 0700 01/07 0659    P.O. 240 300 240    I.V. (mL/kg) 2101.4 (30.7) 2625 (38.3)     Blood 667.5 0.6     NG/ 327     IV Piggyback 678.4 963.2     TPN 1910.4 1369.9     Total Intake(mL/kg) 6047.7 (88.3) 5585.7 (81.5) 240 (3.5)    Urine (mL/kg/hr) 1000 (0.6) 650 (0.4) 100 (0.1)    Drains 150      Other 200      Stool 750 400     Total Output 2100 1050 100    Net +3947.7 +4535.7 +140                    Physical Exam  Vitals and nursing note reviewed.   Constitutional:       General: He is not in acute distress.     Appearance: He is ill-appearing.      Comments: Thin male; intubated   HENT:      Head: Normocephalic and atraumatic.      Nose:      Comments: NEFT in place L nare     Mouth/Throat:      Mouth: Mucous membranes are moist.   Eyes:      Pupils: Pupils are equal, round, and reactive to light.    Cardiovascular:      Rate and Rhythm: Normal rate and regular rhythm.   Pulmonary:      Effort: Prolonged expiration present. No respiratory distress.      Breath sounds: Transmitted upper airway sounds present. Rhonchi and rales present.      Comments: Decreased breath sounds at lung bases bilaterally  Abdominal:      General: There is no distension.      Palpations: Abdomen is soft.      Tenderness: There is no abdominal tenderness.      Comments: G tube noted; Ostomy L mid abdomen   Genitourinary:     Comments: Rojas with yellow urine  Musculoskeletal:         General: No swelling.      Cervical back: Normal range of motion and neck supple.   Skin:     General: Skin is warm and dry.      Comments: Wounds to sacrum and groin area with dressings changed by nursing today          Significant Labs:  I have reviewed all pertinent lab results within the past 24 hours.  CBC:   Recent Labs   Lab 01/06/25  0343   WBC 16.19*   RBC 2.52*   HGB 7.7*   HCT 22.3*   PLT 81*   MCV 89   MCH 30.6   MCHC 34.5     BMP:   Recent Labs   Lab 01/06/25  0343   *   *   K 4.6   CL 94*   CO2 23   BUN 86*   CREATININE 2.6*   CALCIUM 8.3*   MG 1.9     CMP:   Recent Labs   Lab 01/06/25  0343   *   CALCIUM 8.3*   ALBUMIN <0.6*   PROT 5.4*   *   K 4.6   CO2 23   CL 94*   BUN 86*   CREATININE 2.6*   ALKPHOS 1,244*   ALT 35   AST 97*   BILITOT 3.3*       Significant Diagnostics:  I have reviewed all pertinent imaging results/findings within the past 24 hours.  CXR: I have reviewed all pertinent results/findings within the past 24 hours and my personal findings are:  unchanged  Assessment/Plan:     * Sepsis  Hemodynamic instability again with requirement of pressor support.  On Levophed.  Add Dopamine for ronaldo with hypotension.    Elevated LFTs  ?TPN induced  Cycle with window fluids    Severe malnutrition  Complicated by what appears to be gastroparesis.  Continue nasal feeding tube into his small intestines for post  pyloric feeds as tolerated.    Likely G tube needs to be converted to G-J but just beginning to tolerate feeds  Continue Reglan and Erythromycin    Pneumonia of right lower lobe due to infectious organism  Antibiotics and vent support per primary  Likely needs trach since unable to progress to spontaneous ventilation  CXR concerning for RLL infiltrate/atelectasis needing CPT  Add mucolytic agent due to continued poor breath sounds    Sacral wound, sequela  Continue VAC therapy .  Will attempt to change tomorrow if more stable.   Aspirin and Lovenox stopped for bleeding    Open wound of groin  Wound care written    Microcytic anemia  Transfuse as needed.  Good volume resuscitator.    Urinary tract infection associated with indwelling urethral catheter  Per primary    Type 1 diabetes  Elevated sugars.  Management per primary        Francoise Diaz MD  General Surgery  [place] - Intensive Care

## 2025-01-07 NOTE — NURSING
Glucose monitoring as ordered. Bleeding from mouth, and slight blood noted to ileostomy. Abd distended. Scant urine output, cloudy wit sediment. Blistering with edema to the lower ext and upper ext. Wound care to the lower abed and thigh. VSS. Afebrile. ABT, Diprivan, Levo and TPN infusing left upper arm PICC. Repositioned. Continue to observe.

## 2025-01-07 NOTE — EICU
eICU Physician Virtual/Remote Brief Evaluation Note      Message from bedside RN   ABG pH 7.21, pCO2 36, PO2 77, bicarb 14, O2 sat 94  Chart reviewed, discussed with RN   Continue current ventilator settings      NICKY Rebollar MD  eICU Attending  [phone]    This report has been created through the use of [employer] dictation software. Typographical and content errors may occur with this process. While efforts are made to detect and correct such errors, in some cases errors will persist. For this reason, wording in this document should be considered in the proper context and not strictly verbatim

## 2025-01-07 NOTE — PLAN OF CARE
Problem: Skin Injury Risk Increased  Goal: Skin Health and Integrity  Outcome: Not Progressing     Problem: Mechanical Ventilation Invasive  Goal: Effective Communication  Outcome: Not Progressing  Goal: Optimal Device Function  Outcome: Not Progressing  Goal: Mechanical Ventilation Liberation  Outcome: Not Progressing  Goal: Optimal Nutrition Delivery  Outcome: Not Progressing  Goal: Absence of Device-Related Skin and Tissue Injury  Outcome: Not Progressing  Goal: Absence of Ventilator-Induced Lung Injury  Outcome: Not Progressing     Problem: Artificial Airway  Goal: Effective Communication  Outcome: Not Progressing  Goal: Optimal Device Function  Outcome: Not Progressing  Goal: Absence of Device-Related Skin or Tissue Injury  Outcome: Not Progressing     Problem: Adult Inpatient Plan of Care  Goal: Plan of Care Review  Outcome: Not Progressing  Goal: Patient-Specific Goal (Individualized)  Outcome: Not Progressing  Goal: Absence of Hospital-Acquired Illness or Injury  Outcome: Not Progressing  Goal: Optimal Comfort and Wellbeing  Outcome: Not Progressing  Goal: Readiness for Transition of Care  Outcome: Not Progressing     Problem: Diabetes Comorbidity  Goal: Blood Glucose Level Within Targeted Range  Outcome: Not Progressing     Problem: Wound  Goal: Optimal Coping  Outcome: Not Progressing  Goal: Optimal Functional Ability  Outcome: Not Progressing  Goal: Absence of Infection Signs and Symptoms  Outcome: Not Progressing  Goal: Improved Oral Intake  Outcome: Not Progressing  Goal: Optimal Pain Control and Function  Outcome: Not Progressing  Goal: Skin Health and Integrity  Outcome: Not Progressing  Goal: Optimal Wound Healing  Outcome: Not Progressing     Problem: Infection  Goal: Absence of Infection Signs and Symptoms  Outcome: Not Progressing     Problem: Pneumonia  Goal: Fluid Balance  Outcome: Not Progressing  Goal: Resolution of Infection Signs and Symptoms  Outcome: Not Progressing  Goal: Effective  Oxygenation and Ventilation  Outcome: Not Progressing     Problem: Sepsis/Septic Shock  Goal: Optimal Coping  Outcome: Not Progressing  Goal: Absence of Bleeding  Outcome: Not Progressing  Goal: Blood Glucose Level Within Targeted Range  Outcome: Not Progressing  Goal: Absence of Infection Signs and Symptoms  Outcome: Not Progressing  Goal: Optimal Nutrition Intake  Outcome: Not Progressing     Problem: Fall Injury Risk  Goal: Absence of Fall and Fall-Related Injury  Outcome: Not Progressing     Problem: Electrolyte Imbalance  Goal: Electrolyte Balance  Outcome: Not Progressing

## 2025-01-07 NOTE — PLAN OF CARE
[place] - Intensive Care  Discharge Reassessment    Primary Care Provider: Jose Francisco Klein MD    Expected Discharge Date:     Reassessment (most recent)       Discharge Reassessment - 01/07/25 1222          Discharge Reassessment    Assessment Type Discharge Planning Reassessment     Did the patient's condition or plan change since previous assessment? Yes     Discharge Plan A Other   TBD    Discharge Plan B Other   TBD    Why the patient remains in the hospital Requires continued medical care        Post-Acute Status    Discharge Delays None known at this time                 Discharge reassessment is completed. The patient in now a DNR. He is requiring continued medical care at this time. Case management will remain available. Please contact if you all have any questions are concerns.       MARY Toledo, LMSW  [facility] Case Management Department  Office: [phone]   Office II: [phone]   Fax: [phone]

## 2025-01-07 NOTE — EICU
eICU Physician Virtual/Remote Brief Evaluation Note      Telephone call E ICU RN   Patient having seizure  Chart reviewed, patient observed, discussed with RN   Blood glucose 21  Hypoglycemia protocol given   TPN restarted 80 mL/hour as bag is still available  Would strongly consider changing TPN schedule to 24 hours        NICYK Rebollar MD  eICU Attending  [phone]    This report has been created through the use of [employer] dictation software. Typographical and content errors may occur with this process. While efforts are made to detect and correct such errors, in some cases errors will persist. For this reason, wording in this document should be considered in the proper context and not strictly verbatim

## 2025-01-07 NOTE — SUBJECTIVE & OBJECTIVE
Interval History: Continued hypotension.  Hypoxia and bradycardia noted.    Medications:  Continuous Infusions:   Amino acid 5% - dextrose 20% (CLINIMIX-E) solution with additives. CENTRAL LINE ONLY (1650mOsm/L)   Intravenous Continuous 80 mL/hr at 01/06/25 1654 New Bag at 01/06/25 1654    D10W   Intravenous Continuous PRN        D10W   Intravenous Continuous 50 mL/hr at 01/06/25 2005 New Bag at 01/06/25 2005    DOPamine  0-20 mcg/kg/min Intravenous Continuous        NORepinephrine bitartrate-D5W  0-3 mcg/kg/min Intravenous Continuous 127.5 mL/hr at 01/06/25 1928 0.5 mcg/kg/min at 01/06/25 1928     Scheduled Meds:   acetylcysteine 200 mg/ml (20%)  2 mL Nebulization QID    albuterol sulfate  2.5 mg Nebulization Q4H WAKE    ampicillin-sulbactam  9 g Intravenous Q8H    collagenase   Topical (Top) Daily    erythromycin base  250 mg Per NG tube QID    famotidine  20 mg Per G Tube Daily    fat emulsion 20%  250 mL Intravenous Every Mon, Wed, Fri    FLUoxetine  20 mg Per G Tube Daily    insulin glargine U-100  20 Units Subcutaneous Daily    levothyroxine  150 mcg Per G Tube Before breakfast    lurasidone  40 mg Per G Tube Daily    magnesium oxide  400 mg Per G Tube BID    meropenem IV (PEDS and ADULTS)  2 g Intravenous Q12H    metoclopramide  5 mg Intravenous Q6H    sodium hypochlorite 0.125%   Topical (Top) Daily    white petrolatum-mineral oiL   Both Eyes QHS     PRN Meds:  Current Facility-Administered Medications:     0.9%  NaCl infusion (for blood administration), , Intravenous, Q24H PRN    acetaminophen, 650 mg, Per G Tube, Q6H PRN    bisacodyL, 10 mg, Other, Daily PRN    D10W, , Intravenous, Continuous PRN    dextrose 50%, 12.5 g, Intravenous, PRN    dextrose 50%, 25 g, Intravenous, PRN    glucagon (human recombinant), 1 mg, Intramuscular, PRN    insulin aspart U-100, 0-10 Units, Subcutaneous, Q4H PRN    melatonin, 6 mg, Per G Tube, Nightly PRN    midazolam, 2 mg, Intravenous, Q1H PRN    propofoL, 0-50 mcg/kg/min,  Intravenous, Daily PRN    sodium chloride 0.9%, 10 mL, Intravenous, PRN    Flushing PICC/Midline Protocol, , , Until Discontinued **AND** sodium chloride 0.9%, 10 mL, Intravenous, Q12H PRN     Review of patient's allergies indicates:   Allergen Reactions    Guaifenesin Hives    Codeine Itching     Objective:     Vital Signs (Most Recent):  Temp: 97 °F (36.1 °C) (01/06/25 1929)  Pulse: 71 (01/06/25 1909)  Resp: (!) 39 (01/06/25 1909)  BP: (!) 97/52 (01/06/25 1802)  SpO2: (!) 80 % (01/06/25 1909) Vital Signs (24h Range):  Temp:  [96.8 °F (36 °C)-97.9 °F (36.6 °C)] 97 °F (36.1 °C)  Pulse:  [46-71] 71  Resp:  [26-41] 39  SpO2:  [80 %-100 %] 80 %  BP: ()/(45-70) 97/52     Weight: 68.5 kg (151 lb 0.2 oz)  Body mass index is 23.65 kg/m².    Intake/Output - Last 3 Shifts         01/04 0700  01/05 0659 01/05 0700 01/06 0659 01/06 0700  01/07 0659    P.O. 240 300 240    I.V. (mL/kg) 2101.4 (30.7) 2625 (38.3)     Blood 667.5 0.6     NG/ 327     IV Piggyback 678.4 963.2     TPN 1910.4 1369.9     Total Intake(mL/kg) 6047.7 (88.3) 5585.7 (81.5) 240 (3.5)    Urine (mL/kg/hr) 1000 (0.6) 650 (0.4) 100 (0.1)    Drains 150      Other 200      Stool 750 400     Total Output 2100 1050 100    Net +3947.7 +4535.7 +140                    Physical Exam  Vitals and nursing note reviewed.   Constitutional:       General: He is not in acute distress.     Appearance: He is ill-appearing.      Comments: Thin male; intubated   HENT:      Head: Normocephalic and atraumatic.      Nose:      Comments: NEFT in place L nare     Mouth/Throat:      Mouth: Mucous membranes are moist.   Eyes:      Pupils: Pupils are equal, round, and reactive to light.   Cardiovascular:      Rate and Rhythm: Normal rate and regular rhythm.   Pulmonary:      Effort: Prolonged expiration present. No respiratory distress.      Breath sounds: Transmitted upper airway sounds present. Rhonchi and rales present.      Comments: Decreased breath sounds at lung bases  bilaterally  Abdominal:      General: There is no distension.      Palpations: Abdomen is soft.      Tenderness: There is no abdominal tenderness.      Comments: G tube noted; Ostomy L mid abdomen   Genitourinary:     Comments: Rojas with yellow urine  Musculoskeletal:         General: No swelling.      Cervical back: Normal range of motion and neck supple.   Skin:     General: Skin is warm and dry.      Comments: Wounds to sacrum and groin area with dressings changed by nursing today          Significant Labs:  I have reviewed all pertinent lab results within the past 24 hours.  CBC:   Recent Labs   Lab 01/06/25  0343   WBC 16.19*   RBC 2.52*   HGB 7.7*   HCT 22.3*   PLT 81*   MCV 89   MCH 30.6   MCHC 34.5     BMP:   Recent Labs   Lab 01/06/25  0343   *   *   K 4.6   CL 94*   CO2 23   BUN 86*   CREATININE 2.6*   CALCIUM 8.3*   MG 1.9     CMP:   Recent Labs   Lab 01/06/25  0343   *   CALCIUM 8.3*   ALBUMIN <0.6*   PROT 5.4*   *   K 4.6   CO2 23   CL 94*   BUN 86*   CREATININE 2.6*   ALKPHOS 1,244*   ALT 35   AST 97*   BILITOT 3.3*       Significant Diagnostics:  I have reviewed all pertinent imaging results/findings within the past 24 hours.  CXR: I have reviewed all pertinent results/findings within the past 24 hours and my personal findings are:  unchanged

## 2025-01-07 NOTE — EICU
Intervention Initiated From:  [facility] / EICU    Sandy intervened regarding:  Rounding (Video assessment)    Nurse Notified:  No    Doctor Notified:  No    Comments: Video rounding complete. Pt vented and sedated. VSS

## 2025-01-08 NOTE — ASSESSMENT & PLAN NOTE
Patient's most recent potassium results are listed below.   Recent Labs     01/05/25  0355 01/06/25  0343 01/07/25  0358   K 5.1 4.6 5.0       Plan  - Replete potassium per protocol  - Monitor potassium Daily  - Patient's hypokalemia is stable, continue below and monitor  12/20 increase pot bicarb to bid  12/23 decrease pot bicard to daily dosing  12/24 improved- stop potassium bicarb repalcement - monitor    1/3 resolved

## 2025-01-08 NOTE — ASSESSMENT & PLAN NOTE
Patient's FSGs are uncontrolled due to hyperglycemia on current medication regimen.  Last A1c reviewed-   Lab Results   Component Value Date    HGBA1C 6.6 (H) 12/16/2024     Most recent fingerstick glucose reviewed-   Recent Labs   Lab 01/07/25  0659 01/07/25  0829 01/07/25  0957 01/07/25  1147   POCTGLUCOSE 99 114* 101 96       Current correctional scale  Low  Maintain anti-hyperglycemic dose as follows-   Antihyperglycemics (From admission, onward)      Start     Stop Route Frequency Ordered    12/30/24 0900  insulin glargine U-100 (Lantus) pen 20 Units         -- SubQ Daily 12/30/24 0734    12/24/24 0807  insulin aspart U-100 pen 0-10 Units         -- SubQ Every 4 hours PRN 12/24/24 0707          Hold Oral hypoglycemics while patient is in the hospital.  12/17 A1c improved to 6.6%  12/24 due to tpn - add lantus 12 u daily - ssi changed to moderate scale  12/25 FM:  BS logs noted, continue coverage.  12/26 FM:  Cont SSI.  12/27 increase lantus to 16u daily  1/3 bs have been better controlled  1/7 pt has had hypoglycemia when off of tpn - cotn tpn 24 hrs of the day

## 2025-01-08 NOTE — ASSESSMENT & PLAN NOTE
Nutrition consulted. Most recent weight and BMI monitored-     Measurements:  Wt Readings from Last 1 Encounters:   12/16/24 68.5 kg (151 lb 0.2 oz)   Body mass index is 23.65 kg/m².    Patient has been screened and assessed by RD.    Malnutrition Type:  Context:    Level:      Malnutrition Characteristic Summary:       Interventions/Recommendations (treatment strategy):  1. Rec'd Continuous EN: Glucerna 1.5 @ 40mL/r increasing by 5 mL q6hrs to goal rate of 55mL/hr with a continuous 40mL FWF to provide 1980kcal (94% EEN), 109g of protein (100% EPN), and 1962mL FW.   2. Néstor BID via PEG tube to promote wound healing and to provide additional nutrition.           - Do not mix Néstor with formula in a tube-feeding bag         - Pour one packet of Néstor in a clean, small container for mixing.           - Add 4 fl oz (120 mL) water at room temperature.           - Mix well with disposable spoon or tongue blade until all particles are completely hydrated.           - Verify correct tube placement.           - Flush feeding tube with 30 mL water.           -Administer Néstor through feeding tube using a 60-mL or larger syringe.           - Flush with an additional 30 mL water.  3. Rec'd ClinimixE 5/20 @ 80mL/hr to provide 1690kcal (80% EEN), 96g of protein (88% EPN), and 1920mL TFV.      -Add 250mL of 20% lipids 3x/week to provide additional calories.      -Check Triglycerides before adding lipids. 4. RD to follow and make rec's accordingly.    12/18 restart tfs today at 1ml/hr to see if can tolerate feeds  12/19 increase tfs as tolerates  12/23 not tolerating tfs- will get tpn orders and start that until can tolerate tfs better - ncrease regaln 10 mg iv q 6hrs  12/24 con ttfs as tolerates - started on tpn for further nutritional support  12/25 FM:  Check KUB.  12/26 FM:  Resume TF today, monitor.  12/27 on tpn - not tolerating tfs at this time  12/28/24:  Albumin infusion today.   12/31:  tube feeds as tolerated    Patient  has protein malnutrition.  Last trend as follows-   Lab Results   Component Value Date    ALBUMIN 0.6 (L) 01/07/2025    ALBUMIN <0.6 (L) 01/06/2025    ALBUMIN 0.6 (L) 01/05/2025 1/2/25 tolerating tfs better with dibhoff tube - cont tpn and IL for now  1/5 will attempt tube feeds again and cont as tolerates

## 2025-01-08 NOTE — ASSESSMENT & PLAN NOTE
Anemia is likely due to chronic infections Most recent hemoglobin and hematocrit are listed below.  Recent Labs     01/05/25  0355 01/06/25  0343 01/07/25  0358   HGB 8.1* 7.7* 8.0*   HCT 23.7* 22.3* 21.6*       Plan  - Monitor serial CBC: Daily  - Transfuse PRBC if patient becomes hemodynamically unstable, symptomatic or H/H drops below 7/21.  - Patient has not received any PRBC transfusions to date  - Patient's anemia is currently worsening. Will adjust treatment as follows: 2uprbcs today  12/17 h/h Improved  12/20 h/h holding  12/26 FM:  Transfuse today.  12/27 hh improved after transfusion  12/28/24:  Continue daily monitoring.  12/30 h/h stable  12/31: Hemoglobin remained stable  1/1/24:  transfuse today  1/2/25 h/h imp[roved after transfusion  1/3 h/h holding  1/4 trasnfuse 2u prbcs today due to recent bleeding from sacral wound with decrease in h/h and bp  1/5 sp 2 uprbcs yesterday with improvement in h/h  1/6 slight decrease in h/h  1/7 h/ h holding

## 2025-01-08 NOTE — ASSESSMENT & PLAN NOTE
Patient has Abnormal Magnesium: hypomagnesemia. Will continue to monitor electrolytes closely. Will replace the affected electrolytes and repeat labs to be done after interventions completed. The patient's magnesium results have been reviewed and are listed below.  Recent Labs   Lab 01/07/25  0358   MG 1.7      12/17 mag imrpoving - repalce mag today  12/19 replace mag today  12/20 mag oxide bid  12/21 2g Mg  12/23 dose of iv mag today to keep mag level about 2  12/25 FM:  Replace, recheck.  12/30 replace mag today  1/3 mag level imporved

## 2025-01-08 NOTE — ASSESSMENT & PLAN NOTE
Patient has a diagnosis of pneumonia. The cause of the pneumonia is suspected to be bacterial in etiology but organism is not known. The pneumonia is stable. The patient has the following signs/symptoms of pneumonia: persistent hypoxia  and sputum production. The patient does have a current oxygen requirement and the patient does not have a home oxygen requirement. I have reviewed the pertinent imaging. The following cultures have been collected: Blood cultures and Sputum culture The culture results are listed below.     Current antimicrobial regimen consists of the antibiotics listed below. Will monitor patient closely and continue current treatment plan unchanged.    Antibiotics (From admission, onward)      Start     Stop Route Frequency Ordered    01/05/25 0900  meropenem 2 g in 0.9% NaCl 100 mL IVPB         -- IV Every 12 hours (non-standard times) 01/05/25 0731    01/04/25 1300  ampicillin-sulbactam (UNASYN) 9 g in 0.9% NaCl 250 mL IVPB         -- IV Every 8 hours (non-standard times) 01/04/25 0804    12/30/24 1300  erythromycin base tablet 250 mg         -- PER NG TUBE 4 times daily 12/30/24 1111            Microbiology Results (last 7 days)       Procedure Component Value Units Date/Time    Blood culture [[id number]] Collected: 01/02/25 0805    Order Status: Completed Specimen: Blood from Peripheral, Wrist, Left Updated: 01/06/25 2012     Blood Culture, Routine No Growth to date      No Growth to date      No Growth to date      No Growth to date      No Growth to date    Blood culture [[id number]] Collected: 01/02/25 0818    Order Status: Completed Specimen: Blood from Peripheral, Forearm, Left Updated: 01/06/25 2012     Blood Culture, Routine No Growth to date      No Growth to date      No Growth to date      No Growth to date      No Growth to date    Culture, Respiratory with Gram Stain [[id number]]  (Abnormal)  (Susceptibility) Collected: 12/27/24 1522    Order Status: Completed Specimen: Respiratory  from Endotracheal Aspirate Updated: 01/03/25 1146     Respiratory Culture No S aureus or Pseudomonas isolated.      ACINETOBACTER BAUMANNII   Many       Gram Stain (Respiratory) Few Gram positive cocci     Gram Stain (Respiratory) Rare Gram positive rods     Gram Stain (Respiratory) Rare WBC's    Blood culture [[id number]] Collected: 12/27/24 0837    Order Status: Completed Specimen: Blood Updated: 01/01/25 1812     Blood Culture, Routine No growth after 5 days.    Blood culture [[id number]] Collected: 12/27/24 0838    Order Status: Completed Specimen: Blood Updated: 01/01/25 1812     Blood Culture, Routine No growth after 5 days.        12/17 dc zosyn with recent esbl kleb in urine - will change to merrem - cont vanc until final cultures obtained - change vent to simv; add nebs  12/18 cotn merrem and vanc - await final sputum cutlure - cont vent on ac control; nebs - wbc imrpoved slightly; lasix 20mg iv for edema today  12/19  dc vanc - proteus grwoign from sputum - cont merrem and nebs; lasix today - wean rr on vent  12/20 wnc improved - cont merrem - nebs and vent - another dose of lasix today for edema - fio2 increased  12/23 cont iv gent and merren due to sent of multiple bugs (acinetobacter/kleb/proteus)  12/24 cont current abxs  12/25 FM:  Cont GENT  12/26 FM:  Cont GENT, poor prognosis.  12/27 on gent - get new sputum culture today due to elevated wbc - cont vent and nebs  12/28/24:Gram positive cocci/rods on sputum, blood cultures negative   12/29/24:  Gram negative rods on sputum cx.   12/30 await final results of sputum cx  12/31: Tailor antibiotics based on sputum culture results  1/1/24:  tailor based on c/s  1/2/25 still awaitin g final culture reports with I&D of sputum; cont current abxs  1/3 sputum culture grew out actinobacter - cont gent  1/4 changed to unasyn and merrem per pharm id recs for better tissue pentration for actinobacter  1/6 cont unasyn/merrem - slight decrease in wbc count  1/7  cont abxs

## 2025-01-08 NOTE — EICU
Intervention Initiated From:  [facility] / EICU    Sandy intervened regarding:  Rounding (Video assessment)    Nurse Notified:  No    Doctor Notified:  No    Comments: Video rounding complete. Pt vented and sedated. Levophed infusing for BP support.

## 2025-01-08 NOTE — ED PROVIDER NOTES
Encounter Date: 12/15/2024       History     Chief Complaint   Patient presents with    Altered Mental Status     Pt to ED via EMS [facility] - hx anoxic brain injury - staff stated that pt found this morning with decreased SPO2 / hypotensive and decreased mental status from baseline.     Respiratory Distress     Call to patient room by nursing staff.  They report patient is DNI DNR history of anoxic brain injury.  He has been in the hospital for greater than 1 month.  His primary care provider is .  They report he has been asystole minute.  No noted        Review of patient's allergies indicates:   Allergen Reactions    Guaifenesin Hives    Codeine Itching     Past Medical History:   Diagnosis Date    Anoxic brain injury     Bipolar disorder, unspecified     Diabetes insipidus     Diabetes mellitus     Dysphagia, unspecified     Chucky gangrene     Gangrene     GERD (gastroesophageal reflux disease)     Hereditary and idiopathic neuropathy     Nontraumatic intracerebral hemorrhage, unspecified     Other muscle spasm     Other psychoactive substance abuse with withdrawal, uncomplicated     Skin transplant status     Thyroid disease      Past Surgical History:   Procedure Laterality Date    COLOSTOMY      INSERTION, PEG TUBE      PRESSURE ULCER DEBRIDEMENT N/A 12/31/2024    Procedure: DEBRIDEMENT, PRESSURE ULCER;  Surgeon: Francoise Diaz MD;  Location: [facility];  Service: General;  Laterality: N/A;  buttock     No family history on file.  Social History     Tobacco Use    Smoking status: Unknown     Review of Systems    Physical Exam     Initial Vitals   BP Pulse Resp Temp SpO2   12/15/24 0904 12/15/24 0904 12/15/24 0904 12/15/24 0938 12/15/24 0904   (!) 86/53 106 (!) 60 97.1 °F (36.2 °C) (!) 86 %      MAP       --                Physical Exam    Constitutional:   Nonresponsive catatonic   Eyes:   No corneal reflex   Cardiovascular:            Asystole.  No heart sounds for greater than  2 minutes   Pulmonary/Chest:   No chest wall rise or lung sounds     Neurological:   Unresponsive to painful stimuli         ED Course   Procedures  Labs Reviewed   CBC W/ AUTO DIFFERENTIAL - Abnormal       Result Value    WBC 15.57 (*)     RBC 2.51 (*)     Hemoglobin 6.5 (*)     Hematocrit 22.0 (*)     MCV 88      MCH 25.9 (*)     MCHC 29.5 (*)     RDW 16.1 (*)     Platelets 527 (*)     MPV 9.7      Immature Granulocytes 0.5      Gran # (ANC) 12.2 (*)     Immature Grans (Abs) 0.08 (*)     Lymph # 1.7      Mono # 1.1 (*)     Eos # 0.5      Baso # 0.04      nRBC 0      Gran % 78.5 (*)     Lymph % 10.9 (*)     Mono % 6.8      Eosinophil % 3.0      Basophil % 0.3      Differential Method Automated     COMPREHENSIVE METABOLIC PANEL - Abnormal    Sodium 139      Potassium 4.7      Chloride 103      CO2 31 (*)     Glucose 156 (*)     BUN 19      Creatinine 1.2      Calcium 10.0      Total Protein 6.7      Albumin 0.7 (*)     Total Bilirubin 0.4      Alkaline Phosphatase 503 (*)     AST 41 (*)     ALT 22      eGFR >60.0      Anion Gap 5     URINALYSIS, REFLEX TO URINE CULTURE - Abnormal    Specimen UA Urine, Catheterized      Color, UA Yellow      Appearance, UA Cloudy (*)     pH, UA 6.0      Specific Gravity, UA 1.015      Protein, UA 2+ (*)     Glucose, UA Negative      Ketones, UA 1+ (*)     Bilirubin (UA) Negative      Occult Blood UA 3+ (*)     Nitrite, UA Positive (*)     Urobilinogen, UA Negative      Leukocytes, UA 3+ (*)     Narrative:     Preferred Collection Type->Urine, Clean Catch  Specimen Source->Urine   URINALYSIS MICROSCOPIC - Abnormal    RBC, UA >100 (*)     WBC, UA >100 (*)     Bacteria Many (*)     Squam Epithel, UA 6      Hyaline Casts, UA 50 (*)     Granular Casts, UA 4 (*)     Ca Oxalate Kerline, UA Occasional      Microscopic Comment SEE COMMENT      Narrative:     Preferred Collection Type->Urine, Clean Catch  Specimen Source->Urine   LACTIC ACID, PLASMA    Lactate (Lactic Acid) 1.6          ECG  Results              EKG 12-lead (Final result)        Collection Time Result Time QRS Duration OHS QTC Calculation    12/15/24 09:08:22 12/15/24 18:44:40 84 470                     Final result by Interface, Lab In [facility] (12/15/24 18:44:49)                   Narrative:    Test Reason : A41.9,    Vent. Rate : 106 BPM     Atrial Rate : 106 BPM     P-R Int : 148 ms          QRS Dur :  84 ms      QT Int : 354 ms       P-R-T Axes :  62  57  64 degrees    QTcB Int : 470 ms    Sinus tachycardia  Slightly Low septal forces  Minor Non-specific ST -T abn  Mildly Abnormal ECG  When compared with ECG of 29-Nov-2024 10:54,  No significant change was found  Confirmed by Laurent Terry ([id number]) on 12/15/2024 6:44:38 PM    Referred By: AAAREFERRAL SELF           Confirmed By: Laurent Terry                                  Imaging Results              X-Ray Chest AP Portable (Final result)  Result time 12/15/24 10:35:59      Final result by Nakul Cross MD (12/15/24 10:35:59)                   Impression:      Right lower lung airspace disease and moderate right pleural fluid collection.      Electronically signed by: Nakul Cross MD  Date:    12/15/2024  Time:    10:35               Narrative:    EXAMINATION:  XR CHEST AP PORTABLE    CLINICAL HISTORY:  Sepsis;    COMPARISON:  Chest x-ray 11/29/2024.    FINDINGS:  Unremarkable AP cardiac silhouette.  Right lower lung airspace disease and moderate right pleural fluid collection.  Left lung clear.  Endotracheal tube present.                                       Medications   sodium chloride 0.9% flush 10 mL (10 mLs Intravenous Given 12/23/24 0039)   propofol (DIPRIVAN) 10 mg/mL infusion (0 mcg/kg/min × 68.5 kg Intravenous Stopped 1/7/25 2041)   sodium chloride 0.9% flush 10 mL (has no administration in time range)   levothyroxine tablet 150 mcg (150 mcg Per G Tube Given 1/7/25 0612)   FLUoxetine capsule 20 mg (20 mg Per G Tube Given 1/7/25 0856)   lurasidone tablet 40 mg (40 mg Per G  Tube Given 1/7/25 0856)   dextrose 10 % infusion (has no administration in time range)   dextrose 50% injection 12.5 g (12.5 g Intravenous Given 1/5/25 1144)   dextrose 50% injection 25 g (25 g Intravenous Given 1/7/25 0328)   glucagon (human recombinant) injection 1 mg (has no administration in time range)   melatonin tablet 6 mg (has no administration in time range)   midazolam injection 2 mg (2 mg Intravenous Given 12/17/24 2237)   magnesium oxide tablet 400 mg (400 mg Per G Tube Given 1/7/25 0856)   albuterol sulfate nebulizer solution 2.5 mg (2.5 mg Nebulization Given 1/7/25 1923)   insulin aspart U-100 pen 0-10 Units (4 Units Subcutaneous Given 1/4/25 1149)   collagenase ointment ( Topical (Top) Given 1/7/25 0856)   Amino acid 5% - dextrose 20% (CLINIMIX-E) solution  (1L provides 50gm AA, 200gm CHO (680 kcal/L dextrose), Na 35, K 30, Mg 5, Ca 4.5, Acetate 80, Cl 39, Phos 15) (880 total kcal/L) ( Intravenous Stopped 12/26/24 1729)   Amino acid 5% - dextrose 20% (CLINIMIX-E) solution  (1L provides 50gm AA, 200gm CHO (680 kcal/L dextrose), Na 35, K 30, Mg 5, Ca 4.5, Acetate 80, Cl 39, Phos 15) (880 total kcal/L) ( Intravenous Stopped 12/27/24 1640)   Amino acid 5% - dextrose 20% (CLINIMIX-E) solution  (1L provides 50gm AA, 200gm CHO (680 kcal/L dextrose), Na 35, K 30, Mg 5, Ca 4.5, Acetate 80, Cl 39, Phos 15) (880 total kcal/L) ( Intravenous Stopped 12/28/24 1616)   Amino acid 5% - dextrose 20% (CLINIMIX-E) solution with additives. CENTRAL LINE ONLY (1650mOsm/L) ( Intravenous Stopped 12/29/24 1616)   Amino acid 5% - dextrose 20% (CLINIMIX-E) solution with additives. CENTRAL LINE ONLY (1650mOsm/L) ( Intravenous Stopped 12/30/24 1702)   insulin glargine U-100 (Lantus) pen 20 Units (20 Units Subcutaneous Not Given 1/7/25 0900)   erythromycin base tablet 250 mg (250 mg Per NG tube Given 1/7/25 1639)   Amino acid 5% - dextrose 20% (CLINIMIX-E) solution with additives. CENTRAL LINE ONLY (1650mOsm/L) ( Intravenous  Restarted 12/31/24 1715)   sodium hypochlorite 0.125% external solution ( Topical (Top) Given 1/7/25 0857)   Amino acid 5% - dextrose 20% (CLINIMIX-E) solution with additives. CENTRAL LINE ONLY (1650mOsm/L) ( Intravenous Stopped 1/1/25 1637)   Amino acid 5% - dextrose 20% (CLINIMIX-E) solution with additives. CENTRAL LINE ONLY (1650mOsm/L) ( Intravenous Stopped 1/2/25 1611)   acetaminophen tablet 650 mg (650 mg Per G Tube Given 1/2/25 0407)   famotidine tablet 20 mg (20 mg Per G Tube Given 1/7/25 0856)   Amino acid 5% - dextrose 20% (CLINIMIX-E) solution with additives. CENTRAL LINE ONLY (1650mOsm/L) ( Intravenous Stopped 1/3/25 1658)   acetylcysteine 200 mg/ml (20%) solution 2 mL (2 mLs Nebulization Given 1/7/25 1923)   white petrolatum-mineral oil (LUBIFRESH P.M.) ophthalmic ointment ( Both Eyes Given 1/6/25 2103)   Amino acid 5% - dextrose 20% (CLINIMIX-E) solution with additives. CENTRAL LINE ONLY (1650mOsm/L) ( Intravenous Stopped 1/4/25 1641)   bisacodyL suppository 10 mg (has no administration in time range)   0.9%  NaCl infusion (for blood administration) (has no administration in time range)   ampicillin-sulbactam (UNASYN) 9 g in 0.9% NaCl 250 mL IVPB (0 g Intravenous Stopped 1/7/25 1745)   meropenem 2 g in 0.9% NaCl 100 mL IVPB (0 g Intravenous Stopped 1/7/25 1323)   Amino acid 5% - dextrose 20% (CLINIMIX-E) solution  (1L provides 50gm AA, 200gm CHO (680 kcal/L dextrose), Na 35, K 30, Mg 5, Ca 4.5, Acetate 80, Cl 39, Phos 15) (880 total kcal/L) ( Intravenous Stopped 1/6/25 0811)   metoclopramide injection 5 mg (5 mg Intravenous Given 1/7/25 1703)   DOPamine 400 mg in dextrose 5 % 250 mL infusion (premix) ( Intravenous Not Given 1/6/25 2750)   Amino acid 5% - dextrose 20% (CLINIMIX-E) solution with additives. CENTRAL LINE ONLY (1650mOsm/L) ( Intravenous Verify Only 1/7/25 0646)   NORepinephrine 32 mg in 0.9% NaCl 250 mL infusion (0.6 mcg/kg/min × 68 kg Intravenous New Bag 1/7/25 5511)   Amino acid 5% -  dextrose 20% (CLINIMIX-E) solution with additives. CENTRAL LINE ONLY (1650mOsm/L) ( Intravenous New Bag 1/7/25 1624)   fat emulsion 20% infusion 250 mL (has no administration in time range)   sodium chloride 0.9% bolus 2,040 mL 2,040 mL (0 mLs Intravenous Stopped 12/15/24 1045)   vancomycin 1750 mg in 0.9% sodium chloride 500 mL IVPB (0 mg Intravenous Stopped 12/15/24 1118)   piperacillin-tazobactam (ZOSYN) 4.5 g in D5W 100 mL IVPB (MB+) (0 g Intravenous Stopped 12/15/24 1002)   etomidate injection 20 mg (20 mg Intravenous Given 12/15/24 0919)   rocuronium injection 80 mg (80 mg Intravenous Given 12/15/24 0920)   mupirocin 2 % ointment ( Nasal Given 12/19/24 2041)   magnesium sulfate 2g in water 50mL IVPB (premix) (0 g Intravenous Stopped 12/16/24 1028)   meropenem injection 1 g (1 g Intravenous Given 12/26/24 1746)   magnesium sulfate 2g in water 50mL IVPB (premix) (0 g Intravenous Stopped 12/17/24 1131)   vancomycin (VANCOCIN) 1,000 mg in 0.9% NaCl 250 mL IVPB (admixture device) (0 mg Intravenous Stopped 12/18/24 0026)   fentaNYL (SUBLIMAZE) 50 mcg/mL injection (  Override Pull 12/18/24 0045)   furosemide injection 20 mg (20 mg Intravenous Given 12/18/24 0905)   vancomycin 750 mg in 0.9% NaCl 250 mL IVPB (admixture device) (0 mg Intravenous Stopped 12/19/24 0158)   furosemide injection 20 mg (20 mg Intravenous Given 12/19/24 0902)   magnesium sulfate 2g in water 50mL IVPB (premix) (0 g Intravenous Stopped 12/19/24 1109)   potassium bicarbonate disintegrating tablet 25 mEq (25 mEq Per G Tube Given 12/19/24 0902)   gentamicin (GARAMYCIN) 462.8 mg in 0.9% NaCl 100 mL IVPB (0 mg Intravenous Stopped 12/20/24 1621)   magnesium sulfate 2g in water 50mL IVPB (premix) (0 g Intravenous Stopped 12/21/24 1057)   magnesium sulfate 2g in water 50mL IVPB (premix) (0 g Intravenous Stopped 12/23/24 1111)   fat emulsion 20% infusion 250 mL (0 mLs Intravenous Stopped 12/23/24 2242)   insulin regular injection 5 Units 0.05 mL (5  Units Intravenous Given 12/24/24 0429)   fat emulsion 20% infusion 250 mL (0 mLs Intravenous Stopped 12/25/24 2158)   furosemide injection 20 mg (20 mg Intravenous Given 12/24/24 0839)   magnesium sulfate 2g in water 50mL IVPB (premix) (0 g Intravenous Stopped 12/25/24 1151)   fat emulsion 20% infusion 250 mL (0 mLs Intravenous Stopped 12/27/24 2100)   iohexoL (OMNIPAQUE 350) injection 100 mL (100 mLs Intravenous Given 12/27/24 1217)   albumin human 25% bottle 25 g (0 g Intravenous Stopped 12/28/24 1047)   magnesium sulfate 2g in water 50mL IVPB (premix) (0 g Intravenous Stopped 12/30/24 1230)   fat emulsion 20% infusion 250 mL (0 mLs Intravenous Stopped 12/31/24 0456)   gentamicin (GARAMYCIN) 112.4 mg in 0.9% NaCl 100 mL IVPB (0 mg Intravenous Stopped 12/31/24 0802)   gentamicin (GARAMYCIN) 134.4 mg in 0.9% NaCl 100 mL IVPB (0 mg Intravenous Stopped 12/31/24 1633)   fat emulsion 20% infusion 250 mL (0 mLs Intravenous Stopped 1/1/25 2112)   fat emulsion 20% infusion 250 mL (0 mLs Intravenous Stopped 1/4/25 0248)   albumin human 25% bottle 12.5 g (0 g Intravenous Stopped 1/5/25 1036)   furosemide injection 40 mg (40 mg Intravenous Given 1/5/25 1037)   fat emulsion 20% infusion 250 mL (0 mLs Intravenous Stopped 1/7/25 0452)     Medical Decision Making  Amount and/or Complexity of Data Reviewed  Labs: ordered.  Radiology: ordered.    Risk  Prescription drug management.  Decision regarding hospitalization.                          Medical Decision Making:   ED Management:  Time of death is 2105.  Nursing staff reports Dr. Leong notified.  Nursing staff reports family notified.             Clinical Impression:  Final diagnoses:  [A41.9] Sepsis (Primary)  [J18.9] Pneumonia of right lower lobe due to infectious organism  [T83.511A, N39.0] Urinary tract infection associated with indwelling urethral catheter, initial encounter    Cardiac arrest        ED Disposition Condition    Admit Stable                Leonel Zapata  MD [place]  01/07/25 6211

## 2025-01-08 NOTE — PROGRESS NOTES
[facility] Medicine  Progress Note    Patient Name: Carlos Chahal  MRN: [id number]  Patient Class: IP- Inpatient   Admission Date: 12/15/2024  Length of Stay: 23 days  Attending Physician: Kim Diamond MD  Primary Care Provider: Jose Francisco Klein MD        Subjective     Principal Problem:Sepsis        HPI:  Carlos Chahal is a 34 y.o. male who presents to the ED from nursing home for altered mental status and difficulty breathing.  Patient is found to be hypoxic.  He has a history of anoxic brain injury     This am, pt is on ventilator and levophed at 0.25mcg and ivfs at 75ml/hr.      Spoke with father this am on condition of pt    Overview/Hospital Course:  12/17 ND became more awake yesterday with wound changes - low dose propofol added for pt - able to wean levophed to 0.15mcg.  did tolerate tf last night - check kub this am  12/18 ND pt had to be changed back to ac control last nigth after becoming tachypneic and had low tv.  Tolerating ac mode.  Levophed down to 0.1 mcg  12/19 ND pt toleratign vent - will wean RR today - cont to slowly wean levophed as tolerates - wbc slowly imrpoving - tolerating low dose tfs - will cont to increase tfs as tolerates  12/20 ND tolerating vent - cont to work on feeds and nutritional support  12/21 [place] weekend coverage, in no acute distress, stable vital signs, AC/18/400/5/40%, SpO2 100%. On proprofol for sedation, patient tracks voice and moving head and neck with lid opening. Mild bump in WBC, afebrile, may be associated with the reported residual >200 and tube feeds held. Abdomen, soft and no distension on exam. Will resume as tolerated and monitor.  Blood cx x2, final report no growth. Continue merrem (D5), gentamicin (D2) with mixed MDRO frida from sputum studies and UTI. Replete Mg, continue abx. Continue daily wound care.   12/22 [place] weekend coverage, overnight rate change and tolerating AC12/400/5/40 SpO2 100%, proprofol 15 mcg/kg/min, levophed  0.1mcg/kg/min. Patient without gag reflex on suction. Tube feeds held overnight and resumed, remains on trickle feeds. Ileostomy output 100.   No associated abdominal distension, patient in no distress. Vent settings weaned. Leukocytosis resolved. Continue IV abx for MDRO coverage.   12/23 ND Pt still havign trouble tolerating tfs -change reglan iv; pt is more awake this am - will change to simv for a few hours today   12/24 ND elevated bs with tpn- will add long acting insulin as 12u and restart tpn - ssi adjusted to moderate scale.  12/25 FM:  Holiday coverage, chart reviewed and case discussed with team.  Patient's Levophed is being weaned slowly and he is not tolerating his tube feeds.  Abdominal exam is soft patient has output via ostomy we will check KUB.  Patient is followed by surgery and has a polymicrobial respiratory and urinary tract infection.  Patient has multiple wounds and has a history of anoxic brain injury and is a long-term nursing home resident.  12/26 FM:  Patient is off of vasopressors this morning, patient's KUB noted and will rechallenge tube feedings today.  Patient is tolerating TPN labs noted and his hemoglobin has continued to trend downward Ms. With no visible blood loss.  Patient's other labs noted his white blood cell count is rising despite appropriate antibiotics based on respiratory and urinary cultures.  Patient's prognosis is poor.  12/27 ND pt  still not tolerating tfs - surgery following -  cont tpn; h/h improved after transfusion.  Updated aunt on pts condition  12/28/24:  Patient seen this morning.  Not tolerating tube feedings at this time, remains on vent support.  Poor prognosis at this time.  Patient with poor urine output despite diuretics.  Trial of albumin infusion followed by lasix today.    12/29/24:  Good response to albumin/lasix combo yesterday with good urine output.  Renal function essentially unchanged.  Sputum culture with gram negative rods, susceptibility  pending.  Continue abx for now. Leukocytosis improving, H/H stable.   12/30 ND pt will be switched to simv today to start havign him do more work on breathing.  Awaiting sputum cx results - cxr appears improved and wbc improving.  Still not tolerating tfs  12/31 WC: Patient having ongoing need for ventilator support.  Sputum culture still pending.  Abdominal x-ray reveals no evidence of bowel obstruction.  Tube feeds as tolerated.  1/1/25 WC:  remains vent dependent.  TF as tolerated.  1/2/25 ND PT had to placed back on levophed overnight due to lower bp and had some tachypnea -= abg showed lower pO2 - fio2 increased  tolerating feeds via dibhoff tube.   Also had new temp overnight - leia repeat bc x 2  1/3/25 ND decreased uop yesterday.   Had abd us and reviewed.  Pt off of levophed.   No further temp spike  1/4/25 ND pt had abxs adjusted yesterday for better coverage for actinobacter in lung - current ly on unasyn and merrem per pharm id.  Pt had large blood clot from sacral area last nigth.  And has some decreased bp - back on low dose levophed.    1/5/25 ND pt still not toleratign tube feeds overnight.  Sp 2 uprbcs yesterday with improvement in h/h.  Havign more edema and more blistering of skin with the edema.   Updated aunt on pts condition and that he has poor prognosis - will discuss with his dad  about poss dnr status  1/6/25 ND pt still not tolerating tube feeds - pt has poor denitition which has been causign bleedign from oral cavity.  1/7/25 ND PT has had more organ dysfunction - resp acidosis, worsening renal and liver fxn.  Family updated this am and notified imminent death due to organ failure.  Levophed has been titrated        Review of Systems   Unable to perform ROS: Acuity of condition     Objective:     Vital Signs (Most Recent):  Temp: 97.3 °F (36.3 °C) (01/07/25 1501)  Pulse: 63 (01/07/25 1800)  Resp: (!) 35 (01/07/25 1800)  BP: (!) 91/46 (01/07/25 1800)  SpO2: 100 % (01/07/25 1800) Vital Signs  (24h Range):  Temp:  [97 °F (36.1 °C)-97.3 °F (36.3 °C)] 97.3 °F (36.3 °C)  Pulse:  [62-80] 63  Resp:  [31-44] 35  SpO2:  [80 %-100 %] 100 %  BP: ()/(41-63) 91/46     Weight: 68.5 kg (151 lb 0.2 oz)  Body mass index is 23.65 kg/m².    Intake/Output Summary (Last 24 hours) at 1/7/2025 1807  Last data filed at 1/7/2025 1711  Gross per 24 hour   Intake 4299.12 ml   Output 200 ml   Net 4099.12 ml         Physical Exam  Constitutional:       Appearance: He is ill-appearing.      Comments: Thin male; intubated   HENT:      Mouth/Throat:      Mouth: Mucous membranes are moist.   Eyes:      Pupils: Pupils are equal, round, and reactive to light.   Cardiovascular:      Rate and Rhythm: Normal rate and regular rhythm.      Heart sounds: Normal heart sounds.   Pulmonary:      Effort: Pulmonary effort is normal.      Breath sounds: Normal breath sounds. Transmitted upper airway sounds present.   Abdominal:      General: There is no distension.      Palpations: Abdomen is soft. There is no mass.      Tenderness: There is no abdominal tenderness.      Comments: Jtube noted; ileostomy noted   Genitourinary:     Comments: Rojas with yellow urine  Musculoskeletal:      Right lower leg: Edema present.      Left lower leg: Edema present.   Lymphadenopathy:      Cervical: No cervical adenopathy.   Skin:     General: Skin is warm and dry.      Comments: Wounds to sacrum with wound vac in place; groin area             Significant Labs: All pertinent labs within the past 24 hours have been reviewed.    Significant Imaging: I have reviewed all pertinent imaging results/findings within the past 24 hours.    Assessment and Plan     * Sepsis  This patient does have evidence of infective focus  My overall impression is septic shock due to MAP < 65 and SBP < 90.  Source: Respiratory and Urinary Tract  Antibiotics given-   Antibiotics (72h ago, onward)      Start     Stop Route Frequency Ordered    01/05/25 0900  meropenem 2 g in 0.9% NaCl  "100 mL IVPB         -- IV Every 12 hours (non-standard times) 01/05/25 0731    01/04/25 1300  ampicillin-sulbactam (UNASYN) 9 g in 0.9% NaCl 250 mL IVPB         -- IV Every 8 hours (non-standard times) 01/04/25 0804    12/30/24 1300  erythromycin base tablet 250 mg         -- PER NG TUBE 4 times daily 12/30/24 1111          Latest lactate reviewed-  No results for input(s): "LACTATE", "POCLAC" in the last 72 hours.    Organ dysfunction indicated by Acute respiratory failure and Encephalopathy    Fluid challenge Ideal Body Weight- The patient's ideal body weight is Ideal body weight: 66.1 kg (145 lb 11.6 oz) which will be used to calculate fluid bolus of 30 ml/kg for treatment of septic shock.      Post- resuscitation assessment Yes Perfusion exam was performed within 6 hours of septic shock presentation after bolus shows Inadequate tissue perfusion assessed by non-invasive monitoring       Will Start Pressors- Levophed for MAP of 65  Source control achieved by: iv zosym. Vanc, ivfs    Patient has a leukocytosis.  Last trend as follows-   Lab Results   Component Value Date    WBC 28.67 (H) 01/07/2025    WBC 16.19 (H) 01/06/2025    WBC 20.33 (H) 01/05/2025 12/17 wean levophed as tolerates- abxs changed to merrem, cotn vanc - await culture final reports  12/18 wbc slightly imrpoved - cont iv abxs- await final sputum culture - on merrem fro esbl kleb in urine  12/19 cont iv merrem for esbl kleb in urine and proteus in sputum - dc vanc  12/20 wbc improvign - cotn iv merrem  12/24 wbc normal - cont iv abxs  12/25 FM:  Cont GENT.  12/26 FM:  Cont GENT, prognosis poor, pulm/gu/skin infections.  12/27 repeat bc and sputum culture due to increasing wbc - cont gent; completed 10 days of merrem  12/29/24:  Sputum with gram negative rods, continue gentamicin, susceptibility pending   12/30 wbc improvign  1/2/25 wbc improved but had fever overnight - repeat bcx 2 today - cont iv abxs - back on levophed for bp control  1/3 " off of levophed - cont gent; wbc increased but no further temp spikes; bc ng x 1 day  1/4 bc - ng x 2 days - con unasyn and merrem for better actinobacter coverage  1/6 cont iv abxs  1/7 overal worsening of organ fxn- poor prognosis - expect impendign death soon- family aware of prognosis    Elevated LFTs  Abd us done 1/2/25 and reviewed - slightly improved today; check ggt  1/4 lfts slowly improving  1/7 worsenign liver fxn    Hyperkalemia  Hyperkalemia is likely due to OTONIEL.The patients most recent potassium results are listed below.  Recent Labs     01/05/25  0355 01/06/25  0343 01/07/25  0358   K 5.1 4.6 5.0       Plan  - Monitor for arrhythmias with EKG and/or continuous telemetry.   - Treat the hyperkalemia with Potassium Binders.   - Monitor potassium: Daily  - The patient's hyperkalemia is  being treated  1/5 imrpovign with lokema  1/6 dc lokema - potassium level better          Edema  Lasix 20mg iv bid - monitor I/o  12/30 decreae lasix to daily - edema has imrpoved  1/2/25 give extra of lasix today due to edema  1/3 stop lasix due to worsening kidney fxn  1/5 give dose of albumin then lasix push to help edema  1/6 albumin/lasix didn't help much yesterday  1/7 worsening with decreased uop    Hypokalemia  Patient's most recent potassium results are listed below.   Recent Labs     01/05/25  0355 01/06/25 0343 01/07/25  0358   K 5.1 4.6 5.0       Plan  - Replete potassium per protocol  - Monitor potassium Daily  - Patient's hypokalemia is stable, continue below and monitor  12/20 increase pot bicarb to bid  12/23 decrease pot bicard to daily dosing  12/24 improved- stop potassium bicarb repalcement - monitor    1/3 resolved    Severe malnutrition  Nutrition consulted. Most recent weight and BMI monitored-     Measurements:  Wt Readings from Last 1 Encounters:   12/16/24 68.5 kg (151 lb 0.2 oz)   Body mass index is 23.65 kg/m².    Patient has been screened and assessed by RD.    Malnutrition Type:  Context:     Level:      Malnutrition Characteristic Summary:       Interventions/Recommendations (treatment strategy):  1. Rec'd Continuous EN: Glucerna 1.5 @ 40mL/r increasing by 5 mL q6hrs to goal rate of 55mL/hr with a continuous 40mL FWF to provide 1980kcal (94% EEN), 109g of protein (100% EPN), and 1962mL FW.   2. Néstor BID via PEG tube to promote wound healing and to provide additional nutrition.           - Do not mix Néstor with formula in a tube-feeding bag         - Pour one packet of Néstor in a clean, small container for mixing.           - Add 4 fl oz (120 mL) water at room temperature.           - Mix well with disposable spoon or tongue blade until all particles are completely hydrated.           - Verify correct tube placement.           - Flush feeding tube with 30 mL water.           -Administer Néstor through feeding tube using a 60-mL or larger syringe.           - Flush with an additional 30 mL water.  3. Rec'd ClinimixE 5/20 @ 80mL/hr to provide 1690kcal (80% EEN), 96g of protein (88% EPN), and 1920mL TFV.      -Add 250mL of 20% lipids 3x/week to provide additional calories.      -Check Triglycerides before adding lipids. 4. RD to follow and make rec's accordingly.    12/18 restart tfs today at 1ml/hr to see if can tolerate feeds  12/19 increase tfs as tolerates  12/23 not tolerating tfs- will get tpn orders and start that until can tolerate tfs better - ncrease regaln 10 mg iv q 6hrs  12/24 con ttfs as tolerates - started on tpn for further nutritional support  12/25 FM:  Check KUB.  12/26 FM:  Resume TF today, monitor.  12/27 on tpn - not tolerating tfs at this time  12/28/24:  Albumin infusion today.   12/31:  tube feeds as tolerated    Patient has protein malnutrition.  Last trend as follows-   Lab Results   Component Value Date    ALBUMIN 0.6 (L) 01/07/2025    ALBUMIN <0.6 (L) 01/06/2025    ALBUMIN 0.6 (L) 01/05/2025 1/2/25 tolerating tfs better with dibhoff tube - cont tpn and IL for now  1/5 will  attempt tube feeds again and cont as tolerates    Hypomagnesemia  Patient has Abnormal Magnesium: hypomagnesemia. Will continue to monitor electrolytes closely. Will replace the affected electrolytes and repeat labs to be done after interventions completed. The patient's magnesium results have been reviewed and are listed below.  Recent Labs   Lab 01/07/25  0358   MG 1.7      12/17 mag imrpoving - repalce mag today  12/19 replace mag today  12/20 mag oxide bid  12/21 2g Mg  12/23 dose of iv mag today to keep mag level about 2  12/25 FM:  Replace, recheck.  12/30 replace mag today  1/3 mag level imporved    Pneumonia of right lower lobe due to infectious organism  Patient has a diagnosis of pneumonia. The cause of the pneumonia is suspected to be bacterial in etiology but organism is not known. The pneumonia is stable. The patient has the following signs/symptoms of pneumonia: persistent hypoxia  and sputum production. The patient does have a current oxygen requirement and the patient does not have a home oxygen requirement. I have reviewed the pertinent imaging. The following cultures have been collected: Blood cultures and Sputum culture The culture results are listed below.     Current antimicrobial regimen consists of the antibiotics listed below. Will monitor patient closely and continue current treatment plan unchanged.    Antibiotics (From admission, onward)      Start     Stop Route Frequency Ordered    01/05/25 0900  meropenem 2 g in 0.9% NaCl 100 mL IVPB         -- IV Every 12 hours (non-standard times) 01/05/25 0731    01/04/25 1300  ampicillin-sulbactam (UNASYN) 9 g in 0.9% NaCl 250 mL IVPB         -- IV Every 8 hours (non-standard times) 01/04/25 0804    12/30/24 1300  erythromycin base tablet 250 mg         -- PER NG TUBE 4 times daily 12/30/24 1111            Microbiology Results (last 7 days)       Procedure Component Value Units Date/Time    Blood culture [[id number]] Collected: 01/02/25 0805     Order Status: Completed Specimen: Blood from Peripheral, Wrist, Left Updated: 01/06/25 2012     Blood Culture, Routine No Growth to date      No Growth to date      No Growth to date      No Growth to date      No Growth to date    Blood culture [[id number]] Collected: 01/02/25 0818    Order Status: Completed Specimen: Blood from Peripheral, Forearm, Left Updated: 01/06/25 2012     Blood Culture, Routine No Growth to date      No Growth to date      No Growth to date      No Growth to date      No Growth to date    Culture, Respiratory with Gram Stain [[id number]]  (Abnormal)  (Susceptibility) Collected: 12/27/24 1522    Order Status: Completed Specimen: Respiratory from Endotracheal Aspirate Updated: 01/03/25 1146     Respiratory Culture No S aureus or Pseudomonas isolated.      ACINETOBACTER BAUMANNII   Many       Gram Stain (Respiratory) Few Gram positive cocci     Gram Stain (Respiratory) Rare Gram positive rods     Gram Stain (Respiratory) Rare WBC's    Blood culture [[id number]] Collected: 12/27/24 0837    Order Status: Completed Specimen: Blood Updated: 01/01/25 1812     Blood Culture, Routine No growth after 5 days.    Blood culture [[id number]] Collected: 12/27/24 0838    Order Status: Completed Specimen: Blood Updated: 01/01/25 1812     Blood Culture, Routine No growth after 5 days.        12/17 dc zosyn with recent esbl kleb in urine - will change to merrem - cont vanc until final cultures obtained - change vent to simv; add nebs  12/18 cotn merrem and vanc - await final sputum cutlure - cont vent on ac control; nebs - wbc imrpoved slightly; lasix 20mg iv for edema today  12/19  dc vanc - proteus grwoign from sputum - cont merrem and nebs; lasix today - wean rr on vent  12/20 wnc improved - cont merrem - nebs and vent - another dose of lasix today for edema - fio2 increased  12/23 cont iv gent and merren due to sent of multiple bugs (acinetobacter/kleb/proteus)  12/24 cont current abxs  12/25 FM:  Cont  GENT  12/26 FM:  Cont GENT, poor prognosis.  12/27 on gent - get new sputum culture today due to elevated wbc - cont vent and nebs  12/28/24:Gram positive cocci/rods on sputum, blood cultures negative   12/29/24:  Gram negative rods on sputum cx.   12/30 await final results of sputum cx  12/31: Tailor antibiotics based on sputum culture results  1/1/24:  tailor based on c/s  1/2/25 still awaitin g final culture reports with I&D of sputum; cont current abxs  1/3 sputum culture grew out actinobacter - cont gent  1/4 changed to unasyn and merrem per pharm id recs for better tissue pentration for actinobacter  1/6 cont unasyn/merrem - slight decrease in wbc count  1/7 cont abxs    Sacral wound, sequela  Local wound care with dakins bid  Iv abxs  12/27 prob debridementt in or on 12/30 1/2/25 sp debridement =- wound vac in place    Open wound of groin  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  Recent Labs     01/05/25  0355 01/06/25  0343 01/07/25  0358   HGB 8.1* 7.7* 8.0*   HCT 23.7* 22.3* 21.6*       Plan  - Monitor serial CBC: Daily  - Transfuse PRBC if patient becomes hemodynamically unstable, symptomatic or H/H drops below 7/21.  - Patient has not received any PRBC transfusions to date  - Patient's anemia is currently worsening. Will adjust treatment as follows: 2uprbcs today  12/17 h/h Improved  12/20 h/h holding  12/26 FM:  Transfuse today.  12/27 hh improved after transfusion  12/28/24:  Continue daily monitoring.  12/30 h/h stable  12/31: Hemoglobin remained stable  1/1/24:  transfuse today  1/2/25 h/h imp[roved after transfusion  1/3 h/h holding  1/4 trasnfuse 2u prbcs today due to recent bleeding from sacral wound with decrease in h/h and bp  1/5 sp 2 uprbcs yesterday with improvement in h/h  1/6 slight decrease in h/h  1/7 h/ h holding    History of anoxic  brain injury  12/25 FM:  Poor prognosis.      Urinary tract infection associated with indwelling urethral catheter  Await new urine culture =- currently on zosyn/vanc  12/17 change to merrem/vanc  12/18 has esbl klebsiella - cont merrem  12/23 on merrem  12/25 FM:  Continue Gent, CS reviewed.  12/26 FM:  WBC increasing, monitor, on appropriate abx based on cultures.  12/28/24:  Blood culture negative to date, sputum culture with some gram positive cocci and rods.  Continue abx for now.  Gentamicin per pharmacy to dose.     Tailor antibiotics based on culture and sensitivity    Type 1 diabetes  Patient's FSGs are uncontrolled due to hyperglycemia on current medication regimen.  Last A1c reviewed-   Lab Results   Component Value Date    HGBA1C 6.6 (H) 12/16/2024     Most recent fingerstick glucose reviewed-   Recent Labs   Lab 01/07/25  0659 01/07/25  0829 01/07/25  0957 01/07/25  1147   POCTGLUCOSE 99 114* 101 96       Current correctional scale  Low  Maintain anti-hyperglycemic dose as follows-   Antihyperglycemics (From admission, onward)      Start     Stop Route Frequency Ordered    12/30/24 0900  insulin glargine U-100 (Lantus) pen 20 Units         -- SubQ Daily 12/30/24 0734    12/24/24 0807  insulin aspart U-100 pen 0-10 Units         -- SubQ Every 4 hours PRN 12/24/24 0707          Hold Oral hypoglycemics while patient is in the hospital.  12/17 A1c improved to 6.6%  12/24 due to tpn - add lantus 12 u daily - ssi changed to moderate scale  12/25 FM:  BS logs noted, continue coverage.  12/26 FM:  Cont SSI.  12/27 increase lantus to 16u daily  1/3 bs have been better controlled  1/7 pt has had hypoglycemia when off of tpn - cotn tpn 24 hrs of the day      VTE Risk Mitigation (From admission, onward)           Ordered     IP VTE LOW RISK PATIENT  Once         12/15/24 1201     Place sequential compression device  Until discontinued         12/15/24 1201                    Discharge Planning   JOSE MANUEL:      Code  Status: DNR   Medical Readiness for Discharge Date:   Discharge Plan A: Other (TBD)   Discharge Delays: None known at this time                    Kim Diamond MD  Department of Hospital Medicine   [place] [facility]

## 2025-01-08 NOTE — ASSESSMENT & PLAN NOTE
"This patient does have evidence of infective focus  My overall impression is septic shock due to MAP < 65 and SBP < 90.  Source: Respiratory and Urinary Tract  Antibiotics given-   Antibiotics (72h ago, onward)      Start     Stop Route Frequency Ordered    01/05/25 0900  meropenem 2 g in 0.9% NaCl 100 mL IVPB         -- IV Every 12 hours (non-standard times) 01/05/25 0731    01/04/25 1300  ampicillin-sulbactam (UNASYN) 9 g in 0.9% NaCl 250 mL IVPB         -- IV Every 8 hours (non-standard times) 01/04/25 0804    12/30/24 1300  erythromycin base tablet 250 mg         -- PER NG TUBE 4 times daily 12/30/24 1111          Latest lactate reviewed-  No results for input(s): "LACTATE", "POCLAC" in the last 72 hours.    Organ dysfunction indicated by Acute respiratory failure and Encephalopathy    Fluid challenge Ideal Body Weight- The patient's ideal body weight is Ideal body weight: 66.1 kg (145 lb 11.6 oz) which will be used to calculate fluid bolus of 30 ml/kg for treatment of septic shock.      Post- resuscitation assessment Yes Perfusion exam was performed within 6 hours of septic shock presentation after bolus shows Inadequate tissue perfusion assessed by non-invasive monitoring       Will Start Pressors- Levophed for MAP of 65  Source control achieved by: iv zosym. Vanc, ivfs    Patient has a leukocytosis.  Last trend as follows-   Lab Results   Component Value Date    WBC 28.67 (H) 01/07/2025    WBC 16.19 (H) 01/06/2025    WBC 20.33 (H) 01/05/2025 12/17 wean levophed as tolerates- abxs changed to merrem, cotn vanc - await culture final reports  12/18 wbc slightly imrpoved - cont iv abxs- await final sputum culture - on merrem fro esbl kleb in urine  12/19 cont iv merrem for esbl kleb in urine and proteus in sputum - dc vanc  12/20 wbc improvign - cotn iv merrem  12/24 wbc normal - cont iv abxs  12/25 FM:  Cont GENT.  12/26 FM:  Cont GENT, prognosis poor, pulm/gu/skin infections.  12/27 repeat bc and sputum " culture due to increasing wbc - cont gent; completed 10 days of merrem  12/29/24:  Sputum with gram negative rods, continue gentamicin, susceptibility pending   12/30 wbc improvign  1/2/25 wbc improved but had fever overnight - repeat bcx 2 today - cont iv abxs - back on levophed for bp control  1/3 off of levophed - cont gent; wbc increased but no further temp spikes; bc ng x 1 day  1/4 bc - ng x 2 days - con unasyn and merrem for better actinobacter coverage  1/6 cont iv abxs  1/7 overal worsening of organ fxn- poor prognosis - expect impendign death soon- family aware of prognosis

## 2025-01-08 NOTE — ASSESSMENT & PLAN NOTE
Lasix 20mg iv bid - monitor I/o  12/30 decreae lasix to daily - edema has imrpoved  1/2/25 give extra of lasix today due to edema  1/3 stop lasix due to worsening kidney fxn  1/5 give dose of albumin then lasix push to help edema  1/6 albumin/lasix didn't help much yesterday  1/7 worsening with decreased uop

## 2025-01-08 NOTE — ASSESSMENT & PLAN NOTE
Hyperkalemia is likely due to OTONIEL.The patients most recent potassium results are listed below.  Recent Labs     01/05/25  0355 01/06/25  0343 01/07/25  0358   K 5.1 4.6 5.0       Plan  - Monitor for arrhythmias with EKG and/or continuous telemetry.   - Treat the hyperkalemia with Potassium Binders.   - Monitor potassium: Daily  - The patient's hyperkalemia is  being treated  1/5 imrpovign with lokema  1/6 dc lokema - potassium level better

## 2025-01-08 NOTE — ASSESSMENT & PLAN NOTE
Abd us done 1/2/25 and reviewed - slightly improved today; check ggt  1/4 lfts slowly improving  1/7 worsenign liver fxn

## 2025-01-09 NOTE — DISCHARGE SUMMARY
[facility] Medicine  Discharge Summary      Patient Name: Carlos Chahal  MRN: [id number]  [facility]: [id number]  Patient Class: IP- Inpatient  Admission Date: 12/15/2024  Hospital Length of Stay: 23 days  Discharge Date and Time:    Attending Physician: Gilma att. providers found   Discharging Provider: Kim Diamond MD  Primary Care Provider: Gilma, Primary Doctor    Primary Care Team: Networked reference to record PCT     HPI:   Carlos Chahal is a 34 y.o. male who presents to the ED from nursing home for altered mental status and difficulty breathing.  Patient is found to be hypoxic.  He has a history of anoxic brain injury     This am, pt is on ventilator and levophed at 0.25mcg and ivfs at 75ml/hr.      Spoke with father this am on condition of pt    Procedure(s) (LRB):  DEBRIDEMENT, PRESSURE ULCER (N/A)      Hospital Course:   12/17 ND became more awake yesterday with wound changes - low dose propofol added for pt - able to wean levophed to 0.15mcg.  did tolerate tf last night - check kub this am  12/18 ND pt had to be changed back to ac control last nigth after becoming tachypneic and had low tv.  Tolerating ac mode.  Levophed down to 0.1 mcg  12/19 ND pt toleratign vent - will wean RR today - cont to slowly wean levophed as tolerates - wbc slowly imrpoving - tolerating low dose tfs - will cont to increase tfs as tolerates  12/20 ND tolerating vent - cont to work on feeds and nutritional support  12/21 [place] weekend coverage, in no acute distress, stable vital signs, AC/18/400/5/40%, SpO2 100%. On proprofol for sedation, patient tracks voice and moving head and neck with lid opening. Mild bump in WBC, afebrile, may be associated with the reported residual >200 and tube feeds held. Abdomen, soft and no distension on exam. Will resume as tolerated and monitor.  Blood cx x2, final report no growth. Continue merrem (D5), gentamicin (D2) with mixed MDRO frida from sputum studies and UTI. Replete Mg,  continue abx. Continue daily wound care.   12/22 [place] weekend coverage, overnight rate change and tolerating AC12/400/5/40 SpO2 100%, proprofol 15 mcg/kg/min, levophed 0.1mcg/kg/min. Patient without gag reflex on suction. Tube feeds held overnight and resumed, remains on trickle feeds. Ileostomy output 100.   No associated abdominal distension, patient in no distress. Vent settings weaned. Leukocytosis resolved. Continue IV abx for MDRO coverage.   12/23 ND Pt still havign trouble tolerating tfs -change reglan iv; pt is more awake this am - will change to simv for a few hours today   12/24 ND elevated bs with tpn- will add long acting insulin as 12u and restart tpn - ssi adjusted to moderate scale.  12/25 FM:  Holiday coverage, chart reviewed and case discussed with team.  Patient's Levophed is being weaned slowly and he is not tolerating his tube feeds.  Abdominal exam is soft patient has output via ostomy we will check KUB.  Patient is followed by surgery and has a polymicrobial respiratory and urinary tract infection.  Patient has multiple wounds and has a history of anoxic brain injury and is a long-term nursing home resident.  12/26 FM:  Patient is off of vasopressors this morning, patient's KUB noted and will rechallenge tube feedings today.  Patient is tolerating TPN labs noted and his hemoglobin has continued to trend downward Ms. With no visible blood loss.  Patient's other labs noted his white blood cell count is rising despite appropriate antibiotics based on respiratory and urinary cultures.  Patient's prognosis is poor.  12/27 ND pt  still not tolerating tfs - surgery following -  cont tpn; h/h improved after transfusion.  Updated aunt on pts condition  12/28/24:  Patient seen this morning.  Not tolerating tube feedings at this time, remains on vent support.  Poor prognosis at this time.  Patient with poor urine output despite diuretics.  Trial of albumin infusion followed by lasix today.    12/29/24:   Good response to albumin/lasix combo yesterday with good urine output.  Renal function essentially unchanged.  Sputum culture with gram negative rods, susceptibility pending.  Continue abx for now. Leukocytosis improving, H/H stable.   12/30 ND pt will be switched to simv today to start havign him do more work on breathing.  Awaiting sputum cx results - cxr appears improved and wbc improving.  Still not tolerating tfs  12/31 WC: Patient having ongoing need for ventilator support.  Sputum culture still pending.  Abdominal x-ray reveals no evidence of bowel obstruction.  Tube feeds as tolerated.  1/1/25 WC:  remains vent dependent.  TF as tolerated.  1/2/25 ND PT had to placed back on levophed overnight due to lower bp and had some tachypnea -= abg showed lower pO2 - fio2 increased  tolerating feeds via dibhoff tube.   Also had new temp overnight - leia repeat bc x 2  1/3/25 ND decreased uop yesterday.   Had abd us and reviewed.  Pt off of levophed.   No further temp spike  1/4/25 ND pt had abxs adjusted yesterday for better coverage for actinobacter in lung - current ly on unasyn and merrem per pharm id.  Pt had large blood clot from sacral area last nigth.  And has some decreased bp - back on low dose levophed.    1/5/25 ND pt still not toleratign tube feeds overnight.  Sp 2 uprbcs yesterday with improvement in h/h.  Havign more edema and more blistering of skin with the edema.   Updated aunt on pts condition and that he has poor prognosis - will discuss with his dad  about poss dnr status  1/6/25 ND pt still not tolerating tube feeds - pt has poor denitition which has been causign bleedign from oral cavity.  1/7/25 ND PT has had more organ dysfunction - resp acidosis, worsening renal and liver fxn.  Family updated this am and notified imminent death due to organ failure.  Levophed has been titrated    Family had visited on 1/7/25,  later that evening pt started to become bradycardic then ultimately went aystole - pt  "was pronounced dead on 1/7/25 at 2105 - cause of death - sepsis, actinobacter pneumonia, respiratory failure, renal failure, diabetes type1     Goals of Care Treatment Preferences:  Code Status: DNR       LaPOST: Yes           SDOH Screening:  The patient was unable to be screened for utility difficulties, food insecurity, transport difficulties, housing insecurity, and interpersonal safety, so no concerns could be identified this admission.     Consults:   Consults (From admission, onward)          Status Ordering Provider     Inpatient consult to General Surgery  Once        Provider:  Francoise Diaz MD    Completed ATIF VELARDE            * Sepsis  This patient does have evidence of infective focus  My overall impression is septic shock due to MAP < 65 and SBP < 90.  Source: Respiratory and Urinary Tract  Antibiotics given-   Antibiotics (72h ago, onward)      None          Latest lactate reviewed-  No results for input(s): "LACTATE", "POCLAC" in the last 72 hours.    Organ dysfunction indicated by Acute respiratory failure and Encephalopathy    Fluid challenge Ideal Body Weight- The patient's ideal body weight is Ideal body weight: 66.1 kg (145 lb 11.6 oz) which will be used to calculate fluid bolus of 30 ml/kg for treatment of septic shock.      Post- resuscitation assessment Yes Perfusion exam was performed within 6 hours of septic shock presentation after bolus shows Inadequate tissue perfusion assessed by non-invasive monitoring       Will Start Pressors- Levophed for MAP of 65  Source control achieved by: iv zosym. Vanc, ivfs    Patient has a leukocytosis.  Last trend as follows-   Lab Results   Component Value Date    WBC 28.67 (H) 01/07/2025    WBC 16.19 (H) 01/06/2025    WBC 20.33 (H) 01/05/2025 12/17 wean levophed as tolerates- abxs changed to merrem, cotn vanc - await culture final reports  12/18 wbc slightly imrpoved - cont iv abxs- await final sputum culture - on merrem fro esbl " kleb in urine  12/19 cont iv merrem for esbl kleb in urine and proteus in sputum - dc vanc  12/20 wbc improvign - cotn iv merrem  12/24 wbc normal - cont iv abxs  12/25 FM:  Cont GENT.  12/26 FM:  Cont GENT, prognosis poor, pulm/gu/skin infections.  12/27 repeat bc and sputum culture due to increasing wbc - cont gent; completed 10 days of merrem  12/29/24:  Sputum with gram negative rods, continue gentamicin, susceptibility pending   12/30 wbc improvign  1/2/25 wbc improved but had fever overnight - repeat bcx 2 today - cont iv abxs - back on levophed for bp control  1/3 off of levophed - cont gent; wbc increased but no further temp spikes; bc ng x 1 day  1/4 bc - ng x 2 days - con unasyn and merrem for better actinobacter coverage  1/6 cont iv abxs  1/7 overal worsening of organ fxn- poor prognosis - expect impendign death soon- family aware of prognosis    Pt pronounced dead on 1/7/25 at 2105    Elevated LFTs  Abd us done 1/2/25 and reviewed - slightly improved today; check ggt  1/4 lfts slowly improving  1/7 worsenign liver fxn    Hyperkalemia  Hyperkalemia is likely due to OTONIEL.The patients most recent potassium results are listed below.  Recent Labs     01/05/25  0355 01/06/25  0343 01/07/25  0358   K 5.1 4.6 5.0       Plan  - Monitor for arrhythmias with EKG and/or continuous telemetry.   - Treat the hyperkalemia with Potassium Binders.   - Monitor potassium: Daily  - The patient's hyperkalemia is  being treated  1/5 imrpovign with lokema  1/6 dc lokema - potassium level better          Edema  Lasix 20mg iv bid - monitor I/o  12/30 decreae lasix to daily - edema has imrpoved  1/2/25 give extra of lasix today due to edema  1/3 stop lasix due to worsening kidney fxn  1/5 give dose of albumin then lasix push to help edema  1/6 albumin/lasix didn't help much yesterday  1/7 worsening with decreased uop    Hypokalemia  Patient's most recent potassium results are listed below.   Recent Labs     01/05/25  7810  01/06/25  0343 01/07/25  0358   K 5.1 4.6 5.0       Plan  - Replete potassium per protocol  - Monitor potassium Daily  - Patient's hypokalemia is stable, continue below and monitor  12/20 increase pot bicarb to bid  12/23 decrease pot bicard to daily dosing  12/24 improved- stop potassium bicarb repalcement - monitor    1/3 resolved    Severe malnutrition  Nutrition consulted. Most recent weight and BMI monitored-     Measurements:  Wt Readings from Last 1 Encounters:   12/16/24 68.5 kg (151 lb 0.2 oz)   Body mass index is 23.65 kg/m².    Patient has been screened and assessed by RD.    Malnutrition Type:  Context:    Level:      Malnutrition Characteristic Summary:       Interventions/Recommendations (treatment strategy):  1. Rec'd Continuous EN: Glucerna 1.5 @ 40mL/r increasing by 5 mL q6hrs to goal rate of 55mL/hr with a continuous 40mL FWF to provide 1980kcal (94% EEN), 109g of protein (100% EPN), and 1962mL FW.   2. Néstor BID via PEG tube to promote wound healing and to provide additional nutrition.           - Do not mix Néstor with formula in a tube-feeding bag         - Pour one packet of Néstor in a clean, small container for mixing.           - Add 4 fl oz (120 mL) water at room temperature.           - Mix well with disposable spoon or tongue blade until all particles are completely hydrated.           - Verify correct tube placement.           - Flush feeding tube with 30 mL water.           -Administer Néstor through feeding tube using a 60-mL or larger syringe.           - Flush with an additional 30 mL water.  3. Rec'd ClinimixE 5/20 @ 80mL/hr to provide 1690kcal (80% EEN), 96g of protein (88% EPN), and 1920mL TFV.      -Add 250mL of 20% lipids 3x/week to provide additional calories.      -Check Triglycerides before adding lipids. 4. RD to follow and make rec's accordingly.    12/18 restart tfs today at 1ml/hr to see if can tolerate feeds  12/19 increase tfs as tolerates  12/23 not tolerating tfs- will  get tpn orders and start that until can tolerate tfs better - ncrease regaln 10 mg iv q 6hrs  12/24 con ttfs as tolerates - started on tpn for further nutritional support  12/25 FM:  Check KUB.  12/26 FM:  Resume TF today, monitor.  12/27 on tpn - not tolerating tfs at this time  12/28/24:  Albumin infusion today.   12/31:  tube feeds as tolerated    Patient has protein malnutrition.  Last trend as follows-   Lab Results   Component Value Date    ALBUMIN 0.6 (L) 01/07/2025    ALBUMIN <0.6 (L) 01/06/2025    ALBUMIN 0.6 (L) 01/05/2025 1/2/25 tolerating tfs better with dibhoff tube - cont tpn and IL for now  1/5 will attempt tube feeds again and cont as tolerates    Hypomagnesemia  Patient has Abnormal Magnesium: hypomagnesemia. Will continue to monitor electrolytes closely. Will replace the affected electrolytes and repeat labs to be done after interventions completed. The patient's magnesium results have been reviewed and are listed below.  Recent Labs   Lab 01/07/25  0358   MG 1.7      12/17 mag imrpoving - repalce mag today  12/19 replace mag today  12/20 mag oxide bid  12/21 2g Mg  12/23 dose of iv mag today to keep mag level about 2  12/25 FM:  Replace, recheck.  12/30 replace mag today  1/3 mag level imporved    Pneumonia of right lower lobe due to infectious organism  Patient has a diagnosis of pneumonia. The cause of the pneumonia is suspected to be bacterial in etiology but organism is not known. The pneumonia is stable. The patient has the following signs/symptoms of pneumonia: persistent hypoxia  and sputum production. The patient does have a current oxygen requirement and the patient does not have a home oxygen requirement. I have reviewed the pertinent imaging. The following cultures have been collected: Blood cultures and Sputum culture The culture results are listed below.     Current antimicrobial regimen consists of the antibiotics listed below. Will monitor patient closely and continue current  treatment plan unchanged.    Antibiotics (From admission, onward)      Start     Stop Route Frequency Ordered    01/05/25 0900  meropenem 2 g in 0.9% NaCl 100 mL IVPB         -- IV Every 12 hours (non-standard times) 01/05/25 0731    01/04/25 1300  ampicillin-sulbactam (UNASYN) 9 g in 0.9% NaCl 250 mL IVPB         -- IV Every 8 hours (non-standard times) 01/04/25 0804    12/30/24 1300  erythromycin base tablet 250 mg         -- PER NG TUBE 4 times daily 12/30/24 1111            Microbiology Results (last 7 days)       Procedure Component Value Units Date/Time    Blood culture [[id number]] Collected: 01/02/25 0805    Order Status: Completed Specimen: Blood from Peripheral, Wrist, Left Updated: 01/06/25 2012     Blood Culture, Routine No Growth to date      No Growth to date      No Growth to date      No Growth to date      No Growth to date    Blood culture [[id number]] Collected: 01/02/25 0818    Order Status: Completed Specimen: Blood from Peripheral, Forearm, Left Updated: 01/06/25 2012     Blood Culture, Routine No Growth to date      No Growth to date      No Growth to date      No Growth to date      No Growth to date    Culture, Respiratory with Gram Stain [[id number]]  (Abnormal)  (Susceptibility) Collected: 12/27/24 1522    Order Status: Completed Specimen: Respiratory from Endotracheal Aspirate Updated: 01/03/25 1146     Respiratory Culture No S aureus or Pseudomonas isolated.      ACINETOBACTER BAUMANNII   Many       Gram Stain (Respiratory) Few Gram positive cocci     Gram Stain (Respiratory) Rare Gram positive rods     Gram Stain (Respiratory) Rare WBC's    Blood culture [[id number]] Collected: 12/27/24 0837    Order Status: Completed Specimen: Blood Updated: 01/01/25 1812     Blood Culture, Routine No growth after 5 days.    Blood culture [[id number]] Collected: 12/27/24 0838    Order Status: Completed Specimen: Blood Updated: 01/01/25 1812     Blood Culture, Routine No growth after 5 days.         12/17 dc zosyn with recent esbl kleb in urine - will change to merrem - cont vanc until final cultures obtained - change vent to simv; add nebs  12/18 cotn merrem and vanc - await final sputum cutlure - cont vent on ac control; nebs - wbc imrpoved slightly; lasix 20mg iv for edema today  12/19  dc vanc - proteus grwoign from sputum - cont merrem and nebs; lasix today - wean rr on vent  12/20 wnc improved - cont merrem - nebs and vent - another dose of lasix today for edema - fio2 increased  12/23 cont iv gent and merren due to sent of multiple bugs (acinetobacter/kleb/proteus)  12/24 cont current abxs  12/25 FM:  Cont GENT  12/26 FM:  Cont GENT, poor prognosis.  12/27 on gent - get new sputum culture today due to elevated wbc - cont vent and nebs  12/28/24:Gram positive cocci/rods on sputum, blood cultures negative   12/29/24:  Gram negative rods on sputum cx.   12/30 await final results of sputum cx  12/31: Tailor antibiotics based on sputum culture results  1/1/24:  tailor based on c/s  1/2/25 still awaitin g final culture reports with I&D of sputum; cont current abxs  1/3 sputum culture grew out actinobacter - cont gent  1/4 changed to unasyn and merrem per pharm id recs for better tissue pentration for actinobacter  1/6 cont unasyn/merrem - slight decrease in wbc count  1/7 cont abxs    Sacral wound, sequela  Local wound care with dakins bid  Iv abxs  12/27 prob debridementt in or on 12/30 1/2/25 sp debridement =- wound vac in place    Open wound of groin  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  Recent Labs     01/05/25  0355 01/06/25  0343 01/07/25  0358   HGB 8.1* 7.7* 8.0*   HCT 23.7* 22.3* 21.6*       Plan  - Monitor serial CBC: Daily  - Transfuse PRBC if patient becomes hemodynamically unstable, symptomatic or H/H drops below 7/21.  - Patient has not  received any PRBC transfusions to date  - Patient's anemia is currently worsening. Will adjust treatment as follows: 2uprbcs today  12/17 h/h Improved  12/20 h/h holding  12/26 FM:  Transfuse today.  12/27 hh improved after transfusion  12/28/24:  Continue daily monitoring.  12/30 h/h stable  12/31: Hemoglobin remained stable  1/1/24:  transfuse today  1/2/25 h/h imp[roved after transfusion  1/3 h/h holding  1/4 trasnfuse 2u prbcs today due to recent bleeding from sacral wound with decrease in h/h and bp  1/5 sp 2 uprbcs yesterday with improvement in h/h  1/6 slight decrease in h/h  1/7 h/ h holding    History of anoxic brain injury  12/25 FM:  Poor prognosis.      Urinary tract infection associated with indwelling urethral catheter  Await new urine culture =- currently on zosyn/vanc  12/17 change to merrem/vanc  12/18 has esbl klebsiella - cont merrem  12/23 on merrem  12/25 FM:  Continue Gent, CS reviewed.  12/26 FM:  WBC increasing, monitor, on appropriate abx based on cultures.  12/28/24:  Blood culture negative to date, sputum culture with some gram positive cocci and rods.  Continue abx for now.  Gentamicin per pharmacy to dose.     Tailor antibiotics based on culture and sensitivity    Type 1 diabetes  Patient's FSGs are uncontrolled due to hyperglycemia on current medication regimen.  Last A1c reviewed-   Lab Results   Component Value Date    HGBA1C 6.6 (H) 12/16/2024     Most recent fingerstick glucose reviewed-   Recent Labs   Lab 01/07/25  0659 01/07/25  0829 01/07/25  0957 01/07/25  1147   POCTGLUCOSE 99 114* 101 96       Current correctional scale  Low  Maintain anti-hyperglycemic dose as follows-   Antihyperglycemics (From admission, onward)      Start     Stop Route Frequency Ordered    12/30/24 0900  insulin glargine U-100 (Lantus) pen 20 Units         -- SubQ Daily 12/30/24 0734    12/24/24 0807  insulin aspart U-100 pen 0-10 Units         -- SubQ Every 4 hours PRN 12/24/24 0707          Hold Oral  hypoglycemics while patient is in the hospital.   A1c improved to 6.6%   due to tpn - add lantus 12 u daily - ssi changed to moderate scale   FM:  BS logs noted, continue coverage.   FM:  Cont SSI.   increase lantus to 16u daily  1/3 bs have been better controlled   pt has had hypoglycemia when off of tpn - cotn tpn 24 hrs of the day      Final Active Diagnoses:    Diagnosis Date Noted POA    PRINCIPAL PROBLEM:  Sepsis [A41.9] 2024 Yes    Hyperkalemia [E87.5] 2025 No    Elevated LFTs [R79.89] 2025 No    Edema [R60.9] 2024 Yes    Hypokalemia [E87.6] 2024 No    Severe malnutrition [E43] 2024 Yes    Pneumonia of right lower lobe due to infectious organism [J18.9] 2024 Yes    Hypomagnesemia [E83.42] 2024 Yes    Sacral wound, sequela [S31.000S] 2024 Not Applicable    Open wound of groin [S31.109A] 2024 Yes    Microcytic anemia [D50.9] 2024 Yes    Urinary tract infection associated with indwelling urethral catheter [T83.511A, N39.0] 2024 Yes    History of anoxic brain injury [Z86.69] 2024 Not Applicable    Type 1 diabetes [E10.9] 2024 Yes      Problems Resolved During this Admission:       Discharged Condition:     Disposition:     Follow Up:    Patient Instructions:   No discharge procedures on file.    Significant Diagnostic Studies: Labs: All labs within the past 24 hours have been reviewed    Pending Diagnostic Studies:       None           Medications:  None    Indwelling Lines/Drains at time of discharge:   Lines/Drains/Airways       Peripherally Inserted Central Catheter Line  Duration             PICC Triple Lumen 12/15/24 1452 left brachial 24 days              Drain  Duration                  Ileostomy LLQ -- days         Gastrostomy/Enterostomy 12/15/24 1604 midline feeding 24 days              Airway  Duration                  Airway - Non-Surgical 12/15/24 0922 Endotracheal Tube 24  days                    Time spent on the discharge of patient: 25 minutes         Kmi Diamond MD  Department of Hospital Medicine  [place] - Intensive Care

## 2025-01-09 NOTE — ASSESSMENT & PLAN NOTE
"This patient does have evidence of infective focus  My overall impression is septic shock due to MAP < 65 and SBP < 90.  Source: Respiratory and Urinary Tract  Antibiotics given-   Antibiotics (72h ago, onward)      None          Latest lactate reviewed-  No results for input(s): "LACTATE", "POCLAC" in the last 72 hours.    Organ dysfunction indicated by Acute respiratory failure and Encephalopathy    Fluid challenge Ideal Body Weight- The patient's ideal body weight is Ideal body weight: 66.1 kg (145 lb 11.6 oz) which will be used to calculate fluid bolus of 30 ml/kg for treatment of septic shock.      Post- resuscitation assessment Yes Perfusion exam was performed within 6 hours of septic shock presentation after bolus shows Inadequate tissue perfusion assessed by non-invasive monitoring       Will Start Pressors- Levophed for MAP of 65  Source control achieved by: iv zosym. Vanc, ivfs    Patient has a leukocytosis.  Last trend as follows-   Lab Results   Component Value Date    WBC 28.67 (H) 01/07/2025    WBC 16.19 (H) 01/06/2025    WBC 20.33 (H) 01/05/2025 12/17 wean levophed as tolerates- abxs changed to merrem, cotn vanc - await culture final reports  12/18 wbc slightly imrpoved - cont iv abxs- await final sputum culture - on merrem fro esbl kleb in urine  12/19 cont iv merrem for esbl kleb in urine and proteus in sputum - dc vanc  12/20 wbc improvign - cotn iv merrem  12/24 wbc normal - cont iv abxs  12/25 FM:  Cont GENT.  12/26 FM:  Cont GENT, prognosis poor, pulm/gu/skin infections.  12/27 repeat bc and sputum culture due to increasing wbc - cont gent; completed 10 days of merrem  12/29/24:  Sputum with gram negative rods, continue gentamicin, susceptibility pending   12/30 wbc improvign  1/2/25 wbc improved but had fever overnight - repeat bcx 2 today - cont iv abxs - back on levophed for bp control  1/3 off of levophed - cont gent; wbc increased but no further temp spikes; bc ng x 1 day  1/4 bc - " ng x 2 days - con unasyn and merrem for better actinobacter coverage  1/6 cont iv abxs  1/7 overal worsening of organ fxn- poor prognosis - expect impendign death soon- family aware of prognosis    Pt pronounced dead on 1/7/25 at 2105
